# Patient Record
Sex: FEMALE | Race: WHITE | NOT HISPANIC OR LATINO | Employment: OTHER | URBAN - METROPOLITAN AREA
[De-identification: names, ages, dates, MRNs, and addresses within clinical notes are randomized per-mention and may not be internally consistent; named-entity substitution may affect disease eponyms.]

---

## 2017-01-02 ENCOUNTER — TELEPHONE (OUTPATIENT)
Dept: FAMILY MEDICINE | Facility: CLINIC | Age: 72
End: 2017-01-02

## 2017-01-02 NOTE — Clinical Note
January 26, 2017    Yamel Lindsay  [address] [address]      Dear Yamel Lindsay,     We have tried to contact you about your health, but have been unable to reach you.  Please call us as soon as possible so we can provide you with the best care possible.  We will continue to check in with you throughout the year to complete these items of care, if you are not able to complete these items at this time.  If you would like to complete the missing items for your care, please contact us at [phone].    We recommend the following:   -Complete and return the attached ASTHMA CONTROL TEST.  If your total score is 19 or less or you have been to the ER or urgent care for your asthma, then please schedule an asthma followup appointment.    Sincerely,     Your Care Team at [place]  EN

## 2017-01-02 NOTE — TELEPHONE ENCOUNTER
Please create a telephone encounter and send an ACT Questionnaire to this patient, last ACT score 0. Requested by Magdi.    Once the ACT Questionnaire has been sent out postpone the encounter for 2 weeks for the MA's to follow up with the patient.    Please place ACT Questionnaire order once the questionnaire has been completed and returned to clinic or if the MA's get a score over the phone, they will place the order then. Over the phone score will only be done if the patient has received the ACT Questionnaire in the mail already.    Libertad Stoner MA

## 2017-01-02 NOTE — Clinical Note
January 2, 2017    Yamel Lindsay  [address] [address]    Dear Yamel    We care about your health and have reviewed your health plan. We have reviewed your medical conditions, medication list, and lab results and are making recommendations based on this review, to better manage your health.    You are in particular need of attention regarding:  - Your Asthma, please complete the enclosed asthma questionnaire and mail it back to the clinic.      Here is a list of Health Maintenance topics that are due now or due soon:  Health Maintenance Due   Topic Date Due     HEPATITIS C SCREENING  06/15/1963     DEXA Q3 YR  07/05/2014     COLON CANCER SCREEN (SYSTEM ASSIGNED)  08/29/2015     MAMMO SCREEN Q2 YR (SYSTEM ASSIGNED)  02/18/2016     ADVANCE DIRECTIVE PLANNING Q5 YRS (NO INBASKET)  12/19/2016     We will be calling you in the next couple of weeks to help you schedule any appointments that are needed.  Please call us at [phone] (or use [employer]) to address the above recommendations.     Thank you for trusting [facility] and we appreciate the opportunity to serve you.  We look forward to supporting your healthcare needs in the future.      Healthy Regards,    Your Health Care Team  JPB/EN

## 2017-01-31 ENCOUNTER — TELEPHONE (OUTPATIENT)
Dept: FAMILY MEDICINE | Facility: CLINIC | Age: 72
End: 2017-01-31

## 2017-01-31 NOTE — TELEPHONE ENCOUNTER
Panel Management Review      Patient has the following on her problem list: None      Composite cancer screening  Chart review shows that this patient is due/due soon for the following Mammogram and Colonoscopy  Summary:    Patient is due/failing the following:   COLONOSCOPY and MAMMOGRAM    Action needed:   Patient needs office visit for colonoscopy and mammo.    Type of outreach:    Phone, spoke to patient.  Patient will do when she comes back from vacation     Questions for provider review:    None                                                                                                                                    Esme Strauss CMA       Chart routed to closed chart .

## 2017-02-02 ENCOUNTER — TELEPHONE (OUTPATIENT)
Dept: FAMILY MEDICINE | Facility: CLINIC | Age: 72
End: 2017-02-02

## 2017-02-14 NOTE — TELEPHONE ENCOUNTER
Received ACT back from patient. Score of 23, passing. No further action at this time.  Libertad Stoner MA

## 2017-02-15 ASSESSMENT — ASTHMA QUESTIONNAIRES: ACT_TOTALSCORE: 23

## 2017-02-23 ENCOUNTER — TELEPHONE (OUTPATIENT)
Dept: FAMILY MEDICINE | Facility: CLINIC | Age: 72
End: 2017-02-23

## 2017-02-27 NOTE — TELEPHONE ENCOUNTER
Your last mammogram was 2/18/14 at [facility]. You are due for a screening Mammogram.  Please contact the Diagnostics Registration Department at: [phone] to schedule this appointment.     Specialty Comments have been updated:    Panel Management: Telephone Encounter: Breast Cancer 2/20/17    Luis Root CMA

## 2017-02-28 NOTE — TELEPHONE ENCOUNTER
Talked with patient about mammogram and she will be gone 2 weeks and will call to schedule when comes back. dp    Panel Management Review      Patient has the following on her problem list: None      Composite cancer screening  Chart review shows that this patient is due/due soon for the following Mammogram  Summary:    Patient is due/failing the following:   PHYSICAL    Action needed:   Patient to call for mammogram when back in 2 weeks.    Type of outreach:    Phone, spoke to patient.  about mammogram appointment.     Questions for provider review:    None                                                                                                                                    Sneha Rogers     Chart routed to Care Team .

## 2017-03-02 ENCOUNTER — OFFICE VISIT (OUTPATIENT)
Dept: FAMILY MEDICINE | Facility: CLINIC | Age: 72
End: 2017-03-02
Payer: COMMERCIAL

## 2017-03-02 VITALS
BODY MASS INDEX: 28.21 KG/M2 | OXYGEN SATURATION: 98 % | DIASTOLIC BLOOD PRESSURE: 71 MMHG | SYSTOLIC BLOOD PRESSURE: 112 MMHG | WEIGHT: 158 LBS | TEMPERATURE: 97.2 F

## 2017-03-02 DIAGNOSIS — Z12.31 ENCOUNTER FOR SCREENING MAMMOGRAM FOR BREAST CANCER: ICD-10-CM

## 2017-03-02 DIAGNOSIS — F41.1 GENERALIZED ANXIETY DISORDER: Primary | ICD-10-CM

## 2017-03-02 DIAGNOSIS — F41.9 ANXIETY: ICD-10-CM

## 2017-03-02 DIAGNOSIS — F32.5 MAJOR DEPRESSION IN COMPLETE REMISSION (H): ICD-10-CM

## 2017-03-02 PROCEDURE — 99214 OFFICE O/P EST MOD 30 MIN: CPT | Performed by: FAMILY MEDICINE

## 2017-03-02 RX ORDER — CLONAZEPAM 0.5 MG/1
0.5 TABLET ORAL 3 TIMES DAILY PRN
Qty: 270 TABLET | Refills: 3 | Status: SHIPPED | OUTPATIENT
Start: 2017-03-02 | End: 2017-09-08

## 2017-03-02 ASSESSMENT — ANXIETY QUESTIONNAIRES
6. BECOMING EASILY ANNOYED OR IRRITABLE: NOT AT ALL
1. FEELING NERVOUS, ANXIOUS, OR ON EDGE: NEARLY EVERY DAY
2. NOT BEING ABLE TO STOP OR CONTROL WORRYING: SEVERAL DAYS
5. BEING SO RESTLESS THAT IT IS HARD TO SIT STILL: NOT AT ALL
3. WORRYING TOO MUCH ABOUT DIFFERENT THINGS: SEVERAL DAYS
GAD7 TOTAL SCORE: 7
7. FEELING AFRAID AS IF SOMETHING AWFUL MIGHT HAPPEN: SEVERAL DAYS

## 2017-03-02 ASSESSMENT — PATIENT HEALTH QUESTIONNAIRE - PHQ9: 5. POOR APPETITE OR OVEREATING: SEVERAL DAYS

## 2017-03-02 ASSESSMENT — PAIN SCALES - GENERAL: PAINLEVEL: NO PAIN (0)

## 2017-03-02 NOTE — MR AVS SNAPSHOT
"              After Visit Summary   3/2/2017    Yamel Lindsay    MRN: [id number]           Patient Information     Date Of Birth          1945        Visit Information        Provider Department      3/2/2017 10:40 AM Nimesh Nelson MD [facility]        Today's Diagnoses     Generalized anxiety disorder    -  1    Anxiety        Major depression in complete remission (H)        Encounter for screening mammogram for breast cancer           Follow-ups after your visit        Future tests that were ordered for you today     Open Future Orders        Priority Expected Expires Ordered    *MA Screening Digital Bilateral Routine  3/2/2018 3/2/2017            Who to contact     If you have questions or need follow up information about today's clinic visit or your schedule please contact [facility] directly at [phone].  Normal or non-critical lab and imaging results will be communicated to you by MyChart, letter or phone within 4 business days after the clinic has received the results. If you do not hear from us within 7 days, please contact the clinic through MyChart or phone. If you have a critical or abnormal lab result, we will notify you by phone as soon as possible.  Submit refill requests through [employer] or call your pharmacy and they will forward the refill request to us. Please allow 3 business days for your refill to be completed.          Additional Information About Your Visit        [employer] Information     [employer] lets you send messages to your doctor, view your test results, renew your prescriptions, schedule appointments and more. To sign up, go to [email or web address] . Click on \"Log in\" on the left side of the screen, which will take you to the Welcome page. Then click on \"Sign up Now\" on the right side of the page.     You will be asked to enter the access code listed below, as well as some personal information. Please follow the directions " to create your username and password.     Your access code is: [email or web address]  Expires: 2017 11:57 AM     Your access code will  in 90 days. If you need help or a new code, please call your [facility] or [phone].        Care EveryWhere ID     This is your Care EveryWhere ID. This could be used by other organizations to access your [place] medical records  [id number]        Your Vitals Were     Temperature Pulse Oximetry Breastfeeding? BMI (Body Mass Index)          97.2  F (36.2  C) (Oral) 98% No 28.21 kg/m2         Blood Pressure from Last 3 Encounters:   17 112/71   16 123/81   10/24/16 136/76    Weight from Last 3 Encounters:   17 158 lb (71.7 kg)   16 164 lb (74.4 kg)   10/24/16 164 lb (74.4 kg)                 Today's Medication Changes          These changes are accurate as of: 3/2/17 11:57 AM.  If you have any questions, ask your nurse or doctor.               These medicines have changed or have updated prescriptions.        Dose/Directions    clonazePAM 0.5 MG tablet   Commonly known as:  klonoPIN   This may have changed:    - when to take this  - reasons to take this   Used for:  Anxiety   Changed by:  Nimesh Nelson MD        Dose:  0.5 mg   Take 1 tablet (0.5 mg) by mouth 3 times daily as needed for anxiety   Quantity:  270 tablet   Refills:  3            Where to get your medicines      Some of these will need a paper prescription and others can be bought over the counter.  Ask your nurse if you have questions.     Bring a paper prescription for each of these medications     clonazePAM 0.5 MG tablet                Primary Care Provider Office Phone # Fax #    Catie Fraser [phone]       [facility] [address] [address]        Thank you!     Thank you for choosing [facility]  for your care. Our goal is always to provide you with excellent care. Hearing back from our  patients is one way we can continue to improve our services. Please take a few minutes to complete the written survey that you may receive in the mail after your visit with us. Thank you!             Your Updated Medication List - Protect others around you: Learn how to safely use, store and throw away your medicines at www.disposemymeds.org.          This list is accurate as of: 3/2/17 11:57 AM.  Always use your most recent med list.                   Brand Name Dispense Instructions for use    albuterol 108 (90 BASE) MCG/ACT Inhaler    PROAIR HFA/PROVENTIL HFA/VENTOLIN HFA    1 Inhaler    Inhale 2 puffs into the lungs every 6 hours as needed for shortness of breath / dyspnea or wheezing       CALCIUM 1000 + D PO      Take 1 tablet by mouth       clonazePAM 0.5 MG tablet    klonoPIN    270 tablet    Take 1 tablet (0.5 mg) by mouth 3 times daily as needed for anxiety       Fish Oil 1200 MG Caps      Take 1 capsule by mouth       FLUoxetine 40 MG capsule    PROzac    90 capsule    Take 1 capsule (40 mg) by mouth daily       hydrochlorothiazide 25 MG tablet    HYDRODIURIL    90 tablet    Take 1 tablet (25 mg) by mouth daily       ibuprofen 200 MG tablet    ADVIL/MOTRIN     Take 200-600 mg by mouth every 2 hours as needed for mild pain       omeprazole 10 MG CR capsule    priLOSEC    180 capsule    Take 2 capsules (20 mg) by mouth daily Take by mouth 30-60 minutes before a meal.       pravastatin 20 MG tablet    PRAVACHOL    90 tablet    Take 1 tablet (20 mg) by mouth At Bedtime 1 at bedtime.       PRENATAL VITAMINS PO      Take 1 tablet by mouth daily       traMADol 50 MG tablet    ULTRAM    120 tablet    Take 1 tablet (50 mg) by mouth every 6 hours as needed for pain       traZODone 50 MG tablet    DESYREL    180 tablet    Take 2 tablets (100 mg) by mouth At Bedtime       VITAMIN D-3 PO      Take 2,000 Units by mouth daily

## 2017-03-02 NOTE — LETTER
My Depression Action Plan  Name: Yamel Lindsay   Date of Birth 1945  Date: 3/2/2017    My doctor: Catie Fraser   My clinic: [address] [address] [phone]          GREEN    ZONE   Good Control    What it looks like:     Things are going generally well. You have normal up s and down s. You may even feel depressed from time to time, but bad moods usually last less than a day.   What you need to do:  1. Continue to care for yourself (see self care plan)  2. Check your depression survival kit and update it as needed  3. Follow your physician s recommendations including any medication.  4. Do not stop taking medication unless you consult with your physician first.           YELLOW         ZONE Getting Worse    What it looks like:     Depression is starting to interfere with your life.     It may be hard to get out of bed; you may be starting to isolate yourself from others.    Symptoms of depression are starting to last most all day and this has happened for several days.     You may have suicidal thoughts but they are not constant.   What you need to do:     1. Call your care team, your response to treatment will improve if you keep your care team informed of your progress. Yellow periods are signs an adjustment may need to be made.     2. Continue your self-care, even if you have to fake it!    3. Talk to someone in your support network    4. Open up your depression survival kit           RED    ZONE Medical Alert - Get Help    What it looks like:     Depression is seriously interfering with your life.     You may experience these or other symptoms: You can t get out of bed most days, can t work or engage in other necessary activities, you have trouble taking care of basic hygiene, or basic responsibilities, thoughts of suicide or death that will not go away, self-injurious behavior.     What you need to  do:  1. Call your care team and request a same-day appointment. If they are not available (weekends or after hours) call your local crisis line, emergency room or 911.      Electronically signed by: Nimesh Nelson, March 2, 2017    Depression Self Care Plan / Survival Kit    Self-Care for Depression  Here s the deal. Your body and mind are really not as separate as most people think.  What you do and think affects how you feel and how you feel influences what you do and think. This means if you do things that people who feel good do, it will help you feel better.  Sometimes this is all it takes.  There is also a place for medication and therapy depending on how severe your depression is, so be sure to consult with your medical provider and/ or Behavioral Health Consultant if your symptoms are worsening or not improving.     In order to better manage my stress, I will:    Exercise  Get some form of exercise, every day. This will help reduce pain and release endorphins, the  feel good  chemicals in your brain. This is almost as good as taking antidepressants!  This is not the same as joining a gym and then never going! (they count on that by the way ) It can be as simple as just going for a walk or doing some gardening, anything that will get you moving.      Hygiene   Maintain good hygiene (Get out of bed in the morning, Make your bed, Brush your teeth, Take a shower, and Get dressed like you were going to work, even if you are unemployed).  If your clothes don't fit try to get ones that do.    Diet  I will strive to eat foods that are good for me, drink plenty of water, and avoid excessive sugar, caffeine, alcohol, and other mood-altering substances.  Some foods that are helpful in depression are: complex carbohydrates, B vitamins, flaxseed, fish or fish oil, fresh fruits and vegetables.    Psychotherapy  I agree to participate in Individual Therapy (if recommended).    Medication  If prescribed medications, I  agree to take them.  Missing doses can result in serious side effects.  I understand that drinking alcohol, or other illicit drug use, may cause potential side effects.  I will not stop my medication abruptly without first discussing it with my provider.    Staying Connected With Others  I will stay in touch with my friends, family members, and my primary care provider/team.    Use your imagination  Be creative.  We all have a creative side; it doesn t matter if it s oil painting, sand castles, or mud pies! This will also kick up the endorphins.    Witness Beauty  (AKA stop and smell the roses) Take a look outside, even in mid-winter. Notice colors, textures. Watch the squirrels and birds.     Service to others  Be of service to others.  There is always someone else in need.  By helping others we can  get out of ourselves  and remember the really important things.  This also provides opportunities for practicing all the other parts of the program.    Humor  Laugh and be silly!  Adjust your TV habits for less news and crime-drama and more comedy.    Control your stress  Try breathing deep, massage therapy, biofeedback, and meditation. Find time to relax each day.     My support system    Clinic Contact:  Phone number:    Contact 1:  Phone number:    Contact 2:  Phone number:    Yazidism/:  Phone number:    Therapist:  Phone number:    Local crisis center:    Phone number:    Other community support:  Phone number:

## 2017-03-02 NOTE — NURSING NOTE
"Chief Complaint   Patient presents with     Recheck Medication     Refill Request     Klonopin       Initial /71  Temp 97.2  F (36.2  C) (Oral)  Wt 158 lb (71.7 kg)  SpO2 98%  Breastfeeding? No  BMI 28.21 kg/m2 Estimated body mass index is 28.21 kg/(m^2) as calculated from the following:    Height as of 12/20/16: 5' 2.76\" (1.594 m).    Weight as of this encounter: 158 lb (71.7 kg).  Medication Reconciliation: complete   Francisco Javier VARGAS    Prescription of Klonopin was given to patient at office visit  Date:  3/2/17  Signature:  Francisco Javier VARGAS        "

## 2017-03-03 ASSESSMENT — ANXIETY QUESTIONNAIRES: GAD7 TOTAL SCORE: 7

## 2017-05-01 ENCOUNTER — OFFICE VISIT (OUTPATIENT)
Dept: FAMILY MEDICINE | Facility: CLINIC | Age: 72
End: 2017-05-01
Payer: COMMERCIAL

## 2017-05-01 VITALS
WEIGHT: 160 LBS | HEART RATE: 79 BPM | TEMPERATURE: 98.3 F | SYSTOLIC BLOOD PRESSURE: 154 MMHG | HEIGHT: 63 IN | DIASTOLIC BLOOD PRESSURE: 82 MMHG | BODY MASS INDEX: 28.35 KG/M2

## 2017-05-01 DIAGNOSIS — K21.9 GASTROESOPHAGEAL REFLUX DISEASE WITHOUT ESOPHAGITIS: Primary | ICD-10-CM

## 2017-05-01 DIAGNOSIS — M17.0 OSTEOARTHRITIS OF BOTH KNEES, UNSPECIFIED OSTEOARTHRITIS TYPE: ICD-10-CM

## 2017-05-01 DIAGNOSIS — I10 BENIGN ESSENTIAL HYPERTENSION: ICD-10-CM

## 2017-05-01 PROCEDURE — 99214 OFFICE O/P EST MOD 30 MIN: CPT | Performed by: FAMILY MEDICINE

## 2017-05-01 RX ORDER — HYDROCHLOROTHIAZIDE 25 MG/1
25 TABLET ORAL DAILY
Qty: 90 TABLET | Refills: 3 | Status: SHIPPED | OUTPATIENT
Start: 2017-05-01 | End: 2018-04-16

## 2017-05-01 RX ORDER — OMEPRAZOLE 10 MG/1
20 CAPSULE, DELAYED RELEASE ORAL DAILY
Qty: 180 CAPSULE | Refills: 3 | Status: SHIPPED | OUTPATIENT
Start: 2017-05-01 | End: 2018-03-15

## 2017-05-01 RX ORDER — TRAMADOL HYDROCHLORIDE 50 MG/1
50 TABLET ORAL EVERY 6 HOURS PRN
Qty: 120 TABLET | Refills: 3 | Status: SHIPPED | OUTPATIENT
Start: 2017-05-01 | End: 2017-09-06

## 2017-05-01 NOTE — PATIENT INSTRUCTIONS
Thank you for choosing [facility].  You may be receiving a survey in the mail from [employer] Cisneros regarding your visit today.  Please take a few minutes to complete and return the survey to let us know how we are doing.      If you have questions or concerns, please contact us via [facility] or you can contact your care team at [phone].    Our Clinic hours are:  Monday 6:40 am  to 7:00 pm  Tuesday -Friday 6:40 am to 5:00 pm    The [place] outpatient lab hours are:  Monday - Friday 6:10 am to 4:45 pm  Saturdays 7:00 am to 11:00 am  Appointments are required, call [phone]    If you have clinical questions after hours or would like to schedule an appointment,  call the clinic at [phone].

## 2017-05-01 NOTE — MR AVS SNAPSHOT
After Visit Summary   5/1/2017    Yamel Lindsay    MRN: [id number]           Patient Information     Date Of Birth          1945        Visit Information        Provider Department      5/1/2017 12:40 PM Catie Fraser MD [facility]        Today's Diagnoses     Gastroesophageal reflux disease without esophagitis    -  1    Osteoarthritis of both knees, unspecified osteoarthritis type        Essential hypertension with goal blood pressure less than 130/80          Care Instructions          Thank you for choosing [facility]  You may be receiving a survey in the mail from [employer]is[employer] regarding your visit today.  Please take a few minutes to complete and return the survey to let us know how we are doing.      If you have questions or concerns, please contact us via Yun[employer] or you can contact your care team at [phone].    Our Clinic hours are:  Monday 6:40 am  to 7:00 pm  Tuesday -Friday 6:40 am to 5:00 pm    The [place] outpatient lab hours are:  Monday - Friday 6:10 am to 4:45 pm  Saturdays 7:00 am to 11:00 am  Appointments are required, call [phone]    If you have clinical questions after hours or would like to schedule an appointment,  call the clinic at [phone].        Follow-ups after your visit        Additional Services     ORTHO  REFERRAL       [facility] is referring you to the Orthopedic  Services at [facility].       The  Representative will assist you in the coordination of your Orthopedic and Musculoskeletal Care as prescribed by your physician.    The  Representative will call you within 1 business day to help schedule your appointment, or you may contact the  Representative at:    All areas ~ [phone]     Type of Referral : Surgical / Specialist       Timeframe requested: Routine    Coverage of these services is subject to the terms and limitations  of your health insurance plan.  Please call member services at your health plan with any benefit or coverage questions.      If X-rays, CT or MRI's have been performed, please contact the facility where they were done to arrange for , prior to your scheduled appointment.  Please bring this referral request to your appointment and present it to your specialist.                  Your next 10 appointments already scheduled     May 08, 2017  1:00 PM CDT   SHORT with FIOR ORTIZ/GURU RN   [facility]    [address] [address] [phone]            May 24, 2017  2:45 PM CDT   MA SCREENING DIGITAL BILATERAL with QUINCY   [facility] ([facility])    [address] [address] [phone]           Do not use any powder, lotion or deodorant under your arms or on your breast. If you do, we will ask you to remove it before your exam.  Wear comfortable, two-piece clothing.  If you have any allergies, tell your care team.  Bring any previous mammograms from other facilities or have them mailed to the breast center.              Who to contact     If you have questions or need follow up information about today's clinic visit or your schedule please contact [facility] directly at [phone].  Normal or non-critical lab and imaging results will be communicated to you by MyChart, letter or phone within 4 business days after the clinic has received the results. If you do not hear from us within 7 days, please contact the clinic through MyChart or phone. If you have a critical or abnormal lab result, we will notify you by phone as soon as possible.  Submit refill requests through [employer] or call your pharmacy and they will forward the refill request to us. Please allow 3 business days for your refill to be completed.          Additional Information About Your Visit        [employer] Information     [employer]  "lets you send messages to your doctor, view your test results, renew your prescriptions, schedule appointments and more. To sign up, go to [email or web address] . Click on \"Log in\" on the left side of the screen, which will take you to the Welcome page. Then click on \"Sign up Now\" on the right side of the page.     You will be asked to enter the access code listed below, as well as some personal information. Please follow the directions to create your username and password.     Your access code is: [email or web address]  Expires: 2017 12:57 PM     Your access code will  in 90 days. If you need help or a new code, please call your [place] [facility] clinic or [phone].        Care EveryWhere ID     This is your Care EveryWhere ID. This could be used by other organizations to access your [facility] medical records  [id number]        Your Vitals Were     Pulse Temperature Height BMI (Body Mass Index)          79 98.3  F (36.8  C) (Tympanic) 5' 2.75\" (1.594 m) 28.57 kg/m2         Blood Pressure from Last 3 Encounters:   17 154/82   17 112/71   16 123/81    Weight from Last 3 Encounters:   17 160 lb (72.6 kg)   17 158 lb (71.7 kg)   16 164 lb (74.4 kg)              We Performed the Following     ORTHO  REFERRAL          Where to get your medicines      These medications were sent to [facility] [place], [place] - [address]  [address], [place]. \[facility]"" [id number]     Phone:  [phone]     hydrochlorothiazide 25 MG tablet    omeprazole 10 MG CR capsule         Some of these will need a paper prescription and others can be bought over the counter.  Ask your nurse if you have questions.     Bring a paper prescription for each of these medications     traMADol 50 MG tablet          Primary Care Provider Office Phone # Fax #    Catie Fraser [phone]       [address]" [facility] [place] [address]        Thank you!     Thank you for choosing [facility]  for your care. Our goal is always to provide you with excellent care. Hearing back from our patients is one way we can continue to improve our services. Please take a few minutes to complete the written survey that you may receive in the mail after your visit with us. Thank you!             Your Updated Medication List - Protect others around you: Learn how to safely use, store and throw away your medicines at www.disposemymeds.org.          This list is accurate as of: 5/1/17  1:52 PM.  Always use your most recent med list.                   Brand Name Dispense Instructions for use    albuterol 108 (90 BASE) MCG/ACT Inhaler    PROAIR HFA/PROVENTIL HFA/VENTOLIN HFA    1 Inhaler    Inhale 2 puffs into the lungs every 6 hours as needed for shortness of breath / dyspnea or wheezing       CALCIUM 1000 + D PO      Take 1 tablet by mouth       clonazePAM 0.5 MG tablet    klonoPIN    270 tablet    Take 1 tablet (0.5 mg) by mouth 3 times daily as needed for anxiety       Fish Oil 1200 MG Caps      Take 1 capsule by mouth       FLUoxetine 40 MG capsule    PROzac    90 capsule    Take 1 capsule (40 mg) by mouth daily       hydrochlorothiazide 25 MG tablet    HYDRODIURIL    90 tablet    Take 1 tablet (25 mg) by mouth daily       ibuprofen 200 MG tablet    ADVIL/MOTRIN     Take 200-600 mg by mouth every 2 hours as needed for mild pain       omeprazole 10 MG CR capsule    priLOSEC    180 capsule    Take 2 capsules (20 mg) by mouth daily Take by mouth 30-60 minutes before a meal.       pravastatin 20 MG tablet    PRAVACHOL    90 tablet    Take 1 tablet (20 mg) by mouth At Bedtime 1 at bedtime.       PRENATAL VITAMINS PO      Take 1 tablet by mouth daily       traMADol 50 MG tablet    ULTRAM    120 tablet    Take 1 tablet (50 mg) by mouth every 6 hours as needed for pain       traZODone 50 MG tablet    DESYREL    180 tablet    Take 2  tablets (100 mg) by mouth At Bedtime       VITAMIN D-3 PO      Take 2,000 Units by mouth daily

## 2017-05-01 NOTE — PROGRESS NOTES
SUBJECTIVE:                                                    Yamel Lindsay is a 71 year old female who presents to clinic today for the following health issues:      Hypertension Follow-up  Patient left her medication at a hotel and needs a refill     Outpatient blood pressures are not being checked.    Low Salt Diet: low salt       Chronic Pain Follow-Up       Type / Location of Pain: arthritis in R knees   Analgesia/pain control:       Recent changes:  worse      Overall control: Tolerable with discomfort  Activity level/function:      Daily activities:  Able to do moderate activities    Work:  not applicable  Adverse effects:  No  Adherance    Taking medication as directed?  Yes    Participating in other treatments: yes  Risk Factors:    Sleep:  Good    Mood/anxiety:  controlled    Recent family or social stressors:  none noted    Other aggravating factors: none  PHQ-9 SCORE 2/22/2016 10/24/2016 12/20/2016   Total Score - - -   Total Score 2 5 12     PAOLA-7 SCORE 10/24/2016 12/20/2016 3/2/2017   Total Score - - -   Total Score 9 15 7     Encounter-Level CSA - 02/20/2015:                 Controlled Substance Agreement - Scan on 2/26/2015  9:06 AM : Controlled Medication Agreement-  2/20/15 (below)                 Amount of exercise or physical activity: 2-3 days/week for an average of 15-30 minutes    Problems taking medications regularly: No    Medication side effects: none    Diet: low salt    Medication Followup of HCTZ Prilosec and tramadol    Taking Medication as prescribed: yes    Side Effects:  None    Medication Helping Symptoms:  yes     Joint Pain     Onset: L knee pain     Description: Patient's l/knee will buckle and throw her off balance happens about 3-4 times daily   Location: L knee  Character: Sharp and haylie     Intensity: mild    Progression of Symptoms: same    Accompanying Signs & Symptoms:  Other symptoms: none   History:   Previous similar pain: no       Precipitating factors:   Trauma  or overuse: no     Alleviating factors:  Improved by: nothing       Therapies Tried and outcome: none     Patient is a 71 yr old female here for medication refills. She is on tramadol for chronic knee pain. She reports that she has not been seen by any orthopedist for this. I recommended that perhaps she should consider this .       Problem list and histories reviewed & adjusted, as indicated.  Additional history: as documented    Patient Active Problem List   Diagnosis     Hyperlipidemia LDL goal <130     Primary localized osteoarthrosis, lower leg     Generalized anxiety disorder     Rhinitis, allergic seasonal     Alcoholism (H)     Mild recurrent major depression (H)     Advanced directives, counseling/discussion     Seasonal allergic rhinitis     Hip pain     GERD (gastroesophageal reflux disease)     S/P laparoscopic hernia repair     OA (osteoarthritis) of knee     Mild intermittent asthma without complication     Major depression in complete remission (H)     Benign essential hypertension     Past Surgical History:   Procedure Laterality Date     ESOPHAGOSCOPY, GASTROSCOPY, DUODENOSCOPY (EGD), COMBINED N/A 1/5/2015    Procedure: COMBINED ESOPHAGOSCOPY, GASTROSCOPY, DUODENOSCOPY (EGD), BIOPSY SINGLE OR MULTIPLE;  Surgeon: Dylan Alejandra MD;  Location: [facility]     LAPAROSCOPIC HERNIORRHAPHY INGUINAL Right 1/26/2015    Procedure: LAPAROSCOPIC HERNIORRHAPHY INGUINAL;  Surgeon: Favio Olsen MD;  Location: [facility]     TUBAL LIGATION         Social History   Substance Use Topics     Smoking status: Former Smoker     Packs/day: 0.50     Types: Cigarettes     Quit date: 6/16/2011     Smokeless tobacco: Never Used     Alcohol use No     Family History   Problem Relation Age of Onset     CANCER Mother      C.A.D. Father      Lipids Father      Hypertension Father      Hypertension Brother      CANCER Brother      esophageal cancer     Breast Cancer Other      Prostate Cancer Brother      Psychotic Disorder Daughter       DIABETES No family hx of      CEREBROVASCULAR DISEASE No family hx of      Cancer - colorectal No family hx of          Current Outpatient Prescriptions   Medication Sig Dispense Refill     traMADol (ULTRAM) 50 MG tablet Take 1 tablet (50 mg) by mouth every 6 hours as needed for pain 120 tablet 3     omeprazole (PRILOSEC) 10 MG CR capsule Take 2 capsules (20 mg) by mouth daily Take by mouth 30-60 minutes before a meal. 180 capsule 3     hydrochlorothiazide (HYDRODIURIL) 25 MG tablet Take 1 tablet (25 mg) by mouth daily 90 tablet 3     clonazePAM (KLONOPIN) 0.5 MG tablet Take 1 tablet (0.5 mg) by mouth 3 times daily as needed for anxiety 270 tablet 3     FLUoxetine (PROZAC) 40 MG capsule Take 1 capsule (40 mg) by mouth daily 90 capsule 3     albuterol (PROAIR HFA/PROVENTIL HFA/VENTOLIN HFA) 108 (90 BASE) MCG/ACT Inhaler Inhale 2 puffs into the lungs every 6 hours as needed for shortness of breath / dyspnea or wheezing 1 Inhaler 3     pravastatin (PRAVACHOL) 20 MG tablet Take 1 tablet (20 mg) by mouth At Bedtime 1 at bedtime. 90 tablet 3     traZODone (DESYREL) 50 MG tablet Take 2 tablets (100 mg) by mouth At Bedtime 180 tablet 3     Calcium Carb-Cholecalciferol (CALCIUM 1000 + D PO) Take 1 tablet by mouth       Omega-3 Fatty Acids (FISH OIL) 1200 MG CAPS Take 1 capsule by mouth       PRENATAL VITAMINS PO Take 1 tablet by mouth daily        Cholecalciferol (VITAMIN D-3 PO) Take 2,000 Units by mouth daily        ibuprofen (ADVIL,MOTRIN) 200 MG tablet Take 200-600 mg by mouth every 2 hours as needed for mild pain       Allergies   Allergen Reactions     Nka [No Known Allergies]      Seasonal Allergies      BP Readings from Last 3 Encounters:   05/01/17 154/82   03/02/17 112/71   12/20/16 123/81    Wt Readings from Last 3 Encounters:   05/01/17 160 lb (72.6 kg)   03/02/17 158 lb (71.7 kg)   12/20/16 164 lb (74.4 kg)                  Labs reviewed in EPIC    Reviewed and updated as needed this visit by clinical  "staff  Allergies       Reviewed and updated as needed this visit by Provider         ROS:  Constitutional, HEENT, cardiovascular, pulmonary, gi and gu systems are negative, except as otherwise noted.    OBJECTIVE:                                                    /82  Pulse 79  Temp 98.3  F (36.8  C) (Tympanic)  Ht 5' 2.75\" (1.594 m)  Wt 160 lb (72.6 kg)  BMI 28.57 kg/m2  Body mass index is 28.57 kg/(m^2).  GENERAL: healthy, alert and no distress  NECK: no adenopathy, no asymmetry, masses, or scars and thyroid normal to palpation  RESP: lungs clear to auscultation - no rales, rhonchi or wheezes  CV: regular rate and rhythm, normal S1 S2, no S3 or S4, no murmur, click or rub, no peripheral edema and peripheral pulses strong  ABDOMEN: soft, nontender, no hepatosplenomegaly, no masses and bowel sounds normal  MS: no gross musculoskeletal defects noted, no edema    Diagnostic Test Results:  none      ASSESSMENT/PLAN:                                                    (K21.9) Gastroesophageal reflux disease without esophagitis  (primary encounter diagnosis)  Comment: medication refilled  Plan: omeprazole (PRILOSEC) 10 MG CR capsule            (M17.0) Osteoarthritis of both knees, unspecified osteoarthritis type  Comment: Medication refilled  Plan: traMADol (ULTRAM) 50 MG tablet, ORTHO         REFERRAL              (I10) Benign essential hypertension  Comment: High today. She will come back for a recheck   Plan: hydrochlorothiazide (HYDRODIURIL) 25 MG tablet                FUTURE APPOINTMENTS:       - Follow-up visit as needed.    Catie Fraser MD  [facility]  "

## 2017-05-04 PROBLEM — I10 BENIGN ESSENTIAL HYPERTENSION: Status: ACTIVE | Noted: 2017-05-04

## 2017-05-08 ENCOUNTER — TELEPHONE (OUTPATIENT)
Dept: FAMILY MEDICINE | Facility: CLINIC | Age: 72
End: 2017-05-08

## 2017-05-08 ENCOUNTER — OFFICE VISIT (OUTPATIENT)
Dept: FAMILY MEDICINE | Facility: CLINIC | Age: 72
End: 2017-05-08
Payer: COMMERCIAL

## 2017-05-08 ENCOUNTER — HOSPITAL ENCOUNTER (OUTPATIENT)
Dept: MAMMOGRAPHY | Facility: CLINIC | Age: 72
Discharge: HOME | End: 2017-05-08
Attending: FAMILY MEDICINE | Admitting: FAMILY MEDICINE
Payer: MEDICARE

## 2017-05-08 VITALS — SYSTOLIC BLOOD PRESSURE: 131 MMHG | HEART RATE: 68 BPM | DIASTOLIC BLOOD PRESSURE: 89 MMHG

## 2017-05-08 DIAGNOSIS — Z12.31 ENCOUNTER FOR SCREENING MAMMOGRAM FOR BREAST CANCER: ICD-10-CM

## 2017-05-08 DIAGNOSIS — I10 BENIGN ESSENTIAL HYPERTENSION: Primary | ICD-10-CM

## 2017-05-08 DIAGNOSIS — E78.5 HYPERLIPIDEMIA LDL GOAL <130: ICD-10-CM

## 2017-05-08 PROCEDURE — G0202 SCR MAMMO BI INCL CAD: HCPCS

## 2017-05-08 PROCEDURE — 99207 ZZC NO CHARGE NURSE ONLY: CPT

## 2017-05-08 PROCEDURE — 77063 BREAST TOMOSYNTHESIS BI: CPT

## 2017-05-08 NOTE — MR AVS SNAPSHOT
After Visit Summary   5/8/2017    Yamel Lindsay    MRN: [id number]           Patient Information     Date Of Birth          1945        Visit Information        Provider Department      5/8/2017 1:00 PM FIOR ORTIZ/GURU PETERSON [facility]        Today's Diagnoses     Benign essential hypertension    -  1    Hyperlipidemia LDL goal <130           Follow-ups after your visit        Your next 10 appointments already scheduled     May 24, 2017  2:45 PM CDT   MA SCREENING DIGITAL BILATERAL with [facility]   [facility] ([facility])    [address] [place] [address] [phone]           Do not use any powder, lotion or deodorant under your arms or on your breast. If you do, we will ask you to remove it before your exam.  Wear comfortable, two-piece clothing.  If you have any allergies, tell your care team.  Bring any previous mammograms from other facilities or have them mailed to the breast center.              Future tests that were ordered for you today     Open Future Orders        Priority Expected Expires Ordered    Basic metabolic panel Routine  8/31/2017 5/8/2017    Lipid Profile with reflex to direct LDL Routine  8/31/2017 5/8/2017            Who to contact     If you have questions or need follow up information about today's clinic visit or your schedule please contact [facility] directly at [phone].  Normal or non-critical lab and imaging results will be communicated to you by MyChart, letter or phone within 4 business days after the clinic has received the results. If you do not hear from us within 7 days, please contact the clinic through MyChart or phone. If you have a critical or abnormal lab result, we will notify you by phone as soon as possible.  Submit refill requests through [facility] or call your pharmacy and they will forward the refill request to us. Please allow 3 business days for your refill to be completed.           "Additional Information About Your Visit        MyChart Information     [employer] lets you send messages to your doctor, view your test results, renew your prescriptions, schedule appointments and more. To sign up, go to [email or web address] . Click on \"Log in\" on the left side of the screen, which will take you to the Welcome page. Then click on \"Sign up Now\" on the right side of the page.     You will be asked to enter the access code listed below, as well as some personal information. Please follow the directions to create your username and password.     Your access code is: [email or web address]  Expires: 2017 12:57 PM     Your access code will  in 90 days. If you need help or a new code, please call your [facility] clinic or [phone].        Care EveryWhere ID     This is your Care EveryWhere ID. This could be used by other organizations to access your [facility] medical records  [id number]        Your Vitals Were     Pulse                   68            Blood Pressure from Last 3 Encounters:   17 131/89   17 154/82   17 112/71    Weight from Last 3 Encounters:   17 160 lb (72.6 kg)   17 158 lb (71.7 kg)   16 164 lb (74.4 kg)               Primary Care Provider Office Phone # Fax #    Shanaana lilia Taj Fraser [phone]       [facility] [address] [address]        Thank you!     Thank you for choosing [facility]  for your care. Our goal is always to provide you with excellent care. Hearing back from our patients is one way we can continue to improve our services. Please take a few minutes to complete the written survey that you may receive in the mail after your visit with us. Thank you!             Your Updated Medication List - Protect others around you: Learn how to safely use, store and throw away your medicines at www.disposemymeds.org.          This list is accurate as of: 17  1:31 PM.  Always use " your most recent med list.                   Brand Name Dispense Instructions for use    albuterol 108 (90 BASE) MCG/ACT Inhaler    PROAIR HFA/PROVENTIL HFA/VENTOLIN HFA    1 Inhaler    Inhale 2 puffs into the lungs every 6 hours as needed for shortness of breath / dyspnea or wheezing       CALCIUM 1000 + D PO      Take 1 tablet by mouth       clonazePAM 0.5 MG tablet    klonoPIN    270 tablet    Take 1 tablet (0.5 mg) by mouth 3 times daily as needed for anxiety       Fish Oil 1200 MG Caps      Take 1 capsule by mouth       FLUoxetine 40 MG capsule    PROzac    90 capsule    Take 1 capsule (40 mg) by mouth daily       hydrochlorothiazide 25 MG tablet    HYDRODIURIL    90 tablet    Take 1 tablet (25 mg) by mouth daily       ibuprofen 200 MG tablet    ADVIL/MOTRIN     Take 200-600 mg by mouth every 2 hours as needed for mild pain       omeprazole 10 MG CR capsule    priLOSEC    180 capsule    Take 2 capsules (20 mg) by mouth daily Take by mouth 30-60 minutes before a meal.       pravastatin 20 MG tablet    PRAVACHOL    90 tablet    Take 1 tablet (20 mg) by mouth At Bedtime 1 at bedtime.       PRENATAL VITAMINS PO      Take 1 tablet by mouth daily       traMADol 50 MG tablet    ULTRAM    120 tablet    Take 1 tablet (50 mg) by mouth every 6 hours as needed for pain       traZODone 50 MG tablet    DESYREL    180 tablet    Take 2 tablets (100 mg) by mouth At Bedtime       VITAMIN D-3 PO      Take 2,000 Units by mouth daily

## 2017-05-08 NOTE — TELEPHONE ENCOUNTER
Pt returned for blood pressure recheck. Pt was last seen 5/1/17 and her blood pressure was noted to be elevated.  Pt explained that she had been out of town and accidentally left her medications at the hotel she was staying. Pt says she called the hotel but no lost meds were turned into hotel staff.   Pt updates that she has been on her medications for one week.     Pt is at goal now. 138/74.  Recheck a few minutes later is 131/89, pulse of 68.     EPIC was slow and not up while I was seeing pt.     After she left, I saw that she was due for lab work.  Lab orders placed.     Left message for patient to return a call to the clinic RN.      Gemma Mendieta RN              Lab Results   Component Value Date     CR 0.57 01/26/2015            Lab Results   Component Value Date     POTASSIUM 3.2 01/26/2015

## 2017-05-08 NOTE — PROGRESS NOTES
Pt returned for blood pressure recheck. Pt was last seen 5/1/17 and her blood pressure was noted to be elevated.  Pt explained that she had been out of town and accidentally left her medications at the hotel she was staying. Pt says she called the hotel but no lost meds were turned into hotel staff.   Pt updates that she has been on her medications for one week.     Pt is at goal now. 138/74.  Recheck a few minutes later is 131/89, pulse of 68.     EPIC was slow and not up while I was seeing pt.     After she left, I saw that she was due for lab work.     Left message for patient to return a call to the clinic RN.      Gemma Mendieta RN

## 2017-05-08 NOTE — NURSING NOTE
Pt returned for blood pressure recheck.  Pt was last seen 5/1/17 and her blood pressure was noted to be elevated.  Pt explained that she had been out of town and accidentally left her medications at the hotel she was staying.  Pt says she called the hotel but no lost meds were turned into hotel staff.   Pt updates that she has been on her medications for one week.    Pt is at goal now.  138/74.  Recheck a few minutes later is 131/89, pulse of 68.    EPIC was slow and not up while I was seeing pt.    After she left, I saw that she was due for lab work.    Left message for patient to return a call to the clinic RN.        Gemma Mendieta RN      Lab Results   Component Value Date    CR 0.57 01/26/2015     Lab Results   Component Value Date    POTASSIUM 3.2 01/26/2015

## 2017-05-09 ENCOUNTER — TRANSFERRED RECORDS (OUTPATIENT)
Dept: HEALTH INFORMATION MANAGEMENT | Facility: CLINIC | Age: 72
End: 2017-05-09

## 2017-05-09 DIAGNOSIS — I10 BENIGN ESSENTIAL HYPERTENSION: ICD-10-CM

## 2017-05-09 DIAGNOSIS — E78.5 HYPERLIPIDEMIA LDL GOAL <130: ICD-10-CM

## 2017-05-09 LAB
ANION GAP SERPL CALCULATED.3IONS-SCNC: 7 MMOL/L (ref 3–14)
BUN SERPL-MCNC: 10 MG/DL (ref 7–30)
CALCIUM SERPL-MCNC: 8.9 MG/DL (ref 8.5–10.1)
CHLORIDE SERPL-SCNC: 96 MMOL/L (ref 94–109)
CHOLEST SERPL-MCNC: 197 MG/DL
CO2 SERPL-SCNC: 34 MMOL/L (ref 20–32)
CREAT SERPL-MCNC: 0.66 MG/DL (ref 0.52–1.04)
GFR SERPL CREATININE-BSD FRML MDRD: 89 ML/MIN/1.7M2
GLUCOSE SERPL-MCNC: 81 MG/DL (ref 70–99)
HDLC SERPL-MCNC: 47 MG/DL
LDLC SERPL CALC-MCNC: 127 MG/DL
NONHDLC SERPL-MCNC: 150 MG/DL
POTASSIUM SERPL-SCNC: 4.4 MMOL/L (ref 3.4–5.3)
SODIUM SERPL-SCNC: 137 MMOL/L (ref 133–144)
TRIGL SERPL-MCNC: 113 MG/DL

## 2017-05-09 PROCEDURE — 80048 BASIC METABOLIC PNL TOTAL CA: CPT | Performed by: FAMILY MEDICINE

## 2017-05-09 PROCEDURE — 36415 COLL VENOUS BLD VENIPUNCTURE: CPT | Performed by: FAMILY MEDICINE

## 2017-05-09 PROCEDURE — 80061 LIPID PANEL: CPT | Performed by: FAMILY MEDICINE

## 2017-05-09 NOTE — LETTER
[facility]  [address] [address]  Phone: [phone]  Fax: [phone]    May 10, 2017    Yamel Lindsay  [address] [address]          Dear Ms. Lindsay,    The results of your recent lab tests were: Enclosed is a copy of these results.  If you have any further questions or problems, BMP ) was within normal parameters. Her good cholesterol was a bit low and her LDL which is the bad cholesterol went up a bit.She is advised to continue with healthy living . please contact our office.    Sincerely,      Ctaie Fraser MD / jelani

## 2017-05-10 NOTE — PROGRESS NOTES
Please inform patient that test result ( BMP ) was within normal parameters. Her good cholesterol was a bit low and her LDL which is the bad cholesterol went up a bit.She is advised to continue with healthy living .     Thank you.     Catie Fraser M.D.

## 2017-05-11 ENCOUNTER — THERAPY VISIT (OUTPATIENT)
Dept: PHYSICAL THERAPY | Facility: CLINIC | Age: 72
End: 2017-05-11
Payer: COMMERCIAL

## 2017-05-11 DIAGNOSIS — M25.562 ACUTE PAIN OF LEFT KNEE: ICD-10-CM

## 2017-05-11 DIAGNOSIS — M25.561 CHRONIC PAIN OF RIGHT KNEE: Primary | ICD-10-CM

## 2017-05-11 DIAGNOSIS — G89.29 CHRONIC PAIN OF RIGHT KNEE: Primary | ICD-10-CM

## 2017-05-11 PROCEDURE — 97110 THERAPEUTIC EXERCISES: CPT | Mod: GP | Performed by: PHYSICAL THERAPIST

## 2017-05-11 PROCEDURE — 97161 PT EVAL LOW COMPLEX 20 MIN: CPT | Mod: GP | Performed by: PHYSICAL THERAPIST

## 2017-05-11 ASSESSMENT — ACTIVITIES OF DAILY LIVING (ADL)
SIT WITH YOUR KNEE BENT: ACTIVITY IS VERY DIFFICULT
PAIN: THE SYMPTOM AFFECTS MY ACTIVITY MODERATELY
SQUAT: ACTIVITY IS FAIRLY DIFFICULT
STAND: ACTIVITY IS MINIMALLY DIFFICULT
KNEE_ACTIVITY_OF_DAILY_LIVING_SCORE: 57.14
RISE FROM A CHAIR: ACTIVITY IS SOMEWHAT DIFFICULT
WEAKNESS: I HAVE THE SYMPTOM BUT IT DOES NOT AFFECT MY ACTIVITY
RAW_SCORE: 40
KNEEL ON THE FRONT OF YOUR KNEE: ACTIVITY IS FAIRLY DIFFICULT
AS_A_RESULT_OF_YOUR_KNEE_INJURY,_HOW_WOULD_YOU_RATE_YOUR_CURRENT_LEVEL_OF_DAILY_ACTIVITY?: NEARLY NORMAL
GIVING WAY, BUCKLING OR SHIFTING OF KNEE: THE SYMPTOM AFFECTS MY ACTIVITY SEVERELY
GO UP STAIRS: ACTIVITY IS MINIMALLY DIFFICULT
SWELLING: I HAVE THE SYMPTOM BUT IT DOES NOT AFFECT MY ACTIVITY
HOW_WOULD_YOU_RATE_THE_OVERALL_FUNCTION_OF_YOUR_KNEE_DURING_YOUR_USUAL_DAILY_ACTIVITIES?: NEARLY NORMAL
STIFFNESS: I HAVE THE SYMPTOM BUT IT DOES NOT AFFECT MY ACTIVITY
WALK: ACTIVITY IS SOMEWHAT DIFFICULT
GO DOWN STAIRS: ACTIVITY IS MINIMALLY DIFFICULT
LIMPING: THE SYMPTOM AFFECTS MY ACTIVITY MODERATELY
KNEE_ACTIVITY_OF_DAILY_LIVING_SUM: 40

## 2017-05-11 NOTE — PROGRESS NOTES
[facility] Initial Evaluation -- Lower Extremity    Evaluation Date: May 11, 2017  Yamel Lindsay is a 71 year old female with a (R) knee condition.   Referral: Ortho  Work mechanical stresses: NA   Employment status: Retired  Leisure mechanical stresses: Walking 40 min-1hr daily  Functional disability score: See KOS  VAS score (0-10): 2-4/10 pain varying intensity  Patient goals/expectations:  Reduce pain with activities    HISTORY:    Present symptoms: (L) ant/lat knee, (R) ant knee.  R > L  Pain quality (sharp/shooting/stabbing/aching/burning/cramping):  achy    Present since (onset date): MD referral date 5/9/17.  (R) knee 15 yrs (L) knee 1 month ago  Symptoms (improving/unchaning/worsening):  Worsening (R) knee, unchanging (L).    Symptoms commenced as a result of: No apparent reason   Condition occurred in the following environment: Unknown     Symptoms at onset: (R) knee  Paresthesia (yes/no):  No  Spinal history: Yes, intermittent 2 x week depending on activity but lasts usually 1 day.  Ongoing like this for years  Cough/Sneeze (pos/neg):  Neg    Constant symptoms: None  Intermittent symptoms: As above.  (L) knee only 2-3 x week when she gets buckling of knee then pain and limping the remaining part of day but then subsides.    Symptoms are worse with the following: Sometimes Sitting, Sometimes Walking, Sometimes On the move and Sometimes Sleeping (prone/sup/side R/L) - Wakes regardless of position, Time of day - always am   Symptoms are better with the following: Sometimes When still    Continued use makes the pain (better/worse/no effect): No effect    Disturbed night (yes/no): Yes      Pain at rest (yes/no):  Yes  Site (back/hip/knee/ankle/foot):  (R) Knee    Other questions (swelling/clicking/locking/giving way/falling):  Buckling - Left knee     Previous episodes: Yes  Previous treatments: cortisone injections -10 years ago    Specific Questions:  General health  (excellent/good/fair/poor):  Excellent  Pertinent medical history includes: Osteoporosis and Depression  Medications (nil/NSAIDS/analg/steroids/anticoag/other):  Narcotics/Opiods and Other - Anti-depressants  Medical allergies:  None  Imaging (NA/Xray/MRI):  Xray  Recent or major surgery (yes/no):  No  Night pain (yes/no):  No  Accidents (yes/no):  No  Unexplained weight loss (yes/no):  No  Barriers at home: None  Other red flags: None    Sites for physical examination (back/hip/knee/ankle/foot/other): Knee's    EXAMINATION    Posture:  Sitting (good/fair/poor): NA    Correction of Posture (better/worse/no effect/NA): NA  Standing (good/fair/poor): NA  Other observations:  NA    Neurological: (NA/motor/sensory/reflexes/dural): NA    Baselines (pain or functional activity): Painful walking, squatting    Extremities (Hip / Knee / Ankle / Foot): Knee    Movement Loss Juan Jose Mod Min Nil Pain   Flexion   X  (R) 132/ERP; (L) 140   Extension   X  (R) 0/ERP; (L) -2     Passive Movement (+/-) over pressure:  (R) ext min loss, flex min loss/ERP; (L) WNL  Resisted Test Response (pain): Quad 5/5 (B); H-S 5/5 (B)  Other Tests: NA    Spine:  Movement loss: NA  Effect of repeated movements: NA  Effect of static positioning: NA  Spine testing (not relevant/relevant/secondary problem): Secondary problem?    Baseline Symptoms: 3/10 pain (R) knee w/squatting, ROM as above  Repeated Tests Symptom Response Mechanical Response   Active/Passive movement, resisted test, functional test During -  Produce, Abolish, Increase, Decrease, NE After -  Better, Worse, NB, NW, NE Effect -   ? or ? ROM, strength or key functional test No   Effect   Passive extension w/self OP (standing) Produce  (R) ant knee No Worse  X   Eccentric ext w/resistance Produce  (R) ant knee No Worse X  Ext ROM = contra X  Pain w/squat or Flex ROM   Passive flexion w/OP Produce  (R) ant knee No Worse X  Flex = contralateral  Abolishes pain with squat X  Ext ROM remains  =  contralateral   Effect of static positioning       NA         Provisional Classification (Extremity/Spine):  Extremity - Derangement      Princicple of Management:   Education:  Etiology, specificity of exercise in treatment of pain    Equipment provided:  None  Exercise and dosage:  Repeated flex w/OP x 10-15 reps, 3-4 x daily.  Perform this on (L) side when noticing symptoms and assess response.  If improves continue, if worsens, instructed to perform repeated extension w/self OP.    ASSESSMENT/PLAN:    Patient is a 71 year old female with right side knee complaints.    Patient has the following significant findings with corresponding treatment plan.                Diagnosis 1:  (B) Knee Pain    Pain -  self management, education, directional preference exercise and home program  Decreased ROM/flexibility - manual therapy, therapeutic exercise and home program  Decreased joint mobility - manual therapy, therapeutic exercise and home program  Decreased strength - therapeutic exercise, therapeutic activities and home program  Impaired muscle performance - neuro re-education and home program  Decreased function - therapeutic activities and home program    Therapy Evaluation Codes:   1) History comprised of:   Personal factors that impact the plan of care:      None.    Comorbidity factors that impact the plan of care are:      Depression.     Medications impacting care: Anti-depressant.  2) Examination of Body Systems comprised of:   Body structures and functions that impact the plan of care:      Knee.   Activity limitations that impact the plan of care are:      Sitting, Squatting/kneeling and Walking.  3) Clinical presentation characteristics are:   Stable/Uncomplicated.  4) Decision-Making    Moderate complexity using standardized patient assessment instrument and/or measureable assessment of functional outcome.  Cumulative Therapy Evaluation is: Low complexity.    Previous and current functional limitations:   (See Goal Flow Sheet for this information)    Short term and Long term goals: (See Goal Flow Sheet for this information)     Communication ability:  Patient appears to be able to clearly communicate and understand verbal and written communication and follow directions correctly.  Treatment Explanation - The following has been discussed with the patient:   RX ordered/plan of care  Anticipated outcomes  Possible risks and side effects  This patient would benefit from PT intervention to resume normal activities.   Rehab potential is good.    Frequency:  1 X week, once daily  Duration:  for 6 weeks  Discharge Plan:  Achieve all LTG.  Independent in home treatment program.  Reach maximal therapeutic benefit.    Please refer to the daily flowsheet for treatment today, total treatment time and time spent performing 1:1 timed codes.

## 2017-05-17 ENCOUNTER — OFFICE VISIT (OUTPATIENT)
Dept: FAMILY MEDICINE | Facility: CLINIC | Age: 72
End: 2017-05-17
Payer: COMMERCIAL

## 2017-05-17 VITALS
DIASTOLIC BLOOD PRESSURE: 68 MMHG | TEMPERATURE: 97 F | WEIGHT: 160 LBS | OXYGEN SATURATION: 94 % | BODY MASS INDEX: 28.57 KG/M2 | SYSTOLIC BLOOD PRESSURE: 126 MMHG | HEART RATE: 71 BPM

## 2017-05-17 DIAGNOSIS — R82.90 ABNORMAL URINE FINDING: ICD-10-CM

## 2017-05-17 DIAGNOSIS — R30.0 DYSURIA: Primary | ICD-10-CM

## 2017-05-17 LAB
ALBUMIN UR-MCNC: NEGATIVE MG/DL
APPEARANCE UR: CLEAR
BACTERIA #/AREA URNS HPF: ABNORMAL /HPF
BILIRUB UR QL STRIP: NEGATIVE
COLOR UR AUTO: YELLOW
GLUCOSE UR STRIP-MCNC: NEGATIVE MG/DL
HGB UR QL STRIP: ABNORMAL
KETONES UR STRIP-MCNC: NEGATIVE MG/DL
LEUKOCYTE ESTERASE UR QL STRIP: ABNORMAL
NITRATE UR QL: NEGATIVE
NON-SQ EPI CELLS #/AREA URNS LPF: ABNORMAL /LPF
PH UR STRIP: 6 PH (ref 5–7)
RBC #/AREA URNS AUTO: ABNORMAL /HPF (ref 0–2)
SP GR UR STRIP: <=1.005 (ref 1–1.03)
URN SPEC COLLECT METH UR: ABNORMAL
URNS CMNT MICRO: ABNORMAL
UROBILINOGEN UR STRIP-ACNC: 0.2 EU/DL (ref 0.2–1)
WBC #/AREA URNS AUTO: ABNORMAL /HPF (ref 0–2)

## 2017-05-17 PROCEDURE — 81001 URINALYSIS AUTO W/SCOPE: CPT | Performed by: NURSE PRACTITIONER

## 2017-05-17 PROCEDURE — 99213 OFFICE O/P EST LOW 20 MIN: CPT | Performed by: NURSE PRACTITIONER

## 2017-05-17 PROCEDURE — 87088 URINE BACTERIA CULTURE: CPT | Performed by: NURSE PRACTITIONER

## 2017-05-17 PROCEDURE — 87186 SC STD MICRODIL/AGAR DIL: CPT | Performed by: NURSE PRACTITIONER

## 2017-05-17 PROCEDURE — 87086 URINE CULTURE/COLONY COUNT: CPT | Performed by: NURSE PRACTITIONER

## 2017-05-17 RX ORDER — SULFAMETHOXAZOLE/TRIMETHOPRIM 800-160 MG
1 TABLET ORAL 2 TIMES DAILY
Qty: 6 TABLET | Refills: 0 | Status: SHIPPED | OUTPATIENT
Start: 2017-05-17 | End: 2017-05-20

## 2017-05-17 NOTE — NURSING NOTE
"Chief Complaint   Patient presents with     UTI       Initial /68 (BP Location: Left arm, Patient Position: Chair, Cuff Size: Adult Regular)  Pulse 71  Temp 97  F (36.1  C) (Oral)  Wt 160 lb (72.6 kg)  SpO2 94%  BMI 28.57 kg/m2 Estimated body mass index is 28.57 kg/(m^2) as calculated from the following:    Height as of 5/1/17: 5' 2.75\" (1.594 m).    Weight as of this encounter: 160 lb (72.6 kg).  Medication Reconciliation: complete   Marry GOLDBERG CMA (AAMA)      "

## 2017-05-17 NOTE — PROGRESS NOTES
SUBJECTIVE:                                                    Yamel Lindsay is a 71 year old female who presents to clinic today for the following health issues:        URINARY TRACT SYMPTOMS     Onset: x1 days    Description:   Painful urination (Dysuria): YES  Blood in urine (Hematuria): no   Delay in urine (Hesitency): no     Intensity: moderate    Progression of Symptoms:  same    Accompanying Signs & Symptoms:  Fever/chills: no   Flank pain no   Nausea and vomiting: no   Any vaginal symptoms: none  Abdominal/Pelvic Pain: no    History:   History of frequent UTI's: no   History of kidney stones: no   Sexually Active: no   Possibility of pregnancy: No    Precipitating factors:   none         Therapies Tried and outcome: Increase fluid intake      Problem list and histories reviewed & adjusted, as indicated.  Additional history: as documented    Patient Active Problem List   Diagnosis     Hyperlipidemia LDL goal <130     Primary localized osteoarthrosis, lower leg     Generalized anxiety disorder     Rhinitis, allergic seasonal     Alcoholism (H)     Mild recurrent major depression (H)     Advanced directives, counseling/discussion     Seasonal allergic rhinitis     Hip pain     GERD (gastroesophageal reflux disease)     S/P laparoscopic hernia repair     OA (osteoarthritis) of knee     Mild intermittent asthma without complication     Major depression in complete remission (H)     Benign essential hypertension     Chronic pain of right knee     Acute pain of left knee     Past Surgical History:   Procedure Laterality Date     ESOPHAGOSCOPY, GASTROSCOPY, DUODENOSCOPY (EGD), COMBINED N/A 1/5/2015    Procedure: COMBINED ESOPHAGOSCOPY, GASTROSCOPY, DUODENOSCOPY (EGD), BIOPSY SINGLE OR MULTIPLE;  Surgeon: Dylan Alejandra MD;  Location: [facility]     LAPAROSCOPIC HERNIORRHAPHY INGUINAL Right 1/26/2015    Procedure: LAPAROSCOPIC HERNIORRHAPHY INGUINAL;  Surgeon: Favio Olsen MD;  Location: [facility]     TUBAL LIGATION          Social History   Substance Use Topics     Smoking status: Former Smoker     Packs/day: 0.50     Types: Cigarettes     Quit date: 6/16/2011     Smokeless tobacco: Never Used     Alcohol use No     Family History   Problem Relation Age of Onset     CANCER Mother      C.A.D. Father      Lipids Father      Hypertension Father      Hypertension Brother      CANCER Brother      esophageal cancer     Breast Cancer Other      Prostate Cancer Brother      Psychotic Disorder Daughter      DIABETES No family hx of      CEREBROVASCULAR DISEASE No family hx of      Cancer - colorectal No family hx of          Current Outpatient Prescriptions   Medication Sig Dispense Refill     sulfamethoxazole-trimethoprim (BACTRIM DS/SEPTRA DS) 800-160 MG per tablet Take 1 tablet by mouth 2 times daily for 3 days 6 tablet 0     traMADol (ULTRAM) 50 MG tablet Take 1 tablet (50 mg) by mouth every 6 hours as needed for pain 120 tablet 3     omeprazole (PRILOSEC) 10 MG CR capsule Take 2 capsules (20 mg) by mouth daily Take by mouth 30-60 minutes before a meal. 180 capsule 3     hydrochlorothiazide (HYDRODIURIL) 25 MG tablet Take 1 tablet (25 mg) by mouth daily 90 tablet 3     clonazePAM (KLONOPIN) 0.5 MG tablet Take 1 tablet (0.5 mg) by mouth 3 times daily as needed for anxiety 270 tablet 3     FLUoxetine (PROZAC) 40 MG capsule Take 1 capsule (40 mg) by mouth daily 90 capsule 3     albuterol (PROAIR HFA/PROVENTIL HFA/VENTOLIN HFA) 108 (90 BASE) MCG/ACT Inhaler Inhale 2 puffs into the lungs every 6 hours as needed for shortness of breath / dyspnea or wheezing 1 Inhaler 3     ibuprofen (ADVIL,MOTRIN) 200 MG tablet Take 200-600 mg by mouth every 2 hours as needed for mild pain       pravastatin (PRAVACHOL) 20 MG tablet Take 1 tablet (20 mg) by mouth At Bedtime 1 at bedtime. 90 tablet 3     traZODone (DESYREL) 50 MG tablet Take 2 tablets (100 mg) by mouth At Bedtime 180 tablet 3     Calcium Carb-Cholecalciferol (CALCIUM 1000 + D PO) Take 1  tablet by mouth       Omega-3 Fatty Acids (FISH OIL) 1200 MG CAPS Take 1 capsule by mouth       PRENATAL VITAMINS PO Take 1 tablet by mouth daily        Cholecalciferol (VITAMIN D-3 PO) Take 2,000 Units by mouth daily        Allergies   Allergen Reactions     Nka [No Known Allergies]      Seasonal Allergies      BP Readings from Last 3 Encounters:   05/17/17 126/68   05/08/17 131/89   05/01/17 154/82    Wt Readings from Last 3 Encounters:   05/17/17 160 lb (72.6 kg)   05/01/17 160 lb (72.6 kg)   03/02/17 158 lb (71.7 kg)                  Labs reviewed in EPIC    Reviewed and updated as needed this visit by clinical staff  Tobacco  Allergies  Meds  Med Hx  Surg Hx  Fam Hx  Soc Hx      Reviewed and updated as needed this visit by Provider         ROS:  Constitutional, HEENT, cardiovascular, pulmonary, gi and gu systems are negative, except as otherwise noted.    OBJECTIVE:                                                    /68 (BP Location: Left arm, Patient Position: Chair, Cuff Size: Adult Regular)  Pulse 71  Temp 97  F (36.1  C) (Oral)  Wt 160 lb (72.6 kg)  SpO2 94%  BMI 28.57 kg/m2  Body mass index is 28.57 kg/(m^2).  GENERAL: healthy, alert and no distress  RESP: lungs clear to auscultation - no rales, rhonchi or wheezes  CV: regular rate and rhythm, normal S1 S2, no S3 or S4, no murmur, click or rub, no peripheral edema and peripheral pulses strong  ABDOMEN: soft, nontender, no hepatosplenomegaly, no masses and bowel sounds normal  MS: no gross musculoskeletal defects noted, no edema    Diagnostic Test Results:  Results for orders placed or performed in visit on 05/17/17 (from the past 24 hour(s))   *UA reflex to Microscopic and Culture ([place] and [facility]] (except [place] and [place])   Result Value Ref Range    Color Urine Yellow     Appearance Urine Clear     Glucose Urine Negative NEG mg/dL    Bilirubin Urine Negative NEG    Ketones Urine Negative NEG mg/dL    Specific Gravity  Urine <=1.005 1.003 - 1.035    Blood Urine Small (A) NEG    pH Urine 6.0 5.0 - 7.0 pH    Protein Albumin Urine Negative NEG mg/dL    Urobilinogen Urine 0.2 0.2 - 1.0 EU/dL    Nitrite Urine Negative NEG    Leukocyte Esterase Urine Large (A) NEG    Source Midstream Urine    Urine Microscopic   Result Value Ref Range    WBC Urine 25-50 (A) 0 - 2 /HPF    RBC Urine 5-10 (A) 0 - 2 /HPF    Squamous Epithelial /LPF Urine Few FEW /LPF    Bacteria Urine Few (A) NEG /HPF    Comment Urine WBC CLUMPS PRESENT         ASSESSMENT/PLAN:                                                      1. Dysuria    - *UA reflex to Microscopic and Culture ([place] and [facility] (except [place] and [place])  - sulfamethoxazole-trimethoprim (BACTRIM DS/SEPTRA DS) 800-160 MG per tablet; Take 1 tablet by mouth 2 times daily for 3 days  Dispense: 6 tablet; Refill: 0  - Urine Microscopic    2. Abnormal urine finding    - Urine Culture Aerobic Bacterial    FUTURE APPOINTMENTS:       - Follow-up for annual visit or as needed    NAYA Santana CNP  [facility] IR[facility]

## 2017-05-17 NOTE — PATIENT INSTRUCTIONS
[facility]    If you have any questions regarding to your visit please contact your care team:     Team Pink:   Clinic Hours Telephone Number   Internal Medicine:  Dr. Stella Riley NP       7am-7pm  Monday - Thursday   7am-5pm  Fridays  [phone]  (Appointment scheduling available 24/7)    Questions about your visit?  Team Line  [phone]   Urgent Care - [place] [facility] and [facility] - 11am-9pm Monday-Friday Saturday-Sunday- 9am-5pm   [place] - 5pm-9pm Monday-Friday Saturday-Sunday- 9am-5pm  [phone] - [place]   [phone] - [place]       What options do I have for visits at the clinic other than the traditional office visit?  To expand how we care for you, many of our providers are utilizing electronic visits (e-visits) and telephone visits, when medically appropriate, for interactions with their patients rather than a visit in the clinic.   We also offer nurse visits for many medical concerns. Just like any other service, we will bill your insurance company for this type of visit based on time spent on the phone with your provider. Not all insurance companies cover these visits. Please check with your medical insurance if this type of visit is covered. You will be responsible for any charges that are not paid by your insurance.      E-visits via [employer]:  generally incur a $35.00 fee.  Telephone visits:  Time spent on the phone: *charged based on time that is spent on the phone in increments of 10 minutes. Estimated cost:   5-10 mins $30.00   11-20 mins. $59.00   21-30 mins. $85.00   Use [employer] (secure email communication and access to your chart) to send your primary care provider a message or make an appointment. Ask someone on your Team how to sign up for [employer].    For a Price Quote for your services, please call our Consumer Price Line at [phone].    As always, Thank you for trusting us with your health care  needs!    Discharged by Marry GOLDBERG CMA ([facility])

## 2017-05-17 NOTE — MR AVS SNAPSHOT
After Visit Summary   5/17/2017    Yamel Lindsay    MRN: [id number]           Patient Information     Date Of Birth          1945        Visit Information        Provider Department      5/17/2017 3:20 PM Pamella Riley APRN [facility]        Today's Diagnoses     Dysuria    -  1      Care Instructions    [facility]    If you have any questions regarding to your visit please contact your care team:     Team Pink:   Clinic Hours Telephone Number   Internal Medicine:  Dr. Stella Riley, NP       7am-7pm  Monday - Thursday   7am-5pm  Fridays  [phone]  (Appointment scheduling available 24/7)    Questions about your visit?  Team Line  [phone]   Urgent Care - [place] and [facility] [place] [place] - 11am-9pm Monday-Friday Saturday-Sunday- 9am-5pm   [place] - 5pm-9pm Monday-Friday Saturday-Sunday- 9am-5pm  [phone] - [place]   [phone] - [place]       What options do I have for visits at the clinic other than the traditional office visit?  To expand how we care for you, many of our providers are utilizing electronic visits (e-visits) and telephone visits, when medically appropriate, for interactions with their patients rather than a visit in the clinic.   We also offer nurse visits for many medical concerns. Just like any other service, we will bill your insurance company for this type of visit based on time spent on the phone with your provider. Not all insurance companies cover these visits. Please check with your medical insurance if this type of visit is covered. You will be responsible for any charges that are not paid by your insurance.      E-visits via [employer]:  generally incur a $35.00 fee.  Telephone visits:  Time spent on the phone: *charged based on time that is spent on the phone in increments of 10 minutes. Estimated cost:   5-10 mins $30.00   11-20 mins. $59.00   21-30 mins. $85.00  "  Use Synetiqhart (secure email communication and access to your chart) to send your primary care provider a message or make an appointment. Ask someone on your Team how to sign up for Presence[employer].    For a Price Quote for your services, please call our Consumer Price Line at [phone].    As always, Thank you for trusting us with your health care needs!    Discharged by Marry GOLDBERG CMA ([facility])          Follow-ups after your visit        Your next 10 appointments already scheduled     May 18, 2017  1:30 PM CDT   DENEEN Extremity with Rhett Edwards PT   [facility]  [facility] ([facility] [place])    [address]   [place] [address] [phone]              Who to contact     If you have questions or need follow up information about today's clinic visit or your schedule please contact [facility] directly at [phone].  Normal or non-critical lab and imaging results will be communicated to you by Synetiqhart, letter or phone within 4 business days after the clinic has received the results. If you do not hear from us within 7 days, please contact the clinic through Synetiqhart or phone. If you have a critical or abnormal lab result, we will notify you by phone as soon as possible.  Submit refill requests through PowerPot or call your pharmacy and they will forward the refill request to us. Please allow 3 business days for your refill to be completed.          Additional Information About Your Visit        PowerPot Information     PowerPot lets you send messages to your doctor, view your test results, renew your prescriptions, schedule appointments and more. To sign up, go to [email or web address] . Click on \"Log in\" on the left side of the screen, which will take you to the Welcome page. Then click on \"Sign up Now\" on the right side of the page.     You will be asked to enter the access code listed below, as well as some personal information. Please follow the " directions to create your username and password.     Your access code is: [email or web address]  Expires: 2017 12:57 PM     Your access code will  in 90 days. If you need help or a new code, please call your [place] clinic or [phone].        Care EveryWhere ID     This is your Care EveryWhere ID. This could be used by other organizations to access your [place] medical records  [id number]        Your Vitals Were     Pulse Temperature Pulse Oximetry BMI (Body Mass Index)          71 97  F (36.1  C) (Oral) 94% 28.57 kg/m2         Blood Pressure from Last 3 Encounters:   17 126/68   17 131/89   17 154/82    Weight from Last 3 Encounters:   17 160 lb (72.6 kg)   17 160 lb (72.6 kg)   17 158 lb (71.7 kg)              We Performed the Following     *UA reflex to Microscopic and Culture ([place] [facility] (except [place] and [place])          Today's Medication Changes          These changes are accurate as of: 17  3:50 PM.  If you have any questions, ask your nurse or doctor.               Start taking these medicines.        Dose/Directions    sulfamethoxazole-trimethoprim 800-160 MG per tablet   Commonly known as:  BACTRIM DS/SEPTRA DS   Used for:  Dysuria   Started by:  Pamella Riley APRN CNP        Dose:  1 tablet   Take 1 tablet by mouth 2 times daily for 3 days   Quantity:  6 tablet   Refills:  0            Where to get your medicines      These medications were sent to [facility] [place] [facility] [place], [place] - [address]  [address], [place]. \[facility].\"" [address]     Phone:  [phone]     sulfamethoxazole-trimethoprim 800-160 MG per tablet                Primary Care Provider Office Phone # Fax #    Catie Fraser [phone]       [facility] [address] [address]        Thank you!     Thank you for choosing [facility]  for your care.  Our goal is always to provide you with excellent care. Hearing back from our patients is one way we can continue to improve our services. Please take a few minutes to complete the written survey that you may receive in the mail after your visit with us. Thank you!             Your Updated Medication List - Protect others around you: Learn how to safely use, store and throw away your medicines at www.disposemymeds.org.          This list is accurate as of: 5/17/17  3:50 PM.  Always use your most recent med list.                   Brand Name Dispense Instructions for use    albuterol 108 (90 BASE) MCG/ACT Inhaler    PROAIR HFA/PROVENTIL HFA/VENTOLIN HFA    1 Inhaler    Inhale 2 puffs into the lungs every 6 hours as needed for shortness of breath / dyspnea or wheezing       CALCIUM 1000 + D PO      Take 1 tablet by mouth       clonazePAM 0.5 MG tablet    klonoPIN    270 tablet    Take 1 tablet (0.5 mg) by mouth 3 times daily as needed for anxiety       Fish Oil 1200 MG Caps      Take 1 capsule by mouth       FLUoxetine 40 MG capsule    PROzac    90 capsule    Take 1 capsule (40 mg) by mouth daily       hydrochlorothiazide 25 MG tablet    HYDRODIURIL    90 tablet    Take 1 tablet (25 mg) by mouth daily       ibuprofen 200 MG tablet    ADVIL/MOTRIN     Take 200-600 mg by mouth every 2 hours as needed for mild pain       omeprazole 10 MG CR capsule    priLOSEC    180 capsule    Take 2 capsules (20 mg) by mouth daily Take by mouth 30-60 minutes before a meal.       pravastatin 20 MG tablet    PRAVACHOL    90 tablet    Take 1 tablet (20 mg) by mouth At Bedtime 1 at bedtime.       PRENATAL VITAMINS PO      Take 1 tablet by mouth daily       sulfamethoxazole-trimethoprim 800-160 MG per tablet    BACTRIM DS/SEPTRA DS    6 tablet    Take 1 tablet by mouth 2 times daily for 3 days       traMADol 50 MG tablet    ULTRAM    120 tablet    Take 1 tablet (50 mg) by mouth every 6 hours as needed for pain       traZODone 50 MG tablet     DESYREL    180 tablet    Take 2 tablets (100 mg) by mouth At Bedtime       VITAMIN D-3 PO      Take 2,000 Units by mouth daily

## 2017-05-18 LAB
BACTERIA SPEC CULT: ABNORMAL
MICRO REPORT STATUS: ABNORMAL
MICROORGANISM SPEC CULT: ABNORMAL
SPECIMEN SOURCE: ABNORMAL

## 2017-05-19 NOTE — PROGRESS NOTES
Patient prescribed Bactrim.  Bacteria is susceptible to antibiotic prescribed.    Pamella Riley, CNP

## 2017-05-22 ENCOUNTER — OFFICE VISIT (OUTPATIENT)
Dept: FAMILY MEDICINE | Facility: CLINIC | Age: 72
End: 2017-05-22
Payer: COMMERCIAL

## 2017-05-22 VITALS
BODY MASS INDEX: 28 KG/M2 | SYSTOLIC BLOOD PRESSURE: 123 MMHG | TEMPERATURE: 98.4 F | DIASTOLIC BLOOD PRESSURE: 74 MMHG | WEIGHT: 158 LBS | HEIGHT: 63 IN | HEART RATE: 75 BPM

## 2017-05-22 DIAGNOSIS — E78.5 HYPERLIPIDEMIA LDL GOAL <100: Primary | ICD-10-CM

## 2017-05-22 PROCEDURE — 99213 OFFICE O/P EST LOW 20 MIN: CPT | Performed by: FAMILY MEDICINE

## 2017-05-22 ASSESSMENT — PATIENT HEALTH QUESTIONNAIRE - PHQ9: 5. POOR APPETITE OR OVEREATING: SEVERAL DAYS

## 2017-05-22 ASSESSMENT — ANXIETY QUESTIONNAIRES
5. BEING SO RESTLESS THAT IT IS HARD TO SIT STILL: MORE THAN HALF THE DAYS
2. NOT BEING ABLE TO STOP OR CONTROL WORRYING: SEVERAL DAYS
3. WORRYING TOO MUCH ABOUT DIFFERENT THINGS: SEVERAL DAYS
7. FEELING AFRAID AS IF SOMETHING AWFUL MIGHT HAPPEN: MORE THAN HALF THE DAYS
GAD7 TOTAL SCORE: 10
6. BECOMING EASILY ANNOYED OR IRRITABLE: MORE THAN HALF THE DAYS
1. FEELING NERVOUS, ANXIOUS, OR ON EDGE: SEVERAL DAYS

## 2017-05-22 NOTE — PATIENT INSTRUCTIONS
Thank you for choosing [facility].  You may be receiving a survey in the mail from [employer] regarding your visit today.  Please take a few minutes to complete and return the survey to let us know how we are doing.      If you have questions or concerns, please contact us via [employer] or you can contact your care team at [phone].    Our Clinic hours are:  Monday 6:40 am  to 7:00 pm  Tuesday -Friday 6:40 am to 5:00 pm    The [place] outpatient lab hours are:  Monday - Friday 6:10 am to 4:45 pm  Saturdays 7:00 am to 11:00 am  Appointments are required, call [phone]    If you have clinical questions after hours or would like to schedule an appointment,  call the clinic at [phone].

## 2017-05-22 NOTE — MR AVS SNAPSHOT
After Visit Summary   5/22/2017    Yamel Lindsay    MRN: [id number]           Patient Information     Date Of Birth          1945        Visit Information        Provider Department      5/22/2017 2:00 PM Catie Fraser MD [facility]        Today's Diagnoses     Hyperlipidemia LDL goal <100    -  1      Care Instructions          Thank you for choosing [facility]  You may be receiving a survey in the mail from [facility] regarding your visit today.  Please take a few minutes to complete and return the survey to let us know how we are doing.      If you have questions or concerns, please contact us via Sense.ly or you can contact your care team at [phone].    Our Clinic hours are:  Monday 6:40 am  to 7:00 pm  Tuesday -Friday 6:40 am to 5:00 pm    The [place] outpatient lab hours are:  Monday - Friday 6:10 am to 4:45 pm  Saturdays 7:00 am to 11:00 am  Appointments are required, call [phone]    If you have clinical questions after hours or would like to schedule an appointment,  call the clinic at [phone].        Follow-ups after your visit        Who to contact     If you have questions or need follow up information about today's clinic visit or your schedule please contact [facility] directly at [phone].  Normal or non-critical lab and imaging results will be communicated to you by [employer]oking GmbHhart, letter or phone within 4 business days after the clinic has received the results. If you do not hear from us within 7 days, please contact the clinic through [employer] or phone. If you have a critical or abnormal lab result, we will notify you by phone as soon as possible.  Submit refill requests through Sense.ly or call your pharmacy and they will forward the refill request to us. Please allow 3 business days for your refill to be completed.          Additional Information About Your Visit        lingoking GmbHhart Information     Sense.ly lets you send  "messages to your doctor, view your test results, renew your prescriptions, schedule appointments and more. To sign up, go to [email or web address] . Click on \"Log in\" on the left side of the screen, which will take you to the Welcome page. Then click on \"Sign up Now\" on the right side of the page.     You will be asked to enter the access code listed below, as well as some personal information. Please follow the directions to create your username and password.     Your access code is: [email or web address]  Expires: 2017 12:57 PM     Your access code will  in 90 days. If you need help or a new code, please call your [place] clinic or [phone].        Care EveryWhere ID     This is your [facility] EveryWhere ID. This could be used by other organizations to access your [place] medical records  [id number]        Your Vitals Were     Pulse Temperature Height BMI (Body Mass Index)          75 98.4  F (36.9  C) (Tympanic) 5' 2.75\" (1.594 m) 28.21 kg/m2         Blood Pressure from Last 3 Encounters:   17 123/74   17 126/68   17 131/89    Weight from Last 3 Encounters:   17 158 lb (71.7 kg)   17 160 lb (72.6 kg)   17 160 lb (72.6 kg)              We Performed the Following     DEPRESSION ACTION PLAN (DAP)        Primary Care Provider Office Phone # Fax #    Catie Fraser [phone]       [facility] [address] [facility] [address]        Thank you!     Thank you for choosing [facility]  for your care. Our goal is always to provide you with excellent care. Hearing back from our patients is one way we can continue to improve our services. Please take a few minutes to complete the written survey that you may receive in the mail after your visit with us. Thank you!             Your Updated Medication List - Protect others around you: Learn how to safely use, store and throw away your medicines at www.disposemymeds.org.        "   This list is accurate as of: 5/22/17 11:59 PM.  Always use your most recent med list.                   Brand Name Dispense Instructions for use    albuterol 108 (90 BASE) MCG/ACT Inhaler    PROAIR HFA/PROVENTIL HFA/VENTOLIN HFA    1 Inhaler    Inhale 2 puffs into the lungs every 6 hours as needed for shortness of breath / dyspnea or wheezing       CALCIUM 1000 + D PO      Take 1 tablet by mouth       clonazePAM 0.5 MG tablet    klonoPIN    270 tablet    Take 1 tablet (0.5 mg) by mouth 3 times daily as needed for anxiety       Fish Oil 1200 MG Caps      Take 1 capsule by mouth       FLUoxetine 40 MG capsule    PROzac    90 capsule    Take 1 capsule (40 mg) by mouth daily       hydrochlorothiazide 25 MG tablet    HYDRODIURIL    90 tablet    Take 1 tablet (25 mg) by mouth daily       ibuprofen 200 MG tablet    ADVIL/MOTRIN     Take 200-600 mg by mouth every 2 hours as needed for mild pain       omeprazole 10 MG CR capsule    priLOSEC    180 capsule    Take 2 capsules (20 mg) by mouth daily Take by mouth 30-60 minutes before a meal.       pravastatin 20 MG tablet    PRAVACHOL    90 tablet    Take 1 tablet (20 mg) by mouth At Bedtime 1 at bedtime.       PRENATAL VITAMINS PO      Take 1 tablet by mouth daily       traMADol 50 MG tablet    ULTRAM    120 tablet    Take 1 tablet (50 mg) by mouth every 6 hours as needed for pain       traZODone 50 MG tablet    DESYREL    180 tablet    Take 2 tablets (100 mg) by mouth At Bedtime       VITAMIN D-3 PO      Take 2,000 Units by mouth daily

## 2017-05-22 NOTE — PROGRESS NOTES
SUBJECTIVE:                                                    Yamel Lindsay is a 71 year old female who presents to clinic today for the following health issues:      Patient is here to go over result of blood test     Component      Latest Ref Rng & Units 5/9/2017 5/17/2017   Sodium      133 - 144 mmol/L 137    Potassium      3.4 - 5.3 mmol/L 4.4    Chloride      94 - 109 mmol/L 96    Carbon Dioxide      20 - 32 mmol/L 34 (H)    Anion Gap      3 - 14 mmol/L 7    Glucose      70 - 99 mg/dL 81    Urea Nitrogen      7 - 30 mg/dL 10    Creatinine      0.52 - 1.04 mg/dL 0.66    GFR Estimate      >60 mL/min/1.7m2 89    GFR Estimate If Black      >60 mL/min/1.7m2 >90 . . .    Calcium      8.5 - 10.1 mg/dL 8.9    Cholesterol      <200 mg/dL 197    Triglycerides      <150 mg/dL 113    HDL Cholesterol      >49 mg/dL 47 (L)    LDL Cholesterol Calculated      <100 mg/dL 127 (H)    Non HDL Cholesterol      <130 mg/dL 150 (H)    WBC Urine      0 - 2 /HPF  25-50 (A)   RBC Urine      0 - 2 /HPF  5-10 (A)   Squamous Epithelial /LPF Urine      FEW /LPF  Few   Bacteria Urine      NEG /HPF  Few (A)   Comment Urine        WBC CLUMPS PRESENT     The 10-year ASCVD risk score (Helper JOHNY Jr, et al., 2013) is: 9%    Values used to calculate the score:      Age: 71 years      Sex: Female      Is Non- : No      Diabetic: No      Tobacco smoker: No      Systolic Blood Pressure: 123 mmHg      Is BP treated: No      HDL Cholesterol: 47 mg/dL      Total Cholesterol: 197 mg/dL      Problem list and histories reviewed & adjusted, as indicated.  Additional history: as documented    Patient Active Problem List   Diagnosis     Hyperlipidemia LDL goal <130     Primary localized osteoarthrosis, lower leg     Generalized anxiety disorder     Rhinitis, allergic seasonal     Alcoholism (H)     Mild recurrent major depression (H)     Advanced directives, counseling/discussion     Seasonal allergic rhinitis     Hip pain     GERD  (gastroesophageal reflux disease)     S/P laparoscopic hernia repair     OA (osteoarthritis) of knee     Mild intermittent asthma without complication     Major depression in complete remission (H)     Benign essential hypertension     Chronic pain of right knee     Acute pain of left knee     Past Surgical History:   Procedure Laterality Date     ESOPHAGOSCOPY, GASTROSCOPY, DUODENOSCOPY (EGD), COMBINED N/A 1/5/2015    Procedure: COMBINED ESOPHAGOSCOPY, GASTROSCOPY, DUODENOSCOPY (EGD), BIOPSY SINGLE OR MULTIPLE;  Surgeon: Dylan Alejandra MD;  Location: [facility]     LAPAROSCOPIC HERNIORRHAPHY INGUINAL Right 1/26/2015    Procedure: LAPAROSCOPIC HERNIORRHAPHY INGUINAL;  Surgeon: Favio Olsen MD;  Location: [facility]     TUBAL LIGATION         Social History   Substance Use Topics     Smoking status: Former Smoker     Packs/day: 0.50     Types: Cigarettes     Quit date: 6/16/2011     Smokeless tobacco: Never Used     Alcohol use No     Family History   Problem Relation Age of Onset     CANCER Mother      C.A.D. Father      Lipids Father      Hypertension Father      Hypertension Brother      CANCER Brother      esophageal cancer     Breast Cancer Other      Prostate Cancer Brother      Psychotic Disorder Daughter      DIABETES No family hx of      CEREBROVASCULAR DISEASE No family hx of      Cancer - colorectal No family hx of          Current Outpatient Prescriptions   Medication Sig Dispense Refill     traMADol (ULTRAM) 50 MG tablet Take 1 tablet (50 mg) by mouth every 6 hours as needed for pain 120 tablet 3     omeprazole (PRILOSEC) 10 MG CR capsule Take 2 capsules (20 mg) by mouth daily Take by mouth 30-60 minutes before a meal. 180 capsule 3     hydrochlorothiazide (HYDRODIURIL) 25 MG tablet Take 1 tablet (25 mg) by mouth daily 90 tablet 3     clonazePAM (KLONOPIN) 0.5 MG tablet Take 1 tablet (0.5 mg) by mouth 3 times daily as needed for anxiety 270 tablet 3     FLUoxetine (PROZAC) 40 MG capsule Take 1 capsule (40  "mg) by mouth daily 90 capsule 3     pravastatin (PRAVACHOL) 20 MG tablet Take 1 tablet (20 mg) by mouth At Bedtime 1 at bedtime. 90 tablet 3     traZODone (DESYREL) 50 MG tablet Take 2 tablets (100 mg) by mouth At Bedtime 180 tablet 3     Calcium Carb-Cholecalciferol (CALCIUM 1000 + D PO) Take 1 tablet by mouth       Omega-3 Fatty Acids (FISH OIL) 1200 MG CAPS Take 1 capsule by mouth       PRENATAL VITAMINS PO Take 1 tablet by mouth daily        Cholecalciferol (VITAMIN D-3 PO) Take 2,000 Units by mouth daily        albuterol (PROAIR HFA/PROVENTIL HFA/VENTOLIN HFA) 108 (90 BASE) MCG/ACT Inhaler Inhale 2 puffs into the lungs every 6 hours as needed for shortness of breath / dyspnea or wheezing 1 Inhaler 3     ibuprofen (ADVIL,MOTRIN) 200 MG tablet Take 200-600 mg by mouth every 2 hours as needed for mild pain       Allergies   Allergen Reactions     Nka [No Known Allergies]      Seasonal Allergies      BP Readings from Last 3 Encounters:   05/22/17 123/74   05/17/17 126/68   05/08/17 131/89    Wt Readings from Last 3 Encounters:   05/22/17 158 lb (71.7 kg)   05/17/17 160 lb (72.6 kg)   05/01/17 160 lb (72.6 kg)                  Labs reviewed in EPIC    Reviewed and updated as needed this visit by clinical staff  Allergies       Reviewed and updated as needed this visit by Provider         ROS:  Constitutional, HEENT, cardiovascular, pulmonary, gi and gu systems are negative, except as otherwise noted.    OBJECTIVE:                                                    /74 (BP Location: Left arm, Cuff Size: Adult Regular)  Pulse 75  Temp 98.4  F (36.9  C) (Tympanic)  Ht 5' 2.75\" (1.594 m)  Wt 158 lb (71.7 kg)  BMI 28.21 kg/m2  Body mass index is 28.21 kg/(m^2).  GENERAL: healthy, alert and no distress  EYES: Eyes grossly normal to inspection, PERRL and conjunctivae and sclerae normal  HENT: ear canals and TM's normal, nose and mouth without ulcers or lesions  NECK: no adenopathy, no asymmetry, masses, or " scars and thyroid normal to palpation  RESP: lungs clear to auscultation - no rales, rhonchi or wheezes  CV: regular rate and rhythm, normal S1 S2, no S3 or S4, no murmur, click or rub, no peripheral edema and peripheral pulses strong  ABDOMEN: soft, nontender, no hepatosplenomegaly, no masses and bowel sounds normal  MS: no gross musculoskeletal defects noted, no edema    Diagnostic Test Results:  none      ASSESSMENT/PLAN:                                                    Hyperlipidemia; controlled   Plan:  No changes in the patient's current treatment plan      (E78.5) Hyperlipidemia LDL goal <100  (primary encounter diagnosis)  Comment: Patient states she will like to try lifestyle modification   Plan: Lifestyle modification.     FUTURE APPOINTMENTS:       - Follow-up visit as needed.    Catie Fraser MD  [facility]

## 2017-05-22 NOTE — LETTER
My Depression Action Plan  Name: Yamel Lindsay   Date of Birth 1945  Date: 5/22/2017    My doctor: Catie Fraser   My clinic: [facility]  [address] [place] [address]  [phone]          GREEN    ZONE   Good Control    What it looks like:     Things are going generally well. You have normal up s and down s. You may even feel depressed from time to time, but bad moods usually last less than a day.   What you need to do:  1. Continue to care for yourself (see self care plan)  2. Check your depression survival kit and update it as needed  3. Follow your physician s recommendations including any medication.  4. Do not stop taking medication unless you consult with your physician first.           YELLOW         ZONE Getting Worse    What it looks like:     Depression is starting to interfere with your life.     It may be hard to get out of bed; you may be starting to isolate yourself from others.    Symptoms of depression are starting to last most all day and this has happened for several days.     You may have suicidal thoughts but they are not constant.   What you need to do:     1. Call your care team, your response to treatment will improve if you keep your care team informed of your progress. Yellow periods are signs an adjustment may need to be made.     2. Continue your self-care, even if you have to fake it!    3. Talk to someone in your support network    4. Open up your depression survival kit           RED    ZONE Medical Alert - Get Help    What it looks like:     Depression is seriously interfering with your life.     You may experience these or other symptoms: You can t get out of bed most days, can t work or engage in other necessary activities, you have trouble taking care of basic hygiene, or basic responsibilities, thoughts of suicide or death that will not go away, self-injurious behavior.     What you need to do:  1. Call your care  team and request a same-day appointment. If they are not available (weekends or after hours) call your local crisis line, emergency room or 911.      Electronically signed by: Carisa Strauss, May 22, 2017    Depression Self Care Plan / Survival Kit    Self-Care for Depression  Here s the deal. Your body and mind are really not as separate as most people think.  What you do and think affects how you feel and how you feel influences what you do and think. This means if you do things that people who feel good do, it will help you feel better.  Sometimes this is all it takes.  There is also a place for medication and therapy depending on how severe your depression is, so be sure to consult with your medical provider and/ or Behavioral Health Consultant if your symptoms are worsening or not improving.     In order to better manage my stress, I will:    Exercise  Get some form of exercise, every day. This will help reduce pain and release endorphins, the  feel good  chemicals in your brain. This is almost as good as taking antidepressants!  This is not the same as joining a gym and then never going! (they count on that by the way ) It can be as simple as just going for a walk or doing some gardening, anything that will get you moving.      Hygiene   Maintain good hygiene (Get out of bed in the morning, Make your bed, Brush your teeth, Take a shower, and Get dressed like you were going to work, even if you are unemployed).  If your clothes don't fit try to get ones that do.    Diet  I will strive to eat foods that are good for me, drink plenty of water, and avoid excessive sugar, caffeine, alcohol, and other mood-altering substances.  Some foods that are helpful in depression are: complex carbohydrates, B vitamins, flaxseed, fish or fish oil, fresh fruits and vegetables.    Psychotherapy  I agree to participate in Individual Therapy (if recommended).    Medication  If prescribed medications, I agree to take them.   Missing doses can result in serious side effects.  I understand that drinking alcohol, or other illicit drug use, may cause potential side effects.  I will not stop my medication abruptly without first discussing it with my provider.    Staying Connected With Others  I will stay in touch with my friends, family members, and my primary care provider/team.    Use your imagination  Be creative.  We all have a creative side; it doesn t matter if it s oil painting, sand castles, or mud pies! This will also kick up the endorphins.    Witness Beauty  (AKA stop and smell the roses) Take a look outside, even in mid-winter. Notice colors, textures. Watch the squirrels and birds.     Service to others  Be of service to others.  There is always someone else in need.  By helping others we can  get out of ourselves  and remember the really important things.  This also provides opportunities for practicing all the other parts of the program.    Humor  Laugh and be silly!  Adjust your TV habits for less news and crime-drama and more comedy.    Control your stress  Try breathing deep, massage therapy, biofeedback, and meditation. Find time to relax each day.     My support system    Clinic Contact:  Phone number:    Contact 1:  Phone number:    Contact 2:  Phone number:    Mandaeism/:  Phone number:    Therapist:  Phone number:    Local crisis center:    Phone number:    Other community support:  Phone number:

## 2017-05-23 ASSESSMENT — ANXIETY QUESTIONNAIRES: GAD7 TOTAL SCORE: 10

## 2017-05-23 ASSESSMENT — PATIENT HEALTH QUESTIONNAIRE - PHQ9: SUM OF ALL RESPONSES TO PHQ QUESTIONS 1-9: 5

## 2017-05-23 ASSESSMENT — ASTHMA QUESTIONNAIRES: ACT_TOTALSCORE: 21

## 2017-05-23 NOTE — LETTER
[facility]LETIC [facility] PHYSICAL THERAPY  [address]  [facility] [address]  [phone]    May 12, 2017    Re: Yamel Lindsay   :   1945  MRN:  [id number]   REFERRING PHYSICIAN:   Quintin Allen    [facility] PHYSICAL THERAPY    Date of Initial Evaluation:   2017  Visits:   1  Reason for Referral:     Chronic pain of right knee  Acute pain of left knee    [facility] Athletic [facility] Initial Evaluation -- Lower Extremity  Evaluation Date: May 11, 2017  Yamel Lindsay is a 71 year old female with a (R) knee condition.   Referral: Ortho  Work mechanical stresses: NA   Employment status: Retired  Leisure mechanical stresses: Walking 40 min-1hr daily  Functional disability score: See KOS  VAS score (0-10): 2-4/10 pain varying intensity  Patient goals/expectations:  Reduce pain with activities    HISTORY:  Present symptoms: (L) ant/lat knee, (R) ant knee.  R > L  Pain quality (sharp/shooting/stabbing/aching/burning/cramping):  achy  Present since (onset date): MD referral date 17.  (R) knee 15 yrs (L) knee 1 month ago  Symptoms (improving/unchaning/worsening):  Worsening (R) knee, unchanging (L).    Symptoms commenced as a result of: No apparent reason   Condition occurred in the following environment: Unknown   Symptoms at onset: (R) knee    Paresthesia (yes/no):  No  Spinal history: Yes, intermittent 2 x week depending on activity but lasts usually 1 day.  Ongoing like this for years  Cough/Sneeze (pos/neg):  Neg  Constant symptoms: None  Intermittent symptoms: As above.  (L) knee only 2-3 x week when she gets buckling of knee then pain and limping the remaining part of day but then subsides.  Symptoms are worse with the following: Sometimes Sitting, Sometimes Walking, Sometimes On the move and Sometimes Sleeping (prone/sup/side R/L) - Wakes regardless of position, Time of day - always am   Symptoms are better with the  following: Sometimes When still  Continued use makes the pain (better/worse/no effect): No effect  Re: Yamel Lindsay   :   1945    HISTORY (continued)  Disturbed night (yes/no): Yes    Pain at rest (yes/no):  Yes    Site (back/hip/knee/ankle/foot):  (R) Knee  Other questions (swelling/clicking/locking/giving way/falling):  Buckling - Left knee  Previous episodes: Yes  Previous treatments: cortisone injections -10 years ago    Specific Questions:  General health (excellent/good/fair/poor):  Excellent  Pertinent medical history includes: Osteoporosis and Depression  Medications (nil/NSAIDS/analg/steroids/anticoag/other):  Narcotics/Opiods and Other - Anti-depressants  Medical allergies:  None  Imaging (NA/Xray/MRI):  Xray  Recent or major surgery (yes/no):  No  Night pain (yes/no):  No  Accidents (yes/no):  No  Unexplained weight loss (yes/no):  No  Barriers at home: None  Other red flags: None    Sites for physical examination (back/hip/knee/ankle/foot/other): Knee's    EXAMINATION    Posture:  Sitting (good/fair/poor): NA    Correction of Posture (better/worse/no effect/NA): NA  Standing (good/fair/poor): NA  Other observations:  NA    Neurological: (NA/motor/sensory/reflexes/dural): NA    Baselines (pain or functional activity): Painful walking, squatting    Extremities (Hip / Knee / Ankle / Foot): Knee    Movement Loss Juan Jose Mod Min Nil Pain   Flexion   X  (R) 132/ERP; (L) 140   Extension   X  (R) 0/ERP; (L) -2     Passive Movement (+/-) over pressure:  (R) ext min loss, flex min loss/ERP; (L) WNL  Resisted Test Response (pain): Quad 5/5 (B); H-S 5/5 (B)  Other Tests: NA      Re: Yamel Lindsay   :   1945    Spine:  Movement loss: NA  Effect of repeated movements: NA  Effect of static positioning: NA  Spine testing (not relevant/relevant/secondary problem): Secondary problem?    Baseline Symptoms: 3/10 pain (R) knee w/squatting, ROM as above  Repeated Tests Symptom Response Mechanical Response    Active/Passive movement, resisted test, functional test During -  Produce, Abolish, Increase, Decrease, NE After -  Better, Worse, NB, NW, NE Effect -   ? or ? ROM, strength or key functional test No   Effect   Passive extension w/self OP (standing) Produce  (R) ant knee No Worse  X   Eccentric ext w/resistance Produce  (R) ant knee No Worse X  Ext ROM = contra X  Pain w/squat or Flex ROM   Passive flexion w/OP Produce  (R) ant knee No Worse X  Flex = contralateral  Abolishes pain with squat X  Ext ROM remains =  contralateral   Effect of static positioning       NA       Provisional Classification (Extremity/Spine):  Extremity - Derangement    Princicple of Management:   Education:  Etiology, specificity of exercise in treatment of pain    Equipment provided:  None  Exercise and dosage:  Repeated flex w/OP x 10-15 reps, 3-4 x daily.  Perform this on (L) side when noticing symptoms and assess response.  If improves continue, if worsens, instructed to perform repeated extension w/self OP.    ASSESSMENT/PLAN:  Patient is a 71 year old female with right side knee complaints.    Patient has the following significant findings with corresponding treatment plan.                Diagnosis 1:  (B) Knee Pain  Pain -  self management, education, directional preference exercise and home program  Decreased ROM/flexibility - manual therapy, therapeutic exercise and home program  Decreased joint mobility - manual therapy, therapeutic exercise and home program  Decreased strength - therapeutic exercise, therapeutic activities and home program  Impaired muscle performance - neuro re-education and home program  Decreased function - therapeutic activities and home program  Re: Yamel Lindsay   :   1945    Therapy Evaluation Codes:   1) History comprised of:   Personal factors that impact the plan of care:      None.    Comorbidity factors that impact the plan of care are:      Depression.     Medications impacting care:  Anti-depressant.  2) Examination of Body Systems comprised of:   Body structures and functions that impact the plan of care:      Knee.   Activity limitations that impact the plan of care are:      Sitting, Squatting/kneeling and Walking.  3) Clinical presentation characteristics are:   Stable/Uncomplicated.  4) Decision-Making    Moderate complexity using standardized patient assessment instrument and/or measureable assessment of functional outcome.  Cumulative Therapy Evaluation is: Low complexity.    Previous and current functional limitations:  (See Goal Flow Sheet for this information)    Short term and Long term goals: (See Goal Flow Sheet for this information)   Communication ability:  Patient appears to be able to clearly communicate and understand verbal and written communication and follow directions correctly.  Treatment Explanation - The following has been discussed with the patient:   RX ordered/plan of care, Anticipated outcomes, Possible risks and side effects    This patient would benefit from PT intervention to resume normal activities.   Rehab potential is good.  Frequency:  1 X week, once daily  Duration:  for 6 weeks  Discharge Plan:  Achieve all LTG.  Independent in home treatment program.  Reach maximal therapeutic benefit.    Thank you for your referral.    INQUIRIES        Therapist:   SKINNY HerediaT, cert MDT  [facility] PH[facility]ICAL THERAPY  [address] [facility] [address]  [facility] [address]  Phone: [phone]  Fax: [phone]      General

## 2017-08-10 ENCOUNTER — TELEPHONE (OUTPATIENT)
Dept: FAMILY MEDICINE | Facility: CLINIC | Age: 72
End: 2017-08-10

## 2017-08-10 DIAGNOSIS — J45.20 MILD INTERMITTENT ASTHMA WITHOUT COMPLICATION: ICD-10-CM

## 2017-08-10 NOTE — TELEPHONE ENCOUNTER
Routing refill request to provider for review/approval because:  Diagnosis is not on problem list.  Macy Laird RN CPC Triage.

## 2017-08-10 NOTE — TELEPHONE ENCOUNTER
albuterol        Last Written Prescription Date: 12/20/16  Last Fill Quantity: 1, # refills: 3    Last Office Visit with G, P or [facility] prescribing provider:  5/22/17   Future Office Visit:       Date of Last Asthma Action Plan Letter:   There are no preventive care reminders to display for this patient.   Asthma Control Test:   ACT Total Scores 5/22/2017   ACT TOTAL SCORE (Goal Greater than or Equal to 20) 21   In the past 12 months, how many times did you visit the emergency room for your asthma without being admitted to the hospital? 0   In the past 12 months, how many times were you hospitalized overnight because of your asthma? 0       Date of Last Spirometry Test:   No results found for this or any previous visit.

## 2017-08-11 NOTE — TELEPHONE ENCOUNTER
Pt is calling wanting this filled by the end of the day     Eliana Stokes  [facility] [place]

## 2017-08-14 RX ORDER — ALBUTEROL SULFATE 90 UG/1
AEROSOL, METERED RESPIRATORY (INHALATION)
Qty: 18 G | Refills: 3 | Status: SHIPPED | OUTPATIENT
Start: 2017-08-14 | End: 2018-09-18

## 2017-08-14 NOTE — TELEPHONE ENCOUNTER
Routing refill request to provider for review/approval because:  Dr. Fraser is not original prescriber of this medicaiton.      Kristin WICK RN

## 2017-09-06 ENCOUNTER — OFFICE VISIT (OUTPATIENT)
Dept: FAMILY MEDICINE | Facility: CLINIC | Age: 72
End: 2017-09-06
Payer: COMMERCIAL

## 2017-09-06 VITALS
SYSTOLIC BLOOD PRESSURE: 137 MMHG | DIASTOLIC BLOOD PRESSURE: 75 MMHG | WEIGHT: 155 LBS | HEIGHT: 63 IN | HEART RATE: 68 BPM | TEMPERATURE: 97.9 F | BODY MASS INDEX: 27.46 KG/M2

## 2017-09-06 DIAGNOSIS — E78.5 HYPERLIPIDEMIA LDL GOAL <130: ICD-10-CM

## 2017-09-06 DIAGNOSIS — M17.0 OSTEOARTHRITIS OF BOTH KNEES, UNSPECIFIED OSTEOARTHRITIS TYPE: ICD-10-CM

## 2017-09-06 DIAGNOSIS — G47.09 OTHER INSOMNIA: ICD-10-CM

## 2017-09-06 PROCEDURE — 99214 OFFICE O/P EST MOD 30 MIN: CPT | Performed by: FAMILY MEDICINE

## 2017-09-06 RX ORDER — TRAMADOL HYDROCHLORIDE 50 MG/1
50 TABLET ORAL EVERY 6 HOURS PRN
Qty: 120 TABLET | Refills: 3 | Status: SHIPPED | OUTPATIENT
Start: 2017-09-06 | End: 2017-09-06

## 2017-09-06 RX ORDER — TRAZODONE HYDROCHLORIDE 50 MG/1
100 TABLET, FILM COATED ORAL AT BEDTIME
Qty: 180 TABLET | Refills: 3 | Status: SHIPPED | OUTPATIENT
Start: 2017-09-06 | End: 2018-08-30

## 2017-09-06 RX ORDER — TRAMADOL HYDROCHLORIDE 50 MG/1
50 TABLET ORAL EVERY 6 HOURS PRN
Qty: 120 TABLET | Refills: 3 | Status: SHIPPED | OUTPATIENT
Start: 2017-09-06 | End: 2018-01-10

## 2017-09-06 NOTE — MR AVS SNAPSHOT
"              After Visit Summary   2017    Yamel Lindsay    MRN: [id number]           Patient Information     Date Of Birth          1945        Visit Information        Provider Department      2017 9:40 AM Catie Fraser MD [facility]        Today's Diagnoses     Osteoarthritis of both knees, unspecified osteoarthritis type        Hyperlipidemia LDL goal <130        Other insomnia           Follow-ups after your visit        Who to contact     If you have questions or need follow up information about today's clinic visit or your schedule please contact [facility] directly at [phone].  Normal or non-critical lab and imaging results will be communicated to you by MyChart, letter or phone within 4 business days after the clinic has received the results. If you do not hear from us within 7 days, please contact the clinic through [employer] or phone. If you have a critical or abnormal lab result, we will notify you by phone as soon as possible.  Submit refill requests through [employer] or call your pharmacy and they will forward the refill request to us. Please allow 3 business days for your refill to be completed.          Additional Information About Your Visit        MyChart Information     [employer] lets you send messages to your doctor, view your test results, renew your prescriptions, schedule appointments and more. To sign up, go to [email or web address] . Click on \"Log in\" on the left side of the screen, which will take you to the Welcome page. Then click on \"Sign up Now\" on the right side of the page.     You will be asked to enter the access code listed below, as well as some personal information. Please follow the directions to create your username and password.     Your access code is: [email or web address]  Expires: 2017 10:07 AM     Your access code will  in 90 days. If you need help or a new code, please call your [facility] or [phone].   " "     Care EveryWhere ID     This is your Care EveryWhere ID. This could be used by other organizations to access your [facility] medical records  [id number]        Your Vitals Were     Pulse Temperature Height BMI (Body Mass Index)          68 97.9  F (36.6  C) (Tympanic) 5' 2.75\" (1.594 m) 27.68 kg/m2         Blood Pressure from Last 3 Encounters:   09/06/17 137/75   05/22/17 123/74   05/17/17 126/68    Weight from Last 3 Encounters:   09/06/17 155 lb (70.3 kg)   05/22/17 158 lb (71.7 kg)   05/17/17 160 lb (72.6 kg)              Today, you had the following     No orders found for display         Today's Medication Changes          These changes are accurate as of: 9/6/17 10:11 AM.  If you have any questions, ask your nurse or doctor.               Start taking these medicines.        Dose/Directions    traMADol 50 MG tablet   Commonly known as:  ULTRAM   Used for:  Osteoarthritis of both knees, unspecified osteoarthritis type   Started by:  Catie Fraser MD        Dose:  50 mg   Take 1 tablet (50 mg) by mouth every 6 hours as needed for pain   Quantity:  120 tablet   Refills:  3            Where to get your medicines      These medications were sent to [facility] - [place], [place] - [address]  [address], [facility] [address]     Phone:  [phone]     traZODone 50 MG tablet         Some of these will need a paper prescription and others can be bought over the counter.  Ask your nurse if you have questions.     Bring a paper prescription for each of these medications     traMADol 50 MG tablet                Primary Care Provider Office Phone # Fax #    Catie Fraser [phone] [address] [facility] [address]        Equal Access to Services     BELÉN MOSLEY AH: Marito Boucher, waaxda luqadaha, qaybta kaalmada dinorah, deep garrett. So [facility] " [phone].    ATENCIÓN: Si [place], tiene a maurer disposición servicios gratuitos de asistencia lingüística. Kunal alarcon [phone].    We comply with applicable federal civil rights laws and [place] laws. We do not discriminate on the basis of race, color, national origin, age, disability sex, sexual orientation or gender identity.            Thank you!     Thank you for choosing [facility]  for your care. Our goal is always to provide you with excellent care. Hearing back from our patients is one way we can continue to improve our services. Please take a few minutes to complete the written survey that you may receive in the mail after your visit with us. Thank you!             Your Updated Medication List - Protect others around you: Learn how to safely use, store and throw away your medicines at www.disposemymeds.org.          This list is accurate as of: 9/6/17 10:11 AM.  Always use your most recent med list.                   Brand Name Dispense Instructions for use Diagnosis    CALCIUM 1000 + D PO      Take 1 tablet by mouth        clonazePAM 0.5 MG tablet    klonoPIN    270 tablet    Take 1 tablet (0.5 mg) by mouth 3 times daily as needed for anxiety    Anxiety       Fish Oil 1200 MG Caps      Take 1 capsule by mouth        FLUoxetine 40 MG capsule    PROzac    90 capsule    Take 1 capsule (40 mg) by mouth daily    Major depressive disorder, single episode, moderate (H)       hydrochlorothiazide 25 MG tablet    HYDRODIURIL    90 tablet    Take 1 tablet (25 mg) by mouth daily    Benign essential hypertension       ibuprofen 200 MG tablet    ADVIL/MOTRIN     Take 200-600 mg by mouth every 2 hours as needed for mild pain        omeprazole 10 MG CR capsule    priLOSEC    180 capsule    Take 2 capsules (20 mg) by mouth daily Take by mouth 30-60 minutes before a meal.    Gastroesophageal reflux disease without esophagitis       pravastatin 20 MG tablet    PRAVACHOL    90 tablet    Take 1 tablet  (20 mg) by mouth At Bedtime 1 at bedtime.    Hyperlipidemia LDL goal <130       PRENATAL VITAMINS PO      Take 1 tablet by mouth daily        traMADol 50 MG tablet    ULTRAM    120 tablet    Take 1 tablet (50 mg) by mouth every 6 hours as needed for pain    Osteoarthritis of both knees, unspecified osteoarthritis type       traZODone 50 MG tablet    DESYREL    180 tablet    Take 2 tablets (100 mg) by mouth At Bedtime    Other insomnia       VENTOLIN  (90 BASE) MCG/ACT Inhaler   Generic drug:  albuterol     18 g    INHALE 2 PUFFS INTO THE LUNGS EVERY 6 HOURS AS NEEDED FOR SHORTNESS OF BREATH, DIFFICULTY BREATHING OR WHEEZING.    Mild intermittent asthma without complication       VITAMIN D-3 PO      Take 2,000 Units by mouth daily

## 2017-09-06 NOTE — PROGRESS NOTES
SUBJECTIVE:   Yamel Lindsay is a 72 year old female who presents to clinic today for the following health issues:  Patient is a 72 yr old male here for refills of her pain medication. She has chronic knee pain. She has had no side effects to the medication. She denies any acute concerns today     Chronic Pain Follow-Up       Type / Location of Pain: arthritis   Analgesia/pain control:       Recent changes:  same      Overall control: Tolerable with discomfort  Activity level/function:      Daily activities:  Can do most things most days, with some rest    Work:  not applicable  Adverse effects:  No  Adherance    Taking medication as directed?  Yes    Participating in other treatments: yes  Risk Factors:    Sleep:  Good    Mood/anxiety:  controlled    Recent family or social stressors:  none noted and other    Other aggravating factors: none  PHQ-9 SCORE 10/24/2016 12/20/2016 5/22/2017   Total Score - - -   Total Score 5 12 5     PAOLA-7 SCORE 12/20/2016 3/2/2017 5/22/2017   Total Score - - -   Total Score 15 7 10     Encounter-Level CSA - 02/20/2015:          Controlled Substance Agreement - Scan on 2/26/2015  9:06 AM : Controlled Medication Agreement-  2/20/15 (below)                  Amount of exercise or physical activity: 4-5 days/week for an average of 30-45 minutes    Problems taking medications regularly: No    Medication side effects: none  Diet: regular (no restrictions)      Medication Followup of tramadol trazodone     Taking Medication as prescribed: yes    Side Effects:  None    Medication Helping Symptoms:  yes         Problem list and histories reviewed & adjusted, as indicated.  Additional history: as documented    Patient Active Problem List   Diagnosis     Hyperlipidemia LDL goal <130     Primary localized osteoarthrosis, lower leg     Generalized anxiety disorder     Rhinitis, allergic seasonal     Alcoholism (H)     Mild recurrent major depression (H)     Advanced directives,  counseling/discussion     Seasonal allergic rhinitis     Hip pain     GERD (gastroesophageal reflux disease)     S/P laparoscopic hernia repair     OA (osteoarthritis) of knee     Mild intermittent asthma without complication     Major depression in complete remission (H)     Benign essential hypertension     Chronic pain of right knee     Acute pain of left knee     Past Surgical History:   Procedure Laterality Date     ESOPHAGOSCOPY, GASTROSCOPY, DUODENOSCOPY (EGD), COMBINED N/A 1/5/2015    Procedure: COMBINED ESOPHAGOSCOPY, GASTROSCOPY, DUODENOSCOPY (EGD), BIOPSY SINGLE OR MULTIPLE;  Surgeon: Dylan Alejandra MD;  Location: [facility]     LAPAROSCOPIC HERNIORRHAPHY INGUINAL Right 1/26/2015    Procedure: LAPAROSCOPIC HERNIORRHAPHY INGUINAL;  Surgeon: Favio Olsen MD;  Location: [facility]     TUBAL LIGATION         Social History   Substance Use Topics     Smoking status: Former Smoker     Packs/day: 0.50     Types: Cigarettes     Quit date: 6/16/2011     Smokeless tobacco: Never Used     Alcohol use No     Family History   Problem Relation Age of Onset     CANCER Mother      C.A.D. Father      Lipids Father      Hypertension Father      Hypertension Brother      CANCER Brother      esophageal cancer     Breast Cancer Other      Prostate Cancer Brother      Psychotic Disorder Daughter      DIABETES No family hx of      CEREBROVASCULAR DISEASE No family hx of      Cancer - colorectal No family hx of          Current Outpatient Prescriptions   Medication Sig Dispense Refill     traZODone (DESYREL) 50 MG tablet Take 2 tablets (100 mg) by mouth At Bedtime 180 tablet 3     traMADol (ULTRAM) 50 MG tablet Take 1 tablet (50 mg) by mouth every 6 hours as needed for pain 120 tablet 3     VENTOLIN  (90 BASE) MCG/ACT Inhaler INHALE 2 PUFFS INTO THE LUNGS EVERY 6 HOURS AS NEEDED FOR SHORTNESS OF BREATH, DIFFICULTY BREATHING OR WHEEZING. 18 g 3     omeprazole (PRILOSEC) 10 MG CR capsule Take 2 capsules (20 mg) by mouth daily  "Take by mouth 30-60 minutes before a meal. 180 capsule 3     hydrochlorothiazide (HYDRODIURIL) 25 MG tablet Take 1 tablet (25 mg) by mouth daily 90 tablet 3     FLUoxetine (PROZAC) 40 MG capsule Take 1 capsule (40 mg) by mouth daily 90 capsule 3     ibuprofen (ADVIL,MOTRIN) 200 MG tablet Take 200-600 mg by mouth every 2 hours as needed for mild pain       pravastatin (PRAVACHOL) 20 MG tablet Take 1 tablet (20 mg) by mouth At Bedtime 1 at bedtime. 90 tablet 3     Calcium Carb-Cholecalciferol (CALCIUM 1000 + D PO) Take 1 tablet by mouth       Omega-3 Fatty Acids (FISH OIL) 1200 MG CAPS Take 1 capsule by mouth       PRENATAL VITAMINS PO Take 1 tablet by mouth daily        Cholecalciferol (VITAMIN D-3 PO) Take 2,000 Units by mouth daily        clonazePAM (KLONOPIN) 0.5 MG tablet Take 1 tablet (0.5 mg) by mouth 3 times daily as needed for anxiety 90 tablet 3     Allergies   Allergen Reactions     Nka [No Known Allergies]      Seasonal Allergies      BP Readings from Last 3 Encounters:   09/06/17 137/75   05/22/17 123/74   05/17/17 126/68    Wt Readings from Last 3 Encounters:   09/06/17 155 lb (70.3 kg)   05/22/17 158 lb (71.7 kg)   05/17/17 160 lb (72.6 kg)                  Labs reviewed in EPIC        Reviewed and updated as needed this visit by clinical staffTobacco  Allergies  Med Hx  Surg Hx  Fam Hx  Soc Hx      Reviewed and updated as needed this visit by Provider         ROS:  Constitutional, HEENT, cardiovascular, pulmonary, gi and gu systems are negative, except as otherwise noted.      OBJECTIVE:   /75 (BP Location: Left arm, Cuff Size: Adult Regular)  Pulse 68  Temp 97.9  F (36.6  C) (Tympanic)  Ht 5' 2.75\" (1.594 m)  Wt 155 lb (70.3 kg)  BMI 27.68 kg/m2  Body mass index is 27.68 kg/(m^2).  GENERAL: healthy, alert and no distress  EYES: Eyes grossly normal to inspection, PERRL and conjunctivae and sclerae normal  HENT: ear canals and TM's normal, nose and mouth without ulcers or lesions  NECK: " no adenopathy, no asymmetry, masses, or scars and thyroid normal to palpation  RESP: lungs clear to auscultation - no rales, rhonchi or wheezes  CV: regular rate and rhythm, normal S1 S2, no S3 or S4, no murmur, click or rub, no peripheral edema and peripheral pulses strong  ABDOMEN: soft, nontender, no hepatosplenomegaly, no masses and bowel sounds normal  MS: no gross musculoskeletal defects noted, no edema  SKIN: no suspicious lesions or rashes    Diagnostic Test Results:  none     ASSESSMENT/PLAN:   (M17.0) Osteoarthritis of both knees, unspecified osteoarthritis type  Comment: Medication refilled  Plan: traMADol (ULTRAM) 50 MG tablet, DISCONTINUED:         traMADol (ULTRAM) 50 MG tablet, DISCONTINUED:         traMADol (ULTRAM) 50 MG tablet      (E78.5) Hyperlipidemia LDL goal <130  Comment: Patient will be notified of results   Plan: needs to come back fasting     (G47.09) Other insomnia  Comment: Medication reilled  Plan: traZODone (DESYREL) 50 MG tablet        FUTURE APPOINTMENTS:       - Follow-up visit as needed    Catie Fraser MD  [facility]

## 2017-09-08 ENCOUNTER — TELEPHONE (OUTPATIENT)
Dept: FAMILY MEDICINE | Facility: CLINIC | Age: 72
End: 2017-09-08

## 2017-09-08 DIAGNOSIS — F41.9 ANXIETY: ICD-10-CM

## 2017-09-08 RX ORDER — CLONAZEPAM 0.5 MG/1
0.5 TABLET ORAL 3 TIMES DAILY PRN
Qty: 90 TABLET | Refills: 3 | Status: SHIPPED | OUTPATIENT
Start: 2017-09-08 | End: 2018-01-10

## 2017-09-08 NOTE — TELEPHONE ENCOUNTER
Reason for Call:  Medication or medication refill:    Do you use a [facility]?  Name of the pharmacy and phone number for the current request:  [facility]    Name of the medication requested: Clonazepam    Other request: She is asking for a refill for today.  She is going out of town and the pharmacy is closed tomorrow.  Please advise.    Can we leave a detailed message on this number? YES    Phone number patient can be reached at: Home number on file [phone] (home)    Best Time: any    Call taken on 9/8/2017 at 9:54 AM by Cortney Sapp

## 2017-09-08 NOTE — TELEPHONE ENCOUNTER
Patient contacted the [facility] regarding refill request. Patient informed that request has been sent to her primary physician to address. She would like script sent to the [facility]. Routing as high priority as patient needs this filled today.

## 2017-10-10 ENCOUNTER — OFFICE VISIT (OUTPATIENT)
Dept: FAMILY MEDICINE | Facility: CLINIC | Age: 72
End: 2017-10-10
Payer: COMMERCIAL

## 2017-10-10 VITALS
SYSTOLIC BLOOD PRESSURE: 120 MMHG | WEIGHT: 155.8 LBS | HEART RATE: 69 BPM | HEIGHT: 63 IN | BODY MASS INDEX: 27.61 KG/M2 | RESPIRATION RATE: 12 BRPM | OXYGEN SATURATION: 97 % | DIASTOLIC BLOOD PRESSURE: 71 MMHG | TEMPERATURE: 97.7 F

## 2017-10-10 DIAGNOSIS — J01.90 ACUTE SINUSITIS WITH SYMPTOMS > 10 DAYS: Primary | ICD-10-CM

## 2017-10-10 PROCEDURE — 99213 OFFICE O/P EST LOW 20 MIN: CPT | Performed by: NURSE PRACTITIONER

## 2017-10-10 NOTE — PATIENT INSTRUCTIONS
Sinusitis (Antibiotic Treatment)    The sinuses are air-filled spaces within the bones of the face. They connect to the inside of the nose. Sinusitis is an inflammation of the tissue lining the sinus cavity. Sinus inflammation can occur during a cold. It can also be due to allergies to pollens and other particles in the air. Sinusitis can cause symptoms of sinus congestion and fullness. A sinus infection causes fever, headache and facial pain. There is often green or yellow drainage from the nose or into the back of the throat (post-nasal drip). You have been given antibiotics to treat this condition.  Home care:    Take the full course of antibiotics as instructed. Do not stop taking them, even if you feel better.    Drink plenty of water, hot tea, and other liquids. This may help thin mucus. It also may promote sinus drainage.    Heat may help soothe painful areas of the face. Use a towel soaked in hot water. Or,  the shower and direct the hot spray onto your face. Using a vaporizer along with a menthol rub at night may also help.     An expectorant containing guaifenesin may help thin the mucus and promote drainage from the sinuses.    Over-the-counter decongestants may be used unless a similar medicine was prescribed. Nasal sprays work the fastest. Use one that contains phenylephrine or oxymetazoline. First blow the nose gently. Then use the spray. Do not use these medicines more often than directed on the label or symptoms may get worse. You may also use tablets containing pseudoephedrine. Avoid products that combine ingredients, because side effects may be increased. Read labels. You can also ask the pharmacist for help. (NOTE: Persons with high blood pressure should not use decongestants. They can raise blood pressure.)    Over-the-counter antihistamines may help if allergies contributed to your sinusitis.      Do not use nasal rinses or irrigation during an acute sinus infection, unless told to by  your health care provider. Rinsing may spread the infection to other sinuses.    Use acetaminophen or ibuprofen to control pain, unless another pain medicine was prescribed. (If you have chronic liver or kidney disease or ever had a stomach ulcer, talk with your doctor before using these medicines. Aspirin should never be used in anyone under 18 years of age who is ill with a fever. It may cause severe liver damage.)    Don't smoke. This can worsen symptoms.  Follow-up care  Follow up with your healthcare provider or our staff if you are not improving within the next week.  When to seek medical advice  Call your healthcare provider if any of these occur:    Facial pain or headache becoming more severe    Stiff neck    Unusual drowsiness or confusion    Swelling of the forehead or eyelids    Vision problems, including blurred or double vision    Fever of 100.4 F (38 C) or higher, or as directed by your healthcare provider    Seizure    Breathing problems    Symptoms not resolving within 10 days  Date Last Reviewed: 4/13/2015 2000-2017 [employer]. [address] [place] [address], [place] [id number]. All rights reserved. This information is not intended as a substitute for professional medical care. Always follow your healthcare professional's instructions.

## 2017-10-10 NOTE — PROGRESS NOTES
"  SUBJECTIVE:   Yamel Lindsay is a 72 year old female who presents to clinic today for the following health issues:      ENT Symptoms             Symptoms: cc Present Absent Comment   Fever/Chills  x  Chills and sweats, low grade fevers, break with 2 aspirin.   Fatigue  x     Muscle Aches   x    Eye Irritation  x  Feels like her eyes are \"pulling out of her head\"   Sneezing  x     Nasal Clark/Drg  x  Congestion, no drainage.   Sinus Pressure/Pain  x     Loss of smell   x    Dental pain   x    Sore Throat   x    Swollen Glands   x    Ear Pain/Fullness   x    Cough  x  First thing in the morning, improves throughout the day.   Wheeze   x    Chest Pain   x    Shortness of breath   x    Rash   x    Other         Symptom duration:  1 month, does have seasonal allergies   Symptom severity:  moderate   Treatments tried:  benadryl BID, ibuprofen   Contacts:  none       Problem list and histories reviewed & adjusted, as indicated.  Additional history: as documented    Patient Active Problem List   Diagnosis     Hyperlipidemia LDL goal <130     Primary localized osteoarthrosis, lower leg     Generalized anxiety disorder     Rhinitis, allergic seasonal     Alcoholism (H)     Mild recurrent major depression (H)     Advanced directives, counseling/discussion     Seasonal allergic rhinitis     Hip pain     GERD (gastroesophageal reflux disease)     S/P laparoscopic hernia repair     OA (osteoarthritis) of knee     Mild intermittent asthma without complication     Major depression in complete remission (H)     Benign essential hypertension     Chronic pain of right knee     Acute pain of left knee     Past Surgical History:   Procedure Laterality Date     ESOPHAGOSCOPY, GASTROSCOPY, DUODENOSCOPY (EGD), COMBINED N/A 1/5/2015    Procedure: COMBINED ESOPHAGOSCOPY, GASTROSCOPY, DUODENOSCOPY (EGD), BIOPSY SINGLE OR MULTIPLE;  Surgeon: Dylan Alejandra MD;  Location: [facility]     LAPAROSCOPIC HERNIORRHAPHY INGUINAL Right 1/26/2015    " "Procedure: LAPAROSCOPIC HERNIORRHAPHY INGUINAL;  Surgeon: Favio Olsen MD;  Location: [facility]     TUBAL LIGATION         Social History   Substance Use Topics     Smoking status: Former Smoker     Packs/day: 0.50     Types: Cigarettes     Quit date: 6/16/2011     Smokeless tobacco: Never Used     Alcohol use No     Family History   Problem Relation Age of Onset     CANCER Mother      C.A.D. Father      Lipids Father      Hypertension Father      Hypertension Brother      CANCER Brother      esophageal cancer     Breast Cancer Other      Prostate Cancer Brother      Psychotic Disorder Daughter      DIABETES No family hx of      CEREBROVASCULAR DISEASE No family hx of      Cancer - colorectal No family hx of              Reviewed and updated as needed this visit by clinical staff     Reviewed and updated as needed this visit by Provider         ROS:  Constitutional, HEENT, cardiovascular, pulmonary, gi and gu systems are negative, except as otherwise noted.      OBJECTIVE:   /71 (BP Location: Left arm, Patient Position: Sitting, Cuff Size: Adult Regular)  Pulse 69  Temp 97.7  F (36.5  C) (Tympanic)  Resp 12  Ht 5' 2.75\" (1.594 m)  Wt 155 lb 12.8 oz (70.7 kg)  SpO2 97%  BMI 27.82 kg/m2  Body mass index is 27.82 kg/(m^2).  GENERAL: healthy, alert and no distress  EYES: Eyes grossly normal to inspection, PERRL and conjunctivae and sclerae normal  HENT: normal cephalic/atraumatic, both ears: normal: no effusions, no erythema, normal landmarks and retracted TM's, nose and mouth without ulcers or lesions, oropharynx clear, oral mucous membranes moist, sinuses: maxillary tenderness on both sides and cobblestone appearance to posterior pharynx/tongue.  NECK: no adenopathy and no asymmetry, masses, or scars  RESP: lungs clear to auscultation - no rales, rhonchi or wheezes  CV: regular rates and rhythm, normal S1 S2, no S3 or S4 and no murmur, click or rub  SKIN: no suspicious lesions or rashes  NEURO: Normal " strength and tone, mentation intact and speech normal    Diagnostic Test Results:  none     ASSESSMENT/PLAN:       ICD-10-CM    1. Acute sinusitis with symptoms > 10 days J01.90 amoxicillin-clavulanate (AUGMENTIN) 875-125 MG per tablet       FUTURE APPOINTMENTS:       - Follow-up visit in 1 week for persistent symptoms, sooner PRN new or worsening symptoms.     Patient Instructions     Sinusitis (Antibiotic Treatment)    The sinuses are air-filled spaces within the bones of the face. They connect to the inside of the nose. Sinusitis is an inflammation of the tissue lining the sinus cavity. Sinus inflammation can occur during a cold. It can also be due to allergies to pollens and other particles in the air. Sinusitis can cause symptoms of sinus congestion and fullness. A sinus infection causes fever, headache and facial pain. There is often green or yellow drainage from the nose or into the back of the throat (post-nasal drip). You have been given antibiotics to treat this condition.  Home care:    Take the full course of antibiotics as instructed. Do not stop taking them, even if you feel better.    Drink plenty of water, hot tea, and other liquids. This may help thin mucus. It also may promote sinus drainage.    Heat may help soothe painful areas of the face. Use a towel soaked in hot water. Or,  the shower and direct the hot spray onto your face. Using a vaporizer along with a menthol rub at night may also help.     An expectorant containing guaifenesin may help thin the mucus and promote drainage from the sinuses.    Over-the-counter decongestants may be used unless a similar medicine was prescribed. Nasal sprays work the fastest. Use one that contains phenylephrine or oxymetazoline. First blow the nose gently. Then use the spray. Do not use these medicines more often than directed on the label or symptoms may get worse. You may also use tablets containing pseudoephedrine. Avoid products that combine  ingredients, because side effects may be increased. Read labels. You can also ask the pharmacist for help. (NOTE: Persons with high blood pressure should not use decongestants. They can raise blood pressure.)    Over-the-counter antihistamines may help if allergies contributed to your sinusitis.      Do not use nasal rinses or irrigation during an acute sinus infection, unless told to by your health care provider. Rinsing may spread the infection to other sinuses.    Use acetaminophen or ibuprofen to control pain, unless another pain medicine was prescribed. (If you have chronic liver or kidney disease or ever had a stomach ulcer, talk with your doctor before using these medicines. Aspirin should never be used in anyone under 18 years of age who is ill with a fever. It may cause severe liver damage.)    Don't smoke. This can worsen symptoms.  Follow-up care  Follow up with your healthcare provider or our staff if you are not improving within the next week.  When to seek medical advice  Call your healthcare provider if any of these occur:    Facial pain or headache becoming more severe    Stiff neck    Unusual drowsiness or confusion    Swelling of the forehead or eyelids    Vision problems, including blurred or double vision    Fever of 100.4 F (38 C) or higher, or as directed by your healthcare provider    Seizure    Breathing problems    Symptoms not resolving within 10 days  Date Last Reviewed: 4/13/2015 2000-2017 [employer]. [address] [place] [address], [place] [address]. All rights reserved. This information is not intended as a substitute for professional medical care. Always follow your healthcare professional's instructions.            NAYA Ruth [facility]

## 2017-10-10 NOTE — NURSING NOTE
"Chief Complaint   Patient presents with     Sinus Problem       Initial /71 (BP Location: Left arm, Patient Position: Sitting, Cuff Size: Adult Regular)  Pulse 69  Temp 97.7  F (36.5  C) (Tympanic)  Resp 12  Ht 5' 2.75\" (1.594 m)  Wt 155 lb 12.8 oz (70.7 kg)  SpO2 97%  BMI 27.82 kg/m2 Estimated body mass index is 27.82 kg/(m^2) as calculated from the following:    Height as of this encounter: 5' 2.75\" (1.594 m).    Weight as of this encounter: 155 lb 12.8 oz (70.7 kg).  Medication Reconciliation: complete  "

## 2017-10-10 NOTE — MR AVS SNAPSHOT
After Visit Summary   10/10/2017    Yamel Lindsay    MRN: [id number]           Patient Information     Date Of Birth          1945        Visit Information        Provider Department      10/10/2017 1:40 PM Pamella Munoz APRN [facility]        Today's Diagnoses     Acute sinusitis with symptoms > 10 days    -  1      Care Instructions      Sinusitis (Antibiotic Treatment)    The sinuses are air-filled spaces within the bones of the face. They connect to the inside of the nose. Sinusitis is an inflammation of the tissue lining the sinus cavity. Sinus inflammation can occur during a cold. It can also be due to allergies to pollens and other particles in the air. Sinusitis can cause symptoms of sinus congestion and fullness. A sinus infection causes fever, headache and facial pain. There is often green or yellow drainage from the nose or into the back of the throat (post-nasal drip). You have been given antibiotics to treat this condition.  Home care:    Take the full course of antibiotics as instructed. Do not stop taking them, even if you feel better.    Drink plenty of water, hot tea, and other liquids. This may help thin mucus. It also may promote sinus drainage.    Heat may help soothe painful areas of the face. Use a towel soaked in hot water. Or,  the shower and direct the hot spray onto your face. Using a vaporizer along with a menthol rub at night may also help.     An expectorant containing guaifenesin may help thin the mucus and promote drainage from the sinuses.    Over-the-counter decongestants may be used unless a similar medicine was prescribed. Nasal sprays work the fastest. Use one that contains phenylephrine or oxymetazoline. First blow the nose gently. Then use the spray. Do not use these medicines more often than directed on the label or symptoms may get worse. You may also use tablets containing pseudoephedrine. Avoid products that combine  ingredients, because side effects may be increased. Read labels. You can also ask the pharmacist for help. (NOTE: Persons with high blood pressure should not use decongestants. They can raise blood pressure.)    Over-the-counter antihistamines may help if allergies contributed to your sinusitis.      Do not use nasal rinses or irrigation during an acute sinus infection, unless told to by your health care provider. Rinsing may spread the infection to other sinuses.    Use acetaminophen or ibuprofen to control pain, unless another pain medicine was prescribed. (If you have chronic liver or kidney disease or ever had a stomach ulcer, talk with your doctor before using these medicines. Aspirin should never be used in anyone under 18 years of age who is ill with a fever. It may cause severe liver damage.)    Don't smoke. This can worsen symptoms.  Follow-up care  Follow up with your healthcare provider or our staff if you are not improving within the next week.  When to seek medical advice  Call your healthcare provider if any of these occur:    Facial pain or headache becoming more severe    Stiff neck    Unusual drowsiness or confusion    Swelling of the forehead or eyelids    Vision problems, including blurred or double vision    Fever of 100.4 F (38 C) or higher, or as directed by your healthcare provider    Seizure    Breathing problems    Symptoms not resolving within 10 days  Date Last Reviewed: 4/13/2015 2000-2017 [employer]. [address] [place] [address], [place] [address]. All rights reserved. This information is not intended as a substitute for professional medical care. Always follow your healthcare professional's instructions.                Follow-ups after your visit        Who to contact     If you have questions or need follow up information about today's clinic visit or your schedule please contact [facility] directly at [phone].  Normal or non-critical lab and imaging  "results will be communicated to you by MyChart, letter or phone within 4 business days after the clinic has received the results. If you do not hear from us within 7 days, please contact the clinic through [email or web address] or phone. If you have a critical or abnormal lab result, we will notify you by phone as soon as possible.  Submit refill requests through [email or web address] or call your pharmacy and they will forward the refill request to us. Please allow 3 business days for your refill to be completed.          Additional Information About Your Visit        [email or web address] Information     [email or web address] lets you send messages to your doctor, view your test results, renew your prescriptions, schedule appointments and more. To sign up, go to [email or web address] . Click on \"Log in\" on the left side of the screen, which will take you to the Welcome page. Then click on \"Sign up Now\" on the right side of the page.     You will be asked to enter the access code listed below, as well as some personal information. Please follow the directions to create your username and password.     Your access code is: [email or web address]  Expires: 2017 10:07 AM     Your access code will  in 90 days. If you need help or a new code, please call your [place] clinic or [phone].        Care EveryWhere ID     This is your Care EveryWhere ID. This could be used by other organizations to access your [place] medical records  [id number]        Your Vitals Were     Pulse Temperature Respirations Height Pulse Oximetry BMI (Body Mass Index)    69 97.7  F (36.5  C) (Tympanic) 12 5' 2.75\" (1.594 m) 97% 27.82 kg/m2       Blood Pressure from Last 3 Encounters:   10/10/17 120/71   17 137/75   17 123/74    Weight from Last 3 Encounters:   10/10/17 155 lb 12.8 oz (70.7 kg)   17 155 lb (70.3 kg)   17 158 lb (71.7 kg)              Today, you had the following     No orders found for display         Today's Medication Changes          These changes " are accurate as of: 10/10/17  1:59 PM.  If you have any questions, ask your nurse or doctor.               Start taking these medicines.        Dose/Directions    amoxicillin-clavulanate 875-125 MG per tablet   Commonly known as:  AUGMENTIN   Used for:  Acute sinusitis with symptoms > 10 days   Started by:  Pamella Munoz APRN CNP        Dose:  1 tablet   Take 1 tablet by mouth 2 times daily   Quantity:  20 tablet   Refills:  0            Where to get your medicines      These medications were sent to [facility] - [place], [place] - [address]  [address], [facility] [address]     Phone:  [phone]     amoxicillin-clavulanate 875-125 MG per tablet                Primary Care Provider Office Phone # Fax #    Catie Fraser [phone] [address] [facility] [address]        Equal Access to Services     BELÉN MOSLEY : Hadii bobby lopez hadasho Soomaali, waaxda luqadaha, qaybta kaalmada adeegyada, waxay vivin hayfelicityn stephane garrett. So [facility] [phone].    ATENCIÓN: Si habla español, tiene a maurer disposición servicios gratuitos de asistencia lingüística. Kunal al [phone].    We comply with applicable federal civil rights laws and [place] laws. We do not discriminate on the basis of race, color, national origin, age, disability, sex, sexual orientation, or gender identity.            Thank you!     Thank you for choosing [facility]  for your care. Our goal is always to provide you with excellent care. Hearing back from our patients is one way we can continue to improve our services. Please take a few minutes to complete the written survey that you may receive in the mail after your visit with us. Thank you!             Your Updated Medication List - Protect others around you: Learn how to safely use, store and throw away your medicines at www.disposemymeds.org.          This list is accurate as of:  10/10/17  1:59 PM.  Always use your most recent med list.                   Brand Name Dispense Instructions for use Diagnosis    amoxicillin-clavulanate 875-125 MG per tablet    AUGMENTIN    20 tablet    Take 1 tablet by mouth 2 times daily    Acute sinusitis with symptoms > 10 days       CALCIUM 1000 + D PO      Take 1 tablet by mouth        clonazePAM 0.5 MG tablet    klonoPIN    90 tablet    Take 1 tablet (0.5 mg) by mouth 3 times daily as needed for anxiety    Anxiety       fish Oil 1200 MG capsule      Take 1 capsule by mouth        FLUoxetine 40 MG capsule    PROzac    90 capsule    Take 1 capsule (40 mg) by mouth daily    Major depressive disorder, single episode, moderate (H)       hydrochlorothiazide 25 MG tablet    HYDRODIURIL    90 tablet    Take 1 tablet (25 mg) by mouth daily    Benign essential hypertension       ibuprofen 200 MG tablet    ADVIL/MOTRIN     Take 200-600 mg by mouth every 2 hours as needed for mild pain        omeprazole 10 MG CR capsule    priLOSEC    180 capsule    Take 2 capsules (20 mg) by mouth daily Take by mouth 30-60 minutes before a meal.    Gastroesophageal reflux disease without esophagitis       pravastatin 20 MG tablet    PRAVACHOL    90 tablet    Take 1 tablet (20 mg) by mouth At Bedtime 1 at bedtime.    Hyperlipidemia LDL goal <130       PRENATAL VITAMINS PO      Take 1 tablet by mouth daily        traMADol 50 MG tablet    ULTRAM    120 tablet    Take 1 tablet (50 mg) by mouth every 6 hours as needed for pain    Osteoarthritis of both knees, unspecified osteoarthritis type       traZODone 50 MG tablet    DESYREL    180 tablet    Take 2 tablets (100 mg) by mouth At Bedtime    Other insomnia       VENTOLIN  (90 BASE) MCG/ACT Inhaler   Generic drug:  albuterol     18 g    INHALE 2 PUFFS INTO THE LUNGS EVERY 6 HOURS AS NEEDED FOR SHORTNESS OF BREATH, DIFFICULTY BREATHING OR WHEEZING.    Mild intermittent asthma without complication       VITAMIN D-3 PO      Take 2,000  Units by mouth daily

## 2018-01-02 DIAGNOSIS — F32.1 MAJOR DEPRESSIVE DISORDER, SINGLE EPISODE, MODERATE (H): ICD-10-CM

## 2018-01-03 NOTE — TELEPHONE ENCOUNTER
Requested Prescriptions   Pending Prescriptions Disp Refills     FLUoxetine (PROZAC) 40 MG capsule [Pharmacy Med Name: FLUOXETINE HCL 40MG CAPS] 90 capsule 3    Last Written Prescription Date:  12-20-16  Last Fill Quantity: 90,  # refills: 3   Last Office Visit with [facility], P or [facility] prescribing provider:  10-10-17   Future Office Visit:      Sig: TAKE ONE CAPSULE BY MOUTH EVERY DAY    SSRIs Protocol Failed    1/2/2018  3:24 PM       Failed - PHQ-9 score less than 5 in past 6 months    The PHQ-9 criteria is meant to fail. It requires a PHQ-9 score review         Failed - Recent (6 mo) or future visit with authorizing provider's specialty    Patient had office visit in the last 6 months or has a visit in the next 30 days with authorizing provider.  See chart review.            Passed - Patient is age 18 or older       Passed - No active pregnancy on record       Passed - No positive pregnancy test in last 12 months

## 2018-01-07 RX ORDER — FLUOXETINE 40 MG/1
CAPSULE ORAL
Qty: 90 CAPSULE | Refills: 3 | Status: SHIPPED | OUTPATIENT
Start: 2018-01-07 | End: 2018-01-09

## 2018-01-09 ENCOUNTER — OFFICE VISIT (OUTPATIENT)
Dept: FAMILY MEDICINE | Facility: CLINIC | Age: 73
End: 2018-01-09
Payer: COMMERCIAL

## 2018-01-09 ENCOUNTER — TELEPHONE (OUTPATIENT)
Dept: FAMILY MEDICINE | Facility: CLINIC | Age: 73
End: 2018-01-09

## 2018-01-09 VITALS
BODY MASS INDEX: 26.93 KG/M2 | DIASTOLIC BLOOD PRESSURE: 82 MMHG | SYSTOLIC BLOOD PRESSURE: 128 MMHG | TEMPERATURE: 98 F | HEIGHT: 63 IN | HEART RATE: 73 BPM | WEIGHT: 152 LBS

## 2018-01-09 DIAGNOSIS — F32.1 MAJOR DEPRESSIVE DISORDER, SINGLE EPISODE, MODERATE (H): ICD-10-CM

## 2018-01-09 DIAGNOSIS — M85.869 OSTEOPENIA OF LOWER LEG, UNSPECIFIED LATERALITY: Primary | ICD-10-CM

## 2018-01-09 PROCEDURE — 99214 OFFICE O/P EST MOD 30 MIN: CPT | Performed by: FAMILY MEDICINE

## 2018-01-09 RX ORDER — FLUOXETINE 40 MG/1
40 CAPSULE ORAL DAILY
Qty: 90 CAPSULE | Refills: 3 | Status: SHIPPED | OUTPATIENT
Start: 2018-01-09 | End: 2018-09-18

## 2018-01-09 ASSESSMENT — PATIENT HEALTH QUESTIONNAIRE - PHQ9
SUM OF ALL RESPONSES TO PHQ QUESTIONS 1-9: 2
5. POOR APPETITE OR OVEREATING: SEVERAL DAYS

## 2018-01-09 ASSESSMENT — ANXIETY QUESTIONNAIRES
3. WORRYING TOO MUCH ABOUT DIFFERENT THINGS: SEVERAL DAYS
7. FEELING AFRAID AS IF SOMETHING AWFUL MIGHT HAPPEN: NOT AT ALL
5. BEING SO RESTLESS THAT IT IS HARD TO SIT STILL: NOT AT ALL
2. NOT BEING ABLE TO STOP OR CONTROL WORRYING: SEVERAL DAYS
6. BECOMING EASILY ANNOYED OR IRRITABLE: NOT AT ALL
1. FEELING NERVOUS, ANXIOUS, OR ON EDGE: SEVERAL DAYS
GAD7 TOTAL SCORE: 4

## 2018-01-09 NOTE — MR AVS SNAPSHOT
After Visit Summary   1/9/2018    Yamel Lindsay    MRN: [id number]           Patient Information     Date Of Birth          1945        Visit Information        Provider Department      1/9/2018 1:20 PM Catie Fraser MD [facility]        Today's Diagnoses     Major depressive disorder, single episode, moderate (H)          Care Instructions          Thank you for choosing [facility].  You may be receiving a survey in the mail from [facility] regarding your visit today.  Please take a few minutes to complete and return the survey to let us know how we are doing.      If you have questions or concerns, please contact us via [employer] or you can contact your care team at [phone].    Our Clinic hours are:  Monday 6:40 am  to 7:00 pm  Tuesday -Friday 6:40 am to 5:00 pm    The [place] outpatient lab hours are:  Monday - Friday 6:10 am to 4:45 pm  Saturdays 7:00 am to 11:00 am  Appointments are required, call [phone]    If you have clinical questions after hours or would like to schedule an appointment,  call the clinic at [phone].          Follow-ups after your visit        Who to contact     If you have questions or need follow up information about today's clinic visit or your schedule please contact [facility] directly at [phone].  Normal or non-critical lab and imaging results will be communicated to you by MyChart, letter or phone within 4 business days after the clinic has received the results. If you do not hear from us within 7 days, please contact the clinic through [employer] or phone. If you have a critical or abnormal lab result, we will notify you by phone as soon as possible.  Submit refill requests through [employer] or call your pharmacy and they will forward the refill request to us. Please allow 3 business days for your refill to be completed.          Additional Information About Your Visit        MyChart Information      "[employer] lets you send messages to your doctor, view your test results, renew your prescriptions, schedule appointments and more. To sign up, go to [email or web address] . Click on \"Log in\" on the left side of the screen, which will take you to the Welcome page. Then click on \"Sign up Now\" on the right side of the page.     You will be asked to enter the access code listed below, as well as some personal information. Please follow the directions to create your username and password.     Your access code is: [email or web address]  Expires: 2018  1:47 PM     Your access code will  in 90 days. If you need help or a new code, please call your [place] clinic or [phone].        Care EveryWhere ID     This is your Care EveryWhere ID. This could be used by other organizations to access your [place] medical records  [id number]        Your Vitals Were     Pulse Temperature Height BMI (Body Mass Index)          73 98  F (36.7  C) (Tympanic) 5' 2.75\" (1.594 m) 27.14 kg/m2         Blood Pressure from Last 3 Encounters:   18 128/82   10/10/17 120/71   17 137/75    Weight from Last 3 Encounters:   18 152 lb (68.9 kg)   10/10/17 155 lb 12.8 oz (70.7 kg)   17 155 lb (70.3 kg)              Today, you had the following     No orders found for display         Today's Medication Changes          These changes are accurate as of: 18  1:47 PM.  If you have any questions, ask your nurse or doctor.               These medicines have changed or have updated prescriptions.        Dose/Directions    FLUoxetine 40 MG capsule   Commonly known as:  PROzac   This may have changed:  See the new instructions.   Used for:  Major depressive disorder, single episode, moderate (H)   Changed by:  Catie Fraser MD        Dose:  40 mg   Take 1 capsule (40 mg) by mouth daily   Quantity:  90 capsule   Refills:  3            Where to get your medicines      These medications were sent to [facility] " [place] - [place], [place] - [address]  [address], [facility] [address]     Phone:  [phone]     FLUoxetine 40 MG capsule                Primary Care Provider Office Phone # Fax #    Catie Fraser [phone] [address] [facility] [address]        Equal Access to Services     BELÉN MOSLEY : Hadii aad ku hadasho Soomaali, waaxda luqadaha, qaybta kaalmada adeegyada, waxay idiin hayaan adeeg kharash la'aan . So [facility] [phone].    ATENCIÓN: Si [place], tiene a maurer disposición servicios gratuitos de asistencia lingüística. Noemyaylin al [phone].    We comply with applicable federal civil rights laws and [place] laws. We do not discriminate on the basis of race, color, national origin, age, disability, sex, sexual orientation, or gender identity.            Thank you!     Thank you for choosing [facility]  for your care. Our goal is always to provide you with excellent care. Hearing back from our patients is one way we can continue to improve our services. Please take a few minutes to complete the written survey that you may receive in the mail after your visit with us. Thank you!             Your Updated Medication List - Protect others around you: Learn how to safely use, store and throw away your medicines at www.disposemymeds.org.          This list is accurate as of: 1/9/18  1:47 PM.  Always use your most recent med list.                   Brand Name Dispense Instructions for use Diagnosis    amoxicillin-clavulanate 875-125 MG per tablet    AUGMENTIN    20 tablet    Take 1 tablet by mouth 2 times daily    Acute sinusitis with symptoms > 10 days       CALCIUM 1000 + D PO      Take 1 tablet by mouth        clonazePAM 0.5 MG tablet    klonoPIN    90 tablet    Take 1 tablet (0.5 mg) by mouth 3 times daily as needed for anxiety    Anxiety       fish Oil 1200 MG capsule      Take 1 capsule by mouth        FLUoxetine  40 MG capsule    PROzac    90 capsule    Take 1 capsule (40 mg) by mouth daily    Major depressive disorder, single episode, moderate (H)       hydrochlorothiazide 25 MG tablet    HYDRODIURIL    90 tablet    Take 1 tablet (25 mg) by mouth daily    Benign essential hypertension       ibuprofen 200 MG tablet    ADVIL/MOTRIN     Take 200-600 mg by mouth every 2 hours as needed for mild pain        omeprazole 10 MG CR capsule    priLOSEC    180 capsule    Take 2 capsules (20 mg) by mouth daily Take by mouth 30-60 minutes before a meal.    Gastroesophageal reflux disease without esophagitis       pravastatin 20 MG tablet    PRAVACHOL    90 tablet    Take 1 tablet (20 mg) by mouth At Bedtime 1 at bedtime.    Hyperlipidemia LDL goal <130       PRENATAL VITAMINS PO      Take 1 tablet by mouth daily        traMADol 50 MG tablet    ULTRAM    120 tablet    Take 1 tablet (50 mg) by mouth every 6 hours as needed for pain    Osteoarthritis of both knees, unspecified osteoarthritis type       traZODone 50 MG tablet    DESYREL    180 tablet    Take 2 tablets (100 mg) by mouth At Bedtime    Other insomnia       VENTOLIN  (90 BASE) MCG/ACT Inhaler   Generic drug:  albuterol     18 g    INHALE 2 PUFFS INTO THE LUNGS EVERY 6 HOURS AS NEEDED FOR SHORTNESS OF BREATH, DIFFICULTY BREATHING OR WHEEZING.    Mild intermittent asthma without complication       VITAMIN D-3 PO      Take 2,000 Units by mouth daily

## 2018-01-09 NOTE — PATIENT INSTRUCTIONS
Thank you for choosing [facility].  You may be receiving a survey in the mail from [employer] regarding your visit today.  Please take a few minutes to complete and return the survey to let us know how we are doing.      If you have questions or concerns, please contact us via [employer] or you can contact your care team at [phone].    Our Clinic hours are:  Monday 6:40 am  to 7:00 pm  Tuesday -Friday 6:40 am to 5:00 pm    The [place] outpatient lab hours are:  Monday - Friday 6:10 am to 4:45 pm  Saturdays 7:00 am to 11:00 am  Appointments are required, call [phone]    If you have clinical questions after hours or would like to schedule an appointment,  call the clinic at [phone].

## 2018-01-09 NOTE — PROGRESS NOTES
SUBJECTIVE:   Yamel Lindsay is a 72 year old female who presents to clinic today for the following health issues:  Chief Complaint   Patient presents with     Depression     Refill Request     Health Maintenance     Patient is due for a dexa scan and is willing to do          Depression and Anxiety Follow-Up    Status since last visit: No change    Other associated symptoms:None    Complicating factors:     Significant life event: No     Current substance abuse: None    PHQ-9 Score and MyChart F/U Questions 10/24/2016 12/20/2016 5/22/2017   Total Score 5 12 5   Q9: Suicide Ideation Not at all Not at all Not at all     PAOLA-7 SCORE 12/20/2016 3/2/2017 5/22/2017   Total Score - - -   Total Score 15 7 10     In the past two weeks have you had thoughts of suicide or self-harm?  No.    Do you have concerns about your personal safety or the safety of others?   No    PHQ-9  English  PHQ-9   Any Language  GAD7  Suicide Assessment Five-step Evaluation and Treatment (SAFE-T)      Amount of exercise or physical activity: 2-3 days/week for an average of 30-45 minutes    Problems taking medications regularly: No    Medication side effects: none    Diet: low fat/cholesterol and small amount of  meat         Medication Followup of Prozac     Taking Medication as prescribed: yes    Side Effects:  None    Medication Helping Symptoms:  yes    Patient here for medication refill. Wants refills on antidepressants . Depression has been stable. No new symptoms.       Problem list and histories reviewed & adjusted, as indicated.  Additional history: as documented    Patient Active Problem List   Diagnosis     Hyperlipidemia LDL goal <130     Primary localized osteoarthrosis, lower leg     Generalized anxiety disorder     Rhinitis, allergic seasonal     Alcoholism (H)     Mild recurrent major depression (H)     Advanced directives, counseling/discussion     Seasonal allergic rhinitis     Hip pain     GERD (gastroesophageal reflux disease)      S/P laparoscopic hernia repair     OA (osteoarthritis) of knee     Mild intermittent asthma without complication     Major depression in complete remission (H)     Benign essential hypertension     Chronic pain of right knee     Acute pain of left knee     Past Surgical History:   Procedure Laterality Date     ESOPHAGOSCOPY, GASTROSCOPY, DUODENOSCOPY (EGD), COMBINED N/A 1/5/2015    Procedure: COMBINED ESOPHAGOSCOPY, GASTROSCOPY, DUODENOSCOPY (EGD), BIOPSY SINGLE OR MULTIPLE;  Surgeon: Dylan Alejandra MD;  Location: [facility]     LAPAROSCOPIC HERNIORRHAPHY INGUINAL Right 1/26/2015    Procedure: LAPAROSCOPIC HERNIORRHAPHY INGUINAL;  Surgeon: Favio Olsen MD;  Location: [facility]     TUBAL LIGATION         Social History   Substance Use Topics     Smoking status: Former Smoker     Packs/day: 0.50     Types: Cigarettes     Quit date: 6/16/2011     Smokeless tobacco: Never Used     Alcohol use No     Family History   Problem Relation Age of Onset     CANCER Mother      C.A.D. Father      Lipids Father      Hypertension Father      Hypertension Brother      CANCER Brother      esophageal cancer     Breast Cancer Other      Prostate Cancer Brother      Psychotic Disorder Daughter      DIABETES No family hx of      CEREBROVASCULAR DISEASE No family hx of      Cancer - colorectal No family hx of          Current Outpatient Prescriptions   Medication Sig Dispense Refill     FLUoxetine (PROZAC) 40 MG capsule Take 1 capsule (40 mg) by mouth daily 90 capsule 3     clonazePAM (KLONOPIN) 0.5 MG tablet Take 1 tablet (0.5 mg) by mouth 3 times daily as needed for anxiety 90 tablet 3     traZODone (DESYREL) 50 MG tablet Take 2 tablets (100 mg) by mouth At Bedtime 180 tablet 3     traMADol (ULTRAM) 50 MG tablet Take 1 tablet (50 mg) by mouth every 6 hours as needed for pain 120 tablet 3     VENTOLIN  (90 BASE) MCG/ACT Inhaler INHALE 2 PUFFS INTO THE LUNGS EVERY 6 HOURS AS NEEDED FOR SHORTNESS OF BREATH, DIFFICULTY BREATHING OR  "WHEEZING. 18 g 3     omeprazole (PRILOSEC) 10 MG CR capsule Take 2 capsules (20 mg) by mouth daily Take by mouth 30-60 minutes before a meal. 180 capsule 3     hydrochlorothiazide (HYDRODIURIL) 25 MG tablet Take 1 tablet (25 mg) by mouth daily 90 tablet 3     pravastatin (PRAVACHOL) 20 MG tablet Take 1 tablet (20 mg) by mouth At Bedtime 1 at bedtime. 90 tablet 3     Calcium Carb-Cholecalciferol (CALCIUM 1000 + D PO) Take 1 tablet by mouth       Omega-3 Fatty Acids (FISH OIL) 1200 MG CAPS Take 1 capsule by mouth       PRENATAL VITAMINS PO Take 1 tablet by mouth daily        Cholecalciferol (VITAMIN D-3 PO) Take 2,000 Units by mouth daily        amoxicillin-clavulanate (AUGMENTIN) 875-125 MG per tablet Take 1 tablet by mouth 2 times daily 20 tablet 0     ibuprofen (ADVIL,MOTRIN) 200 MG tablet Take 200-600 mg by mouth every 2 hours as needed for mild pain       Allergies   Allergen Reactions     Nka [No Known Allergies]      Seasonal Allergies      BP Readings from Last 3 Encounters:   01/09/18 128/82   10/10/17 120/71   09/06/17 137/75    Wt Readings from Last 3 Encounters:   01/09/18 152 lb (68.9 kg)   10/10/17 155 lb 12.8 oz (70.7 kg)   09/06/17 155 lb (70.3 kg)                  Labs reviewed in EPIC        Reviewed and updated as needed this visit by clinical staffTobacco  Allergies  Med Hx  Surg Hx  Fam Hx  Soc Hx      Reviewed and updated as needed this visit by Provider         ROS:  Constitutional, HEENT, cardiovascular, pulmonary, gi and gu systems are negative, except as otherwise noted.      OBJECTIVE:   /82 (BP Location: Left arm, Cuff Size: Adult Regular)  Pulse 73  Temp 98  F (36.7  C) (Tympanic)  Ht 5' 2.75\" (1.594 m)  Wt 152 lb (68.9 kg)  BMI 27.14 kg/m2  Body mass index is 27.14 kg/(m^2).  GENERAL: healthy, alert and no distress  EYES: Eyes grossly normal to inspection, PERRL and conjunctivae and sclerae normal  HENT: ear canals and TM's normal, nose and mouth without ulcers or " lesions  NECK: no adenopathy, no asymmetry, masses, or scars and thyroid normal to palpation  RESP: lungs clear to auscultation - no rales, rhonchi or wheezes  CV: regular rate and rhythm, normal S1 S2, no S3 or S4, no murmur, click or rub, no peripheral edema and peripheral pulses strong  ABDOMEN: soft, nontender, no hepatosplenomegaly, no masses and bowel sounds normal  MS: no gross musculoskeletal defects noted, no edema    Diagnostic Test Results:  none     ASSESSMENT/PLAN:   1. Major depressive disorder, single episode, moderate (H)  Medication refilled  - FLUoxetine (PROZAC) 40 MG capsule; Take 1 capsule (40 mg) by mouth daily  Dispense: 90 capsule; Refill: 3    2. Osteopenia of lower leg, unspecified laterality  Dexa scan ordered  - DX Hip/Pelvis/Spine; Future    FUTURE APPOINTMENTS:       - Follow-up visit as needed.    Catie Fraser MD  [facility]

## 2018-01-10 DIAGNOSIS — M17.0 OSTEOARTHRITIS OF BOTH KNEES, UNSPECIFIED OSTEOARTHRITIS TYPE: ICD-10-CM

## 2018-01-10 DIAGNOSIS — F41.9 ANXIETY: ICD-10-CM

## 2018-01-10 ASSESSMENT — ASTHMA QUESTIONNAIRES: ACT_TOTALSCORE: 22

## 2018-01-10 ASSESSMENT — ANXIETY QUESTIONNAIRES: GAD7 TOTAL SCORE: 4

## 2018-01-10 NOTE — TELEPHONE ENCOUNTER
clonazePAM (KLONOPIN) 0.5 MG tablet      Last Written Prescription Date:  09/08/2017  Last Fill Quantity: 90,   # refills: 3  Last Office Visit: 01/09/2018  Future Office visit:       Routing refill request to provider for review/approval because:  Drug not on the [facility], [facility] or [facility] refill protocol or controlled substance      Huang MILLER (R)

## 2018-01-10 NOTE — TELEPHONE ENCOUNTER
traMADol (ULTRAM) 50 MG tablet      Last Written Prescription Date:  09/06/2017  Last Fill Quantity: 120,   # refills: 3  Last Office Visit: 01/09/2017  Future Office visit:       Routing refill request to provider for review/approval because:  Drug not on the [facility], [facility] or [facility] refill protocol or controlled substance      Huang MILLER (R)

## 2018-01-12 ENCOUNTER — TELEPHONE (OUTPATIENT)
Dept: FAMILY MEDICINE | Facility: CLINIC | Age: 73
End: 2018-01-12

## 2018-01-12 DIAGNOSIS — Z78.0 MENOPAUSE: Primary | ICD-10-CM

## 2018-01-12 RX ORDER — CLONAZEPAM 0.5 MG/1
0.5 TABLET ORAL 3 TIMES DAILY PRN
Qty: 90 TABLET | Refills: 3 | Status: SHIPPED | OUTPATIENT
Start: 2018-01-12 | End: 2018-05-16

## 2018-01-12 RX ORDER — TRAMADOL HYDROCHLORIDE 50 MG/1
50 TABLET ORAL EVERY 6 HOURS PRN
Qty: 120 TABLET | Refills: 3 | Status: SHIPPED | OUTPATIENT
Start: 2018-01-12 | End: 2018-05-16

## 2018-01-12 NOTE — TELEPHONE ENCOUNTER
Reason for Call:  Other     Detailed comments: Skyla from Imaging calling about the bone scan ordered 1-9 by Dr. Fraser. She is getting an AVN warning which means something is wrong with the order. In this case the diagnosis is too specific. If this is not fixed the patient will get billed for the entire procedure.    Skyla said a commonly used diagnosis is pre-menopausal symptoms - or something closely related.    Phone Number Patient can be reached at: Other phone number:  Skyla - imaging - [phone]    Best Time: any    Can we leave a detailed message on this number? YES    Call taken on 1/12/2018 at 3:01 PM by Abby Calvo

## 2018-02-08 ENCOUNTER — OFFICE VISIT (OUTPATIENT)
Dept: FAMILY MEDICINE | Facility: CLINIC | Age: 73
End: 2018-02-08
Payer: COMMERCIAL

## 2018-02-08 VITALS
TEMPERATURE: 98.6 F | HEART RATE: 68 BPM | OXYGEN SATURATION: 95 % | SYSTOLIC BLOOD PRESSURE: 120 MMHG | DIASTOLIC BLOOD PRESSURE: 71 MMHG | WEIGHT: 150 LBS | BODY MASS INDEX: 26.58 KG/M2 | HEIGHT: 63 IN

## 2018-02-08 DIAGNOSIS — F41.9 ANXIETY: ICD-10-CM

## 2018-02-08 DIAGNOSIS — M17.0 OSTEOARTHRITIS OF BOTH KNEES, UNSPECIFIED OSTEOARTHRITIS TYPE: ICD-10-CM

## 2018-02-08 PROCEDURE — 99213 OFFICE O/P EST LOW 20 MIN: CPT | Performed by: FAMILY MEDICINE

## 2018-02-08 RX ORDER — CLONAZEPAM 0.5 MG/1
0.5 TABLET ORAL 3 TIMES DAILY PRN
Qty: 90 TABLET | Refills: 3 | Status: CANCELLED | OUTPATIENT
Start: 2018-02-08

## 2018-02-08 RX ORDER — TRAMADOL HYDROCHLORIDE 50 MG/1
50 TABLET ORAL EVERY 6 HOURS PRN
Qty: 120 TABLET | Refills: 3 | Status: CANCELLED | OUTPATIENT
Start: 2018-02-08

## 2018-02-08 NOTE — MR AVS SNAPSHOT
"              After Visit Summary   2018    Yamel Lindsay    MRN: [id number]           Patient Information     Date Of Birth          1945        Visit Information        Provider Department      2018 9:00 AM Catie Fraser MD [facility]        Today's Diagnoses     Anxiety        Osteoarthritis of both knees, unspecified osteoarthritis type           Follow-ups after your visit        Who to contact     If you have questions or need follow up information about today's clinic visit or your schedule please contact [facility] directly at [phone].  Normal or non-critical lab and imaging results will be communicated to you by RadMithart, letter or phone within 4 business days after the clinic has received the results. If you do not hear from us within 7 days, please contact the clinic through RadMithart or phone. If you have a critical or abnormal lab result, we will notify you by phone as soon as possible.  Submit refill requests through [employer] or call your pharmacy and they will forward the refill request to us. Please allow 3 business days for your refill to be completed.          Additional Information About Your Visit        MyChart Information     [employer] lets you send messages to your doctor, view your test results, renew your prescriptions, schedule appointments and more. To sign up, go to [email or web address] . Click on \"Log in\" on the left side of the screen, which will take you to the Welcome page. Then click on \"Sign up Now\" on the right side of the page.     You will be asked to enter the access code listed below, as well as some personal information. Please follow the directions to create your username and password.     Your access code is: [email or web address]  Expires: 2018  1:47 PM     Your access code will  in 90 days. If you need help or a new code, please call your [facility] or [phone].        Care EveryWhere ID     This is your " "Care EveryWhere ID. This could be used by other organizations to access your [place] medical records  [id number]        Your Vitals Were     Pulse Temperature Height Pulse Oximetry BMI (Body Mass Index)       68 98.6  F (37  C) (Tympanic) 5' 2.75\" (1.594 m) 95% 26.78 kg/m2        Blood Pressure from Last 3 Encounters:   02/08/18 120/71   01/09/18 128/82   10/10/17 120/71    Weight from Last 3 Encounters:   02/08/18 150 lb (68 kg)   01/09/18 152 lb (68.9 kg)   10/10/17 155 lb 12.8 oz (70.7 kg)              Today, you had the following     No orders found for display       Primary Care Provider Office Phone # Fax #    Catie Fraser [phone] [address] [facility] [address]        Equal Access to Services     BELÉN MOSLEY : Hadii bobby carrascoo Sokyara, waaxda luqadaha, qaybta kaalmada adetanjayada, deep arce . So [facility] [phone].    ATENCIÓN: Si south josé migueljose, tiene a maurer disposición servicios gratuitos de asistencia lingüística. Llame al [phone].    We comply with applicable federal civil rights laws and [place] laws. We do not discriminate on the basis of race, color, national origin, age, disability, sex, sexual orientation, or gender identity.            Thank you!     Thank you for choosing [facility]  for your care. Our goal is always to provide you with excellent care. Hearing back from our patients is one way we can continue to improve our services. Please take a few minutes to complete the written survey that you may receive in the mail after your visit with us. Thank you!             Your Updated Medication List - Protect others around you: Learn how to safely use, store and throw away your medicines at www.disposemymeds.org.          This list is accurate as of 2/8/18 12:12 PM.  Always use your most recent med list.                   Brand Name Dispense Instructions for use Diagnosis    amoxicillin-clavulanate " 875-125 MG per tablet    AUGMENTIN    20 tablet    Take 1 tablet by mouth 2 times daily    Acute sinusitis with symptoms > 10 days       CALCIUM 1000 + D PO      Take 1 tablet by mouth        clonazePAM 0.5 MG tablet    klonoPIN    90 tablet    Take 1 tablet (0.5 mg) by mouth 3 times daily as needed for anxiety    Anxiety       fish Oil 1200 MG capsule      Take 1 capsule by mouth        FLUoxetine 40 MG capsule    PROzac    90 capsule    Take 1 capsule (40 mg) by mouth daily    Major depressive disorder, single episode, moderate (H)       hydrochlorothiazide 25 MG tablet    HYDRODIURIL    90 tablet    Take 1 tablet (25 mg) by mouth daily    Benign essential hypertension       ibuprofen 200 MG tablet    ADVIL/MOTRIN     Take 200-600 mg by mouth every 2 hours as needed for mild pain        omeprazole 10 MG CR capsule    priLOSEC    180 capsule    Take 2 capsules (20 mg) by mouth daily Take by mouth 30-60 minutes before a meal.    Gastroesophageal reflux disease without esophagitis       pravastatin 20 MG tablet    PRAVACHOL    90 tablet    Take 1 tablet (20 mg) by mouth At Bedtime 1 at bedtime.    Hyperlipidemia LDL goal <130       PRENATAL VITAMINS PO      Take 1 tablet by mouth daily        traMADol 50 MG tablet    ULTRAM    120 tablet    Take 1 tablet (50 mg) by mouth every 6 hours as needed for pain    Osteoarthritis of both knees, unspecified osteoarthritis type       traZODone 50 MG tablet    DESYREL    180 tablet    Take 2 tablets (100 mg) by mouth At Bedtime    Other insomnia       VENTOLIN  (90 BASE) MCG/ACT Inhaler   Generic drug:  albuterol     18 g    INHALE 2 PUFFS INTO THE LUNGS EVERY 6 HOURS AS NEEDED FOR SHORTNESS OF BREATH, DIFFICULTY BREATHING OR WHEEZING.    Mild intermittent asthma without complication       VITAMIN D-3 PO      Take 2,000 Units by mouth daily

## 2018-02-08 NOTE — NURSING NOTE
"Chief Complaint   Patient presents with     Pain Management     Anxiety     Refill Request       Initial /71  Pulse 68  Temp 98.6  F (37  C) (Tympanic)  Ht 5' 2.75\" (1.594 m)  Wt 150 lb (68 kg)  SpO2 95%  BMI 26.78 kg/m2 Estimated body mass index is 26.78 kg/(m^2) as calculated from the following:    Height as of this encounter: 5' 2.75\" (1.594 m).    Weight as of this encounter: 150 lb (68 kg).  Medication Reconciliation: complete  "

## 2018-02-08 NOTE — PROGRESS NOTES
SUBJECTIVE:   Yamel Lindsay is a 72 year old female who presents to clinic today for the following health issues:  Patient is coning on a trip leaving 2-14-18 and will run out of her medication     Depression and Anxiety Follow-Up    Status since last visit: No change    Other associated symptoms:None    Complicating factors:     Significant life event: No     Current substance abuse: None    PHQ-9 12/20/2016 5/22/2017 1/9/2018   Total Score 12 5 2   Q9: Suicide Ideation Not at all Not at all Not at all     PAOLA-7 SCORE 3/2/2017 5/22/2017 1/9/2018   Total Score - - -   Total Score 7 10 4       PHQ-9  English  PHQ-9   Any Language  PAOLA-7  Suicide Assessment Five-step Evaluation and Treatment (SAFE-T)  Chronic Pain Follow-Up       Type / Location of Pain: arthritis in both knees   Analgesia/pain control:       Recent changes:  same      Overall control: Tolerable with discomfort  Activity level/function:      Daily activities:  Can do most things most days, with some rest and Able to do all daily activities    Work:  not applicable  Adverse effects:  No  Adherance    Taking medication as directed?  Yes    Participating in other treatments: no -   Risk Factors:    Sleep:  Good    Mood/anxiety:  controlled    Recent family or social stressors:  none noted    Other aggravating factors: prolonged standing  PHQ-9 SCORE 12/20/2016 5/22/2017 1/9/2018   Total Score - - -   Total Score 12 5 2     PAOLA-7 SCORE 3/2/2017 5/22/2017 1/9/2018   Total Score - - -   Total Score 7 10 4     Encounter-Level CSA - 02/20/2015:          Controlled Substance Agreement - Scan on 2/26/2015  9:06 AM : Controlled Medication Agreement-  2/20/15 (below)                Amount of exercise or physical activity: 4-5 days/week for an average of 30-45 minutes    Problems taking medications regularly: No    Medication side effects: none    Diet: regular (no restrictions)      Medication Followup of medication listed in      Taking Medication  as prescribed: yes    Side Effects:  None    Medication Helping Symptoms:  yes       Here for medication refills on her pain medication and anxiety medication. Has refills. Talked to pharmacy and they say she cam pick it up on Monday. Patient told this     Problem list and histories reviewed & adjusted, as indicated.  Additional history: as documented    Patient Active Problem List   Diagnosis     Hyperlipidemia LDL goal <130     Primary localized osteoarthrosis, lower leg     Generalized anxiety disorder     Rhinitis, allergic seasonal     Alcoholism (H)     Mild recurrent major depression (H)     Advanced directives, counseling/discussion     Seasonal allergic rhinitis     Hip pain     GERD (gastroesophageal reflux disease)     S/P laparoscopic hernia repair     OA (osteoarthritis) of knee     Mild intermittent asthma without complication     Major depression in complete remission (H)     Benign essential hypertension     Chronic pain of right knee     Acute pain of left knee     Past Surgical History:   Procedure Laterality Date     ESOPHAGOSCOPY, GASTROSCOPY, DUODENOSCOPY (EGD), COMBINED N/A 1/5/2015    Procedure: COMBINED ESOPHAGOSCOPY, GASTROSCOPY, DUODENOSCOPY (EGD), BIOPSY SINGLE OR MULTIPLE;  Surgeon: Dylan Alejandra MD;  Location: [facility]     LAPAROSCOPIC HERNIORRHAPHY INGUINAL Right 1/26/2015    Procedure: LAPAROSCOPIC HERNIORRHAPHY INGUINAL;  Surgeon: Favio Olsen MD;  Location: [facility]     TUBAL LIGATION         Social History   Substance Use Topics     Smoking status: Former Smoker     Packs/day: 0.50     Types: Cigarettes     Quit date: 6/16/2011     Smokeless tobacco: Never Used     Alcohol use No     Family History   Problem Relation Age of Onset     CANCER Mother      C.A.D. Father      Lipids Father      Hypertension Father      Hypertension Brother      CANCER Brother      esophageal cancer     Breast Cancer Other      Prostate Cancer Brother      Psychotic Disorder Daughter      DIABETES No  family hx of      CEREBROVASCULAR DISEASE No family hx of      Cancer - colorectal No family hx of          Current Outpatient Prescriptions   Medication Sig Dispense Refill     traMADol (ULTRAM) 50 MG tablet Take 1 tablet (50 mg) by mouth every 6 hours as needed for pain 120 tablet 3     clonazePAM (KLONOPIN) 0.5 MG tablet Take 1 tablet (0.5 mg) by mouth 3 times daily as needed for anxiety 90 tablet 3     FLUoxetine (PROZAC) 40 MG capsule Take 1 capsule (40 mg) by mouth daily 90 capsule 3     traZODone (DESYREL) 50 MG tablet Take 2 tablets (100 mg) by mouth At Bedtime 180 tablet 3     VENTOLIN  (90 BASE) MCG/ACT Inhaler INHALE 2 PUFFS INTO THE LUNGS EVERY 6 HOURS AS NEEDED FOR SHORTNESS OF BREATH, DIFFICULTY BREATHING OR WHEEZING. 18 g 3     omeprazole (PRILOSEC) 10 MG CR capsule Take 2 capsules (20 mg) by mouth daily Take by mouth 30-60 minutes before a meal. 180 capsule 3     hydrochlorothiazide (HYDRODIURIL) 25 MG tablet Take 1 tablet (25 mg) by mouth daily 90 tablet 3     pravastatin (PRAVACHOL) 20 MG tablet Take 1 tablet (20 mg) by mouth At Bedtime 1 at bedtime. 90 tablet 3     Calcium Carb-Cholecalciferol (CALCIUM 1000 + D PO) Take 1 tablet by mouth       Omega-3 Fatty Acids (FISH OIL) 1200 MG CAPS Take 1 capsule by mouth       PRENATAL VITAMINS PO Take 1 tablet by mouth daily        Cholecalciferol (VITAMIN D-3 PO) Take 2,000 Units by mouth daily        amoxicillin-clavulanate (AUGMENTIN) 875-125 MG per tablet Take 1 tablet by mouth 2 times daily 20 tablet 0     ibuprofen (ADVIL,MOTRIN) 200 MG tablet Take 200-600 mg by mouth every 2 hours as needed for mild pain       Allergies   Allergen Reactions     Nka [No Known Allergies]      Seasonal Allergies      BP Readings from Last 3 Encounters:   02/08/18 120/71   01/09/18 128/82   10/10/17 120/71    Wt Readings from Last 3 Encounters:   02/08/18 150 lb (68 kg)   01/09/18 152 lb (68.9 kg)   10/10/17 155 lb 12.8 oz (70.7 kg)                  Labs reviewed  "in EPIC    Reviewed and updated as needed this visit by clinical staff  Tobacco  Allergies  Med Hx  Surg Hx  Fam Hx  Soc Hx      Reviewed and updated as needed this visit by Provider         ROS:  Constitutional, HEENT, cardiovascular, pulmonary, gi and gu systems are negative, except as otherwise noted.    OBJECTIVE:     /71  Pulse 68  Temp 98.6  F (37  C) (Tympanic)  Ht 5' 2.75\" (1.594 m)  Wt 150 lb (68 kg)  SpO2 95%  BMI 26.78 kg/m2  Body mass index is 26.78 kg/(m^2).  GENERAL: healthy, alert and no distress    Diagnostic Test Results:  none     ASSESSMENT/PLAN:   1. Anxiety  Medication already faxed     2. Osteoarthritis of both knees, unspecified osteoarthritis type  Has refills in pharmacy and can fill this on Monday      FUTURE APPOINTMENTS:       - Follow-up visit as needed.    Catie Fraser MD  [facility]  "

## 2018-02-13 PROBLEM — M25.561 CHRONIC PAIN OF RIGHT KNEE: Status: RESOLVED | Noted: 2017-05-11 | Resolved: 2018-02-13

## 2018-02-13 PROBLEM — G89.29 CHRONIC PAIN OF RIGHT KNEE: Status: RESOLVED | Noted: 2017-05-11 | Resolved: 2018-02-13

## 2018-02-13 PROBLEM — M25.562 ACUTE PAIN OF LEFT KNEE: Status: RESOLVED | Noted: 2017-05-11 | Resolved: 2018-02-13

## 2018-03-15 DIAGNOSIS — K21.9 GASTROESOPHAGEAL REFLUX DISEASE WITHOUT ESOPHAGITIS: ICD-10-CM

## 2018-03-15 NOTE — TELEPHONE ENCOUNTER
"Requested Prescriptions   Pending Prescriptions Disp Refills     omeprazole (PRILOSEC) 20 MG CR capsule [Pharmacy Med Name: OMEPRAZOLE 20MG CPDR]  Last Written Prescription Date:  05/01/2017  Last Fill Quantity: 180,  # refills: 3   Last office visit: 2/8/2018 with prescribing provider:  Evelio   Future Office Visit:     90 capsule 3     Sig: TAKE 1 CAPSULE BY MOUTH DAILY, 30 TO 60 MINUTES BEFORE A MEAL.    PPI Protocol Passed    3/15/2018 11:22 AM       Passed - Not on Clopidogrel (unless Pantoprazole ordered)       Passed - No diagnosis of osteoporosis on record       Passed - Recent (12 mo) or future (30 days) visit within the authorizing provider's specialty    Patient had office visit in the last 12 months or has a visit in the next 30 days with authorizing provider or within the authorizing provider's specialty.  See \"Patient Info\" tab in inbasket, or \"Choose Columns\" in Meds & Orders section of the refill encounter.           Passed - Patient is age 18 or older       Passed - No active pregnacy on record       Passed - No positive pregnancy test in past 12 months        Huang Nielson RT (R)    "

## 2018-04-16 DIAGNOSIS — E78.5 HYPERLIPIDEMIA LDL GOAL <130: Primary | ICD-10-CM

## 2018-04-16 DIAGNOSIS — I10 BENIGN ESSENTIAL HYPERTENSION: ICD-10-CM

## 2018-04-16 RX ORDER — HYDROCHLOROTHIAZIDE 25 MG/1
25 TABLET ORAL DAILY
Qty: 30 TABLET | Refills: 0 | Status: SHIPPED | OUTPATIENT
Start: 2018-04-16 | End: 2018-05-03

## 2018-04-16 NOTE — TELEPHONE ENCOUNTER
Prescription for HCTZ approved per [facility] [place].  Due for labs May 2018 (Future orders placed for BMP, along with lipids as on statin also)  Last OV with PCP 2/8/2018.    BP Readings from Last 3 Encounters:   02/08/18 120/71   01/09/18 128/82   10/10/17 120/71       Yamini TEJADA RN

## 2018-05-03 ENCOUNTER — OFFICE VISIT (OUTPATIENT)
Dept: FAMILY MEDICINE | Facility: CLINIC | Age: 73
End: 2018-05-03
Payer: COMMERCIAL

## 2018-05-03 VITALS
HEIGHT: 63 IN | DIASTOLIC BLOOD PRESSURE: 69 MMHG | WEIGHT: 150 LBS | BODY MASS INDEX: 26.58 KG/M2 | SYSTOLIC BLOOD PRESSURE: 116 MMHG | TEMPERATURE: 98.9 F | HEART RATE: 68 BPM

## 2018-05-03 DIAGNOSIS — Z83.79 FAMILY HISTORY OF BARRETT'S ESOPHAGUS: Primary | ICD-10-CM

## 2018-05-03 DIAGNOSIS — I10 BENIGN ESSENTIAL HYPERTENSION: ICD-10-CM

## 2018-05-03 PROCEDURE — 99214 OFFICE O/P EST MOD 30 MIN: CPT | Performed by: FAMILY MEDICINE

## 2018-05-03 RX ORDER — HYDROCHLOROTHIAZIDE 25 MG/1
25 TABLET ORAL DAILY
Qty: 90 TABLET | Refills: 3 | Status: SHIPPED | OUTPATIENT
Start: 2018-05-03 | End: 2019-01-23

## 2018-05-03 NOTE — MR AVS SNAPSHOT
After Visit Summary   5/3/2018    Yamel Lindsay    MRN: [id number]           Patient Information     Date Of Birth          1945        Visit Information        Provider Department      5/3/2018 1:00 PM Catie Fraser MD [facility]        Today's Diagnoses     Family history of Oliva's esophagus    -  1    Benign essential hypertension          Care Instructions          Thank you for choosing [facility].  You may be receiving a survey in the mail from [facility] regarding your visit today.  Please take a few minutes to complete and return the survey to let us know how we are doing.      If you have questions or concerns, please contact us via [employer] or you can contact your care team at [phone].    Our Clinic hours are:  Monday 6:40 am  to 7:00 pm  Tuesday -Friday 6:40 am to 5:00 pm    The [place] outpatient lab hours are:  Monday - Friday 6:10 am to 4:45 pm  Saturdays 7:00 am to 11:00 am  Appointments are required, call [phone]    If you have clinical questions after hours or would like to schedule an appointment,  call the clinic at [phone].          Follow-ups after your visit        Future tests that were ordered for you today     Open Future Orders        Priority Expected Expires Ordered    UPPER GI ENDOSCOPY Routine  6/17/2018 5/3/2018            Who to contact     If you have questions or need follow up information about today's clinic visit or your schedule please contact [facility] directly at [phone].  Normal or non-critical lab and imaging results will be communicated to you by [employer], letter or phone within 4 business days after the clinic has received the results. If you do not hear from us within 7 days, please contact the clinic through [employer] or phone. If you have a critical or abnormal lab result, we will notify you by phone as soon as possible.  Submit refill requests through [employer] or call your pharmacy  "and they will forward the refill request to us. Please allow 3 business days for your refill to be completed.          Additional Information About Your Visit        R[employer] Information     [employer] lets you send messages to your doctor, view your test results, renew your prescriptions, schedule appointments and more. To sign up, go to [email or web address] . Click on \"Log in\" on the left side of the screen, which will take you to the Welcome page. Then click on \"Sign up Now\" on the right side of the page.     You will be asked to enter the access code listed below, as well as some personal information. Please follow the directions to create your username and password.     Your access code is: [email or web address]  Expires: 2018  1:28 PM     Your access code will  in 90 days. If you need help or a new code, please call your [place] clinic or [phone].        Care EveryWhere ID     This is your [facility] EveryWhere ID. This could be used by other organizations to access your [place] medical records  [id number]        Your Vitals Were     Pulse Temperature Height BMI (Body Mass Index)          68 98.9  F (37.2  C) (Tympanic) 5' 2.75\" (1.594 m) 26.78 kg/m2         Blood Pressure from Last 3 Encounters:   18 116/69   18 120/71   18 128/82    Weight from Last 3 Encounters:   18 150 lb (68 kg)   18 150 lb (68 kg)   18 152 lb (68.9 kg)              We Performed the Following     DEPRESSION ACTION PLAN (DAP)          Today's Medication Changes          These changes are accurate as of 5/3/18  1:28 PM.  If you have any questions, ask your nurse or doctor.               These medicines have changed or have updated prescriptions.        Dose/Directions    hydrochlorothiazide 25 MG tablet   Commonly known as:  HYDRODIURIL   This may have changed:  additional instructions   Used for:  Benign essential hypertension   Changed by:  Catie Fraser MD        Dose:  25 mg   Take 1 " tablet (25 mg) by mouth daily   Quantity:  90 tablet   Refills:  3            Where to get your medicines      These medications were sent to [facility] - [place], [place] - [address]  [address], [facility] [address]     Phone:  [phone]     hydrochlorothiazide 25 MG tablet                Primary Care Provider Office Phone # Fax #    Zaknaldo Taj Fraser [phone] [address] [facility] [address]        Equal Access to Services     BELÉN MOSLEY : Hadii aad ku hadasho Soomaali, waaxda luqadaha, qaybta kaalmada adeegyada, waxay idiin hayaan stephane arce . So [facility] [phone].    ATENCIÓN: Si habla español, tiene a maurer disposición servicios gratuitos de asistencia lingüística. Kunal al [phone].    We comply with applicable federal civil rights laws and [place] laws. We do not discriminate on the basis of race, color, national origin, age, disability, sex, sexual orientation, or gender identity.            Thank you!     Thank you for choosing [facility]  for your care. Our goal is always to provide you with excellent care. Hearing back from our patients is one way we can continue to improve our services. Please take a few minutes to complete the written survey that you may receive in the mail after your visit with us. Thank you!             Your Updated Medication List - Protect others around you: Learn how to safely use, store and throw away your medicines at www.disposemymeds.org.          This list is accurate as of 5/3/18  1:28 PM.  Always use your most recent med list.                   Brand Name Dispense Instructions for use Diagnosis    CALCIUM 1000 + D PO      Take 1 tablet by mouth        clonazePAM 0.5 MG tablet    klonoPIN    90 tablet    Take 1 tablet (0.5 mg) by mouth 3 times daily as needed for anxiety    Anxiety       fish Oil 1200 MG capsule      Take 1 capsule by mouth         FLUoxetine 40 MG capsule    PROzac    90 capsule    Take 1 capsule (40 mg) by mouth daily    Major depressive disorder, single episode, moderate (H)       hydrochlorothiazide 25 MG tablet    HYDRODIURIL    90 tablet    Take 1 tablet (25 mg) by mouth daily    Benign essential hypertension       ibuprofen 200 MG tablet    ADVIL/MOTRIN     Take 200-600 mg by mouth every 2 hours as needed for mild pain        omeprazole 20 MG CR capsule    priLOSEC    90 capsule    TAKE 1 CAPSULE BY MOUTH DAILY, 30 TO 60 MINUTES BEFORE A MEAL.    Gastroesophageal reflux disease without esophagitis       pravastatin 20 MG tablet    PRAVACHOL    90 tablet    Take 1 tablet (20 mg) by mouth At Bedtime 1 at bedtime.    Hyperlipidemia LDL goal <130       PRENATAL VITAMINS PO      Take 1 tablet by mouth daily        traMADol 50 MG tablet    ULTRAM    120 tablet    Take 1 tablet (50 mg) by mouth every 6 hours as needed for pain    Osteoarthritis of both knees, unspecified osteoarthritis type       traZODone 50 MG tablet    DESYREL    180 tablet    Take 2 tablets (100 mg) by mouth At Bedtime    Other insomnia       VENTOLIN  (90 Base) MCG/ACT Inhaler   Generic drug:  albuterol     18 g    INHALE 2 PUFFS INTO THE LUNGS EVERY 6 HOURS AS NEEDED FOR SHORTNESS OF BREATH, DIFFICULTY BREATHING OR WHEEZING.    Mild intermittent asthma without complication       VITAMIN D-3 PO      Take 2,000 Units by mouth daily

## 2018-05-03 NOTE — PATIENT INSTRUCTIONS
Thank you for choosing [facility].  You may be receiving a survey in the mail from [facility] regarding your visit today.  Please take a few minutes to complete and return the survey to let us know how we are doing.      If you have questions or concerns, please contact us via [employer] or you can contact your care team at [phone].    Our Clinic hours are:  Monday 6:40 am  to 7:00 pm  Tuesday -Friday 6:40 am to 5:00 pm    The [place] outpatient lab hours are:  Monday - Friday 6:10 am to 4:45 pm  Saturdays 7:00 am to 11:00 am  Appointments are required, call [phone]    If you have clinical questions after hours or would like to schedule an appointment,  call the clinic at [phone].

## 2018-05-03 NOTE — PROGRESS NOTES
SUBJECTIVE:   Yamel Lindsay is a 72 year old female who presents to clinic today for the following health issues:      Hypertension Follow-up      Outpatient blood pressures are not being checked.    Low Salt Diet: low salt      Amount of exercise or physical activity: 2-3 days/week for an average of 15-30 minutes    Problems taking medications regularly: No    Medication side effects: none    Diet: low salt        Medication Followup of HCTZ     Taking Medication as prescribed: yes    Side Effects:  None    Medication Helping Symptoms:  yes   Patient here for medication refills for hypertension. She is also due for some labs work . Will come back when fasting  Patient also has a strong family history of Oliva's and will like an Upper endoscopy     Problem list and histories reviewed & adjusted, as indicated.  Additional history: as documented    Patient Active Problem List   Diagnosis     Hyperlipidemia LDL goal <130     Primary localized osteoarthrosis, lower leg     Generalized anxiety disorder     Rhinitis, allergic seasonal     Alcoholism (H)     Mild recurrent major depression (H)     Advanced directives, counseling/discussion     Seasonal allergic rhinitis     Hip pain     GERD (gastroesophageal reflux disease)     S/P laparoscopic hernia repair     OA (osteoarthritis) of knee     Mild intermittent asthma without complication     Major depression in complete remission (H)     Benign essential hypertension     Past Surgical History:   Procedure Laterality Date     ESOPHAGOSCOPY, GASTROSCOPY, DUODENOSCOPY (EGD), COMBINED N/A 1/5/2015    Procedure: COMBINED ESOPHAGOSCOPY, GASTROSCOPY, DUODENOSCOPY (EGD), BIOPSY SINGLE OR MULTIPLE;  Surgeon: Dylan Alejandra MD;  Location: [facility]     LAPAROSCOPIC HERNIORRHAPHY INGUINAL Right 1/26/2015    Procedure: LAPAROSCOPIC HERNIORRHAPHY INGUINAL;  Surgeon: Favio Olsen MD;  Location: [facility]     TUBAL LIGATION         Social History   Substance Use Topics     Smoking  status: Former Smoker     Packs/day: 0.50     Types: Cigarettes     Quit date: 6/16/2011     Smokeless tobacco: Never Used     Alcohol use No     Family History   Problem Relation Age of Onset     CANCER Mother      C.A.D. Father      Lipids Father      Hypertension Father      Hypertension Brother      CANCER Brother      esophageal cancer     Breast Cancer Other      Prostate Cancer Brother      Psychotic Disorder Daughter      DIABETES No family hx of      CEREBROVASCULAR DISEASE No family hx of      Cancer - colorectal No family hx of          Current Outpatient Prescriptions   Medication Sig Dispense Refill     Calcium Carb-Cholecalciferol (CALCIUM 1000 + D PO) Take 1 tablet by mouth       Cholecalciferol (VITAMIN D-3 PO) Take 2,000 Units by mouth daily        clonazePAM (KLONOPIN) 0.5 MG tablet Take 1 tablet (0.5 mg) by mouth 3 times daily as needed for anxiety 90 tablet 3     FLUoxetine (PROZAC) 40 MG capsule Take 1 capsule (40 mg) by mouth daily 90 capsule 3     hydrochlorothiazide (HYDRODIURIL) 25 MG tablet Take 1 tablet (25 mg) by mouth daily 90 tablet 3     ibuprofen (ADVIL,MOTRIN) 200 MG tablet Take 200-600 mg by mouth every 2 hours as needed for mild pain       Omega-3 Fatty Acids (FISH OIL) 1200 MG CAPS Take 1 capsule by mouth       omeprazole (PRILOSEC) 20 MG CR capsule TAKE 1 CAPSULE BY MOUTH DAILY, 30 TO 60 MINUTES BEFORE A MEAL. 90 capsule 1     pravastatin (PRAVACHOL) 20 MG tablet Take 1 tablet (20 mg) by mouth At Bedtime 1 at bedtime. 90 tablet 3     PRENATAL VITAMINS PO Take 1 tablet by mouth daily        traMADol (ULTRAM) 50 MG tablet Take 1 tablet (50 mg) by mouth every 6 hours as needed for pain 120 tablet 3     traZODone (DESYREL) 50 MG tablet Take 2 tablets (100 mg) by mouth At Bedtime 180 tablet 3     VENTOLIN  (90 BASE) MCG/ACT Inhaler INHALE 2 PUFFS INTO THE LUNGS EVERY 6 HOURS AS NEEDED FOR SHORTNESS OF BREATH, DIFFICULTY BREATHING OR WHEEZING. 18 g 3     Allergies   Allergen  "Reactions     Nka [No Known Allergies]      Seasonal Allergies      BP Readings from Last 3 Encounters:   05/03/18 116/69   02/08/18 120/71   01/09/18 128/82    Wt Readings from Last 3 Encounters:   05/03/18 150 lb (68 kg)   02/08/18 150 lb (68 kg)   01/09/18 152 lb (68.9 kg)                  Labs reviewed in EPIC    Reviewed and updated as needed this visit by clinical staff  Tobacco  Med Hx  Surg Hx  Fam Hx  Soc Hx      Reviewed and updated as needed this visit by Provider         ROS:  Constitutional, HEENT, cardiovascular, pulmonary, gi and gu systems are negative, except as otherwise noted.    OBJECTIVE:     /69 (BP Location: Right arm, Cuff Size: Adult Regular)  Pulse 68  Temp 98.9  F (37.2  C) (Tympanic)  Ht 5' 2.75\" (1.594 m)  Wt 150 lb (68 kg)  BMI 26.78 kg/m2  Body mass index is 26.78 kg/(m^2).  GENERAL: healthy, alert and no distress  EYES: Eyes grossly normal to inspection, PERRL and conjunctivae and sclerae normal  HENT: ear canals and TM's normal, nose and mouth without ulcers or lesions  NECK: no adenopathy, no asymmetry, masses, or scars and thyroid normal to palpation  RESP: lungs clear to auscultation - no rales, rhonchi or wheezes  CV: regular rate and rhythm, normal S1 S2, no S3 or S4, no murmur, click or rub, no peripheral edema and peripheral pulses strong  ABDOMEN: soft, nontender, no hepatosplenomegaly, no masses and bowel sounds normal  MS: no gross musculoskeletal defects noted, no edema    Diagnostic Test Results:  none     ASSESSMENT/PLAN:         1. Benign essential hypertension  Patient's medication refilled. She will come back when fasting for labs   - hydrochlorothiazide (HYDRODIURIL) 25 MG tablet; Take 1 tablet (25 mg) by mouth daily  Dispense: 90 tablet; Refill: 3    2. Family history of Oliva's esophagus  Wants an upper GI as she has a strong family history of Barretts  - UPPER GI ENDOSCOPY; Future    FUTURE APPOINTMENTS:       - Follow-up visit as " needed    Catie Fraser MD  [facility]

## 2018-05-04 ASSESSMENT — ASTHMA QUESTIONNAIRES: ACT_TOTALSCORE: 21

## 2018-05-16 DIAGNOSIS — F41.9 ANXIETY: ICD-10-CM

## 2018-05-16 DIAGNOSIS — M17.0 OSTEOARTHRITIS OF BOTH KNEES, UNSPECIFIED OSTEOARTHRITIS TYPE: ICD-10-CM

## 2018-05-16 NOTE — TELEPHONE ENCOUNTER
Patient called requesting refill on tramadol and clonazepam.    Patient last seen 05/3/2018    Tramadol 1/12/2018 qty 120 with 3 refills  Clonazepam 1/12/2018 qty 90 with 3 refills.    Cristel GAMBINO  Station

## 2018-05-16 NOTE — TELEPHONE ENCOUNTER
Requested Prescriptions   Pending Prescriptions Disp Refills     clonazePAM (KLONOPIN) 0.5 MG tablet  Last Written Prescription Date:  01/12/2018  Last Fill Quantity: 90,  # refills: 3   Last office visit: 5/3/2018 with prescribing provider:  zara Ballesteros Office Visit:     90 tablet 3     Sig: Take 1 tablet (0.5 mg) by mouth 3 times daily as needed for anxiety    There is no refill protocol information for this order        traMADol (ULTRAM) 50 MG tablet  Last Written Prescription Date:  01/12/2018  Last Fill Quantity: 120,  # refills: 3   Last office visit: 5/3/2018 with prescribing provider:  zara Ballesteros Office Visit:     120 tablet 3     Sig: Take 1 tablet (50 mg) by mouth every 6 hours as needed for pain    There is no refill protocol information for this order        Huang ArevaloR)

## 2018-05-16 NOTE — TELEPHONE ENCOUNTER
Routing refill request to provider for review/approval because:  Drug not on the [facility] refill protocol      Michelle MONTE RN

## 2018-05-17 RX ORDER — CLONAZEPAM 0.5 MG/1
0.5 TABLET ORAL 3 TIMES DAILY PRN
Qty: 90 TABLET | Refills: 3 | Status: SHIPPED | OUTPATIENT
Start: 2018-05-17 | End: 2018-09-20

## 2018-05-17 RX ORDER — TRAMADOL HYDROCHLORIDE 50 MG/1
50 TABLET ORAL EVERY 6 HOURS PRN
Qty: 120 TABLET | Refills: 3 | Status: SHIPPED | OUTPATIENT
Start: 2018-05-17 | End: 2018-09-18

## 2018-05-17 NOTE — TELEPHONE ENCOUNTER
Rx 's for Klonopin and Tramadol faxed to [facility].  Message left for patient.

## 2018-05-17 NOTE — TELEPHONE ENCOUNTER
Routing refill requests to provider for review/approval because:  Drugs not on the [facility] refill protocol   Yamini TEJADA RN

## 2018-05-24 ENCOUNTER — ANESTHESIA EVENT (OUTPATIENT)
Dept: GASTROENTEROLOGY | Facility: CLINIC | Age: 73
End: 2018-05-24
Payer: MEDICARE

## 2018-05-24 ASSESSMENT — COPD QUESTIONNAIRES: COPD: 0

## 2018-05-24 ASSESSMENT — LIFESTYLE VARIABLES: TOBACCO_USE: 1

## 2018-05-24 NOTE — ANESTHESIA PREPROCEDURE EVALUATION
Anesthesia Evaluation     . Pt has had prior anesthetic. Type: MAC and General    No history of anesthetic complications          ROS/MED HX    ENT/Pulmonary:  - neg pulmonary ROS   (+)tobacco use, Past use 2 packs/day  Intermittent asthma Treatment: Inhaler prn,  , . .   (-) COPD   Neurologic:  - neg neurologic ROS     Cardiovascular:     (+) Dyslipidemia, hypertension----. : . . . :. . Previous cardiac testing date:results:date: results:ECG reviewed date:1-2015 results:Sinus Rhythm   WITHIN NORMAL LIMITS   date: results:          METS/Exercise Tolerance:  >4 METS   Hematologic:  - neg hematologic  ROS       Musculoskeletal:   (+) arthritis, , , -       GI/Hepatic:     (+) GERD Symptomatic, Other GI/Hepatic h/o hawkins's, ETOH      Renal/Genitourinary:  - ROS Renal section negative       Endo:  - neg endo ROS       Psychiatric:  - neg psychiatric ROS   (+) psychiatric history depression and anxiety      Infectious Disease:  - neg infectious disease ROS       Malignancy:      - no malignancy   Other:    (+) No chance of pregnancy H/O Chronic Pain,H/O chronic opiod use , no other significant disability                    Physical Exam  Normal systems: cardiovascular, pulmonary and dental    Airway   Mallampati: I  TM distance: >3 FB  Neck ROM: full    Dental     Cardiovascular       Pulmonary                     Anesthesia Plan      History & Physical Review  History and physical reviewed and following examination; no interval change.    ASA Status:  3 .    NPO Status:  > 4 hours    Plan for MAC Reason for MAC:  Difficulty with conscious sedation (QS)         Postoperative Care      Consents  Anesthetic plan, risks, benefits and alternatives discussed with:  Patient..                          .

## 2018-05-25 ENCOUNTER — HOSPITAL ENCOUNTER (OUTPATIENT)
Facility: CLINIC | Age: 73
Discharge: HOME | End: 2018-05-25
Attending: SURGERY | Admitting: SURGERY
Payer: MEDICARE

## 2018-05-25 ENCOUNTER — ANESTHESIA (OUTPATIENT)
Dept: GASTROENTEROLOGY | Facility: CLINIC | Age: 73
End: 2018-05-25
Payer: MEDICARE

## 2018-05-25 ENCOUNTER — SURGERY (OUTPATIENT)
Age: 73
End: 2018-05-25

## 2018-05-25 VITALS
DIASTOLIC BLOOD PRESSURE: 64 MMHG | HEIGHT: 63 IN | WEIGHT: 150 LBS | OXYGEN SATURATION: 96 % | RESPIRATION RATE: 16 BRPM | TEMPERATURE: 96.8 F | BODY MASS INDEX: 26.58 KG/M2 | SYSTOLIC BLOOD PRESSURE: 116 MMHG

## 2018-05-25 LAB — UPPER GI ENDOSCOPY: NORMAL

## 2018-05-25 PROCEDURE — 43239 EGD BIOPSY SINGLE/MULTIPLE: CPT | Performed by: SURGERY

## 2018-05-25 PROCEDURE — 88305 TISSUE EXAM BY PATHOLOGIST: CPT | Performed by: SURGERY

## 2018-05-25 PROCEDURE — 37000008 ZZH ANESTHESIA TECHNICAL FEE, 1ST 30 MIN: Performed by: SURGERY

## 2018-05-25 PROCEDURE — 88305 TISSUE EXAM BY PATHOLOGIST: CPT | Mod: 26 | Performed by: SURGERY

## 2018-05-25 PROCEDURE — 25000128 H RX IP 250 OP 636: Performed by: SURGERY

## 2018-05-25 PROCEDURE — 25000125 ZZHC RX 250: Performed by: SURGERY

## 2018-05-25 PROCEDURE — 25000125 ZZHC RX 250: Performed by: NURSE ANESTHETIST, CERTIFIED REGISTERED

## 2018-05-25 PROCEDURE — 25000128 H RX IP 250 OP 636: Performed by: NURSE ANESTHETIST, CERTIFIED REGISTERED

## 2018-05-25 RX ORDER — SODIUM CHLORIDE, SODIUM LACTATE, POTASSIUM CHLORIDE, CALCIUM CHLORIDE 600; 310; 30; 20 MG/100ML; MG/100ML; MG/100ML; MG/100ML
INJECTION, SOLUTION INTRAVENOUS CONTINUOUS
Status: DISCONTINUED | OUTPATIENT
Start: 2018-05-25 | End: 2018-05-25 | Stop reason: HOSPADM

## 2018-05-25 RX ORDER — GLYCOPYRROLATE 0.2 MG/ML
INJECTION, SOLUTION INTRAMUSCULAR; INTRAVENOUS PRN
Status: DISCONTINUED | OUTPATIENT
Start: 2018-05-25 | End: 2018-05-25

## 2018-05-25 RX ORDER — PROPOFOL 10 MG/ML
INJECTION, EMULSION INTRAVENOUS CONTINUOUS PRN
Status: DISCONTINUED | OUTPATIENT
Start: 2018-05-25 | End: 2018-05-25

## 2018-05-25 RX ORDER — LIDOCAINE HYDROCHLORIDE 10 MG/ML
INJECTION, SOLUTION INFILTRATION; PERINEURAL PRN
Status: DISCONTINUED | OUTPATIENT
Start: 2018-05-25 | End: 2018-05-25

## 2018-05-25 RX ORDER — LIDOCAINE 40 MG/G
CREAM TOPICAL
Status: DISCONTINUED | OUTPATIENT
Start: 2018-05-25 | End: 2018-05-25 | Stop reason: HOSPADM

## 2018-05-25 RX ORDER — ONDANSETRON 2 MG/ML
4 INJECTION INTRAMUSCULAR; INTRAVENOUS
Status: DISCONTINUED | OUTPATIENT
Start: 2018-05-25 | End: 2018-05-25 | Stop reason: HOSPADM

## 2018-05-25 RX ORDER — PROPOFOL 10 MG/ML
INJECTION, EMULSION INTRAVENOUS PRN
Status: DISCONTINUED | OUTPATIENT
Start: 2018-05-25 | End: 2018-05-25

## 2018-05-25 RX ADMIN — PROPOFOL 40 MG: 10 INJECTION, EMULSION INTRAVENOUS at 09:47

## 2018-05-25 RX ADMIN — GLYCOPYRROLATE 0.1 MG: 0.2 INJECTION, SOLUTION INTRAMUSCULAR; INTRAVENOUS at 09:46

## 2018-05-25 RX ADMIN — PROPOFOL 20 MG: 10 INJECTION, EMULSION INTRAVENOUS at 09:49

## 2018-05-25 RX ADMIN — PROPOFOL 200 MCG/KG/MIN: 10 INJECTION, EMULSION INTRAVENOUS at 09:45

## 2018-05-25 RX ADMIN — TOPICAL ANESTHETIC 1 SPRAY: 200 SPRAY DENTAL; PERIODONTAL at 09:45

## 2018-05-25 RX ADMIN — LIDOCAINE HYDROCHLORIDE 100 MG: 10 INJECTION, SOLUTION INFILTRATION; PERINEURAL at 09:46

## 2018-05-25 RX ADMIN — SODIUM CHLORIDE, POTASSIUM CHLORIDE, SODIUM LACTATE AND CALCIUM CHLORIDE: 600; 310; 30; 20 INJECTION, SOLUTION INTRAVENOUS at 08:57

## 2018-05-25 RX ADMIN — GLYCOPYRROLATE 0.1 MG: 0.2 INJECTION, SOLUTION INTRAMUSCULAR; INTRAVENOUS at 09:44

## 2018-05-25 RX ADMIN — LIDOCAINE HYDROCHLORIDE 1 ML: 10 INJECTION, SOLUTION EPIDURAL; INFILTRATION; INTRACAUDAL; PERINEURAL at 08:57

## 2018-05-25 NOTE — ANESTHESIA POSTPROCEDURE EVALUATION
Patient: Yamel Lindsay    Procedure(s):  gastroscopy - Wound Class: II-Clean Contaminated    Diagnosis:family history of Oliva's esophagus    diagnostic  Diagnosis Additional Information: No value filed.    Anesthesia Type:  No value filed.    Note:  Anesthesia Post Evaluation    Patient location during evaluation: Phase 2  Patient participation: Able to fully participate in evaluation  Level of consciousness: awake  Pain management: adequate  Airway patency: patent  Cardiovascular status: acceptable and hemodynamically stable  Respiratory status: acceptable, room air and spontaneous ventilation  Hydration status: acceptable  PONV: none     Anesthetic complications: None          Last vitals:  Vitals:    05/25/18 0830   BP: 121/77   Resp: 16   Temp: 36.8  C (98.2  F)   SpO2: 94%         Electronically Signed By: NAYA Chatterjee CRNA  May 25, 2018  9:58 AM

## 2018-05-25 NOTE — ANESTHESIA CARE TRANSFER NOTE
Patient: Yamel Lindsay    Procedure(s):  gastroscopy - Wound Class: II-Clean Contaminated    Diagnosis: family history of Oliva's esophagus    diagnostic  Diagnosis Additional Information: No value filed.    Anesthesia Type:   No value filed.     Note:  Airway :Room Air  Patient transferred to:Phase II  Handoff Report: Identifed the Patient, Identified the Reponsible Provider, Reviewed the pertinent medical history, Discussed the surgical course, Reviewed Intra-OP anesthesia mangement and issues during anesthesia, Set expectations for post-procedure period and Allowed opportunity for questions and acknowledgement of understanding      Vitals: (Last set prior to Anesthesia Care Transfer)    CRNA VITALS  5/25/2018 0925 - 5/25/2018 0955      5/25/2018             Pulse: 72    Ht Rate: 72    SpO2: 99 %                Electronically Signed By: NAYA Chatterjee CRNA  May 25, 2018  9:55 AM

## 2018-05-30 LAB — COPATH REPORT: NORMAL

## 2018-06-18 ENCOUNTER — OFFICE VISIT (OUTPATIENT)
Dept: FAMILY MEDICINE | Facility: CLINIC | Age: 73
End: 2018-06-18
Payer: COMMERCIAL

## 2018-06-18 VITALS
HEART RATE: 67 BPM | HEIGHT: 63 IN | SYSTOLIC BLOOD PRESSURE: 133 MMHG | DIASTOLIC BLOOD PRESSURE: 80 MMHG | BODY MASS INDEX: 27.11 KG/M2 | TEMPERATURE: 98.4 F | WEIGHT: 153 LBS

## 2018-06-18 DIAGNOSIS — M54.50 ACUTE BILATERAL LOW BACK PAIN WITHOUT SCIATICA: Primary | ICD-10-CM

## 2018-06-18 PROCEDURE — 99213 OFFICE O/P EST LOW 20 MIN: CPT | Performed by: FAMILY MEDICINE

## 2018-06-18 RX ORDER — CYCLOBENZAPRINE HCL 5 MG
5 TABLET ORAL 3 TIMES DAILY PRN
Qty: 42 TABLET | Refills: 0 | Status: SHIPPED | OUTPATIENT
Start: 2018-06-18 | End: 2018-09-18

## 2018-06-18 RX ORDER — METHYLPREDNISOLONE 4 MG
4 TABLET, DOSE PACK ORAL SEE ADMIN INSTRUCTIONS
Qty: 21 TABLET | Refills: 0 | Status: SHIPPED | OUTPATIENT
Start: 2018-06-18 | End: 2018-11-14

## 2018-06-18 ASSESSMENT — ANXIETY QUESTIONNAIRES
7. FEELING AFRAID AS IF SOMETHING AWFUL MIGHT HAPPEN: NOT AT ALL
1. FEELING NERVOUS, ANXIOUS, OR ON EDGE: SEVERAL DAYS
GAD7 TOTAL SCORE: 5
2. NOT BEING ABLE TO STOP OR CONTROL WORRYING: SEVERAL DAYS
3. WORRYING TOO MUCH ABOUT DIFFERENT THINGS: SEVERAL DAYS
5. BEING SO RESTLESS THAT IT IS HARD TO SIT STILL: NOT AT ALL
6. BECOMING EASILY ANNOYED OR IRRITABLE: SEVERAL DAYS

## 2018-06-18 ASSESSMENT — PATIENT HEALTH QUESTIONNAIRE - PHQ9: 5. POOR APPETITE OR OVEREATING: SEVERAL DAYS

## 2018-06-18 NOTE — PROGRESS NOTES
SUBJECTIVE:   Yamel Lindsay is a 73 year old female who presents to clinic today for the following health issues:      Back Pain       Duration: 1.5weeks ago         Specific cause: lifting, turning/bending    Description:   Location of pain: low back left and middle   Character of pain: stabbing  Pain radiation:radiates into the left leg  New numbness or weakness in legs, not attributed to pain:  no     Intensity: Currently 4-5/10, At its worst 7/10    History:   Pain interferes with job: Not applicable  History of back problems: no prior back problems  Any previous MRI or X-rays: None  Sees a specialist for back pain:  No  Therapies tried without relief:     Alleviating factors:   Improved by: cold, heat and NSAIDs      Precipitating factors:  Worsened by: Lifting, Bending, Standing and Lying Flat    Functional and Psychosocial Screen (Cordlel STarT Back):      Most recent score:    CORDELL START BACK TOTAL SCORE 6/18/2018   Total Score (all 9) 3             Accompanying Signs & Symptoms:  Risk of Fracture:  None  Risk of Cauda Equina:  None  Risk of Infection:  None  Risk of Cancer:  None  Risk of Ankylosing Spondylitis:  Onset at age <35, male, AND morning back stiffness. no          Patient is a 73 yr old female here for back pain. Says she has been moving furniture for the last couple of weeks. Patient reports a dull throbbing pain across her back. Patient states that it does not typically radiate into her legs. She has tried using over the counter medication with no relief. She is also on tramadol chronically.    Problem list and histories reviewed & adjusted, as indicated.  Additional history: as documented    Patient Active Problem List   Diagnosis     Hyperlipidemia LDL goal <130     Primary localized osteoarthrosis, lower leg     Generalized anxiety disorder     Rhinitis, allergic seasonal     Alcoholism (H)     Mild recurrent major depression (H)     Advanced directives, counseling/discussion     Seasonal  allergic rhinitis     Hip pain     GERD (gastroesophageal reflux disease)     S/P laparoscopic hernia repair     OA (osteoarthritis) of knee     Mild intermittent asthma without complication     Major depression in complete remission (H)     Benign essential hypertension     Past Surgical History:   Procedure Laterality Date     ESOPHAGOSCOPY, GASTROSCOPY, DUODENOSCOPY (EGD), COMBINED N/A 1/5/2015    Procedure: COMBINED ESOPHAGOSCOPY, GASTROSCOPY, DUODENOSCOPY (EGD), BIOPSY SINGLE OR MULTIPLE;  Surgeon: Dylan Alejandra MD;  Location: [facility]     ESOPHAGOSCOPY, GASTROSCOPY, DUODENOSCOPY (EGD), COMBINED N/A 5/25/2018    Procedure: COMBINED ESOPHAGOSCOPY, GASTROSCOPY, DUODENOSCOPY (EGD), BIOPSY SINGLE OR MULTIPLE;  gastroscopy;  Surgeon: Alexander Bautista MD;  Location: [facility]     LAPAROSCOPIC HERNIORRHAPHY INGUINAL Right 1/26/2015    Procedure: LAPAROSCOPIC HERNIORRHAPHY INGUINAL;  Surgeon: Favio Olsen MD;  Location: [facility]     TUBAL LIGATION         Social History   Substance Use Topics     Smoking status: Former Smoker     Packs/day: 0.50     Types: Cigarettes     Quit date: 6/16/2011     Smokeless tobacco: Never Used     Alcohol use No     Family History   Problem Relation Age of Onset     CANCER Mother      C.A.D. Father      Lipids Father      Hypertension Father      Hypertension Brother      CANCER Brother      esophageal cancer     Breast Cancer Other      Prostate Cancer Brother      Psychotic Disorder Daughter      DIABETES No family hx of      CEREBROVASCULAR DISEASE No family hx of      Cancer - colorectal No family hx of          Current Outpatient Prescriptions   Medication Sig Dispense Refill     Calcium Carb-Cholecalciferol (CALCIUM 1000 + D PO) Take 1 tablet by mouth       Cholecalciferol (VITAMIN D-3 PO) Take 2,000 Units by mouth daily        clonazePAM (KLONOPIN) 0.5 MG tablet Take 1 tablet (0.5 mg) by mouth 3 times daily as needed for anxiety 90 tablet 3     cyclobenzaprine (FLEXERIL) 5 MG  "tablet Take 1 tablet (5 mg) by mouth 3 times daily as needed 42 tablet 0     FLUoxetine (PROZAC) 40 MG capsule Take 1 capsule (40 mg) by mouth daily 90 capsule 3     hydrochlorothiazide (HYDRODIURIL) 25 MG tablet Take 1 tablet (25 mg) by mouth daily 90 tablet 3     ibuprofen (ADVIL,MOTRIN) 200 MG tablet Take 200-600 mg by mouth every 2 hours as needed for mild pain       methylPREDNISolone (MEDROL DOSEPAK) 4 MG tablet Take 1 tablet (4 mg) by mouth See Admin Instructions 21 tablet 0     Omega-3 Fatty Acids (FISH OIL) 1200 MG CAPS Take 1 capsule by mouth       omeprazole (PRILOSEC) 20 MG CR capsule TAKE 1 CAPSULE BY MOUTH DAILY, 30 TO 60 MINUTES BEFORE A MEAL. 90 capsule 1     pravastatin (PRAVACHOL) 20 MG tablet Take 1 tablet (20 mg) by mouth At Bedtime 1 at bedtime. 90 tablet 3     PRENATAL VITAMINS PO Take 1 tablet by mouth daily        traMADol (ULTRAM) 50 MG tablet Take 1 tablet (50 mg) by mouth every 6 hours as needed for pain 120 tablet 3     traZODone (DESYREL) 50 MG tablet Take 2 tablets (100 mg) by mouth At Bedtime 180 tablet 3     VENTOLIN  (90 BASE) MCG/ACT Inhaler INHALE 2 PUFFS INTO THE LUNGS EVERY 6 HOURS AS NEEDED FOR SHORTNESS OF BREATH, DIFFICULTY BREATHING OR WHEEZING. 18 g 3     Allergies   Allergen Reactions     Nka [No Known Allergies]      Seasonal Allergies      BP Readings from Last 3 Encounters:   06/18/18 133/80   05/25/18 116/64   05/03/18 116/69    Wt Readings from Last 3 Encounters:   06/18/18 153 lb (69.4 kg)   05/25/18 150 lb (68 kg)   05/03/18 150 lb (68 kg)                  Labs reviewed in EPIC    Reviewed and updated as needed this visit by clinical staff  Allergies  Meds       Reviewed and updated as needed this visit by Provider         ROS:  Constitutional, HEENT, cardiovascular, pulmonary, gi and gu systems are negative, except as otherwise noted.    OBJECTIVE:     /80  Pulse 67  Temp 98.4  F (36.9  C) (Tympanic)  Ht 5' 2.75\" (1.594 m)  Wt 153 lb (69.4 kg)  " BMI 27.32 kg/m2  Body mass index is 27.32 kg/(m^2).  GENERAL: healthy, alert and no distress  EYES: Eyes grossly normal to inspection, PERRL and conjunctivae and sclerae normal  HENT: ear canals and TM's normal, nose and mouth without ulcers or lesions  NECK: no adenopathy, no asymmetry, masses, or scars and thyroid normal to palpation  RESP: lungs clear to auscultation - no rales, rhonchi or wheezes  CV: regular rate and rhythm, normal S1 S2, no S3 or S4, no murmur, click or rub, no peripheral edema and peripheral pulses strong  MS: no gross musculoskeletal defects noted, no edema  SKIN: no suspicious lesions or rashes  PSYCH: mentation appears normal, affect normal/bright    Diagnostic Test Results:  none     ASSESSMENT/PLAN:     1. Acute bilateral low back pain without sciatica  Trial of methyl prednisone. With muscle relaxants. May also need Physical therapy .   - methylPREDNISolone (MEDROL DOSEPAK) 4 MG tablet; Take 1 tablet (4 mg) by mouth See Admin Instructions  Dispense: 21 tablet; Refill: 0  - cyclobenzaprine (FLEXERIL) 5 MG tablet; Take 1 tablet (5 mg) by mouth 3 times daily as needed  Dispense: 42 tablet; Refill: 0    FUTURE APPOINTMENTS:       - Follow-up visit as needed    Catie Fraser MD  [facility]

## 2018-06-18 NOTE — MR AVS SNAPSHOT
After Visit Summary   6/18/2018    Yamel Lindsay    MRN: [id number]           Patient Information     Date Of Birth          1945        Visit Information        Provider Department      6/18/2018 1:00 PM Catie Fraser MD [facility]        Today's Diagnoses     Acute bilateral low back pain without sciatica    -  1      Care Instructions          Thank you for choosing [facility] (formerly [facility]).  You may be receiving a survey in the mail from [facility] regarding your visit today.  Please take a few minutes to complete and return the survey to let us know how we are doing.      If you have questions or concerns, please contact us via [email or web address] or you can contact your care team at [phone].    Our Clinic hours are:  Monday 6:40 am  to 7:00 pm  Tuesday -Friday 6:40 am to 5:00 pm    The [place] outpatient lab hours are:  Monday - Friday 6:10 am to 4:45 pm  Saturdays 7:00 am to 11:00 am  Appointments are required, call [phone]    If you have clinical questions after hours or would like to schedule an appointment,  call the clinic at [phone].  When You Have Low Back Pain    Caring for Your Back  You are not alone.    Low back pain is very common. Nearly half of all adults have low back pain in any given year. The good news is that back pain is rarely a danger to your health. Most people can manage their back pain on their own and about half of them start feeling better within 2 weeks. In 9 out of 10 cases, low back pain goes away or no longer limits daily activity within 6 weeks.     Your outlook is good!    Your symptoms tell us that your low back pain is most likely not a danger to you. Most of the time we do not know the exact cause of low back pain, even if you see a doctor or have an MRI. However, treatment can still work without knowing the cause of the pain. Less than 1 in 100 people need surgery for their back pain.     What can I do about my low back pain?      There are three things you can do to ease low back pain and help it go away.    Use heat or cold packs.    Take medicine as directed.    Use positions, movements and exercises.     Using heat or cold packs    Try cold packs or gentle heat to ease your pain. Use whichever gives you the most relief. Apply the cold pack or heat for 15 minutes at a time, as often as needed.    Taking medicine      If your doctor has prescribed medicine, be sure to follow the directions.    If you take over-the-counter medicine, read and follow the directions.    Talk to your doctor if you have any questions.     Using positions, movements and exercises    Research tells us that moving your joints and muscles can help you recover from back pain. Such activity should be simple and gentle. Use the positions in the photos as well as walking to help relieve your pain. Try taking a short walk every 3 to 4 hours during the day. Walk for a few minutes inside your home or take longer walks outside, on a treadmill or at a mall. Slowly increase the amount of time you walk. Expect discomfort when you begin, but it should lessen as your back starts to heal. When your back feels better, walk daily to keep your back and body healthy.    Finding a comfortable position    When your back pain is new, certain positions will ease your pain. Gently try each of the positions below until you find one that is helpful. Once you find a position of comfort, use it as often as you like when you are resting. You will recover faster if you combine rest with activity.         Lie on your back with your legs bent. You can do this by placing a pillow under your knees. Or you may lie on the floor and rest your lower legs on the seat of a chair.       Lie on your side with your knees bent, and place a pillow between your knees.       Lie on your stomach over pillows.      When should I call my doctor?    Your back pain should improve over the first couple of weeks.  "As it improves, you should be able to return to your normal activities. But call your doctor if:    You have a sudden change in your ability to control your bladder or bowels.    You feel tingling in your groin or legs.    The pain spreads down your leg and into your foot.    Your toes, feet or leg muscles feel weak.    You feel generally unwell or sick.    Your pain does not get better or gets worse.      If you are deaf or hard of hearing, please let us know. We provide many free services including sign language interpreters,oral interpreters, TTYs, telephone amplifiers, note takers and written materials.    For informational purposes only. Not to replace the advice of your health care provider. Copyright   2013 [facility]. All rights reserved. [employer] [id number] - Rev 06/14.            Follow-ups after your visit        Who to contact     If you have questions or need follow up information about today's clinic visit or your schedule please contact [facility] directly at [phone].  Normal or non-critical lab and imaging results will be communicated to you by MyChart, letter or phone within 4 business days after the clinic has received the results. If you do not hear from us within 7 days, please contact the clinic through [email or web address] or phone. If you have a critical or abnormal lab result, we will notify you by phone as soon as possible.  Submit refill requests through [employer] or call your pharmacy and they will forward the refill request to us. Please allow 3 business days for your refill to be completed.          Additional Information About Your Visit        Care EveryWhere ID     This is your Care EveryWhere ID. This could be used by other organizations to access your [facility] medical records  [id number]        Your Vitals Were     Pulse Temperature Height BMI (Body Mass Index)          67 98.4  F (36.9  C) (Tympanic) 5' 2.75\" (1.594 m) 27.32 kg/m2         Blood Pressure from Last " 3 Encounters:   06/18/18 133/80   05/25/18 116/64   05/03/18 116/69    Weight from Last 3 Encounters:   06/18/18 153 lb (69.4 kg)   05/25/18 150 lb (68 kg)   05/03/18 150 lb (68 kg)              Today, you had the following     No orders found for display         Today's Medication Changes          These changes are accurate as of 6/18/18  1:29 PM.  If you have any questions, ask your nurse or doctor.               Start taking these medicines.        Dose/Directions    cyclobenzaprine 5 MG tablet   Commonly known as:  FLEXERIL   Used for:  Acute bilateral low back pain without sciatica   Started by:  Catie Fraser MD        Dose:  5 mg   Take 1 tablet (5 mg) by mouth 3 times daily as needed   Quantity:  42 tablet   Refills:  0       methylPREDNISolone 4 MG tablet   Commonly known as:  MEDROL DOSEPAK   Used for:  Acute bilateral low back pain without sciatica   Started by:  Catie Fraser MD        Dose:  4 mg   Take 1 tablet (4 mg) by mouth See Admin Instructions   Quantity:  21 tablet   Refills:  0            Where to get your medicines      These medications were sent to [facility] [address]  [address], [place] [address]     Phone:  [phone]     cyclobenzaprine 5 MG tablet    methylPREDNISolone 4 MG tablet                Primary Care Provider Office Phone # Fax #    Catie Fraser [phone] [address] [facility] [address]        Equal Access to Services     CASIMIRO MOSLEY AH: Hadii aad ku hadasho Soomaali, waaxda luqadaha, qaybta kaalmada adeegyada, waxay joanna garrett. So [facility] [phone].    ATENCIÓN: Si habla español, tiene a maurer disposición servicios gratuitos de asistencia lingüística. Llame al [phone].    We comply with applicable federal civil rights laws and [place] laws. We do not discriminate on the basis of race, color, national  origin, age, disability, sex, sexual orientation, or gender identity.            Thank you!     Thank you for choosing [facility]  for your care. Our goal is always to provide you with excellent care. Hearing back from our patients is one way we can continue to improve our services. Please take a few minutes to complete the written survey that you may receive in the mail after your visit with us. Thank you!             Your Updated Medication List - Protect others around you: Learn how to safely use, store and throw away your medicines at www.disposemymeds.org.          This list is accurate as of 6/18/18  1:29 PM.  Always use your most recent med list.                   Brand Name Dispense Instructions for use Diagnosis    CALCIUM 1000 + D PO      Take 1 tablet by mouth        clonazePAM 0.5 MG tablet    klonoPIN    90 tablet    Take 1 tablet (0.5 mg) by mouth 3 times daily as needed for anxiety    Anxiety       cyclobenzaprine 5 MG tablet    FLEXERIL    42 tablet    Take 1 tablet (5 mg) by mouth 3 times daily as needed    Acute bilateral low back pain without sciatica       fish Oil 1200 MG capsule      Take 1 capsule by mouth        FLUoxetine 40 MG capsule    PROzac    90 capsule    Take 1 capsule (40 mg) by mouth daily    Major depressive disorder, single episode, moderate (H)       hydrochlorothiazide 25 MG tablet    HYDRODIURIL    90 tablet    Take 1 tablet (25 mg) by mouth daily    Benign essential hypertension       ibuprofen 200 MG tablet    ADVIL/MOTRIN     Take 200-600 mg by mouth every 2 hours as needed for mild pain        methylPREDNISolone 4 MG tablet    MEDROL DOSEPAK    21 tablet    Take 1 tablet (4 mg) by mouth See Admin Instructions    Acute bilateral low back pain without sciatica       omeprazole 20 MG CR capsule    priLOSEC    90 capsule    TAKE 1 CAPSULE BY MOUTH DAILY, 30 TO 60 MINUTES BEFORE A MEAL.    Gastroesophageal reflux disease without esophagitis       pravastatin 20 MG  tablet    PRAVACHOL    90 tablet    Take 1 tablet (20 mg) by mouth At Bedtime 1 at bedtime.    Hyperlipidemia LDL goal <130       PRENATAL VITAMINS PO      Take 1 tablet by mouth daily        traMADol 50 MG tablet    ULTRAM    120 tablet    Take 1 tablet (50 mg) by mouth every 6 hours as needed for pain    Osteoarthritis of both knees, unspecified osteoarthritis type       traZODone 50 MG tablet    DESYREL    180 tablet    Take 2 tablets (100 mg) by mouth At Bedtime    Other insomnia       VENTOLIN  (90 Base) MCG/ACT Inhaler   Generic drug:  albuterol     18 g    INHALE 2 PUFFS INTO THE LUNGS EVERY 6 HOURS AS NEEDED FOR SHORTNESS OF BREATH, DIFFICULTY BREATHING OR WHEEZING.    Mild intermittent asthma without complication       VITAMIN D-3 PO      Take 2,000 Units by mouth daily

## 2018-06-18 NOTE — PATIENT INSTRUCTIONS
Thank you for choosing [facility].  You may be receiving a survey in the mail from [employer] [facility] regarding your visit today.  Please take a few minutes to complete and return the survey to let us know how we are doing.      If you have questions or concerns, please contact us via [employer] or you can contact your care team at [phone].    Our Clinic hours are:  Monday 6:40 am  to 7:00 pm  Tuesday -Friday 6:40 am to 5:00 pm    The [place] outpatient lab hours are:  Monday - Friday 6:10 am to 4:45 pm  Saturdays 7:00 am to 11:00 am  Appointments are required, call [phone]    If you have clinical questions after hours or would like to schedule an appointment,  call the clinic at [phone].  When You Have Low Back Pain    Caring for Your Back  You are not alone.    Low back pain is very common. Nearly half of all adults have low back pain in any given year. The good news is that back pain is rarely a danger to your health. Most people can manage their back pain on their own and about half of them start feeling better within 2 weeks. In 9 out of 10 cases, low back pain goes away or no longer limits daily activity within 6 weeks.     Your outlook is good!    Your symptoms tell us that your low back pain is most likely not a danger to you. Most of the time we do not know the exact cause of low back pain, even if you see a doctor or have an MRI. However, treatment can still work without knowing the cause of the pain. Less than 1 in 100 people need surgery for their back pain.     What can I do about my low back pain?     There are three things you can do to ease low back pain and help it go away.    Use heat or cold packs.    Take medicine as directed.    Use positions, movements and exercises.     Using heat or cold packs    Try cold packs or gentle heat to ease your pain. Use whichever gives you the most relief. Apply the cold pack or heat for 15 minutes at a time, as often as needed.    Taking  medicine      If your doctor has prescribed medicine, be sure to follow the directions.    If you take over-the-counter medicine, read and follow the directions.    Talk to your doctor if you have any questions.     Using positions, movements and exercises    Research tells us that moving your joints and muscles can help you recover from back pain. Such activity should be simple and gentle. Use the positions in the photos as well as walking to help relieve your pain. Try taking a short walk every 3 to 4 hours during the day. Walk for a few minutes inside your home or take longer walks outside, on a treadmill or at a mall. Slowly increase the amount of time you walk. Expect discomfort when you begin, but it should lessen as your back starts to heal. When your back feels better, walk daily to keep your back and body healthy.    Finding a comfortable position    When your back pain is new, certain positions will ease your pain. Gently try each of the positions below until you find one that is helpful. Once you find a position of comfort, use it as often as you like when you are resting. You will recover faster if you combine rest with activity.         Lie on your back with your legs bent. You can do this by placing a pillow under your knees. Or you may lie on the floor and rest your lower legs on the seat of a chair.       Lie on your side with your knees bent, and place a pillow between your knees.       Lie on your stomach over pillows.      When should I call my doctor?    Your back pain should improve over the first couple of weeks. As it improves, you should be able to return to your normal activities. But call your doctor if:    You have a sudden change in your ability to control your bladder or bowels.    You feel tingling in your groin or legs.    The pain spreads down your leg and into your foot.    Your toes, feet or leg muscles feel weak.    You feel generally unwell or sick.    Your pain does not get  better or gets worse.      If you are deaf or hard of hearing, please let us know. We provide many free services including sign language interpreters,oral interpreters, TTYs, telephone amplifiers, note takers and written materials.    For informational purposes only. Not to replace the advice of your health care provider. Copyright   2013 [employer]. All rights reserved. [employer] 314316   Rev 06/14.

## 2018-06-19 ASSESSMENT — ANXIETY QUESTIONNAIRES: GAD7 TOTAL SCORE: 5

## 2018-06-19 ASSESSMENT — PATIENT HEALTH QUESTIONNAIRE - PHQ9: SUM OF ALL RESPONSES TO PHQ QUESTIONS 1-9: 4

## 2018-06-19 ASSESSMENT — ASTHMA QUESTIONNAIRES: ACT_TOTALSCORE: 20

## 2018-08-30 DIAGNOSIS — G47.09 OTHER INSOMNIA: ICD-10-CM

## 2018-08-30 NOTE — TELEPHONE ENCOUNTER
"Requested Prescriptions   Pending Prescriptions Disp Refills     traZODone (DESYREL) 50 MG tablet [Pharmacy Med Name: TRAZODONE HCL 50MG TABS]  Last Written Prescription Date:  09/06/17  Last Fill Quantity: 180,  # refills: 3   Last office visit: 6/18/2018 with prescribing provider:  06/18/18   Future Office Visit:     180 tablet 3     Sig: TAKE TWO TABLETS BY MOUTH EVERY NIGHT AT BEDTIME    Serotonin Modulators Passed    8/30/2018  1:13 PM       Passed - Recent (12 mo) or future (30 days) visit within the authorizing provider's specialty    Patient had office visit in the last 12 months or has a visit in the next 30 days with authorizing provider or within the authorizing provider's specialty.  See \"Patient Info\" tab in inbasket, or \"Choose Columns\" in Meds & Orders section of the refill encounter.           Passed - Patient is age 18 or older       Passed - No active pregnancy on record       Passed - No positive pregnancy test in past 12 months          "

## 2018-08-31 RX ORDER — TRAZODONE HYDROCHLORIDE 50 MG/1
TABLET, FILM COATED ORAL
Qty: 60 TABLET | Refills: 0 | Status: SHIPPED | OUTPATIENT
Start: 2018-08-31 | End: 2018-09-18

## 2018-08-31 NOTE — TELEPHONE ENCOUNTER
Medication is being filled for 1 time refill only due to:  Patient needs to be seen because has not discussed insomnia with PCP since 9/2017..   She will schedule an appt in next 2 weeks.  Kristin WICK RN

## 2018-09-18 ENCOUNTER — TELEPHONE (OUTPATIENT)
Dept: FAMILY MEDICINE | Facility: CLINIC | Age: 73
End: 2018-09-18

## 2018-09-18 ENCOUNTER — OFFICE VISIT (OUTPATIENT)
Dept: FAMILY MEDICINE | Facility: CLINIC | Age: 73
End: 2018-09-18
Payer: COMMERCIAL

## 2018-09-18 VITALS
TEMPERATURE: 97.4 F | WEIGHT: 161 LBS | BODY MASS INDEX: 28.53 KG/M2 | HEIGHT: 63 IN | DIASTOLIC BLOOD PRESSURE: 66 MMHG | SYSTOLIC BLOOD PRESSURE: 118 MMHG | OXYGEN SATURATION: 93 % | HEART RATE: 82 BPM

## 2018-09-18 DIAGNOSIS — L57.0 ACTINIC KERATOSIS: ICD-10-CM

## 2018-09-18 DIAGNOSIS — I10 BENIGN ESSENTIAL HYPERTENSION: Primary | ICD-10-CM

## 2018-09-18 DIAGNOSIS — M54.50 ACUTE BILATERAL LOW BACK PAIN WITHOUT SCIATICA: ICD-10-CM

## 2018-09-18 DIAGNOSIS — M17.0 OSTEOARTHRITIS OF BOTH KNEES, UNSPECIFIED OSTEOARTHRITIS TYPE: ICD-10-CM

## 2018-09-18 DIAGNOSIS — G47.09 OTHER INSOMNIA: ICD-10-CM

## 2018-09-18 DIAGNOSIS — F32.1 MAJOR DEPRESSIVE DISORDER, SINGLE EPISODE, MODERATE (H): ICD-10-CM

## 2018-09-18 DIAGNOSIS — J45.20 MILD INTERMITTENT ASTHMA WITHOUT COMPLICATION: ICD-10-CM

## 2018-09-18 DIAGNOSIS — M79.7 FIBROMYALGIA: Primary | ICD-10-CM

## 2018-09-18 DIAGNOSIS — K21.9 GASTROESOPHAGEAL REFLUX DISEASE WITHOUT ESOPHAGITIS: ICD-10-CM

## 2018-09-18 PROCEDURE — 99214 OFFICE O/P EST MOD 30 MIN: CPT | Performed by: FAMILY MEDICINE

## 2018-09-18 RX ORDER — CYCLOBENZAPRINE HCL 5 MG
5 TABLET ORAL 3 TIMES DAILY PRN
Qty: 42 TABLET | Refills: 0 | Status: SHIPPED | OUTPATIENT
Start: 2018-09-18 | End: 2018-09-18

## 2018-09-18 RX ORDER — TRAZODONE HYDROCHLORIDE 50 MG/1
TABLET, FILM COATED ORAL
Qty: 180 TABLET | Refills: 3 | Status: SHIPPED | OUTPATIENT
Start: 2018-09-18 | End: 2019-08-30

## 2018-09-18 RX ORDER — FLUOXETINE 40 MG/1
40 CAPSULE ORAL DAILY
Qty: 90 CAPSULE | Refills: 3 | Status: SHIPPED | OUTPATIENT
Start: 2018-09-18 | End: 2018-12-05

## 2018-09-18 RX ORDER — ALBUTEROL SULFATE 90 UG/1
AEROSOL, METERED RESPIRATORY (INHALATION)
Qty: 18 G | Refills: 3 | Status: SHIPPED | OUTPATIENT
Start: 2018-09-18 | End: 2019-03-14

## 2018-09-18 RX ORDER — TIZANIDINE 2 MG/1
2 TABLET ORAL 3 TIMES DAILY
Qty: 90 TABLET | Refills: 0 | Status: SHIPPED | OUTPATIENT
Start: 2018-09-18 | End: 2019-07-01

## 2018-09-18 RX ORDER — TRAMADOL HYDROCHLORIDE 50 MG/1
50 TABLET ORAL EVERY 6 HOURS PRN
Qty: 120 TABLET | Refills: 3 | Status: SHIPPED | OUTPATIENT
Start: 2018-09-18 | End: 2019-01-23

## 2018-09-18 ASSESSMENT — ANXIETY QUESTIONNAIRES
5. BEING SO RESTLESS THAT IT IS HARD TO SIT STILL: SEVERAL DAYS
2. NOT BEING ABLE TO STOP OR CONTROL WORRYING: SEVERAL DAYS
1. FEELING NERVOUS, ANXIOUS, OR ON EDGE: SEVERAL DAYS
7. FEELING AFRAID AS IF SOMETHING AWFUL MIGHT HAPPEN: NOT AT ALL
3. WORRYING TOO MUCH ABOUT DIFFERENT THINGS: SEVERAL DAYS
6. BECOMING EASILY ANNOYED OR IRRITABLE: SEVERAL DAYS
GAD7 TOTAL SCORE: 6

## 2018-09-18 ASSESSMENT — PATIENT HEALTH QUESTIONNAIRE - PHQ9: 5. POOR APPETITE OR OVEREATING: SEVERAL DAYS

## 2018-09-18 NOTE — PATIENT INSTRUCTIONS
Thank you for choosing [facility].  You may be receiving a survey in the mail from [employer] regarding your visit today.  Please take a few minutes to complete and return the survey to let us know how we are doing.      If you have questions or concerns, please contact us via Phantom or you can contact your care team at [phone].    Our Clinic hours are:  Monday 6:40 am  to 7:00 pm  Tuesday -Friday 6:40 am to 5:00 pm    The [place] outpatient lab hours are:  Monday - Friday 6:10 am to 4:45 pm  Saturdays 7:00 am to 11:00 am  Appointments are required, call [phone]    If you have clinical questions after hours or would like to schedule an appointment,  call the clinic at [phone].

## 2018-09-18 NOTE — TELEPHONE ENCOUNTER
Cyclobenzaprine is not covered by Medicare Part D with out Prior Authorization.  They do cover Tizanidine without a PA.    Tulio Elizabeth  [facility]   Ext #[phone], [phone]  #[phone]

## 2018-09-18 NOTE — TELEPHONE ENCOUNTER
Patient notified by voicemail that there is a prescription waiting for them at their pharmacy of choice.      Hui LIU RN

## 2018-09-18 NOTE — MR AVS SNAPSHOT
After Visit Summary   9/18/2018    Yamel Lindsay    MRN: [id number]           Patient Information     Date Of Birth          1945        Visit Information        Provider Department      9/18/2018 10:00 AM Catie Fraser MD [facility]        Today's Diagnoses     Benign essential hypertension    -  1    Other insomnia        Mild intermittent asthma without complication        Osteoarthritis of both knees, unspecified osteoarthritis type        Gastroesophageal reflux disease without esophagitis        Major depressive disorder, single episode, moderate (H)        Acute bilateral low back pain without sciatica          Care Instructions          Thank you for choosing [facility].  You may be receiving a survey in the mail from [employer] regarding your visit today.  Please take a few minutes to complete and return the survey to let us know how we are doing.      If you have questions or concerns, please contact us via Sociall or you can contact your care team at [phone].    Our Clinic hours are:  Monday 6:40 am  to 7:00 pm  Tuesday -Friday 6:40 am to 5:00 pm    The [place] outpatient lab hours are:  Monday - Friday 6:10 am to 4:45 pm  Saturdays 7:00 am to 11:00 am  Appointments are required, call [phone]    If you have clinical questions after hours or would like to schedule an appointment,  call the clinic at [phone].          Follow-ups after your visit        Future tests that were ordered for you today     Open Future Orders        Priority Expected Expires Ordered    **Basic metabolic panel FUTURE anytime Routine 9/18/2018 9/18/2019 9/18/2018    Lipid panel reflex to direct LDL Fasting Routine 9/18/2018 9/18/2019 9/18/2018            Who to contact     If you have questions or need follow up information about today's clinic visit or your schedule please contact [facility] directly at [phone].  Normal or non-critical lab and  "imaging results will be communicated to you by MyChart, letter or phone within 4 business days after the clinic has received the results. If you do not hear from us within 7 days, please contact the clinic through Restalohart or phone. If you have a critical or abnormal lab result, we will notify you by phone as soon as possible.  Submit refill requests through [email or web address] or call your pharmacy and they will forward the refill request to us. Please allow 3 business days for your refill to be completed.          Additional Information About Your Visit        RestaloharOnevest Information     [email or web address] lets you send messages to your doctor, view your test results, renew your prescriptions, schedule appointments and more. To sign up, go to [email or web address] . Click on \"Log in\" on the left side of the screen, which will take you to the Welcome page. Then click on \"Sign up Now\" on the right side of the page.     You will be asked to enter the access code listed below, as well as some personal information. Please follow the directions to create your username and password.     Your access code is: [email or web address]  Expires: 2018  9:45 AM     Your access code will  in 90 days. If you need help or a new code, please call your [facility] clinic or [phone].        Care EveryWhere ID     This is your Care EveryWhere ID. This could be used by other organizations to access your [facility] medical records  [id number]        Your Vitals Were     Pulse Temperature Height Pulse Oximetry BMI (Body Mass Index)       82 97.4  F (36.3  C) (Tympanic) 5' 2.75\" (1.594 m) 93% 28.75 kg/m2        Blood Pressure from Last 3 Encounters:   18 118/66   18 133/80   18 116/64    Weight from Last 3 Encounters:   18 161 lb (73 kg)   18 153 lb (69.4 kg)   18 150 lb (68 kg)                 Where to get your medicines      These medications were sent to [facility] [place], [place] - [address]  [address], [facility] [address]     Phone:  [phone]     albuterol 108 (90 Base) MCG/ACT inhaler    cyclobenzaprine 5 MG tablet    FLUoxetine 40 MG capsule    omeprazole 20 MG CR capsule    traZODone 50 MG tablet         Some of these will need a paper prescription and others can be bought over the counter.  Ask your nurse if you have questions.     Bring a paper prescription for each of these medications     traMADol 50 MG tablet         Information about OPIOIDS     PRESCRIPTION OPIOIDS: WHAT YOU NEED TO KNOW   We gave you an opioid (narcotic) pain medicine. It is important to manage your pain, but opioids are not always the best choice. You should first try all the other options your care team gave you. Take this medicine for as short a time (and as few doses) as possible.    Some activities can increase your pain, such as bandage changes or therapy sessions. It may help to take your pain medicine 30 to 60 minutes before these activities. Reduce your stress by getting enough sleep, working on hobbies you enjoy and practicing relaxation or meditation. Talk to your care team about ways to manage your pain beyond prescription opioids.    These medicines have risks:    DO NOT drive when on new or higher doses of pain medicine. These medicines can affect your alertness and reaction times, and you could be arrested for driving under the influence (DUI). If you need to use opioids long-term, talk to your care team about driving.    DO NOT operate heavy machinery    DO NOT do any other dangerous activities while taking these medicines.    DO NOT drink any alcohol while taking these medicines.     If the opioid prescribed includes acetaminophen, DO NOT take with any other medicines that contain acetaminophen. Read all labels carefully. Look for the word  acetaminophen  or  Tylenol.  Ask your pharmacist if you have questions or are unsure.    You can get addicted to pain medicines,  especially if you have a history of addiction (chemical, alcohol or substance dependence). Talk to your care team about ways to reduce this risk.    All opioids tend to cause constipation. Drink plenty of water and eat foods that have a lot of fiber, such as fruits, vegetables, prune juice, apple juice and high-fiber cereal. Take a laxative (Miralax, milk of magnesia, Colace, Senna) if you don t move your bowels at least every other day. Other side effects include upset stomach, sleepiness, dizziness, throwing up, tolerance (needing more of the medicine to have the same effect), physical dependence and slowed breathing.    Store your pills in a secure place, locked if possible. We will not replace any lost or stolen medicine. If you don t finish your medicine, please throw away (dispose) as directed by your pharmacist. The [place] Pollution [employer] has more information about safe disposal: [email or web address]         Primary Care Provider Office Phone # Fax #    Catie Fraser [phone] [address] [facility] [address]        Equal Access to Services     BELÉN MOSLEY AH: Hadii bobby carrascoo Sokyara, waaxda luqadaha, qaybta kaalmada dinorah, deep garrett. So [facility] [phone].    ATENCIÓN: Si habla español, tiene a maurer disposición servicios gratuitos de asistencia lingüística. Kunal al [phone].    We comply with applicable federal civil rights laws and [place] laws. We do not discriminate on the basis of race, color, national origin, age, disability, sex, sexual orientation, or gender identity.            Thank you!     Thank you for choosing [facility]  for your care. Our goal is always to provide you with excellent care. Hearing back from our patients is one way we can continue to improve our services. Please take a few minutes to complete the written survey that you  may receive in the mail after your visit with us. Thank you!             Your Updated Medication List - Protect others around you: Learn how to safely use, store and throw away your medicines at www.disposemymeds.org.          This list is accurate as of 9/18/18 10:25 AM.  Always use your most recent med list.                   Brand Name Dispense Instructions for use Diagnosis    albuterol 108 (90 Base) MCG/ACT inhaler    VENTOLIN HFA    18 g    INHALE 2 PUFFS INTO THE LUNGS EVERY 6 HOURS AS NEEDED FOR SHORTNESS OF BREATH, DIFFICULTY BREATHING OR WHEEZING.    Mild intermittent asthma without complication       CALCIUM 1000 + D PO      Take 1 tablet by mouth        clonazePAM 0.5 MG tablet    klonoPIN    90 tablet    Take 1 tablet (0.5 mg) by mouth 3 times daily as needed for anxiety    Anxiety       cyclobenzaprine 5 MG tablet    FLEXERIL    42 tablet    Take 1 tablet (5 mg) by mouth 3 times daily as needed    Acute bilateral low back pain without sciatica       fish Oil 1200 MG capsule      Take 1 capsule by mouth        FLUoxetine 40 MG capsule    PROzac    90 capsule    Take 1 capsule (40 mg) by mouth daily    Major depressive disorder, single episode, moderate (H)       hydrochlorothiazide 25 MG tablet    HYDRODIURIL    90 tablet    Take 1 tablet (25 mg) by mouth daily    Benign essential hypertension       ibuprofen 200 MG tablet    ADVIL/MOTRIN     Take 200-600 mg by mouth every 2 hours as needed for mild pain        methylPREDNISolone 4 MG tablet    MEDROL DOSEPAK    21 tablet    Take 1 tablet (4 mg) by mouth See Admin Instructions    Acute bilateral low back pain without sciatica       omeprazole 20 MG CR capsule    priLOSEC    90 capsule    TAKE 1 CAPSULE BY MOUTH DAILY, 30 TO 60 MINUTES BEFORE A MEAL.    Gastroesophageal reflux disease without esophagitis       pravastatin 20 MG tablet    PRAVACHOL    90 tablet    Take 1 tablet (20 mg) by mouth At Bedtime 1 at bedtime.    Hyperlipidemia LDL goal <130        PRENATAL VITAMINS PO      Take 1 tablet by mouth daily        traMADol 50 MG tablet    ULTRAM    120 tablet    Take 1 tablet (50 mg) by mouth every 6 hours as needed for pain    Osteoarthritis of both knees, unspecified osteoarthritis type       traZODone 50 MG tablet    DESYREL    180 tablet    TAKE TWO TABLETS BY MOUTH EVERY NIGHT AT BEDTIME    Other insomnia       VITAMIN D-3 PO      Take 2,000 Units by mouth daily

## 2018-09-18 NOTE — PROGRESS NOTES
SUBJECTIVE:   Yamel Lindsay is a 73 year old female who presents to clinic today for the following health issues:    Chief Complaint   Patient presents with     Asthma     Depression     anxiety      Refill Request     Blood Draw     Patient is not fasting today is due for bmp and lipid        Hypertension Follow-up      Outpatient blood pressures are not being checked.    Low Salt Diet: low salt    Depression and Anxiety Follow-Up    Status since last visit: No change    Other associated symptoms:None    Complicating factors:     Significant life event: No     Current substance abuse: None    PHQ-9 5/22/2017 1/9/2018 6/18/2018   Total Score 5 2 4   Q9: Suicide Ideation Not at all Not at all Not at all     PAOLA-7 SCORE 5/22/2017 1/9/2018 6/18/2018   Total Score - - -   Total Score 10 4 5     In the past two weeks have you had thoughts of suicide or self-harm?  No.    Do you have concerns about your personal safety or the safety of others?   No  PHQ-9  English  PHQ-9   Any Language  PAOLA-7  Suicide Assessment Five-step Evaluation and Treatment (SAFE-T)  Asthma Follow-Up    Was ACT completed today?    Yes    ACT Total Scores 9/18/2018   ACT TOTAL SCORE (Goal Greater than or Equal to 20) 20   In the past 12 months, how many times did you visit the emergency room for your asthma without being admitted to the hospital? 0   In the past 12 months, how many times were you hospitalized overnight because of your asthma? 0       Recent asthma triggers that patient is dealing with: upper respiratory infections and exercise or sports        Amount of exercise or physical activity: 2-3 days/week for an average of 15-30 minutes    Problems taking medications regularly: No    Medication side effects: none    Diet: low salt and low fat/cholesterol      Medication Followup of all medications listed in      Taking Medication as prescribed: yes    Side Effects:  None    Medication Helping Symptoms:  yes       Patient is a 73  yr old female here for medication refills. She is on chronic pain medication and also on muscle relaxants. Patient denies any side effects to her medication and they have worked well for her.     She also has two moles that she will like seen . She has on on her right shoulder and it catches with her clothes. She also has one on her left temple. No other symptoms.    Problem list and histories reviewed & adjusted, as indicated.  Additional history: as documented    Patient Active Problem List   Diagnosis     Hyperlipidemia LDL goal <130     Primary localized osteoarthrosis, lower leg     Generalized anxiety disorder     Rhinitis, allergic seasonal     Alcoholism (H)     Mild recurrent major depression (H)     Advanced directives, counseling/discussion     Seasonal allergic rhinitis     Hip pain     GERD (gastroesophageal reflux disease)     S/P laparoscopic hernia repair     OA (osteoarthritis) of knee     Mild intermittent asthma without complication     Major depression in complete remission (H)     Benign essential hypertension     Past Surgical History:   Procedure Laterality Date     ESOPHAGOSCOPY, GASTROSCOPY, DUODENOSCOPY (EGD), COMBINED N/A 1/5/2015    Procedure: COMBINED ESOPHAGOSCOPY, GASTROSCOPY, DUODENOSCOPY (EGD), BIOPSY SINGLE OR MULTIPLE;  Surgeon: Dylan Alejandra MD;  Location: [facility]     ESOPHAGOSCOPY, GASTROSCOPY, DUODENOSCOPY (EGD), COMBINED N/A 5/25/2018    Procedure: COMBINED ESOPHAGOSCOPY, GASTROSCOPY, DUODENOSCOPY (EGD), BIOPSY SINGLE OR MULTIPLE;  gastroscopy;  Surgeon: Alexander Bautista MD;  Location: [facility]     LAPAROSCOPIC HERNIORRHAPHY INGUINAL Right 1/26/2015    Procedure: LAPAROSCOPIC HERNIORRHAPHY INGUINAL;  Surgeon: Favio Olsen MD;  Location: [facility]     TUBAL LIGATION         Social History   Substance Use Topics     Smoking status: Former Smoker     Packs/day: 0.50     Types: Cigarettes     Quit date: 6/16/2011     Smokeless tobacco: Never Used     Alcohol use No     Family  History   Problem Relation Age of Onset     Cancer Mother      C.A.D. Father      Lipids Father      Hypertension Father      Hypertension Brother      Cancer Brother      esophageal cancer     Breast Cancer Other      Prostate Cancer Brother      Psychotic Disorder Daughter      Diabetes No family hx of      Cerebrovascular Disease No family hx of      Cancer - colorectal No family hx of          Current Outpatient Prescriptions   Medication Sig Dispense Refill     albuterol (VENTOLIN HFA) 108 (90 Base) MCG/ACT inhaler INHALE 2 PUFFS INTO THE LUNGS EVERY 6 HOURS AS NEEDED FOR SHORTNESS OF BREATH, DIFFICULTY BREATHING OR WHEEZING. 18 g 3     Calcium Carb-Cholecalciferol (CALCIUM 1000 + D PO) Take 1 tablet by mouth       Cholecalciferol (VITAMIN D-3 PO) Take 2,000 Units by mouth daily        clonazePAM (KLONOPIN) 0.5 MG tablet Take 1 tablet (0.5 mg) by mouth 3 times daily as needed for anxiety 90 tablet 3     cyclobenzaprine (FLEXERIL) 5 MG tablet Take 1 tablet (5 mg) by mouth 3 times daily as needed 42 tablet 0     FLUoxetine (PROZAC) 40 MG capsule Take 1 capsule (40 mg) by mouth daily 90 capsule 3     hydrochlorothiazide (HYDRODIURIL) 25 MG tablet Take 1 tablet (25 mg) by mouth daily 90 tablet 3     Omega-3 Fatty Acids (FISH OIL) 1200 MG CAPS Take 1 capsule by mouth       omeprazole (PRILOSEC) 20 MG CR capsule TAKE 1 CAPSULE BY MOUTH DAILY, 30 TO 60 MINUTES BEFORE A MEAL. 90 capsule 3     traMADol (ULTRAM) 50 MG tablet Take 1 tablet (50 mg) by mouth every 6 hours as needed for pain 120 tablet 3     traZODone (DESYREL) 50 MG tablet TAKE TWO TABLETS BY MOUTH EVERY NIGHT AT BEDTIME 180 tablet 3     ibuprofen (ADVIL,MOTRIN) 200 MG tablet Take 200-600 mg by mouth every 2 hours as needed for mild pain       methylPREDNISolone (MEDROL DOSEPAK) 4 MG tablet Take 1 tablet (4 mg) by mouth See Admin Instructions 21 tablet 0     pravastatin (PRAVACHOL) 20 MG tablet Take 1 tablet (20 mg) by mouth At Bedtime 1 at bedtime. 90  "tablet 3     PRENATAL VITAMINS PO Take 1 tablet by mouth daily        tiZANidine (ZANAFLEX) 2 MG tablet Take 1 tablet (2 mg) by mouth 3 times daily 90 tablet 0     [DISCONTINUED] FLUoxetine (PROZAC) 40 MG capsule Take 1 capsule (40 mg) by mouth daily 90 capsule 3     [DISCONTINUED] traZODone (DESYREL) 50 MG tablet TAKE TWO TABLETS BY MOUTH EVERY NIGHT AT BEDTIME 60 tablet 0     [DISCONTINUED] VENTOLIN  (90 BASE) MCG/ACT Inhaler INHALE 2 PUFFS INTO THE LUNGS EVERY 6 HOURS AS NEEDED FOR SHORTNESS OF BREATH, DIFFICULTY BREATHING OR WHEEZING. 18 g 3     Allergies   Allergen Reactions     Nka [No Known Allergies]      Seasonal Allergies        Reviewed and updated as needed this visit by clinical staff  Tobacco  Meds  Med Hx  Surg Hx  Fam Hx  Soc Hx      Reviewed and updated as needed this visit by Provider         ROS:  Constitutional, HEENT, cardiovascular, pulmonary, gi and gu systems are negative, except as otherwise noted.    OBJECTIVE:     /66  Pulse 82  Temp 97.4  F (36.3  C) (Tympanic)  Ht 5' 2.75\" (1.594 m)  Wt 161 lb (73 kg)  SpO2 93%  BMI 28.75 kg/m2  Body mass index is 28.75 kg/(m^2).  GENERAL: healthy, alert and no distress  EYES: Eyes grossly normal to inspection, PERRL and conjunctivae and sclerae normal  HENT: ear canals and TM's normal, nose and mouth without ulcers or lesions  NECK: no adenopathy, no asymmetry, masses, or scars and thyroid normal to palpation  RESP: lungs clear to auscultation - no rales, rhonchi or wheezes  CV: regular rate and rhythm, normal S1 S2, no S3 or S4, no murmur, click or rub, no peripheral edema and peripheral pulses strong  ABDOMEN: soft, nontender, no hepatosplenomegaly, no masses and bowel sounds normal  MS: no gross musculoskeletal defects noted, no edema  SKIN: no suspicious lesions or rashes and keratoses - actinic # 2 - frozen    Diagnostic Test Results:  Pending     ASSESSMENT/PLAN:   (I10) Benign essential hypertension  (primary encounter " diagnosis)  Comment: BP is within normal range  Plan: Basic metabolic panel FUTURE anytime, Lipid         panel reflex to direct LDL Fasting           (G47.09) Other insomnia  Comment: Medication refilled  Plan: traZODone (DESYREL) 50 MG tablet      (J45.20) Mild intermittent asthma without complication  Comment: medication refilled   Plan: albuterol (VENTOLIN HFA) 108 (90 Base) MCG/ACT         inhaler            (M17.0) Osteoarthritis of both knees, unspecified osteoarthritis type  Comment: Medication refilled  Plan: traMADol (ULTRAM) 50 MG tablet      (K21.9) Gastroesophageal reflux disease without esophagitis  Comment: Medication refilled  Plan: omeprazole (PRILOSEC) 20 MG CR capsule      (F32.1) Major depressive disorder, single episode, moderate (H)  Comment: medication refilled   Plan: FLUoxetine (PROZAC) 40 MG capsule            (M54.5) Acute bilateral low back pain without sciatica  Comment: Medication refilled  Plan: cyclobenzaprine (FLEXERIL) 5 MG tablet      (L57.0) Actinic keratosis  Comment: 2 actinic keratosis frozen with liquid nitrogen  Plan: Patient tolerated this well        FUTURE APPOINTMENTS:       - Follow-up visit as needed.    Catie Fraser MD  [facility]

## 2018-09-20 DIAGNOSIS — F41.9 ANXIETY: ICD-10-CM

## 2018-09-20 ASSESSMENT — PATIENT HEALTH QUESTIONNAIRE - PHQ9: SUM OF ALL RESPONSES TO PHQ QUESTIONS 1-9: 6

## 2018-09-20 ASSESSMENT — ANXIETY QUESTIONNAIRES: GAD7 TOTAL SCORE: 6

## 2018-09-20 ASSESSMENT — ASTHMA QUESTIONNAIRES: ACT_TOTALSCORE: 20

## 2018-09-20 NOTE — TELEPHONE ENCOUNTER
Reason for Call:  Other prescription    Detailed comments: pt calling for a refill of clonazePAM (KLONOPIN) 0.5 MG tablet.    Phone Number Patient can be reached at: Home number on file [phone] (home)    Best Time: any    Can we leave a detailed message on this number? YES    Call taken on 9/20/2018 at 9:11 AM by Hali Ledezma

## 2018-09-26 RX ORDER — CLONAZEPAM 0.5 MG/1
0.5 TABLET ORAL 3 TIMES DAILY PRN
Qty: 90 TABLET | Refills: 3 | Status: SHIPPED | OUTPATIENT
Start: 2018-09-26 | End: 2019-01-23

## 2018-10-25 ENCOUNTER — TELEPHONE (OUTPATIENT)
Dept: FAMILY MEDICINE | Facility: CLINIC | Age: 73
End: 2018-10-25

## 2018-10-25 NOTE — TELEPHONE ENCOUNTER
Panel Management Review      Patient has the following on her problem list:       Composite cancer screening  Chart review shows that this patient is due/due soon for the following Colonoscopy  Summary:    Patient is due/failing the following:   COLONOSCOPY and FIT    Action needed:   Colonoscopy     Type of outreach:    Phone, spoke to patient.  she feels like she had it done about 2-3 yrs ago but does not know where she got it at she was going to call her daughter to see if she remembers     Questions for provider review:    None                                                                                                                                    Esme Strauss CMA     Chart routed to  .

## 2018-11-14 ENCOUNTER — OFFICE VISIT (OUTPATIENT)
Dept: FAMILY MEDICINE | Facility: CLINIC | Age: 73
End: 2018-11-14
Payer: COMMERCIAL

## 2018-11-14 VITALS
OXYGEN SATURATION: 95 % | SYSTOLIC BLOOD PRESSURE: 132 MMHG | WEIGHT: 158 LBS | DIASTOLIC BLOOD PRESSURE: 70 MMHG | HEART RATE: 73 BPM | RESPIRATION RATE: 14 BRPM | TEMPERATURE: 98.1 F | HEIGHT: 63 IN | BODY MASS INDEX: 28 KG/M2

## 2018-11-14 DIAGNOSIS — F32.1 MODERATE MAJOR DEPRESSION (H): Primary | ICD-10-CM

## 2018-11-14 PROCEDURE — 99213 OFFICE O/P EST LOW 20 MIN: CPT | Performed by: FAMILY MEDICINE

## 2018-11-14 RX ORDER — CITALOPRAM HYDROBROMIDE 10 MG/1
10 TABLET ORAL DAILY
Qty: 30 TABLET | Refills: 3 | Status: SHIPPED | OUTPATIENT
Start: 2018-11-14 | End: 2019-03-14

## 2018-11-14 ASSESSMENT — PATIENT HEALTH QUESTIONNAIRE - PHQ9
SUM OF ALL RESPONSES TO PHQ QUESTIONS 1-9: 19
5. POOR APPETITE OR OVEREATING: NEARLY EVERY DAY

## 2018-11-14 ASSESSMENT — ANXIETY QUESTIONNAIRES
7. FEELING AFRAID AS IF SOMETHING AWFUL MIGHT HAPPEN: MORE THAN HALF THE DAYS
1. FEELING NERVOUS, ANXIOUS, OR ON EDGE: NEARLY EVERY DAY
6. BECOMING EASILY ANNOYED OR IRRITABLE: NEARLY EVERY DAY
5. BEING SO RESTLESS THAT IT IS HARD TO SIT STILL: MORE THAN HALF THE DAYS
GAD7 TOTAL SCORE: 19
3. WORRYING TOO MUCH ABOUT DIFFERENT THINGS: NEARLY EVERY DAY
2. NOT BEING ABLE TO STOP OR CONTROL WORRYING: NEARLY EVERY DAY
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
## 2018-11-14 NOTE — MR AVS SNAPSHOT
After Visit Summary   11/14/2018    Yamel Lindsay    MRN: [id number]           Patient Information     Date Of Birth          1945        Visit Information        Provider Department      11/14/2018 1:00 PM Catie Fraser MD [facility]        Today's Diagnoses     Moderate major depression (H)    -  1      Care Instructions          Thank you for choosing [facility].  You may be receiving a survey in the mail from [facility] [employer] regarding your visit today.  Please take a few minutes to complete and return the survey to let us know how we are doing.      If you have questions or concerns, please contact us via [employer] or you can contact your care team at [phone].    Our Clinic hours are:  Monday 6:40 am  to 7:00 pm  Tuesday -Friday 6:40 am to 5:00 pm    The [place] outpatient lab hours are:  Monday - Friday 6:10 am to 4:45 pm  Saturdays 7:00 am to 11:00 am  Appointments are required, call [phone]    If you have clinical questions after hours or would like to schedule an appointment,  call the clinic at [phone].  Depression: Tips to Help Yourself    As your healthcare providers help treat your depression, you can also help yourself. Keep in mind that your illness affects you emotionally, physically, mentally, and socially. So full recovery will take time. Take care of your body and your soul, and be patient with yourself as you get better.  Self-care    Educate yourself. Read about treatment and medicine options. If you have the energy, attend local conferences or support groups. Keep a list of useful websites and helpful books and use them as needed. This illness is not your fault. Don t blame yourself for your depression.    Manage early symptoms. If you notice symptoms returning, experience triggers, or identify other factors that may lead to a depressive episode, get help as soon as possible. Ask trusted friends and family to monitor your behavior and  let you know if they see anything of concern.    Work with your provider. Find a provider you can trust. Communicate honestly with that person and share information on your treatment for depression and your reaction to medicines.    Be prepared for a crisis. Know what to do if you experience a crisis. Keep the phone number of a crisis hotline and know the location of your community's urgent care centers and the closest emergency department.    Hold off on big decisions. Depression can cloud your judgment. So wait until you feel better before making major life decisions, such as changing jobs, moving, or getting  or .    Be patient. Recovering from depression is a process. Don t be discouraged if it takes some time to feel better.    Keep it simple. Depression saps your energy and concentration. So you won t be able to do all the things you used to do. Set small goals and do what you can.    Be with others. Don t isolate yourself--you ll only feel worse. Try to be with other people. And take part in fun activities when you can. Go to a movie, ballgame, [employer] service, or social event. Talk openly with people you can trust. And accept help when it s offered.  Take care of your body  People with depression often lose the desire to take care of themselves. That only makes their problems worse. During treatment and afterward, make a point to:    Exercise. It s a great way to take care of your body. And studies have shown that exercise helps fight depression.    Avoid drugs and alcohol. These may ease the pain in the short term. But they ll only make your problems worse in the long run.    Get relief from stress. Ask your healthcare provider for relaxation exercises and techniques to help relieve stress.    Eat right. A balanced and healthy diet helps keep your body healthy.  Date Last Reviewed: 1/1/2017 2000-2018 [employer]. [address] [place] [address], [place] [address]. All rights  "reserved. This information is not intended as a substitute for professional medical care. Always follow your healthcare professional's instructions.                Follow-ups after your visit        Follow-up notes from your care team     Return in about 3 months (around 2/14/2019).      Who to contact     If you have questions or need follow up information about today's clinic visit or your schedule please contact [facility] directly at [phone].  Normal or non-critical lab and imaging results will be communicated to you by [employer], letter or phone within 4 business days after the clinic has received the results. If you do not hear from us within 7 days, please contact the clinic through [employer] or phone. If you have a critical or abnormal lab result, we will notify you by phone as soon as possible.  Submit refill requests through [employer] or call your pharmacy and they will forward the refill request to us. Please allow 3 business days for your refill to be completed.          Additional Information About Your Visit        [employer] Information     [employer] gives you secure access to your electronic health record. If you see a primary care provider, you can also send messages to your care team and make appointments. If you have questions, please call your primary care clinic.  If you do not have a primary care provider, please call [phone] and they will assist you.        Care EveryWhere ID     This is your Care EveryWhere ID. This could be used by other organizations to access your [facility] medical records  [id number]        Your Vitals Were     Pulse Temperature Respirations Height Pulse Oximetry BMI (Body Mass Index)    73 98.1  F (36.7  C) (Tympanic) 14 5' 2.75\" (1.594 m) 95% 28.21 kg/m2       Blood Pressure from Last 3 Encounters:   11/14/18 132/70   09/18/18 118/66   06/18/18 133/80    Weight from Last 3 Encounters:   11/14/18 158 lb (71.7 kg)   09/18/18 161 lb (73 kg)   06/18/18 153 lb " (69.4 kg)              Today, you had the following     No orders found for display         Today's Medication Changes          These changes are accurate as of 11/14/18  1:25 PM.  If you have any questions, ask your nurse or doctor.               Start taking these medicines.        Dose/Directions    citalopram 10 MG tablet   Commonly known as:  celeXA   Used for:  Moderate major depression (H)   Started by:  Catie Fraser MD        Dose:  10 mg   Take 1 tablet (10 mg) by mouth daily   Quantity:  30 tablet   Refills:  3         Stop taking these medicines if you haven't already. Please contact your care team if you have questions.     methylPREDNISolone 4 MG tablet   Commonly known as:  MEDROL DOSEPAK   Stopped by:  Catie Fraser MD                Where to get your medicines      These medications were sent to [facility] - [place], [place] - [address]  [address], [facility] [address]     Phone:  [phone]     citalopram 10 MG tablet                Primary Care Provider Office Phone # Fax #    Catie Fraser [phone] [address] [facility] [address]        Equal Access to Services     BELÉN MOSLEY AH: Hadii bobby lopez hadasho Sokyara, waaxda luqadaha, qaybta kaalmada adeegyada, deep garrett. So [facility] [phone].    ATENCIÓN: Si habla español, tiene a maurer disposición servicios gratuitos de asistencia lingüística. Kunal al [phone].    We comply with applicable federal civil rights laws and [place] laws. We do not discriminate on the basis of race, color, national origin, age, disability, sex, sexual orientation, or gender identity.            Thank you!     Thank you for choosing [facility]  for your care. Our goal is always to provide you with excellent care. Hearing back from our patients is one way we can continue to improve our services. Please  take a few minutes to complete the written survey that you may receive in the mail after your visit with us. Thank you!             Your Updated Medication List - Protect others around you: Learn how to safely use, store and throw away your medicines at www.disposemymeds.org.          This list is accurate as of 11/14/18  1:25 PM.  Always use your most recent med list.                   Brand Name Dispense Instructions for use Diagnosis    albuterol 108 (90 Base) MCG/ACT inhaler    VENTOLIN HFA    18 g    INHALE 2 PUFFS INTO THE LUNGS EVERY 6 HOURS AS NEEDED FOR SHORTNESS OF BREATH, DIFFICULTY BREATHING OR WHEEZING.    Mild intermittent asthma without complication       CALCIUM 1000 + D PO      Take 1 tablet by mouth        citalopram 10 MG tablet    celeXA    30 tablet    Take 1 tablet (10 mg) by mouth daily    Moderate major depression (H)       clonazePAM 0.5 MG tablet    klonoPIN    90 tablet    Take 1 tablet (0.5 mg) by mouth 3 times daily as needed for anxiety    Anxiety       fish Oil 1200 MG capsule      Take 1 capsule by mouth        FLUoxetine 40 MG capsule    PROzac    90 capsule    Take 1 capsule (40 mg) by mouth daily    Major depressive disorder, single episode, moderate (H)       hydrochlorothiazide 25 MG tablet    HYDRODIURIL    90 tablet    Take 1 tablet (25 mg) by mouth daily    Benign essential hypertension       ibuprofen 200 MG tablet    ADVIL/MOTRIN     Take 200-600 mg by mouth every 2 hours as needed for mild pain        omeprazole 20 MG CR capsule    priLOSEC    90 capsule    TAKE 1 CAPSULE BY MOUTH DAILY, 30 TO 60 MINUTES BEFORE A MEAL.    Gastroesophageal reflux disease without esophagitis       pravastatin 20 MG tablet    PRAVACHOL    90 tablet    Take 1 tablet (20 mg) by mouth At Bedtime 1 at bedtime.    Hyperlipidemia LDL goal <130       PRENATAL VITAMINS PO      Take 1 tablet by mouth daily        tiZANidine 2 MG tablet    ZANAFLEX    90 tablet    Take 1 tablet (2 mg) by mouth 3 times  daily    Fibromyalgia       traMADol 50 MG tablet    ULTRAM    120 tablet    Take 1 tablet (50 mg) by mouth every 6 hours as needed for pain    Osteoarthritis of both knees, unspecified osteoarthritis type       traZODone 50 MG tablet    DESYREL    180 tablet    TAKE TWO TABLETS BY MOUTH EVERY NIGHT AT BEDTIME    Other insomnia       VITAMIN D-3 PO      Take 2,000 Units by mouth daily

## 2018-11-14 NOTE — PATIENT INSTRUCTIONS
Thank you for choosing [facility].  You may be receiving a survey in the mail from [employer] regarding your visit today.  Please take a few minutes to complete and return the survey to let us know how we are doing.      If you have questions or concerns, please contact us via [employer] or you can contact your care team at [phone].    Our Clinic hours are:  Monday 6:40 am  to 7:00 pm  Tuesday -Friday 6:40 am to 5:00 pm    The [place] outpatient lab hours are:  Monday - Friday 6:10 am to 4:45 pm  Saturdays 7:00 am to 11:00 am  Appointments are required, call [phone]    If you have clinical questions after hours or would like to schedule an appointment,  call the clinic at [phone].  Depression: Tips to Help Yourself    As your healthcare providers help treat your depression, you can also help yourself. Keep in mind that your illness affects you emotionally, physically, mentally, and socially. So full recovery will take time. Take care of your body and your soul, and be patient with yourself as you get better.  Self-care    Educate yourself. Read about treatment and medicine options. If you have the energy, attend local conferences or support groups. Keep a list of useful websites and helpful books and use them as needed. This illness is not your fault. Don t blame yourself for your depression.    Manage early symptoms. If you notice symptoms returning, experience triggers, or identify other factors that may lead to a depressive episode, get help as soon as possible. Ask trusted friends and family to monitor your behavior and let you know if they see anything of concern.    Work with your provider. Find a provider you can trust. Communicate honestly with that person and share information on your treatment for depression and your reaction to medicines.    Be prepared for a crisis. Know what to do if you experience a crisis. Keep the phone number of a crisis hotline and know the location of your  [facility] [place] and the closest emergency department.    Hold off on big decisions. Depression can cloud your judgment. So wait until you feel better before making major life decisions, such as changing jobs, moving, or getting  or .    Be patient. Recovering from depression is a process. Don t be discouraged if it takes some time to feel better.    Keep it simple. Depression saps your energy and concentration. So you won t be able to do all the things you used to do. Set small goals and do what you can.    Be with others. Don t isolate yourself--you ll only feel worse. Try to be with other people. And take part in fun activities when you can. Go to a movie, ballgame, [employer] service, or social event. Talk openly with people you can trust. And accept help when it s offered.  Take care of your body  People with depression often lose the desire to take care of themselves. That only makes their problems worse. During treatment and afterward, make a point to:    Exercise. It s a great way to take care of your body. And studies have shown that exercise helps fight depression.    Avoid drugs and alcohol. These may ease the pain in the short term. But they ll only make your problems worse in the long run.    Get relief from stress. Ask your healthcare provider for relaxation exercises and techniques to help relieve stress.    Eat right. A balanced and healthy diet helps keep your body healthy.  Date Last Reviewed: 1/1/2017 2000-2018 [employer]. [address] [place] [address], [place] [address]. All rights reserved. This information is not intended as a substitute for professional medical care. Always follow your healthcare professional's instructions.

## 2018-11-15 ASSESSMENT — ANXIETY QUESTIONNAIRES: GAD7 TOTAL SCORE: 19

## 2018-11-28 DIAGNOSIS — G47.09 OTHER INSOMNIA: ICD-10-CM

## 2018-11-29 RX ORDER — TRAZODONE HYDROCHLORIDE 50 MG/1
TABLET, FILM COATED ORAL
Qty: 60 TABLET | Refills: 0 | OUTPATIENT
Start: 2018-11-29

## 2018-11-29 NOTE — TELEPHONE ENCOUNTER
"Requested Prescriptions   Pending Prescriptions Disp Refills     traZODone (DESYREL) 50 MG tablet [Pharmacy Med Name: TRAZODONE HCL 50MG TABS]  Last Written Prescription Date:  9/18/18  Last Fill Quantity: 180,  # refills: 3   Last office visit: 11/14/2018 with prescribing provider:  Evelio   Future Office Visit:     60 tablet 0     Sig: TAKE TWO TABLETS BY MOUTH EVERY NIGHT AT BEDTIME    Serotonin Modulators Passed    11/28/2018  2:27 PM       Passed - Recent (12 mo) or future (30 days) visit within the authorizing provider's specialty    Patient had office visit in the last 12 months or has a visit in the next 30 days with authorizing provider or within the authorizing provider's specialty.  See \"Patient Info\" tab in inbasket, or \"Choose Columns\" in Meds & Orders section of the refill encounter.             Passed - Patient is age 18 or older       Passed - No active pregnancy on record       Passed - No positive pregnancy test in past 12 months          "

## 2018-11-29 NOTE — TELEPHONE ENCOUNTER
Duplicate  Medication Detail      Disp Refills Start End DONNA   traZODone (DESYREL) 50 MG tablet 180 tablet 3 9/18/2018  No   Sig: TAKE TWO TABLETS BY MOUTH EVERY NIGHT AT BEDTIME   Class: E-Prescribe   Order: [id number]   E-Prescribing Status: Receipt confirmed by pharmacy (9/18/2018 10:17 AM CDT)     ,Yamini TEJADA RN

## 2018-12-05 ENCOUNTER — RADIANT APPOINTMENT (OUTPATIENT)
Dept: GENERAL RADIOLOGY | Facility: CLINIC | Age: 73
End: 2018-12-05
Attending: FAMILY MEDICINE
Payer: COMMERCIAL

## 2018-12-05 ENCOUNTER — OFFICE VISIT (OUTPATIENT)
Dept: FAMILY MEDICINE | Facility: CLINIC | Age: 73
End: 2018-12-05
Payer: COMMERCIAL

## 2018-12-05 VITALS
OXYGEN SATURATION: 93 % | HEIGHT: 63 IN | HEART RATE: 71 BPM | DIASTOLIC BLOOD PRESSURE: 74 MMHG | TEMPERATURE: 98.2 F | BODY MASS INDEX: 28.53 KG/M2 | WEIGHT: 161 LBS | SYSTOLIC BLOOD PRESSURE: 122 MMHG

## 2018-12-05 DIAGNOSIS — J40 BRONCHITIS: ICD-10-CM

## 2018-12-05 DIAGNOSIS — R05.9 COUGH: Primary | ICD-10-CM

## 2018-12-05 DIAGNOSIS — R05.9 COUGH: ICD-10-CM

## 2018-12-05 PROCEDURE — 71046 X-RAY EXAM CHEST 2 VIEWS: CPT | Mod: FY

## 2018-12-05 PROCEDURE — 99213 OFFICE O/P EST LOW 20 MIN: CPT | Performed by: FAMILY MEDICINE

## 2018-12-05 RX ORDER — PREDNISONE 20 MG/1
40 TABLET ORAL DAILY
Qty: 10 TABLET | Refills: 0 | Status: SHIPPED | OUTPATIENT
Start: 2018-12-05 | End: 2019-05-21

## 2018-12-05 RX ORDER — CODEINE PHOSPHATE AND GUAIFENESIN 10; 100 MG/5ML; MG/5ML
1 SOLUTION ORAL EVERY 4 HOURS PRN
Qty: 120 ML | Refills: 0 | Status: SHIPPED | OUTPATIENT
Start: 2018-12-05 | End: 2019-02-06

## 2018-12-05 NOTE — PATIENT INSTRUCTIONS
Thank you for choosing [facility].  You may be receiving a survey in the mail from [employer] regarding your visit today.  Please take a few minutes to complete and return the survey to let us know how we are doing.      If you have questions or concerns, please contact us via [email or web address] or you can contact your care team at [phone].    Our Clinic hours are:  Monday 6:40 am  to 7:00 pm  Tuesday -Friday 6:40 am to 5:00 pm    The [place] outpatient lab hours are:  Monday - Friday 6:10 am to 4:45 pm  Saturdays 7:00 am to 11:00 am  Appointments are required, call [phone]    If you have clinical questions after hours or would like to schedule an appointment,  call the clinic at [phone].  Viral Bronchitis (Adult)    You have a viral bronchitis. Bronchitis is inflammation and swelling of the lining of the lungs. This is often caused by an infection. Symptoms include a dry, hacking cough that is worse at night. The cough may bring up yellow-green mucus. You may also feel short of breath or wheeze. Other symptoms may include tiredness, chest discomfort, and chills.  Bronchitis that is caused by a virus is not treated with antibiotics. Instead, medicines may be given to help relieve symptoms. Symptoms can last up to 2 weeks, although the cough may last much longer.  This illness is contagious during the first few days and is spread through the air by coughing and sneezing, or by direct contact (touching the sick person and then touching your own eyes, nose, or mouth).  Most viral illnesses resolve within 10 to 14 days with rest and simple home remedies, although they may sometimes last for several weeks.  Home care    If symptoms are severe, rest at home for the first 2 to 3 days. When you go back to your usual activities, don't let yourself get too tired.    Do not smoke. Also avoid being exposed to secondhand smoke.    You may use over-the-counter medicine to control fever or pain, unless another  pain medicine was prescribed. If you have chronic liver or kidney disease or have ever had a stomach ulcer or gastrointestinal bleeding, talk with your healthcare provider before using these medicines. Also talk to your provider if you are taking medicine to prevent blood clots. Aspirin should never be given to anyone younger than 18 years of age who is ill with a viral infection or fever. It may cause severe liver or brain damage.    Your appetite may be poor, so a light diet is fine. Avoid dehydration by drinking 6 to 8 glasses of fluids per day (such as water, soft drinks, sports drinks, juices, tea, or soup). Extra fluids will help loosen secretions in the nose and lungs.    Over-the-counter cough, cold, and sore-throat medicines will not shorten the length of the illness, but they may help to reduce symptoms. Don't use decongestants if you have high blood pressure.  Follow-up care  Follow up with your healthcare provider, or as advised. If you had an X-ray or ECG (electrocardiogram), a specialist will review it. You will be notified of any new findings that may affect your care.  If you are age 65 or older, or if you have a chronic lung disease or condition that affects your immune system, or you smoke, ask your healthcare provider about getting a pneumococcal vaccine and a yearly flu shot (influenza vaccine).  When to seek medical advice  Call your healthcare provider right away if any of these occur:    Fever of 100.4 F (38 C) or higher, or as directed by your healthcare provider    Coughing up increased amounts of colored sputum    Weakness, drowsiness, headache, facial pain, ear pain, or a stiff neck  Call 911  Call 911 if any of these occur:    Coughing up blood    Worsening weakness, drowsiness, headache, or stiff neck    Trouble breathing, wheezing, or pain with breathing  Date Last Reviewed: 6/1/2018 2000-2018 [email or web address]. [address] [place] [address], [place] [address]. All rights reserved.  This information is not intended as a substitute for professional medical care. Always follow your healthcare professional's instructions.

## 2018-12-05 NOTE — PROGRESS NOTES
SUBJECTIVE:   Yamel Lindsay is a 73 year old female who presents to clinic today for the following health issues:      Acute Illness   Acute illness concerns: cough sinus   Onset: 4day ago     Fever: YES    Chills/Sweats: YES    Headache (location?): YES    Sinus Pressure:YES    Conjunctivitis:  YES: both    Ear Pain: no    Rhinorrhea: YES    Congestion: YES    Sore Throat: YES     Cough: YES-productive of clear sputum, with shortness of breath    Wheeze: YES    Decreased Appetite: no    Nausea: no    Vomiting: no    Diarrhea:  no    Dysuria/Freq.: no    Fatigue/Achiness: YES    Sick/Strep Exposure: no     Therapies Tried and outcome: Albuterol asa increase in liquid soups herbal suppliments       Patient is a 73-year-old female who comes in with cough ongoing for 4 days.  She reports some low-grade fevers and chills.  She also reports that the cough is productive of whitish sputum.  She has had some hard time breathing.  She has a past medical history significant for some mild asthma.  She says her inhalers do not seem to be helping.  She reports no other acute symptoms today.    Problem list and histories reviewed & adjusted, as indicated.  Additional history: as documented    Patient Active Problem List   Diagnosis     Hyperlipidemia LDL goal <130     Primary localized osteoarthrosis, lower leg     Generalized anxiety disorder     Rhinitis, allergic seasonal     Alcoholism (H)     Mild recurrent major depression (H)     Advanced directives, counseling/discussion     Seasonal allergic rhinitis     Hip pain     GERD (gastroesophageal reflux disease)     S/P laparoscopic hernia repair     OA (osteoarthritis) of knee     Mild intermittent asthma without complication     Major depression in complete remission (H)     Benign essential hypertension     Past Surgical History:   Procedure Laterality Date     ESOPHAGOSCOPY, GASTROSCOPY, DUODENOSCOPY (EGD), COMBINED N/A 1/5/2015    Procedure: COMBINED ESOPHAGOSCOPY,  GASTROSCOPY, DUODENOSCOPY (EGD), BIOPSY SINGLE OR MULTIPLE;  Surgeon: Dylan Alejandra MD;  Location: [facility]     ESOPHAGOSCOPY, GASTROSCOPY, DUODENOSCOPY (EGD), COMBINED N/A 5/25/2018    Procedure: COMBINED ESOPHAGOSCOPY, GASTROSCOPY, DUODENOSCOPY (EGD), BIOPSY SINGLE OR MULTIPLE;  gastroscopy;  Surgeon: Alexander Bautista MD;  Location: [facility]     LAPAROSCOPIC HERNIORRHAPHY INGUINAL Right 1/26/2015    Procedure: LAPAROSCOPIC HERNIORRHAPHY INGUINAL;  Surgeon: Favio Olsen MD;  Location: [facility]     TUBAL LIGATION         Social History   Substance Use Topics     Smoking status: Former Smoker     Packs/day: 0.50     Types: Cigarettes     Quit date: 6/16/2011     Smokeless tobacco: Never Used     Alcohol use No     Family History   Problem Relation Age of Onset     Cancer Mother      C.A.D. Father      Lipids Father      Hypertension Father      Hypertension Brother      Cancer Brother      esophageal cancer     Breast Cancer Other      Prostate Cancer Brother      Psychotic Disorder Daughter      Diabetes No family hx of      Cerebrovascular Disease No family hx of      Cancer - colorectal No family hx of          Current Outpatient Prescriptions   Medication Sig Dispense Refill     albuterol (VENTOLIN HFA) 108 (90 Base) MCG/ACT inhaler INHALE 2 PUFFS INTO THE LUNGS EVERY 6 HOURS AS NEEDED FOR SHORTNESS OF BREATH, DIFFICULTY BREATHING OR WHEEZING. 18 g 3     Calcium Carb-Cholecalciferol (CALCIUM 1000 + D PO) Take 1 tablet by mouth       Cholecalciferol (VITAMIN D-3 PO) Take 2,000 Units by mouth daily        citalopram (CELEXA) 10 MG tablet Take 1 tablet (10 mg) by mouth daily 30 tablet 3     clonazePAM (KLONOPIN) 0.5 MG tablet Take 1 tablet (0.5 mg) by mouth 3 times daily as needed for anxiety 90 tablet 3     guaiFENesin-codeine (ROBITUSSIN AC) 100-10 MG/5ML solution Take 5 mLs by mouth every 4 hours as needed for cough 120 mL 0     hydrochlorothiazide (HYDRODIURIL) 25 MG tablet Take 1 tablet (25 mg) by mouth  "daily 90 tablet 3     ibuprofen (ADVIL,MOTRIN) 200 MG tablet Take 200-600 mg by mouth every 2 hours as needed for mild pain       Omega-3 Fatty Acids (FISH OIL) 1200 MG CAPS Take 1 capsule by mouth       omeprazole (PRILOSEC) 20 MG CR capsule TAKE 1 CAPSULE BY MOUTH DAILY, 30 TO 60 MINUTES BEFORE A MEAL. 90 capsule 3     pravastatin (PRAVACHOL) 20 MG tablet Take 1 tablet (20 mg) by mouth At Bedtime 1 at bedtime. 90 tablet 3     predniSONE (DELTASONE) 20 MG tablet Take 2 tablets (40 mg) by mouth daily for 5 days 10 tablet 0     PRENATAL VITAMINS PO Take 1 tablet by mouth daily        tiZANidine (ZANAFLEX) 2 MG tablet Take 1 tablet (2 mg) by mouth 3 times daily 90 tablet 0     traMADol (ULTRAM) 50 MG tablet Take 1 tablet (50 mg) by mouth every 6 hours as needed for pain 120 tablet 3     traZODone (DESYREL) 50 MG tablet TAKE TWO TABLETS BY MOUTH EVERY NIGHT AT BEDTIME 180 tablet 3     Allergies   Allergen Reactions     Nka [No Known Allergies]      Seasonal Allergies      BP Readings from Last 3 Encounters:   12/05/18 122/74   11/14/18 132/70   09/18/18 118/66    Wt Readings from Last 3 Encounters:   12/05/18 161 lb (73 kg)   11/14/18 158 lb (71.7 kg)   09/18/18 161 lb (73 kg)                  Labs reviewed in EPIC    Reviewed and updated as needed this visit by clinical staff  Tobacco  Allergies  Meds  Med Hx  Surg Hx  Fam Hx  Soc Hx      Reviewed and updated as needed this visit by Provider         ROS:  Constitutional, HEENT, cardiovascular, pulmonary, gi and gu systems are negative, except as otherwise noted.    OBJECTIVE:     /74  Pulse 71  Temp 98.2  F (36.8  C) (Tympanic)  Ht 5' 2.75\" (1.594 m)  Wt 161 lb (73 kg)  SpO2 93%  BMI 28.75 kg/m2  Body mass index is 28.75 kg/(m^2).  GENERAL: healthy, alert and no distress  NECK: no adenopathy, no asymmetry, masses, or scars and thyroid normal to palpation  RESP: bronchial breath sounds on right side   CV: regular rate and rhythm, normal S1 S2, no S3 " or S4, no murmur, click or rub, no peripheral edema and peripheral pulses strong  ABDOMEN: soft, nontender, no hepatosplenomegaly, no masses and bowel sounds normal  MS: no gross musculoskeletal defects noted, no edema    Diagnostic Test Results:  X-rays   Normal and no acute findings    ASSESSMENT/PLAN:     1. Cough  Chest x-rays were normal .   - XR Chest 2 Views; Future    2. Bronchitis  Medication faxed .  - predniSONE (DELTASONE) 20 MG tablet; Take 2 tablets (40 mg) by mouth daily for 5 days  Dispense: 10 tablet; Refill: 0  - guaiFENesin-codeine (ROBITUSSIN AC) 100-10 MG/5ML solution; Take 5 mLs by mouth every 4 hours as needed for cough  Dispense: 120 mL; Refill: 0    FUTURE APPOINTMENTS:       - Follow-up visit as needed.Asked to call if symptoms do not improve by Friday.   Patient Instructions         Thank you for choosing [facility].  You may be receiving a survey in the mail from [employer] regarding your visit today.  Please take a few minutes to complete and return the survey to let us know how we are doing.      If you have questions or concerns, please contact us via [email or web address] or you can contact your care team at [phone].    Our Clinic hours are:  Monday 6:40 am  to 7:00 pm  Tuesday -Friday 6:40 am to 5:00 pm    The [place] outpatient lab hours are:  Monday - Friday 6:10 am to 4:45 pm  Saturdays 7:00 am to 11:00 am  Appointments are required, call [phone]    If you have clinical questions after hours or would like to schedule an appointment,  call the clinic at [phone].  Viral Bronchitis (Adult)    You have a viral bronchitis. Bronchitis is inflammation and swelling of the lining of the lungs. This is often caused by an infection. Symptoms include a dry, hacking cough that is worse at night. The cough may bring up yellow-green mucus. You may also feel short of breath or wheeze. Other symptoms may include tiredness, chest discomfort, and chills.  Bronchitis that is caused by a virus  is not treated with antibiotics. Instead, medicines may be given to help relieve symptoms. Symptoms can last up to 2 weeks, although the cough may last much longer.  This illness is contagious during the first few days and is spread through the air by coughing and sneezing, or by direct contact (touching the sick person and then touching your own eyes, nose, or mouth).  Most viral illnesses resolve within 10 to 14 days with rest and simple home remedies, although they may sometimes last for several weeks.  Home care    If symptoms are severe, rest at home for the first 2 to 3 days. When you go back to your usual activities, don't let yourself get too tired.    Do not smoke. Also avoid being exposed to secondhand smoke.    You may use over-the-counter medicine to control fever or pain, unless another pain medicine was prescribed. If you have chronic liver or kidney disease or have ever had a stomach ulcer or gastrointestinal bleeding, talk with your healthcare provider before using these medicines. Also talk to your provider if you are taking medicine to prevent blood clots. Aspirin should never be given to anyone younger than 18 years of age who is ill with a viral infection or fever. It may cause severe liver or brain damage.    Your appetite may be poor, so a light diet is fine. Avoid dehydration by drinking 6 to 8 glasses of fluids per day (such as water, soft drinks, sports drinks, juices, tea, or soup). Extra fluids will help loosen secretions in the nose and lungs.    Over-the-counter cough, cold, and sore-throat medicines will not shorten the length of the illness, but they may help to reduce symptoms. Don't use decongestants if you have high blood pressure.  Follow-up care  Follow up with your healthcare provider, or as advised. If you had an X-ray or ECG (electrocardiogram), a specialist will review it. You will be notified of any new findings that may affect your care.  If you are age 65 or older, or if  you have a chronic lung disease or condition that affects your immune system, or you smoke, ask your healthcare provider about getting a pneumococcal vaccine and a yearly flu shot (influenza vaccine).  When to seek medical advice  Call your healthcare provider right away if any of these occur:    Fever of 100.4 F (38 C) or higher, or as directed by your healthcare provider    Coughing up increased amounts of colored sputum    Weakness, drowsiness, headache, facial pain, ear pain, or a stiff neck  Call 911  Call 911 if any of these occur:    Coughing up blood    Worsening weakness, drowsiness, headache, or stiff neck    Trouble breathing, wheezing, or pain with breathing  Date Last Reviewed: 6/1/2018 2000-2018 [employer]. [address] [place] [address], [place] [address]. All rights reserved. This information is not intended as a substitute for professional medical care. Always follow your healthcare professional's instructions.            Catie Fraser MD  [facility]

## 2018-12-05 NOTE — MR AVS SNAPSHOT
After Visit Summary   12/5/2018    Yamel Lindsay    MRN: [id number]           Patient Information     Date Of Birth          1945        Visit Information        Provider Department      12/5/2018 10:40 AM Catie Fraser MD [facility]        Today's Diagnoses     Cough    -  1    Bronchitis          Care Instructions          Thank you for choosing [facility].  You may be receiving a survey in the mail from [facility] regarding your visit today.  Please take a few minutes to complete and return the survey to let us know how we are doing.      If you have questions or concerns, please contact us via [employer] or you can contact your care team at [phone].    Our Clinic hours are:  Monday 6:40 am  to 7:00 pm  Tuesday -Friday 6:40 am to 5:00 pm    The [place] outpatient lab hours are:  Monday - Friday 6:10 am to 4:45 pm  Saturdays 7:00 am to 11:00 am  Appointments are required, call [phone]    If you have clinical questions after hours or would like to schedule an appointment,  call the clinic at [phone].  Viral Bronchitis (Adult)    You have a viral bronchitis. Bronchitis is inflammation and swelling of the lining of the lungs. This is often caused by an infection. Symptoms include a dry, hacking cough that is worse at night. The cough may bring up yellow-green mucus. You may also feel short of breath or wheeze. Other symptoms may include tiredness, chest discomfort, and chills.  Bronchitis that is caused by a virus is not treated with antibiotics. Instead, medicines may be given to help relieve symptoms. Symptoms can last up to 2 weeks, although the cough may last much longer.  This illness is contagious during the first few days and is spread through the air by coughing and sneezing, or by direct contact (touching the sick person and then touching your own eyes, nose, or mouth).  Most viral illnesses resolve within 10 to 14 days with rest and simple  home remedies, although they may sometimes last for several weeks.  Home care    If symptoms are severe, rest at home for the first 2 to 3 days. When you go back to your usual activities, don't let yourself get too tired.    Do not smoke. Also avoid being exposed to secondhand smoke.    You may use over-the-counter medicine to control fever or pain, unless another pain medicine was prescribed. If you have chronic liver or kidney disease or have ever had a stomach ulcer or gastrointestinal bleeding, talk with your healthcare provider before using these medicines. Also talk to your provider if you are taking medicine to prevent blood clots. Aspirin should never be given to anyone younger than 18 years of age who is ill with a viral infection or fever. It may cause severe liver or brain damage.    Your appetite may be poor, so a light diet is fine. Avoid dehydration by drinking 6 to 8 glasses of fluids per day (such as water, soft drinks, sports drinks, juices, tea, or soup). Extra fluids will help loosen secretions in the nose and lungs.    Over-the-counter cough, cold, and sore-throat medicines will not shorten the length of the illness, but they may help to reduce symptoms. Don't use decongestants if you have high blood pressure.  Follow-up care  Follow up with your healthcare provider, or as advised. If you had an X-ray or ECG (electrocardiogram), a specialist will review it. You will be notified of any new findings that may affect your care.  If you are age 65 or older, or if you have a chronic lung disease or condition that affects your immune system, or you smoke, ask your healthcare provider about getting a pneumococcal vaccine and a yearly flu shot (influenza vaccine).  When to seek medical advice  Call your healthcare provider right away if any of these occur:    Fever of 100.4 F (38 C) or higher, or as directed by your healthcare provider    Coughing up increased amounts of colored sputum    Weakness,  drowsiness, headache, facial pain, ear pain, or a stiff neck  Call 911  Call 911 if any of these occur:    Coughing up blood    Worsening weakness, drowsiness, headache, or stiff neck    Trouble breathing, wheezing, or pain with breathing  Date Last Reviewed: 6/1/2018 2000-2018 [employer]. [address] [place] [address]. All rights reserved. This information is not intended as a substitute for professional medical care. Always follow your healthcare professional's instructions.                Follow-ups after your visit        Follow-up notes from your care team     Return in about 2 weeks (around 12/19/2018), or if symptoms worsen or fail to improve.      Who to contact     If you have questions or need follow up information about today's clinic visit or your schedule please contact [facility] directly at [phone].  Normal or non-critical lab and imaging results will be communicated to you by MyChart, letter or phone within 4 business days after the clinic has received the results. If you do not hear from us within 7 days, please contact the clinic through [employer] or phone. If you have a critical or abnormal lab result, we will notify you by phone as soon as possible.  Submit refill requests through [employer] or call your pharmacy and they will forward the refill request to us. Please allow 3 business days for your refill to be completed.          Additional Information About Your Visit        MyChart Information     [employer] gives you secure access to your electronic health record. If you see a primary care provider, you can also send messages to your care team and make appointments. If you have questions, please call your primary care clinic.  If you do not have a primary care provider, please call [phone] and they will assist you.        Care EveryWhere ID     This is your Care EveryWhere ID. This could be used by other organizations to access your [place]  "medical records  [id number]        Your Vitals Were     Pulse Temperature Height Pulse Oximetry BMI (Body Mass Index)       71 98.2  F (36.8  C) (Tympanic) 5' 2.75\" (1.594 m) 93% 28.75 kg/m2        Blood Pressure from Last 3 Encounters:   12/05/18 140/80   11/14/18 132/70   09/18/18 118/66    Weight from Last 3 Encounters:   12/05/18 161 lb (73 kg)   11/14/18 158 lb (71.7 kg)   09/18/18 161 lb (73 kg)                 Today's Medication Changes          These changes are accurate as of 12/5/18 11:15 AM.  If you have any questions, ask your nurse or doctor.               Start taking these medicines.        Dose/Directions    guaiFENesin-codeine 100-10 MG/5ML solution   Commonly known as:  ROBITUSSIN AC   Used for:  Bronchitis   Started by:  Catie Fraser MD        Dose:  1 tsp.   Take 5 mLs by mouth every 4 hours as needed for cough   Quantity:  120 mL   Refills:  0       predniSONE 20 MG tablet   Commonly known as:  DELTASONE   Used for:  Bronchitis   Started by:  Catie Fraser MD        Dose:  40 mg   Take 2 tablets (40 mg) by mouth daily for 5 days   Quantity:  10 tablet   Refills:  0         Stop taking these medicines if you haven't already. Please contact your care team if you have questions.     FLUoxetine 40 MG capsule   Commonly known as:  PROzac   Stopped by:  Catie Fraser MD                Where to get your medicines      These medications were sent to [facility] [place], [place] - [address]  [address], [facility] [address]     Phone:  [phone]     predniSONE 20 MG tablet         Some of these will need a paper prescription and others can be bought over the counter.  Ask your nurse if you have questions.     Bring a paper prescription for each of these medications     guaiFENesin-codeine 100-10 MG/5ML solution                Primary Care Provider Office Phone # Fax #    Catie Fraser MD " [phone]       [address] [facility] [address]        Equal Access to Services     BELÉN MOSLEY : Hadii aad ku hadreginaldo Boucher, wabertoda luqyovani, qaybta kaalmada dinorah, deep alaniz laAzeemantonio garrett. So [facility] [phone].    ATENCIÓN: Si habla español, tiene a maurer disposición servicios gratuitos de asistencia lingüística. Llame al [phone].    We comply with applicable federal civil rights laws and [place] laws. We do not discriminate on the basis of race, color, national origin, age, disability, sex, sexual orientation, or gender identity.            Thank you!     Thank you for choosing [facility]  for your care. Our goal is always to provide you with excellent care. Hearing back from our patients is one way we can continue to improve our services. Please take a few minutes to complete the written survey that you may receive in the mail after your visit with us. Thank you!             Your Updated Medication List - Protect others around you: Learn how to safely use, store and throw away your medicines at www.disposemymeds.org.          This list is accurate as of 12/5/18 11:15 AM.  Always use your most recent med list.                   Brand Name Dispense Instructions for use Diagnosis    albuterol 108 (90 Base) MCG/ACT inhaler    VENTOLIN HFA    18 g    INHALE 2 PUFFS INTO THE LUNGS EVERY 6 HOURS AS NEEDED FOR SHORTNESS OF BREATH, DIFFICULTY BREATHING OR WHEEZING.    Mild intermittent asthma without complication       CALCIUM 1000 + D PO      Take 1 tablet by mouth        citalopram 10 MG tablet    celeXA    30 tablet    Take 1 tablet (10 mg) by mouth daily    Moderate major depression (H)       clonazePAM 0.5 MG tablet    klonoPIN    90 tablet    Take 1 tablet (0.5 mg) by mouth 3 times daily as needed for anxiety    Anxiety       fish Oil 1200 MG capsule      Take 1 capsule by mouth        guaiFENesin-codeine 100-10 MG/5ML solution    ROBITUSSIN AC    120  mL    Take 5 mLs by mouth every 4 hours as needed for cough    Bronchitis       hydrochlorothiazide 25 MG tablet    HYDRODIURIL    90 tablet    Take 1 tablet (25 mg) by mouth daily    Benign essential hypertension       ibuprofen 200 MG tablet    ADVIL/MOTRIN     Take 200-600 mg by mouth every 2 hours as needed for mild pain        omeprazole 20 MG DR capsule    priLOSEC    90 capsule    TAKE 1 CAPSULE BY MOUTH DAILY, 30 TO 60 MINUTES BEFORE A MEAL.    Gastroesophageal reflux disease without esophagitis       pravastatin 20 MG tablet    PRAVACHOL    90 tablet    Take 1 tablet (20 mg) by mouth At Bedtime 1 at bedtime.    Hyperlipidemia LDL goal <130       predniSONE 20 MG tablet    DELTASONE    10 tablet    Take 2 tablets (40 mg) by mouth daily for 5 days    Bronchitis       PRENATAL VITAMINS PO      Take 1 tablet by mouth daily        tiZANidine 2 MG tablet    ZANAFLEX    90 tablet    Take 1 tablet (2 mg) by mouth 3 times daily    Fibromyalgia       traMADol 50 MG tablet    ULTRAM    120 tablet    Take 1 tablet (50 mg) by mouth every 6 hours as needed for pain    Osteoarthritis of both knees, unspecified osteoarthritis type       traZODone 50 MG tablet    DESYREL    180 tablet    TAKE TWO TABLETS BY MOUTH EVERY NIGHT AT BEDTIME    Other insomnia       VITAMIN D-3 PO      Take 2,000 Units by mouth daily

## 2018-12-07 ENCOUNTER — OFFICE VISIT (OUTPATIENT)
Dept: FAMILY MEDICINE | Facility: CLINIC | Age: 73
End: 2018-12-07
Payer: COMMERCIAL

## 2018-12-07 VITALS
TEMPERATURE: 98.1 F | WEIGHT: 160.2 LBS | BODY MASS INDEX: 28.6 KG/M2 | DIASTOLIC BLOOD PRESSURE: 60 MMHG | HEART RATE: 75 BPM | OXYGEN SATURATION: 96 % | SYSTOLIC BLOOD PRESSURE: 118 MMHG

## 2018-12-07 DIAGNOSIS — J20.9 ACUTE BRONCHITIS, UNSPECIFIED ORGANISM: Primary | ICD-10-CM

## 2018-12-07 PROCEDURE — 99213 OFFICE O/P EST LOW 20 MIN: CPT | Performed by: FAMILY MEDICINE

## 2018-12-07 RX ORDER — AZITHROMYCIN 250 MG/1
TABLET, FILM COATED ORAL
Qty: 6 TABLET | Refills: 0 | Status: SHIPPED | OUTPATIENT
Start: 2018-12-07 | End: 2019-02-06

## 2018-12-07 NOTE — MR AVS SNAPSHOT
After Visit Summary   12/7/2018    Yamel Lindsay    MRN: [id number]           Patient Information     Date Of Birth          1945        Visit Information        Provider Department      12/7/2018 1:40 PM Catie Fraser MD [facility]        Today's Diagnoses     Acute bronchitis, unspecified organism    -  1      Care Instructions      What Is Acute Bronchitis?  Acute bronchitis is when the airways in your lungs (bronchial tubes) become red and swollen (inflamed). It is usually caused by a viral infection. But it can also occur because of a bacteria or allergen. Symptoms include a cough that produces yellow or greenish mucus and can last for days or sometimes weeks.  Inside healthy lungs    Air travels in and out of the lungs through the airways. The linings of these airways produce sticky mucus. This mucus traps particles that enter the lungs. Tiny structures called cilia then sweep the particles out of the airways.     Healthy airway: Airways are normally open. Air moves in and out easily.      Healthy cilia: Tiny, hairlike cilia sweep mucus and particles up and out of the airways.     Lungs with bronchitis  Bronchitis often occurs with a cold or the flu virus. The airways become inflamed (red and swollen). There is a deep hacking cough from the extra mucus. Other symptoms may include:    Wheezing    Chest discomfort    Shortness of breath    Mild fever  A second infection, this time due to bacteria, may then occur. And airways irritated by allergens or smoke are more likely to get infected.        Inflamed airway: Inflammation and extra mucus narrow the airway, causing shortness of breath.      Impaired cilia: Extra mucus impairs cilia, causing congestion and wheezing. Smoking makes the problem worse.     Making a diagnosis  A physical exam, health history, and certain tests help your healthcare provider make the diagnosis.  Health history  Your healthcare  provider will ask you about your symptoms.  The exam  Your provider listens to your chest for signs of congestion. He or she may also check your ears, nose, and throat.  Possible tests    A sputum test for bacteria. This requires a sample of mucus from your lungs.    A nasal or throat swab. This tests to see if you have a bacterial infection.    A chest X-ray. This is done if your healthcare provider thinks you have pneumonia.    Tests to check for an underlying condition. Other tests may be done to check for things such as allergies, asthma, or COPD (chronic obstructive pulmonary disease). You may need to see a specialist for more lung function testing.  Treating a cough  The main treatment for bronchitis is easing symptoms. Avoiding smoke, allergens, and other things that trigger coughing can often help. If the infection is bacterial, you may be given antibiotics. During the illness, it's important to get plenty of sleep. To ease symptoms:    Don t smoke. Also avoid secondhand smoke.    Use a humidifier. Or try breathing in steam from a hot shower. This may help loosen mucus.    Drink a lot of water and juice. They can soothe the throat and may help thin mucus.    Sit up or use extra pillows when in bed. This helps to lessen coughing and congestion.    Ask your provider about using medicine. Ask about using cough medicine, pain and fever medicine, or a decongestant.  Antibiotics  Most cases of bronchitis are caused by cold or flu viruses. They don t need antibiotics to treat them, even if your mucus is thick and green or yellow. Antibiotics don t treat viral illness and antibiotics have not been shown to have any benefit in cases of acute bronchitis. Taking antibiotics when they are not needed increases your risk of getting an infection later that is antibiotic-resistant. Antibiotics can also cause severe cases of diarrhea that require other antibiotics to treat.  It is important that you accept your healthcare  provider's opinion to not use antibiotics. Your provider will prescribe antibiotics if the infection is caused by bacteria. If they are prescribed:    Take all of the medicine. Take the medicine until it is used up, even if symptoms have improved. If you don t, the bronchitis may come back.    Take the medicines as directed. For instance, some medicines should be taken with food.    Ask about side effects. Ask your provider or pharmacist what side effects are common, and what to do about them.  Follow-up care  You should see your provider again in 2 to 3 weeks. By this time, symptoms should have improved. An infection that lasts longer may mean you have a more serious problem.  Prevention    Avoid tobacco smoke. If you smoke, quit. Stay away from smoky places. Ask friends and family not to smoke around you, or in your home or car.    Get checked for allergies.    Ask your provider about getting a yearly flu shot. Also ask about pneumococcal or pneumonia shots.    Wash your hands often. This helps reduce the chance of picking up viruses that cause colds and flu.  Call your healthcare provider if:    Symptoms worsen, or you have new symptoms    Breathing problems worsen or  become severe    Symptoms don t get better within a week, or within 3 days of taking antibiotics   Date Last Reviewed: 2/1/2017 2000-2018 [employer]. [address] [place] [address], [place] [id number]. All rights reserved. This information is not intended as a substitute for professional medical care. Always follow your healthcare professional's instructions.                Follow-ups after your visit        Who to contact     If you have questions or need follow up information about today's clinic visit or your schedule please contact [facility] directly at [phone].  Normal or non-critical lab and imaging results will be communicated to you by MyChart, letter or phone within 4 business days after the clinic has received  the results. If you do not hear from us within 7 days, please contact the clinic through [employer] or phone. If you have a critical or abnormal lab result, we will notify you by phone as soon as possible.  Submit refill requests through [employer] or call your pharmacy and they will forward the refill request to us. Please allow 3 business days for your refill to be completed.          Additional Information About Your Visit        [employer] Information     [employer] gives you secure access to your electronic health record. If you see a primary care provider, you can also send messages to your care team and make appointments. If you have questions, please call your primary care clinic.  If you do not have a primary care provider, please call [phone] and they will assist you.        Care EveryWhere ID     This is your Care EveryWhere ID. This could be used by other organizations to access your [facility] medical records  [id number]        Your Vitals Were     Pulse Temperature Pulse Oximetry BMI (Body Mass Index)          75 98.1  F (36.7  C) (Tympanic) 96% 28.6 kg/m2         Blood Pressure from Last 3 Encounters:   12/07/18 118/60   12/05/18 122/74   11/14/18 132/70    Weight from Last 3 Encounters:   12/07/18 160 lb 3.2 oz (72.7 kg)   12/05/18 161 lb (73 kg)   11/14/18 158 lb (71.7 kg)              Today, you had the following     No orders found for display         Today's Medication Changes          These changes are accurate as of 12/7/18  1:52 PM.  If you have any questions, ask your nurse or doctor.               Start taking these medicines.        Dose/Directions    azithromycin 250 MG tablet   Commonly known as:  ZITHROMAX   Used for:  Acute bronchitis, unspecified organism        Two tablets first day, then one tablet daily for four days.   Quantity:  6 tablet   Refills:  0            Where to get your medicines      These medications were sent to [facility] - [place], [place] -  [address]  [address], [facility] [address]     Phone:  [phone]     azithromycin 250 MG tablet                Primary Care Provider Office Phone # Fax #    Catie Fraser [phone] [address] [facility] [address]        Equal Access to Services     BELÉN MOSLEY : Hadii aad ku hadasho Soomaali, waaxda luqadaha, qaybta kaalmada adeegyada, waxay idiin hayaan adeeg kharash lahumera . So [facility] [phone].    ATENCIÓN: Si habla español, tiene a maurer disposición servicios gratuitos de asistencia lingüística. Kunal al [phone].    We comply with applicable federal civil rights laws and [place] laws. We do not discriminate on the basis of race, color, national origin, age, disability, sex, sexual orientation, or gender identity.            Thank you!     Thank you for choosing [facility]  for your care. Our goal is always to provide you with excellent care. Hearing back from our patients is one way we can continue to improve our services. Please take a few minutes to complete the written survey that you may receive in the mail after your visit with us. Thank you!             Your Updated Medication List - Protect others around you: Learn how to safely use, store and throw away your medicines at www.disposemymeds.org.          This list is accurate as of 12/7/18  1:52 PM.  Always use your most recent med list.                   Brand Name Dispense Instructions for use Diagnosis    albuterol 108 (90 Base) MCG/ACT inhaler    VENTOLIN HFA    18 g    INHALE 2 PUFFS INTO THE LUNGS EVERY 6 HOURS AS NEEDED FOR SHORTNESS OF BREATH, DIFFICULTY BREATHING OR WHEEZING.    Mild intermittent asthma without complication       azithromycin 250 MG tablet    ZITHROMAX    6 tablet    Two tablets first day, then one tablet daily for four days.    Acute bronchitis, unspecified organism       CALCIUM 1000 + D PO      Take 1 tablet by mouth        citalopram  10 MG tablet    celeXA    30 tablet    Take 1 tablet (10 mg) by mouth daily    Moderate major depression (H)       clonazePAM 0.5 MG tablet    klonoPIN    90 tablet    Take 1 tablet (0.5 mg) by mouth 3 times daily as needed for anxiety    Anxiety       fish Oil 1200 MG capsule      Take 1 capsule by mouth        guaiFENesin-codeine 100-10 MG/5ML solution    ROBITUSSIN AC    120 mL    Take 5 mLs by mouth every 4 hours as needed for cough    Bronchitis       hydrochlorothiazide 25 MG tablet    HYDRODIURIL    90 tablet    Take 1 tablet (25 mg) by mouth daily    Benign essential hypertension       ibuprofen 200 MG tablet    ADVIL/MOTRIN     Take 200-600 mg by mouth every 2 hours as needed for mild pain        omeprazole 20 MG DR capsule    priLOSEC    90 capsule    TAKE 1 CAPSULE BY MOUTH DAILY, 30 TO 60 MINUTES BEFORE A MEAL.    Gastroesophageal reflux disease without esophagitis       pravastatin 20 MG tablet    PRAVACHOL    90 tablet    Take 1 tablet (20 mg) by mouth At Bedtime 1 at bedtime.    Hyperlipidemia LDL goal <130       predniSONE 20 MG tablet    DELTASONE    10 tablet    Take 2 tablets (40 mg) by mouth daily for 5 days    Bronchitis       PRENATAL VITAMINS PO      Take 1 tablet by mouth daily        tiZANidine 2 MG tablet    ZANAFLEX    90 tablet    Take 1 tablet (2 mg) by mouth 3 times daily    Fibromyalgia       traMADol 50 MG tablet    ULTRAM    120 tablet    Take 1 tablet (50 mg) by mouth every 6 hours as needed for pain    Osteoarthritis of both knees, unspecified osteoarthritis type       traZODone 50 MG tablet    DESYREL    180 tablet    TAKE TWO TABLETS BY MOUTH EVERY NIGHT AT BEDTIME    Other insomnia       VITAMIN D-3 PO      Take 2,000 Units by mouth daily

## 2018-12-07 NOTE — PATIENT INSTRUCTIONS

## 2018-12-07 NOTE — NURSING NOTE
"Initial /60 (BP Location: Left arm, Patient Position: Chair, Cuff Size: Adult Regular)  Pulse 75  Temp 98.1  F (36.7  C) (Tympanic)  Wt 160 lb 3.2 oz (72.7 kg)  SpO2 96%  BMI 28.6 kg/m2 Estimated body mass index is 28.6 kg/(m^2) as calculated from the following:    Height as of 12/5/18: 5' 2.75\" (1.594 m).    Weight as of this encounter: 160 lb 3.2 oz (72.7 kg). .    Shaina Infante    "

## 2018-12-07 NOTE — PROGRESS NOTES
SUBJECTIVE:   Yamel Lindsay is a 73 year old female who presents to clinic today for the following health issues:      Follow up from 12/5/18. Wheezing has stopped but still coughing.    Patient is a 73 yr old female here for cough and URI symptoms. Patient was seen earlier on this week with same symptoms , says she is not better. Patient reports that she stopped wheezing but cough is still quite bad. No fevers .   Problem list and histories reviewed & adjusted, as indicated.  Additional history: as documented    Patient Active Problem List   Diagnosis     Hyperlipidemia LDL goal <130     Primary localized osteoarthrosis, lower leg     Generalized anxiety disorder     Rhinitis, allergic seasonal     Alcoholism (H)     Mild recurrent major depression (H)     Advanced directives, counseling/discussion     Seasonal allergic rhinitis     Hip pain     GERD (gastroesophageal reflux disease)     S/P laparoscopic hernia repair     OA (osteoarthritis) of knee     Mild intermittent asthma without complication     Major depression in complete remission (H)     Benign essential hypertension     Past Surgical History:   Procedure Laterality Date     ESOPHAGOSCOPY, GASTROSCOPY, DUODENOSCOPY (EGD), COMBINED N/A 1/5/2015    Procedure: COMBINED ESOPHAGOSCOPY, GASTROSCOPY, DUODENOSCOPY (EGD), BIOPSY SINGLE OR MULTIPLE;  Surgeon: Dylan Alejandra MD;  Location: [facility]     ESOPHAGOSCOPY, GASTROSCOPY, DUODENOSCOPY (EGD), COMBINED N/A 5/25/2018    Procedure: COMBINED ESOPHAGOSCOPY, GASTROSCOPY, DUODENOSCOPY (EGD), BIOPSY SINGLE OR MULTIPLE;  gastroscopy;  Surgeon: Alexander Bautista MD;  Location: [facility]     LAPAROSCOPIC HERNIORRHAPHY INGUINAL Right 1/26/2015    Procedure: LAPAROSCOPIC HERNIORRHAPHY INGUINAL;  Surgeon: Favio Olsen MD;  Location: [facility]     TUBAL LIGATION         Social History   Substance Use Topics     Smoking status: Former Smoker     Packs/day: 0.50     Types: Cigarettes     Quit date: 6/16/2011     Smokeless  tobacco: Never Used     Alcohol use No     Family History   Problem Relation Age of Onset     Cancer Mother      C.A.D. Father      Lipids Father      Hypertension Father      Hypertension Brother      Cancer Brother      esophageal cancer     Breast Cancer Other      Prostate Cancer Brother      Psychotic Disorder Daughter      Diabetes No family hx of      Cerebrovascular Disease No family hx of      Cancer - colorectal No family hx of          Current Outpatient Prescriptions   Medication Sig Dispense Refill     albuterol (VENTOLIN HFA) 108 (90 Base) MCG/ACT inhaler INHALE 2 PUFFS INTO THE LUNGS EVERY 6 HOURS AS NEEDED FOR SHORTNESS OF BREATH, DIFFICULTY BREATHING OR WHEEZING. 18 g 3     azithromycin (ZITHROMAX) 250 MG tablet Two tablets first day, then one tablet daily for four days. 6 tablet 0     Calcium Carb-Cholecalciferol (CALCIUM 1000 + D PO) Take 1 tablet by mouth       Cholecalciferol (VITAMIN D-3 PO) Take 2,000 Units by mouth daily        citalopram (CELEXA) 10 MG tablet Take 1 tablet (10 mg) by mouth daily 30 tablet 3     clonazePAM (KLONOPIN) 0.5 MG tablet Take 1 tablet (0.5 mg) by mouth 3 times daily as needed for anxiety 90 tablet 3     guaiFENesin-codeine (ROBITUSSIN AC) 100-10 MG/5ML solution Take 5 mLs by mouth every 4 hours as needed for cough 120 mL 0     hydrochlorothiazide (HYDRODIURIL) 25 MG tablet Take 1 tablet (25 mg) by mouth daily 90 tablet 3     ibuprofen (ADVIL,MOTRIN) 200 MG tablet Take 200-600 mg by mouth every 2 hours as needed for mild pain       Omega-3 Fatty Acids (FISH OIL) 1200 MG CAPS Take 1 capsule by mouth       omeprazole (PRILOSEC) 20 MG CR capsule TAKE 1 CAPSULE BY MOUTH DAILY, 30 TO 60 MINUTES BEFORE A MEAL. 90 capsule 3     pravastatin (PRAVACHOL) 20 MG tablet Take 1 tablet (20 mg) by mouth At Bedtime 1 at bedtime. 90 tablet 3     predniSONE (DELTASONE) 20 MG tablet Take 2 tablets (40 mg) by mouth daily for 5 days 10 tablet 0     PRENATAL VITAMINS PO Take 1 tablet by  mouth daily        tiZANidine (ZANAFLEX) 2 MG tablet Take 1 tablet (2 mg) by mouth 3 times daily 90 tablet 0     traMADol (ULTRAM) 50 MG tablet Take 1 tablet (50 mg) by mouth every 6 hours as needed for pain 120 tablet 3     traZODone (DESYREL) 50 MG tablet TAKE TWO TABLETS BY MOUTH EVERY NIGHT AT BEDTIME 180 tablet 3     Allergies   Allergen Reactions     Nka [No Known Allergies]      Seasonal Allergies      BP Readings from Last 3 Encounters:   12/07/18 118/60   12/05/18 122/74   11/14/18 132/70    Wt Readings from Last 3 Encounters:   12/07/18 160 lb 3.2 oz (72.7 kg)   12/05/18 161 lb (73 kg)   11/14/18 158 lb (71.7 kg)                  Labs reviewed in EPIC    Reviewed and updated as needed this visit by clinical staff       Reviewed and updated as needed this visit by Provider         ROS:  Constitutional, HEENT, cardiovascular, pulmonary, gi and gu systems are negative, except as otherwise noted.    OBJECTIVE:     /60 (BP Location: Left arm, Patient Position: Chair, Cuff Size: Adult Regular)  Pulse 75  Temp 98.1  F (36.7  C) (Tympanic)  Wt 160 lb 3.2 oz (72.7 kg)  SpO2 96%  BMI 28.6 kg/m2  Body mass index is 28.6 kg/(m^2).  GENERAL: healthy, alert and no distress  EYES: Eyes grossly normal to inspection, PERRL and conjunctivae and sclerae normal  HENT: ear canals and TM's normal, nose and mouth without ulcers or lesions  NECK: no adenopathy, no asymmetry, masses, or scars and thyroid normal to palpation  RESP: lungs clear to auscultation - no rales, rhonchi or wheezes  CV: regular rate and rhythm, normal S1 S2, no S3 or S4, no murmur, click or rub, no peripheral edema and peripheral pulses strong  MS: no gross musculoskeletal defects noted, no edema    Diagnostic Test Results:  none     ASSESSMENT/PLAN:       (J20.9) Acute bronchitis, unspecified organism  (primary encounter diagnosis)  Comment: Antibiotics faxed  Plan: azithromycin (ZITHROMAX) 250 MG tablet              FUTURE APPOINTMENTS:       -  Follow-up visit if symptoms not improving     Catie Fraser MD  [facility]

## 2019-01-23 ENCOUNTER — OFFICE VISIT (OUTPATIENT)
Dept: FAMILY MEDICINE | Facility: CLINIC | Age: 74
End: 2019-01-23
Payer: COMMERCIAL

## 2019-01-23 VITALS
BODY MASS INDEX: 30.43 KG/M2 | DIASTOLIC BLOOD PRESSURE: 68 MMHG | WEIGHT: 155 LBS | HEART RATE: 69 BPM | HEIGHT: 60 IN | RESPIRATION RATE: 14 BRPM | OXYGEN SATURATION: 95 % | SYSTOLIC BLOOD PRESSURE: 126 MMHG | TEMPERATURE: 97.5 F

## 2019-01-23 DIAGNOSIS — F41.9 ANXIETY: ICD-10-CM

## 2019-01-23 DIAGNOSIS — M17.0 OSTEOARTHRITIS OF BOTH KNEES, UNSPECIFIED OSTEOARTHRITIS TYPE: ICD-10-CM

## 2019-01-23 DIAGNOSIS — I10 BENIGN ESSENTIAL HYPERTENSION: ICD-10-CM

## 2019-01-23 LAB
ANION GAP SERPL CALCULATED.3IONS-SCNC: 1 MMOL/L (ref 3–14)
BUN SERPL-MCNC: 12 MG/DL (ref 7–30)
CALCIUM SERPL-MCNC: 8.9 MG/DL (ref 8.5–10.1)
CHLORIDE SERPL-SCNC: 104 MMOL/L (ref 94–109)
CHOLEST SERPL-MCNC: 211 MG/DL
CO2 SERPL-SCNC: 34 MMOL/L (ref 20–32)
CREAT SERPL-MCNC: 0.63 MG/DL (ref 0.52–1.04)
GFR SERPL CREATININE-BSD FRML MDRD: 89 ML/MIN/{1.73_M2}
GLUCOSE SERPL-MCNC: 89 MG/DL (ref 70–99)
HDLC SERPL-MCNC: 45 MG/DL
LDLC SERPL CALC-MCNC: 134 MG/DL
NONHDLC SERPL-MCNC: 166 MG/DL
POTASSIUM SERPL-SCNC: 4.4 MMOL/L (ref 3.4–5.3)
SODIUM SERPL-SCNC: 139 MMOL/L (ref 133–144)
TRIGL SERPL-MCNC: 159 MG/DL

## 2019-01-23 PROCEDURE — 99214 OFFICE O/P EST MOD 30 MIN: CPT | Performed by: FAMILY MEDICINE

## 2019-01-23 PROCEDURE — 80048 BASIC METABOLIC PNL TOTAL CA: CPT | Performed by: FAMILY MEDICINE

## 2019-01-23 PROCEDURE — 36415 COLL VENOUS BLD VENIPUNCTURE: CPT | Performed by: FAMILY MEDICINE

## 2019-01-23 PROCEDURE — 80061 LIPID PANEL: CPT | Performed by: FAMILY MEDICINE

## 2019-01-23 RX ORDER — CLONAZEPAM 0.5 MG/1
0.5 TABLET ORAL 3 TIMES DAILY PRN
Qty: 90 TABLET | Refills: 3 | Status: SHIPPED | OUTPATIENT
Start: 2019-01-23 | End: 2019-05-21

## 2019-01-23 RX ORDER — TRAMADOL HYDROCHLORIDE 50 MG/1
50 TABLET ORAL EVERY 6 HOURS PRN
Qty: 120 TABLET | Refills: 3 | Status: SHIPPED | OUTPATIENT
Start: 2019-01-23 | End: 2019-05-21

## 2019-01-23 RX ORDER — HYDROCHLOROTHIAZIDE 25 MG/1
25 TABLET ORAL DAILY
Qty: 90 TABLET | Refills: 3 | Status: SHIPPED | OUTPATIENT
Start: 2019-01-23 | End: 2020-01-07

## 2019-01-23 ASSESSMENT — MIFFLIN-ST. JEOR: SCORE: 1134.58

## 2019-01-23 NOTE — RESULT ENCOUNTER NOTE
Please inform patient that test result showed high cholesterol.This puts her at slight risk for cardiovascular events . I would recommend a low dose of a cholesterol medication .   Thank you.     Catie Fraser M.D.

## 2019-01-23 NOTE — PATIENT INSTRUCTIONS
Thank you for choosing [facility].  You may be receiving a survey in the mail from [employer] regarding your visit today.  Please take a few minutes to complete and return the survey to let us know how we are doing.      If you have questions or concerns, please contact us via [employer] or you can contact your care team at [phone].    Our Clinic hours are:  Monday 6:40 am  to 7:00 pm  Tuesday -Friday 6:40 am to 5:00 pm    The [place] outpatient lab hours are:  Monday - Friday 6:10 am to 4:45 pm  Saturdays 7:00 am to 11:00 am  Appointments are required, call [phone]    If you have clinical questions after hours or would like to schedule an appointment,  call the clinic at [phone].

## 2019-01-23 NOTE — PROGRESS NOTES
SUBJECTIVE:   Yamel Linsday is a 73 year old female who presents to clinic today for the following health issues:    Yamel is a 73 yr old female here for medication refills for her hypertension, anxiety and chronic pain. She reports that she started weaning off her antidepressants and since then she has been feeling much better. She denies any withdrawal symptoms.   BP seems controlled . Denies any side effects to her medication       Hypertension Follow-up      Outpatient blood pressures are not being checked.    Low Salt Diet: low salt    Chronic Pain Follow-Up       Type / Location of Pain: knee and neck   Analgesia/pain control:       Recent changes:  same      Overall control: Tolerable with discomfort  Activity level/function:      Daily activities:  Can do most things most days, with some rest    Work:  not applicable  Adverse effects:  No  Adherance    Taking medication as directed?  Yes    Participating in other treatments: no -   Risk Factors:    Sleep:  Good    Mood/anxiety:  controlled    Recent family or social stressors:  none noted    Other aggravating factors: none  PHQ-9 SCORE 6/18/2018 9/18/2018 11/14/2018   PHQ-9 Total Score - - -   PHQ-9 Total Score 4 6 19     PAOLA-7 SCORE 6/18/2018 9/18/2018 11/14/2018   Total Score - - -   Total Score 5 6 19     Encounter-Level CSA - 02/20/2015:    Controlled Substance Agreement - Scan on 2/26/2015  9:06 AM: Controlled Medication Agreement-  2/20/15 (below)       Patient-Level CSA:    There are no patient-level csa.         Amount of exercise or physical activity: 2-3 days/week for an average of 30-45 minutes    Problems taking medications regularly: No    Medication side effects: none    Diet: regular (no restrictions) and low salt      Medication Followup of all medications listed in      Taking Medication as prescribed: yes    Side Effects:  None    Medication Helping Symptoms:  yes           Problem list and histories reviewed & adjusted, as  indicated.  Additional history: as documented    Patient Active Problem List   Diagnosis     Hyperlipidemia LDL goal <130     Primary localized osteoarthrosis, lower leg     Generalized anxiety disorder     Rhinitis, allergic seasonal     Alcoholism (H)     Mild recurrent major depression (H)     Advanced directives, counseling/discussion     Seasonal allergic rhinitis     Hip pain     GERD (gastroesophageal reflux disease)     S/P laparoscopic hernia repair     OA (osteoarthritis) of knee     Mild intermittent asthma without complication     Major depression in complete remission (H)     Benign essential hypertension     Past Surgical History:   Procedure Laterality Date     ESOPHAGOSCOPY, GASTROSCOPY, DUODENOSCOPY (EGD), COMBINED N/A 2015    Procedure: COMBINED ESOPHAGOSCOPY, GASTROSCOPY, DUODENOSCOPY (EGD), BIOPSY SINGLE OR MULTIPLE;  Surgeon: Dylan Alejandra MD;  Location: [facility]     ESOPHAGOSCOPY, GASTROSCOPY, DUODENOSCOPY (EGD), COMBINED N/A 2018    Procedure: COMBINED ESOPHAGOSCOPY, GASTROSCOPY, DUODENOSCOPY (EGD), BIOPSY SINGLE OR MULTIPLE;  gastroscopy;  Surgeon: Alexander Bautista MD;  Location: [facility]     LAPAROSCOPIC HERNIORRHAPHY INGUINAL Right 2015    Procedure: LAPAROSCOPIC HERNIORRHAPHY INGUINAL;  Surgeon: Favio Olsen MD;  Location: [facility]     TUBAL LIGATION         Social History     Tobacco Use     Smoking status: Former Smoker     Packs/day: 0.50     Types: Cigarettes     Last attempt to quit: 2011     Years since quittin.6     Smokeless tobacco: Never Used   Substance Use Topics     Alcohol use: No     Family History   Problem Relation Age of Onset     Cancer Mother      C.A.D. Father      Lipids Father      Hypertension Father      Hypertension Brother      Cancer Brother         esophageal cancer     Breast Cancer Other      Prostate Cancer Brother      Psychotic Disorder Daughter      Diabetes No family hx of      Cerebrovascular Disease No family hx of       Cancer - colorectal No family hx of          Current Outpatient Medications   Medication Sig Dispense Refill     albuterol (VENTOLIN HFA) 108 (90 Base) MCG/ACT inhaler INHALE 2 PUFFS INTO THE LUNGS EVERY 6 HOURS AS NEEDED FOR SHORTNESS OF BREATH, DIFFICULTY BREATHING OR WHEEZING. 18 g 3     Calcium Carb-Cholecalciferol (CALCIUM 1000 + D PO) Take 1 tablet by mouth       Cholecalciferol (VITAMIN D-3 PO) Take 2,000 Units by mouth daily        citalopram (CELEXA) 10 MG tablet Take 1 tablet (10 mg) by mouth daily 30 tablet 3     clonazePAM (KLONOPIN) 0.5 MG tablet Take 1 tablet (0.5 mg) by mouth 3 times daily as needed for anxiety 90 tablet 3     hydrochlorothiazide (HYDRODIURIL) 25 MG tablet Take 1 tablet (25 mg) by mouth daily 90 tablet 3     ibuprofen (ADVIL,MOTRIN) 200 MG tablet Take 200-600 mg by mouth every 2 hours as needed for mild pain       Omega-3 Fatty Acids (FISH OIL) 1200 MG CAPS Take 1 capsule by mouth       omeprazole (PRILOSEC) 20 MG CR capsule TAKE 1 CAPSULE BY MOUTH DAILY, 30 TO 60 MINUTES BEFORE A MEAL. 90 capsule 3     PRENATAL VITAMINS PO Take 1 tablet by mouth daily        tiZANidine (ZANAFLEX) 2 MG tablet Take 1 tablet (2 mg) by mouth 3 times daily 90 tablet 0     traMADol (ULTRAM) 50 MG tablet Take 1 tablet (50 mg) by mouth every 6 hours as needed for pain 120 tablet 3     traZODone (DESYREL) 50 MG tablet TAKE TWO TABLETS BY MOUTH EVERY NIGHT AT BEDTIME 180 tablet 3     azithromycin (ZITHROMAX) 250 MG tablet Two tablets first day, then one tablet daily for four days. 6 tablet 0     guaiFENesin-codeine (ROBITUSSIN AC) 100-10 MG/5ML solution Take 5 mLs by mouth every 4 hours as needed for cough (Patient not taking: Reported on 1/23/2019) 120 mL 0     Allergies   Allergen Reactions     Nka [No Known Allergies]      Seasonal Allergies      BP Readings from Last 3 Encounters:   01/23/19 126/68   12/07/18 118/60   12/05/18 122/74    Wt Readings from Last 3 Encounters:   01/23/19 70.3 kg (155 lb)    12/07/18 72.7 kg (160 lb 3.2 oz)   12/05/18 73 kg (161 lb)                  Labs reviewed in EPIC    Reviewed and updated as needed this visit by clinical staff  Tobacco  Allergies  Meds  Med Hx  Surg Hx  Fam Hx  Soc Hx      Reviewed and updated as needed this visit by Provider         ROS:  Constitutional, HEENT, cardiovascular, pulmonary, gi and gu systems are negative, except as otherwise noted.    OBJECTIVE:     /68   Pulse 69   Temp 97.5  F (36.4  C) (Tympanic)   Resp 14   Ht 1.524 m (5')   Wt 70.3 kg (155 lb)   SpO2 95%   BMI 30.27 kg/m    Body mass index is 30.27 kg/m .  GENERAL: healthy, alert and no distress  EYES: Eyes grossly normal to inspection, PERRL and conjunctivae and sclerae normal  HENT: ear canals and TM's normal, nose and mouth without ulcers or lesions  NECK: no adenopathy, no asymmetry, masses, or scars and thyroid normal to palpation  RESP: lungs clear to auscultation - no rales, rhonchi or wheezes  CV: regular rate and rhythm, normal S1 S2, no S3 or S4, no murmur, click or rub, no peripheral edema and peripheral pulses strong  ABDOMEN: soft, nontender, no hepatosplenomegaly, no masses and bowel sounds normal  MS: no gross musculoskeletal defects noted, no edema  SKIN: no suspicious lesions or rashes    Diagnostic Test Results:  Pending     ASSESSMENT/PLAN:         (I10) Benign essential hypertension  Comment: BP is controlled. Medication faxed   Plan: hydrochlorothiazide (HYDRODIURIL) 25 MG tablet,        Lipid panel reflex to direct LDL Fasting, Basic        metabolic panel            (M17.0) Osteoarthritis of both knees, unspecified osteoarthritis type  Comment: Medication faxed .   Plan: traMADol (ULTRAM) 50 MG tablet            (F41.9) Anxiety  Comment: Medication faxed   Plan: clonazePAM (KLONOPIN) 0.5 MG tablet        FUTURE APPOINTMENTS:       - Follow-up visit in 3 months or as needed    Catie Fraser MD  [facility]

## 2019-01-28 DIAGNOSIS — E78.5 HYPERLIPIDEMIA LDL GOAL <100: Primary | ICD-10-CM

## 2019-01-28 RX ORDER — ATORVASTATIN CALCIUM 20 MG/1
20 TABLET, FILM COATED ORAL DAILY
Qty: 90 TABLET | Refills: 3 | Status: SHIPPED | OUTPATIENT
Start: 2019-01-28 | End: 2020-03-05

## 2019-02-06 ENCOUNTER — OFFICE VISIT (OUTPATIENT)
Dept: FAMILY MEDICINE | Facility: CLINIC | Age: 74
End: 2019-02-06
Payer: COMMERCIAL

## 2019-02-06 VITALS
TEMPERATURE: 98.2 F | DIASTOLIC BLOOD PRESSURE: 86 MMHG | OXYGEN SATURATION: 96 % | WEIGHT: 160 LBS | SYSTOLIC BLOOD PRESSURE: 128 MMHG | HEIGHT: 60 IN | BODY MASS INDEX: 31.41 KG/M2 | HEART RATE: 78 BPM

## 2019-02-06 DIAGNOSIS — F10.20 ALCOHOLISM (H): ICD-10-CM

## 2019-02-06 DIAGNOSIS — F32.1 MODERATE MAJOR DEPRESSION (H): ICD-10-CM

## 2019-02-06 DIAGNOSIS — R06.2 WHEEZING: Primary | ICD-10-CM

## 2019-02-06 PROCEDURE — 99214 OFFICE O/P EST MOD 30 MIN: CPT | Mod: 25 | Performed by: FAMILY MEDICINE

## 2019-02-06 PROCEDURE — 94640 AIRWAY INHALATION TREATMENT: CPT | Performed by: FAMILY MEDICINE

## 2019-02-06 RX ORDER — ALBUTEROL SULFATE 0.83 MG/ML
2.5 SOLUTION RESPIRATORY (INHALATION) EVERY 6 HOURS PRN
Qty: 1 BOX | Refills: 0 | Status: SHIPPED | OUTPATIENT
Start: 2019-02-06 | End: 2019-03-14

## 2019-02-06 RX ORDER — PREDNISONE 20 MG/1
40 TABLET ORAL DAILY
Qty: 10 TABLET | Refills: 0 | Status: SHIPPED | OUTPATIENT
Start: 2019-02-06 | End: 2019-05-21

## 2019-02-06 RX ORDER — ALBUTEROL SULFATE 0.83 MG/ML
2.5 SOLUTION RESPIRATORY (INHALATION) ONCE
Status: DISCONTINUED | OUTPATIENT
Start: 2019-02-06 | End: 2020-04-27

## 2019-02-06 ASSESSMENT — MIFFLIN-ST. JEOR: SCORE: 1157.26

## 2019-02-06 NOTE — PATIENT INSTRUCTIONS
Thank you for choosing [facility].  You may be receiving a survey in the mail from [employer] regarding your visit today.  Please take a few minutes to complete and return the survey to let us know how we are doing.      If you have questions or concerns, please contact us via [email or web address] or you can contact your care team at [phone].    Our Clinic hours are:  Monday 6:40 am  to 7:00 pm  Tuesday -Friday 6:40 am to 5:00 pm    The [place] outpatient lab hours are:  Monday - Friday 6:10 am to 4:45 pm  Saturdays 7:00 am to 11:00 am  Appointments are required, call [phone]    If you have clinical questions after hours or would like to schedule an appointment,  call the clinic at [phone].  Patient Education     Controlling Your Asthma  You can do a lot to manage your asthma and improve your quality of life. You will need to work with your healthcare provider to develop a plan. But it s up to you to put this plan into action.  Why you need to take control  You need to control the inflammation in your lungs. Take all medicine as directed, especially controller medicines, even if you feel that your asthma is under good control. You also need to relieve symptoms when you have them. These are long-term tasks. But the more you stay in control, the better you ll feel. If you don t stay in control:    Asthma symptoms may cause you to miss school, work, or activities that you enjoy.    Asthma flare-ups can be dangerous, even deadly.    Uncontrolled asthma makes it more likely that you will need emergency department and in-hospital care.    Uncontrolled asthma may cause permanent damage to your lungs.    Peak flow monitoring helps measure how open your airways are.   Taking medicine helps you control your asthma and relieve symptoms when they occur.     Using an Asthma Action Plan will help you keep track of and respond to asthma symptoms.   Avoiding triggers--the things that inflame your airways--will help  prevent symptoms and flare-ups.   Your action plan  Your healthcare provider will help you prepare, and when needed, update your personal Asthma Action Plan. Your plan tells you what to do based on your current symptoms. If you don't have an Asthma Action Plan, or if yours isn't up-to-date, make sure you talk with your healthcare provider.  Date Last Reviewed: 1/1/2017 2000-2018 [employer]. [address] [place] [address], [place] [address]. All rights reserved. This information is not intended as a substitute for professional medical care. Always follow your healthcare professional's instructions.

## 2019-02-06 NOTE — PROGRESS NOTES
SUBJECTIVE:   Yamel Lindsay is a 73 year old female who presents to clinic today for the following health issues:      Acute Illness   Acute illness concerns: cough and wheezing   Onset: 1-29-19    Fever: YES    Chills/Sweats: YES    Headache (location?): YES    Sinus Pressure:YES    Conjunctivitis:  no    Ear Pain: no    Rhinorrhea: YES    Congestion: YES- chest and chest is painful    Sore Throat: no     Cough: YES-with shortness of breath, barking    Wheeze: YES    Decreased Appetite: YES    Nausea: YES    Vomiting: no    Diarrhea:  no    Dysuria/Freq.: no    Fatigue/Achiness: YES    Sick/Strep Exposure: no     Therapies Tried and outcome: inhaler       73 yr old female here for wheezing, started about five days ago. She has a dry cough. Has a history of asthma, says the wheezing was induced with exercise. Denies any fevers. Had some shortness of breath.    Problem list and histories reviewed & adjusted, as indicated.  Additional history: as documented    Patient Active Problem List   Diagnosis     Hyperlipidemia LDL goal <130     Primary localized osteoarthrosis, lower leg     Generalized anxiety disorder     Rhinitis, allergic seasonal     Alcoholism (H)     Mild recurrent major depression (H)     Advanced directives, counseling/discussion     Seasonal allergic rhinitis     Hip pain     GERD (gastroesophageal reflux disease)     S/P laparoscopic hernia repair     OA (osteoarthritis) of knee     Mild intermittent asthma without complication     Major depression in complete remission (H)     Benign essential hypertension     Past Surgical History:   Procedure Laterality Date     ESOPHAGOSCOPY, GASTROSCOPY, DUODENOSCOPY (EGD), COMBINED N/A 1/5/2015    Procedure: COMBINED ESOPHAGOSCOPY, GASTROSCOPY, DUODENOSCOPY (EGD), BIOPSY SINGLE OR MULTIPLE;  Surgeon: Dylan Alejandra MD;  Location: [facility]     ESOPHAGOSCOPY, GASTROSCOPY, DUODENOSCOPY (EGD), COMBINED N/A 5/25/2018    Procedure: COMBINED ESOPHAGOSCOPY,  GASTROSCOPY, DUODENOSCOPY (EGD), BIOPSY SINGLE OR MULTIPLE;  gastroscopy;  Surgeon: Alexander Bautista MD;  Location: [facility]     LAPAROSCOPIC HERNIORRHAPHY INGUINAL Right 2015    Procedure: LAPAROSCOPIC HERNIORRHAPHY INGUINAL;  Surgeon: Favio Olsen MD;  Location: [facility]     TUBAL LIGATION         Social History     Tobacco Use     Smoking status: Former Smoker     Packs/day: 0.50     Types: Cigarettes     Last attempt to quit: 2011     Years since quittin.6     Smokeless tobacco: Never Used   Substance Use Topics     Alcohol use: No     Family History   Problem Relation Age of Onset     Cancer Mother      C.A.D. Father      Lipids Father      Hypertension Father      Hypertension Brother      Cancer Brother         esophageal cancer     Breast Cancer Other      Prostate Cancer Brother      Psychotic Disorder Daughter      Diabetes No family hx of      Cerebrovascular Disease No family hx of      Cancer - colorectal No family hx of          Current Outpatient Medications   Medication Sig Dispense Refill     albuterol (PROVENTIL) (2.5 MG/3ML) 0.083% neb solution Take 1 vial (2.5 mg) by nebulization every 6 hours as needed for shortness of breath / dyspnea or wheezing 1 Box 0     albuterol (VENTOLIN HFA) 108 (90 Base) MCG/ACT inhaler INHALE 2 PUFFS INTO THE LUNGS EVERY 6 HOURS AS NEEDED FOR SHORTNESS OF BREATH, DIFFICULTY BREATHING OR WHEEZING. 18 g 3     atorvastatin (LIPITOR) 20 MG tablet Take 1 tablet (20 mg) by mouth daily 90 tablet 3     Calcium Carb-Cholecalciferol (CALCIUM 1000 + D PO) Take 1 tablet by mouth       Cholecalciferol (VITAMIN D-3 PO) Take 2,000 Units by mouth daily        clonazePAM (KLONOPIN) 0.5 MG tablet Take 1 tablet (0.5 mg) by mouth 3 times daily as needed for anxiety 90 tablet 3     hydrochlorothiazide (HYDRODIURIL) 25 MG tablet Take 1 tablet (25 mg) by mouth daily 90 tablet 3     ibuprofen (ADVIL,MOTRIN) 200 MG tablet Take 200-600 mg by mouth every 2 hours as needed for  mild pain       Omega-3 Fatty Acids (FISH OIL) 1200 MG CAPS Take 1 capsule by mouth       omeprazole (PRILOSEC) 20 MG CR capsule TAKE 1 CAPSULE BY MOUTH DAILY, 30 TO 60 MINUTES BEFORE A MEAL. 90 capsule 3     order for DME Equipment being ordered: Nebulizer 1 Units 0     predniSONE (DELTASONE) 20 MG tablet Take 40 mg by mouth daily for 5 days. 10 tablet 0     PRENATAL VITAMINS PO Take 1 tablet by mouth daily        traMADol (ULTRAM) 50 MG tablet Take 1 tablet (50 mg) by mouth every 6 hours as needed for pain 120 tablet 3     traZODone (DESYREL) 50 MG tablet TAKE TWO TABLETS BY MOUTH EVERY NIGHT AT BEDTIME 180 tablet 3     citalopram (CELEXA) 10 MG tablet Take 1 tablet (10 mg) by mouth daily (Patient not taking: Reported on 2/6/2019) 30 tablet 3     tiZANidine (ZANAFLEX) 2 MG tablet Take 1 tablet (2 mg) by mouth 3 times daily (Patient not taking: Reported on 2/6/2019) 90 tablet 0     Allergies   Allergen Reactions     Nka [No Known Allergies]      Seasonal Allergies      BP Readings from Last 3 Encounters:   02/06/19 128/86   01/23/19 126/68   12/07/18 118/60    Wt Readings from Last 3 Encounters:   02/06/19 72.6 kg (160 lb)   01/23/19 70.3 kg (155 lb)   12/07/18 72.7 kg (160 lb 3.2 oz)                  Labs reviewed in EPIC    Reviewed and updated as needed this visit by clinical staff       Reviewed and updated as needed this visit by Provider         ROS:  Constitutional, HEENT, cardiovascular, pulmonary, gi and gu systems are negative, except as otherwise noted.    OBJECTIVE:     /86   Pulse 78   Temp 98.2  F (36.8  C) (Tympanic)   Ht 1.524 m (5')   Wt 72.6 kg (160 lb)   SpO2 96%   BMI 31.25 kg/m    Body mass index is 31.25 kg/m .  GENERAL: healthy, alert and no distress  EYES: Eyes grossly normal to inspection, PERRL and conjunctivae and sclerae normal  HENT: ear canals and TM's normal, nose and mouth without ulcers or lesions  NECK: no adenopathy, no asymmetry, masses, or scars and thyroid normal to  palpation  RESP: lungs clear to auscultation - no rales, rhonchi or wheezes  CV: regular rate and rhythm, normal S1 S2, no S3 or S4, no murmur, click or rub, no peripheral edema and peripheral pulses strong  MS: no gross musculoskeletal defects noted, no edema    Diagnostic Test Results:  none     ASSESSMENT/PLAN:       1. Wheezing  Medication is faxed, appears like an asthma exacerbation , albuterol neb given in clinic   - order for DME; Equipment being ordered: Nebulizer  Dispense: 1 Units; Refill: 0  - albuterol (PROVENTIL) neb solution 2.5 mg; Take 3 mLs (2.5 mg) by nebulization once  - albuterol (PROVENTIL) (2.5 MG/3ML) 0.083% neb solution; Take 1 vial (2.5 mg) by nebulization every 6 hours as needed for shortness of breath / dyspnea or wheezing  Dispense: 1 Box; Refill: 0  - predniSONE (DELTASONE) 20 MG tablet; Take 40 mg by mouth daily for 5 days.  Dispense: 10 tablet; Refill: 0  - ALBUTEROL UNIT DOSE, 1 MG  - INHALATION/NEBULIZER TREATMENT, INITIAL    2. Alcoholism (H)  resolved     3. Moderate major depression (H)  Stable       FUTURE APPOINTMENTS:       - Follow-up visit in one month or as needed.    Catie Fraser MD  [facility]

## 2019-02-11 ENCOUNTER — TELEPHONE (OUTPATIENT)
Dept: FAMILY MEDICINE | Facility: CLINIC | Age: 74
End: 2019-02-11

## 2019-02-11 NOTE — TELEPHONE ENCOUNTER
Patient reports she has had no change, was feeling better with Prednisone, using Albuterol nebs every 6 hours with improvement.   Advised an Office visit or Urgent Care for evaluation of lungs and breathing.   Patient does not know her schedule and will call back to schedule.

## 2019-02-11 NOTE — TELEPHONE ENCOUNTER
Reason for Call:  Other med request    Detailed comments: patient is asking for prednisone as her Asthma is worse.    Phone Number Patient can be reached at: Home number on file [phone] (home)    Best Time: any    Can we leave a detailed message on this number? YES    Call taken on 2/11/2019 at 2:08 PM by Raeann Gomez

## 2019-03-07 ENCOUNTER — TELEPHONE (OUTPATIENT)
Dept: FAMILY MEDICINE | Facility: CLINIC | Age: 74
End: 2019-03-07

## 2019-03-07 NOTE — TELEPHONE ENCOUNTER
Panel Management Review      Patient has the following on her problem list:     Depression / Dysthymia review    Measure:  Needs PHQ-9 score of 4 or less during index window.  Administer PHQ-9 and if score is 5 or more, send encounter to provider for next steps.    4-8 month window range: 3/14/19-7/14/19    PHQ-9 SCORE 6/18/2018 9/18/2018 11/14/2018   PHQ-9 Total Score - - -   PHQ-9 Total Score 4 6 19       If PHQ-9 recheck is 5 or more, route to provider for next steps.    Patient is due for:  PHQ9    Asthma review     ACT Total Scores 9/18/2018   ACT TOTAL SCORE (Goal Greater than or Equal to 20) 20   In the past 12 months, how many times did you visit the emergency room for your asthma without being admitted to the hospital? 0   In the past 12 months, how many times were you hospitalized overnight because of your asthma? 0      1. Is Asthma diagnosis on the Problem List? Yes    2. Is Asthma listed on Health Maintenance? Yes    3. Patient is due for:  ACT      Composite cancer screening  Chart review shows that this patient is due/due soon for the following None  Summary:    Patient is due/failing the following:   ACT and PHQ9    Action needed:   Patient needs to do ACT. and Patient needs to do PHQ9.    Type of outreach:    Will postpone until 3/14/19.  Due to update the phq/bhakti and ACT.  Can review phq over the phone, advise patient we will mail an ACT for her to complete and return.    Questions for provider review:    None                                                                                                                                    MAKI Richards MA       Chart routed to Care Team .

## 2019-03-14 ENCOUNTER — OFFICE VISIT (OUTPATIENT)
Dept: FAMILY MEDICINE | Facility: CLINIC | Age: 74
End: 2019-03-14
Payer: COMMERCIAL

## 2019-03-14 VITALS
TEMPERATURE: 98 F | BODY MASS INDEX: 32.51 KG/M2 | WEIGHT: 165.6 LBS | SYSTOLIC BLOOD PRESSURE: 130 MMHG | OXYGEN SATURATION: 93 % | HEART RATE: 78 BPM | RESPIRATION RATE: 18 BRPM | HEIGHT: 60 IN | DIASTOLIC BLOOD PRESSURE: 70 MMHG

## 2019-03-14 DIAGNOSIS — J45.20 MILD INTERMITTENT ASTHMA WITHOUT COMPLICATION: ICD-10-CM

## 2019-03-14 PROCEDURE — 99213 OFFICE O/P EST LOW 20 MIN: CPT | Performed by: NURSE PRACTITIONER

## 2019-03-14 RX ORDER — FLUTICASONE PROPIONATE AND SALMETEROL 113; 14 UG/1; UG/1
1 POWDER, METERED RESPIRATORY (INHALATION) 2 TIMES DAILY
Qty: 1 INHALER | Refills: 3 | Status: SHIPPED | OUTPATIENT
Start: 2019-03-14 | End: 2020-05-07

## 2019-03-14 RX ORDER — PREDNISONE 20 MG/1
40 TABLET ORAL DAILY
Qty: 10 TABLET | Refills: 0 | Status: SHIPPED | OUTPATIENT
Start: 2019-03-14 | End: 2019-05-21

## 2019-03-14 RX ORDER — ALBUTEROL SULFATE 90 UG/1
AEROSOL, METERED RESPIRATORY (INHALATION)
Qty: 18 G | Refills: 3 | Status: SHIPPED | OUTPATIENT
Start: 2019-03-14 | End: 2019-07-01

## 2019-03-14 RX ORDER — ALBUTEROL SULFATE 0.83 MG/ML
2.5 SOLUTION RESPIRATORY (INHALATION) EVERY 6 HOURS PRN
Qty: 75 VIAL | Refills: 2 | Status: SHIPPED | OUTPATIENT
Start: 2019-03-14 | End: 2020-05-07

## 2019-03-14 ASSESSMENT — ANXIETY QUESTIONNAIRES
GAD7 TOTAL SCORE: 5
7. FEELING AFRAID AS IF SOMETHING AWFUL MIGHT HAPPEN: NOT AT ALL
2. NOT BEING ABLE TO STOP OR CONTROL WORRYING: SEVERAL DAYS
3. WORRYING TOO MUCH ABOUT DIFFERENT THINGS: NOT AT ALL
6. BECOMING EASILY ANNOYED OR IRRITABLE: SEVERAL DAYS
5. BEING SO RESTLESS THAT IT IS HARD TO SIT STILL: SEVERAL DAYS
4. TROUBLE RELAXING: SEVERAL DAYS
1. FEELING NERVOUS, ANXIOUS, OR ON EDGE: SEVERAL DAYS

## 2019-03-14 ASSESSMENT — MIFFLIN-ST. JEOR: SCORE: 1182.66

## 2019-03-14 ASSESSMENT — PATIENT HEALTH QUESTIONNAIRE - PHQ9: SUM OF ALL RESPONSES TO PHQ QUESTIONS 1-9: 1

## 2019-03-14 NOTE — TELEPHONE ENCOUNTER
Patient called back and said she has an appointment today she will do it in clinic.  Lily Gaffney on 3/14/2019 at 1:00 PM

## 2019-03-14 NOTE — PATIENT INSTRUCTIONS
Refilled Albuterol neb and inhaler    Recommend to try Airduo inhaler, this is not rescue inhaler, this is more for treatment, it will help to prevent frequent episodes of shortness of breath and wheezing     Prednisone 40 mg daily morning for 5 days

## 2019-03-14 NOTE — PROGRESS NOTES
SUBJECTIVE:   Yamel Lindsay is a 73 year old female who presents to clinic today for the following health issues: asthma follow up. Complains of wheezing and shortness of breath, using Albuterol twice daily morning and evening almost daily.     Asthma Follow-Up    Was ACT completed today?    Yes    ACT Total Scores 3/14/2019   ACT TOTAL SCORE (Goal Greater than or Equal to 20) 14   In the past 12 months, how many times did you visit the emergency room for your asthma without being admitted to the hospital? 0   In the past 12 months, how many times were you hospitalized overnight because of your asthma? 0       Recent asthma triggers that patient is dealing with: cold air    Amount of exercise or physical activity: 2-3 days/week for an average of 30-45 minutes    Problems taking medications regularly: No    Medication side effects: none    Diet: low fat/cholesterol    Problem list and histories reviewed & adjusted, as indicated.  Additional history: as documented    Labs reviewed in EPIC    Reviewed and updated as needed this visit by clinical staff  Allergies       Reviewed and updated as needed this visit by Provider         ROS:  Constitutional, HEENT, cardiovascular, pulmonary, gi and gu systems are negative, except as otherwise noted.    OBJECTIVE:     /70 (BP Location: Left arm, Patient Position: Sitting, Cuff Size: Adult Regular)   Pulse 78   Temp 98  F (36.7  C) (Tympanic)   Resp 18   Ht 1.524 m (5')   Wt 75.1 kg (165 lb 9.6 oz)   SpO2 93%   BMI 32.34 kg/m    Body mass index is 32.34 kg/m .  GENERAL: healthy, alert and no distress  RESP: lungs clear to auscultation - no rales, rhonchi or wheezes  CV: regular rate and rhythm, normal S1 S2, no S3 or S4, no murmur, click or rub, no peripheral edema and peripheral pulses strong  PSYCH: mentation appears normal, affect normal/bright    Diagnostic Test Results:  none     ASSESSMENT/PLAN:     1. Mild intermittent asthma without  complication  -uncontrolled  -recommended to start Airduo, refilled Albuterol and also recommended short course of Prednisone   - albuterol (PROVENTIL) (2.5 MG/3ML) 0.083% neb solution; Take 1 vial (2.5 mg) by nebulization every 6 hours as needed for shortness of breath / dyspnea or wheezing  Dispense: 75 vial; Refill: 2  - fluticasone-salmeterol (AIRDUO RESPICLICK) 113-14 MCG/ACT inhaler; Inhale 1 puff into the lungs 2 times daily  Dispense: 1 Inhaler; Refill: 3  - predniSONE (DELTASONE) 20 MG tablet; Take 40 mg by mouth daily for 5 days.  Dispense: 10 tablet; Refill: 0  - albuterol (VENTOLIN HFA) 108 (90 Base) MCG/ACT inhaler; INHALE 2 PUFFS INTO THE LUNGS EVERY 6 HOURS AS NEEDED FOR SHORTNESS OF BREATH, DIFFICULTY BREATHING OR WHEEZING.  Dispense: 18 g; Refill: 3    See Patient Instructions    NAYA [facility]

## 2019-03-14 NOTE — TELEPHONE ENCOUNTER
Left message for patient to call back. Due to update the phq/bhakti and ACT.  Can review phq over the phone, advise patient we will mail an ACT for her to complete and return.-Darian AMARAL

## 2019-03-15 ASSESSMENT — ANXIETY QUESTIONNAIRES: GAD7 TOTAL SCORE: 5

## 2019-03-15 ASSESSMENT — ASTHMA QUESTIONNAIRES: ACT_TOTALSCORE: 14

## 2019-03-27 ENCOUNTER — TELEPHONE (OUTPATIENT)
Dept: FAMILY MEDICINE | Facility: CLINIC | Age: 74
End: 2019-03-27

## 2019-03-28 NOTE — TELEPHONE ENCOUNTER
Prior Authorization Retail Medication Request    Medication/Dose: fluticasone inh salmeter  ICD code (if different than what is on RX):    Previously Tried and Failed:  Proair; proventil; ventolin; albuterol neb  Rationale:  Temporary fill letter received    Insurance Name:  [employer]  Insurance ID:  [id number]       Pharmacy Information (if different than what is on RX)  Name:    Phone:

## 2019-04-01 NOTE — TELEPHONE ENCOUNTER
This PA request was submitted to the insurance by the Central Prior Authorization Team.  If you have any questions in regards to this request, we can be reached at [phone].     Thanks    PA Initiation    Medication: fluticasone-salmeterol (AIRDUO RESPICLICK) 113-14 MCG/ACT inhaler  Insurance Company: [employer] - Phone [phone] Fax [phone]  Pharmacy Filling the Rx: [facility] [place], [place] - [address] [place], [place] [address]ELEAZAR UMANA  Filling Pharmacy Phone: [phone]  Filling Pharmacy Fax:    Start Date: 4/1/2019

## 2019-04-02 NOTE — TELEPHONE ENCOUNTER
Prior Authorization Approval    Authorization Effective Date: 1/1/2019  Authorization Expiration Date: 3/31/2020  Medication: fluticasone-salmeterol (AIRDUO RESPICLICK) 113-14 MCG/ACT inhaler - APPROVED   Approved Dose/Quantity:   Reference #:     Insurance Company: [employer] - Phone [phone] Fax [phone]  Expected CoPay:       CoPay Card Available:      Foundation Assistance Needed:    Which Pharmacy is filling the prescription (Not needed for infusion/clinic administered): [facility] - [place], [place] - [address]  Pharmacy Notified: Yes  Patient Notified: Yes

## 2019-05-21 ENCOUNTER — OFFICE VISIT (OUTPATIENT)
Dept: FAMILY MEDICINE | Facility: CLINIC | Age: 74
End: 2019-05-21
Payer: COMMERCIAL

## 2019-05-21 ENCOUNTER — TELEPHONE (OUTPATIENT)
Dept: FAMILY MEDICINE | Facility: CLINIC | Age: 74
End: 2019-05-21

## 2019-05-21 VITALS
HEART RATE: 76 BPM | SYSTOLIC BLOOD PRESSURE: 124 MMHG | OXYGEN SATURATION: 95 % | WEIGHT: 158.7 LBS | HEIGHT: 63 IN | BODY MASS INDEX: 28.12 KG/M2 | DIASTOLIC BLOOD PRESSURE: 76 MMHG | RESPIRATION RATE: 14 BRPM | TEMPERATURE: 97.3 F

## 2019-05-21 DIAGNOSIS — F41.9 ANXIETY: ICD-10-CM

## 2019-05-21 DIAGNOSIS — M17.0 OSTEOARTHRITIS OF BOTH KNEES, UNSPECIFIED OSTEOARTHRITIS TYPE: ICD-10-CM

## 2019-05-21 DIAGNOSIS — Z12.11 SCREEN FOR COLON CANCER: Primary | ICD-10-CM

## 2019-05-21 PROCEDURE — 99214 OFFICE O/P EST MOD 30 MIN: CPT | Performed by: FAMILY MEDICINE

## 2019-05-21 RX ORDER — TRAMADOL HYDROCHLORIDE 50 MG/1
50 TABLET ORAL EVERY 6 HOURS PRN
Qty: 120 TABLET | Refills: 3 | Status: SHIPPED | OUTPATIENT
Start: 2019-05-21 | End: 2019-08-30

## 2019-05-21 RX ORDER — CLONAZEPAM 0.5 MG/1
0.5 TABLET ORAL 3 TIMES DAILY PRN
Qty: 90 TABLET | Refills: 3 | Status: SHIPPED | OUTPATIENT
Start: 2019-05-21 | End: 2019-08-30

## 2019-05-21 ASSESSMENT — MIFFLIN-ST. JEOR: SCORE: 1195.02

## 2019-05-21 NOTE — PATIENT INSTRUCTIONS
Thank you for choosing [facility].  You may be receiving an email and/or telephone survey request from [facility] [employer] regarding your visit today.  Please take a few minutes to respond to the survey to let us know how we are doing.      If you have questions or concerns, please contact us via [employer] or you can contact your care team at [phone].    Our Clinic hours are:  Monday 6:40 am  to 7:00 pm  Tuesday -Friday 6:40 am to 5:00 pm    The [place] outpatient lab hours are:  Monday - Friday 6:10 am to 4:45 pm  Saturdays 7:00 am to 11:00 am  Appointments are required, call [phone]    If you have clinical questions after hours or would like to schedule an appointment,  call the clinic at [phone].

## 2019-05-21 NOTE — TELEPHONE ENCOUNTER
Panel Management Review      Patient has the following on her problem list:     Asthma review     ACT Total Scores 5/21/2019   ACT TOTAL SCORE (Goal Greater than or Equal to 20) 13   In the past 12 months, how many times did you visit the emergency room for your asthma without being admitted to the hospital? 0   In the past 12 months, how many times were you hospitalized overnight because of your asthma? 0      1. Is Asthma diagnosis on the Problem List? Yes    2. Is Asthma listed on Health Maintenance? Yes    3. Patient is due for:  ACT      Composite cancer screening  Chart review shows that this patient is due/due soon for the following Mammogram and Fecal Colorectal (FIT)  Summary:    Patient is due/failing the following:   ACT, FIT and MAMMOGRAM    Action needed:   Patient needs to do ACT. In 4 weeks    Type of outreach:    Patient failed ACT at her appt today and was given act paperwork to bring home will call in 4 weeks     Questions for provider review:    None                                                                                                                                    [facility]     Chart routed to Care Team .

## 2019-05-21 NOTE — PROGRESS NOTES
Subjective     Yamel Lindsay is a 73 year old female who presents to clinic today for the following health issues:    Here for medication refills on Klonopin and Tramadol. Has been on it for years . Denies any side effects. Works well for her.     HPI   Chronic Pain Follow-Up       Type / Location of Pain: all over body   Analgesia/pain control:       Recent changes:  same      Overall control: Tolerable with discomfort  Activity level/function:      Daily activities:  Can do most things most days, with some rest    Work:  not applicable  Adverse effects:  No  Adherance    Taking medication as directed?  Yes    Participating in other treatments: no -   Risk Factors:    Sleep:  Good    Mood/anxiety:  controlled    Recent family or social stressors:  none noted    Other aggravating factors: none  PHQ-9 SCORE 9/18/2018 11/14/2018 3/14/2019   PHQ-9 Total Score - - -   PHQ-9 Total Score 6 19 1     PAOLA-7 SCORE 9/18/2018 11/14/2018 3/14/2019   Total Score - - -   Total Score 6 19 5     Encounter-Level CSA - 02/20/2015:    Controlled Substance Agreement - Scan on 2/26/2015  9:06 AM: Controlled Medication Agreement-  2/20/15 (below)       Patient-Level CSA:    There are no patient-level csa.         Amount of exercise or physical activity: 4-5 days/week for an average of 15-30 minutes    Problems taking medications regularly: No    Medication side effects: none    Diet: regular (no restrictions)      Medication Followup of medication listed in      Taking Medication as prescribed: yes    Side Effects:  None    Medication Helping Symptoms:  yes     {  Patient Active Problem List   Diagnosis     Hyperlipidemia LDL goal <130     Primary localized osteoarthrosis, lower leg     Generalized anxiety disorder     Rhinitis, allergic seasonal     Alcoholism (H)     Mild recurrent major depression (H)     Advanced directives, counseling/discussion     Seasonal allergic rhinitis     Hip pain     GERD (gastroesophageal reflux  disease)     S/P laparoscopic hernia repair     OA (osteoarthritis) of knee     Mild intermittent asthma without complication     Major depression in complete remission (H)     Benign essential hypertension     Past Surgical History:   Procedure Laterality Date     ESOPHAGOSCOPY, GASTROSCOPY, DUODENOSCOPY (EGD), COMBINED N/A 2015    Procedure: COMBINED ESOPHAGOSCOPY, GASTROSCOPY, DUODENOSCOPY (EGD), BIOPSY SINGLE OR MULTIPLE;  Surgeon: Dylan Alejandra MD;  Location: [facility]     ESOPHAGOSCOPY, GASTROSCOPY, DUODENOSCOPY (EGD), COMBINED N/A 2018    Procedure: COMBINED ESOPHAGOSCOPY, GASTROSCOPY, DUODENOSCOPY (EGD), BIOPSY SINGLE OR MULTIPLE;  gastroscopy;  Surgeon: Alexander Bautista MD;  Location: [facility]     LAPAROSCOPIC HERNIORRHAPHY INGUINAL Right 2015    Procedure: LAPAROSCOPIC HERNIORRHAPHY INGUINAL;  Surgeon: Favio Olsen MD;  Location: [facility]     TUBAL LIGATION         Social History     Tobacco Use     Smoking status: Former Smoker     Packs/day: 0.50     Types: Cigarettes     Last attempt to quit: 2011     Years since quittin.9     Smokeless tobacco: Never Used   Substance Use Topics     Alcohol use: No     Family History   Problem Relation Age of Onset     Cancer Mother      C.A.D. Father      Lipids Father      Hypertension Father      Hypertension Brother      Cancer Brother         esophageal cancer     Breast Cancer Other      Prostate Cancer Brother      Psychotic Disorder Daughter      Diabetes No family hx of      Cerebrovascular Disease No family hx of      Cancer - colorectal No family hx of          Current Outpatient Medications   Medication Sig Dispense Refill     albuterol (PROVENTIL) (2.5 MG/3ML) 0.083% neb solution Take 1 vial (2.5 mg) by nebulization every 6 hours as needed for shortness of breath / dyspnea or wheezing 75 vial 2     albuterol (VENTOLIN HFA) 108 (90 Base) MCG/ACT inhaler INHALE 2 PUFFS INTO THE LUNGS EVERY 6 HOURS AS NEEDED FOR SHORTNESS OF BREATH,  DIFFICULTY BREATHING OR WHEEZING. 18 g 3     atorvastatin (LIPITOR) 20 MG tablet Take 1 tablet (20 mg) by mouth daily 90 tablet 3     Calcium Carb-Cholecalciferol (CALCIUM 1000 + D PO) Take 1 tablet by mouth       Cholecalciferol (VITAMIN D-3 PO) Take 2,000 Units by mouth daily        clonazePAM (KLONOPIN) 0.5 MG tablet Take 1 tablet (0.5 mg) by mouth 3 times daily as needed for anxiety 90 tablet 3     fluticasone-salmeterol (AIRDUO RESPICLICK) 113-14 MCG/ACT inhaler Inhale 1 puff into the lungs 2 times daily 1 Inhaler 3     hydrochlorothiazide (HYDRODIURIL) 25 MG tablet Take 1 tablet (25 mg) by mouth daily 90 tablet 3     ibuprofen (ADVIL,MOTRIN) 200 MG tablet Take 200-600 mg by mouth every 2 hours as needed for mild pain       Omega-3 Fatty Acids (FISH OIL) 1200 MG CAPS Take 1 capsule by mouth       omeprazole (PRILOSEC) 20 MG CR capsule TAKE 1 CAPSULE BY MOUTH DAILY, 30 TO 60 MINUTES BEFORE A MEAL. 90 capsule 3     PRENATAL VITAMINS PO Take 1 tablet by mouth daily        traMADol (ULTRAM) 50 MG tablet Take 1 tablet (50 mg) by mouth every 6 hours as needed for pain 120 tablet 3     traZODone (DESYREL) 50 MG tablet TAKE TWO TABLETS BY MOUTH EVERY NIGHT AT BEDTIME 180 tablet 3     order for DME Equipment being ordered: Nebulizer 1 Units 0     tiZANidine (ZANAFLEX) 2 MG tablet Take 1 tablet (2 mg) by mouth 3 times daily (Patient not taking: Reported on 2/6/2019) 90 tablet 0     Allergies   Allergen Reactions     Nka [No Known Allergies]      Seasonal Allergies      BP Readings from Last 3 Encounters:   05/21/19 124/76   03/14/19 130/70   02/06/19 128/86    Wt Readings from Last 3 Encounters:   05/21/19 72 kg (158 lb 11.2 oz)   03/14/19 75.1 kg (165 lb 9.6 oz)   02/06/19 72.6 kg (160 lb)                    Reviewed and updated as needed this visit by Provider         Review of Systems   ROS COMP: Constitutional, HEENT, cardiovascular, pulmonary, gi and gu systems are negative, except as otherwise noted.      Objective  "   /76   Pulse 76   Temp 97.3  F (36.3  C) (Tympanic)   Resp 14   Ht 1.594 m (5' 2.75\")   Wt 72 kg (158 lb 11.2 oz)   SpO2 95%   BMI 28.34 kg/m    Body mass index is 28.34 kg/m .  Physical Exam   GENERAL: healthy, alert and no distress  EYES: Eyes grossly normal to inspection, PERRL and conjunctivae and sclerae normal  HENT: ear canals and TM's normal, nose and mouth without ulcers or lesions  NECK: no adenopathy, no asymmetry, masses, or scars and thyroid normal to palpation  RESP: lungs clear to auscultation - no rales, rhonchi or wheezes  CV: regular rate and rhythm, normal S1 S2, no S3 or S4, no murmur, click or rub, no peripheral edema and peripheral pulses strong  ABDOMEN: soft, nontender, no hepatosplenomegaly, no masses and bowel sounds normal  MS: no gross musculoskeletal defects noted, no edema    Diagnostic Test Results:  none         Assessment & Plan     1. Osteoarthritis of both knees, unspecified osteoarthritis type  Medication refilled   - traMADol (ULTRAM) 50 MG tablet; Take 1 tablet (50 mg) by mouth every 6 hours as needed for pain  Dispense: 120 tablet; Refill: 3    2. Anxiety  Medication refilled  - clonazePAM (KLONOPIN) 0.5 MG tablet; Take 1 tablet (0.5 mg) by mouth 3 times daily as needed for anxiety  Dispense: 90 tablet; Refill: 3    3. Screen for colon cancer  .Patient will be notified of results  - Fecal colorectal cancer screen (FIT); Future          FUTURE APPOINTMENTS:       - Follow-up visit in one month or sooner as needed.    No follow-ups on file.    Catie Fraser MD  [facility] [place]    "

## 2019-05-22 ASSESSMENT — ASTHMA QUESTIONNAIRES: ACT_TOTALSCORE: 13

## 2019-06-03 PROCEDURE — 82274 ASSAY TEST FOR BLOOD FECAL: CPT | Performed by: FAMILY MEDICINE

## 2019-06-09 LAB — HEMOCCULT STL QL IA: NEGATIVE

## 2019-06-10 DIAGNOSIS — Z12.11 SCREEN FOR COLON CANCER: ICD-10-CM

## 2019-06-10 NOTE — LETTER
June 11, 2019      Yamel Lindsay  [address] [address] [facility] [id number]        Dear ,    We are writing to inform you of your test results.    Test result was within normal parameters.     Resulted Orders   Fecal colorectal cancer screen (FIT)   Result Value Ref Range    Occult Blood Scn FIT Negative NEG^Negative       If you have any questions or concerns, please call the clinic at the number listed above.       Sincerely,      Catie Fraser MD

## 2019-06-11 NOTE — RESULT ENCOUNTER NOTE
Please inform patient that test result was within normal parameters.   Thank you.     Catie Fraser M.D.

## 2019-06-24 ENCOUNTER — NURSE TRIAGE (OUTPATIENT)
Dept: NURSING | Facility: CLINIC | Age: 74
End: 2019-06-24

## 2019-06-24 DIAGNOSIS — J45.20 MILD INTERMITTENT ASTHMA WITHOUT COMPLICATION: ICD-10-CM

## 2019-06-24 NOTE — TELEPHONE ENCOUNTER
Clinic Action Needed: yes, call back    FNA Triage Call  Presenting Problem:  Patient says she is returning call to clinic.  FNA advised it may be related to refill of her albuterol inhaler.   Patient says she is currently out of town and needs a refill.  Patient asks that clinic be notified again she needs it immediately.  FNA advised will send message back to clinic.     FNA did not see this note before hanging up with patient. Please call patient back.          Shamika Llanos RN/[facility]

## 2019-06-24 NOTE — TELEPHONE ENCOUNTER
Patient says she is returning call to clinic.  FNA advised it may be related to refill of her albuterol inhaler.  Patient says she is currently out of town and needs a refill.  Patient asks that clinic be notified again she needs it immediately.  FNA advised will send message back to clinic. FNA saw note patient is due for mammogram and ACT.  Note added to telephone encounter for clinic to call patient back.    Reason for Disposition    [1] Caller requesting NON-URGENT health information AND [2] PCP's office is the best resource    Additional Information    Negative: RN needs further essential information from caller in order to complete triage    Negative: [1] Caller is not with the adult (patient) AND [2] reporting urgent symptoms    Negative: Lab result questions    Negative: Medication questions    Negative: Caller can't be reached by phone    Negative: Caller has already spoken to PCP or another triager    Negative: Requesting regular office appointment    Protocols used: INFORMATION ONLY CALL-A-

## 2019-06-24 NOTE — TELEPHONE ENCOUNTER
"Requested Prescriptions   Pending Prescriptions Disp Refills     albuterol (VENTOLIN HFA) 108 (90 Base) MCG/ACT inhaler [Pharmacy Med Name: VENTOLIN HFA 108MCG/ACT AERS] 18 g 3     Sig: INHALE 2 PUFFS INTO THE LUNGS EVERY 6 HOURS AS NEEDED FOR SHORTNESS OF BREATH, DIFFICULTY BREATHING OR WHEEZING.  Last Written Prescription Date:  3/14/2019  Last Fill Quantity: 18g,  # refills: 3  Last office visit: 5/21/2019 with prescribing provider:  Evelio   Future Office Visit:           Asthma Maintenance Inhalers - Anticholinergics Failed - 6/24/2019  4:57 PM        Failed - Asthma control assessment score within normal limits in last 6 months     Please review ACT score.   ACT Total Scores 9/18/2018 3/14/2019 5/21/2019   ACT TOTAL SCORE (Goal Greater than or Equal to 20) 20 14 13   In the past 12 months, how many times did you visit the emergency room for your asthma without being admitted to the hospital? 0 0 0   In the past 12 months, how many times were you hospitalized overnight because of your asthma? 0 0 0             Passed - Patient is age 12 years or older        Passed - Medication is active on med list        Passed - Recent (6 mo) or future (30 days) visit within the authorizing provider's specialty     Patient had office visit in the last 6 months or has a visit in the next 30 days with authorizing provider or within the authorizing provider's specialty.  See \"Patient Info\" tab in inbasket, or \"Choose Columns\" in Meds & Orders section of the refill encounter.              "

## 2019-06-24 NOTE — TELEPHONE ENCOUNTER
Panel Management Review      Patient has the following on her problem list:     Asthma review     ACT Total Scores 5/21/2019   ACT TOTAL SCORE (Goal Greater than or Equal to 20) 13   In the past 12 months, how many times did you visit the emergency room for your asthma without being admitted to the hospital? 0   In the past 12 months, how many times were you hospitalized overnight because of your asthma? 0      Composite cancer screening  Chart review shows that this patient is due/due soon for the following Mammogram  Summary:    Patient is due/failing the following:   ACT and MAMMOGRAM    Type of outreach:    Phone, left message for patient to call back.     Darian WICK CMA

## 2019-07-01 ENCOUNTER — TELEPHONE (OUTPATIENT)
Dept: FAMILY MEDICINE | Facility: CLINIC | Age: 74
End: 2019-07-01

## 2019-07-01 ENCOUNTER — OFFICE VISIT (OUTPATIENT)
Dept: FAMILY MEDICINE | Facility: CLINIC | Age: 74
End: 2019-07-01
Payer: COMMERCIAL

## 2019-07-01 VITALS
RESPIRATION RATE: 14 BRPM | WEIGHT: 154 LBS | SYSTOLIC BLOOD PRESSURE: 120 MMHG | OXYGEN SATURATION: 94 % | HEIGHT: 63 IN | BODY MASS INDEX: 27.29 KG/M2 | TEMPERATURE: 98.4 F | DIASTOLIC BLOOD PRESSURE: 66 MMHG | HEART RATE: 74 BPM

## 2019-07-01 DIAGNOSIS — M62.838 MUSCLE SPASM: Primary | ICD-10-CM

## 2019-07-01 DIAGNOSIS — J45.20 MILD INTERMITTENT ASTHMA WITHOUT COMPLICATION: ICD-10-CM

## 2019-07-01 DIAGNOSIS — M79.7 FIBROMYALGIA: ICD-10-CM

## 2019-07-01 PROCEDURE — 99214 OFFICE O/P EST MOD 30 MIN: CPT | Performed by: FAMILY MEDICINE

## 2019-07-01 RX ORDER — ALBUTEROL SULFATE 90 UG/1
AEROSOL, METERED RESPIRATORY (INHALATION)
Qty: 1 INHALER | Refills: 3 | Status: SHIPPED | OUTPATIENT
Start: 2019-07-01 | End: 2019-11-18

## 2019-07-01 RX ORDER — ALBUTEROL SULFATE 90 UG/1
AEROSOL, METERED RESPIRATORY (INHALATION)
Qty: 18 G | Refills: 3 | OUTPATIENT
Start: 2019-07-01

## 2019-07-01 RX ORDER — TIZANIDINE 2 MG/1
2 TABLET ORAL 3 TIMES DAILY
Qty: 90 TABLET | Refills: 0 | Status: SHIPPED | OUTPATIENT
Start: 2019-07-01 | End: 2019-11-12

## 2019-07-01 ASSESSMENT — MIFFLIN-ST. JEOR: SCORE: 1168.7

## 2019-07-01 NOTE — LETTER
August 13, 2019      Yamel Lindsay  [address] [address]        Dear Yamel,      Your [facility] Team works hard to make sure that you  receive exceptional care. Enclosed you will find a copy of the Asthma Control Test (ACT) that our clinic uses to monitor and manage your asthma. This test is an assessment tool that we can use to determine how well your asthma is controlled. Please fill out and mail back the enclosed ACT form. Enclosed is a stamped addressed envelope for you to mail the ACT back to the clinic in.  Thank You .          Sincerely,        Catie Fraser MD

## 2019-07-01 NOTE — TELEPHONE ENCOUNTER
Panel Management Review      Patient has the following on her problem list:     Asthma review     ACT Total Scores 7/1/2019   ACT TOTAL SCORE (Goal Greater than or Equal to 20) 7   In the past 12 months, how many times did you visit the emergency room for your asthma without being admitted to the hospital? 0   In the past 12 months, how many times were you hospitalized overnight because of your asthma? 0      1. Is Asthma diagnosis on the Problem List? Yes    2. Is Asthma listed on Health Maintenance? Yes    3. Patient is due for:  ACT      Composite cancer screening  Chart review shows that this patient is due/due soon for the following Mammogram  Summary:    Patient is due/failing the following:   ACT and MAMMOGRAM    Action needed:   Patient needs to do ACT.    Type of outreach:    Patient was seen in clinic on 7/1 and failed her ACT was given ACT to bring home will call in 4 weeks to see how she is doing     Questions for provider review:    None                                                                                                                                    Esme Strauss CMA     Chart routed to Care Team .

## 2019-07-01 NOTE — PROGRESS NOTES
Subjective     Yamel Lindsay is a 74 year old female who presents to clinic today for the following health issues:    Patient is a 74 yr old female here for asthma recheck . She appears to be using her rescue inhaler more than she should.  In the last couple of weeks she says she is using her inhalers +  nebs about 3-4 times a day.  She is supposed to be on a maintenance inhaler : Airduo Respiclick.      She is not using it as directed.  She is supposed to be on a twice a day but uses it only once a day .  Denies any side effects to her medications .  Her asthma is not controlled her score today 16 .  She is traveling  to [place] and she needs  a refill of the albuterol inhaler.  I went over some asthma education with her emphasizing that she needs to use the maintenance inhaler twice a day instead of once and that once she is using the rescue inhaler more than 4 times a day this is some indication that asthma is not controlled.  Today she is not having any asthma symptoms she is no wheezing she is not short of breath her oxygen saturations are normal.    HPI   Asthma Follow-Up    Was ACT completed today?    Yes    ACT Total Scores 7/1/2019   ACT TOTAL SCORE (Goal Greater than or Equal to 20) 7   In the past 12 months, how many times did you visit the emergency room for your asthma without being admitted to the hospital? 0   In the past 12 months, how many times were you hospitalized overnight because of your asthma? 0       How many days per week do you miss taking your asthma controller medication?  I do not have an asthma controller medication    Please describe any recent triggers for your asthma: upper respiratory infections and animal dander dog     Have you had any Emergency Room Visits, Urgent Care Visits, or Hospital Admissions since your last office visit?  No        Amount of exercise or physical activity: very little     Problems taking medications regularly: No    Medication side effects:  none    Diet: regular (no restrictions)      Medication Followup of albuterol     Taking Medication as prescribed: patient is out of the inhaler but is using the nebs     Side Effects:  None    Medication Helping Symptoms:  yes         Patient Active Problem List   Diagnosis     Hyperlipidemia LDL goal <130     Primary localized osteoarthrosis, lower leg     Generalized anxiety disorder     Rhinitis, allergic seasonal     Alcoholism (H)     Mild recurrent major depression (H)     Advanced directives, counseling/discussion     Seasonal allergic rhinitis     Hip pain     GERD (gastroesophageal reflux disease)     S/P laparoscopic hernia repair     OA (osteoarthritis) of knee     Mild intermittent asthma without complication     Major depression in complete remission (H)     Benign essential hypertension     Past Surgical History:   Procedure Laterality Date     ESOPHAGOSCOPY, GASTROSCOPY, DUODENOSCOPY (EGD), COMBINED N/A 2015    Procedure: COMBINED ESOPHAGOSCOPY, GASTROSCOPY, DUODENOSCOPY (EGD), BIOPSY SINGLE OR MULTIPLE;  Surgeon: Dylan Alejandra MD;  Location: [facility]     ESOPHAGOSCOPY, GASTROSCOPY, DUODENOSCOPY (EGD), COMBINED N/A 2018    Procedure: COMBINED ESOPHAGOSCOPY, GASTROSCOPY, DUODENOSCOPY (EGD), BIOPSY SINGLE OR MULTIPLE;  gastroscopy;  Surgeon: Alexander Bautista MD;  Location: [facility]     LAPAROSCOPIC HERNIORRHAPHY INGUINAL Right 2015    Procedure: LAPAROSCOPIC HERNIORRHAPHY INGUINAL;  Surgeon: Favio Olsen MD;  Location: [facility]     TUBAL LIGATION         Social History     Tobacco Use     Smoking status: Former Smoker     Packs/day: 0.50     Types: Cigarettes     Last attempt to quit: 2011     Years since quittin.0     Smokeless tobacco: Never Used   Substance Use Topics     Alcohol use: No     Family History   Problem Relation Age of Onset     Cancer Mother      C.A.D. Father      Lipids Father      Hypertension Father      Hypertension Brother      Cancer Brother          esophageal cancer     Breast Cancer Other      Prostate Cancer Brother      Psychotic Disorder Daughter      Diabetes No family hx of      Cerebrovascular Disease No family hx of      Cancer - colorectal No family hx of          Current Outpatient Medications   Medication Sig Dispense Refill     albuterol (PROVENTIL) (2.5 MG/3ML) 0.083% neb solution Take 1 vial (2.5 mg) by nebulization every 6 hours as needed for shortness of breath / dyspnea or wheezing 75 vial 2     albuterol (VENTOLIN HFA) 108 (90 Base) MCG/ACT inhaler INHALE 2 PUFFS INTO THE LUNGS EVERY 6 HOURS AS NEEDED FOR SHORTNESS OF BREATH, DIFFICULTY BREATHING OR WHEEZING. 1 Inhaler 3     atorvastatin (LIPITOR) 20 MG tablet Take 1 tablet (20 mg) by mouth daily 90 tablet 3     Calcium Carb-Cholecalciferol (CALCIUM 1000 + D PO) Take 1 tablet by mouth       Cholecalciferol (VITAMIN D-3 PO) Take 2,000 Units by mouth daily        clonazePAM (KLONOPIN) 0.5 MG tablet Take 1 tablet (0.5 mg) by mouth 3 times daily as needed for anxiety 90 tablet 3     fluticasone-salmeterol (AIRDUO RESPICLICK) 113-14 MCG/ACT inhaler Inhale 1 puff into the lungs 2 times daily 1 Inhaler 3     hydrochlorothiazide (HYDRODIURIL) 25 MG tablet Take 1 tablet (25 mg) by mouth daily 90 tablet 3     ibuprofen (ADVIL,MOTRIN) 200 MG tablet Take 200-600 mg by mouth every 2 hours as needed for mild pain       Omega-3 Fatty Acids (FISH OIL) 1200 MG CAPS Take 1 capsule by mouth       omeprazole (PRILOSEC) 20 MG CR capsule TAKE 1 CAPSULE BY MOUTH DAILY, 30 TO 60 MINUTES BEFORE A MEAL. 90 capsule 3     order for DME Equipment being ordered: Nebulizer 1 Units 0     PRENATAL VITAMINS PO Take 1 tablet by mouth daily        tiZANidine (ZANAFLEX) 2 MG tablet Take 1 tablet (2 mg) by mouth 3 times daily 90 tablet 0     traMADol (ULTRAM) 50 MG tablet Take 1 tablet (50 mg) by mouth every 6 hours as needed for pain 120 tablet 3     traZODone (DESYREL) 50 MG tablet TAKE TWO TABLETS BY MOUTH EVERY NIGHT AT  "BEDTIME 180 tablet 3     Allergies   Allergen Reactions     Nka [No Known Allergies]      Seasonal Allergies      BP Readings from Last 3 Encounters:   07/01/19 120/66   05/21/19 124/76   03/14/19 130/70    Wt Readings from Last 3 Encounters:   07/01/19 69.9 kg (154 lb)   05/21/19 72 kg (158 lb 11.2 oz)   03/14/19 75.1 kg (165 lb 9.6 oz)                      Reviewed and updated as needed this visit by Provider         Review of Systems   ROS COMP: Constitutional, HEENT, cardiovascular, pulmonary, gi and gu systems are negative, except as otherwise noted.      Objective    /66   Pulse 74   Temp 98.4  F (36.9  C) (Tympanic)   Resp 14   Ht 1.594 m (5' 2.75\")   Wt 69.9 kg (154 lb)   SpO2 94%   BMI 27.50 kg/m    Body mass index is 27.5 kg/m .  Physical Exam   GENERAL: healthy, alert and no distress  EYES: Eyes grossly normal to inspection, PERRL and conjunctivae and sclerae normal  HENT: ear canals and TM's normal, nose and mouth without ulcers or lesions  NECK: no adenopathy, no asymmetry, masses, or scars and thyroid normal to palpation  RESP: lungs clear to auscultation - no rales, rhonchi or wheezes  CV: regular rate and rhythm, normal S1 S2, no S3 or S4, no murmur, click or rub, no peripheral edema and peripheral pulses strong  ABDOMEN: soft, nontender, no hepatosplenomegaly, no masses and bowel sounds normal  MS: no gross musculoskeletal defects noted, no edema    Diagnostic Test Results:  none         Assessment & Plan       ICD-10-CM    1. Muscle spasm M62.838 tiZANidine (ZANAFLEX) 2 MG tablet   2. Mild intermittent asthma without complication J45.20 albuterol (VENTOLIN HFA) 108 (90 Base) MCG/ACT inhaler   3. Fibromyalgia M79.7    MEDICATION REFILLED.   Patient wanted refills on muscle relaxants.             FUTURE APPOINTMENTS:       - Follow-up visit in one month .    No follow-ups on file.    Catie Fraser MD  [facility]      "

## 2019-07-01 NOTE — PATIENT INSTRUCTIONS
Thank you for choosing [facility].  You may be receiving an email and/or telephone survey request from [facility] Customer Experience regarding your visit today.  Please take a few minutes to respond to the survey to let us know how we are doing.      If you have questions or concerns, please contact us via [facility] or you can contact your care team at [phone].    Our Clinic hours are:  Monday 6:40 am  to 7:00 pm  Tuesday -Friday 6:40 am to 5:00 pm    The [facility] outpatient lab hours are:  Monday - Friday 6:10 am to 4:45 pm  Saturdays 7:00 am to 11:00 am  Appointments are required, call [phone]    If you have clinical questions after hours or would like to schedule an appointment,  call the clinic at [phone].

## 2019-07-01 NOTE — TELEPHONE ENCOUNTER
She has not viewed the Mychart note with ACT.   I can see she was in to the clinic today and this was refilled.   Vashti Desir RNC

## 2019-07-02 ASSESSMENT — ASTHMA QUESTIONNAIRES: ACT_TOTALSCORE: 7

## 2019-07-08 ENCOUNTER — TELEPHONE (OUTPATIENT)
Dept: FAMILY MEDICINE | Facility: CLINIC | Age: 74
End: 2019-07-08

## 2019-07-08 DIAGNOSIS — F33.0 MILD RECURRENT MAJOR DEPRESSION (H): ICD-10-CM

## 2019-07-08 DIAGNOSIS — F32.5 MAJOR DEPRESSION IN COMPLETE REMISSION (H): ICD-10-CM

## 2019-07-08 DIAGNOSIS — F41.1 GENERALIZED ANXIETY DISORDER: Primary | ICD-10-CM

## 2019-07-08 NOTE — TELEPHONE ENCOUNTER
Panel Management Review      Patient has the following on her problem list:     Depression / Dysthymia review    Measure:  Needs PHQ-9 score of 4 or less during index window.  Administer PHQ-9 and if score is 5 or more, send encounter to provider for next steps.    5 - 7 month window range: 3/15/19-7/13/19    PHQ-9 SCORE 9/18/2018 11/14/2018 3/14/2019   PHQ-9 Total Score - - -   PHQ-9 Total Score 6 19 1       If PHQ-9 recheck is 5 or more, route to provider for next steps.    Patient is due for:  PHQ9      Composite cancer screening  Chart review shows that this patient is due/due soon for the following Mammogram  Summary:    Patient is due/failing the following:   MAMMOGRAM and PHQ9    Action needed:   Patient needs to do PHQ9 and mammogram.    Type of outreach:    Phone, left message for patient to call back.     Questions for provider review:    None                                                                                                                                    MAKI Richards MA       Chart routed to Care Team .

## 2019-07-17 NOTE — TELEPHONE ENCOUNTER
Panel Management Review      Patient has the following on her problem list:     Depression / Dysthymia review    Measure:  Needs PHQ-9 score of 4 or less during index window.  Administer PHQ-9 and if score is 5 or more, send encounter to provider for next steps.    5 - 7 month window range: 3/15-7/13    PHQ-9 SCORE 9/18/2018 11/14/2018 3/14/2019   PHQ-9 Total Score - - -   PHQ-9 Total Score 6 19 1       If PHQ-9 recheck is 5 or more, route to provider for next steps.    Patient is due for:  PHQ9      Composite cancer screening  Chart review shows that this patient is due/due soon for the following Mammogram  Summary:    Patient is due/failing the following:   MAMMOGRAM and PHQ9      Left message for patient to call back. Darian AMARAL

## 2019-07-25 ASSESSMENT — PATIENT HEALTH QUESTIONNAIRE - PHQ9
SUM OF ALL RESPONSES TO PHQ QUESTIONS 1-9: 3
5. POOR APPETITE OR OVEREATING: NOT AT ALL

## 2019-07-25 ASSESSMENT — ANXIETY QUESTIONNAIRES
5. BEING SO RESTLESS THAT IT IS HARD TO SIT STILL: NOT AT ALL
6. BECOMING EASILY ANNOYED OR IRRITABLE: SEVERAL DAYS
GAD7 TOTAL SCORE: 4
1. FEELING NERVOUS, ANXIOUS, OR ON EDGE: SEVERAL DAYS
7. FEELING AFRAID AS IF SOMETHING AWFUL MIGHT HAPPEN: NOT AT ALL
2. NOT BEING ABLE TO STOP OR CONTROL WORRYING: SEVERAL DAYS
3. WORRYING TOO MUCH ABOUT DIFFERENT THINGS: SEVERAL DAYS

## 2019-07-25 NOTE — TELEPHONE ENCOUNTER
Patient contacted and questionnaires completed.  She is interested in seeing behavioral health.  Would like referral, she will also check with her insurance company.  Continues to take her medications with no concerns.    Dr. Fraser, please review, we will call the patient back with recommendations.    PHQ-9 ([employer]) 7/25/2019   No Interest In Doing Things    Feeling Depressed    Trouble Sleeping    Tired / No Energy    No appetite or Over-Eating    Feeling Bad about Self    Trouble Concentrating    Moving Slow or Restless    Suicidal Thoughts    Total Score    1.  Little interest or pleasure in doing things 0   2.  Feeling down, depressed, or hopeless 1   3.  Trouble falling or staying asleep, or sleeping too much 0   4.  Feeling tired or having little energy 0   5.  Poor appetite or overeating 0   6.  Feeling bad about yourself 1   7.  Trouble concentrating 1   8.  Moving slowly or restless 0   9.  Suicidal or self-harm thoughts 0   PHQ-9 Total Score 3   Difficulty at work, home, or with people    PAOLA-7   [employer], 2002; Used with Permission) 7/25/2019   Over the last 2 weeks, how often have you been bothered by feeling nervous, anxious or on edge?    Over the last 2 weeks, how often have you been bothered by not being able to stop or control worrying?    Over the last 2 weeks, how often have you been bothered by worrying too much about different things?    Over the last 2 weeks, how often have you been bothered by trouble relaxing?    Over the last 2 weeks, how often have you been bothered by being so restless that it is hard to sit still?    Over the last 2 weeks, how often have you been bothered by becoming easily annoyed or irritable?    Over the last 2 weeks, how often have you been bothered by feeling afraid as if something awful might happen?    PAOLA-7 Total Score =     1. Feeling nervous, anxious, or on edge 1   2. Not being able to stop or control worrying 1   3. Worrying too much about different  things 1   4. Trouble relaxing 0   5. Being so restless that it is hard to sit still 0   6. Becoming easily annoyed or irritable 1   7. Feeling afraid, as if something awful might happen 0   PAOLA-7 Total Score 4   If you checked any problems, how difficult have they made it for you to do your work, take care of things at home, or get along with other people?

## 2019-07-26 ASSESSMENT — ANXIETY QUESTIONNAIRES: GAD7 TOTAL SCORE: 4

## 2019-07-29 NOTE — TELEPHONE ENCOUNTER
Panel Management Review      Patient has the following on her problem list:     Asthma review     ACT Total Scores 7/1/2019   ACT TOTAL SCORE (Goal Greater than or Equal to 20) 7   In the past 12 months, how many times did you visit the emergency room for your asthma without being admitted to the hospital? 0   In the past 12 months, how many times were you hospitalized overnight because of your asthma? 0      1. Is Asthma diagnosis on the Problem List? Yes    2. Is Asthma listed on Health Maintenance? Yes    3. Patient is due for:  ACT      Composite cancer screening  Chart review shows that this patient is due/due soon for the following Mammogram ( has scheduled)   Summary:    Patient is due/failing the following:   ACT ( given one 7/1/19 to bring home and told we will call her to update)    Type of outreach:  7/29/19  Phone, left message for patient to call back.   8/6/19 - Left message for patient to call back. Darian WICK CMA

## 2019-08-13 NOTE — TELEPHONE ENCOUNTER
Panel Management Review      Patient has the following on her problem list:     Asthma review     ACT Total Scores 7/1/2019   ACT TOTAL SCORE (Goal Greater than or Equal to 20) 7   In the past 12 months, how many times did you visit the emergency room for your asthma without being admitted to the hospital? 0   In the past 12 months, how many times were you hospitalized overnight because of your asthma? 0      1. Is Asthma diagnosis on the Problem List? Yes    2. Is Asthma listed on Health Maintenance? Yes    3. Patient is due for:  ACT      Composite cancer screening  Chart review shows that this patient is due/due soon for the following None  Summary:    Patient is due/failing the following:   ACT    Action needed:   Patient needs to do ACT.    Type of outreach:    Sent letter. with ACT to fill out and mail back     -Darian WICK CMA

## 2019-08-15 ENCOUNTER — HOSPITAL ENCOUNTER (OUTPATIENT)
Dept: MAMMOGRAPHY | Facility: CLINIC | Age: 74
Discharge: HOME | End: 2019-08-15
Attending: FAMILY MEDICINE | Admitting: FAMILY MEDICINE
Payer: COMMERCIAL

## 2019-08-15 ENCOUNTER — OFFICE VISIT (OUTPATIENT)
Dept: PSYCHOLOGY | Facility: CLINIC | Age: 74
End: 2019-08-15
Payer: COMMERCIAL

## 2019-08-15 DIAGNOSIS — F41.1 GENERALIZED ANXIETY DISORDER: Primary | ICD-10-CM

## 2019-08-15 DIAGNOSIS — Z12.31 VISIT FOR SCREENING MAMMOGRAM: ICD-10-CM

## 2019-08-15 PROCEDURE — 90834 PSYTX W PT 45 MINUTES: CPT | Performed by: PSYCHOLOGIST

## 2019-08-15 PROCEDURE — 77063 BREAST TOMOSYNTHESIS BI: CPT

## 2019-08-16 NOTE — PROGRESS NOTES
Progress Note - Initial Session  Disclaimer: This note consists of symbols derived from keyboarding, dictation and/or voice recognition software. As a result, there may be errors in the script that have gone undetected. Please consider this when interpreting information found in this chart.    Client Name:  Yamel Lindsay Date: 8/15/2019         Service Type: Individual  Video Visit: No     Session Start Time: 12:00 PM  session End Time: 12:45 PM     Session Length: 45    Session #: 1    Attendees: Client attended alone     DATA:  Diagnostic Assessment in progress.  Unable to complete documentation at the conclusion of the first session due to needing more information.  Client presents without paperwork.  She will bring it to our next session.  She has had psychiatry previously but no counseling or therapy.  Presenting complaint is stress and anxiety physically manifesting in her shoulders and neck.  He describes himself as having always been anxious.  Lives alone,  and 20 for his.  Sleep okay on trazodone.  Comparatively active social life, goes to the theater with her friend regularly, tries to see her entire family at least once per month.  Occasional uncued panic attacks consisting of tachycardia, diaphoresis, weak knees, dizziness, shortness of breath, peripheral numbing, tunnel vision, feeling as if she is having a heart attack.  Interactive Complexity: No  Crisis: No    Intervention:  CBT: Assessment for anxiety, work done breathing control and panic attack reduction.    ASSESSMENT:  Mental Status Assessment:  Appearance:   Appropriate   Eye Contact:   Good   Psychomotor Behavior: Normal   Attitude:   Cooperative   Orientation:   All  Speech   Rate / Production: Normal    Volume:  Normal   Mood:    Normal  Affect:    Appropriate   Thought Content:  Clear   Thought Form:  Coherent  Logical   Insight:    Good       Safety Issues and Plan for Safety and Risk Management:  Client denies  current fears or concerns for personal safety.  Client denies current or recent suicidal ideation or behaviors.  Client denies current or recent homicidal ideation or behaviors.  Client denies current or recent self injurious behavior or ideation.  Client denies other safety concerns.  Recommended that patient call 911 or go to the local ED should there be a change in any of these risk factors.  Client reports there are no firearms in the house.      Diagnostic Criteria:  A. Excessive anxiety and worry about a number of events or activities (such as work or school performance).   B. The person finds it difficult to control the worry.  C. Select 3 or more symptoms (required for diagnosis). Only one item is required in children.   - Restlessness or feeling keyed up or on edge.    - Being easily fatigued.    - Difficulty concentrating or mind going blank.    - Muscle tension.    - Sleep disturbance (difficulty falling or staying asleep, or restless unsatisfying sleep).   D. The focus of the anxiety and worry is not confined to features of an Axis I disorder.  E. The anxiety, worry, or physical symptoms cause clinically significant distress or impairment in social, occupational, or other important areas of functioning.   F. The disturbance is not due to the direct physiological effects of a substance (e.g., a drug of abuse, a medication) or a general medical condition (e.g., hyperthyroidism) and does not occur exclusively during a Mood Disorder, a Psychotic Disorder, or a Pervasive Developmental Disorder.    - The aformentioned symptoms began 65 year(s) ago and occurs 7 days per week and is experienced as moderate.      DSM5 Diagnoses: (Sustained by DSM5 Criteria Listed Above)  Diagnoses: 300.02 (F41.1) Generalized Anxiety Disorder  Psychosocial & Contextual Factors: Sleep difficulties        Collateral Reports Completed:  Not Applicable      PLAN: (Homework, other):  Client stated that she may follow up for ongoing  services with [facility].  Client will complete intake paperwork for our next session.      Simone Singleton

## 2019-08-27 ENCOUNTER — TELEPHONE (OUTPATIENT)
Dept: FAMILY MEDICINE | Facility: CLINIC | Age: 74
End: 2019-08-27

## 2019-08-27 NOTE — TELEPHONE ENCOUNTER
Panel Management Review      Patient has the following on her problem list:     Asthma review     ACT Total Scores 8/27/2019   ACT TOTAL SCORE (Goal Greater than or Equal to 20) 10   In the past 12 months, how many times did you visit the emergency room for your asthma without being admitted to the hospital? -   In the past 12 months, how many times were you hospitalized overnight because of your asthma? -      1. Is Asthma diagnosis on the Problem List? Yes    2. Is Asthma listed on Health Maintenance? Yes    3. Patient is due for:  ACT      Composite cancer screening  Chart review shows that this patient is due/due soon for the following   Summary:    Patient is due/failing the following:   ACT    Action needed:   Patient needs to do ACT.    Type of outreach:    ACT completed and returned in the mail, routed to provider for review.                                                                                                                                      MAKI Richards MA       Chart routed to Provider .

## 2019-08-30 ENCOUNTER — OFFICE VISIT (OUTPATIENT)
Dept: FAMILY MEDICINE | Facility: CLINIC | Age: 74
End: 2019-08-30
Payer: COMMERCIAL

## 2019-08-30 VITALS
OXYGEN SATURATION: 96 % | HEIGHT: 63 IN | SYSTOLIC BLOOD PRESSURE: 126 MMHG | BODY MASS INDEX: 27.82 KG/M2 | TEMPERATURE: 97.5 F | RESPIRATION RATE: 16 BRPM | HEART RATE: 72 BPM | WEIGHT: 157 LBS | DIASTOLIC BLOOD PRESSURE: 80 MMHG

## 2019-08-30 DIAGNOSIS — F41.9 ANXIETY: ICD-10-CM

## 2019-08-30 DIAGNOSIS — G47.09 OTHER INSOMNIA: ICD-10-CM

## 2019-08-30 DIAGNOSIS — K21.9 GASTROESOPHAGEAL REFLUX DISEASE WITHOUT ESOPHAGITIS: ICD-10-CM

## 2019-08-30 DIAGNOSIS — M17.0 OSTEOARTHRITIS OF BOTH KNEES, UNSPECIFIED OSTEOARTHRITIS TYPE: ICD-10-CM

## 2019-08-30 DIAGNOSIS — G89.4 CHRONIC PAIN SYNDROME: ICD-10-CM

## 2019-08-30 DIAGNOSIS — F32.5 MAJOR DEPRESSION IN COMPLETE REMISSION (H): Primary | ICD-10-CM

## 2019-08-30 PROCEDURE — 99214 OFFICE O/P EST MOD 30 MIN: CPT | Performed by: FAMILY MEDICINE

## 2019-08-30 PROCEDURE — 80307 DRUG TEST PRSMV CHEM ANLYZR: CPT | Mod: 90 | Performed by: FAMILY MEDICINE

## 2019-08-30 PROCEDURE — 99000 SPECIMEN HANDLING OFFICE-LAB: CPT | Performed by: FAMILY MEDICINE

## 2019-08-30 RX ORDER — TRAMADOL HYDROCHLORIDE 50 MG/1
50 TABLET ORAL EVERY 6 HOURS PRN
Qty: 120 TABLET | Refills: 3 | Status: SHIPPED | OUTPATIENT
Start: 2019-08-30 | End: 2019-08-30

## 2019-08-30 RX ORDER — CLONAZEPAM 0.5 MG/1
0.5 TABLET ORAL 3 TIMES DAILY PRN
Qty: 90 TABLET | Refills: 3 | Status: SHIPPED | OUTPATIENT
Start: 2019-08-30 | End: 2019-08-30

## 2019-08-30 RX ORDER — TRAZODONE HYDROCHLORIDE 50 MG/1
TABLET, FILM COATED ORAL
Qty: 180 TABLET | Refills: 3 | Status: SHIPPED | OUTPATIENT
Start: 2019-08-30 | End: 2020-07-31

## 2019-08-30 RX ORDER — BUPROPION HYDROCHLORIDE 150 MG/1
150 TABLET ORAL EVERY MORNING
Qty: 90 TABLET | Refills: 3 | Status: SHIPPED | OUTPATIENT
Start: 2019-08-30 | End: 2021-03-03

## 2019-08-30 RX ORDER — CLONAZEPAM 0.5 MG/1
0.5 TABLET ORAL 3 TIMES DAILY PRN
Qty: 90 TABLET | Refills: 3 | Status: SHIPPED | OUTPATIENT
Start: 2019-09-13 | End: 2020-01-07

## 2019-08-30 RX ORDER — TRAMADOL HYDROCHLORIDE 50 MG/1
50 TABLET ORAL EVERY 6 HOURS PRN
Qty: 120 TABLET | Refills: 0 | Status: SHIPPED | OUTPATIENT
Start: 2019-09-13 | End: 2019-08-30

## 2019-08-30 RX ORDER — TRAMADOL HYDROCHLORIDE 50 MG/1
50 TABLET ORAL EVERY 6 HOURS PRN
Qty: 120 TABLET | Refills: 3 | Status: SHIPPED | OUTPATIENT
Start: 2019-09-13 | End: 2019-08-30

## 2019-08-30 RX ORDER — TRAMADOL HYDROCHLORIDE 50 MG/1
50 TABLET ORAL EVERY 6 HOURS PRN
Qty: 120 TABLET | Refills: 0 | Status: SHIPPED | OUTPATIENT
Start: 2019-10-11 | End: 2020-04-27

## 2019-08-30 RX ORDER — TRAMADOL HYDROCHLORIDE 50 MG/1
50 TABLET ORAL EVERY 6 HOURS PRN
Qty: 112 TABLET | Refills: 0 | Status: SHIPPED | OUTPATIENT
Start: 2019-09-13 | End: 2019-10-11

## 2019-08-30 ASSESSMENT — ANXIETY QUESTIONNAIRES
7. FEELING AFRAID AS IF SOMETHING AWFUL MIGHT HAPPEN: SEVERAL DAYS
1. FEELING NERVOUS, ANXIOUS, OR ON EDGE: NEARLY EVERY DAY
3. WORRYING TOO MUCH ABOUT DIFFERENT THINGS: NEARLY EVERY DAY
2. NOT BEING ABLE TO STOP OR CONTROL WORRYING: NEARLY EVERY DAY
GAD7 TOTAL SCORE: 18
6. BECOMING EASILY ANNOYED OR IRRITABLE: MORE THAN HALF THE DAYS
5. BEING SO RESTLESS THAT IT IS HARD TO SIT STILL: NEARLY EVERY DAY

## 2019-08-30 ASSESSMENT — PATIENT HEALTH QUESTIONNAIRE - PHQ9
5. POOR APPETITE OR OVEREATING: NEARLY EVERY DAY
SUM OF ALL RESPONSES TO PHQ QUESTIONS 1-9: 9

## 2019-08-30 ASSESSMENT — MIFFLIN-ST. JEOR: SCORE: 1182.31

## 2019-08-30 NOTE — PROGRESS NOTES
Subjective     Yamel Lindsay is a 74 year old female who presents to clinic today for the following health issues:    74 yr old female here for chronic pain medication follow up. She has been on tramadol for years for multiple joint pain. She denies an side effects. She is also on Klonopin for anxiety and she also struggles with depression. She is seeing a therapist and he recommended that she try taking Wellbutrin. She denies any suicidal ideation at this time.     HPI   Chronic Pain Follow-Up       Type / Location of Pain: Arthritis, knees and neck  Analgesia/pain control:       Recent changes:  same      Overall control: Tolerable with discomfort  Activity level/function:      Daily activities:  Able to do all daily activities    Work:  not applicable  Adverse effects:  No  Adherance    Taking medication as directed?  Yes    Participating in other treatments: not applicable  Risk Factors:    Sleep:  Good    Mood/anxiety:  controlled    Recent family or social stressors:  none noted    Other aggravating factors: repetition and weather changes  PHQ-9 SCORE 3/14/2019 7/25/2019 8/30/2019   PHQ-9 Total Score - - -   PHQ-9 Total Score 1 3 9     PAOLA-7 SCORE 3/14/2019 7/25/2019 8/30/2019   Total Score - - -   Total Score 5 4 18     Encounter-Level CSA - 02/20/2015:    Controlled Substance Agreement - Scan on 2/26/2015  9:06 AM: Controlled Medication Agreement-  2/20/15 (below)       Patient-Level CSA:    There are no patient-level csa.           How many servings of fruits and vegetables do you eat daily?  2-3    On average, how many sweetened beverages do you drink each day (soda, juice, sweet tea, etc)?   0    How many days per week do you miss taking your medication? 0        Medication Followup of ultram, trazodone, Clonazepam, Omeprazole    Taking Medication as prescribed: yes    Side Effects:  None    Medication Helping Symptoms:  yes     Anxiety Follow-Up    How are you doing with your anxiety since your last  visit? No change    Are you having other symptoms that might be associated with anxiety? No    Have you had a significant life event? No     Are you feeling depressed? No    Do you have any concerns with your use of alcohol or other drugs? No    Social History     Tobacco Use     Smoking status: Former Smoker     Packs/day: 0.50     Types: Cigarettes     Last attempt to quit: 2011     Years since quittin.2     Smokeless tobacco: Never Used   Substance Use Topics     Alcohol use: No     Drug use: No     PAOLA-7 SCORE 3/14/2019 2019 2019   Total Score - - -   Total Score 5 4 18     PHQ 3/14/2019 2019 2019   PHQ-9 Total Score 1 3 9   Q9: Thoughts of better off dead/self-harm past 2 weeks Not at all Not at all Not at all     No flowsheet data found.  No flowsheet data found.           Patient Active Problem List   Diagnosis     Hyperlipidemia LDL goal <130     Primary localized osteoarthrosis, lower leg     Generalized anxiety disorder     Rhinitis, allergic seasonal     Alcoholism (H)     Mild recurrent major depression (H)     Advanced directives, counseling/discussion     Seasonal allergic rhinitis     Hip pain     GERD (gastroesophageal reflux disease)     S/P laparoscopic hernia repair     OA (osteoarthritis) of knee     Mild intermittent asthma without complication     Major depression in complete remission (H)     Benign essential hypertension     Past Surgical History:   Procedure Laterality Date     ESOPHAGOSCOPY, GASTROSCOPY, DUODENOSCOPY (EGD), COMBINED N/A 2015    Procedure: COMBINED ESOPHAGOSCOPY, GASTROSCOPY, DUODENOSCOPY (EGD), BIOPSY SINGLE OR MULTIPLE;  Surgeon: Dylan Alejandra MD;  Location: [facility]     ESOPHAGOSCOPY, GASTROSCOPY, DUODENOSCOPY (EGD), COMBINED N/A 2018    Procedure: COMBINED ESOPHAGOSCOPY, GASTROSCOPY, DUODENOSCOPY (EGD), BIOPSY SINGLE OR MULTIPLE;  gastroscopy;  Surgeon: Alexander Bautista MD;  Location: [facility]     LAPAROSCOPIC HERNIORRHAPHY  INGUINAL Right 2015    Procedure: LAPAROSCOPIC HERNIORRHAPHY INGUINAL;  Surgeon: Favio Olsen MD;  Location: [facility]     TUBAL LIGATION         Social History     Tobacco Use     Smoking status: Former Smoker     Packs/day: 0.50     Types: Cigarettes     Last attempt to quit: 2011     Years since quittin.2     Smokeless tobacco: Never Used   Substance Use Topics     Alcohol use: No     Family History   Problem Relation Age of Onset     Cancer Mother      C.A.D. Father      Lipids Father      Hypertension Father      Hypertension Brother      Cancer Brother         esophageal cancer     Breast Cancer Other      Prostate Cancer Brother      Psychotic Disorder Daughter      Diabetes No family hx of      Cerebrovascular Disease No family hx of      Cancer - colorectal No family hx of          Current Outpatient Medications   Medication Sig Dispense Refill     albuterol (PROVENTIL) (2.5 MG/3ML) 0.083% neb solution Take 1 vial (2.5 mg) by nebulization every 6 hours as needed for shortness of breath / dyspnea or wheezing 75 vial 2     albuterol (VENTOLIN HFA) 108 (90 Base) MCG/ACT inhaler INHALE 2 PUFFS INTO THE LUNGS EVERY 6 HOURS AS NEEDED FOR SHORTNESS OF BREATH, DIFFICULTY BREATHING OR WHEEZING. 1 Inhaler 3     atorvastatin (LIPITOR) 20 MG tablet Take 1 tablet (20 mg) by mouth daily 90 tablet 3     buPROPion (WELLBUTRIN XL) 150 MG 24 hr tablet Take 1 tablet (150 mg) by mouth every morning 90 tablet 3     Calcium Carb-Cholecalciferol (CALCIUM 1000 + D PO) Take 1 tablet by mouth       Cholecalciferol (VITAMIN D-3 PO) Take 2,000 Units by mouth daily        [START ON 2019] clonazePAM (KLONOPIN) 0.5 MG tablet Take 1 tablet (0.5 mg) by mouth 3 times daily as needed for anxiety Patient going out of town on Sept 11 may fill  on this day 90 tablet 3     fluticasone-salmeterol (AIRDUO RESPICLICK) 113-14 MCG/ACT inhaler Inhale 1 puff into the lungs 2 times daily 1 Inhaler 3     hydrochlorothiazide (HYDRODIURIL)  "25 MG tablet Take 1 tablet (25 mg) by mouth daily 90 tablet 3     ibuprofen (ADVIL,MOTRIN) 200 MG tablet Take 200-600 mg by mouth every 2 hours as needed for mild pain       Omega-3 Fatty Acids (FISH OIL) 1200 MG CAPS Take 1 capsule by mouth       omeprazole (PRILOSEC) 20 MG DR capsule TAKE 1 CAPSULE BY MOUTH DAILY, 30 TO 60 MINUTES BEFORE A MEAL. 90 capsule 3     PRENATAL VITAMINS PO Take 1 tablet by mouth daily        [START ON 9/13/2019] traMADol (ULTRAM) 50 MG tablet Take 1 tablet (50 mg) by mouth every 6 hours as needed for pain Patient going out of town on Sept 11 th may fill on this day 112 tablet 0     [START ON 10/11/2019] traMADol (ULTRAM) 50 MG tablet Take 1 tablet (50 mg) by mouth every 6 hours as needed for severe pain 120 tablet 0     traZODone (DESYREL) 50 MG tablet TAKE TWO TABLETS BY MOUTH EVERY NIGHT AT BEDTIME 180 tablet 3     order for DME Equipment being ordered: Nebulizer 1 Units 0     tiZANidine (ZANAFLEX) 2 MG tablet Take 1 tablet (2 mg) by mouth 3 times daily 90 tablet 0     Allergies   Allergen Reactions     Nka [No Known Allergies]      Seasonal Allergies      BP Readings from Last 3 Encounters:   08/30/19 126/80   07/01/19 120/66   05/21/19 124/76    Wt Readings from Last 3 Encounters:   08/30/19 71.2 kg (157 lb)   07/01/19 69.9 kg (154 lb)   05/21/19 72 kg (158 lb 11.2 oz)                      Reviewed and updated as needed this visit by Provider  Tobacco  Allergies  Meds  Problems  Med Hx  Surg Hx  Fam Hx         Review of Systems   ROS COMP: Constitutional, HEENT, cardiovascular, pulmonary, gi and gu systems are negative, except as otherwise noted.      Objective    /80   Pulse 72   Temp 97.5  F (36.4  C) (Tympanic)   Resp 16   Ht 1.594 m (5' 2.75\")   Wt 71.2 kg (157 lb)   SpO2 96%   BMI 28.03 kg/m    Body mass index is 28.03 kg/m .  Physical Exam   GENERAL: healthy, alert and no distress  EYES: Eyes grossly normal to inspection, PERRL and conjunctivae and sclerae " normal  HENT: ear canals and TM's normal, nose and mouth without ulcers or lesions  NECK: no adenopathy, no asymmetry, masses, or scars and thyroid normal to palpation  RESP: lungs clear to auscultation - no rales, rhonchi or wheezes  CV: regular rate and rhythm, normal S1 S2, no S3 or S4, no murmur, click or rub, no peripheral edema and peripheral pulses strong  ABDOMEN: soft, nontender, no hepatosplenomegaly, no masses and bowel sounds normal  MS: no gross musculoskeletal defects noted, no edema    Diagnostic Test Results:  Pending         Assessment & Plan     1. Anxiety  Medication refilled. Faxed to pharmacy   - clonazePAM (KLONOPIN) 0.5 MG tablet; Take 1 tablet (0.5 mg) by mouth 3 times daily as needed for anxiety Patient going out of town on Sept 11 may fill  on this day  Dispense: 90 tablet; Refill: 3    2. Gastroesophageal reflux disease without esophagitis  Symptoms stable , medication faxed   - omeprazole (PRILOSEC) 20 MG DR capsule; TAKE 1 CAPSULE BY MOUTH DAILY, 30 TO 60 MINUTES BEFORE A MEAL.  Dispense: 90 capsule; Refill: 3    3. Osteoarthritis of both knees, unspecified osteoarthritis type  [place] the [place]  and it is noted   - Drug  Screen Comprehensive, Urine w/o Reported Meds (Pain Care Package)  - traMADol (ULTRAM) 50 MG tablet; Take 1 tablet (50 mg) by mouth every 6 hours as needed for pain Patient going out of town on Sept 11 th may fill on this day  Dispense: 112 tablet; Refill: 0  - traMADol (ULTRAM) 50 MG tablet; Take 1 tablet (50 mg) by mouth every 6 hours as needed for severe pain  Dispense: 120 tablet; Refill: 0    4. Other insomnia  - traZODone (DESYREL) 50 MG tablet; TAKE TWO TABLETS BY MOUTH EVERY NIGHT AT BEDTIME  Dispense: 180 tablet; Refill: 3    5. Chronic pain syndrome   [place]ed the [place] prescription monitoring and no red flags . Also ordered a UDS   - Drug  Screen Comprehensive, Urine w/o Reported Meds (Pain Care Package)  - traMADol (ULTRAM) 50 MG tablet; Take 1 tablet (50  mg) by mouth every 6 hours as needed for pain Patient going out of town on Sept 11 th may fill on this day  Dispense: 112 tablet; Refill: 0  - traMADol (ULTRAM) 50 MG tablet; Take 1 tablet (50 mg) by mouth every 6 hours as needed for severe pain  Dispense: 120 tablet; Refill: 0    6. Major depression in complete remission (H)  Patient started on Wellbutrin   - buPROPion (WELLBUTRIN XL) 150 MG 24 hr tablet; Take 1 tablet (150 mg) by mouth every morning  Dispense: 90 tablet; Refill: 3             FUTURE APPOINTMENTS:       - Follow-up visit in 2 month or sooner as needed.    Return in about 2 months (around 10/30/2019) for Routine Visit.    Catie Fraser MD  [facility]

## 2019-08-30 NOTE — NURSING NOTE
Rx for Klonopin, and 2 Rx's for Tramadol (start date 9/1319 and 10/11/19) faxed to [facility] at [phone].

## 2019-08-31 ASSESSMENT — ANXIETY QUESTIONNAIRES: GAD7 TOTAL SCORE: 18

## 2019-08-31 ASSESSMENT — ASTHMA QUESTIONNAIRES: ACT_TOTALSCORE: 12

## 2019-09-04 LAB — COMPREHEN DRUG ANALYSIS UR: NORMAL

## 2019-09-05 ENCOUNTER — OFFICE VISIT (OUTPATIENT)
Dept: FAMILY MEDICINE | Facility: CLINIC | Age: 74
End: 2019-09-05
Payer: COMMERCIAL

## 2019-09-05 VITALS
TEMPERATURE: 97 F | SYSTOLIC BLOOD PRESSURE: 106 MMHG | HEART RATE: 72 BPM | DIASTOLIC BLOOD PRESSURE: 72 MMHG | OXYGEN SATURATION: 97 % | HEIGHT: 63 IN | RESPIRATION RATE: 14 BRPM | BODY MASS INDEX: 27.11 KG/M2 | WEIGHT: 153 LBS

## 2019-09-05 DIAGNOSIS — G89.29 OTHER CHRONIC PAIN: Primary | ICD-10-CM

## 2019-09-05 PROCEDURE — 99212 OFFICE O/P EST SF 10 MIN: CPT | Performed by: FAMILY MEDICINE

## 2019-09-05 ASSESSMENT — MIFFLIN-ST. JEOR: SCORE: 1164.16

## 2019-09-05 NOTE — PATIENT INSTRUCTIONS
Thank you for choosing [facility].  You may be receiving an email and/or telephone survey request from [facility] Customer Experience regarding your visit today.  Please take a few minutes to respond to the survey to let us know how we are doing.      If you have questions or concerns, please contact us via [employer] or you can contact your care team at [phone].    Our Clinic hours are:  Monday 6:40 am  to 7:00 pm  Tuesday -Friday 6:40 am to 5:00 pm    The [place] outpatient lab hours are:  Monday - Friday 6:10 am to 4:45 pm  Saturdays 7:00 am to 11:00 am  Appointments are required, call [phone]    If you have clinical questions after hours or would like to schedule an appointment,  call the clinic at [phone].

## 2019-09-05 NOTE — PROGRESS NOTES
Subjective     Yamel Lindsay is a 74 year old female who presents to clinic today for the following health issues:74 yr old female here for medication refill. Was here for same last week and returned with some questions. She is leaving town for a class reunion on the 11 th and wanted a refill sooner than the date. I had done the same exact thing she asked of today about a week ago. Patient states that she was confused about this. I reassured her and my assistant called the pharmacy confirming that the prescription was received.    HPI   Chronic Pain Follow-Up       Type / Location of Pain: all over body   Analgesia/pain control:       Recent changes:  same      Overall control: Tolerable with discomfort  Activity level/function:      Daily activities:  Able to do moderate activities    Work:  not applicable  Adverse effects:  No  Adherance    Taking medication as directed?  Yes    Participating in other treatments: yes patient had seen a therapist   Risk Factors:    Sleep:  Fair    Mood/anxiety:  Same     Recent family or social stressors:  none noted    Other aggravating factors: walking   PHQ-9 SCORE 3/14/2019 7/25/2019 8/30/2019   PHQ-9 Total Score - - -   PHQ-9 Total Score 1 3 9     PAOLA-7 SCORE 3/14/2019 7/25/2019 8/30/2019   Total Score - - -   Total Score 5 4 18     Encounter-Level CSA - 02/20/2015:    Controlled Substance Agreement - Scan on 2/26/2015  9:06 AM: Controlled Medication Agreement-  2/20/15 (below)       Patient-Level CSA:    There are no patient-level csa.           How many servings of fruits and vegetables do you eat daily?  2-3    On average, how many sweetened beverages do you drink each day (soda, juice, sweet tea, etc)?   0    How many days per week do you miss taking your medication? 0        Medication Followup of tramadol     Taking Medication as prescribed: yes    Side Effects:  None    Medication Helping Symptoms:  yes         Patient Active Problem List   Diagnosis     Hyperlipidemia  LDL goal <130     Primary localized osteoarthrosis, lower leg     Generalized anxiety disorder     Rhinitis, allergic seasonal     Alcoholism (H)     Mild recurrent major depression (H)     Advanced directives, counseling/discussion     Seasonal allergic rhinitis     Hip pain     GERD (gastroesophageal reflux disease)     S/P laparoscopic hernia repair     OA (osteoarthritis) of knee     Mild intermittent asthma without complication     Major depression in complete remission (H)     Benign essential hypertension     Past Surgical History:   Procedure Laterality Date     ESOPHAGOSCOPY, GASTROSCOPY, DUODENOSCOPY (EGD), COMBINED N/A 2015    Procedure: COMBINED ESOPHAGOSCOPY, GASTROSCOPY, DUODENOSCOPY (EGD), BIOPSY SINGLE OR MULTIPLE;  Surgeon: Dylan Alejandra MD;  Location: [facility]     ESOPHAGOSCOPY, GASTROSCOPY, DUODENOSCOPY (EGD), COMBINED N/A 2018    Procedure: COMBINED ESOPHAGOSCOPY, GASTROSCOPY, DUODENOSCOPY (EGD), BIOPSY SINGLE OR MULTIPLE;  gastroscopy;  Surgeon: Alexander Bautista MD;  Location: [facility]     LAPAROSCOPIC HERNIORRHAPHY INGUINAL Right 2015    Procedure: LAPAROSCOPIC HERNIORRHAPHY INGUINAL;  Surgeon: Favio Olsen MD;  Location: [facility]     TUBAL LIGATION         Social History     Tobacco Use     Smoking status: Former Smoker     Packs/day: 0.50     Types: Cigarettes     Last attempt to quit: 2011     Years since quittin.2     Smokeless tobacco: Never Used   Substance Use Topics     Alcohol use: No     Family History   Problem Relation Age of Onset     Cancer Mother      C.A.D. Father      Lipids Father      Hypertension Father      Hypertension Brother      Cancer Brother         esophageal cancer     Breast Cancer Other      Prostate Cancer Brother      Psychotic Disorder Daughter      Diabetes No family hx of      Cerebrovascular Disease No family hx of      Cancer - colorectal No family hx of          Current Outpatient Medications   Medication Sig Dispense Refill      albuterol (PROVENTIL) (2.5 MG/3ML) 0.083% neb solution Take 1 vial (2.5 mg) by nebulization every 6 hours as needed for shortness of breath / dyspnea or wheezing 75 vial 2     albuterol (VENTOLIN HFA) 108 (90 Base) MCG/ACT inhaler INHALE 2 PUFFS INTO THE LUNGS EVERY 6 HOURS AS NEEDED FOR SHORTNESS OF BREATH, DIFFICULTY BREATHING OR WHEEZING. 1 Inhaler 3     atorvastatin (LIPITOR) 20 MG tablet Take 1 tablet (20 mg) by mouth daily 90 tablet 3     buPROPion (WELLBUTRIN XL) 150 MG 24 hr tablet Take 1 tablet (150 mg) by mouth every morning 90 tablet 3     Calcium Carb-Cholecalciferol (CALCIUM 1000 + D PO) Take 1 tablet by mouth       Cholecalciferol (VITAMIN D-3 PO) Take 2,000 Units by mouth daily        [START ON 9/13/2019] clonazePAM (KLONOPIN) 0.5 MG tablet Take 1 tablet (0.5 mg) by mouth 3 times daily as needed for anxiety Patient going out of town on Sept 11 may fill  on this day 90 tablet 3     fluticasone-salmeterol (AIRDUO RESPICLICK) 113-14 MCG/ACT inhaler Inhale 1 puff into the lungs 2 times daily 1 Inhaler 3     hydrochlorothiazide (HYDRODIURIL) 25 MG tablet Take 1 tablet (25 mg) by mouth daily 90 tablet 3     ibuprofen (ADVIL,MOTRIN) 200 MG tablet Take 200-600 mg by mouth every 2 hours as needed for mild pain       Omega-3 Fatty Acids (FISH OIL) 1200 MG CAPS Take 1 capsule by mouth       omeprazole (PRILOSEC) 20 MG DR capsule TAKE 1 CAPSULE BY MOUTH DAILY, 30 TO 60 MINUTES BEFORE A MEAL. 90 capsule 3     PRENATAL VITAMINS PO Take 1 tablet by mouth daily        tiZANidine (ZANAFLEX) 2 MG tablet Take 1 tablet (2 mg) by mouth 3 times daily 90 tablet 0     [START ON 9/13/2019] traMADol (ULTRAM) 50 MG tablet Take 1 tablet (50 mg) by mouth every 6 hours as needed for pain Patient going out of town on Sept 11 th may fill on this day 112 tablet 0     traZODone (DESYREL) 50 MG tablet TAKE TWO TABLETS BY MOUTH EVERY NIGHT AT BEDTIME 180 tablet 3     order for DME Equipment being ordered: Nebulizer 1 Units 0     [START  ON 10/11/2019] traMADol (ULTRAM) 50 MG tablet Take 1 tablet (50 mg) by mouth every 6 hours as needed for severe pain 120 tablet 0     Allergies   Allergen Reactions     Nka [No Known Allergies]      Seasonal Allergies      BP Readings from Last 3 Encounters:   09/05/19 106/72   08/30/19 126/80   07/01/19 120/66    Wt Readings from Last 3 Encounters:   09/05/19 69.4 kg (153 lb)   08/30/19 71.2 kg (157 lb)   07/01/19 69.9 kg (154 lb)                      Reviewed and updated as needed this visit by Provider         Review of Systems   ROS COMP: Constitutional, HEENT, cardiovascular, pulmonary, gi and gu systems are negative, except as otherwise noted.      Objective    There were no vitals taken for this visit.  There is no height or weight on file to calculate BMI.  Physical Exam   GENERAL: healthy, alert and no distress  SKIN: no suspicious lesions or rashes  PSYCH: mentation appears normal, affect normal/bright    Diagnostic Test Results:  none         Assessment & Plan     1. Other chronic pain  Patient reassured, medication already faxed                FUTURE APPOINTMENTS:       - Follow-up visit in 2 months or sooner as needed.  Patient Instructions         Thank you for choosing [facility].  You may be receiving an email and/or telephone survey request from [facility] [employer] regarding your visit today.  Please take a few minutes to respond to the survey to let us know how we are doing.      If you have questions or concerns, please contact us via NICE or you can contact your care team at [phone].    Our Clinic hours are:  Monday 6:40 am  to 7:00 pm  Tuesday -Friday 6:40 am to 5:00 pm    The [place] outpatient lab hours are:  Monday - Friday 6:10 am to 4:45 pm  Saturdays 7:00 am to 11:00 am  Appointments are required, call [phone]    If you have clinical questions after hours or would like to schedule an appointment,  call the clinic at [phone].      No follow-ups on  file.    Catie Fraser MD  [facility]

## 2019-09-19 ENCOUNTER — OFFICE VISIT (OUTPATIENT)
Dept: PSYCHOLOGY | Facility: CLINIC | Age: 74
End: 2019-09-19
Payer: COMMERCIAL

## 2019-09-19 DIAGNOSIS — F41.1 GENERALIZED ANXIETY DISORDER: Primary | ICD-10-CM

## 2019-09-19 PROCEDURE — 90834 PSYTX W PT 45 MINUTES: CPT | Performed by: PSYCHOLOGIST

## 2019-09-23 NOTE — PROGRESS NOTES
Progress Note  Disclaimer: This note consists of symbols derived from keyboarding, dictation and/or voice recognition software. As a result, there may be errors in the script that have gone undetected. Please consider this when interpreting information found in this chart.    Client Name:  Yamel Lindsay Date: 9/19/2019         Service Type: Individual  Video Visit: No     Session Start Time: 10:30 AM  session End Time: 11:15 AM     Session Length: 45    Session #: 2    Attendees: Client attended alone     DATA:  Diagnostic Assessment in progress.  Unable to complete documentation at the conclusion of the first session due to needing more information.  Client presents without paperwork.  This was supposed to be completed for this session    Client has been traveling a great deal this summer.  Lives alone, .  Sleep okay on trazodone.  Comparatively active social life, goes to the theater with her friend regularly, tries to see her entire family at least once per month.  Occasional uncued panic attacks consisting of tachycardia, diaphoresis, weak knees, dizziness, shortness of breath, peripheral numbing, tunnel vision, feeling as if she is having a heart attack.  Interactive Complexity: No  Crisis: No    Intervention:  CBT: Assessment for anxiety, work done breathing control and panic attack reduction.    ASSESSMENT:  Mental Status Assessment:  Appearance:   Appropriate   Eye Contact:   Good   Psychomotor Behavior: Normal   Attitude:   Cooperative   Orientation:   All  Speech   Rate / Production: Normal    Volume:  Normal   Mood:    Normal  Affect:    Appropriate   Thought Content:  Clear   Thought Form:  Coherent  Logical   Insight:    Good       Safety Issues and Plan for Safety and Risk Management:  Client denies current fears or concerns for personal safety.  Client denies current or recent suicidal ideation or behaviors.  Client denies current or recent homicidal ideation or  behaviors.  Client denies current or recent self injurious behavior or ideation.  Client denies other safety concerns.  Recommended that patient call 911 or go to the local ED should there be a change in any of these risk factors.  Client reports there are no firearms in the house.      Diagnostic Criteria:  A. Excessive anxiety and worry about a number of events or activities (such as work or school performance).   B. The person finds it difficult to control the worry.  C. Select 3 or more symptoms (required for diagnosis). Only one item is required in children.   - Restlessness or feeling keyed up or on edge.    - Being easily fatigued.    - Difficulty concentrating or mind going blank.    - Muscle tension.    - Sleep disturbance (difficulty falling or staying asleep, or restless unsatisfying sleep).   D. The focus of the anxiety and worry is not confined to features of an Axis I disorder.  E. The anxiety, worry, or physical symptoms cause clinically significant distress or impairment in social, occupational, or other important areas of functioning.   F. The disturbance is not due to the direct physiological effects of a substance (e.g., a drug of abuse, a medication) or a general medical condition (e.g., hyperthyroidism) and does not occur exclusively during a Mood Disorder, a Psychotic Disorder, or a Pervasive Developmental Disorder.    - The aformentioned symptoms began 65 year(s) ago and occurs 7 days per week and is experienced as moderate.      DSM5 Diagnoses: (Sustained by DSM5 Criteria Listed Above)  Diagnoses: 300.02 (F41.1) Generalized Anxiety Disorder  Psychosocial & Contextual Factors: Sleep difficulties        Collateral Reports Completed:  Not Applicable      PLAN: (Homework, other):  Client stated that she may follow up for ongoing services with [facility].  Client will complete intake paperwork for our next session.      Simone Singleton

## 2019-10-02 ENCOUNTER — ALLIED HEALTH/NURSE VISIT (OUTPATIENT)
Dept: FAMILY MEDICINE | Facility: CLINIC | Age: 74
End: 2019-10-02
Payer: COMMERCIAL

## 2019-10-02 ENCOUNTER — OFFICE VISIT (OUTPATIENT)
Dept: PSYCHOLOGY | Facility: CLINIC | Age: 74
End: 2019-10-02
Payer: COMMERCIAL

## 2019-10-02 DIAGNOSIS — F41.1 GENERALIZED ANXIETY DISORDER: Primary | ICD-10-CM

## 2019-10-02 DIAGNOSIS — Z23 NEED FOR PROPHYLACTIC VACCINATION AND INOCULATION AGAINST INFLUENZA: Primary | ICD-10-CM

## 2019-10-02 PROCEDURE — 90834 PSYTX W PT 45 MINUTES: CPT | Performed by: PSYCHOLOGIST

## 2019-10-02 PROCEDURE — 99207 ZZC NO CHARGE LOS: CPT

## 2019-10-02 PROCEDURE — 90662 IIV NO PRSV INCREASED AG IM: CPT

## 2019-10-02 PROCEDURE — 90471 IMMUNIZATION ADMIN: CPT

## 2019-10-03 NOTE — PROGRESS NOTES
Progress Note  Disclaimer: This note consists of symbols derived from keyboarding, dictation and/or voice recognition software. As a result, there may be errors in the script that have gone undetected. Please consider this when interpreting information found in this chart.    Client Name:  Yamel Lindsay Date: 10/2/2019         Service Type: Individual  Video Visit: No     Session Start Time: 9:30 AM  session End Time: 10:15 AM     Session Length: 45    Session #: 3    Attendees: Client attended alone     DATA:  Client reports she still experiences a lot of daily anxiety and stress.  It is better if she is out of the house.  She continues to engage with friends, go out to the theater.  She has a trip coming up to [facility] for a week and is looking forward to it greatly.  Stated that she carries her anxiety and stress both in her physical and emotional being.  She does not have much in the way of cognitive thoughts of anxiety.      Interactive Complexity: No  Crisis: No    Intervention:  CBT: Assessment for anxiety, work done breathing control and panic attack reduction.    ASSESSMENT:  Mental Status Assessment:  Appearance:   Appropriate   Eye Contact:   Good   Psychomotor Behavior: Normal   Attitude:   Cooperative   Orientation:   All  Speech   Rate / Production: Normal    Volume:  Normal   Mood:    Normal  Affect:    Appropriate   Thought Content:  Clear   Thought Form:  Coherent  Logical   Insight:    Good       Safety Issues and Plan for Safety and Risk Management:  Client denies current fears or concerns for personal safety.  Client denies current or recent suicidal ideation or behaviors.  Client denies current or recent homicidal ideation or behaviors.  Client denies current or recent self injurious behavior or ideation.  Client denies other safety concerns.  Recommended that patient call 911 or go to the local ED should there be a change in any of these risk  factors.  Client reports there are no firearms in the house.      Diagnostic Criteria:  A. Excessive anxiety and worry about a number of events or activities (such as work or school performance).   B. The person finds it difficult to control the worry.  C. Select 3 or more symptoms (required for diagnosis). Only one item is required in children.   - Restlessness or feeling keyed up or on edge.    - Being easily fatigued.    - Difficulty concentrating or mind going blank.    - Muscle tension.    - Sleep disturbance (difficulty falling or staying asleep, or restless unsatisfying sleep).   D. The focus of the anxiety and worry is not confined to features of an Axis I disorder.  E. The anxiety, worry, or physical symptoms cause clinically significant distress or impairment in social, occupational, or other important areas of functioning.   F. The disturbance is not due to the direct physiological effects of a substance (e.g., a drug of abuse, a medication) or a general medical condition (e.g., hyperthyroidism) and does not occur exclusively during a Mood Disorder, a Psychotic Disorder, or a Pervasive Developmental Disorder.    - The aformentioned symptoms began 65 year(s) ago and occurs 7 days per week and is experienced as moderate.      DSM5 Diagnoses: (Sustained by DSM5 Criteria Listed Above)  Diagnoses: 300.02 (F41.1) Generalized Anxiety Disorder  Psychosocial & Contextual Factors: Sleep difficulties        Collateral Reports Completed:  Not Applicable      PLAN: (Homework, other):  Client stated that she may follow up for ongoing services with [facility].  Client will complete intake paperwork for our next session.      Simone Singleton  ______________________________________________________________________    Treatment Plan    Patient's Name: Yamel Lindsay  YOB: 1945    Date: 10/3/2019    DSM5 Diagnoses: (Sustained by DSM5 Criteria Listed Above)  Diagnoses: 300.02 (F41.1) Generalized  Anxiety Disorder  Psychosocial & Contextual Factors: Sleep difficulties    Referral / Collaboration:  Referral to another professional/service is not indicated at this time..    Anticipated number of session or this episode of care: 20    Anxiety Treatment plan:  1. Education- the Biopsychosocial model of anxiety  a. Client will be able to describe how anxiety is effecting them physically, emotionally and socially  2. Distraction  a. Client will learn 2 techniques to distract themselves when becoming anxious  3. Diaphragmatic breathing  a. Client will learn to breath using their diaphragm  b. Client will learn 2 breathing patterns to use to reduce anxiety  4. Visualization  a. Client will learn to establish a safe, calm place in their mind utilizing all their senses  b. Client will practice using visualization at times when they are not anxious so they will be able to use it when anxiety occurs  5. Progressive muscle relaxation  a. Client will learn progressive muscle relaxation techniques and practice them 4-5 times per week.  b. Client will learn to use mental images of relaxation to relax muscle groups and do this on a daily basis  6. Self-care  a. Client will identify 3 things they can do just for themselves  b. Client will take time for quiet, reflection, meditation 3 times per week  c. Client will Learn to set boundaries when appropriate  d. Client will Identify 3 individuals they can call on for support, distraction  7. Assessment of progress  a. Client will engage in assessment of their progress on a regular basis      Patient has reviewed and agreed to the above plan.      Simone Singleton  October 3, 2019

## 2019-10-10 ENCOUNTER — TELEPHONE (OUTPATIENT)
Dept: FAMILY MEDICINE | Facility: CLINIC | Age: 74
End: 2019-10-10

## 2019-10-10 DIAGNOSIS — G89.4 CHRONIC PAIN SYNDROME: ICD-10-CM

## 2019-10-10 DIAGNOSIS — M17.0 OSTEOARTHRITIS OF BOTH KNEES, UNSPECIFIED OSTEOARTHRITIS TYPE: ICD-10-CM

## 2019-10-10 NOTE — TELEPHONE ENCOUNTER
Tonny from pharmacy reports that the RX written from 10/11/19 is  due to the new laws.  Can provider prescribe new Rx by escribe?  Pharm cued    Routing to provider.    Gabriella WICK Rn

## 2019-10-10 NOTE — TELEPHONE ENCOUNTER
Please see note from pharmacy  Ok to fill Tramadol early?    Routing to provider.    Gabriella WICK Rn

## 2019-10-10 NOTE — TELEPHONE ENCOUNTER
Reason for Call:  Other prescription    Detailed comments: Tonny from the pharmacy is calling asking for a verbal to fill the Tramadol today because patient is going out of town.    Phone Number Patient can be reached at: Home number on file [phone] extension 2 ask for Tonny    Best Time: any    Can we leave a detailed message on this number? YES    Call taken on 10/10/2019 at 8:57 AM by Lily Gaffney

## 2019-10-11 DIAGNOSIS — G89.4 CHRONIC PAIN SYNDROME: ICD-10-CM

## 2019-10-11 DIAGNOSIS — M17.0 OSTEOARTHRITIS OF BOTH KNEES, UNSPECIFIED OSTEOARTHRITIS TYPE: ICD-10-CM

## 2019-10-11 RX ORDER — TRAMADOL HYDROCHLORIDE 50 MG/1
50 TABLET ORAL 4 TIMES DAILY
Qty: 120 TABLET | Refills: 0 | Status: SHIPPED | OUTPATIENT
Start: 2019-10-11 | End: 2019-11-12

## 2019-10-11 RX ORDER — TRAMADOL HYDROCHLORIDE 50 MG/1
50 TABLET ORAL EVERY 6 HOURS PRN
Qty: 112 TABLET | Refills: 0 | Status: CANCELLED | OUTPATIENT
Start: 2019-10-11

## 2019-10-11 NOTE — TELEPHONE ENCOUNTER
Patient notified, she needs refill faxed to pharmacy. CSS, please fax this asap.        KRISTEN Martini

## 2019-10-11 NOTE — TELEPHONE ENCOUNTER
This was filled already, spoke to the patient, she needs this faxed to pharmacy. CSS, please locate scripts and fax, thank you.    KRISTEN Martini

## 2019-10-11 NOTE — TELEPHONE ENCOUNTER
Only one Rx faxed the 120 1 tablet by mouth every 4 hours.   The PRN one was not received.  Lily Gaffney on 10/11/2019 at 3:03 PM

## 2019-10-11 NOTE — TELEPHONE ENCOUNTER
Due to new law effective July 1--- Opiods have to be filled within 30 days of written date so the rx from Aug 30 to fill today is VOID. Please send new rx. Patient is leaving country tomorrow-- so please send as soon as possible as we close at 5.  Thanks!  Helen Madrid CPhT  [facility]  [phone]

## 2019-10-11 NOTE — TELEPHONE ENCOUNTER
Faxed one Rx to the pharmacy the Q[facility]. 120 with 0 refills.    Lily Gaffney on 10/11/2019 at 3:02 PM

## 2019-10-11 NOTE — TELEPHONE ENCOUNTER
Dr. Fraser,  There are 2 scripts for the Tramadol, patient is notified of the 50 mg to take 4 times daily being faxed, there is also a PRN script to take every 6 hours as needed, please clarify, do you want patient to be on both? Patient says she takes every 4 hours, not on as needed basis, please advise.    KRISTEN Martini

## 2019-10-28 ENCOUNTER — OFFICE VISIT (OUTPATIENT)
Dept: PSYCHOLOGY | Facility: CLINIC | Age: 74
End: 2019-10-28
Payer: COMMERCIAL

## 2019-10-28 DIAGNOSIS — F41.1 GENERALIZED ANXIETY DISORDER: Primary | ICD-10-CM

## 2019-10-28 PROCEDURE — 90834 PSYTX W PT 45 MINUTES: CPT | Performed by: PSYCHOLOGIST

## 2019-10-31 ASSESSMENT — ANXIETY QUESTIONNAIRES
1. FEELING NERVOUS, ANXIOUS, OR ON EDGE: MORE THAN HALF THE DAYS
GAD7 TOTAL SCORE: 10
6. BECOMING EASILY ANNOYED OR IRRITABLE: SEVERAL DAYS
5. BEING SO RESTLESS THAT IT IS HARD TO SIT STILL: MORE THAN HALF THE DAYS
3. WORRYING TOO MUCH ABOUT DIFFERENT THINGS: MORE THAN HALF THE DAYS
7. FEELING AFRAID AS IF SOMETHING AWFUL MIGHT HAPPEN: NOT AT ALL
2. NOT BEING ABLE TO STOP OR CONTROL WORRYING: SEVERAL DAYS
4. TROUBLE RELAXING: MORE THAN HALF THE DAYS

## 2019-10-31 ASSESSMENT — PATIENT HEALTH QUESTIONNAIRE - PHQ9: SUM OF ALL RESPONSES TO PHQ QUESTIONS 1-9: 4

## 2019-10-31 NOTE — PROGRESS NOTES
Progress Note  Disclaimer: This note consists of symbols derived from keyboarding, dictation and/or voice recognition software. As a result, there may be errors in the script that have gone undetected. Please consider this when interpreting information found in this chart.    Client Name:  Yamel Lindsay Date: 10/28/2019         Service Type: Individual  Video Visit: No     Session Start Time: 10:30 AM  session End Time: 11:15 AM     Session Length: 45    Session #: 4    Attendees: Client attended alone     DATA:  Client reports she had a great trip to [place] with family.  She described witnessing a large protest outside of her hotel which she had to work around to get inside as well as to go to the airport the next morning.  She reported this all fairly calm way and did not report and anxiety during or after the experience.  This may have contributed to a general decline in her anxiety since our last meeting.  No panic attacks reported.  Her self-care she continues to read.  She sleeps more than she should possibly, she is developing a distributed sleep pattern.      Interactive Complexity: No  Crisis: No    Intervention:  CBT: for anxiety,  breathing control and panic attack reduction.    ASSESSMENT:  Mental Status Assessment:  Appearance:   Appropriate   Eye Contact:   Good   Psychomotor Behavior: Normal   Attitude:   Cooperative   Orientation:   All  Speech   Rate / Production: Normal    Volume:  Normal   Mood:    Normal  Affect:    Appropriate   Thought Content:  Clear   Thought Form:  Coherent  Logical   Insight:    Good       Safety Issues and Plan for Safety and Risk Management:  Client denies current fears or concerns for personal safety.  Client denies current or recent suicidal ideation or behaviors.  Client denies current or recent homicidal ideation or behaviors.  Client denies current or recent self injurious behavior or ideation.  Client denies other  safety concerns.  Recommended that patient call 911 or go to the local ED should there be a change in any of these risk factors.  Client reports there are no firearms in the house.      Diagnostic Criteria:  A. Excessive anxiety and worry about a number of events or activities (such as work or school performance).   B. The person finds it difficult to control the worry.  C. Select 3 or more symptoms (required for diagnosis). Only one item is required in children.   - Restlessness or feeling keyed up or on edge.    - Being easily fatigued.    - Difficulty concentrating or mind going blank.    - Muscle tension.    - Sleep disturbance (difficulty falling or staying asleep, or restless unsatisfying sleep).   D. The focus of the anxiety and worry is not confined to features of an Axis I disorder.  E. The anxiety, worry, or physical symptoms cause clinically significant distress or impairment in social, occupational, or other important areas of functioning.   F. The disturbance is not due to the direct physiological effects of a substance (e.g., a drug of abuse, a medication) or a general medical condition (e.g., hyperthyroidism) and does not occur exclusively during a Mood Disorder, a Psychotic Disorder, or a Pervasive Developmental Disorder.    - The aformentioned symptoms began 65 year(s) ago and occurs 7 days per week and is experienced as moderate.      DSM5 Diagnoses: (Sustained by DSM5 Criteria Listed Above)  Diagnoses: 300.02 (F41.1) Generalized Anxiety Disorder  Psychosocial & Contextual Factors: Sleep difficulties        Collateral Reports Completed:  Not Applicable      PLAN: (Homework, other):  Client to continue 3 breath technique as many times a day as she can remember.      Simone Singleton  ______________________________________________________________________    Treatment Plan    Patient's Name: Yamel Lnidsay  YOB: 1945    Date: 10/3/2019    DSM5 Diagnoses: (Sustained by DSM5 Criteria Listed  Above)  Diagnoses: 300.02 (F41.1) Generalized Anxiety Disorder  Psychosocial & Contextual Factors: Sleep difficulties    Referral / Collaboration:  Referral to another professional/service is not indicated at this time..    Anticipated number of session or this episode of care: 20    Anxiety Treatment plan:  1. Education- the Biopsychosocial model of anxiety  a. Client will be able to describe how anxiety is effecting them physically, emotionally and socially  2. Distraction  a. Client will learn 2 techniques to distract themselves when becoming anxious  3. Diaphragmatic breathing  a. Client will learn to breath using their diaphragm  b. Client will learn 2 breathing patterns to use to reduce anxiety  4. Visualization  a. Client will learn to establish a safe, calm place in their mind utilizing all their senses  b. Client will practice using visualization at times when they are not anxious so they will be able to use it when anxiety occurs  5. Progressive muscle relaxation  a. Client will learn progressive muscle relaxation techniques and practice them 4-5 times per week.  b. Client will learn to use mental images of relaxation to relax muscle groups and do this on a daily basis  6. Self-care  a. Client will identify 3 things they can do just for themselves  b. Client will take time for quiet, reflection, meditation 3 times per week  c. Client will Learn to set boundaries when appropriate  d. Client will Identify 3 individuals they can call on for support, distraction  7. Assessment of progress  a. Client will engage in assessment of their progress on a regular basis      Patient has reviewed and agreed to the above plan.      Simone Singleton  October 3, 2019

## 2019-11-02 ASSESSMENT — ANXIETY QUESTIONNAIRES: GAD7 TOTAL SCORE: 10

## 2019-11-04 ENCOUNTER — HEALTH MAINTENANCE LETTER (OUTPATIENT)
Age: 74
End: 2019-11-04

## 2019-11-12 ENCOUNTER — OFFICE VISIT (OUTPATIENT)
Dept: PSYCHOLOGY | Facility: CLINIC | Age: 74
End: 2019-11-12
Payer: COMMERCIAL

## 2019-11-12 ENCOUNTER — OFFICE VISIT (OUTPATIENT)
Dept: FAMILY MEDICINE | Facility: CLINIC | Age: 74
End: 2019-11-12
Payer: COMMERCIAL

## 2019-11-12 VITALS
SYSTOLIC BLOOD PRESSURE: 124 MMHG | TEMPERATURE: 98.7 F | BODY MASS INDEX: 27.46 KG/M2 | DIASTOLIC BLOOD PRESSURE: 68 MMHG | HEART RATE: 79 BPM | WEIGHT: 155 LBS | OXYGEN SATURATION: 93 % | RESPIRATION RATE: 12 BRPM | HEIGHT: 63 IN

## 2019-11-12 DIAGNOSIS — M62.838 MUSCLE SPASM: ICD-10-CM

## 2019-11-12 DIAGNOSIS — Z11.59 ENCOUNTER FOR HEPATITIS C SCREENING TEST FOR LOW RISK PATIENT: ICD-10-CM

## 2019-11-12 DIAGNOSIS — M17.0 OSTEOARTHRITIS OF BOTH KNEES, UNSPECIFIED OSTEOARTHRITIS TYPE: ICD-10-CM

## 2019-11-12 DIAGNOSIS — Z00.00 ENCOUNTER FOR MEDICARE ANNUAL WELLNESS EXAM: Primary | ICD-10-CM

## 2019-11-12 DIAGNOSIS — G89.4 CHRONIC PAIN SYNDROME: ICD-10-CM

## 2019-11-12 DIAGNOSIS — F41.1 GENERALIZED ANXIETY DISORDER: Primary | ICD-10-CM

## 2019-11-12 PROCEDURE — 90834 PSYTX W PT 45 MINUTES: CPT | Performed by: PSYCHOLOGIST

## 2019-11-12 PROCEDURE — 99213 OFFICE O/P EST LOW 20 MIN: CPT | Mod: 25 | Performed by: FAMILY MEDICINE

## 2019-11-12 PROCEDURE — 86803 HEPATITIS C AB TEST: CPT | Performed by: FAMILY MEDICINE

## 2019-11-12 PROCEDURE — 36415 COLL VENOUS BLD VENIPUNCTURE: CPT | Performed by: FAMILY MEDICINE

## 2019-11-12 PROCEDURE — 99397 PER PM REEVAL EST PAT 65+ YR: CPT | Performed by: FAMILY MEDICINE

## 2019-11-12 RX ORDER — TRAMADOL HYDROCHLORIDE 50 MG/1
50 TABLET ORAL 4 TIMES DAILY
Qty: 120 TABLET | Refills: 0 | Status: SHIPPED | OUTPATIENT
Start: 2019-11-12 | End: 2020-04-27

## 2019-11-12 RX ORDER — TRAMADOL HYDROCHLORIDE 50 MG/1
50 TABLET ORAL 4 TIMES DAILY
Qty: 120 TABLET | Refills: 0 | Status: SHIPPED | OUTPATIENT
Start: 2019-12-13 | End: 2019-11-15

## 2019-11-12 RX ORDER — TIZANIDINE 2 MG/1
2 TABLET ORAL 3 TIMES DAILY
Qty: 270 TABLET | Refills: 1 | Status: SHIPPED | OUTPATIENT
Start: 2019-11-12 | End: 2020-01-07

## 2019-11-12 ASSESSMENT — MIFFLIN-ST. JEOR: SCORE: 1173.24

## 2019-11-12 NOTE — NURSING NOTE
"Initial /68   Pulse 79   Temp 98.7  F (37.1  C) (Tympanic)   Resp 12   Ht 1.594 m (5' 2.75\")   Wt 70.3 kg (155 lb)   SpO2 93%   BMI 27.68 kg/m   Estimated body mass index is 27.68 kg/m  as calculated from the following:    Height as of this encounter: 1.594 m (5' 2.75\").    Weight as of this encounter: 70.3 kg (155 lb). .      "

## 2019-11-12 NOTE — PROGRESS NOTES
"  SUBJECTIVE:   Yamel Lindsay is a 74 year old female who presents for Preventive Visit.  Patient is a 74 yr old female here for her annual physical . She also needs refills on her pain medication. Denies any acute symptoms. Just got back from a trip from [place] .      Are you in the first 12 months of your Medicare Part B coverage?  No    Physical Health:    In general, how would you rate your overall physical health? good    Outside of work, how many days during the week do you exercise? 2-3 days/week    Outside of work, approximately how many minutes a day do you exercise?15-30 minutes    If you drink alcohol do you typically have >3 drinks per day or >7 drinks per week? Not Applicable    Do you usually eat at least 4 servings of fruit and vegetables a day, include whole grains & fiber and avoid regularly eating high fat or \"junk\" foods? NO 2-3    Do you have any problems taking medications regularly?  No    Do you have any side effects from medications? not applicable    Needs assistance for the following daily activities: no assistance needed    Which of the following safety concerns are present in your home?  none identified     Hearing impairment: No    In the past 6 months, have you been bothered by leaking of urine? no    Mental Health:    In general, how would you rate your overall mental or emotional health? fair  PHQ-2 Score:    PHQ-2 Score:     PHQ-2 ( 1999 [employer]) 11/12/2019 3/14/2019   Q1: Little interest or pleasure in doing things 0 0   Q2: Feeling down, depressed or hopeless 2 0   PHQ-2 Score 2 0       Do you feel safe in your environment? Yes    Have you ever done Advance Care Planning? (For example, a Health Directive, POLST, or a discussion with a medical provider or your loved ones about your wishes): No, advance care planning information given to patient to review.  Advanced care planning was discussed at today's visit.    Additional concerns to address?  No    Fall risk:  Fall Risk Assessment " not completed.    Cognitive Screenin) Repeat 3 items (Leader, Season, Table)    2) Clock draw: ABNORMAL   3) 3 item recall: Recalls 1 object   Results: ABNORMAL clock, 1-2 items recalled: PROBABLE COGNITIVE IMPAIRMENT, **INFORM PROVIDER**    Mini-CogTM Copyright [employer]. Licensed by the author for use in [facility]; reprinted with permission (terrance@[facility]). All rights reserved.      Do you have sleep apnea, excessive snoring or daytime drowsiness?: no        Medication Followup of Tizanidine and Tramadol    Taking Medication as prescribed: yes    Side Effects:  None    Medication Helping Symptoms:  yes       Reviewed and updated as needed this visit by clinical staff  Tobacco  Allergies  Med Hx  Surg Hx  Fam Hx  Soc Hx        Reviewed and updated as needed this visit by Provider        Social History     Tobacco Use     Smoking status: Former Smoker     Packs/day: 0.50     Types: Cigarettes     Last attempt to quit: 2011     Years since quittin.4     Smokeless tobacco: Never Used   Substance Use Topics     Alcohol use: No                           Current providers sharing in care for this patient include:   Patient Care Team:  Catie Fraser MD as PCP - General (Family Practice)  Catie Fraser MD as Assigned PCP    The following health maintenance items are reviewed in Epic and correct as of today:  Health Maintenance   Topic Date Due     HEPATITIS C SCREENING  1945     ASTHMA ACTION PLAN  1945     ZOSTER IMMUNIZATION (2 of 3) 2013     DEXA  2014     MEDICARE ANNUAL WELLNESS VISIT  2015     ADVANCE CARE PLANNING  2016     ASTHMA CONTROL TEST  2020     PHQ-9  2020     FALL RISK ASSESSMENT  2020     URINE DRUG SCREEN  2020     PAOLA ASSESSMENT  10/28/2020     MAMMO SCREENING  08/15/2021     LIPID  2024     DTAP/TDAP/TD IMMUNIZATION (3 - Td) 2024     COLONOSCOPY  10/01/2026     DEPRESSION  ACTION PLAN  Completed     INFLUENZA VACCINE  Completed     PNEUMOCOCCAL IMMUNIZATION 65+ LOW/MEDIUM RISK  Completed     IPV IMMUNIZATION  Aged Out     MENINGITIS IMMUNIZATION  Aged Out     BP Readings from Last 3 Encounters:   19 124/68   10/02/19 (!) 142/70   19 106/72    Wt Readings from Last 3 Encounters:   19 70.3 kg (155 lb)   10/02/19 70.3 kg (155 lb)   19 69.4 kg (153 lb)                  Patient Active Problem List   Diagnosis     Hyperlipidemia LDL goal <130     Primary localized osteoarthrosis, lower leg     Generalized anxiety disorder     Rhinitis, allergic seasonal     Alcoholism (H)     Mild recurrent major depression (H)     Advanced directives, counseling/discussion     Seasonal allergic rhinitis     Hip pain     GERD (gastroesophageal reflux disease)     S/P laparoscopic hernia repair     OA (osteoarthritis) of knee     Mild intermittent asthma without complication     Major depression in complete remission (H)     Benign essential hypertension     Past Surgical History:   Procedure Laterality Date     ESOPHAGOSCOPY, GASTROSCOPY, DUODENOSCOPY (EGD), COMBINED N/A 2015    Procedure: COMBINED ESOPHAGOSCOPY, GASTROSCOPY, DUODENOSCOPY (EGD), BIOPSY SINGLE OR MULTIPLE;  Surgeon: Dylan Alejandra MD;  Location: [facility]     ESOPHAGOSCOPY, GASTROSCOPY, DUODENOSCOPY (EGD), COMBINED N/A 2018    Procedure: COMBINED ESOPHAGOSCOPY, GASTROSCOPY, DUODENOSCOPY (EGD), BIOPSY SINGLE OR MULTIPLE;  gastroscopy;  Surgeon: Alexander Bautista MD;  Location: [facility]     LAPAROSCOPIC HERNIORRHAPHY INGUINAL Right 2015    Procedure: LAPAROSCOPIC HERNIORRHAPHY INGUINAL;  Surgeon: Favio Olsen MD;  Location: [facility]     TUBAL LIGATION         Social History     Tobacco Use     Smoking status: Former Smoker     Packs/day: 0.50     Types: Cigarettes     Last attempt to quit: 2011     Years since quittin.4     Smokeless tobacco: Never Used   Substance Use Topics     Alcohol use: No      Family History   Problem Relation Age of Onset     Cancer Mother      C.A.D. Father      Lipids Father      Hypertension Father      Hypertension Brother      Cancer Brother         esophageal cancer     Breast Cancer Other      Prostate Cancer Brother      Psychotic Disorder Daughter      Diabetes No family hx of      Cerebrovascular Disease No family hx of      Cancer - colorectal No family hx of          Current Outpatient Medications   Medication Sig Dispense Refill     albuterol (PROVENTIL) (2.5 MG/3ML) 0.083% neb solution Take 1 vial (2.5 mg) by nebulization every 6 hours as needed for shortness of breath / dyspnea or wheezing 75 vial 2     albuterol (VENTOLIN HFA) 108 (90 Base) MCG/ACT inhaler INHALE 2 PUFFS INTO THE LUNGS EVERY 6 HOURS AS NEEDED FOR SHORTNESS OF BREATH, DIFFICULTY BREATHING OR WHEEZING. 1 Inhaler 3     atorvastatin (LIPITOR) 20 MG tablet Take 1 tablet (20 mg) by mouth daily 90 tablet 3     buPROPion (WELLBUTRIN XL) 150 MG 24 hr tablet Take 1 tablet (150 mg) by mouth every morning 90 tablet 3     Calcium Carb-Cholecalciferol (CALCIUM 1000 + D PO) Take 1 tablet by mouth       Cholecalciferol (VITAMIN D-3 PO) Take 2,000 Units by mouth daily        fluticasone-salmeterol (AIRDUO RESPICLICK) 113-14 MCG/ACT inhaler Inhale 1 puff into the lungs 2 times daily 1 Inhaler 3     hydrochlorothiazide (HYDRODIURIL) 25 MG tablet Take 1 tablet (25 mg) by mouth daily 90 tablet 3     ibuprofen (ADVIL,MOTRIN) 200 MG tablet Take 200-600 mg by mouth every 2 hours as needed for mild pain       Omega-3 Fatty Acids (FISH OIL) 1200 MG CAPS Take 1 capsule by mouth       omeprazole (PRILOSEC) 20 MG DR capsule TAKE 1 CAPSULE BY MOUTH DAILY, 30 TO 60 MINUTES BEFORE A MEAL. 90 capsule 3     PRENATAL VITAMINS PO Take 1 tablet by mouth daily        tiZANidine (ZANAFLEX) 2 MG tablet Take 1 tablet (2 mg) by mouth 3 times daily 270 tablet 1     traMADol (ULTRAM) 50 MG tablet Take 1 tablet (50 mg) by mouth 4 times daily 120  "tablet 0     [START ON 12/13/2019] traMADol (ULTRAM) 50 MG tablet Take 1 tablet (50 mg) by mouth 4 times daily 120 tablet 0     traZODone (DESYREL) 50 MG tablet TAKE TWO TABLETS BY MOUTH EVERY NIGHT AT BEDTIME 180 tablet 3     clonazePAM (KLONOPIN) 0.5 MG tablet Take 1 tablet (0.5 mg) by mouth 3 times daily as needed for anxiety Patient going out of town on Sept 11 may fill  on this day 90 tablet 3     order for DME Equipment being ordered: Nebulizer 1 Units 0     Allergies   Allergen Reactions     Nka [No Known Allergies]      Seasonal Allergies      Pneumonia Vaccine:Adults age 65+ who received Pneumovax (PPSV23) at 65 years or older: Should be given PCV13 > 1 year after their most recent PPSV23  Mammogram Screening: Mammogram Screening: Patient over age 50, mutual decision to screen reflected in health maintenance.had one recently in August , it was normal .     ROS:  Constitutional, HEENT, cardiovascular, pulmonary, gi and gu systems are negative, except as otherwise noted.    OBJECTIVE:   /68   Pulse 79   Temp 98.7  F (37.1  C) (Tympanic)   Resp 12   Ht 1.594 m (5' 2.75\")   Wt 70.3 kg (155 lb)   SpO2 93%   BMI 27.68 kg/m   Estimated body mass index is 27.68 kg/m  as calculated from the following:    Height as of this encounter: 1.594 m (5' 2.75\").    Weight as of this encounter: 70.3 kg (155 lb).  EXAM:   GENERAL: healthy, alert and no distress  EYES: Eyes grossly normal to inspection, PERRL and conjunctivae and sclerae normal  HENT: ear canals and TM's normal, nose and mouth without ulcers or lesions  NECK: no adenopathy, no asymmetry, masses, or scars and thyroid normal to palpation  RESP: lungs clear to auscultation - no rales, rhonchi or wheezes  CV: regular rate and rhythm, normal S1 S2, no S3 or S4, no murmur, click or rub, no peripheral edema and peripheral pulses strong  MS: no gross musculoskeletal defects noted, no edema  SKIN: no suspicious lesions or rashes  PSYCH: mentation appears " "normal, affect normal/bright    Diagnostic Test Results:  Pending     ASSESSMENT / PLAN:   1. Encounter for Medicare annual wellness exam  Patient is a 74 yr old female here for her annual physical. She has a history of hypertension, asthma, chronic pain,she is doing well overall. Medication is faxed for her.    2. Muscle spasm  Medication is faxed   - tiZANidine (ZANAFLEX) 2 MG tablet; Take 1 tablet (2 mg) by mouth 3 times daily  Dispense: 270 tablet; Refill: 1    3. Osteoarthritis of both knees, unspecified osteoarthritis type  - traMADol (ULTRAM) 50 MG tablet; Take 1 tablet (50 mg) by mouth 4 times daily  Dispense: 120 tablet; Refill: 0  - traMADol (ULTRAM) 50 MG tablet; Take 1 tablet (50 mg) by mouth 4 times daily  Dispense: 120 tablet; Refill: 0    4. Chronic pain syndrome   - traMADol (ULTRAM) 50 MG tablet; Take 1 tablet (50 mg) by mouth 4 times daily  Dispense: 120 tablet; Refill: 0  - traMADol (ULTRAM) 50 MG tablet; Take 1 tablet (50 mg) by mouth 4 times daily  Dispense: 120 tablet; Refill: 0    5. Encounter for hepatitis C screening test for low risk patient  Patient will be notified of results  - **Hepatitis C Screen Reflex to RNA FUTURE anytime; Future  - **Hepatitis C Screen Reflex to RNA FUTURE anytime    COUNSELING:  Reviewed preventive health counseling, as reflected in patient instructions       Regular exercise       Healthy diet/nutrition       Vision screening    Estimated body mass index is 27.68 kg/m  as calculated from the following:    Height as of this encounter: 1.594 m (5' 2.75\").    Weight as of this encounter: 70.3 kg (155 lb).         reports that she quit smoking about 8 years ago. Her smoking use included cigarettes. She smoked 0.50 packs per day. She has never used smokeless tobacco.      Appropriate preventive services were discussed with this patient, including applicable screening as appropriate for cardiovascular disease, diabetes, osteopenia/osteoporosis, and glaucoma.  As " appropriate for age/gender, discussed screening for colorectal cancer, prostate cancer, breast cancer, and cervical cancer. Checklist reviewing preventive services available has been given to the patient.    Reviewed patients plan of care and provided an AVS. The Basic Care Plan (routine screening as documented in Health Maintenance) for Yamel meets the Care Plan requirement. This Care Plan has been established and reviewed with the Patient.    Counseling Resources:  ATP IV Guidelines  Pooled Cohorts Equation Calculator  Breast Cancer Risk Calculator  FRAX Risk Assessment  ICSI Preventive Guidelines  Dietary Guidelines for Americans, 2010  USDA's MyPlate  ASA Prophylaxis  Lung CA Screening    Catie Fraser MD  [facility]

## 2019-11-12 NOTE — PATIENT INSTRUCTIONS
Patient Education   Personalized Prevention Plan  You are due for the preventive services outlined below.  Your care team is available to assist you in scheduling these services.  If you have already completed any of these items, please share that information with your care team to update in your medical record.  Health Maintenance Due   Topic Date Due     Hepatitis C Screening  1945     Asthma Action Plan - yearly  1945     Zoster (Shingles) Vaccine (2 of 3) 03/14/2013     Osteoporosis Screening  07/05/2014     Annual Wellness Visit  02/18/2015     Discuss Advance Care Planning  12/19/2016

## 2019-11-13 LAB — HCV AB SERPL QL IA: NONREACTIVE

## 2019-11-15 DIAGNOSIS — G89.4 CHRONIC PAIN SYNDROME: ICD-10-CM

## 2019-11-15 DIAGNOSIS — M17.0 OSTEOARTHRITIS OF BOTH KNEES, UNSPECIFIED OSTEOARTHRITIS TYPE: ICD-10-CM

## 2019-11-15 RX ORDER — TRAMADOL HYDROCHLORIDE 50 MG/1
50 TABLET ORAL 4 TIMES DAILY
Qty: 120 TABLET | Refills: 0 | Status: SHIPPED | OUTPATIENT
Start: 2019-12-11 | End: 2020-01-07

## 2019-11-15 ASSESSMENT — ANXIETY QUESTIONNAIRES
7. FEELING AFRAID AS IF SOMETHING AWFUL MIGHT HAPPEN: NOT AT ALL
1. FEELING NERVOUS, ANXIOUS, OR ON EDGE: MORE THAN HALF THE DAYS
4. TROUBLE RELAXING: NEARLY EVERY DAY
5. BEING SO RESTLESS THAT IT IS HARD TO SIT STILL: MORE THAN HALF THE DAYS
3. WORRYING TOO MUCH ABOUT DIFFERENT THINGS: MORE THAN HALF THE DAYS
2. NOT BEING ABLE TO STOP OR CONTROL WORRYING: SEVERAL DAYS
GAD7 TOTAL SCORE: 12
6. BECOMING EASILY ANNOYED OR IRRITABLE: MORE THAN HALF THE DAYS

## 2019-11-15 ASSESSMENT — PATIENT HEALTH QUESTIONNAIRE - PHQ9: SUM OF ALL RESPONSES TO PHQ QUESTIONS 1-9: 5

## 2019-11-15 NOTE — PROGRESS NOTES
Progress Note  Disclaimer: This note consists of symbols derived from keyboarding, dictation and/or voice recognition software. As a result, there may be errors in the script that have gone undetected. Please consider this when interpreting information found in this chart.    Client Name:  Yamel Lindsay Date: 11/12/2019         Service Type: Individual  Video Visit: No     Session Start Time: 3:00 PM  session End Time: 3:45 PM     Session Length: 45    Session #: 5    Attendees: Client attended alone     DATA:  Client reports she still notes some anxiety and depression.  Things have been better this week due to spending the weekend at a [employer] for [place] hunting season.    Interactive Complexity: No  Crisis: No    Intervention:  CBT: for anxiety,  breathing control and panic attack reduction.    ASSESSMENT:  Mental Status Assessment:  Appearance:   Appropriate   Eye Contact:   Good   Psychomotor Behavior: Normal   Attitude:   Cooperative   Orientation:   All  Speech   Rate / Production: Normal    Volume:  Normal   Mood:    Normal  Affect:    Appropriate   Thought Content:  Clear   Thought Form:  Coherent  Logical   Insight:    Good       Safety Issues and Plan for Safety and Risk Management:  Client denies current fears or concerns for personal safety.  Client denies current or recent suicidal ideation or behaviors.  Client denies current or recent homicidal ideation or behaviors.  Client denies current or recent self injurious behavior or ideation.  Client denies other safety concerns.  Recommended that patient call 911 or go to the local ED should there be a change in any of these risk factors.  Client reports there are no firearms in the house.      Diagnostic Criteria:  A. Excessive anxiety and worry about a number of events or activities (such as work or school performance).   B. The person finds it difficult to control the worry.  C. Select 3 or more  symptoms (required for diagnosis). Only one item is required in children.   - Restlessness or feeling keyed up or on edge.    - Being easily fatigued.    - Difficulty concentrating or mind going blank.    - Muscle tension.    - Sleep disturbance (difficulty falling or staying asleep, or restless unsatisfying sleep).   D. The focus of the anxiety and worry is not confined to features of an Axis I disorder.  E. The anxiety, worry, or physical symptoms cause clinically significant distress or impairment in social, occupational, or other important areas of functioning.   F. The disturbance is not due to the direct physiological effects of a substance (e.g., a drug of abuse, a medication) or a general medical condition (e.g., hyperthyroidism) and does not occur exclusively during a Mood Disorder, a Psychotic Disorder, or a Pervasive Developmental Disorder.    - The aformentioned symptoms began 65 year(s) ago and occurs 7 days per week and is experienced as moderate.      DSM5 Diagnoses: (Sustained by DSM5 Criteria Listed Above)  Diagnoses: 300.02 (F41.1) Generalized Anxiety Disorder  Psychosocial & Contextual Factors: Sleep difficulties        Collateral Reports Completed:  Not Applicable      PLAN: (Homework, other):  Client to continue 3 breath technique as many times a day as she can remember.      Simone Singleton  ______________________________________________________________________    Treatment Plan    Patient's Name: Yamel Lindsay  YOB: 1945    Date: 10/3/2019    DSM5 Diagnoses: (Sustained by DSM5 Criteria Listed Above)  Diagnoses: 300.02 (F41.1) Generalized Anxiety Disorder  Psychosocial & Contextual Factors: Sleep difficulties    Referral / Collaboration:  Referral to another professional/service is not indicated at this time..    Anticipated number of session or this episode of care: 20    Anxiety Treatment plan:  1. Education- the Biopsychosocial model of anxiety  a. Client will be able to  describe how anxiety is effecting them physically, emotionally and socially  2. Distraction  a. Client will learn 2 techniques to distract themselves when becoming anxious  3. Diaphragmatic breathing  a. Client will learn to breath using their diaphragm  b. Client will learn 2 breathing patterns to use to reduce anxiety  4. Visualization  a. Client will learn to establish a safe, calm place in their mind utilizing all their senses  b. Client will practice using visualization at times when they are not anxious so they will be able to use it when anxiety occurs  5. Progressive muscle relaxation  a. Client will learn progressive muscle relaxation techniques and practice them 4-5 times per week.  b. Client will learn to use mental images of relaxation to relax muscle groups and do this on a daily basis  6. Self-care  a. Client will identify 3 things they can do just for themselves  b. Client will take time for quiet, reflection, meditation 3 times per week  c. Client will Learn to set boundaries when appropriate  d. Client will Identify 3 individuals they can call on for support, distraction  7. Assessment of progress  a. Client will engage in assessment of their progress on a regular basis      Patient has reviewed and agreed to the above plan.      Simone Singleton  October 3, 2019

## 2019-11-16 ASSESSMENT — ANXIETY QUESTIONNAIRES: GAD7 TOTAL SCORE: 12

## 2019-11-18 DIAGNOSIS — J45.20 MILD INTERMITTENT ASTHMA WITHOUT COMPLICATION: ICD-10-CM

## 2019-11-18 NOTE — TELEPHONE ENCOUNTER
"Requested Prescriptions   Pending Prescriptions Disp Refills     albuterol (VENTOLIN HFA) 108 (90 Base) MCG/ACT inhaler [Pharmacy Med Name: VENTOLIN HFA 108MCG/ACT AERS] 18 g 3     Sig: INHALE 2 PUFFS INTO THE LUNGS EVERY 6 HOURS AS NEEDED FOR SHORTNESS OF BREATH, DIFFICULTY BREATHING OR WHEEZING.       Asthma Maintenance Inhalers - Anticholinergics Passed - 11/18/2019 12:44 PM        Passed - Patient is age 12 years or older        Passed - Asthma control assessment score within normal limits in last 6 months     Please review ACT score.           Passed - Medication is active on med list        Passed - Recent (6 mo) or future (30 days) visit within the authorizing provider's specialty     Patient had office visit in the last 6 months or has a visit in the next 30 days with authorizing provider or within the authorizing provider's specialty.  See \"Patient Info\" tab in inbasket, or \"Choose Columns\" in Meds & Orders section of the refill encounter.            Last Written Prescription Date:  7/1/2019  Last Fill Quantity: 1,  # refills: 3   Last office visit: 11/12/2019 with prescribing provider:  Evelio   Future Office Visit:   Next 5 appointments (look out 90 days)    Nov 25, 2019  3:00 PM CST  Return Visit with [facility] [address] [address]  [phone]   Dec 09, 2019  4:00 PM CST  Return Visit with [facility] ([facility]) [address] [address]  [phone]           "

## 2019-11-21 RX ORDER — ALBUTEROL SULFATE 90 UG/1
AEROSOL, METERED RESPIRATORY (INHALATION)
Qty: 18 G | Refills: 3 | Status: SHIPPED | OUTPATIENT
Start: 2019-11-21 | End: 2020-05-07

## 2019-11-21 NOTE — TELEPHONE ENCOUNTER
ACT Total Scores 8/27/2019 8/30/2019 10/2/2019   ACT TOTAL SCORE (Goal Greater than or Equal to 20) 10 12 20   In the past 12 months, how many times did you visit the emergency room for your asthma without being admitted to the hospital? - 0 0   In the past 12 months, how many times were you hospitalized overnight because of your asthma? - 0 0     Prescription approved per [facility] Refill Protocol.

## 2020-01-07 ENCOUNTER — TELEPHONE (OUTPATIENT)
Dept: FAMILY MEDICINE | Facility: CLINIC | Age: 75
End: 2020-01-07

## 2020-01-07 ENCOUNTER — OFFICE VISIT (OUTPATIENT)
Dept: FAMILY MEDICINE | Facility: CLINIC | Age: 75
End: 2020-01-07
Payer: COMMERCIAL

## 2020-01-07 VITALS
SYSTOLIC BLOOD PRESSURE: 132 MMHG | TEMPERATURE: 98.9 F | OXYGEN SATURATION: 95 % | HEIGHT: 63 IN | WEIGHT: 153 LBS | HEART RATE: 80 BPM | BODY MASS INDEX: 27.11 KG/M2 | RESPIRATION RATE: 16 BRPM | DIASTOLIC BLOOD PRESSURE: 76 MMHG

## 2020-01-07 DIAGNOSIS — R82.90 NONSPECIFIC FINDING ON EXAMINATION OF URINE: ICD-10-CM

## 2020-01-07 DIAGNOSIS — M62.838 MUSCLE SPASM: ICD-10-CM

## 2020-01-07 DIAGNOSIS — G89.4 CHRONIC PAIN SYNDROME: ICD-10-CM

## 2020-01-07 DIAGNOSIS — R30.9 PAIN WITH URINATION: Primary | ICD-10-CM

## 2020-01-07 DIAGNOSIS — N39.0 URINARY TRACT INFECTION WITHOUT HEMATURIA, SITE UNSPECIFIED: ICD-10-CM

## 2020-01-07 DIAGNOSIS — F41.9 ANXIETY: ICD-10-CM

## 2020-01-07 DIAGNOSIS — I10 BENIGN ESSENTIAL HYPERTENSION: ICD-10-CM

## 2020-01-07 DIAGNOSIS — M17.0 OSTEOARTHRITIS OF BOTH KNEES, UNSPECIFIED OSTEOARTHRITIS TYPE: ICD-10-CM

## 2020-01-07 LAB
ALBUMIN UR-MCNC: 30 MG/DL
APPEARANCE UR: ABNORMAL
BACTERIA #/AREA URNS HPF: ABNORMAL /HPF
BILIRUB UR QL STRIP: NEGATIVE
COLOR UR AUTO: YELLOW
GLUCOSE UR STRIP-MCNC: NEGATIVE MG/DL
HGB UR QL STRIP: ABNORMAL
KETONES UR STRIP-MCNC: NEGATIVE MG/DL
LEUKOCYTE ESTERASE UR QL STRIP: ABNORMAL
NITRATE UR QL: POSITIVE
NON-SQ EPI CELLS #/AREA URNS LPF: ABNORMAL /LPF
PH UR STRIP: 7 PH (ref 5–7)
RBC #/AREA URNS AUTO: ABNORMAL /HPF
SOURCE: ABNORMAL
SP GR UR STRIP: 1.01 (ref 1–1.03)
UROBILINOGEN UR STRIP-ACNC: 0.2 EU/DL (ref 0.2–1)
WBC #/AREA URNS AUTO: >100 /HPF

## 2020-01-07 PROCEDURE — 99214 OFFICE O/P EST MOD 30 MIN: CPT | Performed by: FAMILY MEDICINE

## 2020-01-07 PROCEDURE — 81001 URINALYSIS AUTO W/SCOPE: CPT | Performed by: FAMILY MEDICINE

## 2020-01-07 PROCEDURE — 87086 URINE CULTURE/COLONY COUNT: CPT | Performed by: FAMILY MEDICINE

## 2020-01-07 PROCEDURE — 87186 SC STD MICRODIL/AGAR DIL: CPT | Performed by: FAMILY MEDICINE

## 2020-01-07 PROCEDURE — 87088 URINE BACTERIA CULTURE: CPT | Performed by: FAMILY MEDICINE

## 2020-01-07 RX ORDER — TRAMADOL HYDROCHLORIDE 50 MG/1
50 TABLET ORAL EVERY 6 HOURS PRN
Qty: 120 TABLET | Refills: 0 | Status: SHIPPED | OUTPATIENT
Start: 2020-02-11 | End: 2020-02-13

## 2020-01-07 RX ORDER — TIZANIDINE 2 MG/1
2 TABLET ORAL 3 TIMES DAILY
Qty: 270 TABLET | Refills: 1 | Status: SHIPPED | OUTPATIENT
Start: 2020-01-07 | End: 2020-08-27

## 2020-01-07 RX ORDER — CIPROFLOXACIN 250 MG/1
250 TABLET, FILM COATED ORAL 2 TIMES DAILY
Qty: 14 TABLET | Refills: 0 | Status: SHIPPED | OUTPATIENT
Start: 2020-01-07 | End: 2020-03-05

## 2020-01-07 RX ORDER — HYDROCHLOROTHIAZIDE 25 MG/1
25 TABLET ORAL DAILY
Qty: 90 TABLET | Refills: 3 | Status: SHIPPED | OUTPATIENT
Start: 2020-01-07 | End: 2020-11-19

## 2020-01-07 RX ORDER — TRAMADOL HYDROCHLORIDE 50 MG/1
50 TABLET ORAL 4 TIMES DAILY
Qty: 120 TABLET | Refills: 0 | Status: SHIPPED | OUTPATIENT
Start: 2020-01-12 | End: 2020-02-13

## 2020-01-07 RX ORDER — CLONAZEPAM 0.5 MG/1
0.5 TABLET ORAL 3 TIMES DAILY PRN
Qty: 90 TABLET | Refills: 3 | Status: SHIPPED | OUTPATIENT
Start: 2020-01-07 | End: 2020-04-27

## 2020-01-07 ASSESSMENT — MIFFLIN-ST. JEOR: SCORE: 1164.16

## 2020-01-07 NOTE — TELEPHONE ENCOUNTER
Faxed prescription to patient's pharmacy.  [facility].  Patient notified.

## 2020-01-07 NOTE — PATIENT INSTRUCTIONS
"      Thank you for choosing [facility].  You may be receiving an email and/or telephone survey request from [facility] Customer Experience regarding your visit today.  Please take a few minutes to respond to the survey to let us know how we are doing.      If you have questions or concerns, please contact us via [employer] or you can contact your care team at [phone].    Our Clinic hours are:  Monday 6:40 am  to 7:00 pm  Tuesday -Friday 6:40 am to 5:00 pm    The [place] outpatient lab hours are:  Monday - Friday 6:10 am to 4:45 pm  Saturdays 7:00 am to 11:00 am  Appointments are required, call [phone]    If you have clinical questions after hours or would like to schedule an appointment,  call the clinic at [phone].  Patient Education     Bladder Infection, Female (Adult)    Urine is normally doesn't have any bacteria in it. But bacteria can get into the urinary tract from the skin around the rectum. Or they can travel in the blood from elsewhere in the body. Once they are in your urinary tract, they can cause infection in the urethra (urethritis), the bladder (cystitis), or the kidneys (pyelonephritis).  The most common place for an infection is in the bladder. This is called a bladder infection. This is one of the most common infections in women. Most bladder infections are easily treated. They are not serious unless the infection spreads to the kidney.  The phrases \"bladder infection,\" \"UTI,\" and \"cystitis\" are often used to describe the same thing. But they are not always the same. Cystitis is an inflammation of the bladder. The most common cause of cystitis is an infection.  Symptoms  The infection causes inflammation in the urethra and bladder. This causes many of the symptoms. The most common symptoms of a bladder infection are:    Pain or burning when urinating    Having to urinate more often than usual    Urgent need to urinate    Only a small amount of urine comes out    Blood in " urine    Abdominal discomfort. This is usually in the lower abdomen above the pubic bone.    Cloudy urine    Strong- or bad-smelling urine    Unable to urinate (urinary retention)    Unable to hold urine in (urinary incontinence)    Fever    Loss of appetite    Confusion (in older adults)  Causes  Bladder infections are not contagious. You can't get one from someone else, from a toilet seat, or from sharing a bath.  The most common cause of bladder infections is bacteria from the bowels. The bacteria get onto the skin around the opening of the urethra. From there, they can get into the urine and travel up to the bladder, causing inflammation and infection. This usually happens because of:    Wiping improperly after urinating. Always wipe from front to back.    Bowel incontinence    Pregnancy    Procedures such as having a catheter inserted    Older age    Not emptying your bladder. This can allow bacteria a chance to grow in your urine.    Dehydration    Constipation    Sex    Use of a diaphragm for birth control   Treatment  Bladder infections are diagnosed by a urine test. They are treated with antibiotics and usually clear up quickly without complications. Treatment helps prevent a more serious kidney infection.  Medicines  Medicines can help in the treatment of a bladder infection:    Take antibiotics until they are used up, even if you feel better. It is important to finish them to make sure the infection has cleared.    You can use acetaminophen or ibuprofen for pain, fever, or discomfort, unless another medicine was prescribed. If you have chronic liver or kidney disease, talk with your healthcare provider before using these medicines. Also talk with your provider if you've ever had a stomach ulcer or gastrointestinal bleeding, or are taking blood-thinner medicines.    If you are given phenazopydridine to reduce burning with urination, it will cause your urine to become a bright orange color. This can stain  clothing.  Care and prevention  These self-care steps can help prevent future infections:    Drink plenty of fluids to prevent dehydration and flush out your bladder. Do this unless you must restrict fluids for other health reasons, or your doctor told you not to.    Proper cleaning after going to the bathroom is important. Wipe from front to back after using the toilet to prevent the spread of bacteria.    Urinate more often. Don't try to hold urine in for a long time.    Wear loose-fitting clothes and cotton underwear. Avoid tight-fitting pants.    Improve your diet and prevent constipation. Eat more fresh fruit and vegetables, and fiber, and less junk and fatty foods.    Avoid sex until your symptoms are gone.    Avoid caffeine, alcohol, and spicy foods. These can irritate your bladder.    Urinate right after intercourse to flush out your bladder.    If you use birth control pills and have frequent bladder infections, discuss it with your doctor.  Follow-up care  Call your healthcare provider if all symptoms are not gone after 3 days of treatment. This is especially important if you have repeat infections.  If a culture was done, you will be told if your treatment needs to be changed. If directed, you can call to find out the results.  If X-rays were done, you will be told if the results will affect your treatment.  Call 911  Call 911 if any of the following occur:    Trouble breathing    Hard to wake up or confusion    Fainting or loss of consciousness    Rapid heart rate  When to seek medical advice  Call your healthcare provider right away if any of these occur:    Fever of 100.4 F (38.0 C) or higher, or as directed by your healthcare provider    Symptoms are not better by the third day of treatment    Back or belly (abdominal) pain that gets worse    Repeated vomiting, or unable to keep medicine down    Weakness or dizziness    Vaginal discharge    Pain, redness, or swelling in the outer vaginal area  (labia)  Date Last Reviewed: 10/1/2016    [id number] [employer]. [address] [place] [address], [place] [address]. All rights reserved. This information is not intended as a substitute for professional medical care. Always follow your healthcare professional's instructions.

## 2020-01-07 NOTE — PROGRESS NOTES
Subjective     Yamel Lindsay is a 74 year old female who presents to clinic today for the following health issues:    Yamel is a 74 yr old female here for medication refills.   She takes Tramadol for chronic pain.   She also takes clonazepam for anxiety.   Given her age I talked to Yamel about possibly weaning her off or reducing these medication . This is a proposal I placed before her today. She did not oppose this.   She also wanted refills on her other medication.    Other c/o she has had urinary symptoms for a couple of days. She reports burning, frequency. Denies any fevers or chills.     HPI   Hypertension Follow-up      Do you check your blood pressure regularly outside of the clinic? No     Are you following a low salt diet? Yes    Are your blood pressures ever more than 140 on the top number (systolic) OR more   than 90 on the bottom number (diastolic), for example 140/90? No    Chronic Pain Follow-Up       Type / Location of Pain: knee neck and hands   Analgesia/pain control:       Recent changes:  same      Overall control: Tolerable with discomfort  Activity level/function:      Daily activities:  Able to do moderate activities    Work:  not applicable  Adverse effects:  No  Adherance    Taking medication as directed?  Yes    Participating in other treatments: no -   Risk Factors:    Sleep:  Good    Mood/anxiety:  controlled    Recent family or social stressors:  none noted    Other aggravating factors: none  PHQ-9 SCORE 8/30/2019 10/31/2019 11/15/2019   PHQ-9 Total Score - - -   PHQ-9 Total Score 9 4 5     PAOLA-7 SCORE 8/30/2019 10/31/2019 11/15/2019   Total Score - - -   Total Score 18 10 12     Encounter-Level CSA - 02/20/2015:    Controlled Substance Agreement - Scan on 2/26/2015  9:06 AM: Controlled Medication Agreement-  2/20/15     Patient-Level CSA:    There are no patient-level csa.         How many servings of fruits and vegetables do you eat daily?  2-3    On average, how many sweetened  beverages do you drink each day (Examples: soda, juice, sweet tea, etc.  Do NOT count diet or artificially sweetened beverages)?   0    How many days per week do you miss taking your medication? 0    URINARY TRACT SYMPTOMS  Onset: 2 days ago     Description:   Painful urination (Dysuria): YES           Frequency: YES  Blood in urine (Hematuria): no   Delay in urine (Hesitency): no     Intensity: moderate    Progression of Symptoms:  worsening    Accompanying Signs & Symptoms:  Fever/chills: no   Flank pain no   Nausea and vomiting: no   Any vaginal symptoms: none  Abdominal/Pelvic Pain: no     History:   History of frequent UTI's: no   History of kidney stones: no   Sexually Active: no   Possibility of pregnancy: No    Precipitating factors:   None     Therapies Tried and outcome: Increase fluid intake      Patient Active Problem List   Diagnosis     Hyperlipidemia LDL goal <130     Primary localized osteoarthrosis, lower leg     Generalized anxiety disorder     Rhinitis, allergic seasonal     Alcoholism (H)     Mild recurrent major depression (H)     Advanced directives, counseling/discussion     Seasonal allergic rhinitis     Hip pain     GERD (gastroesophageal reflux disease)     S/P laparoscopic hernia repair     OA (osteoarthritis) of knee     Mild intermittent asthma without complication     Major depression in complete remission (H)     Benign essential hypertension     Past Surgical History:   Procedure Laterality Date     ESOPHAGOSCOPY, GASTROSCOPY, DUODENOSCOPY (EGD), COMBINED N/A 1/5/2015    Procedure: COMBINED ESOPHAGOSCOPY, GASTROSCOPY, DUODENOSCOPY (EGD), BIOPSY SINGLE OR MULTIPLE;  Surgeon: Dylan Alejandra MD;  Location: [facility]     ESOPHAGOSCOPY, GASTROSCOPY, DUODENOSCOPY (EGD), COMBINED N/A 5/25/2018    Procedure: COMBINED ESOPHAGOSCOPY, GASTROSCOPY, DUODENOSCOPY (EGD), BIOPSY SINGLE OR MULTIPLE;  gastroscopy;  Surgeon: Alexander Bautista MD;  Location: [facility]     LAPAROSCOPIC HERNIORRHAPHY  INGUINAL Right 2015    Procedure: LAPAROSCOPIC HERNIORRHAPHY INGUINAL;  Surgeon: Favio Olsen MD;  Location: [facility]     TUBAL LIGATION         Social History     Tobacco Use     Smoking status: Former Smoker     Packs/day: 0.50     Types: Cigarettes     Last attempt to quit: 2011     Years since quittin.5     Smokeless tobacco: Never Used   Substance Use Topics     Alcohol use: No     Family History   Problem Relation Age of Onset     Cancer Mother      C.A.D. Father      Lipids Father      Hypertension Father      Hypertension Brother      Cancer Brother         esophageal cancer     Breast Cancer Other      Prostate Cancer Brother      Psychotic Disorder Daughter      Diabetes No family hx of      Cerebrovascular Disease No family hx of      Cancer - colorectal No family hx of          Current Outpatient Medications   Medication Sig Dispense Refill     albuterol (VENTOLIN HFA) 108 (90 Base) MCG/ACT inhaler INHALE 2 PUFFS INTO THE LUNGS EVERY 6 HOURS AS NEEDED FOR SHORTNESS OF BREATH, DIFFICULTY BREATHING OR WHEEZING. 18 g 3     atorvastatin (LIPITOR) 20 MG tablet Take 1 tablet (20 mg) by mouth daily 90 tablet 3     buPROPion (WELLBUTRIN XL) 150 MG 24 hr tablet Take 1 tablet (150 mg) by mouth every morning 90 tablet 3     Calcium Carb-Cholecalciferol (CALCIUM 1000 + D PO) Take 1 tablet by mouth       Cholecalciferol (VITAMIN D-3 PO) Take 2,000 Units by mouth daily        ciprofloxacin (CIPRO) 250 MG tablet Take 1 tablet (250 mg) by mouth 2 times daily for 7 days 14 tablet 0     clonazePAM (KLONOPIN) 0.5 MG tablet Take 1 tablet (0.5 mg) by mouth 3 times daily as needed for anxiety Patient going out of town on Sept 11 may fill  on this day 90 tablet 3     fluticasone-salmeterol (AIRDUO RESPICLICK) 113-14 MCG/ACT inhaler Inhale 1 puff into the lungs 2 times daily 1 Inhaler 3     hydrochlorothiazide (HYDRODIURIL) 25 MG tablet Take 1 tablet (25 mg) by mouth daily 90 tablet 3     ibuprofen (ADVIL,MOTRIN)  "200 MG tablet Take 200-600 mg by mouth every 2 hours as needed for mild pain       Omega-3 Fatty Acids (FISH OIL) 1200 MG CAPS Take 1 capsule by mouth       omeprazole (PRILOSEC) 20 MG DR capsule TAKE 1 CAPSULE BY MOUTH DAILY, 30 TO 60 MINUTES BEFORE A MEAL. 90 capsule 3     tiZANidine (ZANAFLEX) 2 MG tablet Take 1 tablet (2 mg) by mouth 3 times daily 270 tablet 1     [START ON 1/12/2020] traMADol (ULTRAM) 50 MG tablet Take 1 tablet (50 mg) by mouth 4 times daily 120 tablet 0     [START ON 2/11/2020] traMADol (ULTRAM) 50 MG tablet Take 1 tablet (50 mg) by mouth every 6 hours as needed for severe pain 120 tablet 0     traZODone (DESYREL) 50 MG tablet TAKE TWO TABLETS BY MOUTH EVERY NIGHT AT BEDTIME 180 tablet 3     albuterol (PROVENTIL) (2.5 MG/3ML) 0.083% neb solution Take 1 vial (2.5 mg) by nebulization every 6 hours as needed for shortness of breath / dyspnea or wheezing (Patient not taking: Reported on 1/7/2020) 75 vial 2     order for DME Equipment being ordered: Nebulizer 1 Units 0     PRENATAL VITAMINS PO Take 1 tablet by mouth daily        Allergies   Allergen Reactions     Nka [No Known Allergies]      Seasonal Allergies      BP Readings from Last 3 Encounters:   01/07/20 132/76   11/12/19 124/68   10/02/19 (!) 142/70    Wt Readings from Last 3 Encounters:   01/07/20 69.4 kg (153 lb)   11/12/19 70.3 kg (155 lb)   10/02/19 70.3 kg (155 lb)                      Reviewed and updated as needed this visit by Provider  Tobacco  Allergies  Meds  Problems  Med Hx  Surg Hx  Fam Hx         Review of Systems   ROS COMP: Constitutional, HEENT, cardiovascular, pulmonary, gi and gu systems are negative, except as otherwise noted.      Objective    /76   Pulse 80   Temp 98.9  F (37.2  C) (Tympanic)   Resp 16   Ht 1.594 m (5' 2.75\")   Wt 69.4 kg (153 lb)   SpO2 95%   BMI 27.32 kg/m    Body mass index is 27.32 kg/m .  Physical Exam   GENERAL: healthy, alert and no distress  EYES: Eyes grossly normal to " inspection, PERRL and conjunctivae and sclerae normal  HENT: ear canals and TM's normal, nose and mouth without ulcers or lesions  NECK: no adenopathy, no asymmetry, masses, or scars and thyroid normal to palpation  RESP: lungs clear to auscultation - no rales, rhonchi or wheezes  CV: regular rate and rhythm, normal S1 S2, no S3 or S4, no murmur, click or rub, no peripheral edema and peripheral pulses strong  ABDOMEN: soft, nontender, no hepatosplenomegaly, no masses and bowel sounds normal  MS: no gross musculoskeletal defects noted, no edema    Diagnostic Test Results:  Results for orders placed or performed in visit on 01/07/20 (from the past 24 hour(s))   UA reflex to Microscopic and Culture   Result Value Ref Range    Color Urine Yellow     Appearance Urine Slightly Cloudy     Glucose Urine Negative NEG^Negative mg/dL    Bilirubin Urine Negative NEG^Negative    Ketones Urine Negative NEG^Negative mg/dL    Specific Gravity Urine 1.010 1.003 - 1.035    Blood Urine Trace (A) NEG^Negative    pH Urine 7.0 5.0 - 7.0 pH    Protein Albumin Urine 30 (A) NEG^Negative mg/dL    Urobilinogen Urine 0.2 0.2 - 1.0 EU/dL    Nitrite Urine Positive (A) NEG^Negative    Leukocyte Esterase Urine Large (A) NEG^Negative    Source Midstream Urine    Urine Microscopic   Result Value Ref Range    WBC Urine >100 (A) OTO5^0 - 5 /HPF    RBC Urine O - 2 OTO2^O - 2 /HPF    Squamous Epithelial /LPF Urine Few FEW^Few /LPF    Bacteria Urine Moderate (A) NEG^Negative /HPF           Assessment & Plan     1. Benign essential hypertension  Blood pressure is within normal limits. Medication refilled.   - hydrochlorothiazide (HYDRODIURIL) 25 MG tablet; Take 1 tablet (25 mg) by mouth daily  Dispense: 90 tablet; Refill: 3    2. Anxiety  Medication refilled.   - clonazePAM (KLONOPIN) 0.5 MG tablet; Take 1 tablet (0.5 mg) by mouth 3 times daily as needed for anxiety Patient going out of town on Sept 11 may fill  on this day  Dispense: 90 tablet; Refill:  3    3. Muscle spasm  - tiZANidine (ZANAFLEX) 2 MG tablet; Take 1 tablet (2 mg) by mouth 3 times daily  Dispense: 270 tablet; Refill: 1    4. Osteoarthritis of both knees, unspecified osteoarthritis type  - traMADol (ULTRAM) 50 MG tablet; Take 1 tablet (50 mg) by mouth 4 times daily  Dispense: 120 tablet; Refill: 0    5. Chronic pain syndrome   - traMADol (ULTRAM) 50 MG tablet; Take 1 tablet (50 mg) by mouth 4 times daily  Dispense: 120 tablet; Refill: 0  - traMADol (ULTRAM) 50 MG tablet; Take 1 tablet (50 mg) by mouth every 6 hours as needed for severe pain  Dispense: 120 tablet; Refill: 0    6. Pain with urination  UA is indicative of infection.   - UA reflex to Microscopic and Culture  - Urine Microscopic    7. Nonspecific finding on examination of urine  - Urine Culture Aerobic Bacterial    8. Urinary tract infection without hematuria, site unspecified  Antibiotics faxed. Recommend increasing fluids.   - ciprofloxacin (CIPRO) 250 MG tablet; Take 1 tablet (250 mg) by mouth 2 times daily for 7 days  Dispense: 14 tablet; Refill: 0       FUTURE APPOINTMENTS:       - Follow-up visit in one month or sooner as needed.  Patient Instructions         Thank you for choosing [facility].  You may be receiving an email and/or telephone survey request from [facility] [employer] regarding your visit today.  Please take a few minutes to respond to the survey to let us know how we are doing.      If you have questions or concerns, please contact us via [employer] or you can contact your care team at [phone].    Our Clinic hours are:  Monday 6:40 am  to 7:00 pm  Tuesday -Friday 6:40 am to 5:00 pm    The [place] outpatient lab hours are:  Monday - Friday 6:10 am to 4:45 pm  Saturdays 7:00 am to 11:00 am  Appointments are required, call [phone]    If you have clinical questions after hours or would like to schedule an appointment,  call the clinic at [phone].  Patient Education     Bladder  "Infection, Female (Adult)    Urine is normally doesn't have any bacteria in it. But bacteria can get into the urinary tract from the skin around the rectum. Or they can travel in the blood from elsewhere in the body. Once they are in your urinary tract, they can cause infection in the urethra (urethritis), the bladder (cystitis), or the kidneys (pyelonephritis).  The most common place for an infection is in the bladder. This is called a bladder infection. This is one of the most common infections in women. Most bladder infections are easily treated. They are not serious unless the infection spreads to the kidney.  The phrases \"bladder infection,\" \"UTI,\" and \"cystitis\" are often used to describe the same thing. But they are not always the same. Cystitis is an inflammation of the bladder. The most common cause of cystitis is an infection.  Symptoms  The infection causes inflammation in the urethra and bladder. This causes many of the symptoms. The most common symptoms of a bladder infection are:    Pain or burning when urinating    Having to urinate more often than usual    Urgent need to urinate    Only a small amount of urine comes out    Blood in urine    Abdominal discomfort. This is usually in the lower abdomen above the pubic bone.    Cloudy urine    Strong- or bad-smelling urine    Unable to urinate (urinary retention)    Unable to hold urine in (urinary incontinence)    Fever    Loss of appetite    Confusion (in older adults)  Causes  Bladder infections are not contagious. You can't get one from someone else, from a toilet seat, or from sharing a bath.  The most common cause of bladder infections is bacteria from the bowels. The bacteria get onto the skin around the opening of the urethra. From there, they can get into the urine and travel up to the bladder, causing inflammation and infection. This usually happens because of:    Wiping improperly after urinating. Always wipe from front to back.    Bowel " incontinence    Pregnancy    Procedures such as having a catheter inserted    Older age    Not emptying your bladder. This can allow bacteria a chance to grow in your urine.    Dehydration    Constipation    Sex    Use of a diaphragm for birth control   Treatment  Bladder infections are diagnosed by a urine test. They are treated with antibiotics and usually clear up quickly without complications. Treatment helps prevent a more serious kidney infection.  Medicines  Medicines can help in the treatment of a bladder infection:    Take antibiotics until they are used up, even if you feel better. It is important to finish them to make sure the infection has cleared.    You can use acetaminophen or ibuprofen for pain, fever, or discomfort, unless another medicine was prescribed. If you have chronic liver or kidney disease, talk with your healthcare provider before using these medicines. Also talk with your provider if you've ever had a stomach ulcer or gastrointestinal bleeding, or are taking blood-thinner medicines.    If you are given phenazopydridine to reduce burning with urination, it will cause your urine to become a bright orange color. This can stain clothing.  Care and prevention  These self-care steps can help prevent future infections:    Drink plenty of fluids to prevent dehydration and flush out your bladder. Do this unless you must restrict fluids for other health reasons, or your doctor told you not to.    Proper cleaning after going to the bathroom is important. Wipe from front to back after using the toilet to prevent the spread of bacteria.    Urinate more often. Don't try to hold urine in for a long time.    Wear loose-fitting clothes and cotton underwear. Avoid tight-fitting pants.    Improve your diet and prevent constipation. Eat more fresh fruit and vegetables, and fiber, and less junk and fatty foods.    Avoid sex until your symptoms are gone.    Avoid caffeine, alcohol, and spicy foods. These can  irritate your bladder.    Urinate right after intercourse to flush out your bladder.    If you use birth control pills and have frequent bladder infections, discuss it with your doctor.  Follow-up care  Call your healthcare provider if all symptoms are not gone after 3 days of treatment. This is especially important if you have repeat infections.  If a culture was done, you will be told if your treatment needs to be changed. If directed, you can call to find out the results.  If X-rays were done, you will be told if the results will affect your treatment.  Call 911  Call 911 if any of the following occur:    Trouble breathing    Hard to wake up or confusion    Fainting or loss of consciousness    Rapid heart rate  When to seek medical advice  Call your healthcare provider right away if any of these occur:    Fever of 100.4 F (38.0 C) or higher, or as directed by your healthcare provider    Symptoms are not better by the third day of treatment    Back or belly (abdominal) pain that gets worse    Repeated vomiting, or unable to keep medicine down    Weakness or dizziness    Vaginal discharge    Pain, redness, or swelling in the outer vaginal area (labia)  Date Last Reviewed: 10/1/2016    5693-5910 [employer]. [address] [place] [address], [place] [address]. All rights reserved. This information is not intended as a substitute for professional medical care. Always follow your healthcare professional's instructions.               Return in about 4 weeks (around 2/4/2020) for Routine Visit.    Catie Fraser MD  [facility]

## 2020-01-09 LAB
BACTERIA SPEC CULT: ABNORMAL
Lab: ABNORMAL
SPECIMEN SOURCE: ABNORMAL

## 2020-02-13 ENCOUNTER — TELEPHONE (OUTPATIENT)
Dept: FAMILY MEDICINE | Facility: CLINIC | Age: 75
End: 2020-02-13

## 2020-02-13 DIAGNOSIS — G89.4 CHRONIC PAIN SYNDROME: ICD-10-CM

## 2020-02-13 RX ORDER — TRAMADOL HYDROCHLORIDE 50 MG/1
50 TABLET ORAL EVERY 6 HOURS PRN
Qty: 120 TABLET | Refills: 0 | Status: SHIPPED | OUTPATIENT
Start: 2020-02-13 | End: 2020-03-05

## 2020-02-13 NOTE — TELEPHONE ENCOUNTER
Dr. Fraser,    The original telephone note asking for the refill is still open and the medication is still pended.  We are unable to locate the Rx that appears you did on 2/11/2020.  Please advise. Josi DELGADILLO RN

## 2020-02-13 NOTE — TELEPHONE ENCOUNTER
I am surprised that the prescription is missing as I printed both out for her at her last visit. New prescription placed in out basket.ready for faxing.

## 2020-02-13 NOTE — TELEPHONE ENCOUNTER
Reason for call:  Patient reporting a symptom    Symptom or request: Pharmacist from [facility] calling.  Pt is at pharmacy - She was told that her Tramadol Rx was faxed to the pharmacy yesterday.  Roxanna MORRISON notified and was unable to find hard copy to refax.      Duration (how long have symptoms been present): today    Have you been treated for this before? Yes    Additional comments:     Phone Number pharmacist can be reached at:  Other phone number:  [phone]    Best Time:  any    Can we leave a detailed message on this number:  YES    Call taken on 2/13/2020 at 10:20 AM by Libertad Casillas

## 2020-03-04 DIAGNOSIS — E78.5 HYPERLIPIDEMIA LDL GOAL <100: ICD-10-CM

## 2020-03-04 NOTE — TELEPHONE ENCOUNTER
"Requested Prescriptions   Pending Prescriptions Disp Refills     atorvastatin (LIPITOR) 20 MG tablet [Pharmacy Med Name: ATORVASTATIN CALCIUM 20MG TABS]  Last Written Prescription Date:  1/28/19  Last Fill Quantity: 90,  # refills: 3   Last Office Visit with [facility], P or [facility] prescribing provider:  1/7/20   Future Office Visit:    Next 5 appointments (look out 90 days)    Mar 05, 2020 11:00 AM CST  SHORT with Catie Fraser MD  [facility]  Arrive at: Clinic [facility] [address] [id number]  [phone]          90 tablet 3     Sig: TAKE ONE TABLET BY MOUTH ONCE DAILY       Statins Protocol Failed - 3/4/2020  2:41 PM        Failed - LDL on file in past 12 months     Recent Labs   Lab Test 01/23/19  0758   *             Passed - No abnormal creatine kinase in past 12 months     No lab results found.             Passed - Recent (12 mo) or future (30 days) visit within the authorizing provider's specialty     Patient has had an office visit with the authorizing provider or a provider within the authorizing providers department within the previous 12 mos or has a future within next 30 days. See \"Patient Info\" tab in inbasket, or \"Choose Columns\" in Meds & Orders section of the refill encounter.              Passed - Medication is active on med list        Passed - Patient is age 18 or older        Passed - No active pregnancy on record        Passed - No positive pregnancy test in past 12 months          "

## 2020-03-05 ENCOUNTER — OFFICE VISIT (OUTPATIENT)
Dept: FAMILY MEDICINE | Facility: CLINIC | Age: 75
End: 2020-03-05
Payer: COMMERCIAL

## 2020-03-05 VITALS
OXYGEN SATURATION: 94 % | BODY MASS INDEX: 27.82 KG/M2 | WEIGHT: 157 LBS | HEART RATE: 76 BPM | HEIGHT: 63 IN | SYSTOLIC BLOOD PRESSURE: 124 MMHG | TEMPERATURE: 97.7 F | RESPIRATION RATE: 18 BRPM | DIASTOLIC BLOOD PRESSURE: 66 MMHG

## 2020-03-05 DIAGNOSIS — G89.4 CHRONIC PAIN SYNDROME: ICD-10-CM

## 2020-03-05 DIAGNOSIS — E78.5 HYPERLIPIDEMIA LDL GOAL <100: ICD-10-CM

## 2020-03-05 DIAGNOSIS — F10.20 ALCOHOLISM (H): ICD-10-CM

## 2020-03-05 DIAGNOSIS — F32.5 MAJOR DEPRESSION IN COMPLETE REMISSION (H): ICD-10-CM

## 2020-03-05 PROCEDURE — 99214 OFFICE O/P EST MOD 30 MIN: CPT | Performed by: FAMILY MEDICINE

## 2020-03-05 RX ORDER — ATORVASTATIN CALCIUM 20 MG/1
20 TABLET, FILM COATED ORAL DAILY
Qty: 90 TABLET | Refills: 3 | Status: SHIPPED | OUTPATIENT
Start: 2020-03-05 | End: 2022-02-02

## 2020-03-05 RX ORDER — TRAMADOL HYDROCHLORIDE 50 MG/1
50 TABLET ORAL EVERY 6 HOURS PRN
Qty: 120 TABLET | Refills: 0 | Status: SHIPPED | OUTPATIENT
Start: 2020-03-14 | End: 2020-04-10

## 2020-03-05 RX ORDER — ATORVASTATIN CALCIUM 20 MG/1
TABLET, FILM COATED ORAL
Qty: 30 TABLET | Refills: 0 | Status: SHIPPED | OUTPATIENT
Start: 2020-03-05 | End: 2020-03-05

## 2020-03-05 ASSESSMENT — MIFFLIN-ST. JEOR: SCORE: 1182.31

## 2020-03-05 NOTE — PROGRESS NOTES
Subjective     Yamel Lindsay is a 74 year old female who presents to clinic today for the following health issues:Patient is here for medication refills. She is on tramadol for chronic pain. She is also in need of Lipitor refills. Denies any side effect to her medication.     HPI   Hyperlipidemia Follow-Up      Are you regularly taking any medication or supplement to lower your cholesterol?   Yes- lipitor     Are you having muscle aches or other side effects that you think could be caused by your cholesterol lowering medication?  No    Chronic Pain Follow-Up    Where in your body do you have pain? Back knees and neck   How has your pain affected your ability to work? Not applicable  Which of these pain treatments have you tried since your last clinic visit? Activity or exercise and Relaxation techniques / Biofeedback  How well are you sleeping? Good  How has your mood been since your last visit? About the same  Have you had a significant life event? No  Other aggravating factors: prolonged standing  Taking medication as directed? Yes    PHQ-9 SCORE 8/30/2019 10/31/2019 11/15/2019   PHQ-9 Total Score - - -   PHQ-9 Total Score 9 4 5     PAOLA-7 SCORE 8/30/2019 10/31/2019 11/15/2019   Total Score - - -   Total Score 18 10 12     No flowsheet data found.  Encounter-Level CSA - 02/20/2015:    Controlled Substance Agreement - Scan on 2/26/2015  9:06 AM: Controlled Medication Agreement-  2/20/15     Patient-Level CSA:    There are no patient-level csa.         How many servings of fruits and vegetables do you eat daily?  2-3    On average, how many sweetened beverages do you drink each day (Examples: soda, juice, sweet tea, etc.  Do NOT count diet or artificially sweetened beverages)?   0    How many days per week do you exercise enough to make your heart beat faster? 5    How many minutes a day do you exercise enough to make your heart beat faster? 30 - 60    How many days per week do you miss taking your medication?  0    Medication Followup of all medication listed in      Taking Medication as prescribed: yes    Side Effects:  None    Medication Helping Symptoms:  yes       Patient Active Problem List   Diagnosis     Hyperlipidemia LDL goal <130     Primary localized osteoarthrosis, lower leg     Generalized anxiety disorder     Rhinitis, allergic seasonal     Alcoholism (H)     Mild recurrent major depression (H)     Advanced directives, counseling/discussion     Seasonal allergic rhinitis     Hip pain     GERD (gastroesophageal reflux disease)     S/P laparoscopic hernia repair     OA (osteoarthritis) of knee     Mild intermittent asthma without complication     Major depression in complete remission (H)     Benign essential hypertension     Past Surgical History:   Procedure Laterality Date     ESOPHAGOSCOPY, GASTROSCOPY, DUODENOSCOPY (EGD), COMBINED N/A 2015    Procedure: COMBINED ESOPHAGOSCOPY, GASTROSCOPY, DUODENOSCOPY (EGD), BIOPSY SINGLE OR MULTIPLE;  Surgeon: Dylan Alejandra MD;  Location: [facility]     ESOPHAGOSCOPY, GASTROSCOPY, DUODENOSCOPY (EGD), COMBINED N/A 2018    Procedure: COMBINED ESOPHAGOSCOPY, GASTROSCOPY, DUODENOSCOPY (EGD), BIOPSY SINGLE OR MULTIPLE;  gastroscopy;  Surgeon: Alexander Bautista MD;  Location: [facility]     LAPAROSCOPIC HERNIORRHAPHY INGUINAL Right 2015    Procedure: LAPAROSCOPIC HERNIORRHAPHY INGUINAL;  Surgeon: Favio Olsen MD;  Location: [facility]     TUBAL LIGATION         Social History     Tobacco Use     Smoking status: Former Smoker     Packs/day: 0.50     Types: Cigarettes     Last attempt to quit: 2011     Years since quittin.7     Smokeless tobacco: Never Used   Substance Use Topics     Alcohol use: No     Family History   Problem Relation Age of Onset     Cancer Mother      C.A.D. Father      Lipids Father      Hypertension Father      Hypertension Brother      Cancer Brother         esophageal cancer     Breast Cancer Other      Prostate Cancer  Brother      Psychotic Disorder Daughter      Diabetes No family hx of      Cerebrovascular Disease No family hx of      Cancer - colorectal No family hx of          Current Outpatient Medications   Medication Sig Dispense Refill     albuterol (VENTOLIN HFA) 108 (90 Base) MCG/ACT inhaler INHALE 2 PUFFS INTO THE LUNGS EVERY 6 HOURS AS NEEDED FOR SHORTNESS OF BREATH, DIFFICULTY BREATHING OR WHEEZING. 18 g 3     atorvastatin (LIPITOR) 20 MG tablet Take 1 tablet (20 mg) by mouth daily 90 tablet 3     buPROPion (WELLBUTRIN XL) 150 MG 24 hr tablet Take 1 tablet (150 mg) by mouth every morning 90 tablet 3     Calcium Carb-Cholecalciferol (CALCIUM 1000 + D PO) Take 1 tablet by mouth       Cholecalciferol (VITAMIN D-3 PO) Take 2,000 Units by mouth daily        clonazePAM (KLONOPIN) 0.5 MG tablet Take 1 tablet (0.5 mg) by mouth 3 times daily as needed for anxiety Patient going out of town on Sept 11 may fill  on this day 90 tablet 3     fluticasone-salmeterol (AIRDUO RESPICLICK) 113-14 MCG/ACT inhaler Inhale 1 puff into the lungs 2 times daily 1 Inhaler 3     hydrochlorothiazide (HYDRODIURIL) 25 MG tablet Take 1 tablet (25 mg) by mouth daily 90 tablet 3     Omega-3 Fatty Acids (FISH OIL) 1200 MG CAPS Take 1 capsule by mouth       omeprazole (PRILOSEC) 20 MG DR capsule TAKE 1 CAPSULE BY MOUTH DAILY, 30 TO 60 MINUTES BEFORE A MEAL. 90 capsule 3     tiZANidine (ZANAFLEX) 2 MG tablet Take 1 tablet (2 mg) by mouth 3 times daily 270 tablet 1     [START ON 3/14/2020] traMADol (ULTRAM) 50 MG tablet Take 1 tablet (50 mg) by mouth every 6 hours as needed for severe pain May fill three days before if prescription falls on a weekend 120 tablet 0     [START ON 3/14/2020] traMADol (ULTRAM) 50 MG tablet Take 1 tablet (50 mg) by mouth every 6 hours as needed for severe pain 120 tablet 0     traZODone (DESYREL) 50 MG tablet TAKE TWO TABLETS BY MOUTH EVERY NIGHT AT BEDTIME 180 tablet 3     albuterol (PROVENTIL) (2.5 MG/3ML) 0.083% neb solution  "Take 1 vial (2.5 mg) by nebulization every 6 hours as needed for shortness of breath / dyspnea or wheezing (Patient not taking: Reported on 3/5/2020) 75 vial 2     ibuprofen (ADVIL,MOTRIN) 200 MG tablet Take 200-600 mg by mouth every 2 hours as needed for mild pain       PRENATAL VITAMINS PO Take 1 tablet by mouth daily        traMADol (ULTRAM) 50 MG tablet Take 1 tablet (50 mg) by mouth 4 times daily 120 tablet 0     Allergies   Allergen Reactions     Nka [No Known Allergies]      Seasonal Allergies      BP Readings from Last 3 Encounters:   03/05/20 124/66   01/07/20 132/76   11/12/19 124/68    Wt Readings from Last 3 Encounters:   03/05/20 71.2 kg (157 lb)   01/07/20 69.4 kg (153 lb)   11/12/19 70.3 kg (155 lb)                      Reviewed and updated as needed this visit by Provider  Tobacco  Allergies  Meds  Problems  Med Hx  Surg Hx  Fam Hx         Review of Systems   ROS COMP: Constitutional, HEENT, cardiovascular, pulmonary, gi and gu systems are negative, except as otherwise noted.      Objective    /66   Pulse 76   Temp 97.7  F (36.5  C) (Tympanic)   Resp 18   Ht 1.594 m (5' 2.75\")   Wt 71.2 kg (157 lb)   SpO2 94%   BMI 28.03 kg/m    Body mass index is 28.03 kg/m .  Physical Exam   GENERAL: healthy, alert and no distress  EYES: Eyes grossly normal to inspection, PERRL and conjunctivae and sclerae normal  HENT: ear canals and TM's normal, nose and mouth without ulcers or lesions  NECK: no adenopathy, no asymmetry, masses, or scars and thyroid normal to palpation  RESP: lungs clear to auscultation - no rales, rhonchi or wheezes  CV: regular rate and rhythm, normal S1 S2, no S3 or S4, no murmur, click or rub, no peripheral edema and peripheral pulses strong  MS: no gross musculoskeletal defects noted, no edema    Diagnostic Test Results:  none         Assessment & Plan     1. Hyperlipidemia LDL goal <100  Medication refilled  - atorvastatin (LIPITOR) 20 MG tablet; Take 1 tablet (20 mg) by " mouth daily  Dispense: 90 tablet; Refill: 3  - Lipid panel reflex to direct LDL Fasting; Future    2. Chronic pain syndrome   Medication refilled  - traMADol (ULTRAM) 50 MG tablet; Take 1 tablet (50 mg) by mouth every 6 hours as needed for severe pain May fill three days before if prescription falls on a weekend  Dispense: 120 tablet; Refill: 0  - traMADol (ULTRAM) 50 MG tablet; Take 1 tablet (50 mg) by mouth every 6 hours as needed for severe pain  Dispense: 120 tablet; Refill: 0    3. Alcoholism (H)  In remission     4. Major depression in complete remission (H)  stable         FUTURE APPOINTMENTS:       - Follow-up visit in 2 months or sooner as needed.  Patient Instructions         Thank you for choosing [facility].  You may be receiving an email and/or telephone survey request from [facility] Customer Experience regarding your visit today.  Please take a few minutes to respond to the survey to let us know how we are doing.      If you have questions or concerns, please contact us via "[employer] or you can contact your care team at [phone].    Our Clinic hours are:  Monday 6:40 am  to 7:00 pm  Tuesday -Friday 6:40 am to 5:00 pm    The [place] outpatient lab hours are:  Monday - Friday 6:10 am to 4:45 pm  Saturdays 7:00 am to 11:00 am  Appointments are required, call [phone]    If you have clinical questions after hours or would like to schedule an appointment,  call the clinic at [phone].      Return in about 4 weeks (around 4/2/2020) for Routine Visit.    Catie Fraser MD  [facility]

## 2020-03-05 NOTE — PATIENT INSTRUCTIONS
Thank you for choosing [facility].  You may be receiving an email and/or telephone survey request from [facility] Customer Experience regarding your visit today.  Please take a few minutes to respond to the survey to let us know how we are doing.      If you have questions or concerns, please contact us via [employer] or you can contact your care team at [phone].    Our Clinic hours are:  Monday 6:40 am  to 7:00 pm  Tuesday -Friday 6:40 am to 5:00 pm    The [place] outpatient lab hours are:  Monday - Friday 6:10 am to 4:45 pm  Saturdays 7:00 am to 11:00 am  Appointments are required, call [phone]    If you have clinical questions after hours or would like to schedule an appointment,  call the clinic at [phone].

## 2020-03-05 NOTE — TELEPHONE ENCOUNTER
Medication is being filled for 1 time refill only due to:  Patient needs labs Lipids.     Sent message with script to pharmacy and mailed letter to home.

## 2020-03-06 ASSESSMENT — ASTHMA QUESTIONNAIRES: ACT_TOTALSCORE: 22

## 2020-04-10 ENCOUNTER — VIRTUAL VISIT (OUTPATIENT)
Dept: FAMILY MEDICINE | Facility: CLINIC | Age: 75
End: 2020-04-10
Payer: COMMERCIAL

## 2020-04-10 DIAGNOSIS — G89.4 CHRONIC PAIN SYNDROME: ICD-10-CM

## 2020-04-10 DIAGNOSIS — F41.9 ANXIETY: ICD-10-CM

## 2020-04-10 PROCEDURE — 96127 BRIEF EMOTIONAL/BEHAV ASSMT: CPT | Performed by: FAMILY MEDICINE

## 2020-04-10 PROCEDURE — 99213 OFFICE O/P EST LOW 20 MIN: CPT | Mod: 95 | Performed by: FAMILY MEDICINE

## 2020-04-10 RX ORDER — TRAMADOL HYDROCHLORIDE 50 MG/1
50 TABLET ORAL EVERY 6 HOURS PRN
Qty: 120 TABLET | Refills: 0 | Status: CANCELLED | OUTPATIENT
Start: 2020-04-10

## 2020-04-10 RX ORDER — TRAMADOL HYDROCHLORIDE 50 MG/1
50 TABLET ORAL EVERY 6 HOURS PRN
Qty: 32 TABLET | Refills: 0 | Status: SHIPPED | OUTPATIENT
Start: 2020-04-10 | End: 2020-08-27

## 2020-04-10 RX ORDER — CLONAZEPAM 0.5 MG/1
0.5 TABLET ORAL 3 TIMES DAILY PRN
Qty: 90 TABLET | Refills: 3 | Status: CANCELLED | OUTPATIENT
Start: 2020-04-10

## 2020-04-10 ASSESSMENT — ANXIETY QUESTIONNAIRES
5. BEING SO RESTLESS THAT IT IS HARD TO SIT STILL: NOT AT ALL
2. NOT BEING ABLE TO STOP OR CONTROL WORRYING: SEVERAL DAYS
1. FEELING NERVOUS, ANXIOUS, OR ON EDGE: SEVERAL DAYS
3. WORRYING TOO MUCH ABOUT DIFFERENT THINGS: SEVERAL DAYS
IF YOU CHECKED OFF ANY PROBLEMS ON THIS QUESTIONNAIRE, HOW DIFFICULT HAVE THESE PROBLEMS MADE IT FOR YOU TO DO YOUR WORK, TAKE CARE OF THINGS AT HOME, OR GET ALONG WITH OTHER PEOPLE: SOMEWHAT DIFFICULT
6. BECOMING EASILY ANNOYED OR IRRITABLE: SEVERAL DAYS
GAD7 TOTAL SCORE: 6
7. FEELING AFRAID AS IF SOMETHING AWFUL MIGHT HAPPEN: SEVERAL DAYS

## 2020-04-10 ASSESSMENT — PATIENT HEALTH QUESTIONNAIRE - PHQ9
5. POOR APPETITE OR OVEREATING: SEVERAL DAYS
SUM OF ALL RESPONSES TO PHQ QUESTIONS 1-9: 3

## 2020-04-10 NOTE — PROGRESS NOTES
"Subjective     Yamel Lindsay is a 74 year old female who is being evaluated via a billable telephone visit.      The patient has been notified of following:     \"This telephone visit will be conducted via a call between you and your physician/provider. We have found that certain health care needs can be provided without the need for a physical exam.  This service lets us provide the care you need with a short phone conversation.  If a prescription is necessary we can send it directly to your pharmacy.  If lab work is needed we can place an order for that and you can then stop by our lab to have the test done at a later time.    Telephone visits are billed at different rates depending on your insurance coverage. During this emergency period, for some insurers they may be billed the same as an in-person visit.  Please reach out to your insurance provider with any questions.    If during the course of the call the physician/provider feels a telephone visit is not appropriate, you will not be charged for this service.\"    Patient has given verbal consent for Telephone visit?  Yes    How would you like to obtain your AVS? Andreshart    Yamel Lindsay complains of   Chief Complaint   Patient presents with     Recheck Medication     Trazodone       ALLERGIES  Nka [no known allergies] and Seasonal allergies    Anxiety Follow-Up    How are you doing with your anxiety since your last visit? Stable/ improved.    Are you having other symptoms that might be associated with anxiety? No    Have you had a significant life event? No     Are you feeling depressed? Yes:  little bit but she is still doing things.    Do you have any concerns with your use of alcohol or other drugs? No    Social History     Tobacco Use     Smoking status: Former Smoker     Packs/day: 0.50     Types: Cigarettes     Last attempt to quit: 2011     Years since quittin.8     Smokeless tobacco: Never Used   Substance Use Topics     Alcohol use: No     " Drug use: No     PAOLA-7 SCORE 10/31/2019 11/15/2019 4/10/2020   Total Score - - -   Total Score 10 12 6     PHQ 10/31/2019 11/15/2019 4/10/2020   PHQ-9 Total Score 4 5 3   Q9: Thoughts of better off dead/self-harm past 2 weeks Not at all Not at all Not at all     Last PHQ-9 4/10/2020   1.  Little interest or pleasure in doing things 1   2.  Feeling down, depressed, or hopeless 1   3.  Trouble falling or staying asleep, or sleeping too much 0   4.  Feeling tired or having little energy 1   5.  Poor appetite or overeating 0   6.  Feeling bad about yourself 0   7.  Trouble concentrating 0   8.  Moving slowly or restless 0   Q9: Thoughts of better off dead/self-harm past 2 weeks 0   PHQ-9 Total Score 3   Difficulty at work, home, or with people Somewhat difficult     PAOLA-7  4/10/2020   1. Feeling nervous, anxious, or on edge 1   2. Not being able to stop or control worrying 1   3. Worrying too much about different things 1   4. Trouble relaxing 1   5. Being so restless that it is hard to sit still 0   6. Becoming easily annoyed or irritable 1   7. Feeling afraid, as if something awful might happen 1   PAOLA-7 Total Score 6   If you checked any problems, how difficult have they made it for you to do your work, take care of things at home, or get along with other people? Somewhat difficult       Chronic Pain Follow-Up    Where in your body do you have pain? Thoracic and lumbar area, knees, and neck.  How has your pain affected your ability to work? Not applicable  Which of these pain treatments have you tried since your last clinic visit? Activity or exercise and Relaxation techniques / Biofeedback  How well are you sleeping? Good  How has your mood been since your last visit? Slightly worse  Have you had a significant life event? No  Other aggravating factors: prolonged standing  Taking medication as directed? Yes    PHQ-9 SCORE 10/31/2019 11/15/2019 4/10/2020   PHQ-9 Total Score - - -   PHQ-9 Total Score 4 5 3     PAOLA-7  SCORE 10/31/2019 11/15/2019 4/10/2020   Total Score - - -   Total Score 10 12 6     No flowsheet data found.  Encounter-Level CSA - 02/20/2015:    Controlled Substance Agreement - Scan on 2/26/2015  9:06 AM: Controlled Medication Agreement-  2/20/15     Patient-Level CSA:    There are no patient-level csa.         How many servings of fruits and vegetables do you eat daily?  2-3    On average, how many sweetened beverages do you drink each day (Examples: soda, juice, sweet tea, etc.  Do NOT count diet or artificially sweetened beverages)?   0    How many days per week do you exercise enough to make your heart beat faster? 4    How many minutes a day do you exercise enough to make your heart beat faster? 30 - 60    How many days per week do you miss taking your medication? 0    Reviewed the [place] prescription monitoring site and noted she consistently gets clomazepam 0.5 mg tablet 90 a month and tramadol 50 mg 120 tablets a month for the past year from same provider and pharmacy    Patient Active Problem List   Diagnosis     Hyperlipidemia LDL goal <130     Primary localized osteoarthrosis, lower leg     Generalized anxiety disorder     Rhinitis, allergic seasonal     Alcoholism (H)     Mild recurrent major depression (H)     Advanced directives, counseling/discussion     Seasonal allergic rhinitis     Hip pain     GERD (gastroesophageal reflux disease)     S/P laparoscopic hernia repair     OA (osteoarthritis) of knee     Mild intermittent asthma without complication     Major depression in complete remission (H)     Benign essential hypertension     Past Surgical History:   Procedure Laterality Date     ESOPHAGOSCOPY, GASTROSCOPY, DUODENOSCOPY (EGD), COMBINED N/A 1/5/2015    Procedure: COMBINED ESOPHAGOSCOPY, GASTROSCOPY, DUODENOSCOPY (EGD), BIOPSY SINGLE OR MULTIPLE;  Surgeon: Dylan Alejandra MD;  Location: [facility]     ESOPHAGOSCOPY, GASTROSCOPY, DUODENOSCOPY (EGD), COMBINED N/A 5/25/2018    Procedure: COMBINED  ESOPHAGOSCOPY, GASTROSCOPY, DUODENOSCOPY (EGD), BIOPSY SINGLE OR MULTIPLE;  gastroscopy;  Surgeon: Alexander Bautista MD;  Location: [facility]     LAPAROSCOPIC HERNIORRHAPHY INGUINAL Right 2015    Procedure: LAPAROSCOPIC HERNIORRHAPHY INGUINAL;  Surgeon: Favio Olsen MD;  Location: [facility]     TUBAL LIGATION         Social History     Tobacco Use     Smoking status: Former Smoker     Packs/day: 0.50     Types: Cigarettes     Last attempt to quit: 2011     Years since quittin.8     Smokeless tobacco: Never Used   Substance Use Topics     Alcohol use: No     Family History   Problem Relation Age of Onset     Cancer Mother      C.A.D. Father      Lipids Father      Hypertension Father      Hypertension Brother      Cancer Brother         esophageal cancer     Breast Cancer Other      Prostate Cancer Brother      Psychotic Disorder Daughter      Diabetes No family hx of      Cerebrovascular Disease No family hx of      Cancer - colorectal No family hx of          Current Outpatient Medications   Medication Sig Dispense Refill     albuterol (PROVENTIL) (2.5 MG/3ML) 0.083% neb solution Take 1 vial (2.5 mg) by nebulization every 6 hours as needed for shortness of breath / dyspnea or wheezing (Patient not taking: Reported on 3/5/2020) 75 vial 2     albuterol (VENTOLIN HFA) 108 (90 Base) MCG/ACT inhaler INHALE 2 PUFFS INTO THE LUNGS EVERY 6 HOURS AS NEEDED FOR SHORTNESS OF BREATH, DIFFICULTY BREATHING OR WHEEZING. 18 g 3     atorvastatin (LIPITOR) 20 MG tablet Take 1 tablet (20 mg) by mouth daily 90 tablet 3     buPROPion (WELLBUTRIN XL) 150 MG 24 hr tablet Take 1 tablet (150 mg) by mouth every morning 90 tablet 3     Calcium Carb-Cholecalciferol (CALCIUM 1000 + D PO) Take 1 tablet by mouth       Cholecalciferol (VITAMIN D-3 PO) Take 2,000 Units by mouth daily        clonazePAM (KLONOPIN) 0.5 MG tablet Take 1 tablet (0.5 mg) by mouth 3 times daily as needed for anxiety Patient going out of town on Sept 11 may  fill  on this day 90 tablet 3     fluticasone-salmeterol (AIRDUO RESPICLICK) 113-14 MCG/ACT inhaler Inhale 1 puff into the lungs 2 times daily 1 Inhaler 3     hydrochlorothiazide (HYDRODIURIL) 25 MG tablet Take 1 tablet (25 mg) by mouth daily 90 tablet 3     ibuprofen (ADVIL,MOTRIN) 200 MG tablet Take 200-600 mg by mouth every 2 hours as needed for mild pain       Omega-3 Fatty Acids (FISH OIL) 1200 MG CAPS Take 1 capsule by mouth       omeprazole (PRILOSEC) 20 MG DR capsule TAKE 1 CAPSULE BY MOUTH DAILY, 30 TO 60 MINUTES BEFORE A MEAL. 90 capsule 3     PRENATAL VITAMINS PO Take 1 tablet by mouth daily        tiZANidine (ZANAFLEX) 2 MG tablet Take 1 tablet (2 mg) by mouth 3 times daily 270 tablet 1     traMADol (ULTRAM) 50 MG tablet Take 1 tablet (50 mg) by mouth every 6 hours as needed for severe pain May fill three days before if prescription falls on a weekend 120 tablet 0     traMADol (ULTRAM) 50 MG tablet Take 1 tablet (50 mg) by mouth every 6 hours as needed for severe pain 120 tablet 0     traMADol (ULTRAM) 50 MG tablet Take 1 tablet (50 mg) by mouth 4 times daily 120 tablet 0     traZODone (DESYREL) 50 MG tablet TAKE TWO TABLETS BY MOUTH EVERY NIGHT AT BEDTIME 180 tablet 3     Allergies   Allergen Reactions     Nka [No Known Allergies]      Seasonal Allergies        Reviewed and updated as needed this visit by Provider         Review of Systems   ROS COMP: Constitutional, HEENT, cardiovascular, pulmonary, gi and gu systems are negative, except as otherwise noted.       Objective   Reported vitals:  There were no vitals taken for this visit.   healthy, alert and no distress  Psych: Alert and oriented times 3; coherent speech, normal   rate and volume, able to articulate logical thoughts, able   to abstract reason, no tangential thoughts, no hallucinations   or delusions  Her affect is bright and happy, laughs, talkative             Assessment/Plan:  1. Anxiety  Had clonazepam filled April 3 and gets 90  tablets a month, she will set up phone visit with Dr. Fraser for refills per narcotic refill contract    2. Chronic pain syndrome   Will cover for 8 days until Dr. Fraser off [facility] for COVID19 and then will set up appointment with Dr. NINA to get further refills, they did talk about weaning off in January but does not look lke that is set up yet.  Because she had been on these two drugs for so long would do a slow wear like one less pill a week.  Currently she is using 28 a week so would go to 27 a week, then 26 a week then 25 a week then 24 a week so next prescription would be for 102 tablets instead of 120. And continue slow weak reducing by just one pill a week.  Find alternate treatment for pain and anxiety.  Zoloft or another SSRI would be helpful for anxiety and pain.  - traMADol (ULTRAM) 50 MG tablet; Take 1 tablet (50 mg) by mouth every 6 hours as needed for severe pain May fill three days before if prescription falls on a weekend  Dispense: 32 tablet; Refill: 0    No follow-ups on file.      Phone call duration:  11 minutes    Bonnie Bonilla MD

## 2020-04-11 ASSESSMENT — ANXIETY QUESTIONNAIRES: GAD7 TOTAL SCORE: 6

## 2020-04-23 DIAGNOSIS — F41.9 ANXIETY: ICD-10-CM

## 2020-04-23 DIAGNOSIS — G89.4 CHRONIC PAIN SYNDROME: ICD-10-CM

## 2020-04-23 DIAGNOSIS — M62.838 MUSCLE SPASM: ICD-10-CM

## 2020-04-23 RX ORDER — TRAMADOL HYDROCHLORIDE 50 MG/1
50 TABLET ORAL EVERY 6 HOURS PRN
Qty: 32 TABLET | Refills: 0 | Status: CANCELLED | OUTPATIENT
Start: 2020-04-23

## 2020-04-23 RX ORDER — CLONAZEPAM 0.5 MG/1
0.5 TABLET ORAL 3 TIMES DAILY PRN
Qty: 90 TABLET | Refills: 3 | Status: CANCELLED | OUTPATIENT
Start: 2020-04-23

## 2020-04-23 NOTE — TELEPHONE ENCOUNTER
Reason for Call:  Medication or medication refill:    Do you use a [facility]?  Name of the pharmacy and phone number for the current request:  [facility] [place], [place] - [address] [place] [address]  [phone]    Name of the medication requested:   Clonazepam  Tramadol  Can we leave a detailed message on this number? YES    Phone number patient can be reached at: Home number on file [phone] (home)    Best Time: any    Call taken on 4/23/2020 at 11:18 AM by Lily Gaffney

## 2020-04-23 NOTE — TELEPHONE ENCOUNTER
Routing refill request to provider for review/approval because:  Drug not on the [facility] refill protocol   LOV for chronic pain 4-10-20.  Clonazepam last written for #90 + 3.  #90 is a 30 day supply.    Requested Prescriptions   Pending Prescriptions Disp Refills     traMADol (ULTRAM) 50 MG tablet 32 tablet 0     Sig: Take 1 tablet (50 mg) by mouth every 6 hours as needed for severe pain May fill three days before if prescription falls on a weekend       There is no refill protocol information for this order        clonazePAM (KLONOPIN) 0.5 MG tablet 90 tablet 3     Sig: Take 1 tablet (0.5 mg) by mouth 3 times daily as needed for anxiety Patient going out of town on Sept 11 may fill  on this day       There is no refill protocol information for this order

## 2020-04-27 ENCOUNTER — VIRTUAL VISIT (OUTPATIENT)
Dept: FAMILY MEDICINE | Facility: CLINIC | Age: 75
End: 2020-04-27
Payer: COMMERCIAL

## 2020-04-27 DIAGNOSIS — F41.9 ANXIETY: ICD-10-CM

## 2020-04-27 DIAGNOSIS — G89.4 CHRONIC PAIN SYNDROME: ICD-10-CM

## 2020-04-27 DIAGNOSIS — M17.0 OSTEOARTHRITIS OF BOTH KNEES, UNSPECIFIED OSTEOARTHRITIS TYPE: ICD-10-CM

## 2020-04-27 PROCEDURE — 99214 OFFICE O/P EST MOD 30 MIN: CPT | Mod: 95 | Performed by: FAMILY MEDICINE

## 2020-04-27 RX ORDER — TRAMADOL HYDROCHLORIDE 50 MG/1
50 TABLET ORAL 4 TIMES DAILY
Qty: 120 TABLET | Refills: 0 | Status: SHIPPED | OUTPATIENT
Start: 2020-05-27 | End: 2020-07-31

## 2020-04-27 RX ORDER — TRAMADOL HYDROCHLORIDE 50 MG/1
50 TABLET ORAL EVERY 6 HOURS PRN
Qty: 120 TABLET | Refills: 0 | Status: SHIPPED | OUTPATIENT
Start: 2020-04-27 | End: 2020-05-27

## 2020-04-27 RX ORDER — TRAMADOL HYDROCHLORIDE 50 MG/1
50 TABLET ORAL EVERY 6 HOURS PRN
Qty: 120 TABLET | Refills: 0 | Status: SHIPPED | OUTPATIENT
Start: 2020-06-26 | End: 2020-07-26

## 2020-04-27 RX ORDER — CLONAZEPAM 0.5 MG/1
0.5 TABLET ORAL 3 TIMES DAILY PRN
Qty: 90 TABLET | Refills: 3 | Status: SHIPPED | OUTPATIENT
Start: 2020-04-27 | End: 2020-08-28

## 2020-04-27 ASSESSMENT — ANXIETY QUESTIONNAIRES
6. BECOMING EASILY ANNOYED OR IRRITABLE: NEARLY EVERY DAY
2. NOT BEING ABLE TO STOP OR CONTROL WORRYING: NEARLY EVERY DAY
5. BEING SO RESTLESS THAT IT IS HARD TO SIT STILL: NEARLY EVERY DAY
GAD7 TOTAL SCORE: 19
3. WORRYING TOO MUCH ABOUT DIFFERENT THINGS: NEARLY EVERY DAY
7. FEELING AFRAID AS IF SOMETHING AWFUL MIGHT HAPPEN: SEVERAL DAYS
1. FEELING NERVOUS, ANXIOUS, OR ON EDGE: NEARLY EVERY DAY
IF YOU CHECKED OFF ANY PROBLEMS ON THIS QUESTIONNAIRE, HOW DIFFICULT HAVE THESE PROBLEMS MADE IT FOR YOU TO DO YOUR WORK, TAKE CARE OF THINGS AT HOME, OR GET ALONG WITH OTHER PEOPLE: SOMEWHAT DIFFICULT

## 2020-04-27 ASSESSMENT — PATIENT HEALTH QUESTIONNAIRE - PHQ9: 5. POOR APPETITE OR OVEREATING: NEARLY EVERY DAY

## 2020-04-27 NOTE — PROGRESS NOTES
"Yamel Lindsay is a 74 year old female who is being evaluated via a billable telephone visit.     74 yr old female here for medication refills . She is on tramadol and clonazepam. She reports that her depression is slightly worse and she was wondering about switching to Fluoxetine. She says she was on it a long time ago and it was quite effective.     The patient has been notified of following:     \"This telephone visit will be conducted via a call between you and your physician/provider. We have found that certain health care needs can be provided without the need for a physical exam.  This service lets us provide the care you need with a short phone conversation.  If a prescription is necessary we can send it directly to your pharmacy.  If lab work is needed we can place an order for that and you can then stop by our lab to have the test done at a later time.    Telephone visits are billed at different rates depending on your insurance coverage. During this emergency period, for some insurers they may be billed the same as an in-person visit.  Please reach out to your insurance provider with any questions.    If during the course of the call the physician/provider feels a telephone visit is not appropriate, you will not be charged for this service.\"    Patient has given verbal consent for Telephone visit?  Yes    How would you like to obtain your AVS? MyChart    Subjective     Yamel Lindsay is a 74 year old female who presents to clinic today for the following health issues:    HPI  Anxiety Follow-Up    How are you doing with your anxiety since your last visit? Worsened since being at home    Are you having other symptoms that might be associated with anxiety? No    Have you had a significant life event? No     Are you feeling depressed? Yes:  currently taking wellbutrin would like to talk about prozac    Do you have any concerns with your use of alcohol or other drugs? No    Social History     Tobacco Use     " Smoking status: Former Smoker     Packs/day: 0.50     Types: Cigarettes     Last attempt to quit: 2011     Years since quittin.8     Smokeless tobacco: Never Used   Substance Use Topics     Alcohol use: No     Drug use: No     PAOLA-7 SCORE 11/15/2019 4/10/2020 2020   Total Score - - -   Total Score 12 6 19     PHQ 10/31/2019 11/15/2019 4/10/2020   PHQ-9 Total Score 4 5 3   Q9: Thoughts of better off dead/self-harm past 2 weeks Not at all Not at all Not at all     Last PHQ-9 4/10/2020   1.  Little interest or pleasure in doing things 1   2.  Feeling down, depressed, or hopeless 1   3.  Trouble falling or staying asleep, or sleeping too much 0   4.  Feeling tired or having little energy 1   5.  Poor appetite or overeating 0   6.  Feeling bad about yourself 0   7.  Trouble concentrating 0   8.  Moving slowly or restless 0   Q9: Thoughts of better off dead/self-harm past 2 weeks 0   PHQ-9 Total Score 3   Difficulty at work, home, or with people Somewhat difficult       Chronic Pain Follow-Up    Where in your body do you have pain? Mostly in knees, shoulders and neck, back, now is starting in right hand  How has your pain affected your ability to work? Not applicable  Which of these pain treatments have you tried since your last clinic visit? Massage  How well are you sleeping? Good  How has your mood been since your last visit? Slightly worse  Have you had a significant life event? No  Other aggravating factors: none  Taking medication as directed? Yes    PHQ-9 SCORE 10/31/2019 11/15/2019 4/10/2020   PHQ-9 Total Score - - -   PHQ-9 Total Score 4 5 3     PAOLA-7 SCORE 11/15/2019 4/10/2020 2020   Total Score - - -   Total Score 12 6 19     No flowsheet data found.  Encounter-Level CSA - 2015:    Controlled Substance Agreement - Scan on 2015  9:06 AM: Controlled Medication Agreement-  2/20/15     Patient-Level CSA:    There are no patient-level csa.         How many servings of fruits and  vegetables do you eat daily?  4 or more    On average, how many sweetened beverages do you drink each day (Examples: soda, juice, sweet tea, etc.  Do NOT count diet or artificially sweetened beverages)?   0    How many days per week do you exercise enough to make your heart beat faster? 4    How many minutes a day do you exercise enough to make your heart beat faster? 30 - 60    How many days per week do you miss taking your medication? 0            Patient Active Problem List   Diagnosis     Hyperlipidemia LDL goal <130     Primary localized osteoarthrosis, lower leg     Generalized anxiety disorder     Rhinitis, allergic seasonal     Alcoholism (H)     Mild recurrent major depression (H)     Advanced directives, counseling/discussion     Seasonal allergic rhinitis     Hip pain     GERD (gastroesophageal reflux disease)     S/P laparoscopic hernia repair     OA (osteoarthritis) of knee     Mild intermittent asthma without complication     Major depression in complete remission (H)     Benign essential hypertension     Past Surgical History:   Procedure Laterality Date     ESOPHAGOSCOPY, GASTROSCOPY, DUODENOSCOPY (EGD), COMBINED N/A 2015    Procedure: COMBINED ESOPHAGOSCOPY, GASTROSCOPY, DUODENOSCOPY (EGD), BIOPSY SINGLE OR MULTIPLE;  Surgeon: Dylan Alejandra MD;  Location: [facility]     ESOPHAGOSCOPY, GASTROSCOPY, DUODENOSCOPY (EGD), COMBINED N/A 2018    Procedure: COMBINED ESOPHAGOSCOPY, GASTROSCOPY, DUODENOSCOPY (EGD), BIOPSY SINGLE OR MULTIPLE;  gastroscopy;  Surgeon: Alexander Bautista MD;  Location: [facility]     LAPAROSCOPIC HERNIORRHAPHY INGUINAL Right 2015    Procedure: LAPAROSCOPIC HERNIORRHAPHY INGUINAL;  Surgeon: Favio Olsen MD;  Location: [facility]     TUBAL LIGATION         Social History     Tobacco Use     Smoking status: Former Smoker     Packs/day: 0.50     Types: Cigarettes     Last attempt to quit: 2011     Years since quittin.8     Smokeless tobacco: Never Used   Substance  Use Topics     Alcohol use: No     Family History   Problem Relation Age of Onset     Cancer Mother      C.A.D. Father      Lipids Father      Hypertension Father      Hypertension Brother      Cancer Brother         esophageal cancer     Breast Cancer Other      Prostate Cancer Brother      Psychotic Disorder Daughter      Diabetes No family hx of      Cerebrovascular Disease No family hx of      Cancer - colorectal No family hx of          Current Outpatient Medications   Medication Sig Dispense Refill     albuterol (PROVENTIL) (2.5 MG/3ML) 0.083% neb solution Take 1 vial (2.5 mg) by nebulization every 6 hours as needed for shortness of breath / dyspnea or wheezing 75 vial 2     albuterol (VENTOLIN HFA) 108 (90 Base) MCG/ACT inhaler INHALE 2 PUFFS INTO THE LUNGS EVERY 6 HOURS AS NEEDED FOR SHORTNESS OF BREATH, DIFFICULTY BREATHING OR WHEEZING. 18 g 3     atorvastatin (LIPITOR) 20 MG tablet Take 1 tablet (20 mg) by mouth daily 90 tablet 3     buPROPion (WELLBUTRIN XL) 150 MG 24 hr tablet Take 1 tablet (150 mg) by mouth every morning 90 tablet 3     Calcium Carb-Cholecalciferol (CALCIUM 1000 + D PO) Take 1 tablet by mouth       Cholecalciferol (VITAMIN D-3 PO) Take 2,000 Units by mouth daily        clonazePAM (KLONOPIN) 0.5 MG tablet Take 1 tablet (0.5 mg) by mouth 3 times daily as needed for anxiety 90 tablet 3     FLUoxetine (PROZAC) 20 MG capsule Take 1 capsule (20 mg) by mouth daily 30 capsule 11     fluticasone-salmeterol (AIRDUO RESPICLICK) 113-14 MCG/ACT inhaler Inhale 1 puff into the lungs 2 times daily 1 Inhaler 3     hydrochlorothiazide (HYDRODIURIL) 25 MG tablet Take 1 tablet (25 mg) by mouth daily 90 tablet 3     ibuprofen (ADVIL,MOTRIN) 200 MG tablet Take 200-600 mg by mouth every 2 hours as needed for mild pain       Omega-3 Fatty Acids (FISH OIL) 1200 MG CAPS Take 1 capsule by mouth       omeprazole (PRILOSEC) 20 MG DR capsule TAKE 1 CAPSULE BY MOUTH DAILY, 30 TO 60 MINUTES BEFORE A MEAL. 90 capsule 3      PRENATAL VITAMINS PO Take 1 tablet by mouth daily        tiZANidine (ZANAFLEX) 2 MG tablet Take 1 tablet (2 mg) by mouth 3 times daily 270 tablet 1     [START ON 5/27/2020] traMADol (ULTRAM) 50 MG tablet Take 1 tablet (50 mg) by mouth 4 times daily 120 tablet 0     traMADol (ULTRAM) 50 MG tablet Take 1 tablet (50 mg) by mouth every 6 hours as needed for severe pain 120 tablet 0     [START ON 6/26/2020] traMADol (ULTRAM) 50 MG tablet Take 1 tablet (50 mg) by mouth every 6 hours as needed for severe pain 120 tablet 0     traMADol (ULTRAM) 50 MG tablet Take 1 tablet (50 mg) by mouth every 6 hours as needed for severe pain May fill three days before if prescription falls on a weekend 32 tablet 0     traZODone (DESYREL) 50 MG tablet TAKE TWO TABLETS BY MOUTH EVERY NIGHT AT BEDTIME 180 tablet 3     Allergies   Allergen Reactions     Nka [No Known Allergies]      Seasonal Allergies      BP Readings from Last 3 Encounters:   03/05/20 124/66   01/07/20 132/76   11/12/19 124/68    Wt Readings from Last 3 Encounters:   03/05/20 71.2 kg (157 lb)   01/07/20 69.4 kg (153 lb)   11/12/19 70.3 kg (155 lb)                    Reviewed and updated as needed this visit by Provider  Tobacco  Allergies  Meds  Problems  Med Hx  Surg Hx  Fam Hx         Review of Systems   ROS COMP: Constitutional, HEENT, cardiovascular, pulmonary, gi and gu systems are negative, except as otherwise noted.       Objective   Reported vitals:  There were no vitals taken for this visit.   healthy, alert and no distress  PSYCH: Alert and oriented times 3; coherent speech, normal   rate and volume, able to articulate logical thoughts, able   to abstract reason, no tangential thoughts, no hallucinations   or delusions  Her affect is normal  RESP: No cough, no audible wheezing, able to talk in full sentences  Remainder of exam unable to be completed due to telephone visits    Diagnostic Test Results:  none         Assessment/Plan:  1. Anxiety  Medication  refilled.recommend tapering off the Wellbutrin before starting the Fluoxetine.   - clonazePAM (KLONOPIN) 0.5 MG tablet; Take 1 tablet (0.5 mg) by mouth 3 times daily as needed for anxiety  Dispense: 90 tablet; Refill: 3  - FLUoxetine (PROZAC) 20 MG capsule; Take 1 capsule (20 mg) by mouth daily  Dispense: 30 capsule; Refill: 11    2. Osteoarthritis of both knees, unspecified osteoarthritis type  Medication refilled  - traMADol (ULTRAM) 50 MG tablet; Take 1 tablet (50 mg) by mouth 4 times daily  Dispense: 120 tablet; Refill: 0  - traMADol (ULTRAM) 50 MG tablet; Take 1 tablet (50 mg) by mouth every 6 hours as needed for severe pain  Dispense: 120 tablet; Refill: 0    3. Chronic pain syndrome   - traMADol (ULTRAM) 50 MG tablet; Take 1 tablet (50 mg) by mouth 4 times daily  Dispense: 120 tablet; Refill: 0  - traMADol (ULTRAM) 50 MG tablet; Take 1 tablet (50 mg) by mouth every 6 hours as needed for severe pain  Dispense: 120 tablet; Refill: 0  - traMADol (ULTRAM) 50 MG tablet; Take 1 tablet (50 mg) by mouth every 6 hours as needed for severe pain  Dispense: 120 tablet; Refill: 0    Return in about 3 months (around 7/27/2020) for Routine Visit.      Phone call duration:  5-10 minutes    Catie Fraser MD

## 2020-04-28 ASSESSMENT — ANXIETY QUESTIONNAIRES: GAD7 TOTAL SCORE: 19

## 2020-05-07 ENCOUNTER — TELEPHONE (OUTPATIENT)
Dept: FAMILY MEDICINE | Facility: CLINIC | Age: 75
End: 2020-05-07

## 2020-05-07 ENCOUNTER — VIRTUAL VISIT (OUTPATIENT)
Dept: FAMILY MEDICINE | Facility: CLINIC | Age: 75
End: 2020-05-07
Payer: COMMERCIAL

## 2020-05-07 DIAGNOSIS — J45.20 MILD INTERMITTENT ASTHMA WITHOUT COMPLICATION: Primary | ICD-10-CM

## 2020-05-07 DIAGNOSIS — J45.20 MILD INTERMITTENT ASTHMA WITHOUT COMPLICATION: ICD-10-CM

## 2020-05-07 PROCEDURE — 99213 OFFICE O/P EST LOW 20 MIN: CPT | Mod: TEL | Performed by: FAMILY MEDICINE

## 2020-05-07 RX ORDER — ALBUTEROL SULFATE 90 UG/1
AEROSOL, METERED RESPIRATORY (INHALATION)
Qty: 18 G | Refills: 3 | Status: SHIPPED | OUTPATIENT
Start: 2020-05-07 | End: 2020-06-30

## 2020-05-07 RX ORDER — FLUTICASONE PROPIONATE AND SALMETEROL 113; 14 UG/1; UG/1
1 POWDER, METERED RESPIRATORY (INHALATION) 2 TIMES DAILY
Qty: 1 INHALER | Refills: 3 | Status: SHIPPED | OUTPATIENT
Start: 2020-05-07 | End: 2020-05-07 | Stop reason: ALTCHOICE

## 2020-05-07 RX ORDER — FLUTICASONE PROPIONATE AND SALMETEROL XINAFOATE 115; 21 UG/1; UG/1
2 AEROSOL, METERED RESPIRATORY (INHALATION) 2 TIMES DAILY
Qty: 1 INHALER | Refills: 3 | Status: SHIPPED | OUTPATIENT
Start: 2020-05-07 | End: 2020-09-01

## 2020-05-07 RX ORDER — ALBUTEROL SULFATE 0.83 MG/ML
2.5 SOLUTION RESPIRATORY (INHALATION) EVERY 6 HOURS PRN
Qty: 75 VIAL | Refills: 2 | Status: SHIPPED | OUTPATIENT
Start: 2020-05-07 | End: 2020-09-01

## 2020-05-07 NOTE — PROGRESS NOTES
"    Yamel Lindsay is a 74 year old female who is being evaluated via a billable telephone visit.      74 yr old female with history of mild asthma. She reports that in the last few weeks her asthma has been acting up and she is requesting refills on her asthma medication. She denies any side effects and her medication have been effective.    The patient has been notified of following:     \"This telephone visit will be conducted via a call between you and your physician/provider. We have found that certain health care needs can be provided without the need for a physical exam.  This service lets us provide the care you need with a short phone conversation.  If a prescription is necessary we can send it directly to your pharmacy.  If lab work is needed we can place an order for that and you can then stop by our lab to have the test done at a later time.    Telephone visits are billed at different rates depending on your insurance coverage. During this emergency period, for some insurers they may be billed the same as an in-person visit.  Please reach out to your insurance provider with any questions.    If during the course of the call the physician/provider feels a telephone visit is not appropriate, you will not be charged for this service.\"    Patient has given verbal consent for Telephone visit?  Yes    What phone number would you like to be contacted at?     How would you like to obtain your AVS? Andreshart    Subjective     Yamel Lindsay is a 74 year old female who presents to clinic today for the following health issues:    HPI  Asthma Follow-Up    Was ACT completed today?    Yes    ACT Total Scores 5/7/2020   ACT TOTAL SCORE (Goal Greater than or Equal to 20) 20   In the past 12 months, how many times did you visit the emergency room for your asthma without being admitted to the hospital? 0   In the past 12 months, how many times were you hospitalized overnight because of your asthma? 0       How many days per " week do you miss taking your asthma controller medication?  I do not have an asthma controller medication    Please describe any recent triggers for your asthma: upper respiratory infections and pollens    Have you had any Emergency Room Visits, Urgent Care Visits, or Hospital Admissions since your last office visit?  No      How many servings of fruits and vegetables do you eat daily?  2-3    On average, how many sweetened beverages do you drink each day (Examples: soda, juice, sweet tea, etc.  Do NOT count diet or artificially sweetened beverages)?   0    How many days per week do you exercise enough to make your heart beat faster? 4    How many minutes a day do you exercise enough to make your heart beat faster? 30 - 60    How many days per week do you miss taking your medication? 0    Medication Followup of albuterol, airduo    Taking Medication as prescribed: yes    Side Effects:  None    Medication Helping Symptoms:  yes              Patient Active Problem List   Diagnosis     Hyperlipidemia LDL goal <130     Primary localized osteoarthrosis, lower leg     Generalized anxiety disorder     Rhinitis, allergic seasonal     Alcoholism (H)     Mild recurrent major depression (H)     Advanced directives, counseling/discussion     Seasonal allergic rhinitis     Hip pain     GERD (gastroesophageal reflux disease)     S/P laparoscopic hernia repair     OA (osteoarthritis) of knee     Mild intermittent asthma without complication     Major depression in complete remission (H)     Benign essential hypertension     Past Surgical History:   Procedure Laterality Date     ESOPHAGOSCOPY, GASTROSCOPY, DUODENOSCOPY (EGD), COMBINED N/A 1/5/2015    Procedure: COMBINED ESOPHAGOSCOPY, GASTROSCOPY, DUODENOSCOPY (EGD), BIOPSY SINGLE OR MULTIPLE;  Surgeon: Dylan Alejandra MD;  Location: [facility]     ESOPHAGOSCOPY, GASTROSCOPY, DUODENOSCOPY (EGD), COMBINED N/A 5/25/2018    Procedure: COMBINED ESOPHAGOSCOPY, GASTROSCOPY, DUODENOSCOPY  (EGD), BIOPSY SINGLE OR MULTIPLE;  gastroscopy;  Surgeon: Alexander Bautista MD;  Location: [facility]     LAPAROSCOPIC HERNIORRHAPHY INGUINAL Right 2015    Procedure: LAPAROSCOPIC HERNIORRHAPHY INGUINAL;  Surgeon: Favio Olsen MD;  Location: [facility]     TUBAL LIGATION         Social History     Tobacco Use     Smoking status: Former Smoker     Packs/day: 0.50     Types: Cigarettes     Last attempt to quit: 2011     Years since quittin.8     Smokeless tobacco: Never Used   Substance Use Topics     Alcohol use: No     Family History   Problem Relation Age of Onset     Cancer Mother      C.A.D. Father      Lipids Father      Hypertension Father      Hypertension Brother      Cancer Brother         esophageal cancer     Breast Cancer Other      Prostate Cancer Brother      Psychotic Disorder Daughter      Diabetes No family hx of      Cerebrovascular Disease No family hx of      Cancer - colorectal No family hx of          Current Outpatient Medications   Medication Sig Dispense Refill     albuterol (PROVENTIL) (2.5 MG/3ML) 0.083% neb solution Take 1 vial (2.5 mg) by nebulization every 6 hours as needed for shortness of breath / dyspnea or wheezing 75 vial 2     albuterol (VENTOLIN HFA) 108 (90 Base) MCG/ACT inhaler INHALE 2 PUFFS INTO THE LUNGS EVERY 6 HOURS AS NEEDED FOR SHORTNESS OF BREATH, DIFFICULTY BREATHING OR WHEEZING. 18 g 3     atorvastatin (LIPITOR) 20 MG tablet Take 1 tablet (20 mg) by mouth daily 90 tablet 3     buPROPion (WELLBUTRIN XL) 150 MG 24 hr tablet Take 1 tablet (150 mg) by mouth every morning 90 tablet 3     Calcium Carb-Cholecalciferol (CALCIUM 1000 + D PO) Take 1 tablet by mouth       Cholecalciferol (VITAMIN D-3 PO) Take 2,000 Units by mouth daily        clonazePAM (KLONOPIN) 0.5 MG tablet Take 1 tablet (0.5 mg) by mouth 3 times daily as needed for anxiety 90 tablet 3     FLUoxetine (PROZAC) 20 MG capsule Take 1 capsule (20 mg) by mouth daily 30 capsule 11      fluticasone-salmeterol (AIRDUO RESPICLICK) 113-14 MCG/ACT inhaler Inhale 1 puff into the lungs 2 times daily 1 Inhaler 3     hydrochlorothiazide (HYDRODIURIL) 25 MG tablet Take 1 tablet (25 mg) by mouth daily 90 tablet 3     ibuprofen (ADVIL,MOTRIN) 200 MG tablet Take 200-600 mg by mouth every 2 hours as needed for mild pain       Omega-3 Fatty Acids (FISH OIL) 1200 MG CAPS Take 1 capsule by mouth       omeprazole (PRILOSEC) 20 MG DR capsule TAKE 1 CAPSULE BY MOUTH DAILY, 30 TO 60 MINUTES BEFORE A MEAL. 90 capsule 3     tiZANidine (ZANAFLEX) 2 MG tablet Take 1 tablet (2 mg) by mouth 3 times daily 270 tablet 1     traZODone (DESYREL) 50 MG tablet TAKE TWO TABLETS BY MOUTH EVERY NIGHT AT BEDTIME 180 tablet 3     PRENATAL VITAMINS PO Take 1 tablet by mouth daily        [START ON 5/27/2020] traMADol (ULTRAM) 50 MG tablet Take 1 tablet (50 mg) by mouth 4 times daily 120 tablet 0     traMADol (ULTRAM) 50 MG tablet Take 1 tablet (50 mg) by mouth every 6 hours as needed for severe pain 120 tablet 0     [START ON 6/26/2020] traMADol (ULTRAM) 50 MG tablet Take 1 tablet (50 mg) by mouth every 6 hours as needed for severe pain 120 tablet 0     traMADol (ULTRAM) 50 MG tablet Take 1 tablet (50 mg) by mouth every 6 hours as needed for severe pain May fill three days before if prescription falls on a weekend 32 tablet 0     Allergies   Allergen Reactions     Nka [No Known Allergies]      Seasonal Allergies      BP Readings from Last 3 Encounters:   03/05/20 124/66   01/07/20 132/76   11/12/19 124/68    Wt Readings from Last 3 Encounters:   03/05/20 71.2 kg (157 lb)   01/07/20 69.4 kg (153 lb)   11/12/19 70.3 kg (155 lb)                    Reviewed and updated as needed this visit by Provider  Tobacco  Allergies  Meds  Problems  Med Hx  Surg Hx  Fam Hx         Review of Systems   ROS COMP: Constitutional, HEENT, cardiovascular, pulmonary, gi and gu systems are negative, except as otherwise noted.       Objective   Reported  vitals:  There were no vitals taken for this visit.   healthy, alert and no distress  PSYCH: Alert and oriented times 3; coherent speech, normal   rate and volume, able to articulate logical thoughts, able   to abstract reason, no tangential thoughts, no hallucinations   or delusions  Her affect is normal  RESP: No cough, no audible wheezing, able to talk in full sentences  Remainder of exam unable to be completed due to telephone visits    Diagnostic Test Results:  none         Assessment/Plan:  1. Mild intermittent asthma without complication  Medication faxed  - albuterol (VENTOLIN HFA) 108 (90 Base) MCG/ACT inhaler; INHALE 2 PUFFS INTO THE LUNGS EVERY 6 HOURS AS NEEDED FOR SHORTNESS OF BREATH, DIFFICULTY BREATHING OR WHEEZING.  Dispense: 18 g; Refill: 3  - fluticasone-salmeterol (AIRDUO RESPICLICK) 113-14 MCG/ACT inhaler; Inhale 1 puff into the lungs 2 times daily  Dispense: 1 Inhaler; Refill: 3  - albuterol (PROVENTIL) (2.5 MG/3ML) 0.083% neb solution; Take 1 vial (2.5 mg) by nebulization every 6 hours as needed for shortness of breath / dyspnea or wheezing  Dispense: 75 vial; Refill: 2    Return in about 4 weeks (around 6/4/2020) for Phone Visit.      Phone call duration:  5-10 minutes    Catie Fraser MD

## 2020-05-07 NOTE — TELEPHONE ENCOUNTER
Per therapeutic substitution protocol:     Medication: AirDuo is not covered by insurance.  . Reviewed pharmacy records, and dicussed therapeutic option with patient.     Sent new prescription for Advair /21 ( 2 puffs twice daily) .      Routing to the prescriber as an  FYI to inform of change.      Updated medication list in EPIC.    Tulio Elizabeth  [facility]   Ext #[phone], [phone]  #[phone]

## 2020-05-08 ASSESSMENT — ASTHMA QUESTIONNAIRES: ACT_TOTALSCORE: 20

## 2020-06-26 DIAGNOSIS — M17.0 OSTEOARTHRITIS OF BOTH KNEES, UNSPECIFIED OSTEOARTHRITIS TYPE: ICD-10-CM

## 2020-06-26 DIAGNOSIS — G89.4 CHRONIC PAIN SYNDROME: ICD-10-CM

## 2020-06-26 RX ORDER — TRAMADOL HYDROCHLORIDE 50 MG/1
TABLET ORAL
Qty: 120 TABLET | Refills: 0 | OUTPATIENT
Start: 2020-06-26

## 2020-06-29 DIAGNOSIS — J45.20 MILD INTERMITTENT ASTHMA WITHOUT COMPLICATION: ICD-10-CM

## 2020-06-30 RX ORDER — ALBUTEROL SULFATE 90 UG/1
AEROSOL, METERED RESPIRATORY (INHALATION)
Qty: 8.5 G | Refills: 0 | Status: SHIPPED | OUTPATIENT
Start: 2020-06-30 | End: 2020-07-20

## 2020-07-27 DIAGNOSIS — M17.0 OSTEOARTHRITIS OF BOTH KNEES, UNSPECIFIED OSTEOARTHRITIS TYPE: ICD-10-CM

## 2020-07-27 DIAGNOSIS — G89.4 CHRONIC PAIN SYNDROME: ICD-10-CM

## 2020-07-30 DIAGNOSIS — G47.09 OTHER INSOMNIA: ICD-10-CM

## 2020-07-30 NOTE — TELEPHONE ENCOUNTER
"Requested Prescriptions   Pending Prescriptions Disp Refills     traZODone (DESYREL) 50 MG tablet [Pharmacy Med Name: TRAZODONE HCL 50MG TABS] 180 tablet 3     Sig: TAKE TWO TABLETS BY MOUTH EVERY NIGHT AT BEDTIME       Serotonin Modulators Passed - 7/30/2020 11:07 AM        Passed - Recent (12 mo) or future (30 days) visit within the authorizing provider's specialty     Patient has had an office visit with the authorizing provider or a provider within the authorizing providers department within the previous 12 mos or has a future within next 30 days. See \"Patient Info\" tab in inbasket, or \"Choose Columns\" in Meds & Orders section of the refill encounter.              Passed - Medication is active on med list        Passed - Patient is age 18 or older        Passed - No active pregnancy on record        Passed - No positive pregnancy test in past 12 months             "

## 2020-07-30 NOTE — TELEPHONE ENCOUNTER
Routing refill request to provider for review/approval because:  [place] [facility] [place]         Requested Prescriptions   Pending Prescriptions Disp Refills     traMADol (ULTRAM) 50 MG tablet [Pharmacy Med Name: TRAMADOL HCL 50MG TABS] 120 tablet 0     Sig: TAKE ONE TABLET BY MOUTH EVERY 6 HOURS AS NEEDED FOR SEVERE PAIN       There is no refill protocol information for this order        Kristin WICK RN BSN

## 2020-07-31 RX ORDER — TRAZODONE HYDROCHLORIDE 50 MG/1
TABLET, FILM COATED ORAL
Qty: 180 TABLET | Refills: 1 | Status: SHIPPED | OUTPATIENT
Start: 2020-07-31 | End: 2020-11-19

## 2020-07-31 RX ORDER — TRAMADOL HYDROCHLORIDE 50 MG/1
TABLET ORAL
Qty: 120 TABLET | Refills: 0 | Status: SHIPPED | OUTPATIENT
Start: 2020-07-31 | End: 2020-08-24

## 2020-07-31 NOTE — TELEPHONE ENCOUNTER
Prescription approved per [facility] [place] Protocol.  Kristin WICK RN BSN

## 2020-08-17 DIAGNOSIS — M17.0 OSTEOARTHRITIS OF BOTH KNEES, UNSPECIFIED OSTEOARTHRITIS TYPE: ICD-10-CM

## 2020-08-17 DIAGNOSIS — G89.4 CHRONIC PAIN SYNDROME: ICD-10-CM

## 2020-08-18 NOTE — TELEPHONE ENCOUNTER
Requested Prescriptions   Pending Prescriptions Disp Refills     traMADol (ULTRAM) 50 MG tablet [Pharmacy Med Name: TRAMADOL HCL 50MG TABS] 120 tablet 3     Sig: TAKE ONE TABLET BY MOUTH EVERY 6 HOURS AS NEEDED FOR PAIN       There is no refill protocol information for this order

## 2020-08-19 NOTE — TELEPHONE ENCOUNTER
Dr. Fraser,      Routing refill request to provider for review/approval because:  Drug not on the [facility] refill protocol Josi DELGADILLO RN

## 2020-08-20 DIAGNOSIS — J45.20 MILD INTERMITTENT ASTHMA WITHOUT COMPLICATION: ICD-10-CM

## 2020-08-20 NOTE — TELEPHONE ENCOUNTER
"Requested Prescriptions   Pending Prescriptions Disp Refills     fluticasone-salmeterol (AIRDUO RESPICLICK) 113-14 MCG/ACT inhaler [Pharmacy Med Name: FLUTICASONE-SALMETEROL 113-14 AEPB]  3     Sig: INHALE ONE PUFF BY MOUTH TWICE A DAY       Long-Acting Beta Agonist Inhalers Protocol  Failed - 8/20/2020 12:19 PM        Failed - Order for Serevent, Striverdi, or Foradil and pt has steroid inhaler        Passed - Patient is age 12 or older        Passed - Asthma control assessment score within normal limits in last 6 months     Please review ACT score.           Passed - Medication is active on med list        Passed - Recent (6 mo) or future (30 days) visit within the authorizing provider's specialty     Patient had office visit in the last 6 months or has a visit in the next 30 days with authorizing provider or within the authorizing provider's specialty.  See \"Patient Info\" tab in inbasket, or \"Choose Columns\" in Meds & Orders section of the refill encounter.           Inhaled Steroids Protocol Passed - 8/20/2020 12:19 PM        Passed - Patient is age 12 or older        Passed - Asthma control assessment score within normal limits in last 6 months     Please review ACT score.           Passed - Medication is active on med list        Passed - Recent (6 mo) or future (30 days) visit within the authorizing provider's specialty     Patient had office visit in the last 6 months or has a visit in the next 30 days with authorizing provider or within the authorizing provider's specialty.  See \"Patient Info\" tab in inbasket, or \"Choose Columns\" in Meds & Orders section of the refill encounter.               ACT Total Scores 10/2/2019 3/5/2020 5/7/2020   ACT TOTAL SCORE (Goal Greater than or Equal to 20) 20 22 20   In the past 12 months, how many times did you visit the emergency room for your asthma without being admitted to the hospital? 0 0 0   In the past 12 months, how many times were you hospitalized overnight " because of your asthma? 0 0 0

## 2020-08-21 DIAGNOSIS — J45.20 MILD INTERMITTENT ASTHMA WITHOUT COMPLICATION: ICD-10-CM

## 2020-08-21 RX ORDER — ALBUTEROL SULFATE 90 UG/1
AEROSOL, METERED RESPIRATORY (INHALATION)
Qty: 8.5 G | Refills: 1 | Status: SHIPPED | OUTPATIENT
Start: 2020-08-21 | End: 2020-09-01

## 2020-08-21 NOTE — TELEPHONE ENCOUNTER
Pending Prescriptions:                       Disp   Refills    albuterol (PROAIR HFA/PROVENTIL HFA/MARGIE*8.5 g  0          [facility] - [place], [place] - [address]  [address]  [place], [place]. \[facility].\"" [id number]  Phone: [phone] Fax: [phone]    Lily Gaffney on 8/21/2020 at 10:04 AM

## 2020-08-21 NOTE — TELEPHONE ENCOUNTER
"Requested Prescriptions   Pending Prescriptions Disp Refills     albuterol (PROAIR HFA/PROVENTIL HFA/VENTOLIN HFA) 108 (90 Base) MCG/ACT inhaler 8.5 g 0       Asthma Maintenance Inhalers - Anticholinergics Passed - 8/21/2020 10:05 AM        Passed - Patient is age 12 years or older        Passed - Asthma control assessment score within normal limits in last 6 months     Please review ACT score.           Passed - Medication is active on med list        Passed - Recent (6 mo) or future (30 days) visit within the authorizing provider's specialty     Patient had office visit in the last 6 months or has a visit in the next 30 days with authorizing provider or within the authorizing provider's specialty.  See \"Patient Info\" tab in inbasket, or \"Choose Columns\" in Meds & Orders section of the refill encounter.           Short-Acting Beta Agonist Inhalers Protocol  Passed - 8/21/2020 10:05 AM        Passed - Patient is age 12 or older        Passed - Asthma control assessment score within normal limits in last 6 months     Please review ACT score.           Passed - Medication is active on med list        Passed - Recent (6 mo) or future (30 days) visit within the authorizing provider's specialty     Patient had office visit in the last 6 months or has a visit in the next 30 days with authorizing provider or within the authorizing provider's specialty.  See \"Patient Info\" tab in inbasket, or \"Choose Columns\" in Meds & Orders section of the refill encounter.               Prescription approved per [facility] Ref[facility] Protocol.    "

## 2020-08-24 RX ORDER — TRAMADOL HYDROCHLORIDE 50 MG/1
TABLET ORAL
Qty: 120 TABLET | Refills: 3 | Status: SHIPPED | OUTPATIENT
Start: 2020-08-24 | End: 2020-12-04

## 2020-08-26 DIAGNOSIS — M62.838 MUSCLE SPASM: ICD-10-CM

## 2020-08-26 DIAGNOSIS — F41.9 ANXIETY: ICD-10-CM

## 2020-08-26 RX ORDER — FLUTICASONE PROPIONATE AND SALMETEROL 113; 14 UG/1; UG/1
POWDER, METERED RESPIRATORY (INHALATION)
Qty: 1 INHALER | Refills: 3 | Status: SHIPPED | OUTPATIENT
Start: 2020-08-26 | End: 2020-11-16

## 2020-08-27 ENCOUNTER — OFFICE VISIT (OUTPATIENT)
Dept: FAMILY MEDICINE | Facility: CLINIC | Age: 75
End: 2020-08-27
Payer: COMMERCIAL

## 2020-08-27 VITALS
TEMPERATURE: 98.3 F | RESPIRATION RATE: 16 BRPM | WEIGHT: 159 LBS | DIASTOLIC BLOOD PRESSURE: 64 MMHG | OXYGEN SATURATION: 95 % | BODY MASS INDEX: 28.39 KG/M2 | SYSTOLIC BLOOD PRESSURE: 126 MMHG | HEART RATE: 81 BPM

## 2020-08-27 DIAGNOSIS — M62.838 MUSCLE SPASM: Primary | ICD-10-CM

## 2020-08-27 PROCEDURE — 99213 OFFICE O/P EST LOW 20 MIN: CPT | Performed by: INTERNAL MEDICINE

## 2020-08-27 RX ORDER — TIZANIDINE 2 MG/1
2 TABLET ORAL 3 TIMES DAILY
Qty: 270 TABLET | Refills: 0 | Status: SHIPPED | OUTPATIENT
Start: 2020-08-27 | End: 2021-06-25

## 2020-08-27 NOTE — PROGRESS NOTES
Subjective     Yamel Lindsay is a 75 year old female who presents to clinic today for the following health issues:    HPI       Neck Pain  Onset/Duration: Today this morning  Description:   Location: Neck (left)  Radiation: into the left neck, nto the left shoulder and left upper back  Intensity: moderate  Progression of Symptoms:  same and constant  Accompanying Signs & Symptoms:  Burning, tingling, prickly sensation in arm(s): YES- left arm into hand.  Mild numbness.    Numbness in arm(s): YES- left hand  Weakness in arm(s):  YES  Fever: no  Headache: no  Nausea and/or vomiting: YES- nausea  History:   Trauma: no  Previous neck pain: no  Previous surgery or injections: no  Previous Imaging (MRI,X ray): no  Precipitating or alleviating factors: None  Does movement impact the pain:  no  Therapies tried and outcome: tramadol 50mg QID, clonazepam, tizanidine 2 mg TID PRN.  Ibuprofen 400 mg every 4 hours.  --She is on tramadol for knee, shoulder, neck, back pain  --no neck imaging on file  --neck is feeling better as the day has gone on, almost cancelled appointment  --no trauma, injury, heavy lifting.  --she thinks the tramadol/ibuprofen has helped  --has also used heat/ice -somewhat helpful.  -fell asleep on the couch last night.      Asthma Follow-Up    Was ACT completed today?    Yes    ACT Total Scores 8/27/2020   ACT TOTAL SCORE (Goal Greater than or Equal to 20) 11   In the past 12 months, how many times did you visit the emergency room for your asthma without being admitted to the hospital? 0   In the past 12 months, how many times were you hospitalized overnight because of your asthma? 0         How many days per week do you miss taking your asthma controller medication?  0    Please describe any recent triggers for your asthma: smoke, pollens, mold, humidity, aspirin, exercise or sports, emotions and Gastric Reflux    Have you had any Emergency Room Visits, Urgent Care Visits, or Hospital Admissions since  "your last office visit?  No       Review of Systems   Constitutional, HEENT, cardiovascular, pulmonary, gi and gu systems are negative, except as otherwise noted.      Objective    /64   Pulse 81   Temp 98.3  F (36.8  C) (Tympanic)   Resp 16   Wt 72.1 kg (159 lb)   SpO2 95%   Breastfeeding No   BMI 28.39 kg/m    Body mass index is 28.39 kg/m .  Physical Exam   GENERAL APPEARANCE: healthy, alert and no distress  ORTHO: Cervical Spine Exam: Inspection: normal cervical lordosis  Tender:  left paracervical muscles, left trapezius muscles  Non-tender:  spinous processes, scapula, medial border of scapula, superior angle of scapula, right paracervical muscles, right trapezius muscles  Range of Motion:  flexion:  decreased, painful, extension: full, pain-free, left lateral bending: decreased, painful, right lateral bending: full, pain-free, left lateral rotation:  decreased, painful, right lateral rotation:  full, pain-free  Strength: Full strength of all neck muscles  Special tests:  Spurling's - negative - left, Spurling's - negative - right                Assessment & Plan     Muscle spasm - refilled tizanidine.  Heat, tramadol, ibuprofen, time.  If not impriving w/in 1 week, may consider medrol and imaging  - tiZANidine (ZANAFLEX) 2 MG tablet; Take 1 tablet (2 mg) by mouth 3 times daily     BMI:   Estimated body mass index is 28.39 kg/m  as calculated from the following:    Height as of 3/5/20: 1.594 m (5' 2.75\").    Weight as of this encounter: 72.1 kg (159 lb).   Weight management plan: Patient was referred to their PCP to discuss a diet and exercise plan.        Patient Instructions   1. This is muscle tension  2. Tizandine was refilled, Dr. Fraser should fill the Clonazepam since this is a controlled substance  3. Continue with heat.    Make your own rice sock - long clean sock, put uncooked rice in it, tie off end, microwave for 1-2 minutes.     Patient Education     Neck Problems: Relieving Your " Symptoms    The first goal of treatment is to relieve your symptoms. Your healthcare provider may recommend self-care treatments. These include resting, applying ice and heat, taking medicine, and doing exercises. Your healthcare provider may also recommend that you see a physical therapist who can teach you ways to care for and strengthen your neck.  Self-care treatments  Pain can end quickly or last a while. Either way, you ll want relief as soon as possible. Your healthcare provider can tell you which treatments to do at home to help relieve your pain:    Lying down for a short time takes pressure from the head off the neck.    Ice and heat can help reduce pain. To bring down swelling, rest an ice pack wrapped in a thin towel on your neck for 10 to 15 minutes. To relax sore muscles, apply a warm, wet towel to the area. Or you can take a warm bath or shower.    Over-the-counter medicines, such as ibuprofen, naproxen, and aspirin, can help reduce pain and swelling. Acetaminophen can help relieve pain. Use these only as directed.    Exercises can relax muscles and ease stiffness. To prepare, drape a warm, wet towel around your neck and shoulders for 5 minutes. Remove the towel. Then do any exercises recommended to you by your healthcare provider.  Physical therapy  If self-care treatments aren t helping relieve neck pain, your healthcare provider may suggest physical therapy. Physical therapy is done by a specialist trained to treat injuries and musculoskeletal disorders. Your physical therapist (PT) will teach you how to strengthen muscles, improve the spine s alignment, and help you move properly. Treatment methods used in physical therapy may include:    Heat. A special heating pad called a neck pack may be applied to your neck.    Exercises. Your PT will teach you exercises to help strengthen your neck and improve its range of motion.    Joint mobilization. The PT gently moves your vertebrae to help restore  motion in your neck joints and reduce neck pain.    Soft tissue mobilization. The PT massages and stretches the muscles in your neck and shoulders.    Electrical stimulation. Electrical impulses are sent into your neck. This helps reduce soreness and inflammation.    Education in body mechanics. The PT shows you ways to position and move your body that protect the neck.  Other treatments  If physical therapy doesn t relieve your neck pain, your healthcare provider may suggest other treatments. For example, medicines or injections can help relieve pain and swelling. In some cases, surgery may be needed to treat neck problems.  Date Last Reviewed: 10/1/2017    7614-9054 [employer]. [address] [place] [address]. All rights reserved. This information is not intended as a substitute for professional medical care. Always follow your healthcare professional's instructions.               No follow-ups on file.    Magda Preciado,   [facility]

## 2020-08-27 NOTE — PATIENT INSTRUCTIONS
1. This is muscle tension  2. Tizandine was refilled, Dr. Fraser should fill the Clonazepam since this is a controlled substance  3. Continue with heat.    Make your own rice sock - long clean sock, put uncooked rice in it, tie off end, microwave for 1-2 minutes.     Patient Education     Neck Problems: Relieving Your Symptoms    The first goal of treatment is to relieve your symptoms. Your healthcare provider may recommend self-care treatments. These include resting, applying ice and heat, taking medicine, and doing exercises. Your healthcare provider may also recommend that you see a physical therapist who can teach you ways to care for and strengthen your neck.  Self-care treatments  Pain can end quickly or last a while. Either way, you ll want relief as soon as possible. Your healthcare provider can tell you which treatments to do at home to help relieve your pain:    Lying down for a short time takes pressure from the head off the neck.    Ice and heat can help reduce pain. To bring down swelling, rest an ice pack wrapped in a thin towel on your neck for 10 to 15 minutes. To relax sore muscles, apply a warm, wet towel to the area. Or you can take a warm bath or shower.    Over-the-counter medicines, such as ibuprofen, naproxen, and aspirin, can help reduce pain and swelling. Acetaminophen can help relieve pain. Use these only as directed.    Exercises can relax muscles and ease stiffness. To prepare, drape a warm, wet towel around your neck and shoulders for 5 minutes. Remove the towel. Then do any exercises recommended to you by your healthcare provider.  Physical therapy  If self-care treatments aren t helping relieve neck pain, your healthcare provider may suggest physical therapy. Physical therapy is done by a specialist trained to treat injuries and musculoskeletal disorders. Your physical therapist (PT) will teach you how to strengthen muscles, improve the spine s alignment, and help you move properly.  Treatment methods used in physical therapy may include:    Heat. A special heating pad called a neck pack may be applied to your neck.    Exercises. Your PT will teach you exercises to help strengthen your neck and improve its range of motion.    Joint mobilization. The PT gently moves your vertebrae to help restore motion in your neck joints and reduce neck pain.    Soft tissue mobilization. The PT massages and stretches the muscles in your neck and shoulders.    Electrical stimulation. Electrical impulses are sent into your neck. This helps reduce soreness and inflammation.    Education in body mechanics. The PT shows you ways to position and move your body that protect the neck.  Other treatments  If physical therapy doesn t relieve your neck pain, your healthcare provider may suggest other treatments. For example, medicines or injections can help relieve pain and swelling. In some cases, surgery may be needed to treat neck problems.  Date Last Reviewed: 10/1/2017    9925-9887 [employer]. [address] [place] [address], [place] [address]. All rights reserved. This information is not intended as a substitute for professional medical care. Always follow your healthcare professional's instructions.

## 2020-08-27 NOTE — TELEPHONE ENCOUNTER
Requested Prescriptions   Pending Prescriptions Disp Refills     clonazePAM (KLONOPIN) 0.5 MG tablet [Pharmacy Med Name: CLONAZEPAM 0.5MG TABS] 90 tablet 3     Sig: TAKE ONE TABLET BY MOUTH THREE TIMES A DAY AS NEEDED FOR ANXIETY       There is no refill protocol information for this order        tiZANidine (ZANAFLEX) 2 MG tablet [Pharmacy Med Name: TIZANIDINE HCL 2MG TABS] 270 tablet 1     Sig: TAKE ONE TABLET BY MOUTH THREE TIMES A DAY       There is no refill protocol information for this order

## 2020-08-28 DIAGNOSIS — F41.9 ANXIETY: ICD-10-CM

## 2020-08-28 RX ORDER — CLONAZEPAM 0.5 MG/1
TABLET ORAL
Qty: 90 TABLET | Refills: 3 | Status: SHIPPED | OUTPATIENT
Start: 2020-08-28 | End: 2020-12-23

## 2020-08-28 RX ORDER — CLONAZEPAM 0.5 MG/1
TABLET ORAL
Qty: 90 TABLET | Refills: 3 | Status: CANCELLED | OUTPATIENT
Start: 2020-08-28

## 2020-08-28 RX ORDER — TIZANIDINE 2 MG/1
TABLET ORAL
Qty: 270 TABLET | Refills: 1 | Status: SHIPPED | OUTPATIENT
Start: 2020-08-28 | End: 2021-03-18

## 2020-08-28 ASSESSMENT — ASTHMA QUESTIONNAIRES: ACT_TOTALSCORE: 11

## 2020-08-28 NOTE — TELEPHONE ENCOUNTER
Patient is calling and stating that she is out of this and her Pharmacy will not be open this weekend. Please expedite today.  [facility]

## 2020-08-28 NOTE — TELEPHONE ENCOUNTER
Requested Prescriptions   Pending Prescriptions Disp Refills     clonazePAM (KLONOPIN) 0.5 MG tablet 90 tablet 3       There is no refill protocol information for this order

## 2020-08-31 DIAGNOSIS — J45.20 MILD INTERMITTENT ASTHMA WITHOUT COMPLICATION: ICD-10-CM

## 2020-08-31 DIAGNOSIS — M17.0 OSTEOARTHRITIS OF BOTH KNEES, UNSPECIFIED OSTEOARTHRITIS TYPE: ICD-10-CM

## 2020-08-31 DIAGNOSIS — G89.4 CHRONIC PAIN SYNDROME: ICD-10-CM

## 2020-08-31 NOTE — TELEPHONE ENCOUNTER
"Requested Prescriptions   Pending Prescriptions Disp Refills     albuterol (PROVENTIL) (2.5 MG/3ML) 0.083% neb solution [Pharmacy Med Name: ALBUTEROL SULFATE 2.5 MG/3ML NEBU] 225 mL 2     Sig: NEBULIZE CONTENTS OF ONE VIAL EVERY 6 HOURS AS NEEDED FOR FOR SHORTNESS OF BREATH / DYSPNEA OR WHEEZING       Asthma Maintenance Inhalers - Anticholinergics Failed - 8/31/2020 10:29 AM        Failed - Asthma control assessment score within normal limits in last 6 months     Please review ACT score.   ACT Total Scores 3/5/2020 5/7/2020 8/27/2020   ACT TOTAL SCORE (Goal Greater than or Equal to 20) 22 20 11   In the past 12 months, how many times did you visit the emergency room for your asthma without being admitted to the hospital? 0 0 0   In the past 12 months, how many times were you hospitalized overnight because of your asthma? 0 0 0             Passed - Patient is age 12 years or older        Passed - Medication is active on med list        Passed - Recent (6 mo) or future (30 days) visit within the authorizing provider's specialty     Patient had office visit in the last 6 months or has a visit in the next 30 days with authorizing provider or within the authorizing provider's specialty.  See \"Patient Info\" tab in inbasket, or \"Choose Columns\" in Meds & Orders section of the refill encounter.           Short-Acting Beta Agonist Inhalers Protocol  Failed - 8/31/2020 10:29 AM        Failed - Asthma control assessment score within normal limits in last 6 months     Please review ACT score.           Passed - Patient is age 12 or older        Passed - Medication is active on med list        Passed - Recent (6 mo) or future (30 days) visit within the authorizing provider's specialty     Patient had office visit in the last 6 months or has a visit in the next 30 days with authorizing provider or within the authorizing provider's specialty.  See \"Patient Info\" tab in inbasket, or \"Choose Columns\" in Meds & Orders section of the " refill encounter.               traMADol (ULTRAM) 50 MG tablet [Pharmacy Med Name: TRAMADOL HCL 50MG TABS] 120 tablet 0     Sig: TAKE ONE TABLET BY MOUTH EVERY 6 HOURS AS NEEDED FOR SEVERE PAIN       There is no refill protocol information for this order

## 2020-08-31 NOTE — PROGRESS NOTES
"Yamel Lindsay is a 75 year old female who is being evaluated via a billable telephone visit.      75 yr old female with history of asthma, she needs refills on her medication. Lately her asthma has been acting up. She reports cough that wakes her up at night. Mostly dry - patient states that she has shortness of breath and wheezing. Denies fevers or chills. Using more of her neb treatments.     The patient has been notified of following:     \"This telephone visit will be conducted via a call between you and your physician/provider. We have found that certain health care needs can be provided without the need for a physical exam.  This service lets us provide the care you need with a short phone conversation.  If a prescription is necessary we can send it directly to your pharmacy.  If lab work is needed we can place an order for that and you can then stop by our lab to have the test done at a later time.    Telephone visits are billed at different rates depending on your insurance coverage. During this emergency period, for some insurers they may be billed the same as an in-person visit.  Please reach out to your insurance provider with any questions.    If during the course of the call the physician/provider feels a telephone visit is not appropriate, you will not be charged for this service.\"    Patient has given verbal consent for Telephone visit?  Yes    What phone number would you like to be contacted at? [phone]    How would you like to obtain your AVS? Mail a copy    Subjective     Yamel Lindsay is a 75 year old female who presents via phone visit today for the following health issues:    HPI    Asthma Follow-Up- has not been taking a daily controller because she felt that she didn't need it.    Was ACT completed today?  No      Do you have a cough?  YES    Are you experiencing any wheezing in your chest?  YES    Do you have any shortness of breath?  YES     How often are you using a short-acting " (rescue) inhaler or nebulizer, such as Albuterol?  5-6 times a day and in the middle of the night    How many days per week do you miss taking your asthma controller medication?  I do not have an asthma controller medication    Please describe any recent triggers for your asthma: patient does not know    Have you had any Emergency Room Visits, Urgent Care Visits, or Hospital Admissions since your last office visit?  No      How many servings of fruits and vegetables do you eat daily?  2-3    On average, how many sweetened beverages do you drink each day (Examples: soda, juice, sweet tea, etc.  Do NOT count diet or artificially sweetened beverages)?   0    How many days per week do you exercise enough to make your heart beat faster? 5    How many minutes a day do you exercise enough to make your heart beat faster? 30 - 60    How many days per week do you miss taking your medication? 0           Review of Systems   CONSTITUTIONAL: NEGATIVE for fever, chills, change in weight  ENT/MOUTH: NEGATIVE for ear, mouth and throat problems  RESP:POSITIVE for cough-non productive, Hx asthma and SOB/dyspnea  CV: NEGATIVE for chest pain, palpitations or peripheral edema  GI: NEGATIVE for nausea, abdominal pain, heartburn, or change in bowel habits  : negative for and dysuria  ENDOCRINE: NEGATIVE for temperature intolerance, skin/hair changes  HEME/ALLERGY/IMMUNE: NEGATIVE for bleeding problems  PSYCHIATRIC: NEGATIVE for changes in mood or affect       Objective          Vitals:  No vitals were obtained today due to virtual visit.    healthy, alert and no distress  PSYCH: Alert and oriented times 3; coherent speech, normal   rate and volume, able to articulate logical thoughts, able   to abstract reason, no tangential thoughts, no hallucinations   or delusions  Her affect is normal  RESP: No cough, no audible wheezing, able to talk in full sentences  Remainder of exam unable to be completed due to telephone visits    No results  found for this or any previous visit (from the past 24 hour(s)).        Assessment/Plan:    Assessment & Plan     Mild intermittent asthma without complication  Medication refilled  - albuterol (PROAIR HFA/PROVENTIL HFA/VENTOLIN HFA) 108 (90 Base) MCG/ACT inhaler; INHALE 2 PUFFS INTO THE LUNGS EVERY 6 HOURS AS NEEDED FOR SHORTNESS OF BREATH, DIFFICULTY BREATHING OR WHEEZING  - albuterol (PROVENTIL) (2.5 MG/3ML) 0.083% neb solution; Take 1 vial (2.5 mg) by nebulization every 6 hours as needed for shortness of breath / dyspnea or wheezing  - fluticasone-salmeterol (ADVAIR-HFA) 115-21 MCG/ACT inhaler; Inhale 2 puffs into the lungs 2 times daily    Mild intermittent asthma with exacerbation  Patient appears to be having an exacerbation- prednisone faxed.  - predniSONE (DELTASONE) 20 MG tablet; Take 3 tabs by mouth daily x 3 days, then 2 tabs daily x 3 days, then 1 tab daily x 3 days, then 1/2 tab daily x 3 days.       FUTURE APPOINTMENTS:       - Follow-up visit in one month or sooner as needed.    Return in about 4 weeks (around 9/29/2020) for In-Clinic Visit.    Catie Fraser MD  [facility]    Phone call duration:  5  minutes

## 2020-09-01 ENCOUNTER — VIRTUAL VISIT (OUTPATIENT)
Dept: FAMILY MEDICINE | Facility: CLINIC | Age: 75
End: 2020-09-01
Payer: COMMERCIAL

## 2020-09-01 DIAGNOSIS — J45.21 MILD INTERMITTENT ASTHMA WITH EXACERBATION: Primary | ICD-10-CM

## 2020-09-01 DIAGNOSIS — J45.20 MILD INTERMITTENT ASTHMA WITHOUT COMPLICATION: ICD-10-CM

## 2020-09-01 PROCEDURE — 99214 OFFICE O/P EST MOD 30 MIN: CPT | Mod: TEL | Performed by: FAMILY MEDICINE

## 2020-09-01 RX ORDER — ALBUTEROL SULFATE 90 UG/1
AEROSOL, METERED RESPIRATORY (INHALATION)
Qty: 8.5 G | Refills: 11 | Status: SHIPPED | OUTPATIENT
Start: 2020-09-01 | End: 2021-09-02

## 2020-09-01 RX ORDER — ALBUTEROL SULFATE 0.83 MG/ML
2.5 SOLUTION RESPIRATORY (INHALATION) EVERY 6 HOURS PRN
Qty: 75 VIAL | Refills: 3 | Status: SHIPPED | OUTPATIENT
Start: 2020-09-01 | End: 2020-11-19

## 2020-09-01 RX ORDER — FLUTICASONE PROPIONATE AND SALMETEROL XINAFOATE 115; 21 UG/1; UG/1
2 AEROSOL, METERED RESPIRATORY (INHALATION) 2 TIMES DAILY
Qty: 1 INHALER | Refills: 11 | Status: SHIPPED | OUTPATIENT
Start: 2020-09-01 | End: 2021-09-20

## 2020-09-01 RX ORDER — PREDNISONE 20 MG/1
TABLET ORAL
Qty: 20 TABLET | Refills: 0 | Status: SHIPPED | OUTPATIENT
Start: 2020-09-01 | End: 2020-11-16

## 2020-09-03 RX ORDER — ALBUTEROL SULFATE 0.83 MG/ML
SOLUTION RESPIRATORY (INHALATION)
Qty: 225 ML | Refills: 2 | OUTPATIENT
Start: 2020-09-03

## 2020-09-03 RX ORDER — TRAMADOL HYDROCHLORIDE 50 MG/1
TABLET ORAL
Qty: 120 TABLET | Refills: 0 | OUTPATIENT
Start: 2020-09-03

## 2020-09-17 ENCOUNTER — TELEPHONE (OUTPATIENT)
Dept: FAMILY MEDICINE | Facility: CLINIC | Age: 75
End: 2020-09-17

## 2020-09-17 NOTE — TELEPHONE ENCOUNTER
Panel Management Review      Patient has the following on her problem list:       Composite cancer screening  Chart review shows that this patient is due/due soon for the following   Summary:    Patient is due/failing the following:   COLONOSCOPY and FIT    Action needed:   Patient needs referral/order:     Type of outreach:    Sent letter.    Questions for provider review:    None                                                                                                                                    Ely SORIA CMA       Chart routed to  .

## 2020-09-17 NOTE — LETTER
[facility]  [address] [address]  Phone: [phone]    09/17/20    Yamel Lindsay  [address] [address]      Yamel,        At [facility] we care about your health and are committed to providing quality patient care, which includes staying current on preventative cancer screenings and other health maintenance testing.  You can increase your chances of finding and treating cancers and other health issues through regular screenings.      Our records show that you are due for the following screening(s):      Colonoscopy for Colon Cancer - Call [phone] to schedule   Recommended every ten years for everyone age 50 and older  We strongly urge our patients to consider having a colonoscopy done, which is the best screening test available and only needs to be done every 10 years if normal.     or  Fit Test for Colon Cancer -  in clinic   Recommended every year for everyone age 50 and older if you have not colon/rectal issues in the past  Please take a moment to stop by the clinic to  a take home colon/rectal cancer test kit.       If you have already had one or all of the above screening tests at another facility, please call us so that we may update your chart.            Your partners in health,          Quality Committee   [facility]

## 2020-10-21 DIAGNOSIS — K21.9 GASTROESOPHAGEAL REFLUX DISEASE WITHOUT ESOPHAGITIS: ICD-10-CM

## 2020-11-16 ENCOUNTER — VIRTUAL VISIT (OUTPATIENT)
Dept: FAMILY MEDICINE | Facility: CLINIC | Age: 75
End: 2020-11-16
Payer: COMMERCIAL

## 2020-11-16 DIAGNOSIS — N39.0 URINARY TRACT INFECTION WITHOUT HEMATURIA, SITE UNSPECIFIED: Primary | ICD-10-CM

## 2020-11-16 PROCEDURE — 99213 OFFICE O/P EST LOW 20 MIN: CPT | Mod: 95 | Performed by: NURSE PRACTITIONER

## 2020-11-16 RX ORDER — CIPROFLOXACIN 500 MG/1
500 TABLET, FILM COATED ORAL 2 TIMES DAILY
Qty: 6 TABLET | Refills: 0 | Status: SHIPPED | OUTPATIENT
Start: 2020-11-16 | End: 2020-11-19

## 2020-11-16 NOTE — PATIENT INSTRUCTIONS
Push fluids and take antibiotic as prescribed.  Follow up if symptoms persist or worsen.      Our Clinic hours are:  Mondays    7:20 am - 7 pm  Tues -  Fri  7:20 am - 5 pm    Clinic Phone: [phone]    The clinic lab opens at 7:30 am Mon - Fri and appointments are required.    [facility] [place]  Ph. [phone]  Monday  8 am - 7pm  Tues - Fri 8 am - 5:30 pm          Subjective: Ashley MCGREGOR presents with a bite to the L 2nd finger. This occurred 1 hour.  This Has not occurred before. A tiny kitten bit her  No nausea, vomiting, loss of bowel or bladder function. No numbness or tingling in the extremities. No difficulty swallowing or breathing.  Review of systems [-] otherwise  Review of Systems    OTC meds tried: [-]  No past medical history on file.   No past surgical history on file.   Social History     Tobacco Use   • Smoking status: Never Smoker   • Smokeless tobacco: Never Used   Substance Use Topics   • Alcohol use: Not on file   • Drug use: Not on file      Current Outpatient Medications   Medication Sig Dispense Refill   • albuterol (VENTOLIN HFA) 108 (90 Base) MCG/ACT inhaler Inhale 1 puff into the lungs.     • levothyroxine (SYNTHROID) 50 MCG tablet Take 50 mcg by mouth.     • liothyronine (CYTOMEL) 5 MCG tablet Take 5 mcg by mouth.     • sertraline (ZOLOFT) 100 MG tablet TAKE 1 TABLET BY MOUTH IN THE EVENING     • omeprazole (PRILOSEC) 40 MG capsule Take 40 mg by mouth.     • albuterol 108 (90 Base) MCG/ACT inhaler Inhale 2 puffs into the lungs.       No current facility-administered medications for this visit.         OBJECTIVE: NAD, Blood pressure 110/70, pulse 86, temperature 98.4 °F (36.9 °C), temperature source Tympanic, resp. rate 16, last menstrual period 07/14/2019, SpO2 98 %.       Eyes: no injection or jaundice, no subconjunctival hemorrhage      Oral: no erythema or swelling      Neck: no adenopathy or swelling      HRRR, LCTA      Abdomen soft, BS [+], no masses or tenderness to palpation      Peripheral pulse was intact, capillary refill was normal, sensory function was intact and motion of extremities NL      Skin: erythematous , puncture wounds distal left 2nd finger    ASSESSMENT:. Bite from cat                            Cellulitis    Plan:underwent discussion with the patient regarding this problem and symptomatic measures discussed  Meds in Urgent  care:  no  Home meds:  yes  Ashley will follow up with her primary care physician as needed or as directed but may return to the [facility] if needed. If symptoms become severe, pt instructed to go to the ER                 [id number]

## 2020-11-16 NOTE — PROGRESS NOTES
"Yamel Lindsay is a 75 year old female who is being evaluated via a billable telephone visit.      The patient has been notified of following:     \"This telephone visit will be conducted via a call between you and your physician/provider. We have found that certain health care needs can be provided without the need for a physical exam.  This service lets us provide the care you need with a short phone conversation.  If a prescription is necessary we can send it directly to your pharmacy.  If lab work is needed we can place an order for that and you can then stop by our lab to have the test done at a later time.    Telephone visits are billed at different rates depending on your insurance coverage. During this emergency period, for some insurers they may be billed the same as an in-person visit.  Please reach out to your insurance provider with any questions.    If during the course of the call the physician/provider feels a telephone visit is not appropriate, you will not be charged for this service.\"    Patient has given verbal consent for Telephone visit?  Yes    What phone number would you like to be contacted at? [phone]      How would you like to obtain your AVS? Bari    Subjective     Yamel Lindsay is a 75 year old female who presents via phone visit today for the following health issues:    HPI     Genitourinary - Female  Onset/Duration: started Friday middle of the night, Saturday drank a bottle of cranberry juice and then Saturday night symptoms came back again.  Description:   Painful urination (Dysuria): YES           Frequency: YES  Blood in urine (Hematuria): no  Delay in urine (Hesitency): no  Intensity: moderate  Progression of Symptoms:  worsening  Accompanying Signs & Symptoms:  Fever/chills: YES  Flank pain: no  Nausea and vomiting: no  Vaginal symptoms: none  Abdominal/Pelvic Pain: no  History:   History of frequent UTI s: no, it has been about 3 years  History of kidney stones: " no  Sexually Active: no  Possibility of pregnancy: No  Precipitating or alleviating factors: None  Therapies tried and outcome: Cranberry juice prn (contraindicated in Coumadin patients) and  Aspirin             Review of Systems   See above       Objective          Vitals:  No vitals were obtained today due to virtual visit.    healthy, alert and no distress  PSYCH: Alert and oriented times 3; coherent speech, normal   rate and volume, able to articulate logical thoughts, able   to abstract reason, no tangential thoughts, no hallucinations   or delusions  Her affect is normal  RESP: No cough, no audible wheezing, able to talk in full sentences  Remainder of exam unable to be completed due to telephone visits            Assessment/Plan:    Assessment & Plan     Urinary tract infection without hematuria, site unspecified    - ciprofloxacin (CIPRO) 500 MG tablet; Take 1 tablet (500 mg) by mouth 2 times daily for 3 days     Discussed how to take the medication(s), expected outcomes, potential side effects.      See Patient Instructions  Patient Instructions   Push fluids and take antibiotic as prescribed.  Follow up if symptoms persist or worsen.      Our Clinic hours are:  Mondays    7:20 am - 7 pm  Tues -  Fri  7:20 am - 5 pm    Clinic Phone: [phone]    The clinic lab opens at 7:30 am Mon - Fri and appointments are required.    [facility] [place]  Ph. [phone]  Monday  8 am - 7pm  Tues - Fri 8 am - 5:30 pm             Return in about 2 days (around 11/18/2020) for or sooner if symptoms persist or worsen.    [facility]    Phone call duration:  13 minutes

## 2020-11-19 DIAGNOSIS — I10 BENIGN ESSENTIAL HYPERTENSION: ICD-10-CM

## 2020-11-19 DIAGNOSIS — J45.20 MILD INTERMITTENT ASTHMA WITHOUT COMPLICATION: ICD-10-CM

## 2020-11-19 DIAGNOSIS — G47.09 OTHER INSOMNIA: ICD-10-CM

## 2020-11-19 RX ORDER — HYDROCHLOROTHIAZIDE 25 MG/1
25 TABLET ORAL DAILY
Qty: 90 TABLET | Refills: 3 | Status: SHIPPED | OUTPATIENT
Start: 2020-11-19 | End: 2021-09-20

## 2020-11-19 RX ORDER — ALBUTEROL SULFATE 0.83 MG/ML
2.5 SOLUTION RESPIRATORY (INHALATION) EVERY 6 HOURS PRN
Qty: 75 VIAL | Refills: 3 | Status: SHIPPED | OUTPATIENT
Start: 2020-11-19 | End: 2021-09-20

## 2020-11-19 RX ORDER — TRAZODONE HYDROCHLORIDE 50 MG/1
100 TABLET, FILM COATED ORAL AT BEDTIME
Qty: 180 TABLET | Refills: 3 | Status: SHIPPED | OUTPATIENT
Start: 2020-11-19 | End: 2021-10-26

## 2020-11-19 NOTE — TELEPHONE ENCOUNTER
"Requested Prescriptions   Pending Prescriptions Disp Refills     albuterol (PROVENTIL) (2.5 MG/3ML) 0.083% neb solution 75 vial 3     Sig: Take 1 vial (2.5 mg) by nebulization every 6 hours as needed for shortness of breath / dyspnea or wheezing       Asthma Maintenance Inhalers - Anticholinergics Failed - 11/19/2020  8:48 AM        Failed - Asthma control assessment score within normal limits in last 6 months     Please review ACT score.           Passed - Patient is age 12 years or older        Passed - Medication is active on med list        Passed - Recent (6 mo) or future (30 days) visit within the authorizing provider's specialty     Patient had office visit in the last 6 months or has a visit in the next 30 days with authorizing provider or within the authorizing provider's specialty.  See \"Patient Info\" tab in inbasket, or \"Choose Columns\" in Meds & Orders section of the refill encounter.           Short-Acting Beta Agonist Inhalers Protocol  Failed - 11/19/2020  8:48 AM        Failed - Asthma control assessment score within normal limits in last 6 months     Please review ACT score.           Passed - Patient is age 12 or older        Passed - Medication is active on med list        Passed - Recent (6 mo) or future (30 days) visit within the authorizing provider's specialty     Patient had office visit in the last 6 months or has a visit in the next 30 days with authorizing provider or within the authorizing provider's specialty.  See \"Patient Info\" tab in inbasket, or \"Choose Columns\" in Meds & Orders section of the refill encounter.               hydrochlorothiazide (HYDRODIURIL) 25 MG tablet 90 tablet 3     Sig: Take 1 tablet (25 mg) by mouth daily       Diuretics (Including Combos) Protocol Failed - 11/19/2020  8:48 AM        Failed - Normal serum creatinine on file in past 12 months     Recent Labs   Lab Test 01/23/19  0758   CR 0.63              Failed - Normal serum potassium on file in past 12 " "months     Recent Labs   Lab Test 01/23/19  0758   POTASSIUM 4.4                    Failed - Normal serum sodium on file in past 12 months     Recent Labs   Lab Test 01/23/19  0758                 Passed - Blood pressure under 140/90 in past 12 months     BP Readings from Last 3 Encounters:   08/27/20 126/64   03/05/20 124/66   01/07/20 132/76                 Passed - Recent (12 mo) or future (30 days) visit within the authorizing provider's specialty     Patient has had an office visit with the authorizing provider or a provider within the authorizing providers department within the previous 12 mos or has a future within next 30 days. See \"Patient Info\" tab in inbasket, or \"Choose Columns\" in Meds & Orders section of the refill encounter.              Passed - Medication is active on med list        Passed - Patient is age 18 or older        Passed - No active pregancy on record        Passed - No positive pregnancy test in past 12 months           traZODone (DESYREL) 50 MG tablet 180 tablet 1       Serotonin Modulators Passed - 11/19/2020  8:48 AM        Passed - Recent (12 mo) or future (30 days) visit within the authorizing provider's specialty     Patient has had an office visit with the authorizing provider or a provider within the authorizing providers department within the previous 12 mos or has a future within next 30 days. See \"Patient Info\" tab in inbasket, or \"Choose Columns\" in Meds & Orders section of the refill encounter.              Passed - Medication is active on med list        Passed - Patient is age 18 or older        Passed - No active pregnancy on record        Passed - No positive pregnancy test in past 12 months             "

## 2020-12-04 ENCOUNTER — VIRTUAL VISIT (OUTPATIENT)
Dept: FAMILY MEDICINE | Facility: CLINIC | Age: 75
End: 2020-12-04
Payer: COMMERCIAL

## 2020-12-04 DIAGNOSIS — M17.0 OSTEOARTHRITIS OF BOTH KNEES, UNSPECIFIED OSTEOARTHRITIS TYPE: ICD-10-CM

## 2020-12-04 DIAGNOSIS — G89.4 CHRONIC PAIN SYNDROME: ICD-10-CM

## 2020-12-04 PROCEDURE — 99213 OFFICE O/P EST LOW 20 MIN: CPT | Mod: 95 | Performed by: INTERNAL MEDICINE

## 2020-12-04 RX ORDER — TRAMADOL HYDROCHLORIDE 50 MG/1
50 TABLET ORAL EVERY 6 HOURS PRN
Qty: 120 TABLET | Refills: 1 | Status: SHIPPED | OUTPATIENT
Start: 2020-12-04 | End: 2021-05-17

## 2020-12-04 ASSESSMENT — ANXIETY QUESTIONNAIRES
7. FEELING AFRAID AS IF SOMETHING AWFUL MIGHT HAPPEN: NOT AT ALL
6. BECOMING EASILY ANNOYED OR IRRITABLE: NOT AT ALL
GAD7 TOTAL SCORE: 2
IF YOU CHECKED OFF ANY PROBLEMS ON THIS QUESTIONNAIRE, HOW DIFFICULT HAVE THESE PROBLEMS MADE IT FOR YOU TO DO YOUR WORK, TAKE CARE OF THINGS AT HOME, OR GET ALONG WITH OTHER PEOPLE: SOMEWHAT DIFFICULT
2. NOT BEING ABLE TO STOP OR CONTROL WORRYING: NOT AT ALL
5. BEING SO RESTLESS THAT IT IS HARD TO SIT STILL: NOT AT ALL
1. FEELING NERVOUS, ANXIOUS, OR ON EDGE: SEVERAL DAYS
3. WORRYING TOO MUCH ABOUT DIFFERENT THINGS: SEVERAL DAYS

## 2020-12-04 ASSESSMENT — PATIENT HEALTH QUESTIONNAIRE - PHQ9
5. POOR APPETITE OR OVEREATING: NOT AT ALL
SUM OF ALL RESPONSES TO PHQ QUESTIONS 1-9: 4

## 2020-12-04 NOTE — PROGRESS NOTES
"Yamel Lindsay is a 75 year old female who is being evaluated via a billable telephone visit.      The patient has been notified of following:     \"This telephone visit will be conducted via a call between you and your physician/provider. We have found that certain health care needs can be provided without the need for a physical exam.  This service lets us provide the care you need with a short phone conversation.  If a prescription is necessary we can send it directly to your pharmacy.  If lab work is needed we can place an order for that and you can then stop by our lab to have the test done at a later time.    Telephone visits are billed at different rates depending on your insurance coverage. During this emergency period, for some insurers they may be billed the same as an in-person visit.  Please reach out to your insurance provider with any questions.    If during the course of the call the physician/provider feels a telephone visit is not appropriate, you will not be charged for this service.\"    Patient has given verbal consent for Telephone visit?  Yes    What phone number would you like to be contacted at? [phone]    How would you like to obtain your AVS? MyChart    Subjective     Yamel Lindsay is a 75 year old female who presents via phone visit today for the following health issues:    HPI     PCP is Dr. Fraser     Chronic Pain Follow-Up    Where in your body do you have pain? Knees, back and neck  How has your pain affected your ability to work? Not applicable. Is retired. Pain does affect some of her activities.   Which of these pain treatments have you tried since your last clinic visit? Activity or exercise, Cold, Relaxation techniques / Biofeedback, Rest and Stretching  How well are you sleeping? Good  How has your mood been since your last visit? About the same  Have you had a significant life event? No  Other aggravating factors: not sure what makes pain worse  Taking medication as " directed? Yes, takes four tablets a day as needed  --she reports she needs refill of tramadol; her PCP did not have appointments today and she is nearly out of med  --denies any side effects such as constipation or drowsiness    PHQ-9 SCORE 10/31/2019 11/15/2019 4/10/2020   PHQ-9 Total Score - - -   PHQ-9 Total Score 4 5 3     PAOLA-7 SCORE 11/15/2019 4/10/2020 4/27/2020   Total Score - - -   Total Score 12 6 19     No flowsheet data found.  Encounter-Level CSA - 02/20/2015:    Controlled Substance Agreement - Scan on 2/26/2015  9:06 AM: Controlled Medication Agreement-  2/20/15     Patient-Level CSA:    There are no patient-level csa.         How many servings of fruits and vegetables do you eat daily?  2-3    On average, how many sweetened beverages do you drink each day (Examples: soda, juice, sweet tea, etc.  Do NOT count diet or artificially sweetened beverages)?   0    How many days per week do you exercise enough to make your heart beat faster? 7    How many minutes a day do you exercise enough to make your heart beat faster? 60 or more    How many days per week do you miss taking your medication? 0      Review of Systems   Constitutional, HEENT, cardiovascular, pulmonary, gi and gu systems are negative, except as otherwise noted.       Objective          Vitals:  No vitals were obtained today due to virtual visit.    healthy, alert and no distress  PSYCH: Alert and oriented times 3; coherent speech, normal   rate and volume, able to articulate logical thoughts, able   to abstract reason, no tangential thoughts, no hallucinations   or delusions  Her affect is normal  RESP: No cough, no audible wheezing, able to talk in full sentences  Remainder of exam unable to be completed due to telephone visits          Assessment/Plan:    Assessment & Plan     Diagnoses and all orders for this visit:    Osteoarthritis of both knees, unspecified osteoarthritis type  -     traMADol (ULTRAM) 50 MG tablet; Take 1 tablet (50  mg) by mouth every 6 hours as needed for severe pain    Chronic pain syndrome   -     traMADol (ULTRAM) 50 MG tablet; Take 1 tablet (50 mg) by mouth every 6 hours as needed for severe pain            Patient Instructions   1. Refill of the tramadol sent to pharmacy  2. Follow-up with DR. Fraser when due for next refill      No follow-ups on file.    Magda Preciado [facility]    Phone call duration:  8 minutes

## 2020-12-05 ASSESSMENT — ANXIETY QUESTIONNAIRES: GAD7 TOTAL SCORE: 2

## 2020-12-17 DIAGNOSIS — K21.9 GASTROESOPHAGEAL REFLUX DISEASE WITHOUT ESOPHAGITIS: ICD-10-CM

## 2020-12-22 NOTE — TELEPHONE ENCOUNTER
Prescription approved per [facility] Refill Protocol.  Elenita Jones RN

## 2020-12-23 DIAGNOSIS — F41.9 ANXIETY: ICD-10-CM

## 2020-12-23 RX ORDER — CLONAZEPAM 0.5 MG/1
TABLET ORAL
Qty: 90 TABLET | Refills: 0 | Status: SHIPPED | OUTPATIENT
Start: 2020-12-23 | End: 2021-01-27

## 2020-12-23 NOTE — TELEPHONE ENCOUNTER
Reason for Call:  Medication refill:    Do you use a [facility]?  Name of the pharmacy and phone number for the current request:  [facility] [address]    Name of the medication requested: Clonozepam    Can we leave a detailed message on this number? YES    Phone number patient can be reached at: Home number on file [phone] (home)    Best Time: Any    Call taken on 12/23/2020 at 1:50 PM by Helen Patricia

## 2020-12-23 NOTE — TELEPHONE ENCOUNTER
Routing refill request to provider for review/approval because:  Drug not on the [facility] refill protocol   RX 8-28-20 , controlled substance.    Kristin WICK, MSN, RN

## 2021-01-15 ENCOUNTER — HEALTH MAINTENANCE LETTER (OUTPATIENT)
Age: 76
End: 2021-01-15

## 2021-01-21 DIAGNOSIS — F41.9 ANXIETY: ICD-10-CM

## 2021-01-21 NOTE — TELEPHONE ENCOUNTER
Pending Prescriptions:                       Disp   Refills    clonazePAM (KLONOPIN) 0.5 MG tablet       90 tab*0            Sig: TAKE ONE TABLET BY MOUTH THREE TIMES A DAY AS           NEEDED FOR ANXIETY    [id number] - [place], [place] - [address] AT [facility] [address]  [address] [address]  Phone: [phone] Fax: [phone]      Lily Gaffney on 1/21/2021 at 2:32 PM

## 2021-01-22 NOTE — TELEPHONE ENCOUNTER
Routing refill request to provider for review/approval because:  Drug not on the [facility] refill protocol     Elenita Jones RN

## 2021-01-26 NOTE — TELEPHONE ENCOUNTER
Patient is waiting for medication to be filled, patient is needing medication, has been out since Thursday. Helen Patricia on 1/26/2021 at 9:23 AM

## 2021-01-27 RX ORDER — CLONAZEPAM 0.5 MG/1
TABLET ORAL
Qty: 60 TABLET | Refills: 0 | Status: SHIPPED | OUTPATIENT
Start: 2021-01-27 | End: 2021-02-05

## 2021-01-31 ENCOUNTER — TELEPHONE (OUTPATIENT)
Dept: FAMILY MEDICINE | Facility: CLINIC | Age: 76
End: 2021-01-31

## 2021-01-31 NOTE — TELEPHONE ENCOUNTER
Prior Authorization Retail Medication Request    Medication/Dose: omeprazole  ICD code (if different than what is on RX):    Previously Tried and Failed:  Protonix; prilosec 10 mg; robinul inj;   Rationale:  Temporary fill letter received; patient has used since 2014 with success    Insurance Name:  [employer]  Insurance ID:  [id number]      Pharmacy Information (if different than what is on RX)  Name:    Phone:

## 2021-02-01 NOTE — TELEPHONE ENCOUNTER
Central Prior Authorization Team   Phone: [phone]      PA Initiation    Medication: omeprazole  Insurance Company: [employer] - Phone [phone] Fax [phone]  Pharmacy Filling the Rx: [place] - [address]  Filling Pharmacy Phone: [phone]  Filling Pharmacy Fax:    Start Date: 2/1/2021

## 2021-02-02 NOTE — TELEPHONE ENCOUNTER
Prior Authorization Not Needed per Insurance    Medication: omeprazole  Insurance Company: [employer] - Phone [phone] Fax [phone]  Expected CoPay:      Pharmacy Filling the Rx: [id number] [address] AT [address]  Pharmacy Notified: Yes  Patient Notified: Yes    Pharmacy received a paid claim on 1/22.

## 2021-02-05 ENCOUNTER — OFFICE VISIT (OUTPATIENT)
Dept: FAMILY MEDICINE | Facility: CLINIC | Age: 76
End: 2021-02-05
Payer: COMMERCIAL

## 2021-02-05 VITALS
OXYGEN SATURATION: 95 % | DIASTOLIC BLOOD PRESSURE: 72 MMHG | SYSTOLIC BLOOD PRESSURE: 127 MMHG | TEMPERATURE: 98.2 F | WEIGHT: 163 LBS | HEART RATE: 71 BPM | BODY MASS INDEX: 29.1 KG/M2

## 2021-02-05 DIAGNOSIS — M25.511 ACUTE PAIN OF RIGHT SHOULDER: ICD-10-CM

## 2021-02-05 DIAGNOSIS — F10.20 ALCOHOLISM (H): ICD-10-CM

## 2021-02-05 DIAGNOSIS — F41.9 ANXIETY: ICD-10-CM

## 2021-02-05 DIAGNOSIS — M19.90 GENERALIZED ARTHRITIS: Primary | ICD-10-CM

## 2021-02-05 DIAGNOSIS — F32.5 MAJOR DEPRESSION IN COMPLETE REMISSION (H): ICD-10-CM

## 2021-02-05 PROCEDURE — 99214 OFFICE O/P EST MOD 30 MIN: CPT | Performed by: FAMILY MEDICINE

## 2021-02-05 RX ORDER — TRAMADOL HYDROCHLORIDE 50 MG/1
50 TABLET ORAL EVERY 6 HOURS PRN
Qty: 120 TABLET | Refills: 2 | Status: SHIPPED | OUTPATIENT
Start: 2021-02-05 | End: 2021-02-08

## 2021-02-05 RX ORDER — CLONAZEPAM 0.5 MG/1
0.5 TABLET ORAL 2 TIMES DAILY PRN
Qty: 60 TABLET | Refills: 1 | Status: SHIPPED | OUTPATIENT
Start: 2021-02-05 | End: 2021-04-22

## 2021-02-05 ASSESSMENT — ANXIETY QUESTIONNAIRES
1. FEELING NERVOUS, ANXIOUS, OR ON EDGE: SEVERAL DAYS
6. BECOMING EASILY ANNOYED OR IRRITABLE: NOT AT ALL
GAD7 TOTAL SCORE: 3
2. NOT BEING ABLE TO STOP OR CONTROL WORRYING: SEVERAL DAYS
5. BEING SO RESTLESS THAT IT IS HARD TO SIT STILL: NOT AT ALL
7. FEELING AFRAID AS IF SOMETHING AWFUL MIGHT HAPPEN: NOT AT ALL
3. WORRYING TOO MUCH ABOUT DIFFERENT THINGS: SEVERAL DAYS

## 2021-02-05 ASSESSMENT — PATIENT HEALTH QUESTIONNAIRE - PHQ9
5. POOR APPETITE OR OVEREATING: NOT AT ALL
SUM OF ALL RESPONSES TO PHQ QUESTIONS 1-9: 4

## 2021-02-05 ASSESSMENT — ENCOUNTER SYMPTOMS
BACK PAIN: 1
GASTROINTESTINAL NEGATIVE: 1
CONSTITUTIONAL NEGATIVE: 1
RESPIRATORY NEGATIVE: 1
CARDIOVASCULAR NEGATIVE: 1
EYES NEGATIVE: 1
NERVOUS/ANXIOUS: 1
HEMATOLOGIC/LYMPHATIC NEGATIVE: 1
ARTHRALGIAS: 1
ENDOCRINE NEGATIVE: 1

## 2021-02-05 NOTE — PROGRESS NOTES
Assessment & Plan     Generalized arthritis  Medication faxed.   - traMADol (ULTRAM) 50 MG tablet; Take 1 tablet (50 mg) by mouth every 6 hours as needed for severe pain    Anxiety  Reduced klonopin to two tabs daily.   - clonazePAM (KLONOPIN) 0.5 MG tablet; Take 1 tablet (0.5 mg) by mouth 2 times daily as needed for anxiety TAKE ONE TABLET BY MOUTH THREE TIMES A DAY AS NEEDED FOR ANXIETY    Alcoholism (H)  In remission     Major depression in complete remission (H)  Stable.    Acute pain of right shoulder  Patient reassured, recommend topicals like Voltaren gel . If symptoms persist may need a cortisone shot or imaging.       15    FUTURE APPOINTMENTS:       - Follow-up visit in one month or sooner as needed.    Return in about 4 weeks (around 3/5/2021) for Follow up.    Catie Fraser MD  [facility] 75 year [facility] who presents to clinic today for the following health issues     HPI   75-year-old female who presents today for recheck of her medication and also recheck of  Anxiety,depression and chronic pain.  She is on klonopin 0.5 mg 3 times daily and I explained to the patient that given her age would like us to start tapering of the Klonopin. Klonopin is reduced to  0.5 mg twice a day.      She is on also on tramadol 50 mg 4 times daily and that is another medication she will likely need to taper off as we go along.  She denies any acute symptoms.  She did have some confusion about her omeprazole she thought she was supposed to take 20 mg 3 times a day and I corrected her today.she is supposed to take one tablet daily.  No other symptoms reported today.    Other complaint today is right shoulder pain. She reports that she lifted a leather couch a few weeks ago and then reaggravated the shoulder pain again this last weekend. She has good range of motion. Denies any numbness or tingling down her hands.     Anxiety Follow-Up    How are you doing with your  anxiety since your last visit? No change    Are you having other symptoms that might be associated with anxiety? No    Have you had a significant life event? No     Are you feeling depressed? No    Do you have any concerns with your use of alcohol or other drugs? No    Social History     Tobacco Use     Smoking status: Former Smoker     Packs/day: 0.50     Types: Cigarettes     Quit date: 2011     Years since quittin.6     Smokeless tobacco: Never Used   Substance Use Topics     Alcohol use: No     Drug use: No     PAOLA-7 SCORE 2020   Total Score - - -   Total Score 19 2 3     PHQ 4/10/2020 2020 2021   PHQ-9 Total Score 3 4 4   Q9: Thoughts of better off dead/self-harm past 2 weeks Not at all Not at all Not at all     Last PHQ-9 2021   1.  Little interest or pleasure in doing things 1   2.  Feeling down, depressed, or hopeless 1   3.  Trouble falling or staying asleep, or sleeping too much 0   4.  Feeling tired or having little energy 1   5.  Poor appetite or overeating 1   6.  Feeling bad about yourself 0   7.  Trouble concentrating 0   8.  Moving slowly or restless 0   Q9: Thoughts of better off dead/self-harm past 2 weeks 0   PHQ-9 Total Score 4   Difficulty at work, home, or with people Somewhat difficult     PAOLA-7  2021   1. Feeling nervous, anxious, or on edge 1   2. Not being able to stop or control worrying 1   3. Worrying too much about different things 1   4. Trouble relaxing 0   5. Being so restless that it is hard to sit still 0   6. Becoming easily annoyed or irritable 0   7. Feeling afraid, as if something awful might happen 0   PAOLA-7 Total Score 3   If you checked any problems, how difficult have they made it for you to do your work, take care of things at home, or get along with other people? -       Chronic Pain Follow-Up- patient is having right shoulder pain for one month since lifting something heavy    Where in your body do you have pain?  Arthritis  How has your pain affected your ability to work? Not applicable  Which of these pain treatments have you tried since your last clinic visit? Other: walking  How well are you sleeping? Good  How has your mood been since your last visit? About the same  Have you had a significant life event? No  Other aggravating factors: weather  Taking medication as directed? Yes    PHQ-9 SCORE 4/10/2020 12/4/2020 2/5/2021   PHQ-9 Total Score - - -   PHQ-9 Total Score 3 4 4     PAOLA-7 SCORE 4/27/2020 12/4/2020 2/5/2021   Total Score - - -   Total Score 19 2 3     No flowsheet data found.  Encounter-Level CSA - 02/20/2015:    Controlled Substance Agreement - Scan on 2/26/2015  9:06 AM: Controlled Medication Agreement-  2/20/15     Patient-Level CSA:    There are no patient-level csa.         How many servings of fruits and vegetables do you eat daily?  2-3    On average, how many sweetened beverages do you drink each day (Examples: soda, juice, sweet tea, etc.  Do NOT count diet or artificially sweetened beverages)?   0    How many days per week do you exercise enough to make your heart beat faster? 6    How many minutes a day do you exercise enough to make your heart beat faster? 30 - 60    How many days per week do you miss taking your medication? 0    Medication Followup of Omeprazole, patient states she had 3 tabs in the past. Patient thinks the dose was changed to one daily. Also requesting Tramadol and Clonazepam    Taking Medication as prescribed: yes    Side Effects:  None    Medication Helping Symptoms:  yes       Review of Systems   Constitutional: Negative.    HENT: Negative.    Eyes: Negative.    Respiratory: Negative.    Cardiovascular: Negative.    Gastrointestinal: Negative.    Endocrine: Negative.    Breasts:  negative.    Genitourinary: Negative.    Musculoskeletal: Positive for arthralgias and back pain.   Hematological: Negative.    Psychiatric/Behavioral: Positive for mood changes. The patient is  nervous/anxious.             Objective    /72   Pulse 71   Temp 98.2  F (36.8  C) (Tympanic)   Wt 73.9 kg (163 lb)   SpO2 95%   BMI 29.10 kg/m    Body mass index is 29.1 kg/m .  Physical Exam  Constitutional:       Appearance: Normal appearance.   HENT:      Head: Normocephalic and atraumatic.   Eyes:      Pupils: Pupils are equal, round, and reactive to light.   Neck:      Musculoskeletal: Normal range of motion.   Cardiovascular:      Rate and Rhythm: Normal rate and regular rhythm.   Pulmonary:      Effort: Pulmonary effort is normal.      Breath sounds: Normal breath sounds.   Abdominal:      General: Abdomen is flat.      Palpations: Abdomen is soft.   Musculoskeletal:         General: Tenderness and signs of injury present. No swelling.      Right shoulder: She exhibits tenderness, swelling, pain and spasm. She exhibits normal range of motion and no laceration.   Skin:     General: Skin is warm and dry.   Neurological:      Mental Status: She is alert and oriented to person, place, and time.   Psychiatric:         Mood and Affect: Mood normal.         Behavior: Behavior normal.

## 2021-02-05 NOTE — PATIENT INSTRUCTIONS
Patient Education     Shoulder Pain with Uncertain Cause   Shoulder pain can have many causes. Pain often comes from the structures that surround the shoulder joint. These are the joint capsule, ligaments, tendons, muscles, and bursa. Pain can also come from cartilage in the joint. Cartilage can become worn out or injured. It s important to know what s causing your pain so the healthcare provider can use the correct treatment. But sometimes it s difficult to find the exact cause of shoulder pain. You may need to see a specialist (orthopedist). You may also need special tests such as a CT scan or MRI. The provider may need to use special tools to look inside the joint (arthroscopy).  Shoulder pain can be treated with a sling or a device that keeps your shoulder from moving. You can take an anti-inflammatory medicine such as ibuprofen to ease pain. You may need to do special shoulder exercises. Follow up with a specialist if the pain is severe or doesn t go away after a few weeks.  Home care  Follow these tips when caring for yourself at home:    If a sling was given to you, leave it in place for the time advised by your healthcare provider. If you aren t sure how long to wear it, ask for advice. If the sling becomes loose, adjust it so that your forearm is level with the ground. Your shoulder should feel well supported.    Put an ice pack on the injured area for 20 minutes every 1 to 2 hours the first day. You can make your own ice pack by putting ice cubes in a plastic bag. Wrap the bag in a thin towel. Continue with ice packs 3 to 4 times a day for the next 2 days. Then use the pack as needed to ease pain and swelling.    You may use acetaminophen or ibuprofen to control pain, unless another pain medicine was prescribed. If you have chronic liver or kidney disease, talk with your healthcare provider before using these medicines. Also talk with your provider if you ve ever had a stomach ulcer or digestive  bleeding.    Shoulder pain may seem worse at night, when there is less to distract you from the pain. If you sleep on your side, try to keep weight off your painful shoulder. Propping pillows behind you may stop you from rolling over onto that shoulder during sleep.     Shoulder and elbow joints can become stiff if left in a sling for too long. You should start range of motion exercises about 7 to 10 days after the injury. Talk with your provider to find out what type of exercises to do and how soon to start.    You can take the sling off to shower or bathe.  Follow-up care  Follow up with your healthcare provider if you don t start to get better in the next 5 days.  When to seek medical advice  Call your healthcare provider right away if any of these occur:    Pain or swelling gets worse or continues for more than a few days    Your hand or fingers become cold, blue, numb, or tingly    Large amount of bruising on your shoulder or upper arm    Trouble moving your hand or fingers    Weakness in your hand or fingers    Your shoulder becomes stiff    It feels like your shoulder is popping out    You are less able to do your daily activities  Sherie last reviewed this educational content on 1/1/2020 2000-2020 [employer]. [address] [place] [address], [place] [address]. All rights reserved. This information is not intended as a substitute for professional medical care. Always follow your healthcare professional's instructions.

## 2021-02-06 ASSESSMENT — ANXIETY QUESTIONNAIRES: GAD7 TOTAL SCORE: 3

## 2021-02-08 NOTE — TELEPHONE ENCOUNTER
Faxed to [facility].     [facility] [place]      Received notice of Approval from Horace Radford at [facility] for left L4 and left L5 TFESIs effective 2/4/2021-5/5/2021 to be done at [address] [facility] to clinical staff to schedule

## 2021-02-15 DIAGNOSIS — F41.9 ANXIETY: ICD-10-CM

## 2021-02-15 NOTE — TELEPHONE ENCOUNTER
clonazePAM (KLONOPIN) 0.5 MG tablet 60 tablet 1 2/5/2021  No   Sig - Route: Take 1 tablet (0.5 mg) by mouth 2 times daily as needed for anxiety TAKE ONE TABLET BY MOUTH THREE TIMES A DAY AS NEEDED FOR ANXIETY - Oral     Please clarify directions, pharmacy unable to dispense due to two sets of directions.     Cristel GAMBINO  Station

## 2021-02-18 ENCOUNTER — RECORDS - HEALTHEAST (OUTPATIENT)
Dept: SCHEDULING | Facility: CLINIC | Age: 76
End: 2021-02-18

## 2021-02-18 ENCOUNTER — TELEPHONE (OUTPATIENT)
Dept: FAMILY MEDICINE | Facility: CLINIC | Age: 76
End: 2021-02-18

## 2021-02-18 ENCOUNTER — NURSE TRIAGE (OUTPATIENT)
Dept: NURSING | Facility: CLINIC | Age: 76
End: 2021-02-18

## 2021-02-18 NOTE — TELEPHONE ENCOUNTER
Patient calling regarding her clonazepam prescription.    Rx sent to pharmacy on 2/5/21.    Patient is currently out of the clonazepam and is requesting to have clarification sent to pharmacy so she can pick the prescription.   Please contact patient with update as she would like to have this filled before the weekend if provider or covering provider could address.      Vale Echavarria RN 02/18/21 2:28 PM  [facility] Nurse Advisor

## 2021-02-18 NOTE — TELEPHONE ENCOUNTER
[facility] pharmacy calling for clarification on how to take Klonopin.    Sig:Take 1 tablet (0.5 mg) by mouth 2 times daily as needed for anxiety TAKE ONE TABLET BY MOUTH THREE TIMES A DAY AS NEEDED FOR ANXIETY     Thank you    Lori BRYAN RN

## 2021-02-18 NOTE — TELEPHONE ENCOUNTER
See telephone encounter with same date for documentation. RN will close this encounter to avoid duplication.   Vale Echavarria RN 02/18/21 2:30 PM  [facility] Nurse Advisor

## 2021-02-25 RX ORDER — CLONAZEPAM 0.5 MG/1
TABLET ORAL
Qty: 60 TABLET | OUTPATIENT
Start: 2021-02-25

## 2021-03-03 ENCOUNTER — OFFICE VISIT (OUTPATIENT)
Dept: FAMILY MEDICINE | Facility: CLINIC | Age: 76
End: 2021-03-03
Payer: COMMERCIAL

## 2021-03-03 VITALS
OXYGEN SATURATION: 98 % | HEART RATE: 71 BPM | SYSTOLIC BLOOD PRESSURE: 122 MMHG | TEMPERATURE: 98.1 F | DIASTOLIC BLOOD PRESSURE: 74 MMHG | BODY MASS INDEX: 28.56 KG/M2 | HEIGHT: 63 IN | WEIGHT: 161.2 LBS

## 2021-03-03 DIAGNOSIS — F32.5 MAJOR DEPRESSION IN COMPLETE REMISSION (H): ICD-10-CM

## 2021-03-03 DIAGNOSIS — F41.9 ANXIETY: ICD-10-CM

## 2021-03-03 DIAGNOSIS — Z12.11 COLON CANCER SCREENING: ICD-10-CM

## 2021-03-03 DIAGNOSIS — E78.5 HYPERLIPIDEMIA LDL GOAL <100: ICD-10-CM

## 2021-03-03 DIAGNOSIS — Z00.00 ROUTINE GENERAL MEDICAL EXAMINATION AT A HEALTH CARE FACILITY: Primary | ICD-10-CM

## 2021-03-03 LAB
ALBUMIN SERPL-MCNC: 3.3 G/DL (ref 3.4–5)
ALP SERPL-CCNC: 179 U/L (ref 40–150)
ALT SERPL W P-5'-P-CCNC: 18 U/L (ref 0–50)
ANION GAP SERPL CALCULATED.3IONS-SCNC: 6 MMOL/L (ref 3–14)
AST SERPL W P-5'-P-CCNC: 13 U/L (ref 0–45)
BILIRUB SERPL-MCNC: 0.2 MG/DL (ref 0.2–1.3)
BUN SERPL-MCNC: 10 MG/DL (ref 7–30)
CALCIUM SERPL-MCNC: 9.6 MG/DL (ref 8.5–10.1)
CHLORIDE SERPL-SCNC: 103 MMOL/L (ref 94–109)
CHOLEST SERPL-MCNC: 219 MG/DL
CO2 SERPL-SCNC: 31 MMOL/L (ref 20–32)
CREAT SERPL-MCNC: 0.6 MG/DL (ref 0.52–1.04)
GFR SERPL CREATININE-BSD FRML MDRD: 89 ML/MIN/{1.73_M2}
GLUCOSE SERPL-MCNC: 85 MG/DL (ref 70–99)
HDLC SERPL-MCNC: 57 MG/DL
LDLC SERPL CALC-MCNC: 134 MG/DL
NONHDLC SERPL-MCNC: 162 MG/DL
POTASSIUM SERPL-SCNC: 3.9 MMOL/L (ref 3.4–5.3)
PROT SERPL-MCNC: 7.1 G/DL (ref 6.8–8.8)
SODIUM SERPL-SCNC: 140 MMOL/L (ref 133–144)
TRIGL SERPL-MCNC: 140 MG/DL

## 2021-03-03 PROCEDURE — 80061 LIPID PANEL: CPT | Performed by: FAMILY MEDICINE

## 2021-03-03 PROCEDURE — 92551 PURE TONE HEARING TEST AIR: CPT | Performed by: FAMILY MEDICINE

## 2021-03-03 PROCEDURE — 99214 OFFICE O/P EST MOD 30 MIN: CPT | Mod: 25 | Performed by: FAMILY MEDICINE

## 2021-03-03 PROCEDURE — G0438 PPPS, INITIAL VISIT: HCPCS | Performed by: FAMILY MEDICINE

## 2021-03-03 PROCEDURE — 36415 COLL VENOUS BLD VENIPUNCTURE: CPT | Performed by: FAMILY MEDICINE

## 2021-03-03 PROCEDURE — 80053 COMPREHEN METABOLIC PANEL: CPT | Performed by: FAMILY MEDICINE

## 2021-03-03 RX ORDER — BUPROPION HYDROCHLORIDE 150 MG/1
150 TABLET ORAL EVERY MORNING
Qty: 90 TABLET | Refills: 3 | Status: SHIPPED | OUTPATIENT
Start: 2021-03-03 | End: 2021-09-20

## 2021-03-03 ASSESSMENT — MIFFLIN-ST. JEOR: SCORE: 1196.36

## 2021-03-03 ASSESSMENT — ACTIVITIES OF DAILY LIVING (ADL): CURRENT_FUNCTION: NO ASSISTANCE NEEDED

## 2021-03-03 NOTE — NURSING NOTE
"Chief Complaint   Patient presents with     Wellness Visit       Initial /74   Pulse 71   Temp 98.1  F (36.7  C) (Tympanic)   Ht 1.594 m (5' 2.75\")   Wt 73.1 kg (161 lb 3.2 oz)   SpO2 98%   BMI 28.78 kg/m   Estimated body mass index is 28.78 kg/m  as calculated from the following:    Height as of this encounter: 1.594 m (5' 2.75\").    Weight as of this encounter: 73.1 kg (161 lb 3.2 oz).    Patient presents to the clinic using No DME    Health Maintenance that is potentially due pending provider review:  ACT and Dexa     Possibly completing today per provider review.    Is there anyone who you would like to be able to receive your results? No  If yes have patient fill out MANDA  Ramesh Lechuga M.A.        "

## 2021-03-03 NOTE — PATIENT INSTRUCTIONS
Preventive Health Recommendations    See your health care provider every year to    Review health changes.     Discuss preventive care.      Review your medicines if your doctor has prescribed any.      You no longer need a yearly Pap test unless you've had an abnormal Pap test in the past 10 years. If you have vaginal symptoms, such as bleeding or discharge, be sure to talk with your provider about a Pap test.      Every 1 to 2 years, have a mammogram.  If you are over 69, talk with your health care provider about whether or not you want to continue having screening mammograms.      Every 10 years, have a colonoscopy. Or, have a yearly FIT test (stool test). These exams will check for colon cancer.       Have a cholesterol test every 5 years, or more often if your doctor advises it.       Have a diabetes test (fasting glucose) every three years. If you are at risk for diabetes, you should have this test more often.       At age 65, have a bone density scan (DEXA) to check for osteoporosis (brittle bone disease).    Shots:    Get a flu shot each year.    Get a tetanus shot every 10 years.    Talk to your doctor about your pneumonia vaccines. There are now two you should receive - Pneumovax (PPSV 23) and Prevnar (PCV 13).    Talk to your pharmacist about the shingles vaccine.    Talk to your doctor about the hepatitis B vaccine.    Nutrition:     Eat at least 5 servings of fruits and vegetables each day.      Eat whole-grain bread, whole-wheat pasta and brown rice instead of white grains and rice.      Get adequate about Calcium and Vitamin D.     Lifestyle    Exercise at least 150 minutes a week (30 minutes a day, 5 days a week). This will help you control your weight and prevent disease.      Limit alcohol to one drink per day.      No smoking.       Wear sunscreen to prevent skin cancer.       See your dentist twice a year for an exam and cleaning.      See your eye doctor every 1 to 2 years to screen for  conditions such as glaucoma, macular degeneration, cataracts, etc.    Personalized Prevention Plan  You are due for the preventive services outlined below.  Your care team is available to assist you in scheduling these services.  If you have already completed any of these items, please share that information with your care team to update in your medical record.    Health Maintenance Due   Topic Date Due     COVID-19 Vaccine (1 of 2) 06/15/1961     Zoster (Shingles) Vaccine (2 of 3) 03/14/2013     Osteoporosis Screening  07/05/2014     URINE DRUG SCREEN  08/30/2020     Asthma Action Plan - yearly  11/12/2020     Asthma Control Test  02/27/2021

## 2021-03-03 NOTE — PROGRESS NOTES
"SUBJECTIVE:   Yamel Lindsay is a 75 year old female who presents for Preventive Visit.    75 yr old female here for her annual physical. Patient reports no acute symptoms and she has been doing well overall. Denies any acute symptoms. She also wants a refill on some of her chronic medications. We talked about preventive measures and she still will like to get a mammogram.    {Split Bill scripting  The purpose of this visit is to discuss your medical history and prevent health problems before you are sick. You may be responsible for a co-pay, coinsurance, or deductible if your visit today includes services such as checking on a sore throat, having an x-ray or lab test, or treating and evaluating a new or existing condition :  Patient has been advised of split billing requirements and indicates understanding: Yes   Are you in the first 12 months of your Medicare coverage?  No    Healthy Habits:    In general, how would you rate your overall health?  Very good    Frequency of exercise:  6-7 days/week    Duration of exercise:  Greater than 60 minutes    Do you usually eat at least 4 servings of fruit and vegetables a day, include whole grains    & fiber and avoid regularly eating high fat or \"junk\" foods?  Yes    Taking medications regularly:  Yes    Barriers to taking medications:  None    Medication side effects:  None    Ability to successfully perform activities of daily living:  No assistance needed    Home Safety:  No safety concerns identified    Hearing Impairment:  No hearing concerns    In the past 6 months, have you been bothered by leaking of urine?  No    In general, how would you rate your overall mental or emotional health?  Good      PHQ-2 Total Score:    Additional concerns today:  Yes (Has been having some achy, throbbing legs at night.  Tried compression socks. )    Do you feel safe in your environment? Yes    Have you ever done Advance Care Planning? (For example, a Health Directive, POLST, or a " discussion with a medical provider or your loved ones about your wishes): No, advance care planning information given to patient to review.  Patient declined advance care planning discussion at this time.  Plans to get one made with .       Right Ear:      1000 Hz RESPONSE- on Level: 40 db (Conditioning sound)   1000 Hz: RESPONSE- on Level:   20 db    2000 Hz: RESPONSE- on Level:   20 db    4000 Hz: RESPONSE- on Level:   20 db     Left Ear:      4000 Hz: RESPONSE- on Level:   20 db    2000 Hz: RESPONSE- on Level:   20 db    1000 Hz: RESPONSE- on Level:   20 db     500 Hz: RESPONSE- on Level: 25 db    Right Ear:    500 Hz: RESPONSE- on Level: 25 db    Hearing Acuity: Pass    Hearing Assessment: normal   Fall risk  Fallen 2 or more times in the past year?: No  Any fall with injury in the past year?: No    Cognitive Screening   1) Repeat 3 items (Leader, Season, Table)    2) Clock draw: NORMAL  3) 3 item recall: Recalls 3 objects  Results: 3 items recalled: COGNITIVE IMPAIRMENT LESS LIKELY    Mini-CogTM Copyright S Brandon. Licensed by the author for use in [facility] [employer]; reprinted with permission (sovilma@[facility]). All rights reserved.      Do you have sleep apnea, excessive snoring or daytime drowsiness?: no    Reviewed and updated as needed this visit by clinical staff  Tobacco  Allergies  Meds   Med Hx  Surg Hx  Fam Hx  Soc Hx        Reviewed and updated as needed this visit by Provider                Social History     Tobacco Use     Smoking status: Former Smoker     Packs/day: 0.50     Types: Cigarettes     Quit date: 2011     Years since quittin.7     Smokeless tobacco: Never Used   Substance Use Topics     Alcohol use: No     If you drink alcohol do you typically have >3 drinks per day or >7 drinks per week? Yes      Alcohol Use 2014   Prescreen: >3 drinks/day or >7 drinks/week? The patient does not drink >3 drinks per day nor >7 drinks per week.               Current  providers sharing in care for this patient include:   Patient Care Team:  Catie Fraser MD as PCP - General (Family Practice)  Catie Fraser MD as Assigned PCP    The following health maintenance items are reviewed in Epic and correct as of today:  Health Maintenance   Topic Date Due     COVID-19 Vaccine (1 of 2) 06/15/1961     ZOSTER IMMUNIZATION (2 of 3) 03/14/2013     DEXA  07/05/2014     URINE DRUG SCREEN  08/30/2020     ASTHMA ACTION PLAN  11/12/2020     ASTHMA CONTROL TEST  02/27/2021     PHQ-9  08/05/2021     MAMMO SCREENING  08/15/2021     FALL RISK ASSESSMENT  12/04/2021     MEDICARE ANNUAL WELLNESS VISIT  03/03/2022     DTAP/TDAP/TD IMMUNIZATION (3 - Td) 02/18/2024     LIPID  03/03/2026     ADVANCE CARE PLANNING  03/03/2026     COLORECTAL CANCER SCREENING  10/01/2026     HEPATITIS C SCREENING  Completed     DEPRESSION ACTION PLAN  Completed     INFLUENZA VACCINE  Completed     Pneumococcal Vaccine: Pediatrics (0 to 5 Years) and At-Risk Patients (6 to 64 Years)  Completed     Pneumococcal Vaccine: 65+ Years  Completed     IPV IMMUNIZATION  Aged Out     MENINGITIS IMMUNIZATION  Aged Out     HEPATITIS B IMMUNIZATION  Aged Out     Lab work is in process  BP Readings from Last 3 Encounters:   03/03/21 122/74   02/05/21 127/72   08/27/20 126/64    Wt Readings from Last 3 Encounters:   03/03/21 73.1 kg (161 lb 3.2 oz)   02/05/21 73.9 kg (163 lb)   08/27/20 72.1 kg (159 lb)                  Patient Active Problem List   Diagnosis     Hyperlipidemia LDL goal <130     Primary localized osteoarthrosis, lower leg     Generalized anxiety disorder     Rhinitis, allergic seasonal     Alcoholism (H)     Mild recurrent major depression (H)     Advanced directives, counseling/discussion     Seasonal allergic rhinitis     Hip pain     GERD (gastroesophageal reflux disease)     S/P laparoscopic hernia repair     OA (osteoarthritis) of knee     Mild intermittent asthma without complication     Major  depression in complete remission (H)     Benign essential hypertension     Past Surgical History:   Procedure Laterality Date     ESOPHAGOSCOPY, GASTROSCOPY, DUODENOSCOPY (EGD), COMBINED N/A 2015    Procedure: COMBINED ESOPHAGOSCOPY, GASTROSCOPY, DUODENOSCOPY (EGD), BIOPSY SINGLE OR MULTIPLE;  Surgeon: Dylan Alejandra MD;  Location: [facility]     ESOPHAGOSCOPY, GASTROSCOPY, DUODENOSCOPY (EGD), COMBINED N/A 2018    Procedure: COMBINED ESOPHAGOSCOPY, GASTROSCOPY, DUODENOSCOPY (EGD), BIOPSY SINGLE OR MULTIPLE;  gastroscopy;  Surgeon: Alexander Bautista MD;  Location: [facility]     LAPAROSCOPIC HERNIORRHAPHY INGUINAL Right 2015    Procedure: LAPAROSCOPIC HERNIORRHAPHY INGUINAL;  Surgeon: Favio Olsen MD;  Location: [facility]     TUBAL LIGATION         Social History     Tobacco Use     Smoking status: Former Smoker     Packs/day: 0.50     Types: Cigarettes     Quit date: 2011     Years since quittin.7     Smokeless tobacco: Never Used   Substance Use Topics     Alcohol use: No     Family History   Problem Relation Age of Onset     Cancer Mother      C.A.D. Father      Lipids Father      Hypertension Father      Hypertension Brother      Cancer Brother         esophageal cancer     Breast Cancer Other      Prostate Cancer Brother      Psychotic Disorder Daughter      Diabetes No family hx of      Cerebrovascular Disease No family hx of      Cancer - colorectal No family hx of          Current Outpatient Medications   Medication Sig Dispense Refill     albuterol (PROAIR HFA/PROVENTIL HFA/VENTOLIN HFA) 108 (90 Base) MCG/ACT inhaler INHALE 2 PUFFS INTO THE LUNGS EVERY 6 HOURS AS NEEDED FOR SHORTNESS OF BREATH, DIFFICULTY BREATHING OR WHEEZING 8.5 g 11     albuterol (PROVENTIL) (2.5 MG/3ML) 0.083% neb solution Take 1 vial (2.5 mg) by nebulization every 6 hours as needed for shortness of breath / dyspnea or wheezing 75 vial 3     buPROPion (WELLBUTRIN XL) 150 MG 24 hr tablet Take 1 tablet (150 mg) by  "mouth every morning 90 tablet 3     Calcium Carb-Cholecalciferol (CALCIUM 1000 + D PO) Take 1 tablet by mouth       Cholecalciferol (VITAMIN D-3 PO) Take 2,000 Units by mouth daily        clonazePAM (KLONOPIN) 0.5 MG tablet Take 1 tablet (0.5 mg) by mouth 2 times daily as needed for anxiety TAKE ONE TABLET BY MOUTH THREE TIMES A DAY AS NEEDED FOR ANXIETY 60 tablet 1     FLUoxetine (PROZAC) 20 MG capsule Take 1 capsule (20 mg) by mouth daily 30 capsule 11     fluticasone-salmeterol (ADVAIR-HFA) 115-21 MCG/ACT inhaler Inhale 2 puffs into the lungs 2 times daily 1 Inhaler 11     hydrochlorothiazide (HYDRODIURIL) 25 MG tablet Take 1 tablet (25 mg) by mouth daily 90 tablet 3     ibuprofen (ADVIL,MOTRIN) 200 MG tablet Take 200-600 mg by mouth every 2 hours as needed for mild pain       Omega-3 Fatty Acids (FISH OIL) 1200 MG CAPS Take 1 capsule by mouth       omeprazole (PRILOSEC) 20 MG DR capsule TAKE 1 CAPSULE BY MOUTH DAILY, 30 TO 60 MINUTES BEFORE A MEAL. 90 capsule 2     tiZANidine (ZANAFLEX) 2 MG tablet TAKE ONE TABLET BY MOUTH THREE TIMES A  tablet 1     tiZANidine (ZANAFLEX) 2 MG tablet Take 1 tablet (2 mg) by mouth 3 times daily 270 tablet 0     traMADol (ULTRAM) 50 MG tablet Take 1 tablet (50 mg) by mouth every 6 hours as needed for severe pain 120 tablet 1     traZODone (DESYREL) 50 MG tablet Take 2 tablets (100 mg) by mouth At Bedtime 180 tablet 3     VITAMIN K PO        atorvastatin (LIPITOR) 20 MG tablet Take 1 tablet (20 mg) by mouth daily (Patient not taking: Reported on 12/4/2020) 90 tablet 3     Allergies   Allergen Reactions     Nka [No Known Allergies]      Seasonal Allergies      Mammogram Screening - Patient over age 75, has elected to continue with screening.    Review of Systems  Constitutional, HEENT, cardiovascular, pulmonary, gi and gu systems are negative, except as otherwise noted.    OBJECTIVE:   /74   Pulse 71   Temp 98.1  F (36.7  C) (Tympanic)   Ht 1.594 m (5' 2.75\")   Wt " "73.1 kg (161 lb 3.2 oz)   SpO2 98%   BMI 28.78 kg/m   Estimated body mass index is 28.78 kg/m  as calculated from the following:    Height as of this encounter: 1.594 m (5' 2.75\").    Weight as of this encounter: 73.1 kg (161 lb 3.2 oz).  Physical Exam  GENERAL: healthy, alert and no distress  EYES: Eyes grossly normal to inspection, PERRL and conjunctivae and sclerae normal  HENT: ear canals and TM's normal, nose and mouth without ulcers or lesions  NECK: no adenopathy, no asymmetry, masses, or scars and thyroid normal to palpation  RESP: lungs clear to auscultation - no rales, rhonchi or wheezes  CV: regular rate and rhythm, normal S1 S2, no S3 or S4, no murmur, click or rub, no peripheral edema and peripheral pulses strong  ABDOMEN: soft, nontender, no hepatosplenomegaly, no masses and bowel sounds normal  MS: no gross musculoskeletal defects noted, no edema  SKIN: no suspicious lesions or rashes  NEURO: Normal strength and tone, mentation intact and speech normal  PSYCH: mentation appears normal, affect normal/bright    Diagnostic Test Results:  Pending    ASSESSMENT / PLAN:   1. Routine general medical examination at a health care facility  Patient will be notified of results.Patient encouraged to remain active.  - Comprehensive metabolic panel (BMP + Alb, Alk Phos, ALT, AST, Total. Bili, TP)    2. Anxiety  Medication faxed.  - FLUoxetine (PROZAC) 20 MG capsule; Take 1 capsule (20 mg) by mouth daily  Dispense: 30 capsule; Refill: 11  - OFFICE/OUTPT VISIT,EST,LEVL III    3. Major depression in complete remission (H)  Medication faxed.  - buPROPion (WELLBUTRIN XL) 150 MG 24 hr tablet; Take 1 tablet (150 mg) by mouth every morning  Dispense: 90 tablet; Refill: 3  - OFFICE/OUTPT VISIT,EST,LEVL III    4. Colon cancer screening  - Fecal colorectal cancer screen (FIT); Future    5. Hyperlipidemia LDL goal <100  - Lipid panel reflex to direct LDL Fasting  - OFFICE/OUTPT VISIT,EST,LEVL III    Patient has been advised " "of split billing requirements and indicates understanding: Yes  COUNSELING:  Reviewed preventive health counseling, as reflected in patient instructions       Regular exercise       Healthy diet/nutrition       Vision screening    Estimated body mass index is 28.78 kg/m  as calculated from the following:    Height as of this encounter: 1.594 m (5' 2.75\").    Weight as of this encounter: 73.1 kg (161 lb 3.2 oz).    Weight management plan: Discussed healthy diet and exercise guidelines    She reports that she quit smoking about 9 years ago. Her smoking use included cigarettes. She smoked 0.50 packs per day. She has never used smokeless tobacco.      Appropriate preventive services were discussed with this patient, including applicable screening as appropriate for cardiovascular disease, diabetes, osteopenia/osteoporosis, and glaucoma.  As appropriate for age/gender, discussed screening for colorectal cancer, prostate cancer, breast cancer, and cervical cancer. Checklist reviewing preventive services available has been given to the patient.    Reviewed patients plan of care and provided an AVS. The Basic Care Plan (routine screening as documented in Health Maintenance) for Yamel meets the Care Plan requirement. This Care Plan has been established and reviewed with the Patient.    Counseling Resources:  ATP IV Guidelines  Pooled Cohorts Equation Calculator  Breast Cancer Risk Calculator  Breast Cancer: Medication to Reduce Risk  FRAX Risk Assessment  ICSI Preventive Guidelines  Dietary Guidelines for Americans, 2010  USDA's MyPlate  ASA Prophylaxis  Lung CA Screening    Catie Fraser MD  [facility]    Identified Health Risks:  "

## 2021-03-04 ASSESSMENT — ASTHMA QUESTIONNAIRES: ACT_TOTALSCORE: 12

## 2021-03-04 NOTE — RESULT ENCOUNTER NOTE
Please inform patient that test result showed elevated cholesterol about the same as last check. Other labs were within normal limits.    Thank you.     Catie Fraser M.D.

## 2021-03-13 ENCOUNTER — TELEPHONE (OUTPATIENT)
Dept: FAMILY MEDICINE | Facility: CLINIC | Age: 76
End: 2021-03-13

## 2021-03-13 DIAGNOSIS — M62.838 MUSCLE SPASM: ICD-10-CM

## 2021-03-15 NOTE — TELEPHONE ENCOUNTER
Requested Prescriptions   Pending Prescriptions Disp Refills     tiZANidine (ZANAFLEX) 2 MG tablet [Pharmacy Med Name: TIZANIDINE 2MG TABLETS] 270 tablet 1     Sig: TAKE 1 TABLET BY MOUTH THREE TIMES DAILY       There is no refill protocol information for this order

## 2021-03-16 RX ORDER — TIZANIDINE 2 MG/1
TABLET ORAL
Qty: 270 TABLET | Refills: 1 | OUTPATIENT
Start: 2021-03-16

## 2021-03-17 NOTE — TELEPHONE ENCOUNTER
Patient called and is asking for refill.  She is out of medication. Helen Patricia on 3/17/2021 at 1:16 PM

## 2021-03-18 ENCOUNTER — VIRTUAL VISIT (OUTPATIENT)
Dept: FAMILY MEDICINE | Facility: CLINIC | Age: 76
End: 2021-03-18
Payer: COMMERCIAL

## 2021-03-18 DIAGNOSIS — M62.838 MUSCLE SPASM: ICD-10-CM

## 2021-03-18 PROCEDURE — 99213 OFFICE O/P EST LOW 20 MIN: CPT | Mod: 95 | Performed by: FAMILY MEDICINE

## 2021-03-18 RX ORDER — TIZANIDINE 2 MG/1
2 TABLET ORAL 3 TIMES DAILY PRN
Qty: 90 TABLET | Refills: 1 | Status: SHIPPED | OUTPATIENT
Start: 2021-03-18 | End: 2022-02-02

## 2021-04-22 ENCOUNTER — VIRTUAL VISIT (OUTPATIENT)
Dept: FAMILY MEDICINE | Facility: CLINIC | Age: 76
End: 2021-04-22
Payer: COMMERCIAL

## 2021-04-22 DIAGNOSIS — F41.9 ANXIETY: ICD-10-CM

## 2021-04-22 PROCEDURE — 99213 OFFICE O/P EST LOW 20 MIN: CPT | Mod: 95 | Performed by: FAMILY MEDICINE

## 2021-04-22 RX ORDER — CLONAZEPAM 0.5 MG/1
0.5 TABLET ORAL 2 TIMES DAILY PRN
Qty: 60 TABLET | Refills: 1 | Status: SHIPPED | OUTPATIENT
Start: 2021-04-22 | End: 2021-06-25

## 2021-04-22 NOTE — PROGRESS NOTES
Yamel is a 75 year old who is being evaluated via a billable telephone visit.      What phone number would you like to be contacted at? [phone]  How would you like to obtain your AVS? Mail a copy    Assessment & Plan     Anxiety  Medication faxed.   - clonazePAM (KLONOPIN) 0.5 MG tablet; Take 1 tablet (0.5 mg) by mouth 2 times daily as needed for anxiety TAKE ONE TABLET BY MOUTH THREE TIMES A DAY AS NEEDED FOR ANXIETY           FUTURE APPOINTMENTS:       - Follow-up visit in 2 months or sooner as needed.    Return in about 2 months (around 2021) for Follow up.    Catie Fraser MD  [facility]    Subjective   Yamel is a 75 year old who presents for the following health issues  accompanied by her :    HPI   75 yr old female here for medication refills. She is needing refills on her Klonopin. We have been gradually weaning her off the medication. She is presently on two tabs a day.She reports no side effects.    Anxiety Follow-Up    How are you doing with your anxiety since your last visit? Worsened since decreasing Clonazepam    Are you having other symptoms that might be associated with anxiety? No    Have you had a significant life event? OTHER: Pandemic     Are you feeling depressed? Yes:  sometimes    Do you have any concerns with your use of alcohol or other drugs? No    Social History     Tobacco Use     Smoking status: Former Smoker     Packs/day: 0.50     Types: Cigarettes     Quit date: 2011     Years since quittin.8     Smokeless tobacco: Never Used   Substance Use Topics     Alcohol use: No     Drug use: No     PAOLA-7 SCORE 2020   Total Score - - -   Total Score 19 2 3     PHQ 4/10/2020 2020 2021   PHQ-9 Total Score 3 4 4   Q9: Thoughts of better off dead/self-harm past 2 weeks Not at all Not at all Not at all     Last PHQ-9 2021   1.  Little interest or pleasure in doing things 1   2.  Feeling down, depressed, or  hopeless 1   3.  Trouble falling or staying asleep, or sleeping too much 0   4.  Feeling tired or having little energy 1   5.  Poor appetite or overeating 1   6.  Feeling bad about yourself 0   7.  Trouble concentrating 0   8.  Moving slowly or restless 0   Q9: Thoughts of better off dead/self-harm past 2 weeks 0   PHQ-9 Total Score 4   Difficulty at work, home, or with people Somewhat difficult     PAOLA-7  2/5/2021   1. Feeling nervous, anxious, or on edge 1   2. Not being able to stop or control worrying 1   3. Worrying too much about different things 1   4. Trouble relaxing 0   5. Being so restless that it is hard to sit still 0   6. Becoming easily annoyed or irritable 0   7. Feeling afraid, as if something awful might happen 0   PAOLA-7 Total Score 3   If you checked any problems, how difficult have they made it for you to do your work, take care of things at home, or get along with other people? -         How many servings of fruits and vegetables do you eat daily?  2-3    On average, how many sweetened beverages do you drink each day (Examples: soda, juice, sweet tea, etc.  Do NOT count diet or artificially sweetened beverages)?   0    How many days per week do you exercise enough to make your heart beat faster? 2-4 miles day    How many minutes a day do you exercise enough to make your heart beat faster? 9 or less    How many days per week do you miss taking your medication? 0    Medication Followup of Clonazepam    Taking Medication as prescribed: yes    Side Effects:  None    Medication Helping Symptoms:  Not as much on the decreased dose       Review of Systems   Constitutional, HEENT, cardiovascular, pulmonary, gi and gu systems are negative, except as otherwise noted.      Objective           Vitals:  No vitals were obtained today due to virtual visit.    Physical Exam   healthy, alert and no distress  PSYCH: Alert and oriented times 3; coherent speech, normal   rate and volume, able to articulate logical  thoughts, able   to abstract reason, no tangential thoughts, no hallucinations   or delusions  Her affect is normal  RESP: No cough, no audible wheezing, able to talk in full sentences  Remainder of exam unable to be completed due to telephone visits                Phone call duration: 3 minutes

## 2021-05-06 DIAGNOSIS — G89.4 CHRONIC PAIN SYNDROME: ICD-10-CM

## 2021-05-06 DIAGNOSIS — M17.0 OSTEOARTHRITIS OF BOTH KNEES, UNSPECIFIED OSTEOARTHRITIS TYPE: ICD-10-CM

## 2021-05-06 NOTE — TELEPHONE ENCOUNTER
Routing refill request to provider for review/approval because:  Drug not on the [facility] refill protocol     Elenita Jones RN    Requested Prescriptions   Pending Prescriptions Disp Refills     traMADol (ULTRAM) 50 MG tablet [Pharmacy Med Name: TRAMADOL 50MG TABLETS] 120 tablet      Sig: TAKE 1 TABLET(50 MG) BY MOUTH EVERY 6 HOURS AS NEEDED FOR SEVERE PAIN       There is no refill protocol information for this order

## 2021-05-06 NOTE — LETTER
May 17, 2021      Yamel Lindsay  [address] [id number]        Dear Yamel,   We received a refill request for your Tramadol medication.  This medication has been refilled and you are due for an office visit for further refills.  Please call [phone] to schedule an appointment.            Sincerely,        Catie Fraser MD

## 2021-05-17 RX ORDER — TRAMADOL HYDROCHLORIDE 50 MG/1
TABLET ORAL
Qty: 120 TABLET | Refills: 0 | Status: SHIPPED | OUTPATIENT
Start: 2021-05-17 | End: 2021-09-20

## 2021-06-25 ENCOUNTER — OFFICE VISIT (OUTPATIENT)
Dept: FAMILY MEDICINE | Facility: CLINIC | Age: 76
End: 2021-06-25
Payer: COMMERCIAL

## 2021-06-25 VITALS
WEIGHT: 147.4 LBS | OXYGEN SATURATION: 95 % | BODY MASS INDEX: 26.12 KG/M2 | SYSTOLIC BLOOD PRESSURE: 136 MMHG | TEMPERATURE: 97.7 F | DIASTOLIC BLOOD PRESSURE: 62 MMHG | HEIGHT: 63 IN | HEART RATE: 74 BPM | RESPIRATION RATE: 14 BRPM

## 2021-06-25 DIAGNOSIS — K21.9 GASTROESOPHAGEAL REFLUX DISEASE WITHOUT ESOPHAGITIS: ICD-10-CM

## 2021-06-25 DIAGNOSIS — F41.9 ANXIETY: ICD-10-CM

## 2021-06-25 DIAGNOSIS — G89.4 CHRONIC PAIN SYNDROME: ICD-10-CM

## 2021-06-25 DIAGNOSIS — M17.0 OSTEOARTHRITIS OF BOTH KNEES, UNSPECIFIED OSTEOARTHRITIS TYPE: ICD-10-CM

## 2021-06-25 PROCEDURE — 80307 DRUG TEST PRSMV CHEM ANLYZR: CPT | Performed by: FAMILY MEDICINE

## 2021-06-25 PROCEDURE — 99214 OFFICE O/P EST MOD 30 MIN: CPT | Performed by: FAMILY MEDICINE

## 2021-06-25 RX ORDER — CLONAZEPAM 0.5 MG/1
0.5 TABLET ORAL 2 TIMES DAILY PRN
Qty: 60 TABLET | Refills: 2 | Status: SHIPPED | OUTPATIENT
Start: 2021-06-25 | End: 2021-09-20

## 2021-06-25 RX ORDER — TRAMADOL HYDROCHLORIDE 50 MG/1
TABLET ORAL
Qty: 120 TABLET | Refills: 0 | Status: CANCELLED | OUTPATIENT
Start: 2021-06-25

## 2021-06-25 RX ORDER — TRAMADOL HYDROCHLORIDE 50 MG/1
50 TABLET ORAL 3 TIMES DAILY
Qty: 90 TABLET | Refills: 3 | Status: SHIPPED | OUTPATIENT
Start: 2021-06-25 | End: 2021-07-25

## 2021-06-25 ASSESSMENT — ANXIETY QUESTIONNAIRES
1. FEELING NERVOUS, ANXIOUS, OR ON EDGE: SEVERAL DAYS
3. WORRYING TOO MUCH ABOUT DIFFERENT THINGS: SEVERAL DAYS
IF YOU CHECKED OFF ANY PROBLEMS ON THIS QUESTIONNAIRE, HOW DIFFICULT HAVE THESE PROBLEMS MADE IT FOR YOU TO DO YOUR WORK, TAKE CARE OF THINGS AT HOME, OR GET ALONG WITH OTHER PEOPLE: SOMEWHAT DIFFICULT
GAD7 TOTAL SCORE: 7
5. BEING SO RESTLESS THAT IT IS HARD TO SIT STILL: SEVERAL DAYS
7. FEELING AFRAID AS IF SOMETHING AWFUL MIGHT HAPPEN: SEVERAL DAYS
6. BECOMING EASILY ANNOYED OR IRRITABLE: SEVERAL DAYS
2. NOT BEING ABLE TO STOP OR CONTROL WORRYING: SEVERAL DAYS

## 2021-06-25 ASSESSMENT — MIFFLIN-ST. JEOR: SCORE: 1133.85

## 2021-06-25 ASSESSMENT — PATIENT HEALTH QUESTIONNAIRE - PHQ9
SUM OF ALL RESPONSES TO PHQ QUESTIONS 1-9: 3
5. POOR APPETITE OR OVEREATING: SEVERAL DAYS

## 2021-06-25 ASSESSMENT — PAIN SCALES - GENERAL: PAINLEVEL: SEVERE PAIN (6)

## 2021-06-25 NOTE — PROGRESS NOTES
Assessment & Plan     Gastroesophageal reflux disease without esophagitis  Medication faxed.   - omeprazole (PRILOSEC) 20 MG DR capsule; Take 1 capsule (20 mg) by mouth daily 30-60 minutes before a meal.    Anxiety  - clonazePAM (KLONOPIN) 0.5 MG tablet; Take 1 tablet (0.5 mg) by mouth 2 times daily as needed for anxiety    Osteoarthritis of both knees, unspecified osteoarthritis type  Pain is controlled on medication    Chronic pain syndrome   - [id number] - Urine Drug Confirmation Panel (Comprehensive)  - traMADol (ULTRAM) 50 MG tablet; Take 1 tablet (50 mg) by mouth 3 times daily    [id number]}     FUTURE APPOINTMENTS:       - Follow-up visit in 3 months .    Return in about 3 months (around 9/25/2021).    Catie Fraser MD  [facility] is a 76 year [facility] who presents for the following health issues  accompanied by her :    HIMA Montesinos is a 76 yr old female who comes in for medication refills. She is on tramadol for chronic pain and has been on it for many years. She takes Tramadol. We have been discussing weaning her off. We talked about this again and she is open to cutting down to three a day.    Chronic Pain Follow-Up    Where in your body do you have pain? Knees, neck, back (lower)  How has your pain affected your ability to work? 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 Activity or exercise, Cold, Relaxation techniques / Biofeedback, Rest and Stretching  How well are you sleeping? Good  How has your mood been since your last visit? About the same  Have you had a significant life event? No  Other aggravating factors: prolonged sitting and walking too far  Taking medication as directed? Yes    PHQ-9 SCORE 12/4/2020 2/5/2021 6/25/2021   PHQ-9 Total Score - - -   PHQ-9 Total Score 4 4 3     PAOLA-7 SCORE 12/4/2020  "2/5/2021 6/25/2021   Total Score - - -   Total Score 2 3 7     No flowsheet data found.  Encounter-Level CSA - 02/20/2015:    Controlled Substance Agreement - Scan on 2/26/2015  9:06 AM: Controlled Medication Agreement-  2/20/15     Patient-Level CSA:    There are no patient-level csa.         How many servings of fruits and vegetables do you eat daily?  2-3    On average, how many sweetened beverages do you drink each day (Examples: soda, juice, sweet tea, etc.  Do NOT count diet or artificially sweetened beverages)?   0    How many days per week do you exercise enough to make your heart beat faster? 6    How many minutes a day do you exercise enough to make your heart beat faster? 30 - 60    How many days per week do you miss taking your medication? 0    Medication Followup of traMADol (ULTRAM) 50 MG tablet    Taking Medication as prescribed: yes    Side Effects:  None    Medication Helping Symptoms:  yes         Review of Systems   Constitutional, HEENT, cardiovascular, pulmonary, gi and gu systems are negative, except as otherwise noted.      Objective    /62 (BP Location: Right arm, Patient Position: Sitting, Cuff Size: Adult Regular)   Pulse 74   Temp 97.7  F (36.5  C) (Tympanic)   Resp 14   Ht 1.602 m (5' 3.07\")   Wt 66.9 kg (147 lb 6.4 oz)   SpO2 95%   BMI 26.05 kg/m    Body mass index is 26.05 kg/m .  Physical Exam   GENERAL: healthy, alert and no distress  EYES: Eyes grossly normal to inspection, PERRL and conjunctivae and sclerae normal  HENT: ear canals and TM's normal, nose and mouth without ulcers or lesions  NECK: no adenopathy, no asymmetry, masses, or scars and thyroid normal to palpation  RESP: lungs clear to auscultation - no rales, rhonchi or wheezes  CV: regular rate and rhythm, normal S1 S2, no S3 or S4, no murmur, click or rub, no peripheral edema and peripheral pulses strong  ABDOMEN: soft, nontender, no hepatosplenomegaly, no masses and bowel sounds normal  MS: no gross " musculoskeletal defects noted, no edema        Labs pending.

## 2021-06-25 NOTE — LETTER
Opioid / Opioid Plus Controlled Substance Agreement    This is an agreement between you and your provider about the safe and appropriate use of controlled substance/opioids prescribed by your care team. Controlled substances are medicines that can cause physical and mental dependence (abuse).    There are strict laws about having and using these medicines. We here at [facility] are committing to working with you in your efforts to get better. To support you in this work, we ll help you schedule regular office appointments for medicine refills. If we must cancel or change your appointment for any reason, we ll make sure you have enough medicine to last until your next appointment.     As a Provider, I will:    Listen carefully to your concerns and treat you with respect.     Recommend a treatment plan that I believe is in your best interest. This plan may involve therapies other than opioid pain medication.     Talk with you often about the possible benefits, and the risk of harm of any medicine that we prescribe for you.     Provide a plan on how to taper (discontinue or go off) using this medicine if the decision is made to stop its use.    As a Patient, I understand that opioid(s):     Are a controlled substance prescribed by my care team to help me function or work and manage my condition(s).     Are strong medicines and can cause serious side effects such as:    Drowsiness, which can seriously affect my driving ability    A lower breathing rate, enough to cause death    Harm to my thinking ability     Depression     Abuse of and addiction to this medicine    Need to be taken exactly as prescribed. Combining opioids with certain medicines or chemicals (such as illegal drugs, sedatives, sleeping pills, and benzodiazepines) can be dangerous or even fatal. If I stop opioids suddenly, I may have severe withdrawal symptoms.    Do not work for all types of pain nor for all patients. If they re not helpful, I may  be asked to stop them.        The risks, benefits and side effects of these medicine(s) were explained to me. I agree that:  1. I will take part in other treatments as advised by my care team. This may be psychiatry or counseling, physical therapy, behavioral therapy, group treatment or a referral to a specialist.     2. I will keep all my appointments. I understand that this is part of the monitoring of opioids. My care team may require an office visit for EVERY opioid/controlled substance refill. If I miss appointments or don t follow instructions, my care team may stop my medicine.    3. I will take my medicines as prescribed. I will not change the dose or schedule unless my care team tells me to. There will be no refills if I run out early.     4. I may be asked to come to the clinic and complete a urine drug test or complete a pill count at any time. If I don t give a urine sample or participate in a pill count, the care team may stop my medicine.    5. I will only receive prescriptions from this clinic for chronic pain. If I am treated by another provider for acute pain issues, I will tell them that I am taking opioid pain medication for chronic pain and that I have a treatment agreement with this provider. I will inform my [facility] care team within one business day if I am given a prescription for any pain medication by another healthcare provider. My [facility] care team can contact other providers and pharmacists about my use of any medicines.    6. It is up to me to make sure that I don t run out of my medicines on weekends or holidays. If my care team is willing to refill my opioid prescription without a visit, I must request refills only during office hours. Refills may take up to 3 business days to process. I will use one pharmacy to fill all my opioid and other controlled substance prescriptions. I will notify the clinic about any changes to my insurance or medication  availability.    7. I am responsible for my prescriptions. If the medicine/prescription is lost, stolen or destroyed, it will not be replaced. I also agree not to share controlled substance medicines with anyone.    8. I am aware I should not use any illegal or recreational drugs. I agree not to drink alcohol unless my care team says I can.       9. If I enroll in the [place] Medical Cannabis program, I will tell my care team prior to my next refill.     10. I will tell my care team right away if I become pregnant, have a new medical problem treated outside of my regular clinic, or have a change in my medications.    11. I understand that this medicine can affect my thinking, judgment and reaction time. Alcohol and drugs affect the brain and body, which can affect the safety of my driving. Being under the influence of alcohol or drugs can affect my decision-making, behaviors, personal safety, and the safety of others. Driving while impaired (DWI) can occur if a person is driving, operating, or in physical control of a car, motorcycle, boat, snowmobile, ATV, motorbike, off-road vehicle, or any other motor vehicle ([place] Statute 169A.20). I understand the risk if I choose to drive or operate any vehicle or machinery.    I understand that if I do not follow any of the conditions above, my prescriptions or treatment may be stopped or changed.          Opioids  What You Need to Know    What are opioids?   Opioids are pain medicines that must be prescribed by a doctor. They are also known as narcotics.     Examples are:   1. morphine (MS Contin, Alexandrea)  2. oxycodone (Oxycontin)  3. oxycodone and acetaminophen (Percocet)  4. hydrocodone and acetaminophen (Vicodin, Norco)   5. fentanyl patch (Duragesic)   6. hydromorphone (Dilaudid)   7. methadone  8. codeine (Tylenol #3)     What do opioids do well?   Opioids are best for severe short-term pain such as after a surgery or injury. They may work well for cancer pain. They may  help some people with long-lasting (chronic) pain.     What do opioids NOT do well?   Opioids never get rid of pain entirely, and they don t work well for most patients with chronic pain. Opioids don t reduce swelling, one of the causes of pain.                                    Other ways to manage chronic pain and improve function include:       Treat the health problem that may be causing pain    Anti-inflammation medicines, which reduce swelling and tenderness, such as ibuprofen (Advil, Motrin) or naproxen (Aleve)    Acetaminophen (Tylenol)    Antidepressants and anti-seizure medicines, especially for nerve pain    Topical treatments such as patches or creams    Injections or nerve blocks    Chiropractic or osteopathic treatment    Acupuncture, massage, deep breathing, meditation, visual imagery, aromatherapy    Use heat or ice at the pain site    Physical therapy     Exercise    Stop smoking    Take part in therapy       Risks and side effects     Talk to your doctor before you start or decide to keep taking opioids. Possible side effects include:      Lowering your breathing rate enough to cause death    Overdose, including death, especially if taking higher than prescribed doses    Worse depression symptoms; less pleasure in things you usually enjoy    Feeling tired or sluggish    Slower thoughts or cloudy thinking    Being more sensitive to pain over time; pain is harder to control    Trouble sleeping or restless sleep    Changes in hormone levels (for example, less testosterone)    Changes in sex drive or ability to have sex    Constipation    Unsafe driving    Itching and sweating    Dizziness    Nausea, throwing up and dry mouth    What else should I know about opioids?    Opioids may lead to dependence, tolerance, or addiction.      Dependence means that if you stop or reduce the medicine too quickly, you will have withdrawal symptoms. These include loose poop (diarrhea), jitters, flu-like symptoms,  nervousness and tremors. Dependence is not the same as addiction.                       Tolerance means needing higher doses over time to get the same effect. This may increase the chance of serious side effects.      Addiction is when people improperly use a substance that harms their body, their mind or their relations with others. Use of opiates can cause a relapse of addiction if you have a history of drug or alcohol abuse.      People who have used opioids for a long time may have a lower quality of life, worse depression, higher levels of pain and more visits to doctors.    You can overdose on opioids. Take these steps to lower your risk of overdose:    1. Recognize the signs:  Signs of overdose include decrease or loss of consciousness (blackout), slowed breathing, trouble waking up and blue lips. If someone is worried about overdose, they should call 911.    2. Talk to your doctor about Narcan (naloxone).   If you are at risk for overdose, you may be given a prescription for Narcan. This medicine very quickly reverses the effects of opioids.   If you overdose, a friend or family member can give you Narcan while waiting for the ambulance. They need to know the signs of overdose and how to give Narcan.     3. Don't use alcohol or street drugs.   Taking them with opioids can cause death.    4. Do not take any of these medicines unless your doctor says it s OK. Taking these with opioids can cause death:    Benzodiazepines, such as lorazepam (Ativan), alprazolam (Xanax) or diazepam (Valium)    Muscle relaxers, such as cyclobenzaprine (Flexeril)    Sleeping pills like zolpidem (Ambien)     Other opioids      How to keep you and other people safe while taking opioids:    1. Never share your opioids with others.  Opioid medicines are regulated by the Drug Enforcement Agency ([employer]). Selling or sharing medications is a criminal act.    2. Be sure to store opioids in a secure place, locked up if possible. Young children  can easily swallow them and overdose.    3. When you are traveling with your medicines, keep them in the original bottles. If you use a pill box, be sure you also carry a copy of your medicine list from your clinic or pharmacy.    4. Safe disposal of opioids    Most pharmacies have places to get rid of medicine, called disposal kiosks. Medicine disposal options are also available in every [facility]. Search your county and  medication disposal  to find more options. You can find more details at:  [email or web address]     I agree that my provider, clinic care team, and pharmacy may work with any city, state or federal law enforcement agency that investigates the misuse, sale, or other diversion of my controlled medicine. I will allow my provider to discuss my care with, or share a copy of, this agreement with any other treating provider, pharmacy or emergency room where I receive care.    I have read this agreement and have asked questions about anything I did not understand.    _______________________________________________________  Patient Signature - Yamel Lindsay _____________________                   Date     _______________________________________________________  Provider Signature - Catie Fraser MD   _____________________                   Date     _______________________________________________________  Witness Signature (required if provider not present while patient signing)   _____________________                   Date

## 2021-06-26 ASSESSMENT — ANXIETY QUESTIONNAIRES: GAD7 TOTAL SCORE: 7

## 2021-06-29 LAB
7AMINOCLONAZEPAM UR QL CFM: PRESENT
CREAT UR-MCNC: 140 MG/DL
N-NORTRAMADOL/CREAT UR CFM: ABNORMAL NG/MG{CREAT}
O-NORTRAMADOL UR CFM-MCNC: ABNORMAL NG/ML
RPT COMMENT: ABNORMAL
TRAMADOL CTO UR CFM-MCNC: 7480 NG/ML
TRAMADOL/CREAT UR: 5343 NG/MG{CREAT}

## 2021-08-05 DIAGNOSIS — K21.9 GASTROESOPHAGEAL REFLUX DISEASE WITHOUT ESOPHAGITIS: ICD-10-CM

## 2021-09-02 DIAGNOSIS — J45.20 MILD INTERMITTENT ASTHMA WITHOUT COMPLICATION: ICD-10-CM

## 2021-09-02 RX ORDER — ALBUTEROL SULFATE 90 UG/1
AEROSOL, METERED RESPIRATORY (INHALATION)
Qty: 8.5 G | Refills: 11 | Status: SHIPPED | OUTPATIENT
Start: 2021-09-02 | End: 2022-06-12

## 2021-09-02 NOTE — TELEPHONE ENCOUNTER
Patient need refill by Tuesday, leaving out of town.     Patient was scheduled for OV but MD called in.    Last refill 09/01/2020--1 with 11 refills        Cristel GAMBINO  Station

## 2021-09-02 NOTE — TELEPHONE ENCOUNTER
LM to call clinic/RN to clarify medication need and do the  ACT.  This refill request was also routed to the provider for review.    KPavelRN

## 2021-09-02 NOTE — TELEPHONE ENCOUNTER
Pending Prescriptions:                       Disp   Refills    albuterol (PROAIR HFA/PROVENTIL HFA/VENTOL*8.5 g  11       Sig: INHALE 2 PUFFS INTO THE LUNGS EVERY 6 HOURS AS NEEDED           FOR SHORTNESS OF BREATH, DIFFICULTY BREATHING OR           WHEEZING        Routing refill request to provider for review/approval because:  Failed protocol-last ACT 3/3/21 with score of 12.  Please see other notes below.       Last Written Prescription Date:  9/1/20  Last Fill Quantity: 8.5 g,  # refills: 11   Last office visit: 6/25/2021 with prescribing provider.  Future Office Visit:   Next 5 appointments (look out 90 days)      Sep 14, 2021 11:20 AM  SHORT with Catie Fraser MD  [facility] ([facility] )  Arrive at: Clinic [address] [facility] [id number]  [phone]               Dafne Finch RN

## 2021-09-03 ENCOUNTER — TELEPHONE (OUTPATIENT)
Dept: FAMILY MEDICINE | Facility: CLINIC | Age: 76
End: 2021-09-03

## 2021-09-03 NOTE — TELEPHONE ENCOUNTER
Pt called back.   Needs ACT update.  Gemma Mendieta RN    ACT Total Scores 5/7/2020 8/27/2020 3/3/2021   ACT TOTAL SCORE (Goal Greater than or Equal to 20) 20 11 12   In the past 12 months, how many times did you visit the emergency room for your asthma without being admitted to the hospital? 0 0 0   In the past 12 months, how many times were you hospitalized overnight because of your asthma? 0 0 0

## 2021-09-03 NOTE — TELEPHONE ENCOUNTER
Pt called in asking to complete her ACT?  She had albuterol refill pending and was told she is due for update.    Completed.    Refill was done by provider.    Pt jose pending provider appt 9/14/21.    Gemma Mendieta RN

## 2021-09-04 ASSESSMENT — ASTHMA QUESTIONNAIRES: ACT_TOTALSCORE: 20

## 2021-09-18 ENCOUNTER — HEALTH MAINTENANCE LETTER (OUTPATIENT)
Age: 76
End: 2021-09-18

## 2021-09-20 ENCOUNTER — OFFICE VISIT (OUTPATIENT)
Dept: FAMILY MEDICINE | Facility: CLINIC | Age: 76
End: 2021-09-20
Payer: COMMERCIAL

## 2021-09-20 VITALS
RESPIRATION RATE: 16 BRPM | SYSTOLIC BLOOD PRESSURE: 118 MMHG | DIASTOLIC BLOOD PRESSURE: 62 MMHG | WEIGHT: 152 LBS | HEART RATE: 72 BPM | OXYGEN SATURATION: 96 % | BODY MASS INDEX: 26.87 KG/M2 | TEMPERATURE: 98.3 F

## 2021-09-20 DIAGNOSIS — I10 BENIGN ESSENTIAL HYPERTENSION: ICD-10-CM

## 2021-09-20 DIAGNOSIS — J45.20 MILD INTERMITTENT ASTHMA WITHOUT COMPLICATION: ICD-10-CM

## 2021-09-20 DIAGNOSIS — F41.9 ANXIETY: ICD-10-CM

## 2021-09-20 DIAGNOSIS — G89.4 CHRONIC PAIN SYNDROME: ICD-10-CM

## 2021-09-20 DIAGNOSIS — M17.0 OSTEOARTHRITIS OF BOTH KNEES, UNSPECIFIED OSTEOARTHRITIS TYPE: ICD-10-CM

## 2021-09-20 DIAGNOSIS — Z23 NEED FOR PROPHYLACTIC VACCINATION AND INOCULATION AGAINST INFLUENZA: ICD-10-CM

## 2021-09-20 DIAGNOSIS — F32.5 MAJOR DEPRESSION IN COMPLETE REMISSION (H): Primary | ICD-10-CM

## 2021-09-20 PROCEDURE — G0008 ADMIN INFLUENZA VIRUS VAC: HCPCS | Performed by: FAMILY MEDICINE

## 2021-09-20 PROCEDURE — 96127 BRIEF EMOTIONAL/BEHAV ASSMT: CPT | Mod: 59 | Performed by: FAMILY MEDICINE

## 2021-09-20 PROCEDURE — 90662 IIV NO PRSV INCREASED AG IM: CPT | Performed by: FAMILY MEDICINE

## 2021-09-20 PROCEDURE — 99214 OFFICE O/P EST MOD 30 MIN: CPT | Mod: 25 | Performed by: FAMILY MEDICINE

## 2021-09-20 RX ORDER — HYDROCHLOROTHIAZIDE 25 MG/1
25 TABLET ORAL DAILY
Qty: 90 TABLET | Refills: 3 | Status: SHIPPED | OUTPATIENT
Start: 2021-09-20 | End: 2022-09-27

## 2021-09-20 RX ORDER — BUPROPION HYDROCHLORIDE 150 MG/1
300 TABLET ORAL EVERY MORNING
Qty: 180 TABLET | Refills: 3 | Status: SHIPPED | OUTPATIENT
Start: 2021-09-20 | End: 2022-05-04 | Stop reason: ALTCHOICE

## 2021-09-20 RX ORDER — MULTIPLE VITAMINS W/ MINERALS TAB 9MG-400MCG
1 TAB ORAL DAILY
Status: ON HOLD | COMMUNITY
End: 2022-12-28

## 2021-09-20 RX ORDER — TRAMADOL HYDROCHLORIDE 50 MG/1
50 TABLET ORAL EVERY 6 HOURS PRN
Qty: 120 TABLET | Refills: 3 | Status: SHIPPED | OUTPATIENT
Start: 2021-09-20 | End: 2022-02-02

## 2021-09-20 RX ORDER — CLONAZEPAM 0.5 MG/1
0.5 TABLET ORAL 2 TIMES DAILY PRN
Qty: 60 TABLET | Refills: 2 | Status: SHIPPED | OUTPATIENT
Start: 2021-09-20 | End: 2021-11-03

## 2021-09-20 RX ORDER — FLUTICASONE PROPIONATE AND SALMETEROL XINAFOATE 115; 21 UG/1; UG/1
2 AEROSOL, METERED RESPIRATORY (INHALATION) 2 TIMES DAILY
Qty: 1 G | Refills: 3 | Status: SHIPPED | OUTPATIENT
Start: 2021-09-20 | End: 2022-09-27

## 2021-09-20 ASSESSMENT — ANXIETY QUESTIONNAIRES
GAD7 TOTAL SCORE: 15
5. BEING SO RESTLESS THAT IT IS HARD TO SIT STILL: SEVERAL DAYS
3. WORRYING TOO MUCH ABOUT DIFFERENT THINGS: NEARLY EVERY DAY
IF YOU CHECKED OFF ANY PROBLEMS ON THIS QUESTIONNAIRE, HOW DIFFICULT HAVE THESE PROBLEMS MADE IT FOR YOU TO DO YOUR WORK, TAKE CARE OF THINGS AT HOME, OR GET ALONG WITH OTHER PEOPLE: VERY DIFFICULT
2. NOT BEING ABLE TO STOP OR CONTROL WORRYING: NEARLY EVERY DAY
7. FEELING AFRAID AS IF SOMETHING AWFUL MIGHT HAPPEN: NOT AT ALL
6. BECOMING EASILY ANNOYED OR IRRITABLE: NEARLY EVERY DAY
1. FEELING NERVOUS, ANXIOUS, OR ON EDGE: NEARLY EVERY DAY

## 2021-09-20 ASSESSMENT — PAIN SCALES - GENERAL: PAINLEVEL: NO PAIN (0)

## 2021-09-20 ASSESSMENT — PATIENT HEALTH QUESTIONNAIRE - PHQ9
SUM OF ALL RESPONSES TO PHQ QUESTIONS 1-9: 12
5. POOR APPETITE OR OVEREATING: MORE THAN HALF THE DAYS

## 2021-09-20 NOTE — PROGRESS NOTES
Assessment & Plan     Major depression in complete remission (H)  Bupropion increased to 300 mg daily.  - buPROPion (WELLBUTRIN XL) 150 MG 24 hr tablet; Take 2 tablets (300 mg) by mouth every morning  - MENTAL HEALTH REFERRAL  - Adult; Outpatient Treatment; Individual/Couples/Family/Group Therapy/Health Psychology; [facility] [phone]; We will contact you to schedule the appointment or please call with any questions; Future    Anxiety  Medication faxed, mental health referral placed.   - clonazePAM (KLONOPIN) 0.5 MG tablet; Take 1 tablet (0.5 mg) by mouth 2 times daily as needed for anxiety  - MENTAL HEALTH REFERRAL  - Adult; Outpatient Treatment; Individual/Couples/Family/Group Therapy/Health Psychology; [facility] [phone]; We will contact you to schedule the appointment or please call with any questions; Future    Mild intermittent asthma without complication  Medication faxed.   - fluticasone-salmeterol (ADVAIR-HFA) 115-21 MCG/ACT inhaler; Inhale 2 puffs into the lungs 2 times daily    Osteoarthritis of both knees, unspecified osteoarthritis type  medication faxed.  - traMADol (ULTRAM) 50 MG tablet; Take 1 tablet (50 mg) by mouth every 6 hours as needed for severe pain    Chronic pain syndrome   - traMADol (ULTRAM) 50 MG tablet; Take 1 tablet (50 mg) by mouth every 6 hours as needed for severe pain    Benign essential hypertension  - hydrochlorothiazide (HYDRODIURIL) 25 MG tablet; Take 1 tablet (25 mg) by mouth daily    Need for prophylactic vaccination and inoculation against influenza  Flu shot given.  - INFLUENZA, QUAD, HIGH DOSE, PF, 65YR + (FLUZONE HD)        Return in about 3 months (around 12/20/2021) for Follow up.    Catie Fraser MD  [facility] BAY Montesinos is a 76 year old who presents for the following health issues     HPI     76 yr old female here for medication refills. She has asthma, chronic pain anxiety and  depression. She denies any side effects to her medications.she reports that her depression has worsened over the last few months as she has two grand kids who live with her and they have been struggling with depression .   This has affected her mood and she is struggling with her depression.     We discussed increasing her medication dose and also to refer to therapy which she is opened to.     Hypertension Follow-up      Do you check your blood pressure regularly outside of the clinic? No     Are you following a low salt diet? Yes    Are your blood pressures ever more than 140 on the top number (systolic) OR more   than 90 on the bottom number (diastolic), for example 140/90? Not applicable    Depression and Anxiety Follow-Up    How are you doing with your depression since your last visit? Worsened - would like to discuss an increase in medication    How are you doing with your anxiety since your last visit?  Worsened - would like to discuss an increase in medication    Are you having other symptoms that might be associated with depression or anxiety? Yes, lack of motivation    Have you had a significant life event? No     Do you have any concerns with your use of alcohol or other drugs? No    Social History     Tobacco Use     Smoking status: Former Smoker     Packs/day: 0.50     Types: Cigarettes     Quit date: 6/16/2011     Years since quitting: 10.2     Smokeless tobacco: Never Used   Vaping Use     Vaping Use: Never used   Substance Use Topics     Alcohol use: No     Drug use: No     PHQ 2/5/2021 6/25/2021 9/20/2021   PHQ-9 Total Score 4 3 12   Q9: Thoughts of better off dead/self-harm past 2 weeks Not at all Not at all Not at all     PAOLA-7 SCORE 2/5/2021 6/25/2021 9/20/2021   Total Score - - -   Total Score 3 7 15     Last PHQ-9 9/20/2021   1.  Little interest or pleasure in doing things 2   2.  Feeling down, depressed, or hopeless 2   3.  Trouble falling or staying asleep, or sleeping too much 0   4.   Feeling tired or having little energy 3   5.  Poor appetite or overeating 0   6.  Feeling bad about yourself 2   7.  Trouble concentrating 3   8.  Moving slowly or restless 0   Q9: Thoughts of better off dead/self-harm past 2 weeks 0   PHQ-9 Total Score 12   Difficulty at work, home, or with people Very difficult     PAOLA-7  9/20/2021   1. Feeling nervous, anxious, or on edge 3   2. Not being able to stop or control worrying 3   3. Worrying too much about different things 3   4. Trouble relaxing 2   5. Being so restless that it is hard to sit still 1   6. Becoming easily annoyed or irritable 3   7. Feeling afraid, as if something awful might happen 0   PAOLA-7 Total Score 15   If you checked any problems, how difficult have they made it for you to do your work, take care of things at home, or get along with other people? Very difficult       Suicide Assessment Five-step Evaluation and Treatment (SAFE-T)    Asthma Follow-Up    Was ACT completed today?    Yes    ACT Total Scores 9/20/2021   ACT TOTAL SCORE (Goal Greater than or Equal to 20) 16   In the past 12 months, how many times did you visit the emergency room for your asthma without being admitted to the hospital? 0   In the past 12 months, how many times were you hospitalized overnight because of your asthma? 0         How many days per week do you miss taking your asthma controller medication?  0    Please describe any recent triggers for your asthma: None    Have you had any Emergency Room Visits, Urgent Care Visits, or Hospital Admissions since your last office visit?  No      How many servings of fruits and vegetables do you eat daily?  4 or more    On average, how many sweetened beverages do you drink each day (Examples: soda, juice, sweet tea, etc.  Do NOT count diet or artificially sweetened beverages)?   0    How many days per week do you exercise enough to make your heart beat faster? 3 or less    How many minutes a day do you exercise enough to make your  heart beat faster? 30 - 60    How many days per week do you miss taking your medication? 0        Review of Systems   Constitutional, HEENT, cardiovascular, pulmonary, gi and gu systems are negative, except as otherwise noted.      Objective    /62 (BP Location: Right arm, Patient Position: Sitting, Cuff Size: Adult Regular)   Pulse 72   Temp 98.3  F (36.8  C)   Resp 16   Wt 68.9 kg (152 lb)   SpO2 96%   Breastfeeding No   BMI 26.87 kg/m    Body mass index is 26.87 kg/m .  Physical Exam   GENERAL: healthy, alert and no distress  EYES: Eyes grossly normal to inspection, PERRL and conjunctivae and sclerae normal  HENT: ear canals and TM's normal, nose and mouth without ulcers or lesions  NECK: no adenopathy, no asymmetry, masses, or scars and thyroid normal to palpation  RESP: lungs clear to auscultation - no rales, rhonchi or wheezes  CV: regular rate and rhythm, normal S1 S2, no S3 or S4, no murmur, click or rub, no peripheral edema and peripheral pulses strong  ABDOMEN: soft, nontender, no hepatosplenomegaly, no masses and bowel sounds normal  MS: no gross musculoskeletal defects noted, no edema

## 2021-09-21 ASSESSMENT — ANXIETY QUESTIONNAIRES: GAD7 TOTAL SCORE: 15

## 2021-09-21 ASSESSMENT — ASTHMA QUESTIONNAIRES: ACT_TOTALSCORE: 16

## 2021-10-19 PROBLEM — F32.9 MAJOR DEPRESSION: Status: ACTIVE | Noted: 2017-03-02

## 2021-10-22 ENCOUNTER — TELEPHONE (OUTPATIENT)
Dept: FAMILY MEDICINE | Facility: CLINIC | Age: 76
End: 2021-10-22

## 2021-10-22 DIAGNOSIS — G47.09 OTHER INSOMNIA: ICD-10-CM

## 2021-10-26 RX ORDER — TRAZODONE HYDROCHLORIDE 50 MG/1
100 TABLET, FILM COATED ORAL AT BEDTIME
Qty: 60 TABLET | Refills: 11 | Status: SHIPPED | OUTPATIENT
Start: 2021-10-26 | End: 2022-09-27

## 2021-10-26 NOTE — TELEPHONE ENCOUNTER
"Routing refill request to provider for review/approval because:  Drug interaction warning    Requested Prescriptions   Pending Prescriptions Disp Refills     traZODone (DESYREL) 50 MG tablet [Pharmacy Med Name: TRAZODONE 50MG TABLETS] 180 tablet 1     Sig: TAKE 2 TABLETS(100 MG) BY MOUTH AT BEDTIME       Serotonin Modulators Passed - 10/22/2021 11:51 AM        Passed - Recent (12 mo) or future (30 days) visit within the authorizing provider's specialty     Patient has had an office visit with the authorizing provider or a provider within the authorizing providers department within the previous 12 mos or has a future within next 30 days. See \"Patient Info\" tab in inbasket, or \"Choose Columns\" in Meds & Orders section of the refill encounter.              Passed - Medication is active on med list        Passed - Patient is age 18 or older        Passed - No active pregnancy on record        Passed - No positive pregnancy test in past 12 months                   "

## 2021-10-26 NOTE — TELEPHONE ENCOUNTER
Patient is calling to follow up medication refill. Patient is out of meds today.    Lucretia Salazar on 10/26/2021 at 4:14 PM

## 2021-11-01 DIAGNOSIS — F41.9 ANXIETY: ICD-10-CM

## 2021-11-03 RX ORDER — CLONAZEPAM 0.5 MG/1
0.5 TABLET ORAL DAILY PRN
Qty: 30 TABLET | Refills: 2 | Status: SHIPPED | OUTPATIENT
Start: 2021-11-03 | End: 2022-02-02

## 2021-11-03 NOTE — TELEPHONE ENCOUNTER
Please inform the patient that I am going to start weaning her off this medication as it is no longer safe for her to use it daily on a chronic basis because of her age. I have reduced to one a day and gradually we will have to stop it altogether.

## 2021-11-13 ENCOUNTER — HEALTH MAINTENANCE LETTER (OUTPATIENT)
Age: 76
End: 2021-11-13

## 2021-11-23 DIAGNOSIS — K21.9 GASTROESOPHAGEAL REFLUX DISEASE WITHOUT ESOPHAGITIS: ICD-10-CM

## 2021-11-23 NOTE — TELEPHONE ENCOUNTER
"Pending Prescriptions:                       Disp   Refills    omeprazole (PRILOSEC) 20 MG DR capsule    90 cap*3            Sig: Take 1 capsule (20 mg) by mouth daily 30-60           minutes before a meal.    Requested Prescriptions   Pending Prescriptions Disp Refills     omeprazole (PRILOSEC) 20 MG DR capsule 90 capsule 3     Sig: Take 1 capsule (20 mg) by mouth daily 30-60 minutes before a meal.       PPI Protocol Passed - 11/23/2021  4:55 PM        Passed - Not on Clopidogrel (unless Pantoprazole ordered)        Passed - No diagnosis of osteoporosis on record        Passed - Recent (12 mo) or future (30 days) visit within the authorizing provider's specialty     Patient has had an office visit with the authorizing provider or a provider within the authorizing providers department within the previous 12 mos or has a future within next 30 days. See \"Patient Info\" tab in inbasket, or \"Choose Columns\" in Meds & Orders section of the refill encounter.              Passed - Medication is active on med list        Passed - Patient is age 18 or older        Passed - No active pregnacy on record        Passed - No positive pregnancy test in past 12 months             "

## 2021-11-23 NOTE — TELEPHONE ENCOUNTER
Reason for Call:  Medication or medication refill:    Do you use a [facility]?  Name of the pharmacy and phone number for the current request:  [address]    Name of the medication requested: Omeprazole    Can we leave a detailed message on this number? YES    Phone number patient can be reached at: Home number on file [phone] (home)    Best Time: Any    Call taken on 11/23/2021 at 4:19 PM by Helen Patricia

## 2022-02-02 ENCOUNTER — TELEPHONE (OUTPATIENT)
Dept: FAMILY MEDICINE | Facility: CLINIC | Age: 77
End: 2022-02-02

## 2022-02-02 ENCOUNTER — OFFICE VISIT (OUTPATIENT)
Dept: FAMILY MEDICINE | Facility: CLINIC | Age: 77
End: 2022-02-02
Payer: COMMERCIAL

## 2022-02-02 VITALS
BODY MASS INDEX: 26.4 KG/M2 | HEART RATE: 72 BPM | RESPIRATION RATE: 14 BRPM | OXYGEN SATURATION: 96 % | TEMPERATURE: 98.5 F | HEIGHT: 63 IN | DIASTOLIC BLOOD PRESSURE: 70 MMHG | WEIGHT: 149 LBS | SYSTOLIC BLOOD PRESSURE: 130 MMHG

## 2022-02-02 DIAGNOSIS — F41.1 GAD (GENERALIZED ANXIETY DISORDER): Primary | ICD-10-CM

## 2022-02-02 DIAGNOSIS — F10.20 ALCOHOLISM (H): ICD-10-CM

## 2022-02-02 DIAGNOSIS — E78.5 HYPERLIPIDEMIA LDL GOAL <100: ICD-10-CM

## 2022-02-02 DIAGNOSIS — F41.9 ANXIETY: ICD-10-CM

## 2022-02-02 DIAGNOSIS — K21.9 GASTROESOPHAGEAL REFLUX DISEASE WITHOUT ESOPHAGITIS: ICD-10-CM

## 2022-02-02 DIAGNOSIS — G89.4 CHRONIC PAIN SYNDROME: ICD-10-CM

## 2022-02-02 DIAGNOSIS — F32.5 MAJOR DEPRESSION IN COMPLETE REMISSION (H): ICD-10-CM

## 2022-02-02 DIAGNOSIS — M62.838 MUSCLE SPASM: ICD-10-CM

## 2022-02-02 DIAGNOSIS — M17.0 OSTEOARTHRITIS OF BOTH KNEES, UNSPECIFIED OSTEOARTHRITIS TYPE: ICD-10-CM

## 2022-02-02 PROCEDURE — 99214 OFFICE O/P EST MOD 30 MIN: CPT | Performed by: FAMILY MEDICINE

## 2022-02-02 RX ORDER — CLONAZEPAM 0.5 MG/1
0.5 TABLET ORAL DAILY PRN
Qty: 30 TABLET | Refills: 3 | Status: SHIPPED | OUTPATIENT
Start: 2022-02-02 | End: 2022-02-02

## 2022-02-02 RX ORDER — ATORVASTATIN CALCIUM 20 MG/1
20 TABLET, FILM COATED ORAL DAILY
Qty: 90 TABLET | Refills: 3 | Status: SHIPPED | OUTPATIENT
Start: 2022-02-02 | End: 2023-03-06

## 2022-02-02 RX ORDER — TRAMADOL HYDROCHLORIDE 50 MG/1
50 TABLET ORAL EVERY 6 HOURS PRN
Qty: 120 TABLET | Refills: 3 | Status: SHIPPED | OUTPATIENT
Start: 2022-02-02 | End: 2022-05-04

## 2022-02-02 RX ORDER — TIZANIDINE 2 MG/1
2 TABLET ORAL 3 TIMES DAILY PRN
Qty: 90 TABLET | Refills: 3 | Status: SHIPPED | OUTPATIENT
Start: 2022-02-02 | End: 2022-09-28

## 2022-02-02 RX ORDER — CLONAZEPAM 0.5 MG/1
0.5 TABLET ORAL DAILY PRN
Qty: 30 TABLET | Refills: 3 | Status: SHIPPED | OUTPATIENT
Start: 2022-02-02 | End: 2022-05-04

## 2022-02-02 ASSESSMENT — PATIENT HEALTH QUESTIONNAIRE - PHQ9
SUM OF ALL RESPONSES TO PHQ QUESTIONS 1-9: 7
5. POOR APPETITE OR OVEREATING: NEARLY EVERY DAY

## 2022-02-02 ASSESSMENT — ANXIETY QUESTIONNAIRES
6. BECOMING EASILY ANNOYED OR IRRITABLE: MORE THAN HALF THE DAYS
7. FEELING AFRAID AS IF SOMETHING AWFUL MIGHT HAPPEN: SEVERAL DAYS
1. FEELING NERVOUS, ANXIOUS, OR ON EDGE: NEARLY EVERY DAY
GAD7 TOTAL SCORE: 18
5. BEING SO RESTLESS THAT IT IS HARD TO SIT STILL: NEARLY EVERY DAY
3. WORRYING TOO MUCH ABOUT DIFFERENT THINGS: NEARLY EVERY DAY
2. NOT BEING ABLE TO STOP OR CONTROL WORRYING: NEARLY EVERY DAY

## 2022-02-02 ASSESSMENT — MIFFLIN-ST. JEOR: SCORE: 1139.99

## 2022-02-02 NOTE — TELEPHONE ENCOUNTER
The patient was seen today.     The medication Klonopin RX did not go through to the pharmacy.  Are you able to resend?  I contacted the pharmacy and they have no record of receiving this medication, I am not sure what the waiting for payer response means?        clonazePAM (KLONOPIN) 0.5 MG tablet 30 tablet 3 2/2/2022  No   Sig - Route: Take 1 tablet (0.5 mg) by mouth daily as needed for anxiety - Oral   Class: E-Prescribe   Order: [id number]   Prior authorization: Waiting for Payer Response   This order has not been released to its destination.     Thank you    Lori BRYAN RN

## 2022-02-02 NOTE — PROGRESS NOTES
Assessment & Plan     Alcoholism (H)  Resolved.     Major depression in complete remission (H)  Patient c/o of worsening symptoms.    Anxiety  Increased Fluoxetine. If symptoms do not improve consider switching to another antidepressants.   - FLUoxetine (PROZAC) 20 MG capsule; Take 2 capsules (40 mg) by mouth daily  - clonazePAM (KLONOPIN) 0.5 MG tablet; Take 1 tablet (0.5 mg) by mouth daily as needed for anxiety    Muscle spasm  Muscle relaxants faxed.  - tiZANidine (ZANAFLEX) 2 MG tablet; Take 1 tablet (2 mg) by mouth 3 times daily as needed for muscle spasms    Osteoarthritis of both knees, unspecified osteoarthritis type  - traMADol (ULTRAM) 50 MG tablet; Take 1 tablet (50 mg) by mouth every 6 hours as needed for severe pain    Chronic pain syndrome   Pain medication faxed.  - traMADol (ULTRAM) 50 MG tablet; Take 1 tablet (50 mg) by mouth every 6 hours as needed for severe pain    Gastroesophageal reflux disease without esophagitis  Medication faxed.  - omeprazole (PRILOSEC) 20 MG DR capsule; Take 1 capsule (20 mg) by mouth daily 30-60 minutes before a meal.    Hyperlipidemia LDL goal <100  - atorvastatin (LIPITOR) 20 MG tablet; Take 1 tablet (20 mg) by mouth daily        FUTURE APPOINTMENTS:       - Follow-up visit in one month or sooner as needed.    Return in about 4 weeks (around 3/2/2022) for Follow up.    Catie Fraser MD  [facility] BAY Montesinos is a 76 year old who presents for the following health issues     HPI     76 yr old female here for medication refills. She reports no side effects.  She did mention that her depression has been worse and she is wondering if she can increase the dose of the fluoxetine. She says she has been more depressed lately. She has no suicidal ideations.    Hyperlipidemia Follow-Up      Are you regularly taking any medication or supplement to lower your cholesterol?   Yes- lipitor    Are you having muscle aches or other side  effects that you think could be caused by your cholesterol lowering medication?  No    Hypertension Follow-up      Do you check your blood pressure regularly outside of the clinic? No     Are you following a low salt diet? Yes    Are your blood pressures ever more than 140 on the top number (systolic) OR more   than 90 on the bottom number (diastolic), for example 140/90? No    Depression and Anxiety Follow-Up    How are you doing with your depression since your last visit? Worsened     How are you doing with your anxiety since your last visit?  Worsened     Are you having other symptoms that might be associated with depression or anxiety? No    Have you had a significant life event? No     Do you have any concerns with your use of alcohol or other drugs? No    Social History     Tobacco Use     Smoking status: Former Smoker     Packs/day: 0.50     Types: Cigarettes     Quit date: 6/16/2011     Years since quitting: 10.6     Smokeless tobacco: Never Used   Vaping Use     Vaping Use: Never used   Substance Use Topics     Alcohol use: No     Drug use: No     PHQ 6/25/2021 9/20/2021 2/2/2022   PHQ-9 Total Score 3 12 7   Q9: Thoughts of better off dead/self-harm past 2 weeks Not at all Not at all Not at all     PAOLA-7 SCORE 6/25/2021 9/20/2021 2/2/2022   Total Score - - -   Total Score 7 15 18     Last PHQ-9 2/2/2022   1.  Little interest or pleasure in doing things 2   2.  Feeling down, depressed, or hopeless 3   3.  Trouble falling or staying asleep, or sleeping too much 0   4.  Feeling tired or having little energy 1   5.  Poor appetite or overeating 0   6.  Feeling bad about yourself 1   7.  Trouble concentrating 0   8.  Moving slowly or restless 0   Q9: Thoughts of better off dead/self-harm past 2 weeks 0   PHQ-9 Total Score 7   Difficulty at work, home, or with people -     PAOLA-7  2/2/2022   1. Feeling nervous, anxious, or on edge 3   2. Not being able to stop or control worrying 3   3. Worrying too much about  "different things 3   4. Trouble relaxing 3   5. Being so restless that it is hard to sit still 3   6. Becoming easily annoyed or irritable 2   7. Feeling afraid, as if something awful might happen 1   PAOLA-7 Total Score 18   If you checked any problems, how difficult have they made it for you to do your work, take care of things at home, or get along with other people? -       Suicide Assessment Five-step Evaluation and Treatment (SAFE-T)      How many servings of fruits and vegetables do you eat daily?  2-3    On average, how many sweetened beverages do you drink each day (Examples: soda, juice, sweet tea, etc.  Do NOT count diet or artificially sweetened beverages)?   0    How many days per week do you exercise enough to make your heart beat faster? 7    How many minutes a day do you exercise enough to make your heart beat faster? 30 - 60    How many days per week do you miss taking your medication? 0        Review of Systems   Constitutional, HEENT, cardiovascular, pulmonary, gi and gu systems are negative, except as otherwise noted.      Objective    /70 (BP Location: Right arm, Patient Position: Sitting, Cuff Size: Adult Regular)   Pulse 72   Temp 98.5  F (36.9  C) (Tympanic)   Resp 14   Ht 1.6 m (5' 3\")   Wt 67.6 kg (149 lb)   SpO2 96%   Breastfeeding No   BMI 26.39 kg/m    Body mass index is 26.39 kg/m .  Physical Exam  Constitutional:       Appearance: Normal appearance.   HENT:      Head: Normocephalic and atraumatic.      Right Ear: Tympanic membrane normal.      Left Ear: Tympanic membrane normal.   Eyes:      Extraocular Movements: Extraocular movements intact.      Pupils: Pupils are equal, round, and reactive to light.   Cardiovascular:      Rate and Rhythm: Normal rate and regular rhythm.      Pulses: Normal pulses.      Heart sounds: Normal heart sounds.   Pulmonary:      Effort: Pulmonary effort is normal.      Breath sounds: Normal breath sounds.   Abdominal:      General: Abdomen is " flat. Bowel sounds are normal.   Musculoskeletal:         General: Normal range of motion.   Neurological:      Mental Status: She is alert and oriented to person, place, and time.   Psychiatric:         Mood and Affect: Mood normal.         Behavior: Behavior normal.

## 2022-02-03 ASSESSMENT — ANXIETY QUESTIONNAIRES: GAD7 TOTAL SCORE: 18

## 2022-02-03 NOTE — TELEPHONE ENCOUNTER
Pt is calling and is out of this medication wondering if this is a PA?    Can we call her please?  [phone] [facility]  [facility]

## 2022-02-03 NOTE — TELEPHONE ENCOUNTER
PRIOR AUTHORIZATION DENIED    Medication: clonazePAM (KLONOPIN) 0.5 MG tablet - DENIED    Denial Date: 2/2/2022    Denial Rational: not covered by INS for DX - Anxiety      Appeal Information:

## 2022-02-04 NOTE — TELEPHONE ENCOUNTER
Dr. Fraser,  PA for clonazepam has been denied due to diagnosis code.    Denial Rational: not covered by INS for DX - Anxiety

## 2022-02-04 NOTE — TELEPHONE ENCOUNTER
"Lori,  \"Waiting for Payer Response\" is in regards to a PA request.    This medication was denied by patient's insurance as they did not cover the diagnosis code, and Dr. Fraser has been notified of status in PA encounter for the med.    Thank you,  Lily"

## 2022-02-07 RX ORDER — HYDROXYZINE HYDROCHLORIDE 10 MG/1
10 TABLET, FILM COATED ORAL 3 TIMES DAILY PRN
Qty: 90 TABLET | Refills: 1 | Status: SHIPPED | OUTPATIENT
Start: 2022-02-07 | End: 2022-04-06

## 2022-02-07 NOTE — TELEPHONE ENCOUNTER
Dr. Fraser,    Do you want to try a different medication or pay out of pocket for this medication? Josi DELGADILLO RN

## 2022-02-07 NOTE — TELEPHONE ENCOUNTER
Message left on identified voice mail. Clonazepam is declined by her insurance. Other medication of anxiety has been sent. She is asked to return call with questions.

## 2022-02-18 DIAGNOSIS — I10 BENIGN ESSENTIAL HYPERTENSION: ICD-10-CM

## 2022-02-18 RX ORDER — HYDROCHLOROTHIAZIDE 25 MG/1
TABLET ORAL
Qty: 90 TABLET | Refills: 3 | OUTPATIENT
Start: 2022-02-18

## 2022-03-17 ENCOUNTER — LAB (OUTPATIENT)
Dept: LAB | Facility: CLINIC | Age: 77
End: 2022-03-17
Payer: COMMERCIAL

## 2022-03-17 ENCOUNTER — VIRTUAL VISIT (OUTPATIENT)
Dept: FAMILY MEDICINE | Facility: CLINIC | Age: 77
End: 2022-03-17
Payer: COMMERCIAL

## 2022-03-17 DIAGNOSIS — R39.9 URINARY TRACT INFECTION SYMPTOMS: Primary | ICD-10-CM

## 2022-03-17 DIAGNOSIS — R39.9 URINARY TRACT INFECTION SYMPTOMS: ICD-10-CM

## 2022-03-17 LAB
ALBUMIN UR-MCNC: 30 MG/DL
APPEARANCE UR: CLEAR
BACTERIA #/AREA URNS HPF: ABNORMAL /HPF
BILIRUB UR QL STRIP: NEGATIVE
COLOR UR AUTO: YELLOW
GLUCOSE UR STRIP-MCNC: NEGATIVE MG/DL
HGB UR QL STRIP: NEGATIVE
HYALINE CASTS #/AREA URNS LPF: ABNORMAL /LPF
KETONES UR STRIP-MCNC: NEGATIVE MG/DL
LEUKOCYTE ESTERASE UR QL STRIP: ABNORMAL
NITRATE UR QL: NEGATIVE
PH UR STRIP: 6.5 [PH] (ref 5–7)
RBC #/AREA URNS AUTO: ABNORMAL /HPF
SP GR UR STRIP: 1.02 (ref 1–1.03)
SQUAMOUS #/AREA URNS AUTO: ABNORMAL /LPF
UROBILINOGEN UR STRIP-ACNC: 0.2 E.U./DL
WBC #/AREA URNS AUTO: ABNORMAL /HPF
WBC CLUMPS #/AREA URNS HPF: PRESENT /HPF

## 2022-03-17 PROCEDURE — 81001 URINALYSIS AUTO W/SCOPE: CPT

## 2022-03-17 PROCEDURE — 87086 URINE CULTURE/COLONY COUNT: CPT

## 2022-03-17 PROCEDURE — 87186 SC STD MICRODIL/AGAR DIL: CPT

## 2022-03-17 PROCEDURE — 87088 URINE BACTERIA CULTURE: CPT

## 2022-03-17 PROCEDURE — 99441 PR PHYSICIAN TELEPHONE EVALUATION 5-10 MIN: CPT | Mod: 95 | Performed by: FAMILY MEDICINE

## 2022-03-17 RX ORDER — SULFAMETHOXAZOLE/TRIMETHOPRIM 800-160 MG
1 TABLET ORAL 2 TIMES DAILY
Qty: 14 TABLET | Refills: 0 | Status: SHIPPED | OUTPATIENT
Start: 2022-03-17 | End: 2022-03-24

## 2022-03-17 RX ORDER — CEPHALEXIN 500 MG/1
500 CAPSULE ORAL 2 TIMES DAILY
COMMUNITY
End: 2022-05-04

## 2022-03-17 ASSESSMENT — ENCOUNTER SYMPTOMS
NEUROLOGICAL NEGATIVE: 1
PSYCHIATRIC NEGATIVE: 1
ENDOCRINE NEGATIVE: 1
FREQUENCY: 1
HEMATOLOGIC/LYMPHATIC NEGATIVE: 1
DYSURIA: 1
CARDIOVASCULAR NEGATIVE: 1
CONSTITUTIONAL NEGATIVE: 1
RESPIRATORY NEGATIVE: 1
EYES NEGATIVE: 1
ALLERGIC/IMMUNOLOGIC NEGATIVE: 1
GASTROINTESTINAL NEGATIVE: 1

## 2022-03-17 ASSESSMENT — PAIN SCALES - GENERAL: PAINLEVEL: MODERATE PAIN (5)

## 2022-03-17 NOTE — PROGRESS NOTES
Yamel is a 76 year old who is being evaluated via a billable telephone visit.      What phone number would you like to be contacted at? [phone]  How would you like to obtain your AVS? Mail a copy    Assessment & Plan       Urinary tract infection symptoms  - UA with Microscopic reflex to Culture - lab collect; Future  --recommend rechecking UA.        FUTURE APPOINTMENTS:       - Follow-up visit in one week.    Return in about 1 week (around 3/24/2022) for Follow up.    Catie Fraser MD  [facility]    Subjective   Yamel is a 76 year old who presents for the following health issues     HPI     Patient is a 76-year-old female presenting with urinary symptoms.  She was seen in the urgent care about a week ago and had antibiotics prescribed to her.  She says she was feeling better and then last night started having urinary symptoms again with burning and frequency.  Today she says her symptoms seem better.  She is still on antibiotics presently and has about 3 days left.  She denies any fevers or chills.  She denies any blood in her urine.  She denies any flank pain.  No other acute symptoms reported.    Genitourinary - Female  Onset/Duration: went to urgent care in [facility] on 3/11 and was given antibiotics. She was feeling better until last night when she got the painful urination and increase in frequency. She is leaving on vacation next week and wants to know if she needs more antibiotics. She has 3 days left on cephalexin.  Description:   Painful urination (Dysuria): YES           Frequency: YES- about 5 times in 2 hours last night  Blood in urine (Hematuria): no  Delay in urine (Hesitency): no  Intensity: moderate, 5/10  Progression of Symptoms:  same  Accompanying Signs & Symptoms:  Fever/chills: YES  Flank pain: no  Nausea and vomiting: no  Vaginal symptoms: none  Abdominal/Pelvic Pain: no  History:   History of frequent UTI s: YES  History of kidney stones:  no  Sexually Active: YES  Possibility of pregnancy: No  Precipitating or alleviating factors: None  Therapies tried and outcome:  is currently taking antibiotics for UTI as prescribed and it did seem to be helping for a few days but has had increased pain and frequency the last day or 2         Review of Systems   Constitutional: Negative.    HENT: Negative.    Eyes: Negative.    Respiratory: Negative.    Cardiovascular: Negative.    Gastrointestinal: Negative.    Endocrine: Negative.    Breasts:  negative.    Genitourinary: Positive for dysuria and frequency.   Allergic/Immunologic: Negative.    Neurological: Negative.    Hematological: Negative.    Psychiatric/Behavioral: Negative.             Objective    Vitals - Patient Reported  Pain Score: Moderate Pain (5)      Vitals:  No vitals were obtained today due to virtual visit.    Physical Exam   healthy, alert and no distress  PSYCH: Alert and oriented times 3; coherent speech, normal   rate and volume, able to articulate logical thoughts, able   to abstract reason, no tangential thoughts, no hallucinations   or delusions  Her affect is normal  RESP: No cough, no audible wheezing, able to talk in full sentences  Remainder of exam unable to be completed due to telephone visits            Phone call duration: 6 minutes

## 2022-03-20 LAB — BACTERIA UR CULT: ABNORMAL

## 2022-04-03 DIAGNOSIS — F41.1 GAD (GENERALIZED ANXIETY DISORDER): ICD-10-CM

## 2022-04-06 RX ORDER — HYDROXYZINE HYDROCHLORIDE 10 MG/1
TABLET, FILM COATED ORAL
Qty: 90 TABLET | Refills: 1 | Status: SHIPPED | OUTPATIENT
Start: 2022-04-06 | End: 2022-06-01

## 2022-05-04 ENCOUNTER — VIRTUAL VISIT (OUTPATIENT)
Dept: FAMILY MEDICINE | Facility: CLINIC | Age: 77
End: 2022-05-04
Payer: COMMERCIAL

## 2022-05-04 DIAGNOSIS — M17.0 OSTEOARTHRITIS OF BOTH KNEES, UNSPECIFIED OSTEOARTHRITIS TYPE: ICD-10-CM

## 2022-05-04 DIAGNOSIS — F41.9 ANXIETY: ICD-10-CM

## 2022-05-04 DIAGNOSIS — G89.4 CHRONIC PAIN SYNDROME: ICD-10-CM

## 2022-05-04 PROCEDURE — 99441 PR PHYSICIAN TELEPHONE EVALUATION 5-10 MIN: CPT | Mod: 95 | Performed by: FAMILY MEDICINE

## 2022-05-04 RX ORDER — TRAMADOL HYDROCHLORIDE 50 MG/1
50 TABLET ORAL EVERY 6 HOURS PRN
Qty: 120 TABLET | Refills: 3 | Status: SHIPPED | OUTPATIENT
Start: 2022-05-04 | End: 2022-09-27

## 2022-05-04 RX ORDER — CLONAZEPAM 0.5 MG/1
0.5 TABLET ORAL DAILY PRN
Qty: 30 TABLET | Refills: 3 | Status: SHIPPED | OUTPATIENT
Start: 2022-05-04 | End: 2022-05-23

## 2022-05-04 ASSESSMENT — ASTHMA QUESTIONNAIRES
QUESTION_4 LAST FOUR WEEKS HOW OFTEN HAVE YOU USED YOUR RESCUE INHALER OR NEBULIZER MEDICATION (SUCH AS ALBUTEROL): ONE OR TWO TIMES PER DAY
QUESTION_5 LAST FOUR WEEKS HOW WOULD YOU RATE YOUR ASTHMA CONTROL: SOMEWHAT CONTROLLED
ACT_TOTALSCORE: 16
QUESTION_2 LAST FOUR WEEKS HOW OFTEN HAVE YOU HAD SHORTNESS OF BREATH: MORE THAN ONCE A DAY
QUESTION_3 LAST FOUR WEEKS HOW OFTEN DID YOUR ASTHMA SYMPTOMS (WHEEZING, COUGHING, SHORTNESS OF BREATH, CHEST TIGHTNESS OR PAIN) WAKE YOU UP AT NIGHT OR EARLIER THAN USUAL IN THE MORNING: NOT AT ALL
ACT_TOTALSCORE: 16
QUESTION_1 LAST FOUR WEEKS HOW MUCH OF THE TIME DID YOUR ASTHMA KEEP YOU FROM GETTING AS MUCH DONE AT WORK, SCHOOL OR AT HOME: NONE OF THE TIME

## 2022-05-04 ASSESSMENT — PATIENT HEALTH QUESTIONNAIRE - PHQ9
5. POOR APPETITE OR OVEREATING: MORE THAN HALF THE DAYS
SUM OF ALL RESPONSES TO PHQ QUESTIONS 1-9: 10

## 2022-05-04 ASSESSMENT — ANXIETY QUESTIONNAIRES
1. FEELING NERVOUS, ANXIOUS, OR ON EDGE: NEARLY EVERY DAY
GAD7 TOTAL SCORE: 13
6. BECOMING EASILY ANNOYED OR IRRITABLE: NOT AT ALL
2. NOT BEING ABLE TO STOP OR CONTROL WORRYING: NEARLY EVERY DAY
7. FEELING AFRAID AS IF SOMETHING AWFUL MIGHT HAPPEN: MORE THAN HALF THE DAYS
3. WORRYING TOO MUCH ABOUT DIFFERENT THINGS: NEARLY EVERY DAY
5. BEING SO RESTLESS THAT IT IS HARD TO SIT STILL: NOT AT ALL

## 2022-05-04 ASSESSMENT — PAIN SCALES - GENERAL: PAINLEVEL: SEVERE PAIN (6)

## 2022-05-04 NOTE — PROGRESS NOTES
Yamel is a 76 year old who is being evaluated via a billable telephone visit.      What phone number would you like to be contacted at? [phone]  How would you like to obtain your AVS? Mail a copy    Assessment & Plan     Anxiety  Medication faxed, patient is reminded again that she will need to be weaned off this medication soon  - clonazePAM (KLONOPIN) 0.5 MG tablet; Take 1 tablet (0.5 mg) by mouth daily as needed for anxiety    Osteoarthritis of both knees, unspecified osteoarthritis type  - traMADol (ULTRAM) 50 MG tablet; Take 1 tablet (50 mg) by mouth every 6 hours as needed for severe pain    Chronic pain syndrome   - traMADol (ULTRAM) 50 MG tablet; Take 1 tablet (50 mg) by mouth every 6 hours as needed for severe pain            No follow-ups on file.    Catie Fraser MD  [facility]    Subjective   Yamel is a 76 year old who presents for the following health issues  accompanied by her self.    HPI     Anxiety Follow-Up    How are you doing with your anxiety since your last visit? Worsened-just recently due to issues with her Mortgage [employer] and moving.  Needing to provide papers that she paid her house off in 2018.    Are you having other symptoms that might be associated with anxiety? No    Have you had a significant life event? Housing Concerns and see above for the issue with her Mortgage Company.     Are you feeling depressed? Yes:  Due to whats listed with moving and the issues.    Do you have any concerns with your use of alcohol or other drugs? No    Social History     Tobacco Use     Smoking status: Former Smoker     Packs/day: 0.50     Types: Cigarettes     Quit date: 6/16/2011     Years since quitting: 10.8     Smokeless tobacco: Never Used   Vaping Use     Vaping Use: Never used   Substance Use Topics     Alcohol use: No     Drug use: No     PAOLA-7 SCORE 9/20/2021 2/2/2022 5/4/2022   Total Score - - -   Total Score 15 18 13     PHQ 9/20/2021 2/2/2022 5/4/2022    PHQ-9 Total Score 12 7 10   Q9: Thoughts of better off dead/self-harm past 2 weeks Not at all Not at all Not at all     Last PHQ-9 5/4/2022   1.  Little interest or pleasure in doing things 3   2.  Feeling down, depressed, or hopeless 3   3.  Trouble falling or staying asleep, or sleeping too much 0   4.  Feeling tired or having little energy 3   5.  Poor appetite or overeating 0   6.  Feeling bad about yourself 0   7.  Trouble concentrating 1   8.  Moving slowly or restless 0   Q9: Thoughts of better off dead/self-harm past 2 weeks 0   PHQ-9 Total Score 10   Difficulty at work, home, or with people -     PAOLA-7  5/4/2022   1. Feeling nervous, anxious, or on edge 3   2. Not being able to stop or control worrying 3   3. Worrying too much about different things 3   4. Trouble relaxing 2   5. Being so restless that it is hard to sit still 0   6. Becoming easily annoyed or irritable 0   7. Feeling afraid, as if something awful might happen 2   PAOLA-7 Total Score 13   If you checked any problems, how difficult have they made it for you to do your work, take care of things at home, or get along with other people? -         How many servings of fruits and vegetables do you eat daily?  4 or more    On average, how many sweetened beverages do you drink each day (Examples: soda, juice, sweet tea, etc.  Do NOT count diet or artificially sweetened beverages)?   0    How many days per week do you exercise enough to make your heart beat faster? 3 or less    How many minutes a day do you exercise enough to make your heart beat faster? 30 minutes    How many days per week do you miss taking your medication? 0    Medication Followup of Tramadol    Taking Medication as prescribed: yes, taking every 6 hours.  Taking 4 daily.    Side Effects:  None    Medication Helping Symptoms:  Yes, this helps with the chronic pain and osteoarthritis of the knees.       Review of Systems   Constitutional: Negative.    HENT: Negative.    Eyes:  Negative.    Respiratory: Negative.    Cardiovascular: Negative.    Gastrointestinal: Negative.    Endocrine: Negative.    Breasts:  negative.    Genitourinary: Negative.  Hematuria: telephone.   Musculoskeletal: Positive for arthralgias.   Allergic/Immunologic: Negative.    Neurological: Negative.    Hematological: Negative.    Psychiatric/Behavioral: Positive for mood changes. The patient is nervous/anxious.             Objective    Vitals - Patient Reported  Pain Score: Severe Pain (6)  Pain Loc: Low Back (Knees and neck.)      Vitals:  No vitals were obtained today due to virtual visit.    Physical Exam   healthy, alert and no distress  PSYCH: Alert and oriented times 3; coherent speech, normal   rate and volume, able to articulate logical thoughts, able   to abstract reason, no tangential thoughts, no hallucinations   or delusions  Her affect is normal  RESP: No cough, no audible wheezing, able to talk in full sentences  Remainder of exam unable to be completed due to telephone visits            Phone call duration: 7 minutes

## 2022-05-04 NOTE — PATIENT INSTRUCTIONS
Thank you for choosing [facility].  You may be receiving an email and/or telephone survey request from [facility] [employer] regarding your visit today.  Please take a few minutes to respond to the survey to let us know how we are doing.      If you have questions or concerns, please contact us via [employer] or you can contact your care team at [phone] option [phone].    Our Clinic hours are:  Monday - Thursday 7am-6pm  Friday 7am-5pm    The [place] outpatient lab hours are:  Monday - Friday 7am-4:30pm    Appointments are required, call [phone]    If you have clinical questions after hours or would like to schedule an appointment,  call the clinic at [phone].

## 2022-05-05 ASSESSMENT — ANXIETY QUESTIONNAIRES: GAD7 TOTAL SCORE: 13

## 2022-05-06 ASSESSMENT — ENCOUNTER SYMPTOMS
EYES NEGATIVE: 1
CONSTITUTIONAL NEGATIVE: 1
ENDOCRINE NEGATIVE: 1
NEUROLOGICAL NEGATIVE: 1
RESPIRATORY NEGATIVE: 1
ALLERGIC/IMMUNOLOGIC NEGATIVE: 1
ARTHRALGIAS: 1
GASTROINTESTINAL NEGATIVE: 1
NERVOUS/ANXIOUS: 1
HEMATOLOGIC/LYMPHATIC NEGATIVE: 1
CARDIOVASCULAR NEGATIVE: 1

## 2022-05-22 DIAGNOSIS — M62.838 MUSCLE SPASM: ICD-10-CM

## 2022-05-23 ENCOUNTER — OFFICE VISIT (OUTPATIENT)
Dept: FAMILY MEDICINE | Facility: CLINIC | Age: 77
End: 2022-05-23
Payer: COMMERCIAL

## 2022-05-23 VITALS
OXYGEN SATURATION: 98 % | RESPIRATION RATE: 16 BRPM | HEART RATE: 74 BPM | WEIGHT: 135.8 LBS | DIASTOLIC BLOOD PRESSURE: 68 MMHG | BODY MASS INDEX: 24.06 KG/M2 | SYSTOLIC BLOOD PRESSURE: 124 MMHG | TEMPERATURE: 97.6 F | HEIGHT: 63 IN

## 2022-05-23 DIAGNOSIS — F41.9 ANXIETY: Primary | ICD-10-CM

## 2022-05-23 PROCEDURE — 99213 OFFICE O/P EST LOW 20 MIN: CPT | Performed by: FAMILY MEDICINE

## 2022-05-23 RX ORDER — CLONAZEPAM 0.5 MG/1
0.5 TABLET ORAL DAILY PRN
Qty: 30 TABLET | Refills: 3 | Status: SHIPPED | OUTPATIENT
Start: 2022-05-23 | End: 2022-09-12

## 2022-05-23 ASSESSMENT — PAIN SCALES - GENERAL: PAINLEVEL: MODERATE PAIN (4)

## 2022-05-23 NOTE — TELEPHONE ENCOUNTER
Routing refill request to provider for review/approval because:  Drug not on the [facility] refill protocol     KRISTEN Martini

## 2022-05-23 NOTE — PROGRESS NOTES
Assessment & Plan     Anxiety  Medication faxed  - clonazePAM (KLONOPIN) 0.5 MG tablet; Take 1 tablet (0.5 mg) by mouth daily as needed for anxiety      FUTURE APPOINTMENTS:       - Follow-up visit in one month or sooner as needed    Return in about 4 weeks (around 6/20/2022).    Catie Fraser MD  [facility] 76 [facility] who presents for the following health issues     Patient here for refills on her medication. She reports no side effects.     History of Present Illness       Reason for visit:  Med check    She eats 2-3 servings of fruits and vegetables daily.She consumes 0 sweetened beverage(s) daily.She exercises with enough effort to increase her heart rate 20 to 29 minutes per day.  She exercises with enough effort to increase her heart rate 4 days per week.   She is taking medications regularly.       Depression and Anxiety Follow-Up    How are you doing with your depression since your last visit? No change    How are you doing with your anxiety since your last visit?  No change    Are you having other symptoms that might be associated with depression or anxiety? No    Have you had a significant life event? Housing Concerns - recently sold her house and moved which caused some increased stress     Do you have any concerns with your use of alcohol or other drugs? No    Social History     Tobacco Use     Smoking status: Former Smoker     Packs/day: 0.50     Types: Cigarettes     Quit date: 6/16/2011     Years since quitting: 10.9     Smokeless tobacco: Never Used   Vaping Use     Vaping Use: Never used   Substance Use Topics     Alcohol use: No     Drug use: No     PHQ 9/20/2021 2/2/2022 5/4/2022   PHQ-9 Total Score 12 7 10   Q9: Thoughts of better off dead/self-harm past 2 weeks Not at all Not at all Not at all     PAOLA-7 SCORE 9/20/2021 2/2/2022 5/4/2022   Total Score - - -   Total Score 15 18 13     Last PHQ-9 5/4/2022   1.  Little interest or  "pleasure in doing things 3   2.  Feeling down, depressed, or hopeless 3   3.  Trouble falling or staying asleep, or sleeping too much 0   4.  Feeling tired or having little energy 3   5.  Poor appetite or overeating 0   6.  Feeling bad about yourself 0   7.  Trouble concentrating 1   8.  Moving slowly or restless 0   Q9: Thoughts of better off dead/self-harm past 2 weeks 0   PHQ-9 Total Score 10   Difficulty at work, home, or with people -     PAOLA-7  5/4/2022   1. Feeling nervous, anxious, or on edge 3   2. Not being able to stop or control worrying 3   3. Worrying too much about different things 3   4. Trouble relaxing 2   5. Being so restless that it is hard to sit still 0   6. Becoming easily annoyed or irritable 0   7. Feeling afraid, as if something awful might happen 2   PAOLA-7 Total Score 13   If you checked any problems, how difficult have they made it for you to do your work, take care of things at home, or get along with other people? -       Suicide Assessment Five-step Evaluation and Treatment (SAFE-T)      Medication Followup of clonazepam 0.5mg    Taking Medication as prescribed: yes    Side Effects:  None    Medication Helping Symptoms:  yes         Review of Systems   Constitutional, HEENT, cardiovascular, pulmonary, gi and gu systems are negative, except as otherwise noted.      Objective    /68 (BP Location: Left arm, Patient Position: Sitting, Cuff Size: Adult Regular)   Pulse 74   Temp 97.6  F (36.4  C) (Tympanic)   Resp 16   Ht 1.6 m (5' 3\")   Wt 61.6 kg (135 lb 12.8 oz)   SpO2 98%   Breastfeeding No   BMI 24.06 kg/m    Body mass index is 24.06 kg/m .  Physical Exam   GENERAL: healthy, alert and no distress  EYES: Eyes grossly normal to inspection, PERRL and conjunctivae and sclerae normal  HENT: ear canals and TM's normal, nose and mouth without ulcers or lesions  NECK: no adenopathy, no asymmetry, masses, or scars and thyroid normal to palpation  RESP: lungs clear to auscultation " - no rales, rhonchi or wheezes  CV: regular rate and rhythm, normal S1 S2, no S3 or S4, no murmur, click or rub, no peripheral edema and peripheral pulses strong  ABDOMEN: soft, nontender, no hepatosplenomegaly, no masses and bowel sounds normal  MS: no gross musculoskeletal defects noted, no edema

## 2022-05-27 RX ORDER — TIZANIDINE 2 MG/1
TABLET ORAL
Qty: 90 TABLET | Refills: 3 | OUTPATIENT
Start: 2022-05-27

## 2022-05-30 DIAGNOSIS — F41.1 GAD (GENERALIZED ANXIETY DISORDER): ICD-10-CM

## 2022-06-01 RX ORDER — HYDROXYZINE HYDROCHLORIDE 10 MG/1
TABLET, FILM COATED ORAL
Qty: 90 TABLET | Refills: 1 | Status: SHIPPED | OUTPATIENT
Start: 2022-06-01 | End: 2022-08-22

## 2022-06-08 DIAGNOSIS — J45.20 MILD INTERMITTENT ASTHMA WITHOUT COMPLICATION: ICD-10-CM

## 2022-06-08 NOTE — TELEPHONE ENCOUNTER
Left message for the patient to call the clinic.  Need to update her ACT for refill. Josi DELGADILLO RN

## 2022-06-09 ENCOUNTER — TELEPHONE (OUTPATIENT)
Dept: FAMILY MEDICINE | Facility: CLINIC | Age: 77
End: 2022-06-09
Payer: COMMERCIAL

## 2022-06-09 ASSESSMENT — ASTHMA QUESTIONNAIRES: ACT_TOTALSCORE: 17

## 2022-06-10 NOTE — TELEPHONE ENCOUNTER
"ACT score on 6/9/22 is 17.      Requested Prescriptions   Pending Prescriptions Disp Refills     albuterol (PROAIR HFA/PROVENTIL HFA/VENTOLIN HFA) 108 (90 Base) MCG/ACT inhaler [Pharmacy Med Name: ALBUTEROL HFA INH (200 PUFFS)8.5GM] 8.5 g 11     Sig: INHALE 2 PUFFS INTO THE LUNGS EVERY 6 HOURS AS NEEDED FOR SHORTNESS OF BREATH OR DIFFICULTY BREATHING OR WHEEZING       Asthma Maintenance Inhalers - Anticholinergics Failed - 6/8/2022 11:01 AM        Failed - Asthma control assessment score within normal limits in last 6 months     Please review ACT score.           Passed - Patient is age 12 years or older        Passed - Medication is active on med list        Passed - Recent (6 mo) or future (30 days) visit within the authorizing provider's specialty     Patient had office visit in the last 6 months or has a visit in the next 30 days with authorizing provider or within the authorizing provider's specialty.  See \"Patient Info\" tab in inbasket, or \"Choose Columns\" in Meds & Orders section of the refill encounter.           Short-Acting Beta Agonist Inhalers Protocol  Failed - 6/8/2022 11:01 AM        Failed - Asthma control assessment score within normal limits in last 6 months     Please review ACT score.           Passed - Patient is age 12 or older        Passed - Medication is active on med list        Passed - Recent (6 mo) or future (30 days) visit within the authorizing provider's specialty     Patient had office visit in the last 6 months or has a visit in the next 30 days with authorizing provider or within the authorizing provider's specialty.  See \"Patient Info\" tab in inbasket, or \"Choose Columns\" in Meds & Orders section of the refill encounter.                 "

## 2022-06-12 RX ORDER — ALBUTEROL SULFATE 90 UG/1
AEROSOL, METERED RESPIRATORY (INHALATION)
Qty: 8.5 G | Refills: 11 | Status: SHIPPED | OUTPATIENT
Start: 2022-06-12 | End: 2022-11-19

## 2022-09-12 ENCOUNTER — VIRTUAL VISIT (OUTPATIENT)
Dept: FAMILY MEDICINE | Facility: CLINIC | Age: 77
End: 2022-09-12
Payer: COMMERCIAL

## 2022-09-12 DIAGNOSIS — F41.9 ANXIETY: ICD-10-CM

## 2022-09-12 PROCEDURE — 99213 OFFICE O/P EST LOW 20 MIN: CPT | Mod: 95 | Performed by: FAMILY MEDICINE

## 2022-09-12 RX ORDER — CLONAZEPAM 0.5 MG/1
0.5 TABLET ORAL DAILY PRN
Qty: 30 TABLET | Refills: 0 | Status: SHIPPED | OUTPATIENT
Start: 2022-09-12 | End: 2022-11-19

## 2022-09-12 ASSESSMENT — ANXIETY QUESTIONNAIRES
IF YOU CHECKED OFF ANY PROBLEMS ON THIS QUESTIONNAIRE, HOW DIFFICULT HAVE THESE PROBLEMS MADE IT FOR YOU TO DO YOUR WORK, TAKE CARE OF THINGS AT HOME, OR GET ALONG WITH OTHER PEOPLE: SOMEWHAT DIFFICULT
5. BEING SO RESTLESS THAT IT IS HARD TO SIT STILL: NOT AT ALL
7. FEELING AFRAID AS IF SOMETHING AWFUL MIGHT HAPPEN: NOT AT ALL
3. WORRYING TOO MUCH ABOUT DIFFERENT THINGS: NEARLY EVERY DAY
GAD7 TOTAL SCORE: 10
6. BECOMING EASILY ANNOYED OR IRRITABLE: SEVERAL DAYS
GAD7 TOTAL SCORE: 10
1. FEELING NERVOUS, ANXIOUS, OR ON EDGE: NEARLY EVERY DAY
2. NOT BEING ABLE TO STOP OR CONTROL WORRYING: NEARLY EVERY DAY

## 2022-09-12 ASSESSMENT — PATIENT HEALTH QUESTIONNAIRE - PHQ9
5. POOR APPETITE OR OVEREATING: NOT AT ALL
SUM OF ALL RESPONSES TO PHQ QUESTIONS 1-9: 3

## 2022-09-12 NOTE — PROGRESS NOTES
Yamel is a 77 year old who is being evaluated via a billable telephone visit.      What phone number would you like to be contacted at? [phone]  How would you like to obtain your AVS? Mail a copy    Assessment & Plan       Anxiety  Medication faxed.  - clonazePAM (KLONOPIN) 0.5 MG tablet; Take 1 tablet (0.5 mg) by mouth daily as needed for anxiety  [id number]}     FUTURE APPOINTMENTS:       - Follow-up visit in two weeks.     Return in about 2 weeks (around 2022) for Follow up.    Catie Fraser MD  [facility]    Subjective   Yamel is a 77 year old, presenting for the following health issues:  Recheck Medication      HPI   77 yr old female here for medication refills. She takes clonazepam for anxiety denies any side effects.    Hyperlipidemia Follow-Up      Are you regularly taking any medication or supplement to lower your cholesterol?   Yes- atorvastatin 20mg    Are you having muscle aches or other side effects that you think could be caused by your cholesterol lowering medication?  No    Hypertension Follow-up      Do you check your blood pressure regularly outside of the clinic? No     Are you following a low salt diet? Yes    Are your blood pressures ever more than 140 on the top number (systolic) OR more   than 90 on the bottom number (diastolic), for example 140/90? Not checking    Anxiety Follow-Up    How are you doing with your anxiety since your last visit? Worsened     Are you having other symptoms that might be associated with anxiety? Yes:  restless, heart palpitations, perspiration    Have you had a significant life event? No     Are you feeling depressed? No    Do you have any concerns with your use of alcohol or other drugs? No    Social History     Tobacco Use     Smoking status: Former Smoker     Packs/day: 0.50     Types: Cigarettes     Quit date: 2011     Years since quittin.2     Smokeless tobacco: Never Used   Vaping Use     Vaping Use: Never used    Substance Use Topics     Alcohol use: No     Drug use: No     PAOLA-7 SCORE 2/2/2022 5/4/2022 9/12/2022   Total Score - - -   Total Score 18 13 10     PHQ 2/2/2022 5/4/2022 9/12/2022   PHQ-9 Total Score 7 10 3   Q9: Thoughts of better off dead/self-harm past 2 weeks Not at all Not at all Not at all     Last PHQ-9 9/12/2022   1.  Little interest or pleasure in doing things 1   2.  Feeling down, depressed, or hopeless 1   3.  Trouble falling or staying asleep, or sleeping too much 0   4.  Feeling tired or having little energy 1   5.  Poor appetite or overeating 0   6.  Feeling bad about yourself 0   7.  Trouble concentrating 0   8.  Moving slowly or restless 0   Q9: Thoughts of better off dead/self-harm past 2 weeks 0   PHQ-9 Total Score 3   Difficulty at work, home, or with people Very difficult     PAOLA-7  9/12/2022   1. Feeling nervous, anxious, or on edge 3   2. Not being able to stop or control worrying 3   3. Worrying too much about different things 3   4. Trouble relaxing 0   5. Being so restless that it is hard to sit still 0   6. Becoming easily annoyed or irritable 1   7. Feeling afraid, as if something awful might happen 0   PAOLA-7 Total Score 10   If you checked any problems, how difficult have they made it for you to do your work, take care of things at home, or get along with other people? Somewhat difficult       Asthma Follow-Up    Was ACT completed today?  No      Do you have a cough?  No    Are you experiencing any wheezing in your chest?  No    Do you have any shortness of breath?  YES     How often are you using a short-acting (rescue) inhaler or nebulizer, such as Albuterol?  2-3 times per day    How many days per week do you miss taking your asthma controller medication?  0    Please describe any recent triggers for your asthma: rag weed    Have you had any Emergency Room Visits, Urgent Care Visits, or Hospital Admissions since your last office visit?  No      Pain History:  When did you first  notice your pain? - Chronic Pain   Have you seen this provider for your pain in the past?   Yes   Where in your body do you have pain? Neck, both knees, right hand  Are you seeing anyone else for your pain? No    PHQ-9 SCORE 2/2/2022 5/4/2022 9/12/2022   PHQ-9 Total Score - - -   PHQ-9 Total Score 7 10 3       PAOLA-7 SCORE 2/2/2022 5/4/2022 9/12/2022   Total Score - - -   Total Score 18 13 10       6}    PHQ-9 SCORE 2/2/2022 5/4/2022 9/12/2022   PHQ-9 Total Score - - -   PHQ-9 Total Score 7 10 3     PAOLA-7 SCORE 2/2/2022 5/4/2022 9/12/2022   Total Score - - -   Total Score 18 13 10     PEG Score 9/12/2022   PEG Total Score 3.33         Adverse effects: No   PDMP Review     None        Last CSA Agreement:   CSA -- Patient Level:    Controlled Substance Agreement - Opioid - Scan on 7/1/2021  7:34 PM       Last UDS: 6/29/2021                Review of Systems   Constitutional, HEENT, cardiovascular, pulmonary, gi and gu systems are negative, except as otherwise noted.      Objective           Vitals:  No vitals were obtained today due to virtual visit.    Physical Exam   healthy, alert and no distress  PSYCH: Alert and oriented times 3; coherent speech, normal   rate and volume, able to articulate logical thoughts, able   to abstract reason, no tangential thoughts, no hallucinations   or delusions  Her affect is normal  RESP: No cough, no audible wheezing, able to talk in full sentences  Remainder of exam unable to be completed due to telephone visits            Phone call duration: 5 minutes

## 2022-09-12 NOTE — LETTER
My Asthma Action Plan  Name: Yamel Lindsay   YOB: 1945  Date: 9/12/2022   My doctor: Catie Fraser   My clinic: [facility]      My Control Medicine:fluticasone-salmeterol (ADVAIR-HFA) 115-21 MCG/ACT inhaler   My Rescue Medicine: albuterol (PROAIR HFA/PROVENTIL HFA/VENTOLIN HFA) 108 (90 Base) MCG/ACT inhaler           My Asthma Severity: mild intermittent  Avoid your asthma triggers:         GREEN ZONE   Good Control    I feel good    No cough or wheeze    Can work, sleep and play without asthma symptoms       Take your asthma control medicine every day.     1. If exercise triggers your asthma, take your rescue medication    15 minutes before exercise or sports, and    During exercise if you have asthma symptoms  2. Spacer to use with inhaler: If you have a spacer, make sure to use it with your inhaler             YELLOW ZONE Getting Worse  I have ANY of these:    I do not feel good    Cough or wheeze    Chest feels tight    Wake up at night   1. Keep taking your Green Zone medications  2. Start taking your rescue medicine:    every 20 minutes for up to 1 hour. Then every 4 hours for 24-48 hours.  3. If you stay in the Yellow Zone for more than 12-24 hours, contact your doctor.  4. If you do not return to the Green Zone in 12-24 hours or you get worse, start taking your oral steroid medicine if prescribed by your provider.           RED ZONE Medical Alert - Get Help  I have ANY of these:    I feel awful    Medicine is not helping    Breathing getting harder    Trouble walking or talking    Nose opens wide to breathe       1. Take your rescue medicine NOW  2. If your provider has prescribed an oral steroid medicine, start taking it NOW  3. Call your doctor NOW  4. If you are still in the Red Zone after 20 minutes and you have not reached your doctor:    Take your rescue medicine again and    Call 911 or go to the emergency room right away    See your regular doctor  within 2 weeks of an Emergency Room or Urgent Care visit for follow-up treatment.            Annual Reminders:  Meet with Asthma Educator,  Flu Shot in the Fall, consider Pneumonia Vaccination for patients with asthma (aged 19 and older).    Pharmacy:    [facility] [place], [place] - [address] [facility] [address] [facility] [place] [place] [address] [id number] [facility] [place], [place] [facility] [address] [facility] [place], [place] [address]  [id number] - [facility] [address] AT [facility] [address] [place] [address] PHARMACY [facility] - [place], [place] - [address]                    Asthma Triggers  How To Control Things That Make Your Asthma Worse    Triggers are things that make your asthma worse.  Look at the list below to help you find your triggers and what you can do about them.  You can help prevent asthma flare-ups by staying away from your triggers.      Trigger                                                          What you can do   Cigarette Smoke  Tobacco smoke can make asthma worse. Do not allow smoking in your home, car or around you.  Be sure no one smokes at a child s day care or school.  If you smoke, ask your health care provider for ways to help you quit.  Ask family members to quit too.  Ask your health care provider for a referral to Quit Plan to help you quit smoking, or call 2-332-878-PLAN.     Colds, Flu, Bronchitis  These are common triggers of asthma. Wash your hands often.  Don t touch your eyes, nose or mouth.  Get a flu shot every year.     Dust Mites  These are tiny bugs that live in cloth or carpet. They are too small to see. Wash sheets and blankets in hot water every week.   Encase pillows and mattress in dust mite proof covers.  Avoid having carpet if you can. If you have carpet, vacuum weekly.   Use a dust mask and HEPA vacuum.   Pollen and  Outdoor Mold  Some people are allergic to trees, grass, or weed pollen, or molds. Try to keep your windows closed.  Limit time out doors when pollen count is high.   Ask you health care provider about taking medicine during allergy season.     Animal Dander  Some people are allergic to skin flakes, urine or saliva from pets with fur or feathers. Keep pets with fur or feathers out of your home.    If you can t keep the pet outdoors, then keep the pet out of your bedroom.  Keep the bedroom door closed.  Keep pets off cloth furniture and away from stuffed toys.     Mice, Rats, and Cockroaches  Some people are allergic to the waste from these pests.   Cover food and garbage.  Clean up spills and food crumbs.  Store grease in the refrigerator.   Keep food out of the bedroom.   Indoor Mold  This can be a trigger if your home has high moisture. Fix leaking faucets, pipes, or other sources of water.   Clean moldy surfaces.  Dehumidify basement if it is damp and smelly.   Smoke, Strong Odors, and Sprays  These can reduce air quality. Stay away from strong odors and sprays, such as perfume, powder, hair spray, paints, smoke incense, paint, cleaning products, candles and new carpet.   Exercise or Sports  Some people with asthma have this trigger. Be active!  Ask your doctor about taking medicine before sports or exercise to prevent symptoms.    Warm up for 5-10 minutes before and after sports or exercise.     Other Triggers of Asthma  Cold air:  Cover your nose and mouth with a scarf.  Sometimes laughing or crying can be a trigger.  Some medicines and food can trigger asthma.

## 2022-09-20 DIAGNOSIS — G89.4 CHRONIC PAIN SYNDROME: ICD-10-CM

## 2022-09-20 DIAGNOSIS — M17.0 OSTEOARTHRITIS OF BOTH KNEES, UNSPECIFIED OSTEOARTHRITIS TYPE: ICD-10-CM

## 2022-09-20 NOTE — TELEPHONE ENCOUNTER
Patient has future appt, however she will run out of tramadol a week before visit on 09/27.    Next 5 appointments (look out 90 days)    Sep 27, 2022  8:20 AM  (Arrive by 8:00 AM)  Annual Wellness Visit with Catie Fraser MD  [facility] ([facility] )  Arrive at: Clinic [address] [facility] [address]  [phone]             Cristel GAMBINO  Station

## 2022-09-21 RX ORDER — TRAMADOL HYDROCHLORIDE 50 MG/1
50 TABLET ORAL EVERY 6 HOURS PRN
Qty: 120 TABLET | Refills: 3 | OUTPATIENT
Start: 2022-09-21

## 2022-09-27 ENCOUNTER — TELEPHONE (OUTPATIENT)
Dept: FAMILY MEDICINE | Facility: CLINIC | Age: 77
End: 2022-09-27

## 2022-09-27 ENCOUNTER — OFFICE VISIT (OUTPATIENT)
Dept: FAMILY MEDICINE | Facility: CLINIC | Age: 77
End: 2022-09-27
Payer: COMMERCIAL

## 2022-09-27 VITALS
SYSTOLIC BLOOD PRESSURE: 132 MMHG | OXYGEN SATURATION: 97 % | HEART RATE: 71 BPM | RESPIRATION RATE: 24 BRPM | HEIGHT: 63 IN | BODY MASS INDEX: 25.69 KG/M2 | DIASTOLIC BLOOD PRESSURE: 70 MMHG | TEMPERATURE: 98.2 F | WEIGHT: 145 LBS

## 2022-09-27 DIAGNOSIS — Z00.00 ENCOUNTER FOR MEDICARE ANNUAL WELLNESS EXAM: Primary | ICD-10-CM

## 2022-09-27 DIAGNOSIS — I10 BENIGN ESSENTIAL HYPERTENSION: ICD-10-CM

## 2022-09-27 DIAGNOSIS — G89.4 CHRONIC PAIN SYNDROME: ICD-10-CM

## 2022-09-27 DIAGNOSIS — G47.09 OTHER INSOMNIA: ICD-10-CM

## 2022-09-27 DIAGNOSIS — M62.838 MUSCLE SPASM: ICD-10-CM

## 2022-09-27 DIAGNOSIS — J45.20 MILD INTERMITTENT ASTHMA WITHOUT COMPLICATION: ICD-10-CM

## 2022-09-27 DIAGNOSIS — M17.0 OSTEOARTHRITIS OF BOTH KNEES, UNSPECIFIED OSTEOARTHRITIS TYPE: ICD-10-CM

## 2022-09-27 LAB
ALBUMIN SERPL BCG-MCNC: 3.7 G/DL (ref 3.5–5.2)
ALP SERPL-CCNC: 116 U/L (ref 35–104)
ALT SERPL W P-5'-P-CCNC: 13 U/L (ref 10–35)
ANION GAP SERPL CALCULATED.3IONS-SCNC: 11 MMOL/L (ref 7–15)
AST SERPL W P-5'-P-CCNC: 24 U/L (ref 10–35)
BILIRUB SERPL-MCNC: 0.2 MG/DL
BUN SERPL-MCNC: 10.7 MG/DL (ref 8–23)
CALCIUM SERPL-MCNC: 9.2 MG/DL (ref 8.8–10.2)
CHLORIDE SERPL-SCNC: 96 MMOL/L (ref 98–107)
CHOLEST SERPL-MCNC: 129 MG/DL
CREAT SERPL-MCNC: 0.59 MG/DL (ref 0.51–0.95)
DEPRECATED HCO3 PLAS-SCNC: 26 MMOL/L (ref 22–29)
GFR SERPL CREATININE-BSD FRML MDRD: >90 ML/MIN/1.73M2
GLUCOSE SERPL-MCNC: 115 MG/DL (ref 70–99)
HDLC SERPL-MCNC: 45 MG/DL
LDLC SERPL CALC-MCNC: 59 MG/DL
NONHDLC SERPL-MCNC: 84 MG/DL
POTASSIUM SERPL-SCNC: 3.9 MMOL/L (ref 3.4–5.3)
PROT SERPL-MCNC: 6.6 G/DL (ref 6.4–8.3)
SODIUM SERPL-SCNC: 133 MMOL/L (ref 136–145)
TRIGL SERPL-MCNC: 124 MG/DL

## 2022-09-27 PROCEDURE — 80061 LIPID PANEL: CPT | Performed by: FAMILY MEDICINE

## 2022-09-27 PROCEDURE — 36415 COLL VENOUS BLD VENIPUNCTURE: CPT | Performed by: FAMILY MEDICINE

## 2022-09-27 PROCEDURE — G0439 PPPS, SUBSEQ VISIT: HCPCS | Performed by: FAMILY MEDICINE

## 2022-09-27 PROCEDURE — G0008 ADMIN INFLUENZA VIRUS VAC: HCPCS | Performed by: FAMILY MEDICINE

## 2022-09-27 PROCEDURE — 80053 COMPREHEN METABOLIC PANEL: CPT | Performed by: FAMILY MEDICINE

## 2022-09-27 PROCEDURE — 99214 OFFICE O/P EST MOD 30 MIN: CPT | Mod: 25 | Performed by: FAMILY MEDICINE

## 2022-09-27 PROCEDURE — 90662 IIV NO PRSV INCREASED AG IM: CPT | Performed by: FAMILY MEDICINE

## 2022-09-27 RX ORDER — HYDROCHLOROTHIAZIDE 25 MG/1
25 TABLET ORAL DAILY
Qty: 90 TABLET | Refills: 3 | Status: ON HOLD | OUTPATIENT
Start: 2022-09-27 | End: 2022-12-28

## 2022-09-27 RX ORDER — TRAZODONE HYDROCHLORIDE 50 MG/1
100 TABLET, FILM COATED ORAL AT BEDTIME
Qty: 60 TABLET | Refills: 11 | Status: SHIPPED | OUTPATIENT
Start: 2022-09-27 | End: 2022-09-30

## 2022-09-27 RX ORDER — FLUTICASONE PROPIONATE AND SALMETEROL XINAFOATE 115; 21 UG/1; UG/1
2 AEROSOL, METERED RESPIRATORY (INHALATION) 2 TIMES DAILY
Qty: 1 G | Refills: 3 | Status: SHIPPED | OUTPATIENT
Start: 2022-09-27 | End: 2023-04-03

## 2022-09-27 RX ORDER — TRAMADOL HYDROCHLORIDE 50 MG/1
50 TABLET ORAL EVERY 6 HOURS PRN
Qty: 120 TABLET | Refills: 3 | Status: SHIPPED | OUTPATIENT
Start: 2022-09-27 | End: 2022-11-02

## 2022-09-27 ASSESSMENT — PATIENT HEALTH QUESTIONNAIRE - PHQ9
10. IF YOU CHECKED OFF ANY PROBLEMS, HOW DIFFICULT HAVE THESE PROBLEMS MADE IT FOR YOU TO DO YOUR WORK, TAKE CARE OF THINGS AT HOME, OR GET ALONG WITH OTHER PEOPLE: VERY DIFFICULT
SUM OF ALL RESPONSES TO PHQ QUESTIONS 1-9: 12
SUM OF ALL RESPONSES TO PHQ QUESTIONS 1-9: 12

## 2022-09-27 ASSESSMENT — ENCOUNTER SYMPTOMS
SHORTNESS OF BREATH: 0
HEMATURIA: 0
MYALGIAS: 1
JOINT SWELLING: 0
WEAKNESS: 0
DYSURIA: 0
PARESTHESIAS: 0
NAUSEA: 1
CHILLS: 1
DIZZINESS: 0
SORE THROAT: 0
HEADACHES: 1
EYE PAIN: 1
FEVER: 1
HEMATOCHEZIA: 0
DIARRHEA: 0
ABDOMINAL PAIN: 0
NERVOUS/ANXIOUS: 0
ARTHRALGIAS: 1
COUGH: 0
PALPITATIONS: 1
CONSTIPATION: 0
HEARTBURN: 0

## 2022-09-27 ASSESSMENT — ASTHMA QUESTIONNAIRES
QUESTION_4 LAST FOUR WEEKS HOW OFTEN HAVE YOU USED YOUR RESCUE INHALER OR NEBULIZER MEDICATION (SUCH AS ALBUTEROL): THREE OR MORE TIMES PER DAY
QUESTION_2 LAST FOUR WEEKS HOW OFTEN HAVE YOU HAD SHORTNESS OF BREATH: ONCE OR TWICE A WEEK
ACT_TOTALSCORE: 16
QUESTION_3 LAST FOUR WEEKS HOW OFTEN DID YOUR ASTHMA SYMPTOMS (WHEEZING, COUGHING, SHORTNESS OF BREATH, CHEST TIGHTNESS OR PAIN) WAKE YOU UP AT NIGHT OR EARLIER THAN USUAL IN THE MORNING: ONCE OR TWICE
ACT_TOTALSCORE: 16
QUESTION_5 LAST FOUR WEEKS HOW WOULD YOU RATE YOUR ASTHMA CONTROL: SOMEWHAT CONTROLLED
QUESTION_1 LAST FOUR WEEKS HOW MUCH OF THE TIME DID YOUR ASTHMA KEEP YOU FROM GETTING AS MUCH DONE AT WORK, SCHOOL OR AT HOME: A LITTLE OF THE TIME

## 2022-09-27 ASSESSMENT — ACTIVITIES OF DAILY LIVING (ADL): CURRENT_FUNCTION: NO ASSISTANCE NEEDED

## 2022-09-27 ASSESSMENT — PAIN SCALES - GENERAL: PAINLEVEL: SEVERE PAIN (7)

## 2022-09-27 NOTE — TELEPHONE ENCOUNTER
Reason for Call:  Other prescription    Detailed comments: patient is out of her Tizanidine. Patient uses [facility] off of [address]. Please give her a call back, she is expecting someone to reach back out to her.     Phone Number Patient can be reached at: Cell number on file:    Telephone Information:   Mobile [phone]       Best Time: any time throughout the day    Can we leave a detailed message on this number? YES    Call taken on 9/27/2022 at 11:07 AM by Maycol Santos

## 2022-09-27 NOTE — PATIENT INSTRUCTIONS
"  Patient Education   Personalized Prevention Plan  You are due for the preventive services outlined below.  Your care team is available to assist you in scheduling these services.  If you have already completed any of these items, please share that information with your care team to update in your medical record.  Health Maintenance Due   Topic Date Due     LUNG CANCER SCREENING  Never done     Zoster (Shingles) Vaccine (2 of 3) 03/14/2013     Osteoporosis Screening  07/05/2014     Colorectal Cancer Screening  06/03/2020     COVID-19 Vaccine (4 - Booster for Moderna series) 01/10/2022     Annual Wellness Visit  03/03/2022     URINE DRUG SCREEN  06/25/2022     Flu Vaccine (1) 09/01/2022       Depression and Suicide in Older Adults    Nearly 2 million older Americans have some type of depression. Some of them even take their own lives. Yet depression among older adults is often ignored. Learn the warning signs. You may help spare a loved one needless pain. You may also save a life.   What is depression?  Depression is a common and serious illness that affects the way you think and feel. It is not a normal part of aging, nor is it a sign of weakness, a character flaw, or something you can snap out of. Most people with depression need treatment to get better. The most common symptom is a feeling of deep sadness. People who are depressed also may seem tired and listless. And nothing seems to give them pleasure. It s normal to grieve or be sad sometimes. But sadness lessens or passes with time. Depression rarely goes away or improves on its own. A person with clinical depression can't \"snap out of it.\" Other symptoms of depression are:     Sleeping more or less than normal    Eating more or less than normal    Having headaches, stomachaches, or other pains that don t go away    Feeling nervous,  empty,  or worthless    Crying a great deal    Thinking or talking about suicide or death    Loss of interest in activities " previously enjoyed    Social isolation    Feeling confused or forgetful  What causes it?  The causes of depression aren t fully known. But it is thought to result from a complex blend of these factors:     Biochemistry. Certain chemicals in the brain play a role.    Genes. Depression does run in families.    Life stress. Life stresses can also trigger depression in some people. Older adults often face many stressors, such as death of friends or a spouse, health problems, and financial concerns.    Chronic conditions. This includes conditions such as diabetes, heart disease, or cancer. These can cause symptoms of depression. Medicine side effects can cause changes in thoughts and behaviors.  How you can help  Often, depressed people may not want to ask for help. When they do, they may be ignored. Or, they may receive the wrong treatment. You can help by showing parents and older friends love and support. If they seem depressed, don t lecture the person, ignore the symptoms, or discount the symptoms as a  normal  part of aging -which they are not. Get involved, listen, and show interest and support.   Help them understand that depression is a treatable illness. Tell them you can help them find the right treatment. Offer to go to their healthcare provider's appointment with them for support when the symptoms are discussed. With their approval, contact a local mental health center, social service agency, or hospital about services.   You can be an advocate for him or her at healthcare appointments. Many older adults have chronic illnesses that can cause symptoms of depression. Medicine side effects can change thoughts and behaviors. You can help make sure that the healthcare provider looks at all of these factors. He or she should refer your family member or friend to a mental healthcare provider when needed. in some cases, untreated depression can lead to a misdiagnosis. A person may be diagnosed with a brain disorder  such as dementia. If the healthcare provider does not take the issue of depression seriously, help your family member or friend to find another provider.   Don't be afraid to ask  If you think an older person you care about could be suicidal, ask,  Have you thought about suicide?  Most people will tell you the truth. If they say  yes,  they may already have a plan for how and when they will attempt it. Find out as much as you can. The more detailed the plan, and the easier it is to carry out, the more danger the person is in right now. Tell the person you are there for them and do not want them to harm him or herself. Don't wait to get help for the person. Call the person's healthcare provider, local hospital, or emergency services.   To learn more    National Suicide Prevention Lifeline (crisis hotline) 762-505-QRCU (699-318-1864)    National [place] of Mental Bqbckd896-939-7897hvj.[facility].nih.gov    National [place] on Mental Hllenhk316-819-6823oez.[place].org    Mental Health Ojsgwye522-115-8202yih.[facility].org    National Suicide Jnjnijp549-ZYEXRWC (747-028-9530)    Call 911  Never leave the person alone. A person who is actively suicidal needs psychiatric care right away. They will need constant supervision. Never leave the person out of sight. Call 911 or the national 24-hour suicide crisis hotline at 821-863-GSKA (537-630-4362). You can also take the person to the closest emergency room.   [employer] last reviewed this educational content on 5/1/2020 2000-2021 [employer]. All rights reserved. This information is not intended as a substitute for professional medical care. Always follow your healthcare professional's instructions.

## 2022-09-27 NOTE — TELEPHONE ENCOUNTER
Duplicate.  There is a refill request from same date.  See refill for details.  Gemma Mendieta RN

## 2022-09-27 NOTE — TELEPHONE ENCOUNTER
Requested Prescriptions   Pending Prescriptions Disp Refills    tiZANidine (ZANAFLEX) 2 MG tablet [Pharmacy Med Name: TIZANIDINE 2MG TABLETS] 90 tablet 3     Sig: TAKE 1 TABLET(2 MG) BY MOUTH THREE TIMES DAILY AS NEEDED FOR MUSCLE SPASMS        There is no refill protocol information for this order

## 2022-09-27 NOTE — PROGRESS NOTES
"p  The patient s PHQ-9 score is consistent with moderate depression. She was provided with information regarding depression and was advised to schedule a follow up appointment in 52 weeks to further address this issue.  SUBJECTIVE:   Yamel is a 77 year old who presents for Preventive Visit.    77 yr old female here for her annual physical. Patient has a history of depression,hypertension,anxiety and chronic pain. She is in need of refills of some of her medication. Seemed a bit upset today that her Tramadol was not refilled. Says  She had some withdrawal symptoms. I apologized to the patient.       Patient has been advised of split billing requirements and indicates understanding: Yes  Are you in the first 12 months of your Medicare coverage?  No    Healthy Habits:     In general, how would you rate your overall health?  Good    Frequency of exercise:  6-7 days/week    Duration of exercise:  30-45 minutes    Do you usually eat at least 4 servings of fruit and vegetables a day, include whole grains    & fiber and avoid regularly eating high fat or \"junk\" foods?  Yes    Taking medications regularly:  Yes    Medication side effects:  None    Ability to successfully perform activities of daily living:  No assistance needed    Home Safety:  No safety concerns identified    Hearing Impairment:  No hearing concerns    In the past 6 months, have you been bothered by leaking of urine?  No    In general, how would you rate your overall mental or emotional health?  Fair      PHQ-2 Total Score: 2    Additional concerns today:  No  Anxiety  Associated symptoms include arthralgias, chills, congestion, a fever, headaches, myalgias and nausea. Pertinent negatives include no abdominal pain, chest pain, coughing, joint swelling, rash, sore throat or weakness.     Do you feel safe in your environment? Yes    Have you ever done Advance Care Planning? (For example, a Health Directive, POLST, or a discussion with a medical provider or " your loved ones about your wishes): No, advance care planning information given to patient to review.  Patient plans to discuss their wishes with loved ones or provider.         Fall risk  Fallen 2 or more times in the past year?: No  Any fall with injury in the past year?: No    Cognitive Screening   1) Repeat 3 items (Leader, Season, Table)    2) Clock draw: ABNORMAL Numbers were in the correct order.  Time was incorrect.  Only placed it at the number 11 when trying to explain.  3) 3 item recall: Recalls 2 objects   Results: ABNORMAL clock, 1-2 items recalled: PROBABLE COGNITIVE IMPAIRMENT, **INFORM PROVIDER**    Mini-CogTM Copyright S Brandon. Licensed by the author for use in [facility]; reprinted with permission (terrance@[facility]). All rights reserved.      Do you have sleep apnea, excessive snoring or daytime drowsiness?: no    Reviewed and updated as needed this visit by clinical staff   Tobacco  Allergies  Meds     Fam Hx  Soc Hx          Reviewed and updated as needed this visit by Provider                   Social History     Tobacco Use     Smoking status: Former Smoker     Packs/day: 0.50     Types: Cigarettes     Quit date: 2011     Years since quittin.2     Smokeless tobacco: Never Used   Substance Use Topics     Alcohol use: No     If you drink alcohol do you typically have >3 drinks per day or >7 drinks per week? No    Alcohol Use 2022   Prescreen: >3 drinks/day or >7 drinks/week? Not Applicable   Prescreen: >3 drinks/day or >7 drinks/week? -           Medication Followup of Insomnia  Taking Medication as prescribed: yes  Side Effects:  None  Medication Helping Symptoms:  yes    Hyperlipidemia Follow-Up    Are you regularly taking any medication or supplement to lower your cholesterol?   Yes- Atorvastatin  Are you having muscle aches or other side effects that you think could be caused by your cholesterol lowering medication?  No    Hypertension Follow-up    Do you check  your blood pressure regularly outside of the clinic? No   Are you following a low salt diet? Yes  Are your blood pressures ever more than 140 on the top number (systolic) OR more   than 90 on the bottom number (diastolic), for example 140/90? Not checking.    Anxiety Follow-Up  How are you doing with your anxiety since your last visit? Worsened in the past two months.  Are you having other symptoms that might be associated with anxiety? Yes:  Is having panic attacks.  Have you had a significant life event? OTHER: Has had some stressors lately.  Did recently move.   Are you feeling depressed? No, not as much for depression.  Feeling stressed.  She does walk daily.  Do you have any concerns with your use of alcohol or other drugs? No    Social History     Tobacco Use     Smoking status: Former Smoker     Packs/day: 0.50     Types: Cigarettes     Quit date: 2011     Years since quittin.2     Smokeless tobacco: Never Used   Vaping Use     Vaping Use: Never used   Substance Use Topics     Alcohol use: No     Drug use: No     PAOLA-7 SCORE 2022   Total Score - - -   Total Score 18 13 10     PHQ 2022   PHQ-9 Total Score 10 3 12   Q9: Thoughts of better off dead/self-harm past 2 weeks Not at all Not at all Not at all     Last PHQ-9 2022   1.  Little interest or pleasure in doing things 2   2.  Feeling down, depressed, or hopeless 2   3.  Trouble falling or staying asleep, or sleeping too much 2   4.  Feeling tired or having little energy 2   5.  Poor appetite or overeating 0   6.  Feeling bad about yourself 1   7.  Trouble concentrating 2   8.  Moving slowly or restless 1   Q9: Thoughts of better off dead/self-harm past 2 weeks 0   PHQ-9 Total Score 12   Difficulty at work, home, or with people -     PAOLA-7  2022   1. Feeling nervous, anxious, or on edge 3   2. Not being able to stop or control worrying 3   3. Worrying too much about different things 3   4.  Trouble relaxing 0   5. Being so restless that it is hard to sit still 0   6. Becoming easily annoyed or irritable 1   7. Feeling afraid, as if something awful might happen 0   PAOLA-7 Total Score 10   If you checked any problems, how difficult have they made it for you to do your work, take care of things at home, or get along with other people? Somewhat difficult         Current providers sharing in care for this patient include:   Patient Care Team:  Catie Fraser MD as PCP - General (Family Practice)  Catie Fraser MD as Assigned PCP    The following health maintenance items are reviewed in Epic and correct as of today:  Health Maintenance   Topic Date Due     LUNG CANCER SCREENING  Never done     COVID-19 Vaccine (4 - Booster for Moderna series) 01/10/2022     URINE DRUG SCREEN  06/25/2022     ZOSTER IMMUNIZATION (2 of 3) 09/27/2022 (Originally 3/14/2013)     DEXA  10/27/2026 (Originally 7/5/2014)     ASTHMA CONTROL TEST  03/27/2023     PHQ-9  03/27/2023     ANNUAL REVIEW OF HM ORDERS  05/04/2023     ASTHMA ACTION PLAN  09/12/2023     MEDICARE ANNUAL WELLNESS VISIT  09/27/2023     FALL RISK ASSESSMENT  09/27/2023     DTAP/TDAP/TD IMMUNIZATION (3 - Td or Tdap) 02/18/2024     LIPID  03/03/2026     ADVANCE CARE PLANNING  09/27/2027     HEPATITIS C SCREENING  Completed     DEPRESSION ACTION PLAN  Completed     INFLUENZA VACCINE  Completed     Pneumococcal Vaccine: 65+ Years  Completed     IPV IMMUNIZATION  Aged Out     MENINGITIS IMMUNIZATION  Aged Out     HEPATITIS B IMMUNIZATION  Aged Out     COLORECTAL CANCER SCREENING  Discontinued     Lab work is in process  Labs reviewed in EPIC  BP Readings from Last 3 Encounters:   09/27/22 132/70   05/23/22 124/68   02/02/22 130/70    Wt Readings from Last 3 Encounters:   09/27/22 65.8 kg (145 lb)   05/23/22 61.6 kg (135 lb 12.8 oz)   02/02/22 67.6 kg (149 lb)                  Patient Active Problem List   Diagnosis     Hyperlipidemia LDL goal <130      Primary localized osteoarthrosis, lower leg     Generalized anxiety disorder     Rhinitis, allergic seasonal     Alcoholism (H)     Mild recurrent major depression (H)     Advanced directives, counseling/discussion     Seasonal allergic rhinitis     Hip pain     GERD (gastroesophageal reflux disease)     S/P laparoscopic hernia repair     OA (osteoarthritis) of knee     Mild intermittent asthma without complication     Major depression in complete remission (H)     Benign essential hypertension     Past Surgical History:   Procedure Laterality Date     ESOPHAGOSCOPY, GASTROSCOPY, DUODENOSCOPY (EGD), COMBINED N/A 2015    Procedure: COMBINED ESOPHAGOSCOPY, GASTROSCOPY, DUODENOSCOPY (EGD), BIOPSY SINGLE OR MULTIPLE;  Surgeon: Dylan Alejandra MD;  Location: [facility]     ESOPHAGOSCOPY, GASTROSCOPY, DUODENOSCOPY (EGD), COMBINED N/A 2018    Procedure: COMBINED ESOPHAGOSCOPY, GASTROSCOPY, DUODENOSCOPY (EGD), BIOPSY SINGLE OR MULTIPLE;  gastroscopy;  Surgeon: Alexander Bautista MD;  Location: [facility]     LAPAROSCOPIC HERNIORRHAPHY INGUINAL Right 2015    Procedure: LAPAROSCOPIC HERNIORRHAPHY INGUINAL;  Surgeon: Favio Olsen MD;  Location: [facility]     TUBAL LIGATION         Social History     Tobacco Use     Smoking status: Former Smoker     Packs/day: 0.50     Types: Cigarettes     Quit date: 2011     Years since quittin.2     Smokeless tobacco: Never Used   Substance Use Topics     Alcohol use: No     Family History   Problem Relation Age of Onset     Cancer Mother      C.A.D. Father      Lipids Father      Hypertension Father      Hypertension Brother      Cancer Brother         esophageal cancer     Breast Cancer Other      Prostate Cancer Brother      Psychotic Disorder Daughter      Diabetes No family hx of      Cerebrovascular Disease No family hx of      Cancer - colorectal No family hx of          Current Outpatient Medications   Medication Sig Dispense Refill     albuterol (PROAIR  HFA/PROVENTIL HFA/VENTOLIN HFA) 108 (90 Base) MCG/ACT inhaler INHALE 2 PUFFS INTO THE LUNGS EVERY 6 HOURS AS NEEDED FOR SHORTNESS OF BREATH OR DIFFICULTY BREATHING OR WHEEZING 8.5 g 11     atorvastatin (LIPITOR) 20 MG tablet Take 1 tablet (20 mg) by mouth daily 90 tablet 3     Calcium Carb-Cholecalciferol (CALCIUM 1000 + D PO) Take 1 tablet by mouth       Cholecalciferol (VITAMIN D-3 PO) Take 2,000 Units by mouth daily        clonazePAM (KLONOPIN) 0.5 MG tablet Take 1 tablet (0.5 mg) by mouth daily as needed for anxiety 30 tablet 0     FLUoxetine (PROZAC) 20 MG capsule Take 2 capsules (40 mg) by mouth daily 60 capsule 11     fluticasone-salmeterol (ADVAIR HFA) 115-21 MCG/ACT inhaler Inhale 2 puffs into the lungs 2 times daily 1 g 3     hydrochlorothiazide (HYDRODIURIL) 25 MG tablet Take 1 tablet (25 mg) by mouth daily 90 tablet 3     hydrOXYzine (ATARAX) 10 MG tablet TAKE 1 TABLET(10 MG) BY MOUTH THREE TIMES DAILY AS NEEDED FOR ANXIETY 90 tablet 1     ibuprofen (ADVIL,MOTRIN) 200 MG tablet Take 200-600 mg by mouth every 2 hours as needed for mild pain       multivitamin w/minerals (THERA-VIT-M) tablet Take 1 tablet by mouth daily       Omega-3 Fatty Acids (FISH OIL) 1200 MG CAPS Take 1 capsule by mouth       omeprazole (PRILOSEC) 20 MG DR capsule Take 1 capsule (20 mg) by mouth daily 30-60 minutes before a meal. 90 capsule 2     tiZANidine (ZANAFLEX) 2 MG tablet Take 1 tablet (2 mg) by mouth 3 times daily as needed for muscle spasms 90 tablet 3     traMADol (ULTRAM) 50 MG tablet Take 1 tablet (50 mg) by mouth every 6 hours as needed for severe pain 120 tablet 3     traZODone (DESYREL) 50 MG tablet Take 2 tablets (100 mg) by mouth At Bedtime 60 tablet 11     Allergies   Allergen Reactions     Nka [No Known Allergies]      Seasonal Allergies      Pneumonia Vaccine:For adults 65 years or older who do not have an immunocompromising condition, cerebrospinal fluid leak, or cochlear implant and want to receive PPSV23 ONLY:  "Administer 1 dose of PPSV23. Anyone who received any doses of PPSV23 before age 65 should receive 1 final dose of the vaccine at age 65 or older. Administer this last dose at least 5 years after the prior PPSV23 dose.  Mammogram Screening: Mammogram Screening - Patient over age 75, has elected to discontinue screenings.      Pertinent mammograms are reviewed under the imaging tab.    Review of Systems   Constitutional: Positive for chills and fever.   HENT: Positive for congestion. Negative for ear pain, hearing loss and sore throat.    Eyes: Positive for pain. Negative for visual disturbance.   Respiratory: Negative for cough and shortness of breath.    Cardiovascular: Positive for palpitations. Negative for chest pain and peripheral edema.   Gastrointestinal: Positive for nausea. Negative for abdominal pain, constipation, diarrhea, heartburn and hematochezia.   Genitourinary: Positive for genital sores. Negative for dysuria, hematuria and urgency.   Musculoskeletal: Positive for arthralgias and myalgias. Negative for joint swelling.   Skin: Negative for rash.   Neurological: Positive for headaches. Negative for dizziness, weakness and paresthesias.   Psychiatric/Behavioral: Positive for mood changes. The patient is not nervous/anxious.          OBJECTIVE:   /70   Pulse 71   Temp 98.2  F (36.8  C) (Tympanic)   Resp 24   Ht 1.6 m (5' 3\")   Wt 65.8 kg (145 lb)   SpO2 97%   BMI 25.69 kg/m   Estimated body mass index is 25.69 kg/m  as calculated from the following:    Height as of this encounter: 1.6 m (5' 3\").    Weight as of this encounter: 65.8 kg (145 lb).  Physical Exam  GENERAL: healthy, alert and no distress  EYES: Eyes grossly normal to inspection, PERRL and conjunctivae and sclerae normal  HENT: ear canals and TM's normal, nose and mouth without ulcers or lesions  NECK: no adenopathy, no asymmetry, masses, or scars and thyroid normal to palpation  RESP: lungs clear to auscultation - no rales, " rhonchi or wheezes  CV: regular rate and rhythm, normal S1 S2, no S3 or S4, no murmur, click or rub, no peripheral edema and peripheral pulses strong  ABDOMEN: soft, nontender, no hepatosplenomegaly, no masses and bowel sounds normal  MS: no gross musculoskeletal defects noted, no edema  SKIN: no suspicious lesions or rashes  PSYCH: mentation appears normal, affect normal/bright    Diagnostic Test Results:  Pending     ASSESSMENT / PLAN:   (Z00.00) Encounter for Medicare annual wellness exam  (primary encounter diagnosis)  Comment: 77 yr old female here for her annual physical  Plan: Reminded of flu shot. Recommend healthy lifestyle    (M17.0) Osteoarthritis of both knees, unspecified osteoarthritis type  Comment: medication faxed.   Plan: traMADol (ULTRAM) 50 MG tablet, OFFICE/OUTPT         VISIT,EST,LEVL IV      (G89.4) Chronic pain syndrome   Comment: medication refilled.   Plan: traMADol (ULTRAM) 50 MG tablet, OFFICE/OUTPT         VISIT,EST,LEVL IV      (I10) Benign essential hypertension  Comment: medication faxed. Labs ordered  Plan: hydrochlorothiazide (HYDRODIURIL) 25 MG tablet,        Lipid panel reflex to direct LDL Fasting,         Comprehensive metabolic panel (BMP + Alb, Alk         Phos, ALT, AST, Total. Bili, TP), OFFICE/OUTPT         VISIT,EST,LEVL IV       (G47.09) Other insomnia  Comment: medication faxed.  Plan: traZODone (DESYREL) 50 MG tablet, OFFICE/OUTPT         VISIT,EST,LEVL IV      (J45.20) Mild intermittent asthma without complication  Comment: appears controlled. Medication faxed.  Plan: fluticasone-salmeterol (ADVAIR HFA) 115-21         MCG/ACT inhaler, OFFICE/OUTPT VISIT,EST,LEVL IV              Patient has been advised of split billing requirements and indicates understanding: Yes    COUNSELING:  Reviewed preventive health counseling, as reflected in patient instructions       Regular exercise       Healthy diet/nutrition       Vision screening    Estimated body mass index is 25.69  "kg/m  as calculated from the following:    Height as of this encounter: 1.6 m (5' 3\").    Weight as of this encounter: 65.8 kg (145 lb).        She reports that she quit smoking about 11 years ago. Her smoking use included cigarettes. She smoked 0.50 packs per day. She has never used smokeless tobacco.      Appropriate preventive services were discussed with this patient, including applicable screening as appropriate for cardiovascular disease, diabetes, osteopenia/osteoporosis, and glaucoma.  As appropriate for age/gender, discussed screening for colorectal cancer, prostate cancer, breast cancer, and cervical cancer. Checklist reviewing preventive services available has been given to the patient.    Reviewed patients plan of care and provided an AVS. The Basic Care Plan (routine screening as documented in Health Maintenance) for Yamel meets the Care Plan requirement. This Care Plan has been established and reviewed with the Patient.    Counseling Resources:  ATP IV Guidelines  Pooled Cohorts Equation Calculator  Breast Cancer Risk Calculator  Breast Cancer: Medication to Reduce Risk  FRAX Risk Assessment  ICSI Preventive Guidelines  Dietary Guidelines for Americans, 2010  [employer] MyPlate  ASA Prophylaxis  Lung CA Screening    Catie Fraser MD  [facility]    Identified Health Risks:  Answers for HPI/ROS submitted by the patient on 9/27/2022  If you checked off any problems, how difficult have these problems made it for you to do your work, take care of things at home, or get along with other people?: Very difficult  PHQ9 TOTAL SCORE: 12      "

## 2022-09-27 NOTE — TELEPHONE ENCOUNTER
Pending Prescriptions:                       Disp   Refills    tiZANidine (ZANAFLEX) 2 MG tablet [Pharma*90 tab*3            Sig: TAKE 1 TABLET(2 MG) BY MOUTH THREE TIMES DAILY AS           NEEDED FOR MUSCLE SPASMS    Routing refill request to provider for review/approval because:  Drug not on the [facility] refill protocol     Last filled 2/2/22 for 90 tabs, one month supply and 3 refills.    Pt saw Dr Fraser this morning.  Pt says that she is out now.    Gemma Mendieta RN

## 2022-09-28 RX ORDER — TIZANIDINE 2 MG/1
2 TABLET ORAL 3 TIMES DAILY PRN
Qty: 90 TABLET | Refills: 3 | Status: SHIPPED | OUTPATIENT
Start: 2022-09-28 | End: 2022-11-22

## 2022-09-28 NOTE — RESULT ENCOUNTER NOTE
Please inform patient that test result was within normal parameters except that the sodium was a bit low and blood glucose was also slightly high. The good cholesterol was also a bit low.recommend exercise and healthy eating to help the good cholesterol improve and blood glucose. For the low sodium recommend hydration.   Thank you.     Caite Fraser M.D.

## 2022-09-29 DIAGNOSIS — G47.09 OTHER INSOMNIA: ICD-10-CM

## 2022-09-30 RX ORDER — TRAZODONE HYDROCHLORIDE 50 MG/1
TABLET, FILM COATED ORAL
Qty: 180 TABLET | Refills: 3 | Status: SHIPPED | OUTPATIENT
Start: 2022-09-30 | End: 2022-12-02

## 2022-09-30 NOTE — TELEPHONE ENCOUNTER
"Prescription approved per [employer] Refill Protocol.    Pending Prescriptions:                       Disp   Refills    traZODone (DESYREL) 50 MG tablet [Pharmac*180 ta*3            Sig: TAKE 2 TABLETS(100 MG) BY MOUTH AT BEDTIME    Requested Prescriptions   Pending Prescriptions Disp Refills     traZODone (DESYREL) 50 MG tablet [Pharmacy Med Name: TRAZODONE 50MG TABLETS] 180 tablet 3     Sig: TAKE 2 TABLETS(100 MG) BY MOUTH AT BEDTIME       Serotonin Modulators Passed - 9/30/2022 11:29 AM        Passed - Recent (12 mo) or future (30 days) visit within the authorizing provider's specialty     Patient has had an office visit with the authorizing provider or a provider within the authorizing providers department within the previous 12 mos or has a future within next 30 days. See \"Patient Info\" tab in inbasket, or \"Choose Columns\" in Meds & Orders section of the refill encounter.              Passed - Medication is active on med list        Passed - Patient is age 18 or older        Passed - No active pregnancy on record        Passed - No positive pregnancy test in past 12 months           Kizzy Garcia RN on 9/30/2022 at 11:31 AM    "

## 2022-10-02 RX ORDER — TIZANIDINE 2 MG/1
TABLET ORAL
Qty: 90 TABLET | Refills: 3 | Status: SHIPPED | OUTPATIENT
Start: 2022-10-02 | End: 2022-11-22

## 2022-10-19 NOTE — PROGRESS NOTES
SUBJECTIVE:                                                    Yamel Lindsay is a 71 year old female who presents to clinic today for the following health issues:      Medication Followup of Klonopin    Taking Medication as prescribed: yes        Medication Helping Symptoms:  yes     Medication refill on her Klonopin    She is using klonopin for panic attack and PAOLA   She has seen psychiatry for this     No side effects   She uses 2 x a day sometimes tid     She has had counseling   She was not given relaxation techniques   Has not have any cognitive behavior therapy   She is willing to consider this   Problem list and histories reviewed & adjusted, as indicated.    Past Medical History   Diagnosis Date     Allergies      Anxiety      Hyperlipidemia      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     Nicotine dependence      Postmenopausal HRT (hormone replacement therapy) 1994     S/P alcohol detoxification 07/06     Recovered and active in AA       Past Surgical History   Procedure Laterality Date     Tubal ligation       Esophagoscopy, gastroscopy, duodenoscopy (egd), combined N/A 1/5/2015     Procedure: COMBINED ESOPHAGOSCOPY, GASTROSCOPY, DUODENOSCOPY (EGD), BIOPSY SINGLE OR MULTIPLE;  Surgeon: Dylan Alejandra MD;  Location: [facility]     Laparoscopic herniorrhaphy inguinal Right 1/26/2015     Procedure: LAPAROSCOPIC HERNIORRHAPHY INGUINAL;  Surgeon: Favio Olsen MD;  Location: [facility]       Family History   Problem Relation Age of Onset     CANCER Mother      C.A.D. Father      Lipids Father      Hypertension Father      Hypertension Brother      CANCER Brother      esophageal cancer     Breast Cancer Other      Prostate Cancer Brother      Psychotic Disorder Daughter      DIABETES No family hx of      CEREBROVASCULAR DISEASE No family hx of      Cancer - colorectal No family hx of        Social History   Substance Use Topics     Smoking status: Former Smoker     Packs/day: 0.50     Types: Cigarettes      Addended by: STEVE HEREDIA on: 10/19/2022 11:15 AM     Modules accepted: Orders     Quit date: 6/16/2011     Smokeless tobacco: Never Used     Alcohol use No     Patient has been sober for 8 years     Ros: no chest pain, chest tightness   No sob   No leg edema   No abdominal pain     Denies fever or chills   Weight stable     Wt Readings from Last 2 Encounters:   03/02/17 158 lb (71.7 kg)   12/20/16 164 lb (74.4 kg)       O; /71  Temp 97.2  F (36.2  C) (Oral)  Wt 158 lb (71.7 kg)  SpO2 98%  Breastfeeding? No  BMI 28.21 kg/m2    Chest wall normal to inspection and palpation. Good excursion bilaterally. Lungs clear to auscultation. Good air movement bilaterally without rales, wheezes, or rhonchi.   Regular rate and  rhythm. S1 and S2 normal, no murmurs, clicks, gallops or rubs. No edema or JVD.    The abdomen is soft without tenderness, guarding, mass, rebound or organomegaly. Bowel sounds are normal. No CVA tenderness or inguinal adenopathy noted.      ICD-10-CM    1. Generalized anxiety disorder F41.1    2. Anxiety F41.9 clonazePAM (KLONOPIN) 0.5 MG tablet   3. Major depression in complete remission (H) F32.5    4. Encounter for screening mammogram for breast cancer Z12.31 *MA Screening Digital Bilateral       I did discuss that the patient should consider counseling to try and deal with anxiety in the future.  Even though at this point she has no history of falls she is an at increased risk of falling as she gets older and at some point her Klonopin may need to be stopped.  We did discuss medications that were on the current beer criteria and she was surprised to hear that Klonopin can be associated with falls

## 2022-10-23 DIAGNOSIS — F41.1 GAD (GENERALIZED ANXIETY DISORDER): ICD-10-CM

## 2022-10-25 RX ORDER — HYDROXYZINE HYDROCHLORIDE 10 MG/1
TABLET, FILM COATED ORAL
Qty: 90 TABLET | Refills: 1 | Status: SHIPPED | OUTPATIENT
Start: 2022-10-25 | End: 2022-11-22

## 2022-10-31 DIAGNOSIS — M17.0 OSTEOARTHRITIS OF BOTH KNEES, UNSPECIFIED OSTEOARTHRITIS TYPE: ICD-10-CM

## 2022-10-31 DIAGNOSIS — G89.4 CHRONIC PAIN SYNDROME: ICD-10-CM

## 2022-10-31 NOTE — TELEPHONE ENCOUNTER
Medication Question or Refill        What medication are you calling about (include dose and sig)?: tramadol 50mg tablet    Controlled Substance Agreement on file:   CSA -- Patient Level:     [Media Unavailable] Controlled Substance Agreement - Opioid - Scan on 7/1/2021  7:34 PM       Who prescribed the medication?: aAKINTOLA    Do you need a refill? Yes:     When did you use the medication last? THIS MORNING    Patient offered an appointment? No    Do you have any questions or concerns?  No    [id number] - [place], [place] - [address] AT [facility] OF [address]  [address] [facility] [address]  Phone: [phone] Fax: [phone]    Okay to leave a detailed message?: Yes at Home number on file [phone] (home)

## 2022-10-31 NOTE — TELEPHONE ENCOUNTER
Pending Prescriptions:                       Disp   Refills    traMADol (ULTRAM) 50 MG tablet            120 ta*3            Sig: Take 1 tablet (50 mg) by mouth every 6 hours as           needed for severe pain    Routing refill request to provider for review/approval because:  Drug not on the G refill protocol       Jovany Jimenez RN

## 2022-11-01 RX ORDER — TRAMADOL HYDROCHLORIDE 50 MG/1
50 TABLET ORAL EVERY 6 HOURS PRN
Qty: 120 TABLET | Refills: 3 | OUTPATIENT
Start: 2022-11-01

## 2022-11-02 DIAGNOSIS — G89.4 CHRONIC PAIN SYNDROME: ICD-10-CM

## 2022-11-02 DIAGNOSIS — M17.0 OSTEOARTHRITIS OF BOTH KNEES, UNSPECIFIED OSTEOARTHRITIS TYPE: ICD-10-CM

## 2022-11-02 RX ORDER — TRAMADOL HYDROCHLORIDE 50 MG/1
50 TABLET ORAL EVERY 6 HOURS PRN
Qty: 120 TABLET | Refills: 0 | Status: SHIPPED | OUTPATIENT
Start: 2022-11-02 | End: 2022-12-05

## 2022-11-02 NOTE — TELEPHONE ENCOUNTER
Patient calls the clinic today, she has requested that the Tramadol be sent to [facility] in [place], this is closer to her.    Medication is cued up, routing to provider out of office provider for review as PCP is signed out.      KRISTEN Martini

## 2022-11-02 NOTE — TELEPHONE ENCOUNTER
Reviewed Chart and PDMP.  Refilled Tramadol for one refill in PCP's absence.  Future refills per PCP.

## 2022-11-18 ENCOUNTER — APPOINTMENT (OUTPATIENT)
Dept: GENERAL RADIOLOGY | Facility: CLINIC | Age: 77
End: 2022-11-18
Attending: EMERGENCY MEDICINE
Payer: COMMERCIAL

## 2022-11-18 ENCOUNTER — HOSPITAL ENCOUNTER (EMERGENCY)
Facility: CLINIC | Age: 77
Discharge: HOME | End: 2022-11-19
Attending: EMERGENCY MEDICINE | Admitting: EMERGENCY MEDICINE
Payer: COMMERCIAL

## 2022-11-18 DIAGNOSIS — F41.9 ANXIETY: ICD-10-CM

## 2022-11-18 DIAGNOSIS — R91.8 LUNG MASS: ICD-10-CM

## 2022-11-18 DIAGNOSIS — J43.1 PANLOBULAR EMPHYSEMA (H): ICD-10-CM

## 2022-11-18 LAB
ALBUMIN SERPL BCG-MCNC: 3.8 G/DL (ref 3.5–5.2)
ALP SERPL-CCNC: 128 U/L (ref 35–104)
ALT SERPL W P-5'-P-CCNC: 14 U/L (ref 10–35)
ANION GAP SERPL CALCULATED.3IONS-SCNC: 9 MMOL/L (ref 7–15)
AST SERPL W P-5'-P-CCNC: 20 U/L (ref 10–35)
BASOPHILS # BLD AUTO: 0.1 10E3/UL (ref 0–0.2)
BASOPHILS NFR BLD AUTO: 1 %
BILIRUB DIRECT SERPL-MCNC: <0.2 MG/DL (ref 0–0.3)
BILIRUB SERPL-MCNC: <0.2 MG/DL
BUN SERPL-MCNC: 10.2 MG/DL (ref 8–23)
CALCIUM SERPL-MCNC: 10 MG/DL (ref 8.8–10.2)
CHLORIDE SERPL-SCNC: 99 MMOL/L (ref 98–107)
CREAT SERPL-MCNC: 0.5 MG/DL (ref 0.51–0.95)
DEPRECATED HCO3 PLAS-SCNC: 30 MMOL/L (ref 22–29)
EOSINOPHIL # BLD AUTO: 0.1 10E3/UL (ref 0–0.7)
EOSINOPHIL NFR BLD AUTO: 1 %
ERYTHROCYTE [DISTWIDTH] IN BLOOD BY AUTOMATED COUNT: 13.2 % (ref 10–15)
GFR SERPL CREATININE-BSD FRML MDRD: >90 ML/MIN/1.73M2
GLUCOSE SERPL-MCNC: 129 MG/DL (ref 70–99)
HCT VFR BLD AUTO: 34.7 % (ref 35–47)
HGB BLD-MCNC: 11.6 G/DL (ref 11.7–15.7)
HOLD SPECIMEN: NORMAL
IMM GRANULOCYTES # BLD: 0 10E3/UL
IMM GRANULOCYTES NFR BLD: 0 %
LYMPHOCYTES # BLD AUTO: 1.7 10E3/UL (ref 0.8–5.3)
LYMPHOCYTES NFR BLD AUTO: 23 %
MCH RBC QN AUTO: 28.6 PG (ref 26.5–33)
MCHC RBC AUTO-ENTMCNC: 33.4 G/DL (ref 31.5–36.5)
MCV RBC AUTO: 86 FL (ref 78–100)
MONOCYTES # BLD AUTO: 0.7 10E3/UL (ref 0–1.3)
MONOCYTES NFR BLD AUTO: 9 %
NEUTROPHILS # BLD AUTO: 4.8 10E3/UL (ref 1.6–8.3)
NEUTROPHILS NFR BLD AUTO: 66 %
NRBC # BLD AUTO: 0 10E3/UL
NRBC BLD AUTO-RTO: 0 /100
PLATELET # BLD AUTO: 328 10E3/UL (ref 150–450)
POTASSIUM SERPL-SCNC: 3.2 MMOL/L (ref 3.4–5.3)
PROT SERPL-MCNC: 7 G/DL (ref 6.4–8.3)
RBC # BLD AUTO: 4.06 10E6/UL (ref 3.8–5.2)
SODIUM SERPL-SCNC: 138 MMOL/L (ref 136–145)
TROPONIN T SERPL HS-MCNC: 9 NG/L
TSH SERPL DL<=0.005 MIU/L-ACNC: 2.55 UIU/ML (ref 0.3–4.2)
WBC # BLD AUTO: 7.4 10E3/UL (ref 4–11)

## 2022-11-18 PROCEDURE — 93005 ELECTROCARDIOGRAM TRACING: CPT | Performed by: EMERGENCY MEDICINE

## 2022-11-18 PROCEDURE — 94640 AIRWAY INHALATION TREATMENT: CPT | Performed by: EMERGENCY MEDICINE

## 2022-11-18 PROCEDURE — 99285 EMERGENCY DEPT VISIT HI MDM: CPT | Mod: 25 | Performed by: EMERGENCY MEDICINE

## 2022-11-18 PROCEDURE — 93010 ELECTROCARDIOGRAM REPORT: CPT | Performed by: EMERGENCY MEDICINE

## 2022-11-18 PROCEDURE — 84443 ASSAY THYROID STIM HORMONE: CPT | Performed by: EMERGENCY MEDICINE

## 2022-11-18 PROCEDURE — 80053 COMPREHEN METABOLIC PANEL: CPT | Performed by: EMERGENCY MEDICINE

## 2022-11-18 PROCEDURE — 82248 BILIRUBIN DIRECT: CPT | Performed by: EMERGENCY MEDICINE

## 2022-11-18 PROCEDURE — 71046 X-RAY EXAM CHEST 2 VIEWS: CPT

## 2022-11-18 PROCEDURE — 85025 COMPLETE CBC W/AUTO DIFF WBC: CPT | Performed by: EMERGENCY MEDICINE

## 2022-11-18 PROCEDURE — 36415 COLL VENOUS BLD VENIPUNCTURE: CPT | Performed by: EMERGENCY MEDICINE

## 2022-11-18 PROCEDURE — 250N000009 HC RX 250: Performed by: EMERGENCY MEDICINE

## 2022-11-18 PROCEDURE — 84484 ASSAY OF TROPONIN QUANT: CPT | Performed by: EMERGENCY MEDICINE

## 2022-11-18 RX ORDER — IPRATROPIUM BROMIDE AND ALBUTEROL SULFATE 2.5; .5 MG/3ML; MG/3ML
3 SOLUTION RESPIRATORY (INHALATION) ONCE
Status: COMPLETED | OUTPATIENT
Start: 2022-11-18 | End: 2022-11-18

## 2022-11-18 RX ADMIN — IPRATROPIUM BROMIDE AND ALBUTEROL SULFATE 3 ML: 2.5; .5 SOLUTION RESPIRATORY (INHALATION) at 22:35

## 2022-11-18 ASSESSMENT — ENCOUNTER SYMPTOMS
CONFUSION: 0
HEADACHES: 0
ACTIVITY CHANGE: 0
DIAPHORESIS: 1
VOMITING: 0
DIARRHEA: 0
PALPITATIONS: 1
FEVER: 0
CHEST TIGHTNESS: 1
ABDOMINAL PAIN: 0
NAUSEA: 0
SHORTNESS OF BREATH: 1
LIGHT-HEADEDNESS: 0
COUGH: 1
CHILLS: 0
WEAKNESS: 0
FATIGUE: 0
APPETITE CHANGE: 0
SORE THROAT: 0
BACK PAIN: 0
WHEEZING: 0
UNEXPECTED WEIGHT CHANGE: 0
RHINORRHEA: 0

## 2022-11-18 ASSESSMENT — ACTIVITIES OF DAILY LIVING (ADL): ADLS_ACUITY_SCORE: 37

## 2022-11-18 NOTE — ED TRIAGE NOTES
Pt reports sob for the past month worsening sob with palpitations, pt reports worsens with activity, thought it was maybe an asthma flare up, been using her inhaler with some relief       Triage Assessment     Row Name 11/18/22 5299       Cardiac WDL    Cardiac WDL WDL       Cognitive/Neuro/Behavioral WDL    Cognitive/Neuro/Behavioral WDL WDL

## 2022-11-19 ENCOUNTER — APPOINTMENT (OUTPATIENT)
Dept: CT IMAGING | Facility: CLINIC | Age: 77
End: 2022-11-19
Attending: EMERGENCY MEDICINE
Payer: COMMERCIAL

## 2022-11-19 VITALS
BODY MASS INDEX: 25.69 KG/M2 | HEIGHT: 63 IN | HEART RATE: 75 BPM | RESPIRATION RATE: 23 BRPM | OXYGEN SATURATION: 95 % | SYSTOLIC BLOOD PRESSURE: 158 MMHG | TEMPERATURE: 98.4 F | DIASTOLIC BLOOD PRESSURE: 84 MMHG | WEIGHT: 145 LBS

## 2022-11-19 PROCEDURE — 250N000011 HC RX IP 250 OP 636: Performed by: EMERGENCY MEDICINE

## 2022-11-19 PROCEDURE — 71260 CT THORAX DX C+: CPT

## 2022-11-19 PROCEDURE — 250N000009 HC RX 250: Performed by: EMERGENCY MEDICINE

## 2022-11-19 RX ORDER — CLONAZEPAM 0.5 MG/1
.25-.5 TABLET ORAL DAILY PRN
Qty: 14 TABLET | Refills: 0 | Status: SHIPPED | OUTPATIENT
Start: 2022-11-19 | End: 2023-01-04

## 2022-11-19 RX ORDER — IPRATROPIUM BROMIDE AND ALBUTEROL SULFATE 2.5; .5 MG/3ML; MG/3ML
1 SOLUTION RESPIRATORY (INHALATION) EVERY 6 HOURS PRN
Qty: 90 ML | Refills: 0 | Status: SHIPPED | OUTPATIENT
Start: 2022-11-19 | End: 2022-12-30

## 2022-11-19 RX ORDER — DEXAMETHASONE 6 MG/1
6 TABLET ORAL DAILY
Qty: 3 TABLET | Refills: 0 | Status: SHIPPED | OUTPATIENT
Start: 2022-11-19 | End: 2022-11-22

## 2022-11-19 RX ORDER — ALBUTEROL SULFATE 90 UG/1
2 AEROSOL, METERED RESPIRATORY (INHALATION) EVERY 6 HOURS PRN
Qty: 18 G | Refills: 0 | Status: SHIPPED | OUTPATIENT
Start: 2022-11-19 | End: 2022-12-05

## 2022-11-19 RX ORDER — IOPAMIDOL 755 MG/ML
63 INJECTION, SOLUTION INTRAVASCULAR ONCE
Status: COMPLETED | OUTPATIENT
Start: 2022-11-19 | End: 2022-11-19

## 2022-11-19 RX ADMIN — DEXAMETHASONE 10 MG: 2 TABLET ORAL at 00:24

## 2022-11-19 RX ADMIN — IOPAMIDOL 63 ML: 755 INJECTION, SOLUTION INTRAVENOUS at 00:34

## 2022-11-19 RX ADMIN — SODIUM CHLORIDE 75 ML: 9 INJECTION, SOLUTION INTRAVENOUS at 00:33

## 2022-11-19 ASSESSMENT — ACTIVITIES OF DAILY LIVING (ADL): ADLS_ACUITY_SCORE: 37

## 2022-11-19 NOTE — ED PROVIDER NOTES
History     Chief Complaint   Patient presents with     Palpitations     Shortness of Breath     Pt reports sob for the past month worsening sob with palpitations, pt reports worsens with activity, thought it was maybe an asthma flare up, been using her inhaler with some relief       HPI  Yamel Lindsay is a 77 year old female with a history of hypertension, asthma, anxiety presenting for evaluation of about a month of increasing difficulty breathing with some palpitations.  Has been using her albuterol inhaler thinking that she may be dealing with an asthma exacerbation and this has provided minimal relief.  She reports symptoms began with mild chest tightness and difficulty breathing about a month ago.  Symptoms have been progressively worsening since then.  She reports some exertional dyspnea.  Denies significant cough but does have an occasional dry cough.  She reports she has a longstanding history of smoking having started in her early 20s and only quit about a month ago.  Symptoms did begin shortly after quitting.  Denies any productive cough.  Denies chest pain but does notice an occasional palpitation which seems to be more frequent recently as well.  Denies any recent weight loss or weight gain.  Does report some random intermittent night sweats both day and night.  Denies any history of cancer.  Normal appetite.  Normal bowel movements and urination.  No rashes.  Does report some mild rhinorrhea but feels this is normal for her allergies.    Allergies:  Allergies   Allergen Reactions     Nka [No Known Allergies]      Seasonal Allergies        Problem List:    Patient Active Problem List    Diagnosis Date Noted     Benign essential hypertension 05/04/2017     Priority: Medium     Major depression in complete remission (H) 03/02/2017     Priority: Medium     Mild intermittent asthma without complication 12/20/2016     Priority: Medium     OA (osteoarthritis) of knee 04/16/2015     Priority: Medium     S/P  laparoscopic hernia repair 01/26/2015     Priority: Medium     GERD (gastroesophageal reflux disease) 11/24/2014     Priority: Medium     Hip pain 04/03/2014     Priority: Medium     Seasonal allergic rhinitis 10/21/2013     Priority: Medium     Advanced directives, counseling/discussion 12/19/2011     Priority: Medium     Advance Directive Problem List Overview:   Name Relationship Phone    Primary Health Care Agent            Alternative Health Care Agent          Discussed advance care planning with patient; information given to patient to review. 12/19/2011        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     Priority: Medium     Alcoholism (H) 08/28/2009     Priority: Medium     Recovered  Active in AA       Hyperlipidemia LDL goal <130 10/06/2008     Priority: Medium     Primary localized osteoarthrosis, lower leg 10/06/2008     Priority: Medium     Generalized anxiety disorder 10/06/2008     Priority: Medium     Diagnosis updated by automated process. Provider to review and confirm.       Rhinitis, allergic seasonal 10/06/2008     Priority: Medium        Past Medical History:    Past Medical History:   Diagnosis Date     Allergies      Anxiety      Hyperlipidemia      Hypertension      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     Nicotine dependence      Postmenopausal HRT (hormone replacement therapy) 1994     S/P alcohol detoxification 07/06       Past Surgical History:    Past Surgical History:   Procedure Laterality Date     ESOPHAGOSCOPY, GASTROSCOPY, DUODENOSCOPY (EGD), COMBINED N/A 1/5/2015    Procedure: COMBINED ESOPHAGOSCOPY, GASTROSCOPY, DUODENOSCOPY (EGD), BIOPSY SINGLE OR MULTIPLE;  Surgeon: Dylan Alejandra MD;  [facility]     ESOPHAGOSCOPY, GASTROSCOPY, DUODENOSCOPY (EGD), COMBINED N/A 5/25/2018    Procedure: COMBINED ESOPHAGOSCOPY, GASTROSCOPY, DUODENOSCOPY (EGD), BIOPSY SINGLE OR MULTIPLE;  gastroscopy;  Surgeon: Alexander Bautista MD;  Location: [facility]     LAPAROSCOPIC  HERNIORRHAPHY INGUINAL Right 2015    Procedure: LAPAROSCOPIC HERNIORRHAPHY INGUINAL;  Surgeon: Favio Olsen MD;  Location: [facility]     TUBAL LIGATION         Family History:    Family History   Problem Relation Age of Onset     Cancer Mother      C.A.D. Father      Lipids Father      Hypertension Father      Hypertension Brother      Cancer Brother         esophageal cancer     Breast Cancer Other      Prostate Cancer Brother      Psychotic Disorder Daughter      Diabetes No family hx of      Cerebrovascular Disease No family hx of      Cancer - colorectal No family hx of        Social History:  Marital Status:  Single [1]  Social History     Tobacco Use     Smoking status: Former     Packs/day: 0.50     Types: Cigarettes     Quit date: 2011     Years since quittin.4     Smokeless tobacco: Never   Vaping Use     Vaping Use: Never used   Substance Use Topics     Alcohol use: No     Drug use: No        Medications:    albuterol (PROAIR HFA/PROVENTIL HFA/VENTOLIN HFA) 108 (90 Base) MCG/ACT inhaler  atorvastatin (LIPITOR) 20 MG tablet  Calcium Carb-Cholecalciferol (CALCIUM 1000 + D PO)  Cholecalciferol (VITAMIN D-3 PO)  clonazePAM (KLONOPIN) 0.5 MG tablet  FLUoxetine (PROZAC) 20 MG capsule  fluticasone-salmeterol (ADVAIR HFA) 115-21 MCG/ACT inhaler  hydrochlorothiazide (HYDRODIURIL) 25 MG tablet  hydrOXYzine (ATARAX) 10 MG tablet  ibuprofen (ADVIL,MOTRIN) 200 MG tablet  multivitamin w/minerals (THERA-VIT-M) tablet  Omega-3 Fatty Acids (FISH OIL) 1200 MG CAPS  omeprazole (PRILOSEC) 20 MG DR capsule  tiZANidine (ZANAFLEX) 2 MG tablet  tiZANidine (ZANAFLEX) 2 MG tablet  traMADol (ULTRAM) 50 MG tablet  traZODone (DESYREL) 50 MG tablet          Review of Systems   Constitutional: Positive for diaphoresis. Negative for activity change, appetite change, chills, fatigue, fever and unexpected weight change.   HENT: Negative for congestion, rhinorrhea and sore throat.    Respiratory: Positive for cough (rare dry  "cough), chest tightness and shortness of breath. Negative for wheezing.    Cardiovascular: Positive for palpitations. Negative for chest pain.   Gastrointestinal: Negative for abdominal pain, diarrhea, nausea and vomiting.   Genitourinary: Negative for decreased urine volume.   Musculoskeletal: Negative for back pain.   Skin: Negative for rash.   Neurological: Negative for weakness, light-headedness and headaches.   Psychiatric/Behavioral: Negative for confusion.   All other systems reviewed and are negative.      Physical Exam   BP: (!) 155/98  Pulse: 94  Temp: 98.4  F (36.9  C)  Resp: 18  Height: 160 cm (5' 3\")  Weight: 65.8 kg (145 lb)  SpO2: 96 %      Physical Exam  Vitals and nursing note reviewed.   Constitutional:       Appearance: She is well-developed. She is not ill-appearing or diaphoretic.   HENT:      Head: Atraumatic.      Mouth/Throat:      Mouth: Mucous membranes are moist.   Cardiovascular:      Rate and Rhythm: Normal rate and regular rhythm.  Extrasystoles (occasional PAC on monitor) are present.  Pulmonary:      Effort: Pulmonary effort is normal.      Breath sounds: Examination of the left-lower field reveals decreased breath sounds. Decreased breath sounds present. No wheezing, rhonchi or rales.   Chest:      Chest wall: No tenderness.   Abdominal:      Palpations: Abdomen is soft.      Tenderness: There is no abdominal tenderness.   Musculoskeletal:         General: Normal range of motion.      Cervical back: Normal range of motion.      Right lower leg: No tenderness. No edema.      Left lower leg: No tenderness. No edema.   Skin:     General: Skin is warm and dry.      Capillary Refill: Capillary refill takes less than 2 seconds.   Neurological:      Mental Status: She is alert and oriented to person, place, and time.   Psychiatric:         Mood and Affect: Mood normal.         ED Course                 Procedures              EKG Interpretation:      Interpreted by Rhett Galvan, " MD  Time reviewed: 1030  Symptoms at time of EKG: dyspnea, palpitations   Rhythm: sinus with occasional PACs  Rate: Normal  Axis: Normal  Ectopy: premature atrial contractions  Conduction: normal  ST Segments/ T Waves: No acute ischemic changes  Q Waves: none  Comparison to prior: Similar to previous EKG 1/26/2015    Clinical Impression: Sinus rhythm with occasional PAC                         Results for orders placed or performed during the hospital encounter of 11/18/22 (from the past 24 hour(s))   [place] Draw    Narrative    The following orders were created for panel order [place] Draw.  Procedure                               Abnormality         Status                     ---------                               -----------         ------                     Extra Blue Top Tube[[id number]]                              Final result               Extra Red Top Tube[[id number]]                               Final result               Extra Green Top (Lithium...[[id number]]                      Final result               Extra Purple Top Tube[[id number]]                            Final result                 Please view results for these tests on the individual orders.   CBC with Platelets & Differential    Narrative    The following orders were created for panel order CBC with Platelets & Differential.  Procedure                               Abnormality         Status                     ---------                               -----------         ------                     CBC with platelets and d...[[id number]]  Abnormal            Final result                 Please view results for these tests on the individual orders.   Basic metabolic panel   Result Value Ref Range    Sodium 138 136 - 145 mmol/L    Potassium 3.2 (L) 3.4 - 5.3 mmol/L    Chloride 99 98 - 107 mmol/L    Carbon Dioxide (CO2) 30 (H) 22 - 29 mmol/L    Anion Gap 9 7 - 15 mmol/L    Urea Nitrogen 10.2 8.0 - 23.0 mg/dL    Creatinine 0.50 (L) 0.51 - 0.95  mg/dL    Calcium 10.0 8.8 - 10.2 mg/dL    Glucose 129 (H) 70 - 99 mg/dL    GFR Estimate >90 >60 mL/min/1.73m2   Troponin T, High Sensitivity   Result Value Ref Range    Troponin T, High Sensitivity 9 <=14 ng/L   Extra Red Top Tube   Result Value Ref Range    Hold Specimen JIC    Extra Green Top (Lithium Heparin) Tube   Result Value Ref Range    Hold Specimen JIC    Extra Purple Top Tube   Result Value Ref Range    Hold Specimen JIC    CBC with platelets and differential   Result Value Ref Range    WBC Count 7.4 4.0 - 11.0 10e3/uL    RBC Count 4.06 3.80 - 5.20 10e6/uL    Hemoglobin 11.6 (L) 11.7 - 15.7 g/dL    Hematocrit 34.7 (L) 35.0 - 47.0 %    MCV 86 78 - 100 fL    MCH 28.6 26.5 - 33.0 pg    MCHC 33.4 31.5 - 36.5 g/dL    RDW 13.2 10.0 - 15.0 %    Platelet Count 328 150 - 450 10e3/uL    % Neutrophils 66 %    % Lymphocytes 23 %    % Monocytes 9 %    % Eosinophils 1 %    % Basophils 1 %    % Immature Granulocytes 0 %    NRBCs per 100 WBC 0 <1 /100    Absolute Neutrophils 4.8 1.6 - 8.3 10e3/uL    Absolute Lymphocytes 1.7 0.8 - 5.3 10e3/uL    Absolute Monocytes 0.7 0.0 - 1.3 10e3/uL    Absolute Eosinophils 0.1 0.0 - 0.7 10e3/uL    Absolute Basophils 0.1 0.0 - 0.2 10e3/uL    Absolute Immature Granulocytes 0.0 <=0.4 10e3/uL    Absolute NRBCs 0.0 10e3/uL   TSH with free T4 reflex   Result Value Ref Range    TSH 2.55 0.30 - 4.20 uIU/mL   Hepatic panel   Result Value Ref Range    Protein Total 7.0 6.4 - 8.3 g/dL    Albumin 3.8 3.5 - 5.2 g/dL    Bilirubin Total <0.2 <=1.2 mg/dL    Alkaline Phosphatase 128 (H) 35 - 104 U/L    AST 20 10 - 35 U/L    ALT 14 10 - 35 U/L    Bilirubin Direct <0.20 0.00 - 0.30 mg/dL   Extra Blue Top Tube   Result Value Ref Range    Hold Specimen JIC    XR Chest 2 Views    Narrative    EXAM: XR CHEST 2 VIEWS  LOCATION: [facility]  DATE/TIME: 11/18/2022 10:56 PM    INDICATION: Cough; diminished left lower lobe breath sounds.  COMPARISON: None.      Impression    IMPRESSION:  There is nodular consolidation at the left lung base, which measures up to 2.4 cm. Correlation with CT imaging is recommended, as an underlying malignancy is not excluded.    Possible additional consolidation within the left lower lobe, as seen on the lateral projection. Attention on CT imaging.    No pleural effusion or pneumothorax.    Cardiomediastinal silhouette is normal. Atherosclerotic calcifications of the thoracic aorta.   Chest CT - IV contrast only - TRAUMA / DISSECTION    Narrative    EXAM: CT CHEST W CONTRAST  LOCATION: [facility]  DATE/TIME: 11/19/2022 12:45 AM    INDICATION: Dyspnea, chronic smoker,  abnormal CXR   likely lung cancer  COMPARISON: Chest radiographs from 11/18/2022  TECHNIQUE: CT chest with IV contrast. Multiplanar reformats were obtained. Dose reduction techniques were used.    CONTRAST: 63 mL Isovue 370    FINDINGS:   LUNGS AND PLEURA: Moderate centrilobular emphysema. In the medial aspect of the right lower lobe abutting the pleura and mediastinal interfaces, there is a heterogeneously enhancing mass measuring 7.3 x 5.0 x 11.0 cm. This mass nearly encases the   interlobar bronchus, abuts the interlobar pulmonary artery, and abuts the mid distal thoracic esophagus and the left atrium. There is an additional spiculated nodule at the left lung base in the lower lobe measuring 2.2 x 2.2 cm on image 238 of series 4.   There is a flattened, smooth bordered nodule within the right minor fissure measuring 7 mm on image 207 which may be an intrafissural lymph node. No pneumothorax or pleural effusion.    MEDIASTINUM/AXILLAE: Heart size is normal. No pericardial effusion. Multivessel coronary artery disease.    CORONARY ARTERY CALCIFICATION: Moderate.    UPPER ABDOMEN: There is an indeterminate 12 mm lesion in the periphery of the right liver lobe on image 148 of series 3.    MUSCULOSKELETAL: Thoracic spine degenerative disc change.      Impression    IMPRESSION:    1.  11 cm right lower lobe mass abutting multiple central bronchovascular and mediastinal structures, consistent with malignancy.    2.  2.2 cm spiculated nodule in the left lower lobe, also consistent with malignancy.    3.  Technically indeterminate 12 mm hypodense lesion in the liver. A metastasis is not excluded.       Medications   ipratropium - albuterol 0.5 mg/2.5 mg/3 mL (DUONEB) neb solution 3 mL (3 mLs Nebulization Given 11/18/22 2235)   dexamethasone (DECADRON) tablet 10 mg (10 mg Oral Given 11/19/22 0024)   iopamidol (ISOVUE-370) solution 63 mL (63 mLs Intravenous Given 11/19/22 0034)   sodium chloride 0.9 % bag 500mL for CT scan flush use (75 mLs Intravenous Given 11/19/22 0033)     12:05 AM Patient re-assessed: Updated regarding abnormal x-ray and recommendation for CT. patient does report feeling improvement in her breathing after the DuoNeb.  Will order a dose of dexamethasone    2:01 AM Patient re-assessed: Breathing much better after neb and dexamethasone.  Reports feeling back to normal with her breathing.  Advised patient and daughter of CT findings concerning for primary malignancy and need for oncology referral.    Assessments & Plan (with Medical Decision Making)  77-year-old female with longstanding history of smoking presenting for evaluation of difficulty breathing.  Has history of emphysema and has been having some progressive difficulty breathing over the past month.  Well-appearing in the ED in no distress.  No increased work of breathing here with normal oxygenation.  Did have some diminished lung sounds on exam so x-ray was obtained which showed a consolidation concerning for possible malignancy.  Obtained labs which showed an elevated alk phos and mildly low potassium.  CT obtained confirmed the presence of likely to areas of primary malignancy in the right and left lung with possible liver metastasis.  Advised patient of findings concerning for primary malignancy with possible  metastasis.  Treated here with albuterol and dexamethasone with notable improvement in her subjective dyspnea.  Referred her to oncology via  referral with a plan for close follow-up for tissue diagnosis and oncology evaluation.     I have reviewed the nursing notes.    I have reviewed the findings, diagnosis, plan and need for follow up with the patient.       New Prescriptions    No medications on file       Final diagnoses:   Panlobular emphysema (H)   Lung mass - Concerning for primary lung cancer       11/18/2022   [facility] EMERGENCY DEPT     Galvan, Rhett Stahl MD  11/19/22 2982

## 2022-11-21 ENCOUNTER — PATIENT OUTREACH (OUTPATIENT)
Dept: ONCOLOGY | Facility: CLINIC | Age: 77
End: 2022-11-21

## 2022-11-21 ENCOUNTER — TRANSCRIBE ORDERS (OUTPATIENT)
Dept: ONCOLOGY | Facility: CLINIC | Age: 77
End: 2022-11-21

## 2022-11-21 DIAGNOSIS — R91.8 LUNG MASS: Primary | ICD-10-CM

## 2022-11-21 NOTE — TELEPHONE ENCOUNTER
RECORDS STATUS - ALL OTHER DIAGNOSIS      RECORDS RECEIVED FROM: [facility]   DATE RECEIVED: 11/21/22   NOTES STATUS DETAILS   OFFICE NOTE from referring provider Epic Dr. Rhett GOLDBERG Galvan   DISCHARGE REPORT from the ER [facility] 11/18/22: [facility] ED   PFT [facility] 11/22/22   MEDICATION LIST [facility]    LABS     ANYTHING RELATED TO DIAGNOSIS Epic Most recent 11/18/22   IMAGING (NEED IMAGES & REPORT)     CT SCANS PACS 11/19/22: CT Chest   XRAYS PACS 11/18/22-09/27/11: XR Chest

## 2022-11-22 ENCOUNTER — PRE VISIT (OUTPATIENT)
Dept: PULMONOLOGY | Facility: CLINIC | Age: 77
End: 2022-11-22

## 2022-11-22 ENCOUNTER — VIRTUAL VISIT (OUTPATIENT)
Dept: PULMONOLOGY | Facility: CLINIC | Age: 77
End: 2022-11-22
Attending: EMERGENCY MEDICINE
Payer: COMMERCIAL

## 2022-11-22 DIAGNOSIS — R91.8 LUNG MASS: ICD-10-CM

## 2022-11-22 DIAGNOSIS — R91.1 LUNG NODULE: Primary | ICD-10-CM

## 2022-11-22 LAB
DLCOUNC-%PRED-PRE: 84 %
DLCOUNC-PRE: 15.49 ML/MIN/MMHG
DLCOUNC-PRED: 18.41 ML/MIN/MMHG
ERV-%PRED-PRE: 55 %
ERV-PRE: 0.33 L
ERV-PRED: 0.59 L
EXPTIME-PRE: 8.08 SEC
FEF2575-%PRED-PRE: 45 %
FEF2575-PRE: 0.74 L/SEC
FEF2575-PRED: 1.63 L/SEC
FEFMAX-%PRED-PRE: 78 %
FEFMAX-PRE: 3.95 L/SEC
FEFMAX-PRED: 5.01 L/SEC
FEV1-%PRED-PRE: 77 %
FEV1-PRE: 1.52 L
FEV1FEV6-PRE: 59 %
FEV1FEV6-PRED: 78 %
FEV1FVC-PRE: 57 %
FEV1FVC-PRED: 78 %
FEV1SVC-PRE: 51 %
FEV1SVC-PRED: 70 %
FIFMAX-PRE: 4.48 L/SEC
FRCPLETH-%PRED-PRE: 144 %
FRCPLETH-PRE: 3.84 L
FRCPLETH-PRED: 2.66 L
FVC-%PRED-PRE: 104 %
FVC-PRE: 2.69 L
FVC-PRED: 2.57 L
IC-%PRED-PRE: 119 %
IC-PRE: 2.64 L
IC-PRED: 2.21 L
RVPLETH-%PRED-PRE: 166 %
RVPLETH-PRE: 3.51 L
RVPLETH-PRED: 2.11 L
TLCPLETH-%PRED-PRE: 135 %
TLCPLETH-PRE: 6.48 L
TLCPLETH-PRED: 4.77 L
VA-%PRED-PRE: 90 %
VA-PRE: 4.05 L
VC-%PRED-PRE: 105 %
VC-PRE: 2.97 L
VC-PRED: 2.81 L

## 2022-11-22 PROCEDURE — 94729 DIFFUSING CAPACITY: CPT | Performed by: INTERNAL MEDICINE

## 2022-11-22 PROCEDURE — 94375 RESPIRATORY FLOW VOLUME LOOP: CPT | Performed by: INTERNAL MEDICINE

## 2022-11-22 PROCEDURE — 99204 OFFICE O/P NEW MOD 45 MIN: CPT | Mod: 25 | Performed by: INTERNAL MEDICINE

## 2022-11-22 PROCEDURE — 94726 PLETHYSMOGRAPHY LUNG VOLUMES: CPT | Performed by: INTERNAL MEDICINE

## 2022-11-22 PROCEDURE — G0463 HOSPITAL OUTPT CLINIC VISIT: HCPCS | Mod: PN,RTG | Performed by: INTERNAL MEDICINE

## 2022-11-22 NOTE — LETTER
11/22/2022       RE: Yamel Lindsay  [address] [facility] [address]     Dear Colleague,    Thank you for referring your patient, Yamel Lindsay, to the [facility] at [facility]. Please see a copy of my visit note below.      LUNG NODULE & INTERVENTIONAL PULMONARY CLINIC  CLINICS & SURGERY CENTER, [facility]     Yamel Lindsay MRN# [id number]   Age: 77 year old YOB: 1945       Requesting Physician: Rhett Galvan MD  [address] [place] [address]       Assessment and Plan:    1. New multiple pulmonary lung mass/nodule. Given the characteristics on current/previous imaging and risk factors; I would classify this to be High (>65%) risk for cancer. Concern for mets vs synchronous primaries.  No evident supraclav nodes.    PET now with likely bronchoscopic biopsy.               History:     Yamel Lindsay is a 77 year old female with sig h/o for HTN, HLD, anxiety, who is here for evaluation/followup of lung mass .    She had 1 month history of increasing dyspnea with palpitations. She trialed using her albuterol inhaler with minimal relief. No recent weight changes but does have random intermittent night sweats day and night. She went to the ED for these symptoms on 11/18/22 and CT was obtained at that time. Trialed with albuterol and dexamethasone with notable improvement in her symptoms.     - My interpretation of the images relevant for this visit includes: 11 cm RLL mass, 2.2 cm LLL spiculated nodule, consistent with malignancy.   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     Nicotine dependence      Postmenopausal HRT (hormone  replacement therapy)      S/P alcohol detoxification     Recovered and active in AA           Past Surgical History:      Past Surgical History:   Procedure Laterality Date     ESOPHAGOSCOPY, GASTROSCOPY, DUODENOSCOPY (EGD), COMBINED N/A 2015    Procedure: COMBINED ESOPHAGOSCOPY, GASTROSCOPY, DUODENOSCOPY (EGD), BIOPSY SINGLE OR MULTIPLE;  Surgeon: Dylan Alejandra MD;  Location: [facility]     ESOPHAGOSCOPY, GASTROSCOPY, DUODENOSCOPY (EGD), COMBINED N/A 2018    Procedure: COMBINED ESOPHAGOSCOPY, GASTROSCOPY, DUODENOSCOPY (EGD), BIOPSY SINGLE OR MULTIPLE;  gastroscopy;  Surgeon: Alexander Bautista MD;  Location: [facility]     LAPAROSCOPIC HERNIORRHAPHY INGUINAL Right 2015    Procedure: LAPAROSCOPIC HERNIORRHAPHY INGUINAL;  Surgeon: Favio Olsen MD;  Location: [facility]     TUBAL LIGATION            Social History:     Social History     Tobacco Use     Smoking status: Former     Packs/day: 0.50     Types: Cigarettes     Quit date: 2011     Years since quittin.4     Smokeless tobacco: Never   Substance Use Topics     Alcohol use: No          Family History:     Family History   Problem Relation Age of Onset     Cancer Mother      C.A.D. Father      Lipids Father      Hypertension Father      Hypertension Brother      Cancer Brother         esophageal cancer     Breast Cancer Other      Prostate Cancer Brother      Psychotic Disorder Daughter      Diabetes No family hx of      Cerebrovascular Disease No family hx of      Cancer - colorectal No family hx of            Allergies:      Allergies   Allergen Reactions     Nka [No Known Allergies]      Seasonal Allergies           Medications:     Current Outpatient Medications   Medication Sig     albuterol (PROAIR HFA/PROVENTIL HFA/VENTOLIN HFA) 108 (90 Base) MCG/ACT inhaler Inhale 2 puffs into the lungs every 6 hours as needed for shortness of breath / dyspnea or wheezing     atorvastatin (LIPITOR) 20 MG tablet Take 1 tablet (20 mg) by mouth  daily     Calcium Carb-Cholecalciferol (CALCIUM 1000 + D PO) Take 1 tablet by mouth     Cholecalciferol (VITAMIN D-3 PO) Take 2,000 Units by mouth daily      clonazePAM (KLONOPIN) 0.5 MG tablet Take 0.5-1 tablets (0.25-0.5 mg) by mouth daily as needed for anxiety     fluticasone-salmeterol (ADVAIR HFA) 115-21 MCG/ACT inhaler Inhale 2 puffs into the lungs 2 times daily     hydrochlorothiazide (HYDRODIURIL) 25 MG tablet Take 1 tablet (25 mg) by mouth daily     ibuprofen (ADVIL,MOTRIN) 200 MG tablet Take 200-600 mg by mouth every 2 hours as needed for mild pain     ipratropium - albuterol 0.5 mg/2.5 mg/3 mL (DUONEB) 0.5-2.5 (3) MG/3ML neb solution Take 1 vial (3 mLs) by nebulization every 6 hours as needed for shortness of breath / dyspnea or wheezing     multivitamin w/minerals (THERA-VIT-M) tablet Take 1 tablet by mouth daily     Omega-3 Fatty Acids (FISH OIL) 1200 MG CAPS Take 1 capsule by mouth     omeprazole (PRILOSEC) 20 MG DR capsule Take 1 capsule (20 mg) by mouth daily 30-60 minutes before a meal.     traMADol (ULTRAM) 50 MG tablet Take 1 tablet (50 mg) by mouth every 6 hours as needed for severe pain COVERING FOR PCP ONLY ONCE     traZODone (DESYREL) 50 MG tablet TAKE 2 TABLETS(100 MG) BY MOUTH AT BEDTIME     dexamethasone (DECADRON) 6 MG tablet Take 1 tablet (6 mg) by mouth daily for 3 days (Patient not taking: Reported on 11/22/2022)     FLUoxetine (PROZAC) 20 MG capsule Take 2 capsules (40 mg) by mouth daily     hydrOXYzine (ATARAX) 10 MG tablet TAKE 1 TABLET(10 MG) BY MOUTH THREE TIMES DAILY AS NEEDED FOR ANXIETY (Patient not taking: Reported on 11/22/2022)     tiZANidine (ZANAFLEX) 2 MG tablet TAKE 1 TABLET(2 MG) BY MOUTH THREE TIMES DAILY AS NEEDED FOR MUSCLE SPASMS (Patient not taking: Reported on 11/22/2022)     tiZANidine (ZANAFLEX) 2 MG tablet Take 1 tablet (2 mg) by mouth 3 times daily as needed for muscle spasms (Patient not taking: Reported on 11/22/2022)     No current facility-administered  medications for this visit.          Review of Systems:     See HPI         Physical Exam:   There were no vitals taken for this visit.    Constitutional - looks well, in no apparent distress  Eyes - no redness or discharge  Respiratory -breathing appears comfortable. No wheeze or rhonchi.   Skin - No appreciable discoloration or lesions (very limited exam)  Neurological - No apparent tremors. Speech fluent and articlate  Psychiatric - no signs of delirium or anxiety          Current Laboratory Data:   All laboratory and imaging data reviewed.    Results for orders placed or performed in visit on 11/22/22 (from the past 24 hour(s))   General PFT Lab (Please always keep checked)   Result Value Ref Range    FVC-Pred 2.57 L    FVC-Pre 2.69 L    FVC-%Pred-Pre 104 %    FEV1-Pre 1.52 L    FEV1-%Pred-Pre 77 %    FEV1FVC-Pred 78 %    FEV1FVC-Pre 57 %    FEFMax-Pred 5.01 L/sec    FEFMax-Pre 3.95 L/sec    FEFMax-%Pred-Pre 78 %    FEF2575-Pred 1.63 L/sec    FEF2575-Pre 0.74 L/sec    PYC2794-%Pred-Pre 45 %    ExpTime-Pre 8.08 sec    FIFMax-Pre 4.48 L/sec    VC-Pred 2.81 L    VC-Pre 2.97 L    VC-%Pred-Pre 105 %    IC-Pred 2.21 L    IC-Pre 2.64 L    IC-%Pred-Pre 119 %    ERV-Pred 0.59 L    ERV-Pre 0.33 L    ERV-%Pred-Pre 55 %    FEV1FEV6-Pred 78 %    FEV1FEV6-Pre 59 %    FRCPleth-Pred 2.66 L    FRCPleth-Pre 3.84 L    FRCPleth-%Pred-Pre 144 %    RVPleth-Pred 2.11 L    RVPleth-Pre 3.51 L    RVPleth-%Pred-Pre 166 %    TLCPleth-Pred 4.77 L    TLCPleth-Pre 6.48 L    TLCPleth-%Pred-Pre 135 %    DLCOunc-Pred 18.41 ml/min/mmHg    DLCOunc-Pre 15.49 ml/min/mmHg    DLCOunc-%Pred-Pre 84 %    VA-Pre 4.05 L    VA-%Pred-Pre 90 %    FEV1SVC-Pred 70 %    FEV1SVC-Pre 51 %                  Again, thank you for allowing me to participate in the care of your patient.      Sincerely,    Meek Buck MD

## 2022-11-22 NOTE — PROGRESS NOTES
Yamel is a 77 year old who is being evaluated via a billable video visit.      How would you like to obtain your AVS? MyChart  If the video visit is dropped, the invitation should be resent by: Text to cell phone: [phone]  Will anyone else be joining your video visit? No      Briannavida Holley VERNON    Video-Visit Details    Video Start Time: 335    Type of service:  Video Visit    Video End Time:350    Originating Location (pt. Location): Home        Distant Location (provider location):  On-site    Platform used for Video Visit: [facility]     LUNG NODULE & INTERVENTIONAL PULMONARY CLINIC  CLINICS & SURGERY CENTER, [facility]     Yamel Lindsay MRN# [id number]   Age: 77 year old YOB: 1945       Requesting Physician: Rhett Galvan MD  [address] [place] [address]       Assessment and Plan:    1. New multiple pulmonary lung mass/nodule. Given the characteristics on current/previous imaging and risk factors; I would classify this to be High (>65%) risk for cancer. Concern for mets vs synchronous primaries.  No evident supraclav nodes.    PET now with likely bronchoscopic biopsy.               History:     Yamel Lindsay is a 77 year old female with sig h/o for HTN, HLD, anxiety, who is here for evaluation/followup of lung mass .    She had 1 month history of increasing dyspnea with palpitations. She trialed using her albuterol inhaler with minimal relief. No recent weight changes but does have random intermittent night sweats day and night. She went to the ED for these symptoms on 11/18/22 and CT was obtained at that time. Trialed with albuterol and dexamethasone with notable improvement in her symptoms.     - My interpretation of the images relevant for this visit includes: 11 cm RLL mass, 2.2 cm LLL spiculated nodule, consistent with malignancy.   - My interpretation of the PFT's relevant for this visit includes: Obstructive pattern             Past Medical History:      Past Medical History:   Diagnosis Date     Allergies      Anxiety      Hyperlipidemia      Hypertension      Mild recurrent major depression (H) 2010     Nicotine dependence      Postmenopausal HRT (hormone replacement therapy)      S/P alcohol detoxification     Recovered and active in AA           Past Surgical History:      Past Surgical History:   Procedure Laterality Date     ESOPHAGOSCOPY, GASTROSCOPY, DUODENOSCOPY (EGD), COMBINED N/A 2015    Procedure: COMBINED ESOPHAGOSCOPY, GASTROSCOPY, DUODENOSCOPY (EGD), BIOPSY SINGLE OR MULTIPLE;  Surgeon: Dylan Alejandra MD;  Location: [facility]     ESOPHAGOSCOPY, GASTROSCOPY, DUODENOSCOPY (EGD), COMBINED N/A 2018    Procedure: COMBINED ESOPHAGOSCOPY, GASTROSCOPY, DUODENOSCOPY (EGD), BIOPSY SINGLE OR MULTIPLE;  gastroscopy;  Surgeon: Alexander Bautista MD;  Location: [facility]     LAPAROSCOPIC HERNIORRHAPHY INGUINAL Right 2015    Procedure: LAPAROSCOPIC HERNIORRHAPHY INGUINAL;  Surgeon: Favio Olsen MD;  Location: [facility]     TUBAL LIGATION            Social History:     Social History     Tobacco Use     Smoking status: Former     Packs/day: 0.50     Types: Cigarettes     Quit date: 2011     Years since quittin.4     Smokeless tobacco: Never   Substance Use Topics     Alcohol use: No          Family History:     Family History   Problem Relation Age of Onset     Cancer Mother      C.A.D. Father      Lipids Father      Hypertension Father      Hypertension Brother      Cancer Brother         esophageal cancer     Breast Cancer Other      Prostate Cancer Brother      Psychotic Disorder Daughter      Diabetes No family hx of      Cerebrovascular Disease No family hx of      Cancer - colorectal No family hx of            Allergies:      Allergies   Allergen Reactions     Nka [No Known Allergies]      Seasonal Allergies           Medications:     Current Outpatient Medications   Medication Sig     albuterol  (PROAIR HFA/PROVENTIL HFA/VENTOLIN HFA) 108 (90 Base) MCG/ACT inhaler Inhale 2 puffs into the lungs every 6 hours as needed for shortness of breath / dyspnea or wheezing     atorvastatin (LIPITOR) 20 MG tablet Take 1 tablet (20 mg) by mouth daily     Calcium Carb-Cholecalciferol (CALCIUM 1000 + D PO) Take 1 tablet by mouth     Cholecalciferol (VITAMIN D-3 PO) Take 2,000 Units by mouth daily      clonazePAM (KLONOPIN) 0.5 MG tablet Take 0.5-1 tablets (0.25-0.5 mg) by mouth daily as needed for anxiety     fluticasone-salmeterol (ADVAIR HFA) 115-21 MCG/ACT inhaler Inhale 2 puffs into the lungs 2 times daily     hydrochlorothiazide (HYDRODIURIL) 25 MG tablet Take 1 tablet (25 mg) by mouth daily     ibuprofen (ADVIL,MOTRIN) 200 MG tablet Take 200-600 mg by mouth every 2 hours as needed for mild pain     ipratropium - albuterol 0.5 mg/2.5 mg/3 mL (DUONEB) 0.5-2.5 (3) MG/3ML neb solution Take 1 vial (3 mLs) by nebulization every 6 hours as needed for shortness of breath / dyspnea or wheezing     multivitamin w/minerals (THERA-VIT-M) tablet Take 1 tablet by mouth daily     Omega-3 Fatty Acids (FISH OIL) 1200 MG CAPS Take 1 capsule by mouth     omeprazole (PRILOSEC) 20 MG DR capsule Take 1 capsule (20 mg) by mouth daily 30-60 minutes before a meal.     traMADol (ULTRAM) 50 MG tablet Take 1 tablet (50 mg) by mouth every 6 hours as needed for severe pain COVERING FOR PCP ONLY ONCE     traZODone (DESYREL) 50 MG tablet TAKE 2 TABLETS(100 MG) BY MOUTH AT BEDTIME     dexamethasone (DECADRON) 6 MG tablet Take 1 tablet (6 mg) by mouth daily for 3 days (Patient not taking: Reported on 11/22/2022)     FLUoxetine (PROZAC) 20 MG capsule Take 2 capsules (40 mg) by mouth daily     hydrOXYzine (ATARAX) 10 MG tablet TAKE 1 TABLET(10 MG) BY MOUTH THREE TIMES DAILY AS NEEDED FOR ANXIETY (Patient not taking: Reported on 11/22/2022)     tiZANidine (ZANAFLEX) 2 MG tablet TAKE 1 TABLET(2 MG) BY MOUTH THREE TIMES DAILY AS NEEDED FOR MUSCLE SPASMS  (Patient not taking: Reported on 11/22/2022)     tiZANidine (ZANAFLEX) 2 MG tablet Take 1 tablet (2 mg) by mouth 3 times daily as needed for muscle spasms (Patient not taking: Reported on 11/22/2022)     No current facility-administered medications for this visit.          Review of Systems:     See HPI         Physical Exam:   There were no vitals taken for this visit.    Constitutional - looks well, in no apparent distress  Eyes - no redness or discharge  Respiratory -breathing appears comfortable. No wheeze or rhonchi.   Skin - No appreciable discoloration or lesions (very limited exam)  Neurological - No apparent tremors. Speech fluent and articlate  Psychiatric - no signs of delirium or anxiety          Current Laboratory Data:   All laboratory and imaging data reviewed.    Results for orders placed or performed in visit on 11/22/22 (from the past 24 hour(s))   General PFT Lab (Please always keep checked)   Result Value Ref Range    FVC-Pred 2.57 L    FVC-Pre 2.69 L    FVC-%Pred-Pre 104 %    FEV1-Pre 1.52 L    FEV1-%Pred-Pre 77 %    FEV1FVC-Pred 78 %    FEV1FVC-Pre 57 %    FEFMax-Pred 5.01 L/sec    FEFMax-Pre 3.95 L/sec    FEFMax-%Pred-Pre 78 %    FEF2575-Pred 1.63 L/sec    FEF2575-Pre 0.74 L/sec    ZIG1274-%Pred-Pre 45 %    ExpTime-Pre 8.08 sec    FIFMax-Pre 4.48 L/sec    VC-Pred 2.81 L    VC-Pre 2.97 L    VC-%Pred-Pre 105 %    IC-Pred 2.21 L    IC-Pre 2.64 L    IC-%Pred-Pre 119 %    ERV-Pred 0.59 L    ERV-Pre 0.33 L    ERV-%Pred-Pre 55 %    FEV1FEV6-Pred 78 %    FEV1FEV6-Pre 59 %    FRCPleth-Pred 2.66 L    FRCPleth-Pre 3.84 L    FRCPleth-%Pred-Pre 144 %    RVPleth-Pred 2.11 L    RVPleth-Pre 3.51 L    RVPleth-%Pred-Pre 166 %    TLCPleth-Pred 4.77 L    TLCPleth-Pre 6.48 L    TLCPleth-%Pred-Pre 135 %    DLCOunc-Pred 18.41 ml/min/mmHg    DLCOunc-Pre 15.49 ml/min/mmHg    DLCOunc-%Pred-Pre 84 %    VA-Pre 4.05 L    VA-%Pred-Pre 90 %    FEV1SVC-Pred 70 %    FEV1SVC-Pre 51 %

## 2022-11-23 ENCOUNTER — VIRTUAL VISIT (OUTPATIENT)
Dept: FAMILY MEDICINE | Facility: CLINIC | Age: 77
End: 2022-11-23
Payer: COMMERCIAL

## 2022-11-23 DIAGNOSIS — J43.1 PANLOBULAR EMPHYSEMA (H): ICD-10-CM

## 2022-11-23 DIAGNOSIS — R06.02 SHORTNESS OF BREATH: Primary | ICD-10-CM

## 2022-11-23 PROCEDURE — 99441 PR PHYSICIAN TELEPHONE EVALUATION 5-10 MIN: CPT | Mod: 95 | Performed by: FAMILY MEDICINE

## 2022-11-23 RX ORDER — DEXAMETHASONE 6 MG/1
6 TABLET ORAL DAILY
Qty: 5 TABLET | Refills: 0 | Status: ON HOLD | OUTPATIENT
Start: 2022-11-23 | End: 2022-12-28

## 2022-11-23 ASSESSMENT — PATIENT HEALTH QUESTIONNAIRE - PHQ9: SUM OF ALL RESPONSES TO PHQ QUESTIONS 1-9: 3

## 2022-11-23 NOTE — PROGRESS NOTES
Yamel is a 77 year old who is being evaluated via a billable telephone visit.      What phone number would you like to be contacted at? [phone]  How would you like to obtain your AVS? MyChart    Assessment & Plan     Shortness of breath  Medication faxed, she also requested a nebulizer  - dexamethasone (DECADRON) 6 MG tablet  Dispense: 5 tablet; Refill: 0  - Miscellaneous Order for DME - ONLY FOR DME    Panlobular emphysema (H)  - dexamethasone (DECADRON) 6 MG tablet  Dispense: 5 tablet; Refill: 0  - Miscellaneous Order for DME - ONLY FOR DME        FUTURE APPOINTMENTS:       - Follow-up visit in one month or sooner as needed.    Return in about 4 weeks (around 12/21/2022) for Follow up.    Catie Fraser MD  [facility]    Thu Montesinos is a 77 year old, presenting for the following health issues:  ER F/U (11/18/2022 SOB)      HPI   Patient is a 77-year-old female here for an emergency room follow-up.  She was seen for shortness of breath and was diagnosed with emphysema and possible lung cancer.  She is going to be following up with the lung specialist for this.  She is requesting prednisone which she was given in the ER and she said it helped with her breathing.  She appears to be taking the news quite well as she has a PET scan scheduled for the next few weeks.  She had no other complaints today.  ED/UC Followup:    Facility:  [facility] ED  Date of visit: 11/18/2022  Reason for visit: Shortness of breath. Panlobular emphysema  Current Status: Patient states she went to the pulmonologist yesterday and Dr. Buck diagnosed her with lung cancer. Notes are in Epic. Patient states she was given Prednisone and that really helped. Patient is wanting more Prednisone.     - Patient states she was prescribed nebulizer solution but didn't get a nebulizer machine. Patient needs a machine.        Review of Systems   Constitutional: Negative.    HENT: Negative.     Respiratory: Positive for cough and shortness of breath.    Cardiovascular: Negative.    Gastrointestinal: Negative.    Endocrine: Negative.    Breasts:  negative.    Genitourinary: Negative.    Musculoskeletal: Negative.    Allergic/Immunologic: Negative.    Neurological: Negative.    Hematological: Negative.    Psychiatric/Behavioral: Negative.             Objective             Physical Exam   healthy, alert and no distress  PSYCH: Alert and oriented times 3; coherent speech, normal   rate and volume, able to articulate logical thoughts, able   to abstract reason, no tangential thoughts, no hallucinations   or delusions  Her affect is normal  RESP: No cough, no audible wheezing, able to talk in full sentences  Remainder of exam unable to be completed due to telephone visits            Phone call duration: 5 minutes

## 2022-11-25 ENCOUNTER — TELEPHONE (OUTPATIENT)
Dept: FAMILY MEDICINE | Facility: CLINIC | Age: 77
End: 2022-11-25

## 2022-11-25 NOTE — TELEPHONE ENCOUNTER
Pt states had ER visit 11/18 & Rx neb solution. Never rec'd neb machine. Called pharmacy & they never rec'd order.  Needs neb machine ordered.   Pt was given [facility] medical supply number & she will call there today.   No further questions.    Ashlee Paige RN

## 2022-11-27 ASSESSMENT — ENCOUNTER SYMPTOMS
CARDIOVASCULAR NEGATIVE: 1
HEMATOLOGIC/LYMPHATIC NEGATIVE: 1
SHORTNESS OF BREATH: 1
CONSTITUTIONAL NEGATIVE: 1
ALLERGIC/IMMUNOLOGIC NEGATIVE: 1
COUGH: 1
GASTROINTESTINAL NEGATIVE: 1
ENDOCRINE NEGATIVE: 1
PSYCHIATRIC NEGATIVE: 1
NEUROLOGICAL NEGATIVE: 1
MUSCULOSKELETAL NEGATIVE: 1

## 2022-12-02 DIAGNOSIS — G47.09 OTHER INSOMNIA: ICD-10-CM

## 2022-12-02 DIAGNOSIS — G89.4 CHRONIC PAIN SYNDROME: ICD-10-CM

## 2022-12-02 DIAGNOSIS — M17.0 OSTEOARTHRITIS OF BOTH KNEES, UNSPECIFIED OSTEOARTHRITIS TYPE: ICD-10-CM

## 2022-12-02 NOTE — TELEPHONE ENCOUNTER
Pending Prescriptions:                       Disp   Refills    traMADol (ULTRAM) 50 MG tablet            120 ta*0            Sig: Take 1 tablet (50 mg) by mouth every 6 hours as           needed for severe pain (7-10) COVERING FOR PCP           ONLY ONCE    traZODone (DESYREL) 50 MG tablet          180 ta*3            Patient is completely out of tramadol.

## 2022-12-05 DIAGNOSIS — J44.9 CHRONIC OBSTRUCTIVE PULMONARY DISEASE, UNSPECIFIED COPD TYPE (H): Primary | ICD-10-CM

## 2022-12-05 RX ORDER — TRAMADOL HYDROCHLORIDE 50 MG/1
50 TABLET ORAL EVERY 6 HOURS PRN
Qty: 120 TABLET | Refills: 0 | Status: SHIPPED | OUTPATIENT
Start: 2022-12-05 | End: 2022-12-30

## 2022-12-05 RX ORDER — ALBUTEROL SULFATE 90 UG/1
2 AEROSOL, METERED RESPIRATORY (INHALATION) EVERY 6 HOURS PRN
Qty: 18 G | Refills: 0 | Status: SHIPPED | OUTPATIENT
Start: 2022-12-05 | End: 2023-06-15

## 2022-12-05 RX ORDER — TRAZODONE HYDROCHLORIDE 50 MG/1
TABLET, FILM COATED ORAL
Qty: 180 TABLET | Refills: 3 | Status: SHIPPED | OUTPATIENT
Start: 2022-12-05 | End: 2023-08-31

## 2022-12-05 NOTE — TELEPHONE ENCOUNTER
Medication Question or Refill    Contacts       Type Contact Phone/Fax    12/05/2022 09:12 AM CST Phone (Incoming) Yamel Lindsay ROBERT (Self) [phone] (M)          What medication are you calling about (include dose and sig)?: Albuterol    Controlled Substance Agreement on file:   CSA -- Patient Level:     [Media Unavailable] Controlled Substance Agreement - Opioid - Scan on 7/1/2021  7:34 PM       Who prescribed the medication?: ED MD, Rhett Galvan    Do you need a refill? Yes:     When did you use the medication last? As needed    Patient offered an appointment? No    Do you have any questions or concerns?  No    Preferred Pharmacy:       [id number] [address] [place] [address] [address] [place] [address]  [facility] [address]  Phone: [phone] Fax: [phone]      Could we send this information to you in [employer] or would you prefer to receive a phone call?:   Patient would prefer a phone call   Okay to leave a detailed message?: Yes at Home number on file [phone] (home)

## 2022-12-06 ENCOUNTER — HOSPITAL ENCOUNTER (OUTPATIENT)
Dept: PET IMAGING | Facility: CLINIC | Age: 77
Discharge: HOME | End: 2022-12-06
Attending: INTERNAL MEDICINE | Admitting: INTERNAL MEDICINE
Payer: COMMERCIAL

## 2022-12-06 DIAGNOSIS — R91.8 LUNG MASS: ICD-10-CM

## 2022-12-06 DIAGNOSIS — R91.1 LUNG NODULE: ICD-10-CM

## 2022-12-06 PROCEDURE — A9552 F18 FDG: HCPCS | Performed by: INTERNAL MEDICINE

## 2022-12-06 PROCEDURE — 78816 PET IMAGE W/CT FULL BODY: CPT | Mod: 26 | Performed by: RADIOLOGY

## 2022-12-06 PROCEDURE — 343N000001 HC RX 343: Performed by: INTERNAL MEDICINE

## 2022-12-06 PROCEDURE — 78816 PET IMAGE W/CT FULL BODY: CPT | Mod: PI

## 2022-12-06 RX ADMIN — FLUDEOXYGLUCOSE F-18 10.09 MCI.: 500 INJECTION, SOLUTION INTRAVENOUS at 13:05

## 2022-12-07 ENCOUNTER — PREP FOR PROCEDURE (OUTPATIENT)
Dept: PULMONOLOGY | Facility: CLINIC | Age: 77
End: 2022-12-07

## 2022-12-07 DIAGNOSIS — R91.8 LUNG MASS: Primary | ICD-10-CM

## 2022-12-09 ENCOUNTER — TELEPHONE (OUTPATIENT)
Dept: PULMONOLOGY | Facility: CLINIC | Age: 77
End: 2022-12-09

## 2022-12-09 DIAGNOSIS — R91.8 LUNG MASS: Primary | ICD-10-CM

## 2022-12-09 NOTE — TELEPHONE ENCOUNTER
Called patient to schedule procedure with Meek Buck, there was no answer.  Left message with my direct line [phone].

## 2022-12-10 NOTE — TELEPHONE ENCOUNTER
Spoke with patient to schedule procedure with Meek Buck    Procedure was scheduled on 12/28 at [facility] OR  Patient will have H&P with Dr Buck will complete    Patient is aware a COVID-19 test is needed before their procedure.   Patient will have a home test due to outpatient status  (Patient is aware IP/Thoracic are still requiring COVID-19 test)     Patient is aware a / is needed day of surgery.   Surgery letter was sent via [employer], patient has my direct contact information for any further questions.        Dec 28, 2022  6:40 AM  (Arrive by 6:25 AM)  CT CHEST W/O CONTRAST with UUCT1  [facility] ([facility], [facility] ) [phone]

## 2022-12-28 ENCOUNTER — APPOINTMENT (OUTPATIENT)
Dept: GENERAL RADIOLOGY | Facility: CLINIC | Age: 77
End: 2022-12-28
Attending: INTERNAL MEDICINE
Payer: COMMERCIAL

## 2022-12-28 ENCOUNTER — HOSPITAL ENCOUNTER (OUTPATIENT)
Dept: CT IMAGING | Facility: CLINIC | Age: 77
Discharge: HOME | End: 2022-12-28
Attending: INTERNAL MEDICINE
Payer: COMMERCIAL

## 2022-12-28 ENCOUNTER — ANESTHESIA EVENT (OUTPATIENT)
Dept: SURGERY | Facility: CLINIC | Age: 77
End: 2022-12-28
Payer: COMMERCIAL

## 2022-12-28 ENCOUNTER — ANESTHESIA (OUTPATIENT)
Dept: SURGERY | Facility: CLINIC | Age: 77
End: 2022-12-28
Payer: COMMERCIAL

## 2022-12-28 ENCOUNTER — PATIENT OUTREACH (OUTPATIENT)
Dept: ONCOLOGY | Facility: CLINIC | Age: 77
End: 2022-12-28

## 2022-12-28 ENCOUNTER — HOSPITAL ENCOUNTER (OUTPATIENT)
Facility: CLINIC | Age: 77
Discharge: HOME | End: 2022-12-28
Attending: INTERNAL MEDICINE | Admitting: INTERNAL MEDICINE
Payer: COMMERCIAL

## 2022-12-28 VITALS
HEIGHT: 63 IN | BODY MASS INDEX: 26.64 KG/M2 | WEIGHT: 150.35 LBS | SYSTOLIC BLOOD PRESSURE: 129 MMHG | OXYGEN SATURATION: 97 % | TEMPERATURE: 98.2 F | HEART RATE: 76 BPM | RESPIRATION RATE: 16 BRPM | DIASTOLIC BLOOD PRESSURE: 78 MMHG

## 2022-12-28 DIAGNOSIS — R91.8 RIGHT LOWER LOBE LUNG MASS: Primary | ICD-10-CM

## 2022-12-28 DIAGNOSIS — R91.8 LUNG MASS: ICD-10-CM

## 2022-12-28 LAB
INTERPRETATION: NORMAL
LAB DIRECTOR COMMENTS: NORMAL
LAB DIRECTOR DISCLAIMER: NORMAL
LAB DIRECTOR INTERPRETATION: NORMAL
LAB DIRECTOR METHODOLOGY: NORMAL
LAB DIRECTOR RESULTS: NORMAL
SIGNIFICANT RESULTS: NORMAL
SPECIMEN DESCRIPTION: NORMAL
SPECIMEN DESCRIPTION: NORMAL
TEST DETAILS, MDL: NORMAL

## 2022-12-28 PROCEDURE — 71250 CT THORAX DX C-: CPT

## 2022-12-28 PROCEDURE — 88305 TISSUE EXAM BY PATHOLOGIST: CPT | Mod: TC | Performed by: INTERNAL MEDICINE

## 2022-12-28 PROCEDURE — 250N000009 HC RX 250: Performed by: ANESTHESIOLOGY

## 2022-12-28 PROCEDURE — 88305 TISSUE EXAM BY PATHOLOGIST: CPT | Mod: 26 | Performed by: STUDENT IN AN ORGANIZED HEALTH CARE EDUCATION/TRAINING PROGRAM

## 2022-12-28 PROCEDURE — 31629 BRONCHOSCOPY/NEEDLE BX EACH: CPT | Performed by: INTERNAL MEDICINE

## 2022-12-28 PROCEDURE — 31641 BRONCHOSCOPY TREAT BLOCKAGE: CPT | Performed by: INTERNAL MEDICINE

## 2022-12-28 PROCEDURE — 710N000010 HC RECOVERY PHASE 1, LEVEL 2, PER MIN: Performed by: INTERNAL MEDICINE

## 2022-12-28 PROCEDURE — 258N000003 HC RX IP 258 OP 636: Performed by: INTERNAL MEDICINE

## 2022-12-28 PROCEDURE — 360N000084 HC SURGERY LEVEL 4 W/ FLUORO, PER MIN: Performed by: INTERNAL MEDICINE

## 2022-12-28 PROCEDURE — 88360 TUMOR IMMUNOHISTOCHEM/MANUAL: CPT | Mod: 26 | Performed by: PATHOLOGY

## 2022-12-28 PROCEDURE — 258N000003 HC RX IP 258 OP 636: Performed by: ANESTHESIOLOGY

## 2022-12-28 PROCEDURE — 31627 NAVIGATIONAL BRONCHOSCOPY: CPT | Performed by: INTERNAL MEDICINE

## 2022-12-28 PROCEDURE — 272N000002 HC OR SUPPLY OTHER OPNP: Performed by: INTERNAL MEDICINE

## 2022-12-28 PROCEDURE — 370N000017 HC ANESTHESIA TECHNICAL FEE, PER MIN: Performed by: INTERNAL MEDICINE

## 2022-12-28 PROCEDURE — 710N000012 HC RECOVERY PHASE 2, PER MINUTE: Performed by: INTERNAL MEDICINE

## 2022-12-28 PROCEDURE — 250N000011 HC RX IP 250 OP 636: Performed by: INTERNAL MEDICINE

## 2022-12-28 PROCEDURE — 71045 X-RAY EXAM CHEST 1 VIEW: CPT | Mod: 26 | Performed by: RADIOLOGY

## 2022-12-28 PROCEDURE — 88344 IMHCHEM/IMCYTCHM EA MLT ANTB: CPT | Mod: 26 | Performed by: PATHOLOGY

## 2022-12-28 PROCEDURE — 88360 TUMOR IMMUNOHISTOCHEM/MANUAL: CPT | Mod: 26 | Performed by: STUDENT IN AN ORGANIZED HEALTH CARE EDUCATION/TRAINING PROGRAM

## 2022-12-28 PROCEDURE — 88173 CYTOPATH EVAL FNA REPORT: CPT | Mod: 26 | Performed by: PATHOLOGY

## 2022-12-28 PROCEDURE — 88341 IMHCHEM/IMCYTCHM EA ADD ANTB: CPT | Mod: 26 | Performed by: STUDENT IN AN ORGANIZED HEALTH CARE EDUCATION/TRAINING PROGRAM

## 2022-12-28 PROCEDURE — 272N000001 HC OR GENERAL SUPPLY STERILE: Performed by: INTERNAL MEDICINE

## 2022-12-28 PROCEDURE — 88305 TISSUE EXAM BY PATHOLOGIST: CPT | Mod: 26 | Performed by: PATHOLOGY

## 2022-12-28 PROCEDURE — 31654 BRONCH EBUS IVNTJ PERPH LES: CPT | Performed by: INTERNAL MEDICINE

## 2022-12-28 PROCEDURE — 999N000181 XR SURGERY CARM FLUORO GREATER THAN 5 MIN W STILLS: Mod: TC

## 2022-12-28 PROCEDURE — 999N000141 HC STATISTIC PRE-PROCEDURE NURSING ASSESSMENT: Performed by: INTERNAL MEDICINE

## 2022-12-28 PROCEDURE — 250N000011 HC RX IP 250 OP 636: Performed by: ANESTHESIOLOGY

## 2022-12-28 PROCEDURE — 88344 IMHCHEM/IMCYTCHM EA MLT ANTB: CPT | Mod: 26 | Performed by: STUDENT IN AN ORGANIZED HEALTH CARE EDUCATION/TRAINING PROGRAM

## 2022-12-28 PROCEDURE — 999N000065 XR CHEST PORT 1 VIEW

## 2022-12-28 PROCEDURE — 71250 CT THORAX DX C-: CPT | Mod: 26 | Performed by: RADIOLOGY

## 2022-12-28 PROCEDURE — 88342 IMHCHEM/IMCYTCHM 1ST ANTB: CPT | Mod: 26 | Performed by: STUDENT IN AN ORGANIZED HEALTH CARE EDUCATION/TRAINING PROGRAM

## 2022-12-28 RX ORDER — SODIUM CHLORIDE, SODIUM LACTATE, POTASSIUM CHLORIDE, CALCIUM CHLORIDE 600; 310; 30; 20 MG/100ML; MG/100ML; MG/100ML; MG/100ML
INJECTION, SOLUTION INTRAVENOUS CONTINUOUS
Status: DISCONTINUED | OUTPATIENT
Start: 2022-12-28 | End: 2022-12-28 | Stop reason: HOSPADM

## 2022-12-28 RX ORDER — FENTANYL CITRATE 50 UG/ML
25 INJECTION, SOLUTION INTRAMUSCULAR; INTRAVENOUS
Status: DISCONTINUED | OUTPATIENT
Start: 2022-12-28 | End: 2022-12-28 | Stop reason: HOSPADM

## 2022-12-28 RX ORDER — LIDOCAINE HYDROCHLORIDE AND EPINEPHRINE 10; 10 MG/ML; UG/ML
10 INJECTION, SOLUTION INFILTRATION; PERINEURAL ONCE
Status: DISCONTINUED | OUTPATIENT
Start: 2022-12-28 | End: 2022-12-28 | Stop reason: HOSPADM

## 2022-12-28 RX ORDER — PROPOFOL 10 MG/ML
INJECTION, EMULSION INTRAVENOUS CONTINUOUS PRN
Status: DISCONTINUED | OUTPATIENT
Start: 2022-12-28 | End: 2022-12-28

## 2022-12-28 RX ORDER — ONDANSETRON 2 MG/ML
INJECTION INTRAMUSCULAR; INTRAVENOUS PRN
Status: DISCONTINUED | OUTPATIENT
Start: 2022-12-28 | End: 2022-12-28

## 2022-12-28 RX ORDER — DEXAMETHASONE SODIUM PHOSPHATE 4 MG/ML
INJECTION, SOLUTION INTRA-ARTICULAR; INTRALESIONAL; INTRAMUSCULAR; INTRAVENOUS; SOFT TISSUE PRN
Status: DISCONTINUED | OUTPATIENT
Start: 2022-12-28 | End: 2022-12-28

## 2022-12-28 RX ORDER — LIDOCAINE HYDROCHLORIDE 20 MG/ML
INJECTION, SOLUTION INFILTRATION; PERINEURAL PRN
Status: DISCONTINUED | OUTPATIENT
Start: 2022-12-28 | End: 2022-12-28

## 2022-12-28 RX ORDER — ONDANSETRON 2 MG/ML
4 INJECTION INTRAMUSCULAR; INTRAVENOUS EVERY 30 MIN PRN
Status: DISCONTINUED | OUTPATIENT
Start: 2022-12-28 | End: 2022-12-28 | Stop reason: HOSPADM

## 2022-12-28 RX ORDER — ONDANSETRON 4 MG/1
4 TABLET, ORALLY DISINTEGRATING ORAL EVERY 30 MIN PRN
Status: DISCONTINUED | OUTPATIENT
Start: 2022-12-28 | End: 2022-12-28 | Stop reason: HOSPADM

## 2022-12-28 RX ORDER — GLYCOPYRROLATE 0.2 MG/ML
INJECTION, SOLUTION INTRAMUSCULAR; INTRAVENOUS PRN
Status: DISCONTINUED | OUTPATIENT
Start: 2022-12-28 | End: 2022-12-28

## 2022-12-28 RX ORDER — SODIUM CHLORIDE, SODIUM LACTATE, POTASSIUM CHLORIDE, CALCIUM CHLORIDE 600; 310; 30; 20 MG/100ML; MG/100ML; MG/100ML; MG/100ML
INJECTION, SOLUTION INTRAVENOUS CONTINUOUS PRN
Status: DISCONTINUED | OUTPATIENT
Start: 2022-12-28 | End: 2022-12-28

## 2022-12-28 RX ORDER — MEPERIDINE HYDROCHLORIDE 25 MG/ML
12.5 INJECTION INTRAMUSCULAR; INTRAVENOUS; SUBCUTANEOUS
Status: DISCONTINUED | OUTPATIENT
Start: 2022-12-28 | End: 2022-12-28 | Stop reason: HOSPADM

## 2022-12-28 RX ORDER — FENTANYL CITRATE 50 UG/ML
INJECTION, SOLUTION INTRAMUSCULAR; INTRAVENOUS PRN
Status: DISCONTINUED | OUTPATIENT
Start: 2022-12-28 | End: 2022-12-28

## 2022-12-28 RX ORDER — OXYCODONE HYDROCHLORIDE 10 MG/1
10 TABLET ORAL EVERY 4 HOURS PRN
Status: DISCONTINUED | OUTPATIENT
Start: 2022-12-28 | End: 2022-12-28 | Stop reason: HOSPADM

## 2022-12-28 RX ORDER — FENTANYL CITRATE 50 UG/ML
50 INJECTION, SOLUTION INTRAMUSCULAR; INTRAVENOUS EVERY 5 MIN PRN
Status: DISCONTINUED | OUTPATIENT
Start: 2022-12-28 | End: 2022-12-28 | Stop reason: HOSPADM

## 2022-12-28 RX ORDER — FENTANYL CITRATE 50 UG/ML
25 INJECTION, SOLUTION INTRAMUSCULAR; INTRAVENOUS EVERY 5 MIN PRN
Status: DISCONTINUED | OUTPATIENT
Start: 2022-12-28 | End: 2022-12-28 | Stop reason: HOSPADM

## 2022-12-28 RX ORDER — HYDROMORPHONE HCL IN WATER/PF 6 MG/30 ML
0.4 PATIENT CONTROLLED ANALGESIA SYRINGE INTRAVENOUS EVERY 5 MIN PRN
Status: DISCONTINUED | OUTPATIENT
Start: 2022-12-28 | End: 2022-12-28 | Stop reason: HOSPADM

## 2022-12-28 RX ORDER — OXYCODONE HYDROCHLORIDE 5 MG/1
5 TABLET ORAL EVERY 4 HOURS PRN
Status: DISCONTINUED | OUTPATIENT
Start: 2022-12-28 | End: 2022-12-28 | Stop reason: HOSPADM

## 2022-12-28 RX ORDER — PROPOFOL 10 MG/ML
INJECTION, EMULSION INTRAVENOUS PRN
Status: DISCONTINUED | OUTPATIENT
Start: 2022-12-28 | End: 2022-12-28

## 2022-12-28 RX ORDER — LABETALOL 20 MG/4 ML (5 MG/ML) INTRAVENOUS SYRINGE
PRN
Status: DISCONTINUED | OUTPATIENT
Start: 2022-12-28 | End: 2022-12-28

## 2022-12-28 RX ORDER — HYDROMORPHONE HCL IN WATER/PF 6 MG/30 ML
0.2 PATIENT CONTROLLED ANALGESIA SYRINGE INTRAVENOUS EVERY 5 MIN PRN
Status: DISCONTINUED | OUTPATIENT
Start: 2022-12-28 | End: 2022-12-28 | Stop reason: HOSPADM

## 2022-12-28 RX ADMIN — GLYCOPYRROLATE 0.1 MG: 0.2 INJECTION, SOLUTION INTRAMUSCULAR; INTRAVENOUS at 11:03

## 2022-12-28 RX ADMIN — LIDOCAINE HYDROCHLORIDE 100 MG: 20 INJECTION, SOLUTION INFILTRATION; PERINEURAL at 09:43

## 2022-12-28 RX ADMIN — Medication 50 MG: at 09:45

## 2022-12-28 RX ADMIN — Medication 10 MG: at 10:38

## 2022-12-28 RX ADMIN — GLYCOPYRROLATE 0.1 MG: 0.2 INJECTION, SOLUTION INTRAMUSCULAR; INTRAVENOUS at 10:52

## 2022-12-28 RX ADMIN — DEXAMETHASONE SODIUM PHOSPHATE 4 MG: 4 INJECTION, SOLUTION INTRA-ARTICULAR; INTRALESIONAL; INTRAMUSCULAR; INTRAVENOUS; SOFT TISSUE at 09:45

## 2022-12-28 RX ADMIN — LABETALOL 20 MG/4 ML (5 MG/ML) INTRAVENOUS SYRINGE 5 MG: at 11:02

## 2022-12-28 RX ADMIN — SUGAMMADEX 200 MG: 100 INJECTION, SOLUTION INTRAVENOUS at 11:28

## 2022-12-28 RX ADMIN — Medication 10 MG: at 10:43

## 2022-12-28 RX ADMIN — LABETALOL 20 MG/4 ML (5 MG/ML) INTRAVENOUS SYRINGE 5 MG: at 10:50

## 2022-12-28 RX ADMIN — PROPOFOL 100 MCG/KG/MIN: 10 INJECTION, EMULSION INTRAVENOUS at 09:43

## 2022-12-28 RX ADMIN — LABETALOL 20 MG/4 ML (5 MG/ML) INTRAVENOUS SYRINGE 5 MG: at 10:52

## 2022-12-28 RX ADMIN — SODIUM CHLORIDE, POTASSIUM CHLORIDE, SODIUM LACTATE AND CALCIUM CHLORIDE: 600; 310; 30; 20 INJECTION, SOLUTION INTRAVENOUS at 11:41

## 2022-12-28 RX ADMIN — FENTANYL CITRATE 100 MCG: 50 INJECTION, SOLUTION INTRAMUSCULAR; INTRAVENOUS at 09:43

## 2022-12-28 RX ADMIN — PROPOFOL 40 MG: 10 INJECTION, EMULSION INTRAVENOUS at 10:00

## 2022-12-28 RX ADMIN — Medication 10 MG: at 11:10

## 2022-12-28 RX ADMIN — PROPOFOL 60 MG: 10 INJECTION, EMULSION INTRAVENOUS at 09:44

## 2022-12-28 RX ADMIN — ONDANSETRON 4 MG: 2 INJECTION INTRAMUSCULAR; INTRAVENOUS at 11:24

## 2022-12-28 RX ADMIN — SODIUM CHLORIDE, POTASSIUM CHLORIDE, SODIUM LACTATE AND CALCIUM CHLORIDE: 600; 310; 30; 20 INJECTION, SOLUTION INTRAVENOUS at 09:38

## 2022-12-28 ASSESSMENT — ACTIVITIES OF DAILY LIVING (ADL)
ADLS_ACUITY_SCORE: 37
ADLS_ACUITY_SCORE: 35

## 2022-12-28 NOTE — PROGRESS NOTES
New Patient Oncology Nurse Navigator Note     Referring provider: Dr. Alma Almaguer    Referring Clinic/Organization: [facility]     Referred to: Medical Oncology    Requested provider (if applicable): First available - did not specify     Referral Received: 12/28/22       Evaluation for : Right lower lobe lung mass - pathology pending     Clinical History (per Nurse review of records provided):      12/06/22 PET (bookmarked) found:  IMPRESSION:      1. Markedly hypermetabolic right lower lobe solid lung mass likely  represents primary lung malignancy. Multiple hypermetabolic,  predominantly pleural-based irregular right middle and right upper  lobe pulmonary nodules are concerning for intrapulmonary metastases.     2. Spiculated, intensely hypermetabolic 2.2 cm left lower lobe  pulmonary mass is favored to represent synchronous lung malignancy.     3. Prominent mildly hypermetabolic right paratracheal node is  suspicious for marisel metastasis. Mildly avid, subcentimeter lower  paraesophageal node is indeterminate for reactive changes versus  metastasis.     4. No evidence of extrathoracic metastasis.      12/28/22 CT Chest (bookmarked) found:  IMPRESSION: Redemonstration of left lower lobe spiculated pulmonary  nodule and right lower hemithorax pleural-based mass abutting the  distal esophagus, bronchus intermedius and right lower lobe bronchus  as well as probable involvement of the interlobar pulmonary artery and  right lower lobar pulmonary artery. New nodularity however is noted in  the right upper lobe anterolaterally in the interim end further  evaluation should be needed to exclude new neoplastic site in this  area. Atherosclerosis. Unchanged probable liver cyst. Continued close  attention on follow-up recommended.      Clinical Assessment / Barriers to Care (Per Nurse):    Pt had bronch today with Dr. Almaguer, will wait until 12/29 to call pt and discuss referral. Pathology pending from biopsy today.       Records Location: [facility]     Additional testing needed prior to consult:   Brain MRI    Referral updates and Plan:     12/29: Writer called Yamel to discuss Dr. Almaguer's referral to medical oncology. There was no answer. Left a voicemail to return call.     Holding spot on Dr. Milan's [place] schedule Jan 2nd.    Writer called Yamel and spoke with her. She would prefer to see a lung cancer specialist, assuming the pathology results come back positive for lung cancer. Yamel understands the lung cancer specialists are in [place] and is willing to travel. Writer has a hold on Dr. Martel's schedule Jan 2nd and will watch for biopsy results. Yamel understands if biopsy results are not back by Monday, Jan 2nd writer will contact pt to move appt.    Message sent to Dr. Martel about ordering Brain MRI.     Will continue to keep pt updated.     JOSE Crump, JEANCARLOSN, RN, LAc, OCN  [facility] Oncology Nurse Navigator  [phone] / [phone]

## 2022-12-28 NOTE — ANESTHESIA PROCEDURE NOTES
Airway       Patient location during procedure: OR       Procedure Start/Stop Times: 12/28/2022 9:47 AM  Staff -        CRNA: Doris Mo APRN CRNA       Performed By: CRNA  Consent for Airway        Urgency: elective  Indications and Patient Condition       Indications for airway management: natali-procedural       Induction type:intravenous       Mask difficulty assessment: 2 - vent by mask + OA or adjuvant +/- NMBA    Final Airway Details       Final airway type: endotracheal airway       Successful airway: ETT - single  Endotracheal Airway Details        ETT size (mm): 8.5       Cuffed: yes       Successful intubation technique: direct laryngoscopy       DL Blade Type: Marquez 2       Grade View of Cords: 1       Adjucts: stylet       Secured at (cm): 22    Post intubation assessment        Placement verified by: capnometry, equal breath sounds and chest rise        Number of attempts at approach: 1       Secured with: silk tape       Ease of procedure: easy       Dentition: Unchanged    Medication(s) Administered   Medication Administration Time: 12/28/2022 9:47 AM

## 2022-12-28 NOTE — ANESTHESIA CARE TRANSFER NOTE
Patient: Yamel Lindsay    Procedure: Procedure(s):  BRONCHOSCOPY, USING ION OPTICAL TRACKING SYSTEM, FINE NEEDLE ASPIRATION  endobronchial biopsy and tumor debulking with cryoprobe       Diagnosis: Lung mass [R91.8]  Diagnosis Additional Information: No value filed.    Anesthesia Type:   General     Note:      Level of Consciousness: awake  Oxygen Supplementation: room air    Independent Airway: airway patency satisfactory and stable  Dentition: dentition unchanged  Vital Signs Stable: post-procedure vital signs reviewed and stable    Patient transferred to: PACU    Handoff Report: Identifed the Patient, Identified the Reponsible Provider, Reviewed the pertinent medical history, Discussed the surgical course, Reviewed Intra-OP anesthesia mangement and issues during anesthesia, Set expectations for post-procedure period and Allowed opportunity for questions and acknowledgement of understanding      Vitals:  Vitals Value Taken Time   BP     Temp     Pulse     Resp     SpO2         Electronically Signed By: NAYA Pitts CRNA  December 28, 2022  11:42 AM

## 2022-12-28 NOTE — H&P
The History and Physical has been reviewed (9/27/2022), the patient has been examined and no changes have occurred in the patient's condition since the H & P was completed.

## 2022-12-28 NOTE — ANESTHESIA POSTPROCEDURE EVALUATION
Patient: Yamel Lindsay    Procedure: Procedure(s):  BRONCHOSCOPY, USING ION OPTICAL TRACKING SYSTEM, FINE NEEDLE ASPIRATION  endobronchial biopsy and tumor debulking with cryoprobe       Anesthesia Type:  General    Note:  Disposition: Outpatient   Postop Pain Control: Uneventful            Sign Out: Well controlled pain   PONV: No   Neuro/Psych: Uneventful            Sign Out: Acceptable/Baseline neuro status   Airway/Respiratory: Uneventful            Sign Out: Acceptable/Baseline resp. status   CV/Hemodynamics: Uneventful            Sign Out: Acceptable CV status; No obvious hypovolemia; No obvious fluid overload   Other NRE: NONE   DID A NON-ROUTINE EVENT OCCUR? No           Last vitals:  Vitals Value Taken Time   /72 12/28/22 1215   Temp 37.1  C (98.8  F) 12/28/22 1200   Pulse 74 12/28/22 1229   Resp 20 12/28/22 1229   SpO2 92 % 12/28/22 1229   Vitals shown include unvalidated device data.    Electronically Signed By: Cirilo Meyers MD  December 28, 2022  12:29 PM

## 2022-12-28 NOTE — ANESTHESIA PREPROCEDURE EVALUATION
Anesthesia Pre-Procedure Evaluation    Patient: Yamel Lindsay   MRN: [id number] : 1945        Procedure : Procedure(s):  BRONCHOSCOPY, USING ION OPTICAL TRACKING SYSTEM  endobronchial ultrasound and transbronchial biopsy          Past Medical History:   Diagnosis Date     Allergies      Anxiety      Hyperlipidemia      Hypertension      Mild recurrent major depression (H) 2010     Nicotine dependence      Postmenopausal HRT (hormone replacement therapy)      S/P alcohol detoxification     Recovered and active in AA      Past Surgical History:   Procedure Laterality Date     ESOPHAGOSCOPY, GASTROSCOPY, DUODENOSCOPY (EGD), COMBINED N/A 2015    Procedure: COMBINED ESOPHAGOSCOPY, GASTROSCOPY, DUODENOSCOPY (EGD), BIOPSY SINGLE OR MULTIPLE;  Surgeon: Dylan Alejandra MD;  Location: [facility]     ESOPHAGOSCOPY, GASTROSCOPY, DUODENOSCOPY (EGD), COMBINED N/A 2018    Procedure: COMBINED ESOPHAGOSCOPY, GASTROSCOPY, DUODENOSCOPY (EGD), BIOPSY SINGLE OR MULTIPLE;  gastroscopy;  Surgeon: Alexander Bautista MD;  Location: [facility]     LAPAROSCOPIC HERNIORRHAPHY INGUINAL Right 2015    Procedure: LAPAROSCOPIC HERNIORRHAPHY INGUINAL;  Surgeon: Favio Olsen MD;  Location: [facility]     TUBAL LIGATION        Allergies   Allergen Reactions     Nka [No Known Allergies]      Seasonal Allergies       Social History     Tobacco Use     Smoking status: Former     Packs/day: 0.50     Types: Cigarettes     Quit date: 2011     Years since quittin.5     Smokeless tobacco: Never   Substance Use Topics     Alcohol use: No      Wt Readings from Last 1 Encounters:   22 68.2 kg (150 lb 5.7 oz)        Anesthesia Evaluation   Pt has had prior anesthetic. Type: General.    No history of anesthetic complications       ROS/MED HX  ENT/Pulmonary: Comment: Suspected primary lung cancer, concurrent R and Left lung presenting for diagnostic biopsy    (+) Mild Persistent, asthma Treatment: Inhaler daily,       Neurologic:  - neg neurologic ROS     Cardiovascular:     (+) hypertension-----    METS/Exercise Tolerance:     Hematologic:  - neg hematologic  ROS     Musculoskeletal:       GI/Hepatic:     (+) GERD, Asymptomatic on medication,     Renal/Genitourinary:       Endo:       Psychiatric/Substance Use:     (+) psychiatric history anxiety     Infectious Disease:  - neg infectious disease ROS     Malignancy:       Other:            Physical Exam    Airway        Mallampati: II   TM distance: > 3 FB   Neck ROM: full   Mouth opening: > 3 cm    Respiratory Devices and Support         Dental       (+) upper dentures and lower dentures      Cardiovascular   cardiovascular exam normal      (-) no weak pulses    Pulmonary   pulmonary exam normal        breath sounds clear to auscultation           OUTSIDE LABS:  CBC:   Lab Results   Component Value Date    WBC 7.4 11/18/2022    WBC 6.9 01/26/2015    HGB 11.6 (L) 11/18/2022    HGB 12.9 01/26/2015    HCT 34.7 (L) 11/18/2022    HCT 37.8 01/26/2015     11/18/2022     01/26/2015     BMP:   Lab Results   Component Value Date     11/18/2022     (L) 09/27/2022    POTASSIUM 3.2 (L) 11/18/2022    POTASSIUM 3.9 09/27/2022    CHLORIDE 99 11/18/2022    CHLORIDE 96 (L) 09/27/2022    CO2 30 (H) 11/18/2022    CO2 26 09/27/2022    BUN 10.2 11/18/2022    BUN 10.7 09/27/2022    CR 0.50 (L) 11/18/2022    CR 0.59 09/27/2022     (H) 11/18/2022     (H) 09/27/2022     COAGS: No results found for: PTT, INR, FIBR  POC: No results found for: BGM, HCG, HCGS  HEPATIC:   Lab Results   Component Value Date    ALBUMIN 3.8 11/18/2022    PROTTOTAL 7.0 11/18/2022    ALT 14 11/18/2022    AST 20 11/18/2022    GGT 68 (H) 07/05/2006    ALKPHOS 128 (H) 11/18/2022    BILITOTAL <0.2 11/18/2022     OTHER:   Lab Results   Component Value Date    LACT 0.6 01/26/2015    A1C 5.8 01/31/2013    DEEPIKA 10.0 11/18/2022    TSH 2.55 11/18/2022    T4 0.93 02/18/2014    CRP 8.7 (H) 06/28/2011     SED 34 (H) 06/28/2011       Anesthesia Plan    ASA Status:  3   NPO Status:  NPO Appropriate    Anesthesia Type: General.     - Airway: ETT   Induction: Intravenous.   Maintenance: Balanced.        Consents    Anesthesia Plan(s) and associated risks, benefits, and realistic alternatives discussed. Questions answered and patient/representative(s) expressed understanding.     - Discussed: Risks, Benefits and Alternatives for BOTH SEDATION and the PROCEDURE were discussed     - Discussed with:  Patient      - Extended Intubation/Ventilatory Support Discussed: Yes.      - Patient is DNR/DNI Status: No    Use of blood products discussed: Yes.     - Discussed with: Patient.     - Consented: consented to blood products            Reason for refusal: other.     Postoperative Care    Pain management: IV analgesics, Oral pain medications.   PONV prophylaxis: Ondansetron (or other 5HT-3), Dexamethasone or Solumedrol     Comments:    Other Comments: Navigational bronch under GA. Progressive SOB recently led to chest CT, R and L lung masses, PET scan suggestive for lung primary, no secondary seen. Recent steroids have been very helpful with shortness of breath. Not on home O2.            Dany Almanza MD

## 2022-12-28 NOTE — OP NOTE
INTERVENTIONAL PULMONOLOGY       Procedure(s):    A flexible and rigid bronchoscopy   Airway exam  Endobronchial biopsies (1 sites)  Therapeutic suctioning (1 sites)  Tissue/tumor debulking   ION Robotic Bronchoscopy     Indication:  Bilateral lung nodules    Attending of Record:  Alma Almaguer MD     Interventional Pulmonary Fellow   None    Trainees Present:   None     Medications:    General Anesthesia - See anesthesia flowsheet for details    Sedation Time:   Per Anesthesia Care Provider    Time Out:  Performed    The patient's medical record has been reviewed.  The indication for the procedure was reviewed.  The necessary history and physical examination was performed and reviewed.  The risks, benefits and alternatives of the procedure were discussed with the the patient in detail and she had the opportunity to ask questions.  I discussed in particular the potential complications including risks of minor or life-threatening bleeding and/or infection, respiratory failure, vocal cord trauma / paralysis, pneumothorax, and discomfort. Sedation risks were also discussed including abnormal heart rhythms, low blood pressure, and respiratory failure. All questions were answered to the best of my ability.  Verbal and written informed consent was obtained.  The proposed procedure and the patient's identification were verified prior to the procedure by the physician and the nurse.    A Tuberculosis risk assessment was performed:  The patient has no known RISK of Tuberculosis    The procedure was performed in a negative airflow room: The patient could not be moved to a negative airflow room because of needed OR for the procedure    Maneuvers / Procedure:      A Flexible  bronchoscope was used for the procedure. The patient was orally intubated with a # 8.5 ETT and the flexible scope was passed through.    Airway Examination: A complete airway examination was performed from the distal trachea to the subsegmental level in  each lobe of both lungs.  Pertinent findings include right lower lobe orifice completely occluded with tumor, tumor also seen on posterior membrane of bronchus intermedius just proximal to orifice.       ION Robotic Bronchoscopy: Planning was performed on the laptop prior to the procedure. The ION successfully completed its pre-procedural check phase. The ION arm was oriented towards the ETT and docked successfully. The robotic catheter was inserted into the ETT and the guide was clamped down. Registration was then completed successfully. The catheter was advanced towards the LLL lesion/nodule using the vision probe. Local confirmation of the lesion utilized with ENB-Targeting, Radial EBUS and Fluoroscopy .. The lesion was biopsied using 21G FNA Needle. A total of 10 passes were performed. BALA was present throughout the procedure. EBL was minimal. Guide catheter and vision probe was removed successfully. The ION platform was undocked without issues.   Tumor/Tissue Debulking: The RLL airway was accessed and tumor/tissue debulking was performed using the 1.9mm ERBE cryoprobe. Hemostasis and patency of the airway was acheived post-debulking. Distal / segmental bronchi not visualized    Any disposable equipment was visually inspected and deemed to be intact immediately post procedure.      Relevant Pictures  Bronchus intermedius (looking at RLL orifice) before and after:      Recommendations:     -->  Successful LLL nodule biopsy using Ion based on BALA  -->  Partial tumor debulking of RLL using cryoprobe and endobronchial biopsies sent  -->  Mediastinal staging not done due concomitant RLL malignant obstruction  -->  Repeat bronchoscopy may be warranted for mediastinal staging and/or RLL obstruction  --> medical oncology consultation    Alma Almaguer MD  Associate Professor of Medicine  Section of Interventional Pulmonology   Division of Pulmonary, Allergy, Critical Care and Sleep Medicine   [facility],  [facility]

## 2022-12-28 NOTE — DISCHARGE INSTRUCTIONS
[facility]  Same-Day Surgery   Adult Discharge Orders & Instructions     For 24 hours after surgery    Get plenty of rest.  A responsible adult must stay with you for at least 24 hours after you leave the hospital.   Do not drive or use heavy equipment.  If you have weakness or tingling, don't drive or use heavy equipment until this feeling goes away.  Do not drink alcohol.  Avoid strenuous or risky activities.  Ask for help when climbing stairs.   You may feel lightheaded.  IF so, sit for a few minutes before standing.  Have someone help you get up.   If you have nausea (feel sick to your stomach): Drink only clear liquids such as apple juice, ginger ale, broth or 7-Up.  Rest may also help.  Be sure to drink enough fluids.  Move to a regular diet as you feel able.  You may have a slight fever. Call the doctor if your fever is over 100 F (37.7 C) (taken under the tongue) or lasts longer than 24 hours.  You may have a dry mouth, a sore throat, muscle aches or trouble sleeping.  These should go away after 24 hours.  Do not make important or legal decisions.   Call your doctor for any of the followin.  Signs of infection (fever, growing tenderness at the surgery site, a large amount of drainage or bleeding, severe pain, foul-smelling drainage, redness, swelling).    2. It has been over 8 to 10 hours since surgery and you are still not able to urinate (pass water).    3.  Headache for over 24 hours.    4.  Numbness, tingling or weakness the day after surgery (if you had spinal anesthesia).  To contact a doctor, call Dr Almaguer at [phone] at the Pulmonary Medicine [facility] at 8 am till 5 pm   or:    '   [phone] and ask for the resident on call for   Pulmonology  (answered 24 hours a day)  '   Emergency Department:    [facility]: [phone]       (TTY for hearing impaired: [phone])    [facility] [place]: [phone]       (TTY for hearing impaired:  [phone])

## 2022-12-29 ENCOUNTER — MYC MEDICAL ADVICE (OUTPATIENT)
Dept: FAMILY MEDICINE | Facility: CLINIC | Age: 77
End: 2022-12-29

## 2022-12-29 DIAGNOSIS — M17.0 OSTEOARTHRITIS OF BOTH KNEES, UNSPECIFIED OSTEOARTHRITIS TYPE: ICD-10-CM

## 2022-12-29 DIAGNOSIS — J45.20 MILD INTERMITTENT ASTHMA WITHOUT COMPLICATION: Primary | ICD-10-CM

## 2022-12-29 DIAGNOSIS — K21.9 GASTROESOPHAGEAL REFLUX DISEASE WITHOUT ESOPHAGITIS: ICD-10-CM

## 2022-12-29 DIAGNOSIS — G89.4 CHRONIC PAIN SYNDROME: ICD-10-CM

## 2022-12-29 NOTE — TELEPHONE ENCOUNTER
Routed to provider.  Please see MyChart message from pt.  Pt is asking for refills of   Tramadol  Omeprazole  DuoNeb    And,   Wants hydroxyzine cancelled and change to clonazepam for panic attacks.    Last seen, 9/27/22.    Gemma Mendieta RN

## 2022-12-29 NOTE — TELEPHONE ENCOUNTER
"Requested Prescriptions   Pending Prescriptions Disp Refills     ipratropium - albuterol 0.5 mg/2.5 mg/3 mL (DUONEB) 0.5-2.5 (3) MG/3ML neb solution 90 mL 0     Sig: Take 1 vial (3 mLs) by nebulization every 6 hours as needed for shortness of breath or wheezing       Short-Acting Beta Agonist Inhalers Protocol  Failed - 12/29/2022  5:36 PM        Failed - Asthma control assessment score within normal limits in last 6 months     Please review ACT score.           Passed - Patient is age 12 or older        Passed - Medication is active on med list        Passed - Recent (6 mo) or future (30 days) visit within the authorizing provider's specialty     Patient had office visit in the last 6 months or has a visit in the next 30 days with authorizing provider or within the authorizing provider's specialty.  See \"Patient Info\" tab in inbasket, or \"Choose Columns\" in Meds & Orders section of the refill encounter.           Asthma Nebs Protocol Failed - 12/29/2022  5:36 PM        Failed - Asthma control assessment score within normal limits in last 6 months     Please review ACT score.           Passed - Patient is age 4 years or older        Passed - Medication is active on med list        Passed - Recent (6 mo) or future (30 days) visit within the authorizing provider's specialty     Patient had office visit in the last 6 months or has a visit in the next 30 days with authorizing provider or within the authorizing provider's specialty.  See \"Patient Info\" tab in inbasket, or \"Choose Columns\" in Meds & Orders section of the refill encounter.               omeprazole (PRILOSEC) 20 MG DR capsule 90 capsule 2     Sig: Take 1 capsule (20 mg) by mouth daily 30-60 minutes before a meal.       PPI Protocol Passed - 12/29/2022  5:36 PM        Passed - Not on Clopidogrel (unless Pantoprazole ordered)        Passed - No diagnosis of osteoporosis on record        Passed - Recent (12 mo) or future (30 days) visit within the authorizing " "provider's specialty     Patient has had an office visit with the authorizing provider or a provider within the authorizing providers department within the previous 12 mos or has a future within next 30 days. See \"Patient Info\" tab in inbasket, or \"Choose Columns\" in Meds & Orders section of the refill encounter.              Passed - Medication is active on med list        Passed - Patient is age 18 or older        Passed - No active pregnacy on record        Passed - No positive pregnancy test in past 12 months           traMADol (ULTRAM) 50 MG tablet 120 tablet 0     Sig: Take 1 tablet (50 mg) by mouth every 6 hours as needed for severe pain (7-10) COVERING FOR PCP ONLY ONCE       There is no refill protocol information for this order          "

## 2022-12-29 NOTE — TELEPHONE ENCOUNTER
Routing refill request to provider for review/approval because:  Tramadol not on the [facility] refill protocol   ACT below protocol limits.  Last checked in Sept.  Clonazepam is new request.    Gemma Mendieta RN

## 2022-12-30 RX ORDER — TRAMADOL HYDROCHLORIDE 50 MG/1
50 TABLET ORAL EVERY 6 HOURS PRN
Qty: 120 TABLET | Refills: 1 | Status: SHIPPED | OUTPATIENT
Start: 2022-12-30 | End: 2023-08-01

## 2022-12-30 RX ORDER — IPRATROPIUM BROMIDE AND ALBUTEROL SULFATE 2.5; .5 MG/3ML; MG/3ML
1 SOLUTION RESPIRATORY (INHALATION) EVERY 6 HOURS PRN
Qty: 90 ML | Refills: 0 | Status: SHIPPED | OUTPATIENT
Start: 2022-12-30 | End: 2023-08-31

## 2022-12-30 NOTE — TELEPHONE ENCOUNTER
RECORDS STATUS - ALL OTHER DIAGNOSIS      RECORDS RECEIVED FROM: [facility]   DATE RECEIVED: 12/30   NOTES STATUS DETAILS   OFFICE NOTE from referring provider Epic Alam Almaguer MD   DISCHARGE SUMMARY from hospital [facility] 12/28/22   DISCHARGE REPORT from the ER [facility] 11/18/22   PFT Epic 11/22/22   OPERATIVE REPORT Epic 12/28/22: Bronchospy   MEDICATION LIST [facility] 12/30/22   LABS     PATHOLOGY REPORTS [facility] 12/28/22: Surg Path/FNA   ANYTHING RELATED TO DIAGNOSIS Epic 11/18/22   IMAGING (NEED IMAGES & REPORT)     CT SCANS PACS 12/28/22, 11/19/22: Epic   XR PACS 12/28/22, 11/18/22, 12/5/18: Epic   PET PACS 12/6/22: Epic

## 2023-01-02 ENCOUNTER — PRE VISIT (OUTPATIENT)
Dept: ONCOLOGY | Facility: CLINIC | Age: 78
End: 2023-01-02

## 2023-01-02 ENCOUNTER — PATIENT OUTREACH (OUTPATIENT)
Dept: ONCOLOGY | Facility: CLINIC | Age: 78
End: 2023-01-02

## 2023-01-02 ENCOUNTER — ONCOLOGY VISIT (OUTPATIENT)
Dept: ONCOLOGY | Facility: CLINIC | Age: 78
End: 2023-01-02
Attending: INTERNAL MEDICINE
Payer: COMMERCIAL

## 2023-01-02 DIAGNOSIS — R91.8 RIGHT LOWER LOBE LUNG MASS: ICD-10-CM

## 2023-01-02 DIAGNOSIS — F41.0 ANXIETY ATTACK: Primary | ICD-10-CM

## 2023-01-02 LAB
PATH REPORT.ADDENDUM SPEC: ABNORMAL
PATH REPORT.ADDENDUM SPEC: ABNORMAL
PATH REPORT.COMMENTS IMP SPEC: ABNORMAL
PATH REPORT.COMMENTS IMP SPEC: YES
PATH REPORT.COMMENTS IMP SPEC: YES
PATH REPORT.FINAL DX SPEC: ABNORMAL
PATH REPORT.FINAL DX SPEC: ABNORMAL
PATH REPORT.GROSS SPEC: ABNORMAL
PATH REPORT.GROSS SPEC: ABNORMAL
PATH REPORT.MICROSCOPIC SPEC OTHER STN: ABNORMAL
PATH REPORT.MICROSCOPIC SPEC OTHER STN: ABNORMAL
PATH REPORT.RELEVANT HX SPEC: ABNORMAL
PATH REPORT.RELEVANT HX SPEC: ABNORMAL
PHOTO IMAGE: ABNORMAL

## 2023-01-02 PROCEDURE — G0463 HOSPITAL OUTPT CLINIC VISIT: HCPCS | Performed by: STUDENT IN AN ORGANIZED HEALTH CARE EDUCATION/TRAINING PROGRAM

## 2023-01-02 PROCEDURE — G0463 HOSPITAL OUTPT CLINIC VISIT: HCPCS

## 2023-01-02 PROCEDURE — 99205 OFFICE O/P NEW HI 60 MIN: CPT | Performed by: STUDENT IN AN ORGANIZED HEALTH CARE EDUCATION/TRAINING PROGRAM

## 2023-01-02 ASSESSMENT — PAIN SCALES - GENERAL: PAINLEVEL: NO PAIN (0)

## 2023-01-02 NOTE — LETTER
1/2/2023         RE: Yamel Lindsay  [address] [facility] [address]        Dear Colleague,    Thank you for referring your patient, Yamel Linsday, to the [facility]. Please see a copy of my visit note below.    MEDICAL ONCOLOGY INITIAL CONSULT NOTE    PATIENT NAME: Yamel Lindsay  ENCOUNTER DATE: 1/2/2023    Care Team  Primary Oncologist:  Chaz Martel MD  Surgery: TBD  Radiation oncology: TBD    REASON FOR CURRENT VISIT: New diagnosis of lung cancer    HISTORY OF PRESENT ILLNESS:  Ms. Yamel Lindsay is a 77 year old  female with PMHx of for HTN, HLD, anxiety, who is here for evaluation/followup of new lung cancer      Oncologic Hx:    Diagnosis:   Synchronous Rt LL Squamous cell carcinoma [id number] Stage (AJCC 8th edition) diagnosed 12/2022  [id number]  NGS- pending    Left LL adenocarcinoma [id number] Stage (AJCC 8th edition) diagnosed 12/2022  [id number]  NGS-pending    Treatment: TBD    Intent of treatment: Palliative/Curative- TBD    Oncologic course:  One month history of increasing dyspnea with palpitations, propting ER visit 11/19.  11/19/22- CT- 1 cm right lower lobe mass abutting multiple central bronchovascular and mediastinal structures, consistent with malignancy.2.2 cm spiculated nodule in the left lower lobe, also consistent with malignancy.   12/6/22- PET/CT-  Right lower lobe lung mass abutting the pleura (SUV max of 24), measuring 8 x 5.2 cm. There is associated complete obliteration of right lower lobe bronchus and loss of fat planes with  the left atrium. Spiculated left lower lobe solid lung mass (SUV 17.9), measuring 2.2 x 2 cm. There are several markedly hypermetabolic, irregular solid nodules in the right middle lobe abutting the pleura (approximately 10-12), as for example 1.2 cm nodule with SUV max of 16.8 (4/126). There is a separate markedly hypermetabolic 1 cm nodule in the right upper lobe. Mild hypermetabolic uptake is noted within a1 x 0.9 cm  right paratracheal node ( SUV max of 4.2) (4/120), Mild uptake is also  noted within a subcentimeter lower paraesophageal node (4/154).   12/28/22- EBUS (no mediastinal staging performed)- - LUNG, RIGHT MAINSTEM, ENDOBRONCHIAL BIOPSY: Squamous cell carcinoma with extensive necrosis (positive for p40 and negative for TTF-1)  Left LL FNAC- Positive for malignancy- Adenocarcinoma  (diffusely positive for TTF-1 while negative for p40)      Interval Hx:  Lives in [facility], by herself,  since 1984  Has 2 daughter  Raeann and dudley, she was here with Dudley today  She was working for [employer] for 30 yrs    She continues to have exertional SOB, but can continue to walk after 4-5 blocks  Most likley can do 1-2 stairs of steps wo SOB  She does have morning cough, occasional expectoration  No hemopthysis  No chest pain, wheezing  She is on advair BID and albuterol prn  No recent wt loss  Appetite is ok  No headaches, vision, problems, no focal neurologic deficits  Noraml bladder and bowel movements    Indpendent of ADL and IADL, ambulatory at home  ECOG PS 1  She is sleeping a lot more, and also feels more tired          REVIEW OF SYSTEMS: 14 point ROS negative other than the symptoms noted above in the HPI.    Wt Readings from Last 4 Encounters:   12/28/22 68.2 kg (150 lb 5.7 oz)   11/18/22 65.8 kg (145 lb)   09/27/22 65.8 kg (145 lb)   05/23/22 61.6 kg (135 lb 12.8 oz)              Review of Systems:  A comprehensive ROS was performed and found to be negative or non-contributory with the exception of that noted in the HPI above.    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     Nicotine dependence      Postmenopausal HRT (hormone replacement therapy) 1994     S/P alcohol detoxification 07/06    Recovered and active in AA       Past Surgical History:  Past  Surgical History:   Procedure Laterality Date     BRONCHOSCOPY, WITH BIOPSY, ROBOT ASSISTED N/A 2022    Procedure: BRONCHOSCOPY, USING ION OPTICAL TRACKING SYSTEM, FINE NEEDLE ASPIRATION;  Surgeon: Alma Almaguer MD;  Location: [facility]     ENDOBRONCHIAL ULTRASOUND FLEXIBLE N/A 2022    Procedure: endobronchial biopsy and tumor debulking with cryoprobe;  Surgeon: Alma Almaguer MD;  Location: [facility] OR     ESOPHAGOSCOPY, GASTROSCOPY, DUODENOSCOPY (EGD), COMBINED N/A 2015    Procedure: COMBINED ESOPHAGOSCOPY, GASTROSCOPY, DUODENOSCOPY (EGD), BIOPSY SINGLE OR MULTIPLE;  Surgeon: Dylan Alejandra MD;  Location: [facility]     ESOPHAGOSCOPY, GASTROSCOPY, DUODENOSCOPY (EGD), COMBINED N/A 2018    Procedure: COMBINED ESOPHAGOSCOPY, GASTROSCOPY, DUODENOSCOPY (EGD), BIOPSY SINGLE OR MULTIPLE;  gastroscopy;  Surgeon: Alexander Bautista MD;  Location: [facility]     LAPAROSCOPIC HERNIORRHAPHY INGUINAL Right 2015    Procedure: LAPAROSCOPIC HERNIORRHAPHY INGUINAL;  Surgeon: Favio Olsen MD;  Location: [facility]     TUBAL LIGATION         Social History:  Social History     Socioeconomic History     Marital status: Single   Tobacco Use     Smoking status: Former     Packs/day: 0.50     Types: Cigarettes     Quit date: 2011     Years since quittin.5     Smokeless tobacco: Never   Vaping Use     Vaping Use: Never used   Substance and Sexual Activity     Alcohol use: No     Drug use: No     Sexual activity: Not Currently   Other Topics Concern     Parent/sibling w/ CABG, MI or angioplasty before 65F 55M? No    2 pack per day for 40 yrs    Family History  Family History   Problem Relation Age of Onset     Cancer Mother      C.A.D. Father      Lipids Father      Hypertension Father      Hypertension Brother      Cancer Brother         esophageal cancer     Breast Cancer Other      Prostate Cancer Brother      Psychotic Disorder Daughter      Diabetes No family hx of      Cerebrovascular Disease No  "family hx of      Cancer - colorectal No family hx of    Mother had lung cancer  Older brother esophageal cancer  ANother brother prostate cancer, throat  2 brothers      Outpatient Medications:  albuterol (PROAIR HFA/PROVENTIL HFA/VENTOLIN HFA) 108 (90 Base) MCG/ACT inhaler, Inhale 2 puffs into the lungs every 6 hours as needed for shortness of breath / dyspnea or wheezing  atorvastatin (LIPITOR) 20 MG tablet, Take 1 tablet (20 mg) by mouth daily  Cholecalciferol (VITAMIN D-3 PO), Take 2,000 Units by mouth daily   clonazePAM (KLONOPIN) 0.5 MG tablet, Take 0.5-1 tablets (0.25-0.5 mg) by mouth daily as needed for anxiety  FLUoxetine (PROZAC) 20 MG capsule, Take 2 capsules (40 mg) by mouth daily  fluticasone-salmeterol (ADVAIR HFA) 115-21 MCG/ACT inhaler, Inhale 2 puffs into the lungs 2 times daily  ibuprofen (ADVIL,MOTRIN) 200 MG tablet, Take 200-600 mg by mouth every 2 hours as needed for mild pain  ipratropium - albuterol 0.5 mg/2.5 mg/3 mL (DUONEB) 0.5-2.5 (3) MG/3ML neb solution, Take 1 vial (3 mLs) by nebulization every 6 hours as needed for shortness of breath or wheezing  omeprazole (PRILOSEC) 20 MG DR capsule, Take 1 capsule (20 mg) by mouth daily 30-60 minutes before a meal.  traMADol (ULTRAM) 50 MG tablet, Take 1 tablet (50 mg) by mouth every 6 hours as needed for severe pain (7-10) COVERING FOR PCP ONLY ONCE  traZODone (DESYREL) 50 MG tablet, TAKE 2 TABLETS(100 MG) BY MOUTH AT BEDTIME    No current facility-administered medications on file prior to visit.       Physical Exam:    Blood pressure (!) (P) 157/84, pulse (P) 86, temperature (P) 98.1  F (36.7  C), temperature source (P) Oral, height (P) 1.573 m (5' 1.91\"), weight (P) 70.9 kg (156 lb 6.4 oz), not currently breastfeeding.  General: alert and cooperative, lying in bed, no acute distress  HEENT: sclera anicteric, EOMI, MMM  Neck: supple, normal ROM  CV: RRR, no murmurs  Resp: CTAB, normal respiratory effort on ambient air  GI: soft, non-tender, " non-distended, bowel sounds present and normoactive  MSK: warm and well-perfused, normal tone  Skin: no rashes on limited exam, no jaundice  Neuro: Alert and interactive, moves all extremities equally, no focal deficits    Labs & Studies: I personally reviewed the following studies:    Recent Labs   Lab Test 11/18/22  2205 01/26/15  1445   WBC 7.4 6.9   RBC 4.06 4.21   HGB 11.6* 12.9   HCT 34.7* 37.8   MCV 86 90   MCH 28.6 30.6   MCHC 33.4 34.1   RDW 13.2 11.9    300   NEUTROPHIL 66 62.7     Recent Labs   Lab Test 11/18/22 2205 09/27/22  0842 03/03/21  1442    133* 140   POTASSIUM 3.2* 3.9 3.9   CHLORIDE 99 96* 103   CO2 30* 26 31   ANIONGAP 9 11 6   * 115* 85   BUN 10.2 10.7 10   CR 0.50* 0.59 0.60   DEEPIKA 10.0 9.2 9.6     Recent Labs   Lab Test 11/18/22 2205 09/27/22  0842 03/03/21  1442   PROTTOTAL 7.0 6.6 7.1   ALBUMIN 3.8 3.7 3.3*   BILITOTAL <0.2 0.2 0.2   ALKPHOS 128* 116* 179*   AST 20 24 13   ALT 14 13 18       ASSESSMENT AND PLAN:    Ms. Yamel Lindsay is a 77 year old  female with PMHx of for HTN, HLD, anxiety, who is here for evaluation/followup of new lung cancer      # Synchronous Rt LL Squamous cell carcinoma [id number] Stage (AJCC 8th edition) diagnosed 12/2022  [id number]  NGS- pending    # Left LL adenocarcinoma rH1MhQe Stage (AJCC 8th edition) diagnosed 12/2022  [id number]  NGS-pending    I reviewed PET CT scan, results of the biopsy with the patient.  I discussed that she most likely has 2 synchronous cancers.  The more urgent and threatening one is the right lower lobe squamous cell cancer which appears to be at least stage III given the size greater than 7 cm suggestive of T4 disease.  She also has mediastinal lymph node that were enlarged and appear pathologic but have not yet been biopsied.  She is also has several FDG avid nodules in the right middle and the right upper lobe that have not yet been biopsied.  Most recently she underwent an EBUS for debulking and her  mediastinum has not been staged.  Given the extent of involvement of the right sided cancer, I suspect this is at least locally advanced cancer, and most likely unresectable.  And I am unsure if this is feasible for concurrent chemoradiation given the large radiation field that is required.  Nonetheless, I will confirm this with my colleagues tomorrow at the tumor board meeting with to discuss feasibility of concurrent chemoradiation and also the need for additional mediastinal staging with repeat EBUS.  She has not had PFTs.  I briefly discussed that if the right-sided disease cannot be treated with definitive intent with concurrent chemoradiation we most likely may have to then do palliative intent chemoimmunotherapy.  Her PD-L1 and NGS test from both the samples are still pending, however I suspect NGS might not change our management especially of the locally advanced squamous cell cancer.  Nonetheless we will make a final decisions once we have all the results.  I discussed completing staging with a brain MRI as well.   Patient is in agreement.     Plan  Brain MRI ASAP   Follow-up with PD-L1 and NGS  Discussed with the thoracic tumor board tomorrow , to discuss repeat EBUS for mediastinal staging and feasibility of concurrent chemoradiation  oncology psychology referral   RTC with me in 2 wee      #Bone health: No visible bone mets on the PET CT scan.    # Cancer related pain: No cancer related pain    #Nutrition: No wt loss, appetite is good    #Psychiatry: Patient has previously had a history of anxiety.now since the diagnosis, is having panic attacks.  PTA on Prozac, and also clonazepam  -Trazodoen for ?insomnia  -Consult with psych oncology    #COVID vaccine: s/p 3 boosters    #Hypertension hyperlipidemia  ON statin        On the day of service,  Preparation time:  10 minutes  Visit time:  60 minutes  Care Coordination:  10 minutes      Chaz Martel M.D.   of Medicine  Division of  Hematology, Oncology and Transplantation  [facility]

## 2023-01-02 NOTE — NURSING NOTE
"Oncology Rooming Note    January 2, 2023 3:46 PM   Yamel Lindsay is a 77 year old female who presents for:    Chief Complaint   Patient presents with     Oncology Clinic Visit     Right lower lobe lung mass     Initial Vitals: BP (!) (P) 157/84   Pulse (P) 86   Temp (P) 98.1  F (36.7  C) (Oral)   Ht (P) 1.573 m (5' 1.91\")   Wt (P) 70.9 kg (156 lb 6.4 oz)   BMI (P) 28.69 kg/m   Estimated body mass index is 28.69 kg/m  (pended) as calculated from the following:    Height as of this encounter: (P) 1.573 m (5' 1.91\").    Weight as of this encounter: (P) 70.9 kg (156 lb 6.4 oz). Body surface area is 1.76 meters squared (pended).  No Pain (0) Comment: Data Unavailable   No LMP recorded. Patient is postmenopausal.  Allergies reviewed: Yes  Medications reviewed: Yes    Medications: Medication refills not needed today.  Pharmacy name entered into EPIC:    [facility] [place], [place] - [address] [place] [address] PHARMACY [id number] - [facility] [address] [place] [address] PHARMACY #[id number] - [facility] [address] [place]ARMACY [place] [address]  [id number] - [place] - [address] [facility] [address] & [facility] [place], [place] - [address] [facility] #[id number] - [place], [place] - [address] [facility] [address]    Clinical concerns:        Jammie Cervantes CMA              "

## 2023-01-02 NOTE — PROGRESS NOTES
[facility]: Cancer Care                                                                                          Met with Pt, provided card with clinic #'s and resources on it. Answered all Pt questions.     Signature:  ABHIJIT CORTÉS RN

## 2023-01-02 NOTE — PROGRESS NOTES
MEDICAL ONCOLOGY INITIAL CONSULT NOTE    PATIENT NAME: Yamel Lindsay  ENCOUNTER DATE: 1/2/2023    Care Team  Primary Oncologist:  Chaz Martel MD  Surgery: TBD  Radiation oncology: TBD    REASON FOR CURRENT VISIT: New diagnosis of lung cancer    HISTORY OF PRESENT ILLNESS:  Ms. Yamel Lindsay is a 77 year old  female with PMHx of for HTN, HLD, anxiety, who is here for evaluation/followup of new lung cancer      Oncologic Hx:    Diagnosis:   Synchronous Rt LL Squamous cell carcinoma [id number] Stage (AJCC 8th edition) diagnosed 12/2022  [id number]  NGS- pending    Left LL adenocarcinoma [id number] Stage (AJCC 8th edition) diagnosed 12/2022  [id number]  NGS-pending    Treatment: TBD    Intent of treatment: Palliative/Curative- TBD    Oncologic course:  One month history of increasing dyspnea with palpitations, propting ER visit 11/19.  11/19/22- CT- 1 cm right lower lobe mass abutting multiple central bronchovascular and mediastinal structures, consistent with malignancy.2.2 cm spiculated nodule in the left lower lobe, also consistent with malignancy.   12/6/22- PET/CT-  Right lower lobe lung mass abutting the pleura (SUV max of 24), measuring 8 x 5.2 cm. There is associated complete obliteration of right lower lobe bronchus and loss of fat planes with  the left atrium. Spiculated left lower lobe solid lung mass (SUV 17.9), measuring 2.2 x 2 cm. There are several markedly hypermetabolic, irregular solid nodules in the right middle lobe abutting the pleura (approximately 10-12), as for example 1.2 cm nodule with SUV max of 16.8 (4/126). There is a separate markedly hypermetabolic 1 cm nodule in the right upper lobe. Mild hypermetabolic uptake is noted within a1 x 0.9 cm right paratracheal node ( SUV max of 4.2) (4/120), Mild uptake is also  noted within a subcentimeter lower paraesophageal node (4/154).   12/28/22- EBUS with deblking in the right hilar mass (no mediastinal staging performed)- - LUNG, RIGHT  MAINSTEM, ENDOBRONCHIAL BIOPSY: Squamous cell carcinoma with extensive necrosis (positive for p40 and negative for TTF-1)  Left LL FNAC- Positive for malignancy- Adenocarcinoma  (diffusely positive for TTF-1 while negative for p40)      Interval Hx:  Lives in [facility], by herself,  since 1984  Has 2 daughter  Raeann and dudley, she was here with Dudley today  She was working for [employer] job for 30 yrs    She continues to have exertional SOB, but can continue to walk after 4-5 blocks  Most likley can do 1-2 stairs of steps wo SOB  She does have morning cough, occasional expectoration  No hemopthysis  No chest pain, wheezing  She is on advair BID and albuterol prn  No recent wt loss  Appetite is ok  No headaches, vision, problems, no focal neurologic deficits  Noraml bladder and bowel movements    Indpendent of ADL and IADL, ambulatory at home  ECOG PS 1  She is sleeping a lot more, and also feels more tired          REVIEW OF SYSTEMS: 14 point ROS negative other than the symptoms noted above in the HPI.    Wt Readings from Last 4 Encounters:   12/28/22 68.2 kg (150 lb 5.7 oz)   11/18/22 65.8 kg (145 lb)   09/27/22 65.8 kg (145 lb)   05/23/22 61.6 kg (135 lb 12.8 oz)              Review of Systems:  A comprehensive ROS was performed and found to be negative or non-contributory with the exception of that noted in the HPI above.    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     Nicotine dependence      Postmenopausal HRT (hormone replacement therapy) 1994     S/P alcohol detoxification 07/06    Recovered and active in AA       Past Surgical History:  Past Surgical History:   Procedure Laterality Date     BRONCHOSCOPY, WITH BIOPSY, ROBOT ASSISTED N/A 12/28/2022    Procedure: BRONCHOSCOPY, USING ION OPTICAL TRACKING SYSTEM, FINE NEEDLE ASPIRATION;  Surgeon: Tripp  Alma Stevenson MD;  Location: [facility]     ENDOBRONCHIAL ULTRASOUND FLEXIBLE N/A 2022    Procedure: endobronchial biopsy and tumor debulking with cryoprobe;  Surgeon: Alma Almaguer MD;  Location: [facility]     ESOPHAGOSCOPY, GASTROSCOPY, DUODENOSCOPY (EGD), COMBINED N/A 2015    Procedure: COMBINED ESOPHAGOSCOPY, GASTROSCOPY, DUODENOSCOPY (EGD), BIOPSY SINGLE OR MULTIPLE;  Surgeon: Dylan Alejandra MD;  Location: [facility]     ESOPHAGOSCOPY, GASTROSCOPY, DUODENOSCOPY (EGD), COMBINED N/A 2018    Procedure: COMBINED ESOPHAGOSCOPY, GASTROSCOPY, DUODENOSCOPY (EGD), BIOPSY SINGLE OR MULTIPLE;  gastroscopy;  Surgeon: Alexander Bautista MD;  Location: [facility]     LAPAROSCOPIC HERNIORRHAPHY INGUINAL Right 2015    Procedure: LAPAROSCOPIC HERNIORRHAPHY INGUINAL;  Surgeon: Favio Olsen MD;  Location: [facility]     TUBAL LIGATION         Social History:  Social History     Socioeconomic History     Marital status: Single   Tobacco Use     Smoking status: Former     Packs/day: 0.50     Types: Cigarettes     Quit date: 2011     Years since quittin.5     Smokeless tobacco: Never   Vaping Use     Vaping Use: Never used   Substance and Sexual Activity     Alcohol use: No     Drug use: No     Sexual activity: Not Currently   Other Topics Concern     Parent/sibling w/ CABG, MI or angioplasty before 65F 55M? No    2 pack per day for 40 yrs    Family History  Family History   Problem Relation Age of Onset     Cancer Mother      C.A.D. Father      Lipids Father      Hypertension Father      Hypertension Brother      Cancer Brother         esophageal cancer     Breast Cancer Other      Prostate Cancer Brother      Psychotic Disorder Daughter      Diabetes No family hx of      Cerebrovascular Disease No family hx of      Cancer - colorectal No family hx of    Mother had lung cancer  Older brother esophageal cancer  ANother brother prostate cancer, throat  2 brothers      Outpatient Medications:  albuterol (PROAIR  "HFA/PROVENTIL HFA/VENTOLIN HFA) 108 (90 Base) MCG/ACT inhaler, Inhale 2 puffs into the lungs every 6 hours as needed for shortness of breath / dyspnea or wheezing  atorvastatin (LIPITOR) 20 MG tablet, Take 1 tablet (20 mg) by mouth daily  Cholecalciferol (VITAMIN D-3 PO), Take 2,000 Units by mouth daily   clonazePAM (KLONOPIN) 0.5 MG tablet, Take 0.5-1 tablets (0.25-0.5 mg) by mouth daily as needed for anxiety  FLUoxetine (PROZAC) 20 MG capsule, Take 2 capsules (40 mg) by mouth daily  fluticasone-salmeterol (ADVAIR HFA) 115-21 MCG/ACT inhaler, Inhale 2 puffs into the lungs 2 times daily  ibuprofen (ADVIL,MOTRIN) 200 MG tablet, Take 200-600 mg by mouth every 2 hours as needed for mild pain  ipratropium - albuterol 0.5 mg/2.5 mg/3 mL (DUONEB) 0.5-2.5 (3) MG/3ML neb solution, Take 1 vial (3 mLs) by nebulization every 6 hours as needed for shortness of breath or wheezing  omeprazole (PRILOSEC) 20 MG DR capsule, Take 1 capsule (20 mg) by mouth daily 30-60 minutes before a meal.  traMADol (ULTRAM) 50 MG tablet, Take 1 tablet (50 mg) by mouth every 6 hours as needed for severe pain (7-10) COVERING FOR PCP ONLY ONCE  traZODone (DESYREL) 50 MG tablet, TAKE 2 TABLETS(100 MG) BY MOUTH AT BEDTIME    No current facility-administered medications on file prior to visit.       Physical Exam:    Blood pressure (!) (P) 157/84, pulse (P) 86, temperature (P) 98.1  F (36.7  C), temperature source (P) Oral, height (P) 1.573 m (5' 1.91\"), weight (P) 70.9 kg (156 lb 6.4 oz), not currently breastfeeding.  General: alert and cooperative, lying in bed, no acute distress  HEENT: sclera anicteric, EOMI, MMM  Neck: supple, normal ROM  CV: RRR, no murmurs  Resp: CTAB, normal respiratory effort on ambient air  GI: soft, non-tender, non-distended, bowel sounds present and normoactive  MSK: warm and well-perfused, normal tone  Skin: no rashes on limited exam, no jaundice  Neuro: Alert and interactive, moves all extremities equally, no focal " deficits    Labs & Studies: I personally reviewed the following studies:    Recent Labs   Lab Test 11/18/22  2205 01/26/15  1445   WBC 7.4 6.9   RBC 4.06 4.21   HGB 11.6* 12.9   HCT 34.7* 37.8   MCV 86 90   MCH 28.6 30.6   MCHC 33.4 34.1   RDW 13.2 11.9    300   NEUTROPHIL 66 62.7     Recent Labs   Lab Test 11/18/22 2205 09/27/22  0842 03/03/21  1442    133* 140   POTASSIUM 3.2* 3.9 3.9   CHLORIDE 99 96* 103   CO2 30* 26 31   ANIONGAP 9 11 6   * 115* 85   BUN 10.2 10.7 10   CR 0.50* 0.59 0.60   DEEPIKA 10.0 9.2 9.6     Recent Labs   Lab Test 11/18/22 2205 09/27/22  0842 03/03/21  1442   PROTTOTAL 7.0 6.6 7.1   ALBUMIN 3.8 3.7 3.3*   BILITOTAL <0.2 0.2 0.2   ALKPHOS 128* 116* 179*   AST 20 24 13   ALT 14 13 18       ASSESSMENT AND PLAN:    Ms. Yamel Lindsay is a 77 year old  female with PMHx of for HTN, HLD, anxiety, who is here for evaluation/followup of new lung cancer      # Synchronous Rt LL Squamous cell carcinoma [id number] Stage (AJCC 8th edition) diagnosed 12/2022  [id number]  NGS- pending    # Left LL adenocarcinoma [id number] Stage (AJCC 8th edition) diagnosed 12/2022  [id number]  NGS-pending    I reviewed PET CT scan, results of the biopsy with the patient.  I discussed that she most likely has 2 synchronous cancers.  The more urgent and threatening one is the right lower lobe squamous cell cancer which appears to be at least stage III given the size greater than 7 cm suggestive of T4 disease.  She also has mediastinal lymph node that were enlarged and appear pathologic but have not yet been biopsied.  She is also has several FDG avid nodules in the right middle and the right upper lobe that have not yet been biopsied.  Most recently she underwent an EBUS for debulking and her mediastinum has not been staged.  Given the extent of involvement of the right sided cancer, I suspect this is at least locally advanced cancer, and most likely unresectable.  And I am unsure if this is feasible for  concurrent chemoradiation given the large radiation field that is required.  Nonetheless, I will confirm this with my colleagues tomorrow at the tumor board meeting with to discuss feasibility of concurrent chemoradiation and also the need for additional mediastinal staging with repeat EBUS.  She has not had PFTs.  I briefly discussed that if the right-sided disease cannot be treated with definitive intent with concurrent chemoradiation we most likely may have to then do palliative intent chemoimmunotherapy.  Her PD-L1 and NGS test from both the samples are still pending, however I suspect NGS might not change our management especially of the locally advanced squamous cell cancer.  Nonetheless we will make a final decisions once we have all the results.  I discussed completing staging with a brain MRI as well.   Patient is in agreement.     Plan  Brain MRI ASAP   Follow-up with PD-L1 and NGS  Discussed with the thoracic tumor board tomorrow , to discuss repeat EBUS for mediastinal staging and feasibility of concurrent chemoradiation  oncology psychology referral   RTC with me in 2 wee      #Bone health: No visible bone mets on the PET CT scan.    # Cancer related pain: No cancer related pain    #Nutrition: No wt loss, appetite is good    #Psychiatry: Patient has previously had a history of anxiety.now since the diagnosis, is having panic attacks.  PTA on Prozac, and also clonazepam  -Trazodoen for ?insomnia  -Consult with psych oncology    #COVID vaccine: s/p 3 boosters    #Hypertension hyperlipidemia  ON statin        On the day of service,  Preparation time:  10 minutes  Visit time:  60 minutes  Care Coordination:  10 minutes      Chaz Martel M.D.   of Medicine  Division of Hematology, Oncology and Transplantation  [facility]

## 2023-01-03 ENCOUNTER — HOSPITAL ENCOUNTER (OUTPATIENT)
Dept: MRI IMAGING | Facility: HOSPITAL | Age: 78
Discharge: HOME | End: 2023-01-03
Attending: STUDENT IN AN ORGANIZED HEALTH CARE EDUCATION/TRAINING PROGRAM | Admitting: STUDENT IN AN ORGANIZED HEALTH CARE EDUCATION/TRAINING PROGRAM
Payer: COMMERCIAL

## 2023-01-03 DIAGNOSIS — R91.8 RIGHT LOWER LOBE LUNG MASS: ICD-10-CM

## 2023-01-03 PROCEDURE — G0452 MOLECULAR PATHOLOGY INTERPR: HCPCS | Mod: 26 | Performed by: PATHOLOGY

## 2023-01-03 PROCEDURE — 255N000002 HC RX 255 OP 636: Performed by: STUDENT IN AN ORGANIZED HEALTH CARE EDUCATION/TRAINING PROGRAM

## 2023-01-03 PROCEDURE — 81445 SO NEO GSAP 5-50DNA/DNA&RNA: CPT | Performed by: INTERNAL MEDICINE

## 2023-01-03 PROCEDURE — 70553 MRI BRAIN STEM W/O & W/DYE: CPT

## 2023-01-03 PROCEDURE — A9585 GADOBUTROL INJECTION: HCPCS | Performed by: STUDENT IN AN ORGANIZED HEALTH CARE EDUCATION/TRAINING PROGRAM

## 2023-01-03 PROCEDURE — 81456 SO/HL 51/>GSAP RNA ALYS: CPT | Performed by: INTERNAL MEDICINE

## 2023-01-03 PROCEDURE — 999N000127 HC STATISTIC PERIPHERAL IV START W US GUIDANCE

## 2023-01-03 RX ORDER — GADOBUTROL 604.72 MG/ML
7 INJECTION INTRAVENOUS ONCE
Status: COMPLETED | OUTPATIENT
Start: 2023-01-03 | End: 2023-01-03

## 2023-01-03 RX ADMIN — GADOBUTROL 7 ML: 604.72 INJECTION INTRAVENOUS at 16:03

## 2023-01-04 ENCOUNTER — VIRTUAL VISIT (OUTPATIENT)
Dept: FAMILY MEDICINE | Facility: CLINIC | Age: 78
End: 2023-01-04
Payer: COMMERCIAL

## 2023-01-04 DIAGNOSIS — J43.1 PANLOBULAR EMPHYSEMA (H): ICD-10-CM

## 2023-01-04 DIAGNOSIS — C34.31 MALIGNANT NEOPLASM OF LOWER LOBE OF RIGHT LUNG (H): ICD-10-CM

## 2023-01-04 DIAGNOSIS — F41.9 ANXIETY: Primary | ICD-10-CM

## 2023-01-04 DIAGNOSIS — F10.20 ALCOHOLISM (H): ICD-10-CM

## 2023-01-04 DIAGNOSIS — F33.42 RECURRENT MAJOR DEPRESSIVE DISORDER, IN FULL REMISSION (H): ICD-10-CM

## 2023-01-04 PROCEDURE — 99213 OFFICE O/P EST LOW 20 MIN: CPT | Mod: 95 | Performed by: FAMILY MEDICINE

## 2023-01-04 RX ORDER — CLONAZEPAM 0.5 MG/1
.25-.5 TABLET ORAL DAILY PRN
Qty: 30 TABLET | Refills: 3 | Status: SHIPPED | OUTPATIENT
Start: 2023-01-04 | End: 2023-01-18

## 2023-01-04 ASSESSMENT — ANXIETY QUESTIONNAIRES
6. BECOMING EASILY ANNOYED OR IRRITABLE: SEVERAL DAYS
7. FEELING AFRAID AS IF SOMETHING AWFUL MIGHT HAPPEN: SEVERAL DAYS
GAD7 TOTAL SCORE: 13
5. BEING SO RESTLESS THAT IT IS HARD TO SIT STILL: SEVERAL DAYS
1. FEELING NERVOUS, ANXIOUS, OR ON EDGE: NEARLY EVERY DAY
2. NOT BEING ABLE TO STOP OR CONTROL WORRYING: NEARLY EVERY DAY
3. WORRYING TOO MUCH ABOUT DIFFERENT THINGS: SEVERAL DAYS
IF YOU CHECKED OFF ANY PROBLEMS ON THIS QUESTIONNAIRE, HOW DIFFICULT HAVE THESE PROBLEMS MADE IT FOR YOU TO DO YOUR WORK, TAKE CARE OF THINGS AT HOME, OR GET ALONG WITH OTHER PEOPLE: VERY DIFFICULT
GAD7 TOTAL SCORE: 13

## 2023-01-04 ASSESSMENT — PATIENT HEALTH QUESTIONNAIRE - PHQ9
SUM OF ALL RESPONSES TO PHQ QUESTIONS 1-9: 13
5. POOR APPETITE OR OVEREATING: NEARLY EVERY DAY

## 2023-01-04 NOTE — PROGRESS NOTES
Yamel is a 77 year old who is being evaluated via a billable video visit.      How would you like to obtain your AVS? MyChart  If the video visit is dropped, the invitation should be resent by: Logging into her My Chart.  Will anyone else be joining your video visit? No        Assessment & Plan   77 yr old female here for refills on anxiety medication. She has a history of chronic pain, anxiety depression,hypertension, copd. She was recently diagnosed with Lung CA and she reports that her anxiety has increased since then. She also take tramadol for chronic pain. I explained to the patient the dangers of taking benzodizepines and narcotics. She declined using hydroxyzine, buspirone or propanolol. I even went to the extent of telling her that I was flagged by the [place] prescription board on these two drug interactions. We came to the consensus that she will be weaning of the tramadol and there will be no more refills on the tramadol.   Anxiety  - clonazePAM (KLONOPIN) 0.5 MG tablet; Take 0.5-1 tablets (0.25-0.5 mg) by mouth daily as needed for anxiety    [id number]}     FUTURE APPOINTMENTS:       - Follow-up visit in one month or sooner as needed.    Return in about 4 weeks (around 2/1/2023) for Follow up.    Catie Fraser MD  [facility]    Subjective   Yamel is a 77 year old accompanied by her daughter, presenting for the following health issues:  Anxiety (Discuss about anxiety symptoms and panic attacks with her cancer diagnosis. )      HPI     Anxiety Follow-Up    How are you doing with your anxiety since your last visit? Worsened in the last few weeks.    Are you having other symptoms that might be associated with anxiety? Yes:  Has been having panic attacks daily.    Have you had a significant life event? Health Concerns Diagnosed with cancer.    Are you feeling depressed? Yes:  Yes, is taking Prozac.  Feels this is a little worse with her cancer diagnosis.    Do you have any concerns  with your use of alcohol or other drugs? No    Social History     Tobacco Use     Smoking status: Former     Packs/day: 0.50     Types: Cigarettes     Quit date: 2011     Years since quittin.5     Smokeless tobacco: Never   Vaping Use     Vaping Use: Never used   Substance Use Topics     Alcohol use: No     Drug use: No     PAOLA-7 SCORE 2022   Total Score - - -   Total Score 13 10 13     PHQ 2022   PHQ-9 Total Score 12 3 13   Q9: Thoughts of better off dead/self-harm past 2 weeks Not at all Not at all Not at all     Last PHQ-9 2023   1.  Little interest or pleasure in doing things 1   2.  Feeling down, depressed, or hopeless 3   3.  Trouble falling or staying asleep, or sleeping too much 1   4.  Feeling tired or having little energy 1   5.  Poor appetite or overeating 3   6.  Feeling bad about yourself 1   7.  Trouble concentrating 1   8.  Moving slowly or restless 2   Q9: Thoughts of better off dead/self-harm past 2 weeks 0   PHQ-9 Total Score 13   Difficulty at work, home, or with people Very difficult     PAOLA-7  2023   1. Feeling nervous, anxious, or on edge 3   2. Not being able to stop or control worrying 3   3. Worrying too much about different things 1   4. Trouble relaxing 3   5. Being so restless that it is hard to sit still 1   6. Becoming easily annoyed or irritable 1   7. Feeling afraid, as if something awful might happen 1   PAOLA-7 Total Score 13   If you checked any problems, how difficult have they made it for you to do your work, take care of things at home, or get along with other people? Very difficult         How many servings of fruits and vegetables do you eat daily?  2-3    On average, how many sweetened beverages do you drink each day (Examples: soda, juice, sweet tea, etc.  Do NOT count diet or artificially sweetened beverages)?   0    How many days per week do you exercise enough to make your heart beat faster? 7    How many  minutes a day do you exercise enough to make your heart beat faster? Walking daily.    How many days per week do you miss taking your medication? 0        Review of Systems   Constitutional, HEENT, cardiovascular, pulmonary, gi and gu systems are negative, except as otherwise noted.      Objective    Vitals - Patient Reported  Pain Score: No Pain (0)      Vitals:  No vitals were obtained today due to virtual visit.    Physical Exam   GENERAL: Healthy, alert and no distress  EYES: Eyes grossly normal to inspection.  No discharge or erythema, or obvious scleral/conjunctival abnormalities.  RESP: No audible wheeze, cough, or visible cyanosis.  No visible retractions or increased work of breathing.    SKIN: Visible skin clear. No significant rash, abnormal pigmentation or lesions.  PSYCH: Mentation appears normal, affect normal/bright, judgement and insight intact, normal speech and appearance well-groomed.            Video-Visit Details    Type of service:  Video Visit   Video Start Time: 3:36 PM  Video End Time:3:43 PM    Originating Location (pt. Location): Home  Distant Location (provider location):  Off-site  Platform used for Video Visit: [employer]

## 2023-01-10 DIAGNOSIS — C34.31 MALIGNANT NEOPLASM OF LOWER LOBE OF RIGHT LUNG (H): Primary | ICD-10-CM

## 2023-01-10 DIAGNOSIS — T45.1X5S ADVERSE EFFECT OF ANTINEOPLASTIC AND IMMUNOSUPPRESSIVE DRUGS, SEQUELA: ICD-10-CM

## 2023-01-12 ENCOUNTER — LAB (OUTPATIENT)
Dept: LAB | Facility: CLINIC | Age: 78
End: 2023-01-12
Payer: COMMERCIAL

## 2023-01-12 DIAGNOSIS — C34.90 LUNG CANCER (H): ICD-10-CM

## 2023-01-12 DIAGNOSIS — R91.8 RIGHT LOWER LOBE LUNG MASS: Primary | ICD-10-CM

## 2023-01-12 LAB
INTERPRETATION: NORMAL
SIGNIFICANT RESULTS: NORMAL
SPECIMEN DESCRIPTION: NORMAL
TEST DETAILS, MDL: NORMAL

## 2023-01-12 PROCEDURE — 81455 SO/HL 51/>GSAP DNA/DNA&RNA: CPT | Performed by: INTERNAL MEDICINE

## 2023-01-12 PROCEDURE — G0452 MOLECULAR PATHOLOGY INTERPR: HCPCS | Mod: 26 | Performed by: STUDENT IN AN ORGANIZED HEALTH CARE EDUCATION/TRAINING PROGRAM

## 2023-01-12 PROCEDURE — 81445 SO NEO GSAP 5-50DNA/DNA&RNA: CPT | Performed by: INTERNAL MEDICINE

## 2023-01-12 PROCEDURE — 99207 ONCOLOGY FUSION GENE ONLY NGS PANEL: CPT | Performed by: PATHOLOGY

## 2023-01-17 NOTE — PROGRESS NOTES
MEDICAL ONCOLOGY INITIAL CONSULT NOTE    PATIENT NAME: Yamel Lindsay  ENCOUNTER DATE: 1/18/2023    Care Team  Primary Oncologist:  Chaz Martel MD  Surgery: TBD  Radiation oncology: TBD    REASON FOR CURRENT VISIT: F/u of lung cancer    HISTORY OF PRESENT ILLNESS:  Ms. Yamel Lindsay is a 77 year old  female with PMHx of for HTN, HLD, anxiety, who is here for followup of lung cancer      Oncologic Hx:    Diagnosis:   Synchronous Rt LL Squamous cell carcinoma [id number] Stage IIIC (AJCC 8th edition) diagnosed 12/2022  PD-L1 <1%  NGS- [facility] panel- Neg (High TMB 16.7 mut/MB)    Left LL adenocarcinoma [id number] Stage (AJCC 8th edition) diagnosed 12/2022  [id number]  NGS-pending    Treatment:   1/20/22- Pembrolizumab+carboplatin+paclitaxel    Intent of treatment: Palliative    Oncologic course:  One month history of increasing dyspnea with palpitations, propting ER visit 11/19.  11/19/22- CT- 1 cm right lower lobe mass abutting multiple central bronchovascular and mediastinal structures, consistent with malignancy.2.2 cm spiculated nodule in the left lower lobe, also consistent with malignancy.   12/6/22- PET/CT-  Right lower lobe lung mass abutting the pleura (SUV max of 24), measuring 8 x 5.2 cm. There is associated complete obliteration of right lower lobe bronchus and loss of fat planes with  the left atrium. Spiculated left lower lobe solid lung mass (SUV 17.9), measuring 2.2 x 2 cm. There are several markedly hypermetabolic, irregular solid nodules in the right middle lobe abutting the pleura (approximately 10-12), as for example 1.2 cm nodule with SUV max of 16.8 (4/126). There is a separate markedly hypermetabolic 1 cm nodule in the right upper lobe. Mild hypermetabolic uptake is noted within a1 x 0.9 cm right paratracheal node ( SUV max of 4.2) (4/120), Mild uptake is also  noted within a subcentimeter lower paraesophageal node (4/154).   12/28/22- EBUS with deblking in the right hilar mass (no mediastinal staging  performed)- - LUNG, RIGHT MAINSTEM, ENDOBRONCHIAL BIOPSY: Squamous cell carcinoma with extensive necrosis (positive for p40 and negative for TTF-1). Left LL FNAC- Positive for malignancy- Adenocarcinoma  (diffusely positive for TTF-1 while negative for p40)  1/3/23: Brain MRI - Neg  1/10/23: Tumor board discussion: Too extensive for concurrent/sequential chemo XRT- plan for palliative intent chemo-IO      Interval Hx:  She has no new symptoms.   She continues to have exertional SOB, but can continue to walk after 4-5 blocks  Most likley can do 1-2 stairs of steps wo SOB  She does have morning cough, occasional expectoration  No hemopthysis  No chest pain, wheezing  She is on advair BID and albuterol prn  No recent wt loss  Appetite is ok  No headaches, vision, problems, no focal neurologic deficits  Noraml bladder and bowel movements    Indpendent of ADL and IADL, ambulatory at home  ECOG PS 1  She is sleeping a lot more, and also feels more tired    Lives in [facility], by herself,  since 1984  Has 2 daughter  Raeann and dudley, she was here with Dudley today  She was working for [employer] job for 30 yrs      REVIEW OF SYSTEMS: 14 point ROS negative other than the symptoms noted above in the HPI.    Wt Readings from Last 4 Encounters:   01/02/23 (P) 70.9 kg (156 lb 6.4 oz)   12/28/22 68.2 kg (150 lb 5.7 oz)   11/18/22 65.8 kg (145 lb)   09/27/22 65.8 kg (145 lb)              Review of Systems:  A comprehensive ROS was performed and found to be negative or non-contributory with the exception of that noted in the HPI above.    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     Nicotine dependence      Postmenopausal HRT (hormone replacement therapy) 1994     S/P alcohol detoxification 07/06    Recovered and active in AA       Past Surgical History:  Past  Surgical History:   Procedure Laterality Date     BRONCHOSCOPY, WITH BIOPSY, ROBOT ASSISTED N/A 2022    Procedure: BRONCHOSCOPY, USING ION OPTICAL TRACKING SYSTEM, FINE NEEDLE ASPIRATION;  Surgeon: Alma Almaguer MD;  [facility]     ENDOBRONCHIAL ULTRASOUND FLEXIBLE N/A 2022    Procedure: endobronchial biopsy and tumor debulking with cryoprobe;  Surgeon: Alma Almaguer MD;  [facility]     ESOPHAGOSCOPY, GASTROSCOPY, DUODENOSCOPY (EGD), COMBINED N/A 2015    Procedure: COMBINED ESOPHAGOSCOPY, GASTROSCOPY, DUODENOSCOPY (EGD), BIOPSY SINGLE OR MULTIPLE;  Surgeon: Dylan Alejandra MD;  [facility]     ESOPHAGOSCOPY, GASTROSCOPY, DUODENOSCOPY (EGD), COMBINED N/A 2018    Procedure: COMBINED ESOPHAGOSCOPY, GASTROSCOPY, DUODENOSCOPY (EGD), BIOPSY SINGLE OR MULTIPLE;  gastroscopy;  Surgeon: Alexander Bautista MD;  [facility]     LAPAROSCOPIC HERNIORRHAPHY INGUINAL Right 2015    Procedure: LAPAROSCOPIC HERNIORRHAPHY INGUINAL;  Surgeon: Favio Olsen MD;  Location: [facility]     TUBAL LIGATION         Social History:  Social History     Socioeconomic History     Marital status: Single   Tobacco Use     Smoking status: Former     Packs/day: 0.50     Types: Cigarettes     Quit date: 2011     Years since quittin.5     Smokeless tobacco: Never   Vaping Use     Vaping Use: Never used   Substance and Sexual Activity     Alcohol use: No     Drug use: No     Sexual activity: Not Currently   Other Topics Concern     Parent/sibling w/ CABG, MI or angioplasty before 65F 55M? No    2 pack per day for 40 yrs    Family History  Family History   Problem Relation Age of Onset     Cancer Mother      C.A.D. Father      Lipids Father      Hypertension Father      Hypertension Brother      Cancer Brother         esophageal cancer     Breast Cancer Other      Prostate Cancer Brother      Psychotic Disorder Daughter      Diabetes No family hx of      Cerebrovascular Disease No  family hx of      Cancer - colorectal No family hx of    Mother had lung cancer  Older brother esophageal cancer  ANother brother prostate cancer, throat  2 brothers      Outpatient Medications:  albuterol (PROAIR HFA/PROVENTIL HFA/VENTOLIN HFA) 108 (90 Base) MCG/ACT inhaler, Inhale 2 puffs into the lungs every 6 hours as needed for shortness of breath / dyspnea or wheezing  atorvastatin (LIPITOR) 20 MG tablet, Take 1 tablet (20 mg) by mouth daily  Cholecalciferol (VITAMIN D-3 PO), Take 2,000 Units by mouth daily   clonazePAM (KLONOPIN) 0.5 MG tablet, Take 0.5-1 tablets (0.25-0.5 mg) by mouth daily as needed for anxiety  FLUoxetine (PROZAC) 20 MG capsule, Take 2 capsules (40 mg) by mouth daily  fluticasone-salmeterol (ADVAIR HFA) 115-21 MCG/ACT inhaler, Inhale 2 puffs into the lungs 2 times daily  ibuprofen (ADVIL,MOTRIN) 200 MG tablet, Take 200-600 mg by mouth every 2 hours as needed for mild pain  ipratropium - albuterol 0.5 mg/2.5 mg/3 mL (DUONEB) 0.5-2.5 (3) MG/3ML neb solution, Take 1 vial (3 mLs) by nebulization every 6 hours as needed for shortness of breath or wheezing  omeprazole (PRILOSEC) 20 MG DR capsule, Take 1 capsule (20 mg) by mouth daily 30-60 minutes before a meal.  traMADol (ULTRAM) 50 MG tablet, Take 1 tablet (50 mg) by mouth every 6 hours as needed for severe pain (7-10) COVERING FOR PCP ONLY ONCE  traZODone (DESYREL) 50 MG tablet, TAKE 2 TABLETS(100 MG) BY MOUTH AT BEDTIME    No current facility-administered medications on file prior to visit.       Physical Exam:    Blood pressure (!) 147/82, pulse 77, temperature 97.7  F (36.5  C), temperature source Oral, resp. rate 16, weight 70.8 kg (156 lb 1.6 oz), SpO2 96 %, not currently breastfeeding.  General: alert and cooperative, lying in bed, no acute distress  HEENT: sclera anicteric, EOMI, MMM  Neck: supple, normal ROM  CV: RRR, no murmurs  Resp: CTAB, normal respiratory effort on ambient air  GI: soft, non-tender, non-distended, bowel sounds  present and normoactive  MSK: warm and well-perfused, normal tone  Skin: no rashes on limited exam, no jaundice  Neuro: Alert and interactive, moves all extremities equally, no focal deficits    Labs & Studies: I personally reviewed the following studies:    Recent Labs   Lab Test 11/18/22  2205 01/26/15  1445   WBC 7.4 6.9   RBC 4.06 4.21   HGB 11.6* 12.9   HCT 34.7* 37.8   MCV 86 90   MCH 28.6 30.6   MCHC 33.4 34.1   RDW 13.2 11.9    300   NEUTROPHIL 66 62.7     Recent Labs   Lab Test 11/18/22 2205 09/27/22  0842 03/03/21  1442    133* 140   POTASSIUM 3.2* 3.9 3.9   CHLORIDE 99 96* 103   CO2 30* 26 31   ANIONGAP 9 11 6   * 115* 85   BUN 10.2 10.7 10   CR 0.50* 0.59 0.60   DEEPIKA 10.0 9.2 9.6     Recent Labs   Lab Test 11/18/22 2205 09/27/22  0842 03/03/21  1442   PROTTOTAL 7.0 6.6 7.1   ALBUMIN 3.8 3.7 3.3*   BILITOTAL <0.2 0.2 0.2   ALKPHOS 128* 116* 179*   AST 20 24 13   ALT 14 13 18       ASSESSMENT AND PLAN:    Ms. Yamel Lindsay is a 77 year old  female with PMHx of for HTN, HLD, anxiety, who is here for evaluation/followup of new lung cancer    Synchronous Rt LL Squamous cell carcinoma [id number] Stage IIIC (AJCC 8th edition) diagnosed 12/2022  PD-L1 <1%  NGS- [facility] panel- Neg (High TMB 16.7 mut/MB)    Left LL adenocarcinoma [id number] Stage (AJCC 8th edition) diagnosed 12/2022  [id number]  NGS-pending    Pt with 2 synchronous cancers.  The more urgent and threatening one is the right lower lobe squamous cell cancer which appears to be at least stage IIIC given the size greater than 7 cm suggestive of T4 disease.  She also has mediastinal lymph node that were enlarged and appear pathologic but have not yet been biopsied.  She is also has several FDG avid nodules in the right middle and the right upper lobe that have not yet been biopsied.  She underwent an EBUS for debulking and her mediastinum has not been staged.  Given the extent of involvement of the right sided cancer,  concurrent/sequential chemoradiation not feasible per tumor board discussion, also no further EBUS required since high probability of cancer. Will proceed with palliative intent chemoimmunotherapy with pembro+carbo+taxol.  Of note, squamous has high TMB and low neg PD-L1.  Adenocarcinoma NGS pending. In the future if she has a good response to chemo-IO may consider consoldtive XRT.   Patient is in agreement.     Plan  Ok for infusion on 1/20   Arrange for future infusions   RTC with NP/PA prior to cycle 2   CT prior to cycle 3   RTC with me after CT (3/1/23)      #Bone health: No visible bone mets on the PET CT scan.    # Cancer related pain: No cancer related pain    #Nutrition: No wt loss, appetite is good    #Psychiatry: Patient has previously had a history of anxiety.now since the diagnosis, is having panic attacks.  PTA on Prozac, and also clonazepam  -Trazodoen for ?insomnia  -Consult with psych oncology pending  -increase cloazepan to BID 0.5 mg    #COVID vaccine: s/p 3 boosters    #Hypertension hyperlipidemia  ON statin        On the day of service,  Preparation time:  5 minutes  Visit time:  30 minutes  Care Coordination:  5 minutes      Chaz Martel M.D.   of Medicine  Division of Hematology, Oncology and Transplantation  [facility]

## 2023-01-18 ENCOUNTER — ONCOLOGY VISIT (OUTPATIENT)
Dept: ONCOLOGY | Facility: CLINIC | Age: 78
End: 2023-01-18
Attending: STUDENT IN AN ORGANIZED HEALTH CARE EDUCATION/TRAINING PROGRAM
Payer: COMMERCIAL

## 2023-01-18 VITALS
BODY MASS INDEX: 27.65 KG/M2 | WEIGHT: 156.1 LBS | DIASTOLIC BLOOD PRESSURE: 82 MMHG | OXYGEN SATURATION: 96 % | HEART RATE: 77 BPM | TEMPERATURE: 97.7 F | RESPIRATION RATE: 16 BRPM | SYSTOLIC BLOOD PRESSURE: 147 MMHG

## 2023-01-18 DIAGNOSIS — C34.31 MALIGNANT NEOPLASM OF LOWER LOBE OF RIGHT LUNG (H): ICD-10-CM

## 2023-01-18 DIAGNOSIS — R91.8 RIGHT LOWER LOBE LUNG MASS: Primary | ICD-10-CM

## 2023-01-18 DIAGNOSIS — F41.9 ANXIETY: ICD-10-CM

## 2023-01-18 PROCEDURE — G0463 HOSPITAL OUTPT CLINIC VISIT: HCPCS | Performed by: STUDENT IN AN ORGANIZED HEALTH CARE EDUCATION/TRAINING PROGRAM

## 2023-01-18 PROCEDURE — 99215 OFFICE O/P EST HI 40 MIN: CPT | Performed by: STUDENT IN AN ORGANIZED HEALTH CARE EDUCATION/TRAINING PROGRAM

## 2023-01-18 PROCEDURE — G0463 HOSPITAL OUTPT CLINIC VISIT: HCPCS

## 2023-01-18 RX ORDER — EPINEPHRINE 1 MG/ML
0.3 INJECTION, SOLUTION INTRAMUSCULAR; SUBCUTANEOUS EVERY 5 MIN PRN
Status: CANCELLED | OUTPATIENT
Start: 2023-01-20

## 2023-01-18 RX ORDER — PROCHLORPERAZINE MALEATE 10 MG
5 TABLET ORAL EVERY 6 HOURS PRN
Qty: 30 TABLET | Refills: 2 | Status: SHIPPED | OUTPATIENT
Start: 2023-01-19 | End: 2024-03-27

## 2023-01-18 RX ORDER — MEPERIDINE HYDROCHLORIDE 25 MG/ML
25 INJECTION INTRAMUSCULAR; INTRAVENOUS; SUBCUTANEOUS EVERY 30 MIN PRN
Status: CANCELLED | OUTPATIENT
Start: 2023-01-20

## 2023-01-18 RX ORDER — ONDANSETRON 8 MG/1
8 TABLET, FILM COATED ORAL EVERY 8 HOURS PRN
Qty: 30 TABLET | Refills: 2 | Status: SHIPPED | OUTPATIENT
Start: 2023-01-19 | End: 2023-02-19

## 2023-01-18 RX ORDER — PALONOSETRON 0.05 MG/ML
0.25 INJECTION, SOLUTION INTRAVENOUS ONCE
Status: CANCELLED | OUTPATIENT
Start: 2023-01-20

## 2023-01-18 RX ORDER — DIPHENHYDRAMINE HCL 25 MG
50 CAPSULE ORAL ONCE
Status: CANCELLED
Start: 2023-01-20

## 2023-01-18 RX ORDER — DEXAMETHASONE 4 MG/1
8 TABLET ORAL DAILY
Qty: 6 TABLET | Refills: 3 | Status: SHIPPED | OUTPATIENT
Start: 2023-01-19 | End: 2023-06-19

## 2023-01-18 RX ORDER — HEPARIN SODIUM,PORCINE 10 UNIT/ML
5 VIAL (ML) INTRAVENOUS
Status: CANCELLED | OUTPATIENT
Start: 2023-01-20

## 2023-01-18 RX ORDER — METHYLPREDNISOLONE SODIUM SUCCINATE 125 MG/2ML
125 INJECTION, POWDER, LYOPHILIZED, FOR SOLUTION INTRAMUSCULAR; INTRAVENOUS
Status: CANCELLED
Start: 2023-01-20

## 2023-01-18 RX ORDER — ALBUTEROL SULFATE 90 UG/1
1-2 AEROSOL, METERED RESPIRATORY (INHALATION)
Status: CANCELLED
Start: 2023-01-20

## 2023-01-18 RX ORDER — ALBUTEROL SULFATE 0.83 MG/ML
2.5 SOLUTION RESPIRATORY (INHALATION)
Status: CANCELLED | OUTPATIENT
Start: 2023-01-20

## 2023-01-18 RX ORDER — DIPHENHYDRAMINE HYDROCHLORIDE 50 MG/ML
50 INJECTION INTRAMUSCULAR; INTRAVENOUS
Status: CANCELLED
Start: 2023-01-20

## 2023-01-18 RX ORDER — CLONAZEPAM 0.5 MG/1
.25-.5 TABLET ORAL 2 TIMES DAILY PRN
Qty: 30 TABLET | Refills: 3 | Status: SHIPPED | OUTPATIENT
Start: 2023-01-18

## 2023-01-18 RX ORDER — LORAZEPAM 2 MG/ML
0.5 INJECTION INTRAMUSCULAR EVERY 4 HOURS PRN
Status: CANCELLED | OUTPATIENT
Start: 2023-01-20

## 2023-01-18 RX ORDER — HEPARIN SODIUM (PORCINE) LOCK FLUSH IV SOLN 100 UNIT/ML 100 UNIT/ML
5 SOLUTION INTRAVENOUS
Status: CANCELLED | OUTPATIENT
Start: 2023-01-20

## 2023-01-18 ASSESSMENT — PAIN SCALES - GENERAL: PAINLEVEL: NO PAIN (0)

## 2023-01-18 NOTE — LETTER
1/18/2023         RE: Yamel Lindsay  [address] [facility] [address]        Dear Colleague,    Thank you for referring your patient, Yamel Lindsay, to the [facility]. Please see a copy of my visit note below.    MEDICAL ONCOLOGY INITIAL CONSULT NOTE    PATIENT NAME: Yamel Lindsay  ENCOUNTER DATE: 1/18/2023    Care Team  Primary Oncologist:  Chaz Martel MD  Surgery: TBD  Radiation oncology: TBD    REASON FOR CURRENT VISIT: F/u of lung cancer    HISTORY OF PRESENT ILLNESS:  Ms. Yamel Lindsay is a 77 year old  female with PMHx of for HTN, HLD, anxiety, who is here for followup of lung cancer      Oncologic Hx:    Diagnosis:   Synchronous Rt LL Squamous cell carcinoma [id number] Stage IIIC (AJCC 8th edition) diagnosed 12/2022  PD-L1 <1%  NGS- [facility] panel- Neg (High TMB 16.7 mut/MB)    Left LL adenocarcinoma [id number] Stage (AJCC 8th edition) diagnosed 12/2022  [id number]  NGS-pending    Treatment:   1/20/22- Pembrolizumab+carboplatin+paclitaxel    Intent of treatment: Palliative    Oncologic course:  One month history of increasing dyspnea with palpitations, propting ER visit 11/19.  11/19/22- CT- 1 cm right lower lobe mass abutting multiple central bronchovascular and mediastinal structures, consistent with malignancy.2.2 cm spiculated nodule in the left lower lobe, also consistent with malignancy.   12/6/22- PET/CT-  Right lower lobe lung mass abutting the pleura (SUV max of 24), measuring 8 x 5.2 cm. There is associated complete obliteration of right lower lobe bronchus and loss of fat planes with  the left atrium. Spiculated left lower lobe solid lung mass (SUV 17.9), measuring 2.2 x 2 cm. There are several markedly hypermetabolic, irregular solid nodules in the right middle lobe abutting the pleura (approximately 10-12), as for example 1.2 cm nodule with SUV max of 16.8 (4/126). There is a separate markedly hypermetabolic 1 cm nodule in the right upper lobe. Mild  hypermetabolic uptake is noted within a1 x 0.9 cm right paratracheal node ( SUV max of 4.2) (4/120), Mild uptake is also  noted within a subcentimeter lower paraesophageal node (4/154).   12/28/22- EBUS with deblking in the right hilar mass (no mediastinal staging performed)- - LUNG, RIGHT MAINSTEM, ENDOBRONCHIAL BIOPSY: Squamous cell carcinoma with extensive necrosis (positive for p40 and negative for TTF-1). Left LL FNAC- Positive for malignancy- Adenocarcinoma  (diffusely positive for TTF-1 while negative for p40)  1/3/23: Brain MRI - Neg  1/10/23: Tumor board discussion: Too extensive for concurrent/sequential chemo XRT- plan for palliative intent chemo-IO      Interval Hx:  She has no new symptoms.   She continues to have exertional SOB, but can continue to walk after 4-5 blocks  Most likley can do 1-2 stairs of steps wo SOB  She does have morning cough, occasional expectoration  No hemopthysis  No chest pain, wheezing  She is on advair BID and albuterol prn  No recent wt loss  Appetite is ok  No headaches, vision, problems, no focal neurologic deficits  Noraml bladder and bowel movements    Indpendent of ADL and IADL, ambulatory at home  ECOG PS 1  She is sleeping a lot more, and also feels more tired    Lives in [facility], by herself,  since 1984  Has 2 daughter  Reaann and dudley, she was here with Dudley today  She was working for [employer] job for 30 yrs      REVIEW OF SYSTEMS: 14 point ROS negative other than the symptoms noted above in the HPI.    Wt Readings from Last 4 Encounters:   01/02/23 (P) 70.9 kg (156 lb 6.4 oz)   12/28/22 68.2 kg (150 lb 5.7 oz)   11/18/22 65.8 kg (145 lb)   09/27/22 65.8 kg (145 lb)              Review of Systems:  A comprehensive ROS was performed and found to be negative or non-contributory with the exception of that noted in the HPI above.    Past Medical History:  Past Medical History:   Diagnosis Date     Allergies      Anxiety       Hyperlipidemia      Hypertension      Mild recurrent major depression (H) 2010     Nicotine dependence      Postmenopausal HRT (hormone replacement therapy)      S/P alcohol detoxification     Recovered and active in AA       Past Surgical History:  Past Surgical History:   Procedure Laterality Date     BRONCHOSCOPY, WITH BIOPSY, ROBOT ASSISTED N/A 2022    Procedure: BRONCHOSCOPY, USING ION OPTICAL TRACKING SYSTEM, FINE NEEDLE ASPIRATION;  Surgeon: Alma Almaguer MD;  [facility]     ENDOBRONCHIAL ULTRASOUND FLEXIBLE N/A 2022    Procedure: endobronchial biopsy and tumor debulking with cryoprobe;  Surgeon: Alma Almaguer MD;  [facility]     ESOPHAGOSCOPY, GASTROSCOPY, DUODENOSCOPY (EGD), COMBINED N/A 2015    Procedure: COMBINED ESOPHAGOSCOPY, GASTROSCOPY, DUODENOSCOPY (EGD), BIOPSY SINGLE OR MULTIPLE;  Surgeon: Dylan Alejandra MD;  [facility]     ESOPHAGOSCOPY, GASTROSCOPY, DUODENOSCOPY (EGD), COMBINED N/A 2018    Procedure: COMBINED ESOPHAGOSCOPY, GASTROSCOPY, DUODENOSCOPY (EGD), BIOPSY SINGLE OR MULTIPLE;  gastroscopy;  Surgeon: Alexander Bautista MD;  [facility]     LAPAROSCOPIC HERNIORRHAPHY INGUINAL Right 2015    Procedure: LAPAROSCOPIC HERNIORRHAPHY INGUINAL;  Surgeon: Favio Olsen MD;  Location: [facility]     TUBAL LIGATION         Social History:  Social History     Socioeconomic History     Marital status: Single   Tobacco Use     Smoking status: Former     Packs/day: 0.50     Types: Cigarettes     Quit date: 2011     Years since quittin.5     Smokeless tobacco: Never   Vaping Use     Vaping Use: Never used   Substance and Sexual Activity     Alcohol use: No     Drug use: No     Sexual activity: Not Currently   Other Topics Concern     Parent/sibling w/ CABG, MI or angioplasty before 65F 55M? No    2 pack per day for 40 yrs    Family History  Family History   Problem Relation Age of Onset     Cancer Mother      C.ANAY  Father      Lipids Father      Hypertension Father      Hypertension Brother      Cancer Brother         esophageal cancer     Breast Cancer Other      Prostate Cancer Brother      Psychotic Disorder Daughter      Diabetes No family hx of      Cerebrovascular Disease No family hx of      Cancer - colorectal No family hx of    Mother had lung cancer  Older brother esophageal cancer  ANother brother prostate cancer, throat  2 brothers      Outpatient Medications:  albuterol (PROAIR HFA/PROVENTIL HFA/VENTOLIN HFA) 108 (90 Base) MCG/ACT inhaler, Inhale 2 puffs into the lungs every 6 hours as needed for shortness of breath / dyspnea or wheezing  atorvastatin (LIPITOR) 20 MG tablet, Take 1 tablet (20 mg) by mouth daily  Cholecalciferol (VITAMIN D-3 PO), Take 2,000 Units by mouth daily   clonazePAM (KLONOPIN) 0.5 MG tablet, Take 0.5-1 tablets (0.25-0.5 mg) by mouth daily as needed for anxiety  FLUoxetine (PROZAC) 20 MG capsule, Take 2 capsules (40 mg) by mouth daily  fluticasone-salmeterol (ADVAIR HFA) 115-21 MCG/ACT inhaler, Inhale 2 puffs into the lungs 2 times daily  ibuprofen (ADVIL,MOTRIN) 200 MG tablet, Take 200-600 mg by mouth every 2 hours as needed for mild pain  ipratropium - albuterol 0.5 mg/2.5 mg/3 mL (DUONEB) 0.5-2.5 (3) MG/3ML neb solution, Take 1 vial (3 mLs) by nebulization every 6 hours as needed for shortness of breath or wheezing  omeprazole (PRILOSEC) 20 MG DR capsule, Take 1 capsule (20 mg) by mouth daily 30-60 minutes before a meal.  traMADol (ULTRAM) 50 MG tablet, Take 1 tablet (50 mg) by mouth every 6 hours as needed for severe pain (7-10) COVERING FOR PCP ONLY ONCE  traZODone (DESYREL) 50 MG tablet, TAKE 2 TABLETS(100 MG) BY MOUTH AT BEDTIME    No current facility-administered medications on file prior to visit.       Physical Exam:    Blood pressure (!) 147/82, pulse 77, temperature 97.7  F (36.5  C), temperature source Oral, resp. rate 16, weight 70.8 kg (156 lb 1.6 oz), SpO2 96 %, not currently  breastfeeding.  General: alert and cooperative, lying in bed, no acute distress  HEENT: sclera anicteric, EOMI, MMM  Neck: supple, normal ROM  CV: RRR, no murmurs  Resp: CTAB, normal respiratory effort on ambient air  GI: soft, non-tender, non-distended, bowel sounds present and normoactive  MSK: warm and well-perfused, normal tone  Skin: no rashes on limited exam, no jaundice  Neuro: Alert and interactive, moves all extremities equally, no focal deficits    Labs & Studies: I personally reviewed the following studies:    Recent Labs   Lab Test 11/18/22  2205 01/26/15  1445   WBC 7.4 6.9   RBC 4.06 4.21   HGB 11.6* 12.9   HCT 34.7* 37.8   MCV 86 90   MCH 28.6 30.6   MCHC 33.4 34.1   RDW 13.2 11.9    300   NEUTROPHIL 66 62.7     Recent Labs   Lab Test 11/18/22 2205 09/27/22  0842 03/03/21  1442    133* 140   POTASSIUM 3.2* 3.9 3.9   CHLORIDE 99 96* 103   CO2 30* 26 31   ANIONGAP 9 11 6   * 115* 85   BUN 10.2 10.7 10   CR 0.50* 0.59 0.60   DEEPIKA 10.0 9.2 9.6     Recent Labs   Lab Test 11/18/22 2205 09/27/22  0842 03/03/21  1442   PROTTOTAL 7.0 6.6 7.1   ALBUMIN 3.8 3.7 3.3*   BILITOTAL <0.2 0.2 0.2   ALKPHOS 128* 116* 179*   AST 20 24 13   ALT 14 13 18       ASSESSMENT AND PLAN:    Ms. Yamel Lindsay is a 77 year old  female with PMHx of for HTN, HLD, anxiety, who is here for evaluation/followup of new lung cancer    Synchronous Rt LL Squamous cell carcinoma [id number] Stage IIIC (AJCC 8th edition) diagnosed 12/2022  PD-L1 <1%  NGS- [facility] panel- Neg (High TMB 16.7 mut/MB)    Left LL adenocarcinoma [id number] Stage (AJCC 8th edition) diagnosed 12/2022  [id number]  NGS-pending    Pt with 2 synchronous cancers.  The more urgent and threatening one is the right lower lobe squamous cell cancer which appears to be at least stage IIIC given the size greater than 7 cm suggestive of T4 disease.  She also has mediastinal lymph node that were enlarged and appear pathologic but have not yet been biopsied.  She is  also has several FDG avid nodules in the right middle and the right upper lobe that have not yet been biopsied.  She underwent an EBUS for debulking and her mediastinum has not been staged.  Given the extent of involvement of the right sided cancer, concurrent/sequential chemoradiation not feasible per tumor board discussion, also no further EBUS required since high probability of cancer. Will proceed with palliative intent chemoimmunotherapy with pembro+carbo+taxol.  Of note, squamous has high TMB and low neg PD-L1.  Adenocarcinoma NGS pending. In the future if she has a good response to chemo-IO may consider consoldtive XRT.   Patient is in agreement.     Plan  Ok for infusion on 1/20   Arrange for future infusions   RTC with NP/PA prior to cycle 2   CT prior to cycle 3   RTC with me after CT (3/1/23)      #Bone health: No visible bone mets on the PET CT scan.    # Cancer related pain: No cancer related pain    #Nutrition: No wt loss, appetite is good    #Psychiatry: Patient has previously had a history of anxiety.now since the diagnosis, is having panic attacks.  PTA on Prozac, and also clonazepam  -Trazodoen for ?insomnia  -Consult with psych oncology pending  -increase cloazepan to BID 0.5 mg    #COVID vaccine: s/p 3 boosters    #Hypertension hyperlipidemia  ON statin        On the day of service,  Preparation time:  5 minutes  Visit time:  30 minutes  Care Coordination:  5 minutes      Chaz Martel M.D.   of Medicine  Division of Hematology, Oncology and Transplantation  [facility]

## 2023-01-18 NOTE — NURSING NOTE
"Oncology Rooming Note    January 18, 2023 9:03 AM   Yamel Lindsay is a 77 year old female who presents for:    Chief Complaint   Patient presents with     Oncology Clinic Visit     Malignant neoplasm of lower lobe of right lung (H)     Initial Vitals: BP (!) 147/82 (BP Location: Right arm, Patient Position: Sitting, Cuff Size: Adult Regular)   Pulse 77   Temp 97.7  F (36.5  C) (Oral)   Resp 16   Wt 70.8 kg (156 lb 1.6 oz)   SpO2 96%   BMI (P) 28.64 kg/m   Estimated body mass index is 28.64 kg/m  (pended) as calculated from the following:    Height as of 1/2/23: (P) 1.573 m (5' 1.91\").    Weight as of this encounter: 70.8 kg (156 lb 1.6 oz). Body surface area is 1.76 meters squared (pended).  No Pain (0) Comment: Data Unavailable   No LMP recorded. Patient is postmenopausal.  Allergies reviewed: Yes  Medications reviewed: Yes    Medications: Medication refills needed today.  Pharmacy name entered into [employer]:    [facility] [place], [place] - [address] [facility] [address] [facility] [address] [place] [address] PHARMACY [id number] - [facility] [address] [address]  [id number] - [place] - [address] [facility] [address] [facility] [place], [place] - [address] [id number] - [place], [place] - [address] [facility] [address] [facility] [address]    Clinical concerns: Patient requesting medication refill for Klonopin. Preferred pharmacy was verified.      Tamera Menon), LPN January 18, 2023 9:05 AM                  "

## 2023-01-19 DIAGNOSIS — T45.1X5S ADVERSE EFFECT OF ANTINEOPLASTIC AND IMMUNOSUPPRESSIVE DRUGS, SEQUELA: ICD-10-CM

## 2023-01-20 ENCOUNTER — LAB (OUTPATIENT)
Dept: INFUSION THERAPY | Facility: HOSPITAL | Age: 78
End: 2023-01-20
Payer: COMMERCIAL

## 2023-01-20 ENCOUNTER — INFUSION THERAPY VISIT (OUTPATIENT)
Dept: INFUSION THERAPY | Facility: HOSPITAL | Age: 78
End: 2023-01-20
Payer: COMMERCIAL

## 2023-01-20 ENCOUNTER — E-VISIT (OUTPATIENT)
Dept: ONCOLOGY | Facility: CLINIC | Age: 78
End: 2023-01-20

## 2023-01-20 VITALS
TEMPERATURE: 98.5 F | HEIGHT: 62 IN | SYSTOLIC BLOOD PRESSURE: 116 MMHG | WEIGHT: 158 LBS | DIASTOLIC BLOOD PRESSURE: 60 MMHG | HEART RATE: 69 BPM | BODY MASS INDEX: 29.08 KG/M2 | OXYGEN SATURATION: 95 % | RESPIRATION RATE: 18 BRPM

## 2023-01-20 DIAGNOSIS — T45.1X5S ADVERSE EFFECT OF ANTINEOPLASTIC AND IMMUNOSUPPRESSIVE DRUGS, SEQUELA: Primary | ICD-10-CM

## 2023-01-20 DIAGNOSIS — C34.31 MALIGNANT NEOPLASM OF LOWER LOBE OF RIGHT LUNG (H): Primary | ICD-10-CM

## 2023-01-20 DIAGNOSIS — C34.31 MALIGNANT NEOPLASM OF LOWER LOBE OF RIGHT LUNG (H): ICD-10-CM

## 2023-01-20 DIAGNOSIS — T45.1X5S ADVERSE EFFECT OF ANTINEOPLASTIC AND IMMUNOSUPPRESSIVE DRUGS, SEQUELA: ICD-10-CM

## 2023-01-20 LAB
ALBUMIN SERPL BCG-MCNC: 3.5 G/DL (ref 3.5–5.2)
ALP SERPL-CCNC: 118 U/L (ref 35–104)
ALT SERPL W P-5'-P-CCNC: 10 U/L (ref 10–35)
ANION GAP SERPL CALCULATED.3IONS-SCNC: 6 MMOL/L (ref 7–15)
AST SERPL W P-5'-P-CCNC: 18 U/L (ref 10–35)
BASOPHILS # BLD AUTO: 0.1 10E3/UL (ref 0–0.2)
BASOPHILS NFR BLD AUTO: 1 %
BILIRUB SERPL-MCNC: 0.2 MG/DL
BUN SERPL-MCNC: 9 MG/DL (ref 8–23)
CALCIUM SERPL-MCNC: 9.1 MG/DL (ref 8.8–10.2)
CHLORIDE SERPL-SCNC: 96 MMOL/L (ref 98–107)
CREAT SERPL-MCNC: 0.61 MG/DL (ref 0.51–0.95)
DEPRECATED HCO3 PLAS-SCNC: 31 MMOL/L (ref 22–29)
EOSINOPHIL # BLD AUTO: 0.2 10E3/UL (ref 0–0.7)
EOSINOPHIL NFR BLD AUTO: 3 %
ERYTHROCYTE [DISTWIDTH] IN BLOOD BY AUTOMATED COUNT: 13.3 % (ref 10–15)
GFR SERPL CREATININE-BSD FRML MDRD: >90 ML/MIN/1.73M2
GLUCOSE SERPL-MCNC: 109 MG/DL (ref 70–99)
HCT VFR BLD AUTO: 34.1 % (ref 35–47)
HGB BLD-MCNC: 11.1 G/DL (ref 11.7–15.7)
IMM GRANULOCYTES # BLD: 0 10E3/UL
IMM GRANULOCYTES NFR BLD: 0 %
LYMPHOCYTES # BLD AUTO: 1.6 10E3/UL (ref 0.8–5.3)
LYMPHOCYTES NFR BLD AUTO: 25 %
MCH RBC QN AUTO: 28.1 PG (ref 26.5–33)
MCHC RBC AUTO-ENTMCNC: 32.6 G/DL (ref 31.5–36.5)
MCV RBC AUTO: 86 FL (ref 78–100)
MONOCYTES # BLD AUTO: 0.6 10E3/UL (ref 0–1.3)
MONOCYTES NFR BLD AUTO: 10 %
NEUTROPHILS # BLD AUTO: 4.1 10E3/UL (ref 1.6–8.3)
NEUTROPHILS NFR BLD AUTO: 61 %
NRBC # BLD AUTO: 0 10E3/UL
NRBC BLD AUTO-RTO: 0 /100
PLATELET # BLD AUTO: 324 10E3/UL (ref 150–450)
POTASSIUM SERPL-SCNC: 3.9 MMOL/L (ref 3.4–5.3)
PROT SERPL-MCNC: 6.6 G/DL (ref 6.4–8.3)
RBC # BLD AUTO: 3.95 10E6/UL (ref 3.8–5.2)
SODIUM SERPL-SCNC: 133 MMOL/L (ref 136–145)
TSH SERPL DL<=0.005 MIU/L-ACNC: 1.1 UIU/ML (ref 0.3–4.2)
WBC # BLD AUTO: 6.5 10E3/UL (ref 4–11)

## 2023-01-20 PROCEDURE — 80053 COMPREHEN METABOLIC PANEL: CPT | Performed by: STUDENT IN AN ORGANIZED HEALTH CARE EDUCATION/TRAINING PROGRAM

## 2023-01-20 PROCEDURE — 96413 CHEMO IV INFUSION 1 HR: CPT

## 2023-01-20 PROCEDURE — 96417 CHEMO IV INFUS EACH ADDL SEQ: CPT

## 2023-01-20 PROCEDURE — 99207 PR NO BILLABLE SERVICE THIS VISIT: CPT | Performed by: PHYSICIAN ASSISTANT

## 2023-01-20 PROCEDURE — 250N000011 HC RX IP 250 OP 636: Performed by: STUDENT IN AN ORGANIZED HEALTH CARE EDUCATION/TRAINING PROGRAM

## 2023-01-20 PROCEDURE — 96375 TX/PRO/DX INJ NEW DRUG ADDON: CPT

## 2023-01-20 PROCEDURE — 84443 ASSAY THYROID STIM HORMONE: CPT | Performed by: STUDENT IN AN ORGANIZED HEALTH CARE EDUCATION/TRAINING PROGRAM

## 2023-01-20 PROCEDURE — 96377 APPLICATON ON-BODY INJECTOR: CPT | Mod: XS

## 2023-01-20 PROCEDURE — 85025 COMPLETE CBC W/AUTO DIFF WBC: CPT | Performed by: STUDENT IN AN ORGANIZED HEALTH CARE EDUCATION/TRAINING PROGRAM

## 2023-01-20 PROCEDURE — 96372 THER/PROPH/DIAG INJ SC/IM: CPT | Performed by: STUDENT IN AN ORGANIZED HEALTH CARE EDUCATION/TRAINING PROGRAM

## 2023-01-20 PROCEDURE — 96367 TX/PROPH/DG ADDL SEQ IV INF: CPT

## 2023-01-20 PROCEDURE — 96415 CHEMO IV INFUSION ADDL HR: CPT

## 2023-01-20 PROCEDURE — 36415 COLL VENOUS BLD VENIPUNCTURE: CPT | Performed by: STUDENT IN AN ORGANIZED HEALTH CARE EDUCATION/TRAINING PROGRAM

## 2023-01-20 PROCEDURE — 258N000003 HC RX IP 258 OP 636: Performed by: STUDENT IN AN ORGANIZED HEALTH CARE EDUCATION/TRAINING PROGRAM

## 2023-01-20 RX ORDER — PALONOSETRON 0.05 MG/ML
0.25 INJECTION, SOLUTION INTRAVENOUS ONCE
Status: COMPLETED | OUTPATIENT
Start: 2023-01-20 | End: 2023-01-20

## 2023-01-20 RX ADMIN — PACLITAXEL 348 MG: 6 INJECTION, SOLUTION INTRAVENOUS at 12:34

## 2023-01-20 RX ADMIN — PEGFILGRASTIM 6 MG: KIT SUBCUTANEOUS at 15:46

## 2023-01-20 RX ADMIN — SODIUM CHLORIDE 200 MG: 9 INJECTION, SOLUTION INTRAVENOUS at 12:00

## 2023-01-20 RX ADMIN — CARBOPLATIN 585 MG: 10 INJECTION INTRAVENOUS at 15:44

## 2023-01-20 RX ADMIN — DEXAMETHASONE SODIUM PHOSPHATE: 10 INJECTION, SOLUTION INTRAMUSCULAR; INTRAVENOUS at 11:40

## 2023-01-20 RX ADMIN — PALONOSETRON 0.25 MG: 0.05 INJECTION, SOLUTION INTRAVENOUS at 11:08

## 2023-01-20 RX ADMIN — DIPHENHYDRAMINE HYDROCHLORIDE 50 MG: 50 INJECTION, SOLUTION INTRAMUSCULAR; INTRAVENOUS at 11:20

## 2023-01-20 RX ADMIN — SODIUM CHLORIDE 250 ML: 9 INJECTION, SOLUTION INTRAVENOUS at 11:07

## 2023-01-20 RX ADMIN — FAMOTIDINE 20 MG: 10 INJECTION, SOLUTION INTRAVENOUS at 11:07

## 2023-01-20 NOTE — PROGRESS NOTES
PT here ambulatory with friend for first treatment.Orientated pt to txt area and amenities. Reviewed treatment and duration. Lab results approved for txt. Iv started with brisk blood return. Txt administered as ordered. Reviewed each medication as given and possible side effect/how to manage at home. Pt up to bathroom when chemo not infusing and iv came out. Placed pressure on site where iv was and restarted iv. Txt completed/ iv dc'd and pressure dressing to site. PT will call her MD and request a port. Neulasta injector reviewed with by pt and friend who is staying with her. Injector placed on at 345 and reviewed it will start tomorrow around 645 pm and be completed around 730 pm. PT dc'd steady gait

## 2023-01-24 ENCOUNTER — VIRTUAL VISIT (OUTPATIENT)
Dept: ONCOLOGY | Facility: CLINIC | Age: 78
End: 2023-01-24
Attending: OBSTETRICS & GYNECOLOGY
Payer: COMMERCIAL

## 2023-01-24 DIAGNOSIS — F41.9 ANXIETY: Primary | ICD-10-CM

## 2023-01-24 PROCEDURE — 90791 PSYCH DIAGNOSTIC EVALUATION: CPT | Mod: 95 | Performed by: PSYCHOLOGIST

## 2023-01-24 NOTE — PATIENT INSTRUCTIONS
Dear Yamel -- Marianne helped with the port issue.  I am closing this E-Visit and you will not be charged for this, given it was just a communication request!     Kathleen Tomas PA-C (Marianne's coworker)

## 2023-01-24 NOTE — PROGRESS NOTES
[facility] Oncology- Psychotherapy                                    Progress Note    Patient Name: Yamel Lindsay  Date: 1/24/23         Service Type: Individual      Session Start Time: 10:00  Session End Time: 10:30     Session Length: 30 minutes    Session #: 1    Attendees: Client attended alone    Service Modality:  Video Visit:      Provider verified identity through the following two step process.  Patient provided:  Patient photo    Telemedicine Visit: The patient's condition can be safely assessed and treated via synchronous audio and visual telemedicine encounter.      Reason for Telemedicine Visit: Services only offered telehealth    Originating Site (Patient Location): Patient's home    Distant Site (Provider Location): Provider Remote Setting- Home Office    Consent:  The patient/guardian has verbally consented to: the potential risks and benefits of telemedicine (video visit) versus in person care; bill my insurance or make self-payment for services provided; and responsibility for payment of non-covered services.     Patient would like the video invitation sent by:  My Chart    Mode of Communication:  Video Conference via [employer]well    Distant Location (Provider):  Off-site    As the provider I attest to compliance with applicable laws and regulations related to telemedicine.    DATA  Interactive Complexity: No  Crisis: No     Current / Ongoing Stressors and Concerns:   Cancer, panic attacks     Treatment Objective(s) Addressed in This Session:   identify coping strategies to more effectively address stressors     Intervention:   Motivational Interviewing: to help her reduce panic attacks     ASSESSMENT: Current Emotional / Mental Status (status of significant symptoms):   Risk status (Self / Other harm or suicidal ideation)   Patient denies current fears or concerns for personal safety.   Patient denies current or recent suicidal ideation or behaviors.   Patient denies current or recent  homicidal ideation or behaviors.   Patient denies current or recent self injurious behavior or ideation.   Patient denies other safety concerns.   Patient reports there has been no change in risk factors since their last session.     Patient reports there has been no change in protective factors since their last session.     Recommended that patient call 911 or go to the local ED should there be a change in any of these risk factors.     Appearance:   Appropriate    Eye Contact:   Good    Psychomotor Behavior: Normal    Attitude:   Cooperative    Orientation:   All   Speech    Rate / Production: Normal     Volume:  Normal    Mood:    Normal   Affect:    Appropriate    Thought Content:  Clear    Thought Form:  Coherent  Logical    Insight:    Good      Medication Review:   No changes to current psychiatric medication(s)     Medication Compliance:   Yes     Changes in Health Issues:   None reported     Chemical Use Review:   Substance Use: Chemical use reviewed, no active concerns identified      Tobacco Use: No current tobacco use.      Diagnosis:  1. Anxiety        Collateral Reports Completed:   Routed note to Care Team Member(s)    PLAN: (Patient Tasks / Therapist Tasks / Other)  Use behavioral tools to reduce panic attack frequency. Discuss medications with PCP        Mckay Elise PsyD                                                         ______________________________________________________________________    Individual Treatment Plan    Patient's Name: Yamel Lindsay  YOB: 1945    Date of Creation: 1/24/23  Date Treatment Plan Last Reviewed/Revised:     DSM5 Diagnoses: 300.01 (F41.0) Panic Disorder  Psychosocial / Contextual Factors: cancer, panic disorder    Anticipated number of session for this episode of care: 3-6 sessions  Anticipation frequency of session: Monthly  Anticipated Duration of each session: 16-37 minutes  Treatment plan will be reviewed in 90 days or when goals have  been changed.       MeasurableTreatment Goal(s) related to diagnosis / functional impairment(s)  Goal 1: Patient will use the behavioral tools to reduce panic frequency    I will know I've met my goal when I get better control.      Patient has reviewed and agreed to the above plan.      Mckay Elise PsyD  January 24, 2023

## 2023-01-29 DIAGNOSIS — C34.31 PRIMARY MALIGNANT NEOPLASM OF RIGHT LOWER LOBE OF LUNG (H): Primary | ICD-10-CM

## 2023-01-30 ENCOUNTER — TELEPHONE (OUTPATIENT)
Dept: SURGERY | Facility: CLINIC | Age: 78
End: 2023-01-30
Payer: COMMERCIAL

## 2023-01-30 NOTE — TELEPHONE ENCOUNTER
Type of surgery: INSERTION, VASCULAR ACCESS PORT (N/A)      Location of surgery: [place] OR  Date and time of surgery: 02/06/2023  Surgeon: SHAN  Pre-Op Appt Date: 02/02/2023  Post-Op Appt Date: none scheduled    Packet sent out: Yes  Pre-cert/Authorization completed:  No  Date:

## 2023-01-30 NOTE — TELEPHONE ENCOUNTER
Left a voice message for patient to call to schedule surgery for 02/06/2023 in [place] with Dr. Valadez. [phone]

## 2023-01-31 LAB
LAB DIRECTOR COMMENTS: NORMAL
LAB DIRECTOR METHODOLOGY: NORMAL
LAB DIRECTOR RESULTS: NORMAL
SPECIMEN DESCRIPTION: NORMAL

## 2023-01-31 RX ORDER — TIZANIDINE 2 MG/1
TABLET ORAL
COMMUNITY
Start: 2023-01-02 | End: 2023-03-03

## 2023-02-02 ENCOUNTER — OFFICE VISIT (OUTPATIENT)
Dept: FAMILY MEDICINE | Facility: CLINIC | Age: 78
End: 2023-02-02
Payer: COMMERCIAL

## 2023-02-02 ENCOUNTER — OFFICE VISIT (OUTPATIENT)
Dept: SURGERY | Facility: CLINIC | Age: 78
End: 2023-02-02
Payer: COMMERCIAL

## 2023-02-02 VITALS
BODY MASS INDEX: 28.9 KG/M2 | OXYGEN SATURATION: 93 % | WEIGHT: 158 LBS | DIASTOLIC BLOOD PRESSURE: 58 MMHG | SYSTOLIC BLOOD PRESSURE: 106 MMHG | TEMPERATURE: 98.2 F | RESPIRATION RATE: 18 BRPM | HEART RATE: 78 BPM

## 2023-02-02 VITALS
WEIGHT: 159 LBS | DIASTOLIC BLOOD PRESSURE: 83 MMHG | OXYGEN SATURATION: 93 % | SYSTOLIC BLOOD PRESSURE: 137 MMHG | BODY MASS INDEX: 29.08 KG/M2 | HEART RATE: 84 BPM

## 2023-02-02 DIAGNOSIS — F41.1 GENERALIZED ANXIETY DISORDER: ICD-10-CM

## 2023-02-02 DIAGNOSIS — C34.31 PRIMARY MALIGNANT NEOPLASM OF RIGHT LOWER LOBE OF LUNG (H): Primary | ICD-10-CM

## 2023-02-02 DIAGNOSIS — K21.9 GASTROESOPHAGEAL REFLUX DISEASE WITHOUT ESOPHAGITIS: ICD-10-CM

## 2023-02-02 DIAGNOSIS — J43.1 PANLOBULAR EMPHYSEMA (H): ICD-10-CM

## 2023-02-02 DIAGNOSIS — I10 BENIGN ESSENTIAL HYPERTENSION: ICD-10-CM

## 2023-02-02 DIAGNOSIS — C34.31 MALIGNANT NEOPLASM OF LOWER LOBE OF RIGHT LUNG (H): ICD-10-CM

## 2023-02-02 DIAGNOSIS — Z01.818 PREOP GENERAL PHYSICAL EXAM: Primary | ICD-10-CM

## 2023-02-02 PROCEDURE — 99203 OFFICE O/P NEW LOW 30 MIN: CPT | Performed by: SURGERY

## 2023-02-02 PROCEDURE — 99214 OFFICE O/P EST MOD 30 MIN: CPT | Performed by: NURSE PRACTITIONER

## 2023-02-02 ASSESSMENT — PAIN SCALES - GENERAL: PAINLEVEL: NO PAIN (0)

## 2023-02-02 NOTE — H&P (VIEW-ONLY)
Surgical Consultation/History and Physical  [facility] Surgery    Yamel is seen in consultation for port placement, at the request of Catie Fraser MD.    Chief Complaint:  Port placement    HPI:  Yamel Lindsay presents in consultation today for evaluation of infusion port placement. She has a history of lung cancer. The patient is right dominant, has never had central venous access, pneumothorax, lung surgery, history of dialysis or renal failure, and denies repetitive movements with the upper extremity (such as pitching, chopping wood, bowling, or hunting).      Patient Active Problem List   Diagnosis     Hyperlipidemia LDL goal <130     Primary localized osteoarthrosis, lower leg     Generalized anxiety disorder     Rhinitis, allergic seasonal     Alcoholism (H)     Mild recurrent major depression (H)     Advanced directives, counseling/discussion     Seasonal allergic rhinitis     Hip pain     GERD (gastroesophageal reflux disease)     S/P laparoscopic hernia repair     OA (osteoarthritis) of knee     Mild intermittent asthma without complication     Major depression in complete remission (H)     Benign essential hypertension     Right lower lobe lung mass     Malignant neoplasm of lower lobe of right lung (H)     Panlobular emphysema (H)     Adverse effect of antineoplastic and immunosuppressive drugs, sequela     Past Medical History:   Diagnosis Date     Allergies      Anxiety      Hyperlipidemia      Hypertension      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     Nicotine dependence      Postmenopausal HRT (hormone replacement therapy) 1994     S/P alcohol detoxification 07/06    Recovered and active in AA     Past Surgical History:   Procedure Laterality Date     BRONCHOSCOPY, WITH BIOPSY, ROBOT ASSISTED N/A 12/28/2022    Procedure: BRONCHOSCOPY, USING ION OPTICAL TRACKING SYSTEM, FINE NEEDLE ASPIRATION;  Surgeon: Alma Almaguer MD;  Location: [facility] OR     ENDOBRONCHIAL  ULTRASOUND FLEXIBLE N/A 2022    Procedure: endobronchial biopsy and tumor debulking with cryoprobe;  Surgeon: Alma Almaguer MD;  Location:  OR     ESOPHAGOSCOPY, GASTROSCOPY, DUODENOSCOPY (EGD), COMBINED N/A 2015    Procedure: COMBINED ESOPHAGOSCOPY, GASTROSCOPY, DUODENOSCOPY (EGD), BIOPSY SINGLE OR MULTIPLE;  Surgeon: Dylan Alejandra MD;  Location: [facility]     ESOPHAGOSCOPY, GASTROSCOPY, DUODENOSCOPY (EGD), COMBINED N/A 2018    Procedure: COMBINED ESOPHAGOSCOPY, GASTROSCOPY, DUODENOSCOPY (EGD), BIOPSY SINGLE OR MULTIPLE;  gastroscopy;  Surgeon: Alexander Bautista MD;  Location: [facility]     LAPAROSCOPIC HERNIORRHAPHY INGUINAL Right 2015    Procedure: LAPAROSCOPIC HERNIORRHAPHY INGUINAL;  Surgeon: Favio Olsen MD;  Location: [facility]     TUBAL LIGATION       Family History   Problem Relation Age of Onset     Cancer Mother      C.A.D. Father      Lipids Father      Hypertension Father      Hypertension Brother      Cancer Brother         esophageal cancer     Breast Cancer Other      Prostate Cancer Brother      Psychotic Disorder Daughter      Diabetes No family hx of      Cerebrovascular Disease No family hx of      Cancer - colorectal No family hx of        Social History     Tobacco Use     Smoking status: Former     Packs/day: 0.50     Types: Cigarettes     Quit date: 2011     Years since quittin.6     Smokeless tobacco: Never   Substance Use Topics     Alcohol use: No        History   Drug Use No       Current Outpatient Medications   Medication Sig Dispense Refill     albuterol (PROAIR HFA/PROVENTIL HFA/VENTOLIN HFA) 108 (90 Base) MCG/ACT inhaler Inhale 2 puffs into the lungs every 6 hours as needed for shortness of breath / dyspnea or wheezing 18 g 0     atorvastatin (LIPITOR) 20 MG tablet Take 1 tablet (20 mg) by mouth daily 90 tablet 3     Cholecalciferol (VITAMIN D-3 PO) Take 2,000 Units by mouth daily        clonazePAM (KLONOPIN) 0.5 MG tablet Take 0.5-1 tablets  (0.25-0.5 mg) by mouth 2 times daily as needed for anxiety 30 tablet 3     dexamethasone (DECADRON) 4 MG tablet Take 2 tablets (8 mg) by mouth daily Take for 3 days, starting the day after chemo. Take with food. 6 tablet 3     fluticasone-salmeterol (ADVAIR HFA) 115-21 MCG/ACT inhaler Inhale 2 puffs into the lungs 2 times daily 1 g 3     ibuprofen (ADVIL,MOTRIN) 200 MG tablet Take 200-600 mg by mouth every 2 hours as needed for mild pain       ipratropium - albuterol 0.5 mg/2.5 mg/3 mL (DUONEB) 0.5-2.5 (3) MG/3ML neb solution Take 1 vial (3 mLs) by nebulization every 6 hours as needed for shortness of breath or wheezing 90 mL 0     omeprazole (PRILOSEC) 20 MG DR capsule Take 1 capsule (20 mg) by mouth daily 30-60 minutes before a meal. 90 capsule 2     ondansetron (ZOFRAN) 8 MG tablet Take 1 tablet (8 mg) by mouth every 8 hours as needed for nausea (vomiting) 30 tablet 2     prochlorperazine (COMPAZINE) 10 MG tablet Take 0.5 tablets (5 mg) by mouth every 6 hours as needed for nausea or vomiting 30 tablet 2     tiZANidine (ZANAFLEX) 2 MG tablet        traMADol (ULTRAM) 50 MG tablet Take 1 tablet (50 mg) by mouth every 6 hours as needed for severe pain (7-10) COVERING FOR PCP ONLY ONCE 120 tablet 1     traZODone (DESYREL) 50 MG tablet TAKE 2 TABLETS(100 MG) BY MOUTH AT BEDTIME 180 tablet 3     FLUoxetine (PROZAC) 20 MG capsule Take 2 capsules (40 mg) by mouth daily 60 capsule 11     Allergies   Allergen Reactions     Nka [No Known Allergies]      Seasonal Allergies      Physical Exam:  /83 (BP Location: Right arm, Patient Position: Sitting, Cuff Size: Adult Regular)   Pulse 84   Wt 72.1 kg (159 lb)   SpO2 93%   BMI 29.08 kg/m      Constitutional- No acute distress, well nourished, non-toxic  Eyes: Anicteric, no injection.  PERRL  ENT:  Normocephalic, atraumatic, Nose midline, moist mucus membranes  Neck - supple, no LAD  Respiratory- Clear to auscultation bilaterally, good inspiratory effort  Cardiovascular  - Heart RRR, no lift's, thrills, murmurs, rubs, or gallop.  No peripheral edema.  No clubbing.  Neuro - No focal neuro deficits, Alert and oriented x 3  Psych: Appropriate mood and affect  Musculoskeletal: Normal gait, symmetric strength.  FROM upper and lower extremities.  Skin: Warm, Dry    LABS:     Lab Results   Component Value Date    WBC 6.5 01/20/2023    HGB 11.1 (L) 01/20/2023    HCT 34.1 (L) 01/20/2023    MCV 86 01/20/2023     01/20/2023      No results found for: INR, PROTIME       INFORMED CONSENT:     A discussion of the indications, risks, benefits and alternatives to surgery was held with the patient, and the risks discussed include beeding, infection, thrombosis, stenosis, port malfunction or malposition, air embolism, pneumothorax, hemothorax, or cardiac arrythmia . The patient understood the information given, questions addressed, and she wishes to proceed. She understands the need for maintenance of the infusion port at least once a month while in place.    ASSESSMENT AND PLAN:     Ms. Yamel Lindsay is in need of an infusion port for lung cancer, and is being expedited to surgery to facilitate care. She  understood the information given, and wishes to proceed accordingly.- and is being accordingly scheduled.    She would prefer a left sided port.    Scot Valadez, DO on 2/2/2023 at 2:16 PM

## 2023-02-02 NOTE — LETTER
2/2/2023         RE: Yamel Lindsay  [address] [id number]        Dear Colleague,    Thank you for referring your patient, Yamel Lindsay, to the [facility]. Please see a copy of my visit note below.    Surgical Consultation/History and Physical  [facility] Surgery    Yamel is seen in consultation for port placement, at the request of Catie Fraser MD.    Chief Complaint:  Port placement    HPI:  Yamel Lindsay presents in consultation today for evaluation of infusion port placement. She has a history of lung cancer. The patient is right dominant, has never had central venous access, pneumothorax, lung surgery, history of dialysis or renal failure, and denies repetitive movements with the upper extremity (such as pitching, chopping wood, bowling, or hunting).      Patient Active Problem List   Diagnosis     Hyperlipidemia LDL goal <130     Primary localized osteoarthrosis, lower leg     Generalized anxiety disorder     Rhinitis, allergic seasonal     Alcoholism (H)     Mild recurrent major depression (H)     Advanced directives, counseling/discussion     Seasonal allergic rhinitis     Hip pain     GERD (gastroesophageal reflux disease)     S/P laparoscopic hernia repair     OA (osteoarthritis) of knee     Mild intermittent asthma without complication     Major depression in complete remission (H)     Benign essential hypertension     Right lower lobe lung mass     Malignant neoplasm of lower lobe of right lung (H)     Panlobular emphysema (H)     Adverse effect of antineoplastic and immunosuppressive drugs, sequela     Past Medical History:   Diagnosis Date     Allergies      Anxiety      Hyperlipidemia      Hypertension      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     Nicotine dependence      Postmenopausal HRT (hormone replacement therapy) 1994     S/P alcohol detoxification 07/06    Recovered and active in AA     Past Surgical History:    Procedure Laterality Date     BRONCHOSCOPY, WITH BIOPSY, ROBOT ASSISTED N/A 2022    Procedure: BRONCHOSCOPY, USING ION OPTICAL TRACKING SYSTEM, FINE NEEDLE ASPIRATION;  Surgeon: Alma Almaguer MD;  Location: [facility] OR     ENDOBRONCHIAL ULTRASOUND FLEXIBLE N/A 2022    Procedure: endobronchial biopsy and tumor debulking with cryoprobe;  Surgeon: Alma Almaguer MD;  Location: [facility] OR     ESOPHAGOSCOPY, GASTROSCOPY, DUODENOSCOPY (EGD), COMBINED N/A 2015    Procedure: COMBINED ESOPHAGOSCOPY, GASTROSCOPY, DUODENOSCOPY (EGD), BIOPSY SINGLE OR MULTIPLE;  Surgeon: Dylan Alejandra MD;  Location: [facility]     ESOPHAGOSCOPY, GASTROSCOPY, DUODENOSCOPY (EGD), COMBINED N/A 2018    Procedure: COMBINED ESOPHAGOSCOPY, GASTROSCOPY, DUODENOSCOPY (EGD), BIOPSY SINGLE OR MULTIPLE;  gastroscopy;  Surgeon: Alexander Bautista MD;  Location: [facility]     LAPAROSCOPIC HERNIORRHAPHY INGUINAL Right 2015    Procedure: LAPAROSCOPIC HERNIORRHAPHY INGUINAL;  Surgeon: Favio Olsen MD;  Location: [facility]     TUBAL LIGATION       Family History   Problem Relation Age of Onset     Cancer Mother      C.A.D. Father      Lipids Father      Hypertension Father      Hypertension Brother      Cancer Brother         esophageal cancer     Breast Cancer Other      Prostate Cancer Brother      Psychotic Disorder Daughter      Diabetes No family hx of      Cerebrovascular Disease No family hx of      Cancer - colorectal No family hx of        Social History     Tobacco Use     Smoking status: Former     Packs/day: 0.50     Types: Cigarettes     Quit date: 2011     Years since quittin.6     Smokeless tobacco: Never   Substance Use Topics     Alcohol use: No        History   Drug Use No       Current Outpatient Medications   Medication Sig Dispense Refill     albuterol (PROAIR HFA/PROVENTIL HFA/VENTOLIN HFA) 108 (90 Base) MCG/ACT inhaler Inhale 2 puffs into the lungs every 6 hours as needed for shortness of breath /  dyspnea or wheezing 18 g 0     atorvastatin (LIPITOR) 20 MG tablet Take 1 tablet (20 mg) by mouth daily 90 tablet 3     Cholecalciferol (VITAMIN D-3 PO) Take 2,000 Units by mouth daily        clonazePAM (KLONOPIN) 0.5 MG tablet Take 0.5-1 tablets (0.25-0.5 mg) by mouth 2 times daily as needed for anxiety 30 tablet 3     dexamethasone (DECADRON) 4 MG tablet Take 2 tablets (8 mg) by mouth daily Take for 3 days, starting the day after chemo. Take with food. 6 tablet 3     fluticasone-salmeterol (ADVAIR HFA) 115-21 MCG/ACT inhaler Inhale 2 puffs into the lungs 2 times daily 1 g 3     ibuprofen (ADVIL,MOTRIN) 200 MG tablet Take 200-600 mg by mouth every 2 hours as needed for mild pain       ipratropium - albuterol 0.5 mg/2.5 mg/3 mL (DUONEB) 0.5-2.5 (3) MG/3ML neb solution Take 1 vial (3 mLs) by nebulization every 6 hours as needed for shortness of breath or wheezing 90 mL 0     omeprazole (PRILOSEC) 20 MG DR capsule Take 1 capsule (20 mg) by mouth daily 30-60 minutes before a meal. 90 capsule 2     ondansetron (ZOFRAN) 8 MG tablet Take 1 tablet (8 mg) by mouth every 8 hours as needed for nausea (vomiting) 30 tablet 2     prochlorperazine (COMPAZINE) 10 MG tablet Take 0.5 tablets (5 mg) by mouth every 6 hours as needed for nausea or vomiting 30 tablet 2     tiZANidine (ZANAFLEX) 2 MG tablet        traMADol (ULTRAM) 50 MG tablet Take 1 tablet (50 mg) by mouth every 6 hours as needed for severe pain (7-10) COVERING FOR PCP ONLY ONCE 120 tablet 1     traZODone (DESYREL) 50 MG tablet TAKE 2 TABLETS(100 MG) BY MOUTH AT BEDTIME 180 tablet 3     FLUoxetine (PROZAC) 20 MG capsule Take 2 capsules (40 mg) by mouth daily 60 capsule 11     Allergies   Allergen Reactions     Nka [No Known Allergies]      Seasonal Allergies      Physical Exam:  /83 (BP Location: Right arm, Patient Position: Sitting, Cuff Size: Adult Regular)   Pulse 84   Wt 72.1 kg (159 lb)   SpO2 93%   BMI 29.08 kg/m      Constitutional- No acute distress,  well nourished, non-toxic  Eyes: Anicteric, no injection.  PERRL  ENT:  Normocephalic, atraumatic, Nose midline, moist mucus membranes  Neck - supple, no LAD  Respiratory- Clear to auscultation bilaterally, good inspiratory effort  Cardiovascular - Heart RRR, no lift's, thrills, murmurs, rubs, or gallop.  No peripheral edema.  No clubbing.  Neuro - No focal neuro deficits, Alert and oriented x 3  Psych: Appropriate mood and affect  Musculoskeletal: Normal gait, symmetric strength.  FROM upper and lower extremities.  Skin: Warm, Dry    LABS:     Lab Results   Component Value Date    WBC 6.5 01/20/2023    HGB 11.1 (L) 01/20/2023    HCT 34.1 (L) 01/20/2023    MCV 86 01/20/2023     01/20/2023      No results found for: INR, PROTIME       INFORMED CONSENT:     A discussion of the indications, risks, benefits and alternatives to surgery was held with the patient, and the risks discussed include beeding, infection, thrombosis, stenosis, port malfunction or malposition, air embolism, pneumothorax, hemothorax, or cardiac arrythmia . The patient understood the information given, questions addressed, and she wishes to proceed. She understands the need for maintenance of the infusion port at least once a month while in place.    ASSESSMENT AND PLAN:     Ms. Yamel Lindsay is in need of an infusion port for lung cancer, and is being expedited to surgery to facilitate care. She  understood the information given, and wishes to proceed accordingly.- and is being accordingly scheduled.    She would prefer a left sided port.    Scot Valadez DO on 2/2/2023 at 2:16 PM          Again, thank you for allowing me to participate in the care of your patient.        Sincerely,        Scot Valadez DO

## 2023-02-02 NOTE — NURSING NOTE
Chief Complaint   Patient presents with     Consult       Vitals:    02/02/23 1403   Weight: 72.1 kg (159 lb)     Wt Readings from Last 1 Encounters:   02/02/23 72.1 kg (159 lb)     Vale Javed MA

## 2023-02-02 NOTE — PROGRESS NOTES
[facility]  [address] [address]  Phone: [phone]  Primary Provider: Catie Fraser  Pre-op Performing Provider: TAURUS FIGUEROA      PREOPERATIVE EVALUATION:  Today's date: 2/2/2023    Yamle Lindsay is a 77 year old female who presents for a preoperative evaluation.    Surgical Information:  Surgery/Procedure: INSERTION, VASCULAR ACCESS PORT  Surgery Location: [facility]  Surgeon: Dr. Valadez  Surgery Date: 2/6/23  Time of Surgery: 12:10pm  Where patient plans to recover: At home with family  Fax number for surgical facility: Note does not need to be faxed, will be available electronically in Epic.    Type of Anesthesia Anticipated: to be determined    Assessment & Plan     The proposed surgical procedure is considered LOW risk.    Preop general physical exam  Cleared for proposed procedure.    Malignant neoplasm of lower lobe of right lung (H)  Procedure for port placement for easier venous access.    Benign essential hypertension  Stable.     Generalized anxiety disorder  Using clonazepam prn, undergoing counseling. Stable at this time.    Gastroesophageal reflux disease without esophagitis  On omeprazole.    Panlobular emphysema (H)  Stable. On Advair.        Risks and Recommendations:  The patient has the following additional risks and recommendations for perioperative complications:   - No identified additional risk factors other than previously addressed    Medication Instructions:  Patient is to take all scheduled medications on the day of surgery    RECOMMENDATION:  APPROVAL GIVEN to proceed with proposed procedure, without further diagnostic evaluation.      Subjective     HPI related to upcoming procedure: Undergoing treatment for lung cancer, vascular access has been challenging. Planned procedure to place port.      Preop Questions 2/2/2023   1. Have you ever had a heart attack or stroke? No   2. Have you ever had surgery on your  heart or blood vessels, such as a stent placement, a coronary artery bypass, or surgery on an artery in your head, neck, heart, or legs? No   3. Do you have chest pain with activity? No   4. Do you have a history of  heart failure? No   5. Do you currently have a cold, bronchitis or symptoms of other infection? No   6. Do you have a cough, shortness of breath, or wheezing? No   7. Do you or anyone in your family have previous history of blood clots? No   8. Do you or does anyone in your family have a serious bleeding problem such as prolonged bleeding following surgeries or cuts? No   9. Have you ever had problems with anemia or been told to take iron pills? No   10. Have you had any abnormal blood loss such as black, tarry or bloody stools, or abnormal vaginal bleeding? No   11. Have you ever had a blood transfusion? No   12. Are you willing to have a blood transfusion if it is medically needed before, during, or after your surgery? Yes   13. Have you or any of your relatives ever had problems with anesthesia? No   14. Do you have sleep apnea, excessive snoring or daytime drowsiness? No   14a. Do you have a CPAP machine? No   15. Do you have any artifical heart valves or other implanted medical devices like a pacemaker, defibrillator, or continuous glucose monitor? No   16. Do you have artificial joints? No   17. Are you allergic to latex? No     Health Care Directive:  Patient does not have a Health Care Directive or Living Will:     Preoperative Review of :   reviewed - controlled substances reflected in medication list.      Status of Chronic Conditions:  ASTHMA - Patient has a longstanding history of moderate-severe Asthma . Patient has been doing well overall noting NO SYMPTOMS and continues on medication regimen consisting of Advair without adverse reactions or side effects.     HYPERTENSION - Patient has longstanding history of HTN , currently denies any symptoms referable to elevated blood pressure.  Specifically denies chest pain, palpitations, dyspnea, orthopnea, PND or peripheral edema. Blood pressure readings have been in normal range. Current medication regimen is as listed below. Patient denies any side effects of medication.       Review of Systems  CONSTITUTIONAL: NEGATIVE for fever, chills, change in weight  INTEGUMENTARY/SKIN: NEGATIVE for worrisome rashes, moles or lesions  EYES: NEGATIVE for vision changes or irritation  ENT/MOUTH: NEGATIVE for ear, mouth and throat problems  RESP: NEGATIVE for significant cough or SOB  CV: NEGATIVE for chest pain, palpitations or peripheral edema  GI: NEGATIVE for nausea, abdominal pain, heartburn, or change in bowel habits  : NEGATIVE for frequency, dysuria, or hematuria  MUSCULOSKELETAL: NEGATIVE for significant arthralgias or myalgia  NEURO: NEGATIVE for weakness, dizziness or paresthesias  ENDOCRINE: NEGATIVE for temperature intolerance, skin/hair changes  HEME: NEGATIVE for bleeding problems  PSYCHIATRIC: NEGATIVE for changes in mood or affect    Patient Active Problem List    Diagnosis Date Noted     Adverse effect of antineoplastic and immunosuppressive drugs, sequela 01/19/2023     Priority: Medium     Malignant neoplasm of lower lobe of right lung (H) 01/04/2023     Priority: Medium     Panlobular emphysema (H) 01/04/2023     Priority: Medium     Right lower lobe lung mass 12/28/2022     Priority: Medium     Benign essential hypertension 05/04/2017     Priority: Medium     Major depression in complete remission (H) 03/02/2017     Priority: Medium     Mild intermittent asthma without complication 12/20/2016     Priority: Medium     OA (osteoarthritis) of knee 04/16/2015     Priority: Medium     S/P laparoscopic hernia repair 01/26/2015     Priority: Medium     GERD (gastroesophageal reflux disease) 11/24/2014     Priority: Medium     Hip pain 04/03/2014     Priority: Medium     Seasonal allergic rhinitis 10/21/2013     Priority: Medium     Advanced  directives, counseling/discussion 12/19/2011     Priority: Medium     Advance Directive Problem List Overview:   Name Relationship Phone    Primary Health Care Agent            Alternative Health Care Agent          Discussed advance care planning with patient; information given to patient to review. 12/19/2011        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     Priority: Medium     Alcoholism (H) 08/28/2009     Priority: Medium     Recovered  Active in AA       Hyperlipidemia LDL goal <130 10/06/2008     Priority: Medium     Primary localized osteoarthrosis, lower leg 10/06/2008     Priority: Medium     Generalized anxiety disorder 10/06/2008     Priority: Medium     Diagnosis updated by automated process. Provider to review and confirm.       Rhinitis, allergic seasonal 10/06/2008     Priority: Medium      Past Medical History:   Diagnosis Date     Allergies      Anxiety      Hyperlipidemia      Hypertension      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     Nicotine dependence      Postmenopausal HRT (hormone replacement therapy) 1994     S/P alcohol detoxification 07/06    Recovered and active in AA     Past Surgical History:   Procedure Laterality Date     BRONCHOSCOPY, WITH BIOPSY, ROBOT ASSISTED N/A 12/28/2022    Procedure: BRONCHOSCOPY, USING ION OPTICAL TRACKING SYSTEM, FINE NEEDLE ASPIRATION;  Surgeon: Alma Almaguer MD;  Location: [facility]     ENDOBRONCHIAL ULTRASOUND FLEXIBLE N/A 12/28/2022    Procedure: endobronchial biopsy and tumor debulking with cryoprobe;  Surgeon: Alma Almaguer MD;  Location: [facility]     ESOPHAGOSCOPY, GASTROSCOPY, DUODENOSCOPY (EGD), COMBINED N/A 1/5/2015    Procedure: COMBINED ESOPHAGOSCOPY, GASTROSCOPY, DUODENOSCOPY (EGD), BIOPSY SINGLE OR MULTIPLE;  Surgeon: Dylan Alejandra MD;  Location: [facility]     ESOPHAGOSCOPY, GASTROSCOPY, DUODENOSCOPY (EGD), COMBINED N/A 5/25/2018    Procedure: COMBINED ESOPHAGOSCOPY, GASTROSCOPY, DUODENOSCOPY (EGD), BIOPSY SINGLE OR  MULTIPLE;  gastroscopy;  Surgeon: Alexander Bautista MD;  Location: [facility]     LAPAROSCOPIC HERNIORRHAPHY INGUINAL Right 1/26/2015    Procedure: LAPAROSCOPIC HERNIORRHAPHY INGUINAL;  Surgeon: Favio Olsen MD;  Location: [facility]     TUBAL LIGATION       Current Outpatient Medications   Medication Sig Dispense Refill     albuterol (PROAIR HFA/PROVENTIL HFA/VENTOLIN HFA) 108 (90 Base) MCG/ACT inhaler Inhale 2 puffs into the lungs every 6 hours as needed for shortness of breath / dyspnea or wheezing 18 g 0     atorvastatin (LIPITOR) 20 MG tablet Take 1 tablet (20 mg) by mouth daily 90 tablet 3     Cholecalciferol (VITAMIN D-3 PO) Take 2,000 Units by mouth daily        clonazePAM (KLONOPIN) 0.5 MG tablet Take 0.5-1 tablets (0.25-0.5 mg) by mouth 2 times daily as needed for anxiety 30 tablet 3     dexamethasone (DECADRON) 4 MG tablet Take 2 tablets (8 mg) by mouth daily Take for 3 days, starting the day after chemo. Take with food. 6 tablet 3     fluticasone-salmeterol (ADVAIR HFA) 115-21 MCG/ACT inhaler Inhale 2 puffs into the lungs 2 times daily 1 g 3     ibuprofen (ADVIL,MOTRIN) 200 MG tablet Take 200-600 mg by mouth every 2 hours as needed for mild pain       ipratropium - albuterol 0.5 mg/2.5 mg/3 mL (DUONEB) 0.5-2.5 (3) MG/3ML neb solution Take 1 vial (3 mLs) by nebulization every 6 hours as needed for shortness of breath or wheezing 90 mL 0     omeprazole (PRILOSEC) 20 MG DR capsule Take 1 capsule (20 mg) by mouth daily 30-60 minutes before a meal. 90 capsule 2     ondansetron (ZOFRAN) 8 MG tablet Take 1 tablet (8 mg) by mouth every 8 hours as needed for nausea (vomiting) 30 tablet 2     prochlorperazine (COMPAZINE) 10 MG tablet Take 0.5 tablets (5 mg) by mouth every 6 hours as needed for nausea or vomiting 30 tablet 2     tiZANidine (ZANAFLEX) 2 MG tablet        traMADol (ULTRAM) 50 MG tablet Take 1 tablet (50 mg) by mouth every 6 hours as needed for severe pain (7-10) COVERING FOR PCP ONLY ONCE 120 tablet 1      traZODone (DESYREL) 50 MG tablet TAKE 2 TABLETS(100 MG) BY MOUTH AT BEDTIME 180 tablet 3     FLUoxetine (PROZAC) 20 MG capsule Take 2 capsules (40 mg) by mouth daily 60 capsule 11       Allergies   Allergen Reactions     Nka [No Known Allergies]      Seasonal Allergies         Social History     Tobacco Use     Smoking status: Former     Packs/day: 0.50     Types: Cigarettes     Quit date: 2011     Years since quittin.6     Smokeless tobacco: Never   Substance Use Topics     Alcohol use: No     Family History   Problem Relation Age of Onset     Cancer Mother      C.A.D. Father      Lipids Father      Hypertension Father      Hypertension Brother      Cancer Brother         esophageal cancer     Breast Cancer Other      Prostate Cancer Brother      Psychotic Disorder Daughter      Diabetes No family hx of      Cerebrovascular Disease No family hx of      Cancer - colorectal No family hx of      History   Drug Use No         Objective     /58   Pulse 78   Temp 98.2  F (36.8  C) (Tympanic)   Resp 18   Wt 71.7 kg (158 lb)   SpO2 93%   BMI 28.90 kg/m      Physical Exam    GENERAL APPEARANCE: healthy, alert and no distress     EYES: EOMI, PERRL     HENT: ear canals and TM's normal and nose and mouth without ulcers or lesions     NECK: no adenopathy, no asymmetry, masses, or scars and thyroid normal to palpation     RESP: lungs clear to auscultation - no rales, rhonchi or wheezes     CV: regular rates and rhythm, normal S1 S2, no S3 or S4 and no murmur, click or rub     ABDOMEN:  soft, nontender, no HSM or masses and bowel sounds normal     MS: extremities normal- no gross deformities noted, no evidence of inflammation in joints, FROM in all extremities.     SKIN: no suspicious lesions or rashes     NEURO: Normal strength and tone, sensory exam grossly normal, mentation intact and speech normal     PSYCH: mentation appears normal. and affect normal/bright     LYMPHATICS: No cervical  adenopathy    Recent Labs   Lab Test 01/20/23  1016 11/18/22  2205   HGB 11.1* 11.6*    328   * 138   POTASSIUM 3.9 3.2*   CR 0.61 0.50*        Diagnostics:  No labs were ordered during this visit.   No EKG this visit, completed in the last 90 days.    Revised Cardiac Risk Index (RCRI):  The patient has the following serious cardiovascular risks for perioperative complications:   - No serious cardiac risks = 0 points     RCRI Interpretation: 0 points: Class I (very low risk - 0.4% complication rate)           Signed Electronically by: NAYA Liu CNP  Copy of this evaluation report is provided to requesting physician.

## 2023-02-02 NOTE — PROGRESS NOTES
Surgical Consultation/History and Physical  [facility]    Yamel is seen in consultation for port placement, at the request of Catie Fraser MD.    Chief Complaint:  Port placement    HPI:  Yamel Lindsay presents in consultation today for evaluation of infusion port placement. She has a history of lung cancer. The patient is right dominant, has never had central venous access, pneumothorax, lung surgery, history of dialysis or renal failure, and denies repetitive movements with the upper extremity (such as pitching, chopping wood, bowling, or hunting).      Patient Active Problem List   Diagnosis     Hyperlipidemia LDL goal <130     Primary localized osteoarthrosis, lower leg     Generalized anxiety disorder     Rhinitis, allergic seasonal     Alcoholism (H)     Mild recurrent major depression (H)     Advanced directives, counseling/discussion     Seasonal allergic rhinitis     Hip pain     GERD (gastroesophageal reflux disease)     S/P laparoscopic hernia repair     OA (osteoarthritis) of knee     Mild intermittent asthma without complication     Major depression in complete remission (H)     Benign essential hypertension     Right lower lobe lung mass     Malignant neoplasm of lower lobe of right lung (H)     Panlobular emphysema (H)     Adverse effect of antineoplastic and immunosuppressive drugs, sequela     Past Medical History:   Diagnosis Date     Allergies      Anxiety      Hyperlipidemia      Hypertension      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     Nicotine dependence      Postmenopausal HRT (hormone replacement therapy) 1994     S/P alcohol detoxification 07/06    Recovered and active in AA     Past Surgical History:   Procedure Laterality Date     BRONCHOSCOPY, WITH BIOPSY, ROBOT ASSISTED N/A 12/28/2022    Procedure: BRONCHOSCOPY, USING ION OPTICAL TRACKING SYSTEM, FINE NEEDLE ASPIRATION;  Surgeon: Alma Almaguer MD;  Location: [facility]     ENDOBRONCHIAL  ULTRASOUND FLEXIBLE N/A 2022    Procedure: endobronchial biopsy and tumor debulking with cryoprobe;  Surgeon: Alma Almaguer MD;  Location:  OR     ESOPHAGOSCOPY, GASTROSCOPY, DUODENOSCOPY (EGD), COMBINED N/A 2015    Procedure: COMBINED ESOPHAGOSCOPY, GASTROSCOPY, DUODENOSCOPY (EGD), BIOPSY SINGLE OR MULTIPLE;  Surgeon: Dylan Alejandra MD;  Location: [facility]     ESOPHAGOSCOPY, GASTROSCOPY, DUODENOSCOPY (EGD), COMBINED N/A 2018    Procedure: COMBINED ESOPHAGOSCOPY, GASTROSCOPY, DUODENOSCOPY (EGD), BIOPSY SINGLE OR MULTIPLE;  gastroscopy;  Surgeon: Alexander Bautista MD;  Location: [facility]     LAPAROSCOPIC HERNIORRHAPHY INGUINAL Right 2015    Procedure: LAPAROSCOPIC HERNIORRHAPHY INGUINAL;  Surgeon: Favio Olsen MD;  Location: [facility]     TUBAL LIGATION       Family History   Problem Relation Age of Onset     Cancer Mother      C.A.D. Father      Lipids Father      Hypertension Father      Hypertension Brother      Cancer Brother         esophageal cancer     Breast Cancer Other      Prostate Cancer Brother      Psychotic Disorder Daughter      Diabetes No family hx of      Cerebrovascular Disease No family hx of      Cancer - colorectal No family hx of        Social History     Tobacco Use     Smoking status: Former     Packs/day: 0.50     Types: Cigarettes     Quit date: 2011     Years since quittin.6     Smokeless tobacco: Never   Substance Use Topics     Alcohol use: No        History   Drug Use No       Current Outpatient Medications   Medication Sig Dispense Refill     albuterol (PROAIR HFA/PROVENTIL HFA/VENTOLIN HFA) 108 (90 Base) MCG/ACT inhaler Inhale 2 puffs into the lungs every 6 hours as needed for shortness of breath / dyspnea or wheezing 18 g 0     atorvastatin (LIPITOR) 20 MG tablet Take 1 tablet (20 mg) by mouth daily 90 tablet 3     Cholecalciferol (VITAMIN D-3 PO) Take 2,000 Units by mouth daily        clonazePAM (KLONOPIN) 0.5 MG tablet Take 0.5-1 tablets  (0.25-0.5 mg) by mouth 2 times daily as needed for anxiety 30 tablet 3     dexamethasone (DECADRON) 4 MG tablet Take 2 tablets (8 mg) by mouth daily Take for 3 days, starting the day after chemo. Take with food. 6 tablet 3     fluticasone-salmeterol (ADVAIR HFA) 115-21 MCG/ACT inhaler Inhale 2 puffs into the lungs 2 times daily 1 g 3     ibuprofen (ADVIL,MOTRIN) 200 MG tablet Take 200-600 mg by mouth every 2 hours as needed for mild pain       ipratropium - albuterol 0.5 mg/2.5 mg/3 mL (DUONEB) 0.5-2.5 (3) MG/3ML neb solution Take 1 vial (3 mLs) by nebulization every 6 hours as needed for shortness of breath or wheezing 90 mL 0     omeprazole (PRILOSEC) 20 MG DR capsule Take 1 capsule (20 mg) by mouth daily 30-60 minutes before a meal. 90 capsule 2     ondansetron (ZOFRAN) 8 MG tablet Take 1 tablet (8 mg) by mouth every 8 hours as needed for nausea (vomiting) 30 tablet 2     prochlorperazine (COMPAZINE) 10 MG tablet Take 0.5 tablets (5 mg) by mouth every 6 hours as needed for nausea or vomiting 30 tablet 2     tiZANidine (ZANAFLEX) 2 MG tablet        traMADol (ULTRAM) 50 MG tablet Take 1 tablet (50 mg) by mouth every 6 hours as needed for severe pain (7-10) COVERING FOR PCP ONLY ONCE 120 tablet 1     traZODone (DESYREL) 50 MG tablet TAKE 2 TABLETS(100 MG) BY MOUTH AT BEDTIME 180 tablet 3     FLUoxetine (PROZAC) 20 MG capsule Take 2 capsules (40 mg) by mouth daily 60 capsule 11     Allergies   Allergen Reactions     Nka [No Known Allergies]      Seasonal Allergies      Physical Exam:  /83 (BP Location: Right arm, Patient Position: Sitting, Cuff Size: Adult Regular)   Pulse 84   Wt 72.1 kg (159 lb)   SpO2 93%   BMI 29.08 kg/m      Constitutional- No acute distress, well nourished, non-toxic  Eyes: Anicteric, no injection.  PERRL  ENT:  Normocephalic, atraumatic, Nose midline, moist mucus membranes  Neck - supple, no LAD  Respiratory- Clear to auscultation bilaterally, good inspiratory effort  Cardiovascular  - Heart RRR, no lift's, thrills, murmurs, rubs, or gallop.  No peripheral edema.  No clubbing.  Neuro - No focal neuro deficits, Alert and oriented x 3  Psych: Appropriate mood and affect  Musculoskeletal: Normal gait, symmetric strength.  FROM upper and lower extremities.  Skin: Warm, Dry    LABS:     Lab Results   Component Value Date    WBC 6.5 01/20/2023    HGB 11.1 (L) 01/20/2023    HCT 34.1 (L) 01/20/2023    MCV 86 01/20/2023     01/20/2023      No results found for: INR, PROTIME       INFORMED CONSENT:     A discussion of the indications, risks, benefits and alternatives to surgery was held with the patient, and the risks discussed include beeding, infection, thrombosis, stenosis, port malfunction or malposition, air embolism, pneumothorax, hemothorax, or cardiac arrythmia . The patient understood the information given, questions addressed, and she wishes to proceed. She understands the need for maintenance of the infusion port at least once a month while in place.    ASSESSMENT AND PLAN:     Ms. Yamel Lindsay is in need of an infusion port for lung cancer, and is being expedited to surgery to facilitate care. She  understood the information given, and wishes to proceed accordingly.- and is being accordingly scheduled.    She would prefer a left sided port.    Scot Valadez, DO on 2/2/2023 at 2:16 PM

## 2023-02-03 ENCOUNTER — ANESTHESIA EVENT (OUTPATIENT)
Dept: SURGERY | Facility: CLINIC | Age: 78
End: 2023-02-03
Payer: COMMERCIAL

## 2023-02-03 ASSESSMENT — COPD QUESTIONNAIRES: COPD: 1

## 2023-02-03 ASSESSMENT — LIFESTYLE VARIABLES: TOBACCO_USE: 1

## 2023-02-03 NOTE — ANESTHESIA PREPROCEDURE EVALUATION
Anesthesia Pre-Procedure Evaluation    Patient: Yamel Lindsay   MRN: [id number] : 1945        Procedure : Procedure(s):  INSERTION, VASCULAR ACCESS PORT          Past Medical History:   Diagnosis Date     Allergies      Anxiety      Hyperlipidemia      Hypertension      Mild recurrent major depression (H) 2010     Nicotine dependence      Postmenopausal HRT (hormone replacement therapy)      S/P alcohol detoxification     Recovered and active in AA      Past Surgical History:   Procedure Laterality Date     BRONCHOSCOPY, WITH BIOPSY, ROBOT ASSISTED N/A 2022    Procedure: BRONCHOSCOPY, USING ION OPTICAL TRACKING SYSTEM, FINE NEEDLE ASPIRATION;  Surgeon: Alma Almaguer MD;  Location: [facility]     ENDOBRONCHIAL ULTRASOUND FLEXIBLE N/A 2022    Procedure: endobronchial biopsy and tumor debulking with cryoprobe;  Surgeon: Alma Almaguer MD;  Location:  OR     ESOPHAGOSCOPY, GASTROSCOPY, DUODENOSCOPY (EGD), COMBINED N/A 2015    Procedure: COMBINED ESOPHAGOSCOPY, GASTROSCOPY, DUODENOSCOPY (EGD), BIOPSY SINGLE OR MULTIPLE;  Surgeon: Dylan Alejandra MD;  Location: [facility]     ESOPHAGOSCOPY, GASTROSCOPY, DUODENOSCOPY (EGD), COMBINED N/A 2018    Procedure: COMBINED ESOPHAGOSCOPY, GASTROSCOPY, DUODENOSCOPY (EGD), BIOPSY SINGLE OR MULTIPLE;  gastroscopy;  Surgeon: Alexander Bautista MD;  Location: [facility]     LAPAROSCOPIC HERNIORRHAPHY INGUINAL Right 2015    Procedure: LAPAROSCOPIC HERNIORRHAPHY INGUINAL;  Surgeon: Favio Olsen MD;  Location: [facility]     TUBAL LIGATION        Allergies   Allergen Reactions     Nka [No Known Allergies]      Seasonal Allergies       Social History     Tobacco Use     Smoking status: Former     Packs/day: 0.50     Types: Cigarettes     Quit date: 2011     Years since quittin.6     Smokeless tobacco: Never   Substance Use Topics     Alcohol use: No      Wt Readings from Last 1 Encounters:   23 72.1 kg (159 lb)         Anesthesia Evaluation   Pt has had prior anesthetic. Type: General and MAC.    No history of anesthetic complications       ROS/MED HX  ENT/Pulmonary: Comment: Hx bronch  RLL mass      (+) allergic rhinitis, tobacco use, Past use, Intermittent, asthma Treatment: Inhaler prn, Inhaler daily and Nebulizer prn,  COPD,     Neurologic:  - neg neurologic ROS     Cardiovascular:     (+) Dyslipidemia hypertension-----Previous cardiac testing   Echo: Date: Results:    Stress Test: Date: Results:    ECG Reviewed: Date: 11/18/22 Results:  Sinus Rhythm - occasional PAC     # PACs = 1.  WITHIN NORMAL LIMITS  Cath: Date: Results:      METS/Exercise Tolerance: >4 METS    Hematologic:  - neg hematologic  ROS     Musculoskeletal:   (+) arthritis,     GI/Hepatic:     (+) GERD,     Renal/Genitourinary:  - neg Renal ROS     Endo:  - neg endo ROS     Psychiatric/Substance Use:     (+) psychiatric history depression and anxiety alcohol abuse     Infectious Disease:  - neg infectious disease ROS     Malignancy:   (+) Malignancy, History of Lung.  Lung CA status post Surgery.        Other:  - neg other ROS          Physical Exam    Airway  airway exam normal      Mallampati: I   TM distance: > 3 FB   Neck ROM: full   Mouth opening: > 3 cm    Respiratory Devices and Support         Dental       (+) Minor Abnormalities - some fillings, tiny chips      Cardiovascular   cardiovascular exam normal       Rhythm and rate: regular and normal     Pulmonary   pulmonary exam normal                OUTSIDE LABS:  CBC:   Lab Results   Component Value Date    WBC 6.5 01/20/2023    WBC 7.4 11/18/2022    HGB 11.1 (L) 01/20/2023    HGB 11.6 (L) 11/18/2022    HCT 34.1 (L) 01/20/2023    HCT 34.7 (L) 11/18/2022     01/20/2023     11/18/2022     BMP:   Lab Results   Component Value Date     (L) 01/20/2023     11/18/2022    POTASSIUM 3.9 01/20/2023    POTASSIUM 3.2 (L) 11/18/2022    CHLORIDE 96 (L) 01/20/2023    CHLORIDE 99  11/18/2022    CO2 31 (H) 01/20/2023    CO2 30 (H) 11/18/2022    BUN 9.0 01/20/2023    BUN 10.2 11/18/2022    CR 0.61 01/20/2023    CR 0.50 (L) 11/18/2022     (H) 01/20/2023     (H) 11/18/2022     COAGS: No results found for: PTT, INR, FIBR  POC: No results found for: BGM, HCG, HCGS  HEPATIC:   Lab Results   Component Value Date    ALBUMIN 3.5 01/20/2023    PROTTOTAL 6.6 01/20/2023    ALT 10 01/20/2023    AST 18 01/20/2023    GGT 68 (H) 07/05/2006    ALKPHOS 118 (H) 01/20/2023    BILITOTAL 0.2 01/20/2023     OTHER:   Lab Results   Component Value Date    LACT 0.6 01/26/2015    A1C 5.8 01/31/2013    DEEPIKA 9.1 01/20/2023    TSH 1.10 01/20/2023    T4 0.93 02/18/2014    CRP 8.7 (H) 06/28/2011    SED 34 (H) 06/28/2011       Anesthesia Plan    ASA Status:  3   NPO Status:  NPO Appropriate    Anesthesia Type: General.     - Airway: Native airway   Induction: Intravenous, Propofol.   Maintenance: TIVA.        Consents    Anesthesia Plan(s) and associated risks, benefits, and realistic alternatives discussed. Questions answered and patient/representative(s) expressed understanding.     - Discussed: Risks, Benefits and Alternatives for BOTH SEDATION and the PROCEDURE were discussed     - Discussed with:  Patient      - Extended Intubation/Ventilatory Support Discussed: No.      - Patient is DNR/DNI Status: No    Use of blood products discussed: No .     Postoperative Care    Pain management: Multi-modal analgesia, Oral pain medications, IV analgesics.   PONV prophylaxis: Ondansetron (or other 5HT-3), Dexamethasone or Solumedrol, Background Propofol Infusion     Comments:                NAYA Frias CRNA

## 2023-02-06 ENCOUNTER — APPOINTMENT (OUTPATIENT)
Dept: GENERAL RADIOLOGY | Facility: CLINIC | Age: 78
End: 2023-02-06
Attending: SURGERY
Payer: COMMERCIAL

## 2023-02-06 ENCOUNTER — ANESTHESIA (OUTPATIENT)
Dept: SURGERY | Facility: CLINIC | Age: 78
End: 2023-02-06
Payer: COMMERCIAL

## 2023-02-06 ENCOUNTER — HOSPITAL ENCOUNTER (OUTPATIENT)
Facility: CLINIC | Age: 78
Discharge: HOME | End: 2023-02-06
Attending: SURGERY | Admitting: SURGERY
Payer: COMMERCIAL

## 2023-02-06 VITALS
OXYGEN SATURATION: 93 % | WEIGHT: 159 LBS | TEMPERATURE: 98.9 F | SYSTOLIC BLOOD PRESSURE: 139 MMHG | RESPIRATION RATE: 18 BRPM | HEIGHT: 62 IN | BODY MASS INDEX: 29.26 KG/M2 | DIASTOLIC BLOOD PRESSURE: 70 MMHG | HEART RATE: 71 BPM

## 2023-02-06 PROCEDURE — 250N000009 HC RX 250: Performed by: SURGERY

## 2023-02-06 PROCEDURE — 77001 FLUOROGUIDE FOR VEIN DEVICE: CPT | Mod: 26 | Performed by: SURGERY

## 2023-02-06 PROCEDURE — 250N000009 HC RX 250: Performed by: NURSE ANESTHETIST, CERTIFIED REGISTERED

## 2023-02-06 PROCEDURE — 258N000003 HC RX IP 258 OP 636: Performed by: NURSE ANESTHETIST, CERTIFIED REGISTERED

## 2023-02-06 PROCEDURE — 999N000065 XR CHEST PORT 1 VIEW

## 2023-02-06 PROCEDURE — 36561 INSERT TUNNELED CV CATH: CPT | Performed by: SURGERY

## 2023-02-06 PROCEDURE — 250N000013 HC RX MED GY IP 250 OP 250 PS 637: Performed by: NURSE ANESTHETIST, CERTIFIED REGISTERED

## 2023-02-06 PROCEDURE — 250N000011 HC RX IP 250 OP 636: Performed by: SURGERY

## 2023-02-06 PROCEDURE — 272N000001 HC OR GENERAL SUPPLY STERILE: Performed by: SURGERY

## 2023-02-06 PROCEDURE — 999N000180 XR SURGERY CARM FLUORO LESS THAN 5 MIN: Mod: TC

## 2023-02-06 PROCEDURE — 271N000001 HC OR GENERAL SUPPLY NON-STERILE: Performed by: SURGERY

## 2023-02-06 PROCEDURE — C1788 PORT, INDWELLING, IMP: HCPCS | Performed by: SURGERY

## 2023-02-06 PROCEDURE — 710N000012 HC RECOVERY PHASE 2, PER MINUTE: Performed by: SURGERY

## 2023-02-06 PROCEDURE — 370N000017 HC ANESTHESIA TECHNICAL FEE, PER MIN: Performed by: SURGERY

## 2023-02-06 PROCEDURE — 250N000011 HC RX IP 250 OP 636: Performed by: NURSE ANESTHETIST, CERTIFIED REGISTERED

## 2023-02-06 PROCEDURE — 999N000141 HC STATISTIC PRE-PROCEDURE NURSING ASSESSMENT: Performed by: SURGERY

## 2023-02-06 PROCEDURE — 360N000082 HC SURGERY LEVEL 2 W/ FLUORO, PER MIN: Performed by: SURGERY

## 2023-02-06 DEVICE — CATH PORT MRI POWERPORT 8FR SL 1808000: Type: IMPLANTABLE DEVICE | Site: CHEST | Status: FUNCTIONAL

## 2023-02-06 RX ORDER — DEXAMETHASONE SODIUM PHOSPHATE 4 MG/ML
INJECTION, SOLUTION INTRA-ARTICULAR; INTRALESIONAL; INTRAMUSCULAR; INTRAVENOUS; SOFT TISSUE PRN
Status: DISCONTINUED | OUTPATIENT
Start: 2023-02-06 | End: 2023-02-06

## 2023-02-06 RX ORDER — NALOXONE HYDROCHLORIDE 0.4 MG/ML
0.2 INJECTION, SOLUTION INTRAMUSCULAR; INTRAVENOUS; SUBCUTANEOUS
Status: DISCONTINUED | OUTPATIENT
Start: 2023-02-06 | End: 2023-02-06 | Stop reason: HOSPADM

## 2023-02-06 RX ORDER — HEPARIN SODIUM 1000 [USP'U]/ML
INJECTION, SOLUTION INTRAVENOUS; SUBCUTANEOUS PRN
Status: DISCONTINUED | OUTPATIENT
Start: 2023-02-06 | End: 2023-02-06 | Stop reason: HOSPADM

## 2023-02-06 RX ORDER — GABAPENTIN 100 MG/1
100 CAPSULE ORAL
Status: COMPLETED | OUTPATIENT
Start: 2023-02-06 | End: 2023-02-06

## 2023-02-06 RX ORDER — SODIUM CHLORIDE, SODIUM LACTATE, POTASSIUM CHLORIDE, CALCIUM CHLORIDE 600; 310; 30; 20 MG/100ML; MG/100ML; MG/100ML; MG/100ML
INJECTION, SOLUTION INTRAVENOUS CONTINUOUS
Status: DISCONTINUED | OUTPATIENT
Start: 2023-02-06 | End: 2023-02-06 | Stop reason: HOSPADM

## 2023-02-06 RX ORDER — CEFAZOLIN SODIUM/WATER 2 G/20 ML
2 SYRINGE (ML) INTRAVENOUS
Status: COMPLETED | OUTPATIENT
Start: 2023-02-06 | End: 2023-02-06

## 2023-02-06 RX ORDER — OXYCODONE HYDROCHLORIDE 5 MG/1
5 TABLET ORAL EVERY 4 HOURS PRN
Status: DISCONTINUED | OUTPATIENT
Start: 2023-02-06 | End: 2023-02-06 | Stop reason: HOSPADM

## 2023-02-06 RX ORDER — NALOXONE HYDROCHLORIDE 0.4 MG/ML
0.4 INJECTION, SOLUTION INTRAMUSCULAR; INTRAVENOUS; SUBCUTANEOUS
Status: DISCONTINUED | OUTPATIENT
Start: 2023-02-06 | End: 2023-02-06 | Stop reason: HOSPADM

## 2023-02-06 RX ORDER — OXYCODONE HYDROCHLORIDE 5 MG/1
10 TABLET ORAL EVERY 4 HOURS PRN
Status: DISCONTINUED | OUTPATIENT
Start: 2023-02-06 | End: 2023-02-06 | Stop reason: HOSPADM

## 2023-02-06 RX ORDER — FENTANYL CITRATE 50 UG/ML
25 INJECTION, SOLUTION INTRAMUSCULAR; INTRAVENOUS
Status: DISCONTINUED | OUTPATIENT
Start: 2023-02-06 | End: 2023-02-06 | Stop reason: HOSPADM

## 2023-02-06 RX ORDER — CEFAZOLIN SODIUM/WATER 2 G/20 ML
2 SYRINGE (ML) INTRAVENOUS SEE ADMIN INSTRUCTIONS
Status: DISCONTINUED | OUTPATIENT
Start: 2023-02-06 | End: 2023-02-06 | Stop reason: HOSPADM

## 2023-02-06 RX ORDER — PROPOFOL 10 MG/ML
INJECTION, EMULSION INTRAVENOUS CONTINUOUS PRN
Status: DISCONTINUED | OUTPATIENT
Start: 2023-02-06 | End: 2023-02-06

## 2023-02-06 RX ORDER — LIDOCAINE HYDROCHLORIDE AND EPINEPHRINE 10; 10 MG/ML; UG/ML
INJECTION, SOLUTION INFILTRATION; PERINEURAL PRN
Status: DISCONTINUED | OUTPATIENT
Start: 2023-02-06 | End: 2023-02-06 | Stop reason: HOSPADM

## 2023-02-06 RX ORDER — LIDOCAINE HYDROCHLORIDE 10 MG/ML
INJECTION, SOLUTION INFILTRATION; PERINEURAL PRN
Status: DISCONTINUED | OUTPATIENT
Start: 2023-02-06 | End: 2023-02-06

## 2023-02-06 RX ORDER — PROPOFOL 10 MG/ML
INJECTION, EMULSION INTRAVENOUS PRN
Status: DISCONTINUED | OUTPATIENT
Start: 2023-02-06 | End: 2023-02-06

## 2023-02-06 RX ORDER — ACETAMINOPHEN 325 MG/1
975 TABLET ORAL ONCE
Status: COMPLETED | OUTPATIENT
Start: 2023-02-06 | End: 2023-02-06

## 2023-02-06 RX ORDER — BUPIVACAINE HYDROCHLORIDE 5 MG/ML
INJECTION, SOLUTION PERINEURAL PRN
Status: DISCONTINUED | OUTPATIENT
Start: 2023-02-06 | End: 2023-02-06 | Stop reason: HOSPADM

## 2023-02-06 RX ORDER — ONDANSETRON 2 MG/ML
INJECTION INTRAMUSCULAR; INTRAVENOUS PRN
Status: DISCONTINUED | OUTPATIENT
Start: 2023-02-06 | End: 2023-02-06

## 2023-02-06 RX ORDER — LIDOCAINE 40 MG/G
CREAM TOPICAL
Status: DISCONTINUED | OUTPATIENT
Start: 2023-02-06 | End: 2023-02-06 | Stop reason: HOSPADM

## 2023-02-06 RX ADMIN — Medication 2 G: at 13:36

## 2023-02-06 RX ADMIN — PROPOFOL 100 MCG/KG/MIN: 10 INJECTION, EMULSION INTRAVENOUS at 13:43

## 2023-02-06 RX ADMIN — LIDOCAINE HYDROCHLORIDE 0.1 ML: 10 INJECTION, SOLUTION EPIDURAL; INFILTRATION; INTRACAUDAL; PERINEURAL at 11:55

## 2023-02-06 RX ADMIN — ONDANSETRON 4 MG: 2 INJECTION INTRAMUSCULAR; INTRAVENOUS at 13:50

## 2023-02-06 RX ADMIN — LIDOCAINE HYDROCHLORIDE 5 ML: 10 INJECTION, SOLUTION INFILTRATION; PERINEURAL at 13:43

## 2023-02-06 RX ADMIN — DEXAMETHASONE SODIUM PHOSPHATE 4 MG: 4 INJECTION, SOLUTION INTRA-ARTICULAR; INTRALESIONAL; INTRAMUSCULAR; INTRAVENOUS; SOFT TISSUE at 13:50

## 2023-02-06 RX ADMIN — SODIUM CHLORIDE, POTASSIUM CHLORIDE, SODIUM LACTATE AND CALCIUM CHLORIDE: 600; 310; 30; 20 INJECTION, SOLUTION INTRAVENOUS at 11:54

## 2023-02-06 RX ADMIN — GABAPENTIN 100 MG: 100 CAPSULE ORAL at 11:40

## 2023-02-06 RX ADMIN — ACETAMINOPHEN 975 MG: 325 TABLET, FILM COATED ORAL at 11:40

## 2023-02-06 RX ADMIN — PROPOFOL 75 MG: 10 INJECTION, EMULSION INTRAVENOUS at 13:43

## 2023-02-06 ASSESSMENT — ACTIVITIES OF DAILY LIVING (ADL)
ADLS_ACUITY_SCORE: 37
ADLS_ACUITY_SCORE: 37
ADLS_ACUITY_SCORE: 35

## 2023-02-06 NOTE — ANESTHESIA CARE TRANSFER NOTE
Patient: Yamel Lindsay    Procedure: Procedure(s):  INSERTION, VASCULAR ACCESS PORT       Diagnosis: Primary malignant neoplasm of right lower lobe of lung (H) [C34.31]  Diagnosis Additional Information: No value filed.    Anesthesia Type:   General     Note:    Oropharynx: oropharynx clear of all foreign objects and spontaneously breathing  Level of Consciousness: awake  Oxygen Supplementation: nasal cannula and room air    Independent Airway: airway patency satisfactory and stable  Dentition: dentition unchanged  Vital Signs Stable: post-procedure vital signs reviewed and stable  Report to RN Given: handoff report given  Patient transferred to: Phase II    Handoff Report: Identifed the Patient, Identified the Reponsible Provider, Reviewed the pertinent medical history, Discussed the surgical course, Reviewed Intra-OP anesthesia mangement and issues during anesthesia, Set expectations for post-procedure period and Allowed opportunity for questions and acknowledgement of understanding      Vitals:  Vitals Value Taken Time   BP     Temp     Pulse     Resp     SpO2 94 % 02/06/23 1436   Vitals shown include unvalidated device data.    Electronically Signed By: Darien Bartholomew CRNA, APRN CRNA  February 6, 2023  2:36 PM   Yes

## 2023-02-06 NOTE — OR NURSING
Per Dr. Valadez, patient may eat and drink as he did see xray. MD has asked that patient stay in facility until read comes back from radiology to confirm.

## 2023-02-06 NOTE — INTERVAL H&P NOTE
"I have reviewed the surgical (or preoperative) H&P that is linked to this encounter, and examined the patient. There are no significant changes    Clinical Conditions Present on Arrival:  Clinically Significant Risk Factors Present on Admission           # Hyponatremia: Lowest Na = 133 mmol/L in last 30 days, will monitor as appropriate          # Overweight: Estimated body mass index is 29.08 kg/m  as calculated from the following:    Height as of this encounter: 1.575 m (5' 2\").    Weight as of this encounter: 72.1 kg (159 lb).       "

## 2023-02-06 NOTE — PROGRESS NOTES
"SPIRITUAL HEALTH SERVICES  [facility] - Surgery    Referral Source: Patient Request during Pre-Op Phone Call    - Yamel was being readied for surgery.  Daughter Sivakumar was with her.  Yamel stated that \"it's all good\".  She has had one round of chemo and had no side effects but she is aware that things could be more challenging in the days ahead.  She is taking her chemo at [facility].  Her clinic is here at [facility] and her daughter lives in this area.    - I provided prayer for her surgery and her future chemotherapy.  When I said \"Amen\" Yamel started singing a little \"Amen\" and said \"you've got to have fun.\"    Alexander Goldstein M.A., [facility]  Lead   [facility]  Office: [phone]    "

## 2023-02-06 NOTE — DISCHARGE INSTRUCTIONS
Discharge for Port Placement  Routine Postoperative Care  You may shower now   Extra Strength Tylenol and over the counter Motrin (if you can take these medications) should be sufficient for your painis sufficient.   Eat a balanced diet and try to maintain good nutrition. Also drink plenty of fluids. Some of the pain medications can cause constipation, and drinking plenty of water will help prevent this.  If you do get constipated, you may need a stool softener or laxative. We or your pharmacist can help you with this.  You may resume light activity as you feel up to it. Avoid any heavy lifting (heavier than 10 pounds), or strenuous activity (including exercise) for one week.    Incision Sites  After 24 hours, you may shower.  The glue will follow-off on its own  2)            After 24-48 hours you may shower, cleaning wounds with soapy water. After showering, pat the wound dry with a clean towel. Do not apply any creams or ointments                   to the incision.   3)            You should call with any signs that the wound may be infected. Signs of infection include: the skin around the wound becoming redder, swollen or feeling hot; the                 wound has green or yellow drainage or smells bad, you have fever over 101oF or increasing pain.       Follow-up  You do not require routine follow-up for your procedure.  You must have your port accessed at least once a month to be flushed.  If there are any issues with your port or you are finished with use and would like it removed, please call the office to schedule an appointment.    Remember that healing from surgery, even if that surgery seems small, takes time. If you have any questions about your procedure, your follow-up instructions or the plan of care, do not hesitate to call.  For urgent calls weekends and holidays or prior to 8 am or after 4:30 pm weekdays, call the [facility] Surgery Office ([phone]) will be forwarded to a nurse  and if necessary Dr. Valadez or the on call general surgeon.                        Same Day Surgery Discharge Instructions  Special Precautions After Surgery - Adult    It is not unusual to feel lightheaded or faint, up to 24 hours after surgery or while taking pain medication.  If you have these symptoms; sit for a few minutes before standing and have someone assist you when getting up.  You should rest and relax for the next 24 hours and must have someone stay with you for at least 24 hours after your discharge.  DO NOT DRIVE any vehicle or operate mechanical equipment for 24 hours following the end of your surgery.  DO NOT DRIVE while taking narcotic pain medications that have been prescribed by your physician.  If you had a limb operated on, you must be able to use it fully to drive.  DO NOT drink alcoholic beverages for 24 hours following surgery or while taking prescription pain medication.  Drink clear liquids (apple juice, ginger ale, broth, 7-Up, etc.).  Progress to your regular diet as you feel able.  Any questions call your physician and do not make important decisions for 24 hours.    __________________________________________________________________________________________________________________________________  IMPORTANT NUMBERS:    [facility] [place] Main Number:  [phone], [phone]  Pharmacy:  [phone]  Same Day Surgery:  [phone], Monday - Friday until 8:30 p.m.  Surgery Specialty Clinic:  [phone]       Nausea and Vomiting: Nausea and vomiting can occur any time after receiving anesthesia. If you experience nausea and vomiting we encourage you to move to a clear liquid diet and advance your diet as tolerated. If nausea and vomiting do not improve within 12 hours please call the surgeon or present to the Emergency department.     Break-through Bleeding: If your experience bleeding from your surgical site apply pressure and additional dressing per nurse instruction. For simple problems  such as a saturated dressing, you may need to reinforce the dressing with more gauze and tape and put slight pressure on the site. If bleeding does not subside contact the surgeon or present to the Emergency Department.    Post-op Infection: If you develop a fever of 100.4 or greater, have pus like drainage, redness, swelling or severe pain at the surgical site not alleviated with pain medications; please contact the surgeon or present to the Emergency Department.

## 2023-02-06 NOTE — ANESTHESIA POSTPROCEDURE EVALUATION
Patient: Yamel Lindsay    Procedure: Procedure(s):  INSERTION, VASCULAR ACCESS PORT       Anesthesia Type:  General    Note:  Disposition: Outpatient   Postop Pain Control: Uneventful            Sign Out: Well controlled pain   PONV: No   Neuro/Psych: Uneventful            Sign Out: Acceptable/Baseline neuro status   Airway/Respiratory: Uneventful            Sign Out: Acceptable/Baseline resp. status   CV/Hemodynamics: Uneventful            Sign Out: Acceptable CV status; No obvious hypovolemia; No obvious fluid overload   Other NRE: NONE   DID A NON-ROUTINE EVENT OCCUR? No           Last vitals:  Vitals Value Taken Time   /70 02/06/23 1530   Temp 37.2  C (98.9  F) 02/06/23 1545   Pulse 71 02/06/23 1530   Resp 18 02/06/23 1515   SpO2 93 % 02/06/23 1544   Vitals shown include unvalidated device data.    Electronically Signed By: Darien Bartholomew CRNA, APRN CRNA  February 6, 2023  3:52 PM

## 2023-02-06 NOTE — OP NOTE
"Procedure Date: 02/06/23     PREOPERATIVE DIAGNOSIS: 1. Lung cancer    POSTOPERATIVE DIAGNOSIS: Same    PROCEDURE:    1. Placement of Left Subclavian Infusion port (8 [place] Bard Power-Port)  2. Fluoroscopic guidance and confirmation of port placement.    SURGEON: Scot Valadez DO    ESTIMATED BLOOD LOSS: 3 mL    INDICATIONS:Ms. Yamel Lindsay is a 77 year old white female who presents with a history of lung cancer. The patient is in need of an infusion port for chemotherapy access, and a discussion was held with the patient regarding indications, risks, benefits, and alternatives (including, but not limited to, risks of bleeding, infection, pneumothorax, need for revision, thrombosis, stenosis, port malposition/malfunction, cardiac arrhythmia, and the rare risk of gas embolism). She understood the information given, questions were addressed, and she wishes to proceed.     DESCRIPTION OF PROCEDURE: The patient was brought to the operating room and placed supine on the operating room table. Sedative agents were administered by the anesthesia service. The bilateral chest and neck were then prepped and draped in the usual sterile fashion. The patient was placed in Trendelenburg position. The left subclavian vein was percutaneously accessed after \"a single pass of the introducer needle. A guidewire was advanced under direct fluoroscopic guidance, and the trajectory was confirmed toward the superior vena cava/heart. Additional local anesthesia was used, and a cutdown was created inferior to the entry point of the guidewire, and the incision was deepened to create an appropriate pocket to allow the port reservoir to comfortably reside. An introducer sheath was then passed over the guidewire without difficulty, under direct fluoroscopic guidance, and the dilator and guidewire removed. The infusion port catheter was then passed until the tip the catheter was in the region of the cavo-atrial junction via fluoroscopy. The " sheath was torn away in the standard fashion. The catheter was tunneled to the reservoir site, manicured to an appropriate length, and secured to the infusion port reservoir and the locking collar affixed. The port was easily flushed with a heparin solution, after assuring ease of aspiration. The infusion port itself was then secured to the tissue in the base of the pocket using interrupted 0-Ethibond suture. The final catheter position was again assessed under fluoroscopy and appeared satisfactory, with no abnormal trajectory or kinks noted, and with the tip of the catheter again noted at the cavo-atrial junction. The pocket was irrigated. Hemostasis was assured. An instrument count was conducted, and included laparotomy pads, needles and sponges, and was found to be correct. The subcutaneous tissue was closed with interrupted 3-0 Vicryl. Skin edges were re-approximated using 4-0 Monocryl, and the wound was cleansed and dried prior to application of sterile glue.     The patient tolerated the procedure well. She was then transferred to the recovery room in stable condition, was allowed to recover and was seated upright for a post-procedure chest x-ray.     Scot Valadez DO on 2/6/2023 at 2:24 PM

## 2023-02-07 RX ORDER — LORAZEPAM 2 MG/ML
0.5 INJECTION INTRAMUSCULAR EVERY 4 HOURS PRN
Status: CANCELLED | OUTPATIENT
Start: 2023-02-10

## 2023-02-07 RX ORDER — EPINEPHRINE 1 MG/ML
0.3 INJECTION, SOLUTION INTRAMUSCULAR; SUBCUTANEOUS EVERY 5 MIN PRN
Status: CANCELLED | OUTPATIENT
Start: 2023-02-10

## 2023-02-07 RX ORDER — ALBUTEROL SULFATE 0.83 MG/ML
2.5 SOLUTION RESPIRATORY (INHALATION)
Status: CANCELLED | OUTPATIENT
Start: 2023-02-10

## 2023-02-07 RX ORDER — DIPHENHYDRAMINE HCL 25 MG
50 CAPSULE ORAL ONCE
Status: CANCELLED
Start: 2023-02-10

## 2023-02-07 RX ORDER — PALONOSETRON 0.05 MG/ML
0.25 INJECTION, SOLUTION INTRAVENOUS ONCE
Status: CANCELLED | OUTPATIENT
Start: 2023-02-10

## 2023-02-07 RX ORDER — METHYLPREDNISOLONE SODIUM SUCCINATE 125 MG/2ML
125 INJECTION, POWDER, LYOPHILIZED, FOR SOLUTION INTRAMUSCULAR; INTRAVENOUS
Status: CANCELLED
Start: 2023-02-10

## 2023-02-07 RX ORDER — DIPHENHYDRAMINE HYDROCHLORIDE 50 MG/ML
50 INJECTION INTRAMUSCULAR; INTRAVENOUS
Status: CANCELLED
Start: 2023-02-10

## 2023-02-07 RX ORDER — ALBUTEROL SULFATE 90 UG/1
1-2 AEROSOL, METERED RESPIRATORY (INHALATION)
Status: CANCELLED
Start: 2023-02-10

## 2023-02-07 RX ORDER — HEPARIN SODIUM (PORCINE) LOCK FLUSH IV SOLN 100 UNIT/ML 100 UNIT/ML
5 SOLUTION INTRAVENOUS
Status: CANCELLED | OUTPATIENT
Start: 2023-02-10

## 2023-02-07 RX ORDER — HEPARIN SODIUM,PORCINE 10 UNIT/ML
5 VIAL (ML) INTRAVENOUS
Status: CANCELLED | OUTPATIENT
Start: 2023-02-10

## 2023-02-07 RX ORDER — MEPERIDINE HYDROCHLORIDE 25 MG/ML
25 INJECTION INTRAMUSCULAR; INTRAVENOUS; SUBCUTANEOUS EVERY 30 MIN PRN
Status: CANCELLED | OUTPATIENT
Start: 2023-02-10

## 2023-02-10 ENCOUNTER — ONCOLOGY VISIT (OUTPATIENT)
Dept: ONCOLOGY | Facility: HOSPITAL | Age: 78
End: 2023-02-10
Attending: NURSE PRACTITIONER
Payer: COMMERCIAL

## 2023-02-10 ENCOUNTER — INFUSION THERAPY VISIT (OUTPATIENT)
Dept: INFUSION THERAPY | Facility: HOSPITAL | Age: 78
End: 2023-02-10
Attending: NURSE PRACTITIONER
Payer: COMMERCIAL

## 2023-02-10 VITALS
RESPIRATION RATE: 18 BRPM | OXYGEN SATURATION: 95 % | WEIGHT: 161 LBS | BODY MASS INDEX: 28.53 KG/M2 | TEMPERATURE: 98 F | HEIGHT: 63 IN | DIASTOLIC BLOOD PRESSURE: 86 MMHG | SYSTOLIC BLOOD PRESSURE: 161 MMHG | HEART RATE: 70 BPM

## 2023-02-10 DIAGNOSIS — C34.31 MALIGNANT NEOPLASM OF LOWER LOBE OF RIGHT LUNG (H): Primary | ICD-10-CM

## 2023-02-10 DIAGNOSIS — C34.32 MALIGNANT NEOPLASM OF LOWER LOBE OF LEFT LUNG (H): ICD-10-CM

## 2023-02-10 DIAGNOSIS — T45.1X5S ADVERSE EFFECT OF ANTINEOPLASTIC AND IMMUNOSUPPRESSIVE DRUGS, SEQUELA: ICD-10-CM

## 2023-02-10 DIAGNOSIS — T45.1X5S ADVERSE EFFECT OF ANTINEOPLASTIC AND IMMUNOSUPPRESSIVE DRUGS, SEQUELA: Primary | ICD-10-CM

## 2023-02-10 DIAGNOSIS — C34.31 MALIGNANT NEOPLASM OF LOWER LOBE OF RIGHT LUNG (H): ICD-10-CM

## 2023-02-10 LAB
ALBUMIN SERPL BCG-MCNC: 3.6 G/DL (ref 3.5–5.2)
ALP SERPL-CCNC: 113 U/L (ref 35–104)
ALT SERPL W P-5'-P-CCNC: 14 U/L (ref 10–35)
ANION GAP SERPL CALCULATED.3IONS-SCNC: 8 MMOL/L (ref 7–15)
AST SERPL W P-5'-P-CCNC: 19 U/L (ref 10–35)
BASOPHILS # BLD AUTO: 0.1 10E3/UL (ref 0–0.2)
BASOPHILS NFR BLD AUTO: 1 %
BILIRUB SERPL-MCNC: <0.2 MG/DL
BUN SERPL-MCNC: 7.2 MG/DL (ref 8–23)
CALCIUM SERPL-MCNC: 8.5 MG/DL (ref 8.8–10.2)
CHLORIDE SERPL-SCNC: 101 MMOL/L (ref 98–107)
CREAT SERPL-MCNC: 0.52 MG/DL (ref 0.51–0.95)
DEPRECATED HCO3 PLAS-SCNC: 31 MMOL/L (ref 22–29)
EOSINOPHIL # BLD AUTO: 0.3 10E3/UL (ref 0–0.7)
EOSINOPHIL NFR BLD AUTO: 5 %
ERYTHROCYTE [DISTWIDTH] IN BLOOD BY AUTOMATED COUNT: 15 % (ref 10–15)
GFR SERPL CREATININE-BSD FRML MDRD: >90 ML/MIN/1.73M2
GLUCOSE SERPL-MCNC: 102 MG/DL (ref 70–99)
HCT VFR BLD AUTO: 32.3 % (ref 35–47)
HGB BLD-MCNC: 10.1 G/DL (ref 11.7–15.7)
IMM GRANULOCYTES # BLD: 0 10E3/UL
IMM GRANULOCYTES NFR BLD: 1 %
LYMPHOCYTES # BLD AUTO: 1.1 10E3/UL (ref 0.8–5.3)
LYMPHOCYTES NFR BLD AUTO: 17 %
MCH RBC QN AUTO: 27.7 PG (ref 26.5–33)
MCHC RBC AUTO-ENTMCNC: 31.3 G/DL (ref 31.5–36.5)
MCV RBC AUTO: 89 FL (ref 78–100)
MONOCYTES # BLD AUTO: 0.5 10E3/UL (ref 0–1.3)
MONOCYTES NFR BLD AUTO: 9 %
NEUTROPHILS # BLD AUTO: 4.3 10E3/UL (ref 1.6–8.3)
NEUTROPHILS NFR BLD AUTO: 67 %
NRBC # BLD AUTO: 0 10E3/UL
NRBC BLD AUTO-RTO: 0 /100
PLATELET # BLD AUTO: 378 10E3/UL (ref 150–450)
POTASSIUM SERPL-SCNC: 4.5 MMOL/L (ref 3.4–5.3)
PROT SERPL-MCNC: 6.6 G/DL (ref 6.4–8.3)
RBC # BLD AUTO: 3.64 10E6/UL (ref 3.8–5.2)
SODIUM SERPL-SCNC: 140 MMOL/L (ref 136–145)
TSH SERPL DL<=0.005 MIU/L-ACNC: 1.1 UIU/ML (ref 0.3–4.2)
WBC # BLD AUTO: 6.3 10E3/UL (ref 4–11)

## 2023-02-10 PROCEDURE — 96417 CHEMO IV INFUS EACH ADDL SEQ: CPT

## 2023-02-10 PROCEDURE — 250N000011 HC RX IP 250 OP 636: Performed by: NURSE PRACTITIONER

## 2023-02-10 PROCEDURE — 96372 THER/PROPH/DIAG INJ SC/IM: CPT | Performed by: STUDENT IN AN ORGANIZED HEALTH CARE EDUCATION/TRAINING PROGRAM

## 2023-02-10 PROCEDURE — 80053 COMPREHEN METABOLIC PANEL: CPT | Performed by: STUDENT IN AN ORGANIZED HEALTH CARE EDUCATION/TRAINING PROGRAM

## 2023-02-10 PROCEDURE — 96377 APPLICATON ON-BODY INJECTOR: CPT | Mod: XS

## 2023-02-10 PROCEDURE — 258N000003 HC RX IP 258 OP 636: Performed by: NURSE PRACTITIONER

## 2023-02-10 PROCEDURE — 96413 CHEMO IV INFUSION 1 HR: CPT

## 2023-02-10 PROCEDURE — 96375 TX/PRO/DX INJ NEW DRUG ADDON: CPT

## 2023-02-10 PROCEDURE — 258N000003 HC RX IP 258 OP 636: Performed by: STUDENT IN AN ORGANIZED HEALTH CARE EDUCATION/TRAINING PROGRAM

## 2023-02-10 PROCEDURE — 250N000011 HC RX IP 250 OP 636: Performed by: STUDENT IN AN ORGANIZED HEALTH CARE EDUCATION/TRAINING PROGRAM

## 2023-02-10 PROCEDURE — 99214 OFFICE O/P EST MOD 30 MIN: CPT | Performed by: NURSE PRACTITIONER

## 2023-02-10 PROCEDURE — 250N000013 HC RX MED GY IP 250 OP 250 PS 637: Performed by: STUDENT IN AN ORGANIZED HEALTH CARE EDUCATION/TRAINING PROGRAM

## 2023-02-10 PROCEDURE — G0463 HOSPITAL OUTPT CLINIC VISIT: HCPCS | Mod: 25 | Performed by: NURSE PRACTITIONER

## 2023-02-10 PROCEDURE — 96415 CHEMO IV INFUSION ADDL HR: CPT

## 2023-02-10 PROCEDURE — 84443 ASSAY THYROID STIM HORMONE: CPT | Performed by: STUDENT IN AN ORGANIZED HEALTH CARE EDUCATION/TRAINING PROGRAM

## 2023-02-10 PROCEDURE — 96367 TX/PROPH/DG ADDL SEQ IV INF: CPT

## 2023-02-10 PROCEDURE — 85025 COMPLETE CBC W/AUTO DIFF WBC: CPT | Performed by: STUDENT IN AN ORGANIZED HEALTH CARE EDUCATION/TRAINING PROGRAM

## 2023-02-10 RX ORDER — EPINEPHRINE 1 MG/ML
0.3 INJECTION, SOLUTION INTRAMUSCULAR; SUBCUTANEOUS EVERY 5 MIN PRN
Status: DISCONTINUED | OUTPATIENT
Start: 2023-02-10 | End: 2023-02-10 | Stop reason: HOSPADM

## 2023-02-10 RX ORDER — MEPERIDINE HYDROCHLORIDE 25 MG/ML
25 INJECTION INTRAMUSCULAR; INTRAVENOUS; SUBCUTANEOUS EVERY 30 MIN PRN
Status: DISCONTINUED | OUTPATIENT
Start: 2023-02-10 | End: 2023-02-10 | Stop reason: HOSPADM

## 2023-02-10 RX ORDER — DIPHENHYDRAMINE HCL 50 MG
50 CAPSULE ORAL ONCE
Status: COMPLETED | OUTPATIENT
Start: 2023-02-10 | End: 2023-02-10

## 2023-02-10 RX ORDER — POLYETHYLENE GLYCOL 3350 17 G/17G
1 POWDER, FOR SOLUTION ORAL DAILY
COMMUNITY

## 2023-02-10 RX ORDER — LORAZEPAM 2 MG/ML
0.5 INJECTION INTRAMUSCULAR EVERY 4 HOURS PRN
Status: DISCONTINUED | OUTPATIENT
Start: 2023-02-10 | End: 2023-02-10 | Stop reason: HOSPADM

## 2023-02-10 RX ORDER — PALONOSETRON 0.05 MG/ML
0.25 INJECTION, SOLUTION INTRAVENOUS ONCE
Status: COMPLETED | OUTPATIENT
Start: 2023-02-10 | End: 2023-02-10

## 2023-02-10 RX ORDER — HEPARIN SODIUM (PORCINE) LOCK FLUSH IV SOLN 100 UNIT/ML 100 UNIT/ML
5 SOLUTION INTRAVENOUS
Status: DISCONTINUED | OUTPATIENT
Start: 2023-02-10 | End: 2023-02-10 | Stop reason: HOSPADM

## 2023-02-10 RX ORDER — METHYLPREDNISOLONE SODIUM SUCCINATE 125 MG/2ML
125 INJECTION, POWDER, LYOPHILIZED, FOR SOLUTION INTRAMUSCULAR; INTRAVENOUS
Status: DISCONTINUED | OUTPATIENT
Start: 2023-02-10 | End: 2023-02-10 | Stop reason: HOSPADM

## 2023-02-10 RX ORDER — ALBUTEROL SULFATE 90 UG/1
1-2 AEROSOL, METERED RESPIRATORY (INHALATION)
Status: DISCONTINUED | OUTPATIENT
Start: 2023-02-10 | End: 2023-02-10 | Stop reason: HOSPADM

## 2023-02-10 RX ORDER — DIPHENHYDRAMINE HYDROCHLORIDE 50 MG/ML
50 INJECTION INTRAMUSCULAR; INTRAVENOUS
Status: DISCONTINUED | OUTPATIENT
Start: 2023-02-10 | End: 2023-02-10 | Stop reason: HOSPADM

## 2023-02-10 RX ORDER — ALBUTEROL SULFATE 0.83 MG/ML
2.5 SOLUTION RESPIRATORY (INHALATION)
Status: DISCONTINUED | OUTPATIENT
Start: 2023-02-10 | End: 2023-02-10 | Stop reason: HOSPADM

## 2023-02-10 RX ADMIN — Medication 5 ML: at 15:03

## 2023-02-10 RX ADMIN — SODIUM CHLORIDE 200 MG: 9 INJECTION, SOLUTION INTRAVENOUS at 10:17

## 2023-02-10 RX ADMIN — PACLITAXEL 348 MG: 6 INJECTION, SOLUTION INTRAVENOUS at 10:56

## 2023-02-10 RX ADMIN — DEXAMETHASONE SODIUM PHOSPHATE: 10 INJECTION, SOLUTION INTRAMUSCULAR; INTRAVENOUS at 09:50

## 2023-02-10 RX ADMIN — PALONOSETRON 0.25 MG: 0.05 INJECTION, SOLUTION INTRAVENOUS at 09:39

## 2023-02-10 RX ADMIN — DIPHENHYDRAMINE HYDROCHLORIDE 50 MG: 50 CAPSULE ORAL at 09:35

## 2023-02-10 RX ADMIN — FAMOTIDINE 20 MG: 10 INJECTION, SOLUTION INTRAVENOUS at 09:39

## 2023-02-10 RX ADMIN — SODIUM CHLORIDE 250 ML: 9 INJECTION, SOLUTION INTRAVENOUS at 09:35

## 2023-02-10 RX ADMIN — CARBOPLATIN 675 MG: 10 INJECTION INTRAVENOUS at 14:26

## 2023-02-10 RX ADMIN — PEGFILGRASTIM 6 MG: KIT SUBCUTANEOUS at 14:30

## 2023-02-10 ASSESSMENT — PAIN SCALES - GENERAL: PAINLEVEL: NO PAIN (0)

## 2023-02-10 NOTE — LETTER
2/10/2023         RE: Yamel Lindsay  [address] [facility] [address] [facility] [address]        Dear Colleague,    Thank you for referring your patient, Yamel Lindsay, to the [facility] [place]. Please see a copy of my visit note below.    [facility] Hematology and Oncology Progress Note    Patient: Yamel Lindsay  MRN: [id number]  Date of Service: Feb 10, 2023          Reason for Visit    Chief Complaint   Patient presents with     Oncology Clinic Visit     3 week return Right lower lobe lung mass, review labs & Infusion        Assessment and Plan     Cancer Staging   Malignant neoplasm of lower lobe of left lung (H)  Staging form: Lung, AJCC 8th Edition  - Clinical: Stage IA3 (cT1c, cN0, cM0) - Signed by Ronit Grayson APRN CNP on 2/10/2023    Malignant neoplasm of lower lobe of right lung (H)  Staging form: Lung, AJCC 8th Edition  - Clinical: Stage IIIC (cT4, cN3, cM0) - Signed by Ronit Grayson APRN CNP on 2/10/2023      1.  Synchronous lung cancer, right side and left side: Patient has started on palliative chemotherapy with immunotherapy on January 20.  She is here for cycle #2.  She tolerated the first cycle really quite well except for fatigue.  Her labs look good for treatment today.  She will continue at full dose today with cycle 2.  She will get a CT scan and see Dr. Martel for cycle 3.     2. Elevated liver function tests: likely from mild fatty liver. Was present before chemo. These are mild. We will continue to monitor and make adjustments as necessary if worsens with chemo.     3. Anemia: chemo induced and usually cumulative. Overall asymptomatic except fatigue. Continue to monitor.         ECOG Performance    0 - Independent    Distress Screening (within last 30 days)    No data recorded     Pain  Pain Score: No Pain (0)    Problem List    Patient Active Problem List   Diagnosis     Hyperlipidemia LDL goal <130     Primary localized osteoarthrosis, lower leg      Generalized anxiety disorder     Rhinitis, allergic seasonal     Alcoholism (H)     Mild recurrent major depression (H)     Advanced directives, counseling/discussion     Seasonal allergic rhinitis     Hip pain     GERD (gastroesophageal reflux disease)     S/P laparoscopic hernia repair     OA (osteoarthritis) of knee     Mild intermittent asthma without complication     Major depression in complete remission (H)     Benign essential hypertension     Right lower lobe lung mass     Malignant neoplasm of lower lobe of right lung (H)     Panlobular emphysema (H)     Adverse effect of antineoplastic and immunosuppressive drugs, sequela     Malignant neoplasm of lower lobe of left lung (H)        ______________________________________________________________________________    History of Present Illness    Oncology history  DIAGNOSIS:   Lung cancer, right side, squamous cell carcinoma, 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    Lung cancer, left side, adenocarcinoma, T1 N0 M0, stage I, diagnosed December 2022  PD-L1- 2%  K-lillie G12 C mutation found on lung cancer focused NGS panel  Unable to complete oncology fusion NGS panel due to quantity of DNA not sufficient for testing.    TREATMENT: It was felt that her right-sided lung cancer was too extensive for concurrent/sequential chemoradiation so she has started on palliative chemo with Pembro, carboplatin and Taxol.  Today is cycle 2.  First cycle given January 20.    INTERIM HISTORY: Patient is here today for her second cycle of chemo.  She states that the chemotherapy went surprisingly well.  She says she actually is feeling great.  Her appetite is good.  No nausea.  She says she did feel tired and spent a little bit more time in bed than she normally does but that really is the only side effect that she experienced.  She denies any eating difficulties.  She denies  "any new bone or back pain.  Denies any weight loss.    Review of Systems    Pertinent items are noted in HPI.    Past History    Past Medical History:   Diagnosis Date     Allergies      Anxiety      Hyperlipidemia      Hypertension      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     Nicotine dependence      Postmenopausal HRT (hormone replacement therapy) 1994     S/P alcohol detoxification 07/06    Recovered and active in AA       PHYSICAL EXAM  BP (!) 161/86 (BP Location: Left arm, Patient Position: Sitting, Cuff Size: Adult Regular)   Pulse 70   Temp 98  F (36.7  C) (Oral)   Resp 18   Ht 1.594 m (5' 2.75\")   Wt 73 kg (161 lb)   SpO2 95%   BMI 28.75 kg/m      GENERAL: no acute distress. Cooperative in conversation. Here alone. Mask on  RESP: Regular respiratory rate. No expiratory wheezes   MUSCULOSKELETAL: no bilateral leg swelling  NEURO: non focal. Alert and oriented x3.   PSYCH: within normal limits. No depression or anxiety.  SKIN: exposed skin is dry intact.     Lab Results    Recent Results (from the past 168 hour(s))   Comprehensive metabolic panel   Result Value Ref Range    Sodium 140 136 - 145 mmol/L    Potassium 4.5 3.4 - 5.3 mmol/L    Chloride 101 98 - 107 mmol/L    Carbon Dioxide (CO2) 31 (H) 22 - 29 mmol/L    Anion Gap 8 7 - 15 mmol/L    Urea Nitrogen 7.2 (L) 8.0 - 23.0 mg/dL    Creatinine 0.52 0.51 - 0.95 mg/dL    Calcium 8.5 (L) 8.8 - 10.2 mg/dL    Glucose 102 (H) 70 - 99 mg/dL    Alkaline Phosphatase 113 (H) 35 - 104 U/L    AST 19 10 - 35 U/L    ALT 14 10 - 35 U/L    Protein Total 6.6 6.4 - 8.3 g/dL    Albumin 3.6 3.5 - 5.2 g/dL    Bilirubin Total <0.2 <=1.2 mg/dL    GFR Estimate >90 >60 mL/min/1.73m2   TSH with free T4 reflex   Result Value Ref Range    TSH 1.10 0.30 - 4.20 uIU/mL   CBC with platelets and differential   Result Value Ref Range    WBC Count 6.3 4.0 - 11.0 10e3/uL    RBC Count 3.64 (L) 3.80 - 5.20 10e6/uL    Hemoglobin 10.1 (L) 11.7 - 15.7 g/dL    Hematocrit 32.3 (L) " "35.0 - 47.0 %    MCV 89 78 - 100 fL    MCH 27.7 26.5 - 33.0 pg    MCHC 31.3 (L) 31.5 - 36.5 g/dL    RDW 15.0 10.0 - 15.0 %    Platelet Count 378 150 - 450 10e3/uL    % Neutrophils 67 %    % Lymphocytes 17 %    % Monocytes 9 %    % Eosinophils 5 %    % Basophils 1 %    % Immature Granulocytes 1 %    NRBCs per 100 WBC 0 <1 /100    Absolute Neutrophils 4.3 1.6 - 8.3 10e3/uL    Absolute Lymphocytes 1.1 0.8 - 5.3 10e3/uL    Absolute Monocytes 0.5 0.0 - 1.3 10e3/uL    Absolute Eosinophils 0.3 0.0 - 0.7 10e3/uL    Absolute Basophils 0.1 0.0 - 0.2 10e3/uL    Absolute Immature Granulocytes 0.0 <=0.4 10e3/uL    Absolute NRBCs 0.0 10e3/uL       Imaging    XR Chest Port 1 View    Result Date: 2/6/2023  CHEST ONE VIEW PORTABLE   2/6/2023 3:01 PM HISTORY: Status post left subclavian port placement. COMPARISON: 12/28/2022     IMPRESSION: Cardiac silhouette is mildly enlarged. Prominent vertebral vasculature is noted without evidence for edema. Left subclavian port is seen with the tip overlying the distal SVC. No evidence for pneumothorax. No significant bony abnormalities. No effusions. MARLA PATTERSON MD   SYSTEM ID:  [id number]    XR Surgery SUDHEER Fluoro Less Than 5 Min    Result Date: 2/6/2023  This exam was marked as non-reportable because it will not be read by a radiologist or a [place] non-radiologist provider.         Signed by: NAYA Sifuentes CNP    Oncology Rooming Note    February 10, 2023 8:53 AM   Yamel Lindsay is a 77 year old female who presents for:    Chief Complaint   Patient presents with     Oncology Clinic Visit     3 week return Right lower lobe lung mass, review labs & Infusion      Initial Vitals: BP (!) 176/74 (BP Location: Left arm, Patient Position: Sitting, Cuff Size: Adult Regular)   Pulse 70   Temp 98  F (36.7  C) (Oral)   Resp 18   Ht 1.594 m (5' 2.75\")   Wt 73 kg (161 lb)   SpO2 95%   BMI 28.75 kg/m   Estimated body mass index is 28.75 kg/m  as calculated from the following:    " "Height as of this encounter: 1.594 m (5' 2.75\").    Weight as of this encounter: 73 kg (161 lb). Body surface area is 1.8 meters squared.  No Pain (0) Comment: Data Unavailable   No LMP recorded. Patient is postmenopausal.  Allergies reviewed: Yes  Medications reviewed: Yes    Medications: Medication refills not needed today.  Pharmacy name entered into [facility]:      [id number] - [place] [address] [address] AT [place] [address] & [facility]    Clinical concerns: 3 week return Right lower lobe lung mass, review labs & Infusion       Latonia Lizarraga CMA                Again, thank you for allowing me to participate in the care of your patient.        Sincerely,        NAYA Sifuentes CNP    "

## 2023-02-10 NOTE — PROGRESS NOTES
Infusion Nursing Note:  Yamel Lindsay presents today for cycle 2 day 1 pembrolizumab/paclitaxel/carboplatin.    Patient seen by provider today: Yes: Ronit Grayson CNP   present during visit today: Not Applicable.    Note: Yamel arrived ambulatory and in stable condition accompanied by her granddaughter. Reports that first cycle went very well for her, and she is feeling well. Port was accessed using sterile technique, brisk blood return noted, and labs were collected. She was premedicated, and treatment was administered per orders. Tolerated infusions without issue. Neulasta OnPro was applied to left abdomen at 1430. Patient was instructed that injection would start around 1730 on 2/11 and she would be able to remove applicator around 1815. Port de-accessed and site covered with a band-aid. Will return on 3/3 for next appointment.    Intravenous Access:  Labs drawn without difficulty.  Implanted Port.    Treatment Conditions:  Lab Results   Component Value Date    HGB 10.1 (L) 02/10/2023    WBC 6.3 02/10/2023    ANEU 4.3 01/26/2015    ANEUTAUTO 4.3 02/10/2023     02/10/2023      Lab Results   Component Value Date     02/10/2023    POTASSIUM 4.5 02/10/2023    CR 0.52 02/10/2023    DEEPIKA 8.5 (L) 02/10/2023    BILITOTAL <0.2 02/10/2023    ALBUMIN 3.6 02/10/2023    ALT 14 02/10/2023    AST 19 02/10/2023     Results reviewed, labs MET treatment parameters, ok to proceed with treatment.    Post Infusion Assessment:  Patient tolerated infusion without incident.  Blood return noted pre and post infusion.  Site patent and intact, free from redness, edema or discomfort.  No evidence of extravasations.  Access discontinued per protocol.     Discharge Plan:   Patient and/or family verbalized understanding of discharge instructions and all questions answered.  Copy of AVS reviewed with patient and/or family.  Patient will return 3/3 for next appointment.  Patient discharged in stable condition accompanied  by: granddaughter.  Departure Mode: Ambulatory.      Linda Corral RN

## 2023-02-10 NOTE — PROGRESS NOTES
"Oncology Rooming Note    February 10, 2023 8:53 AM   Yamel Lindsay is a 77 year old female who presents for:    Chief Complaint   Patient presents with     Oncology Clinic Visit     3 week return Right lower lobe lung mass, review labs & Infusion      Initial Vitals: BP (!) 176/74 (BP Location: Left arm, Patient Position: Sitting, Cuff Size: Adult Regular)   Pulse 70   Temp 98  F (36.7  C) (Oral)   Resp 18   Ht 1.594 m (5' 2.75\")   Wt 73 kg (161 lb)   SpO2 95%   BMI 28.75 kg/m   Estimated body mass index is 28.75 kg/m  as calculated from the following:    Height as of this encounter: 1.594 m (5' 2.75\").    Weight as of this encounter: 73 kg (161 lb). Body surface area is 1.8 meters squared.  No Pain (0) Comment: Data Unavailable   No LMP recorded. Patient is postmenopausal.  Allergies reviewed: Yes  Medications reviewed: Yes    Medications: Medication refills not needed today.  Pharmacy name entered into Contractually:      [id number] - [address] & [facility]    Clinical concerns: 3 week return Right lower lobe lung mass, review labs & Infusion       Latonia Lizarraga CMA            "

## 2023-02-10 NOTE — PROGRESS NOTES
[facility] Hematology and Oncology Progress Note    Patient: Yamel Lindsay  MRN: [id number]  Date of Service: Feb 10, 2023          Reason for Visit    Chief Complaint   Patient presents with     Oncology Clinic Visit     3 week return Right lower lobe lung mass, review labs & Infusion        Assessment and Plan     Cancer Staging   Malignant neoplasm of lower lobe of left lung (H)  Staging form: Lung, AJCC 8th Edition  - Clinical: Stage IA3 (cT1c, cN0, cM0) - Signed by Ronit Grayson APRN CNP on 2/10/2023    Malignant neoplasm of lower lobe of right lung (H)  Staging form: Lung, AJCC 8th Edition  - Clinical: Stage IIIC (cT4, cN3, cM0) - Signed by Ronit Grayson APRN CNP on 2/10/2023      1.  Synchronous lung cancer, right side and left side: Patient has started on palliative chemotherapy with immunotherapy on January 20.  She is here for cycle #2.  She tolerated the first cycle really quite well except for fatigue.  Her labs look good for treatment today.  She will continue at full dose today with cycle 2.  She will get a CT scan and see Dr. Martel for cycle 3.     2. Elevated liver function tests: likely from mild fatty liver. Was present before chemo. These are mild. We will continue to monitor and make adjustments as necessary if worsens with chemo.     3. Anemia: chemo induced and usually cumulative. Overall asymptomatic except fatigue. Continue to monitor.         ECOG Performance    0 - Independent    Distress Screening (within last 30 days)    No data recorded     Pain  Pain Score: No Pain (0)    Problem List    Patient Active Problem List   Diagnosis     Hyperlipidemia LDL goal <130     Primary localized osteoarthrosis, lower leg     Generalized anxiety disorder     Rhinitis, allergic seasonal     Alcoholism (H)     Mild recurrent major depression (H)     Advanced directives, counseling/discussion     Seasonal allergic rhinitis     Hip pain     GERD (gastroesophageal reflux disease)      S/P laparoscopic hernia repair     OA (osteoarthritis) of knee     Mild intermittent asthma without complication     Major depression in complete remission (H)     Benign essential hypertension     Right lower lobe lung mass     Malignant neoplasm of lower lobe of right lung (H)     Panlobular emphysema (H)     Adverse effect of antineoplastic and immunosuppressive drugs, sequela     Malignant neoplasm of lower lobe of left lung (H)        ______________________________________________________________________________    History of Present Illness    Oncology history  DIAGNOSIS:   Lung cancer, right side, squamous cell carcinoma, 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    Lung cancer, left side, adenocarcinoma, T1 N0 M0, stage I, diagnosed December 2022  PD-L1- 2%  K-lillie G12 C mutation found on lung cancer focused NGS panel  Unable to complete oncology fusion NGS panel due to quantity of DNA not sufficient for testing.    TREATMENT: It was felt that her right-sided lung cancer was too extensive for concurrent/sequential chemoradiation so she has started on palliative chemo with Pembro, carboplatin and Taxol.  Today is cycle 2.  First cycle given January 20.    INTERIM HISTORY: Patient is here today for her second cycle of chemo.  She states that the chemotherapy went surprisingly well.  She says she actually is feeling great.  Her appetite is good.  No nausea.  She says she did feel tired and spent a little bit more time in bed than she normally does but that really is the only side effect that she experienced.  She denies any eating difficulties.  She denies any new bone or back pain.  Denies any weight loss.    Review of Systems    Pertinent items are noted in HPI.    Past History    Past Medical History:   Diagnosis Date     Allergies      Anxiety      Hyperlipidemia      Hypertension      Mild recurrent major  "depression (H) 11/11/2010     Nicotine dependence      Postmenopausal HRT (hormone replacement therapy) 1994     S/P alcohol detoxification 07/06    Recovered and active in AA       PHYSICAL EXAM  BP (!) 161/86 (BP Location: Left arm, Patient Position: Sitting, Cuff Size: Adult Regular)   Pulse 70   Temp 98  F (36.7  C) (Oral)   Resp 18   Ht 1.594 m (5' 2.75\")   Wt 73 kg (161 lb)   SpO2 95%   BMI 28.75 kg/m      GENERAL: no acute distress. Cooperative in conversation. Here alone. Mask on  RESP: Regular respiratory rate. No expiratory wheezes   MUSCULOSKELETAL: no bilateral leg swelling  NEURO: non focal. Alert and oriented x3.   PSYCH: within normal limits. No depression or anxiety.  SKIN: exposed skin is dry intact.     Lab Results    Recent Results (from the past 168 hour(s))   Comprehensive metabolic panel   Result Value Ref Range    Sodium 140 136 - 145 mmol/L    Potassium 4.5 3.4 - 5.3 mmol/L    Chloride 101 98 - 107 mmol/L    Carbon Dioxide (CO2) 31 (H) 22 - 29 mmol/L    Anion Gap 8 7 - 15 mmol/L    Urea Nitrogen 7.2 (L) 8.0 - 23.0 mg/dL    Creatinine 0.52 0.51 - 0.95 mg/dL    Calcium 8.5 (L) 8.8 - 10.2 mg/dL    Glucose 102 (H) 70 - 99 mg/dL    Alkaline Phosphatase 113 (H) 35 - 104 U/L    AST 19 10 - 35 U/L    ALT 14 10 - 35 U/L    Protein Total 6.6 6.4 - 8.3 g/dL    Albumin 3.6 3.5 - 5.2 g/dL    Bilirubin Total <0.2 <=1.2 mg/dL    GFR Estimate >90 >60 mL/min/1.73m2   TSH with free T4 reflex   Result Value Ref Range    TSH 1.10 0.30 - 4.20 uIU/mL   CBC with platelets and differential   Result Value Ref Range    WBC Count 6.3 4.0 - 11.0 10e3/uL    RBC Count 3.64 (L) 3.80 - 5.20 10e6/uL    Hemoglobin 10.1 (L) 11.7 - 15.7 g/dL    Hematocrit 32.3 (L) 35.0 - 47.0 %    MCV 89 78 - 100 fL    MCH 27.7 26.5 - 33.0 pg    MCHC 31.3 (L) 31.5 - 36.5 g/dL    RDW 15.0 10.0 - 15.0 %    Platelet Count 378 150 - 450 10e3/uL    % Neutrophils 67 %    % Lymphocytes 17 %    % Monocytes 9 %    % Eosinophils 5 %    % Basophils " 1 %    % Immature Granulocytes 1 %    NRBCs per 100 WBC 0 <1 /100    Absolute Neutrophils 4.3 1.6 - 8.3 10e3/uL    Absolute Lymphocytes 1.1 0.8 - 5.3 10e3/uL    Absolute Monocytes 0.5 0.0 - 1.3 10e3/uL    Absolute Eosinophils 0.3 0.0 - 0.7 10e3/uL    Absolute Basophils 0.1 0.0 - 0.2 10e3/uL    Absolute Immature Granulocytes 0.0 <=0.4 10e3/uL    Absolute NRBCs 0.0 10e3/uL       Imaging    XR Chest Port 1 View    Result Date: 2/6/2023  CHEST ONE VIEW PORTABLE   2/6/2023 3:01 PM HISTORY: Status post left subclavian port placement. COMPARISON: 12/28/2022     IMPRESSION: Cardiac silhouette is mildly enlarged. Prominent vertebral vasculature is noted without evidence for edema. Left subclavian port is seen with the tip overlying the distal SVC. No evidence for pneumothorax. No significant bony abnormalities. No effusions. MARLA PATTERSON MD   SYSTEM ID:  [id number]    XR Surgery SUDHEER Fluoro Less Than 5 Min    Result Date: 2/6/2023  This exam was marked as non-reportable because it will not be read by a radiologist or a [place] non-radiologist provider.         Signed by: NAYA Sifuentes CNP

## 2023-02-13 ENCOUNTER — PATIENT OUTREACH (OUTPATIENT)
Dept: CARE COORDINATION | Facility: CLINIC | Age: 78
End: 2023-02-13
Payer: COMMERCIAL

## 2023-02-13 NOTE — PROGRESS NOTES
Oncology Distress Screening Follow-up  Clinical Social Work  [facility]    Identified Concern and Score From Distress Screening:   How concerned do you feel regarding the following common issues people with cancer face. Please answer with a number from 0-10 where 0=not concerned and 10=very concerned. PT response of >=8  will result in outreach from Support Team.   1. How concerned are you about your ability to eat?  0       2. How concerned are you about unintended weight loss or your current weight?  0       3. How concerned are you about feeling depressed or very sad?  3       4. How concerned are you about feeling anxious or very scared?  3       5. Do you struggle with the loss of meaning and mikki in your life?  Not at all       6. How concerned are you about work and home life issues that may be affected by your cancer?  0       7. How concerned are you about knowing what resources are available to help you?  0       8. Do you currently have what you would describe as Alevism or spiritual struggles?             Not at all       You can also ask to be contacted by one of our Oncology Supportive Care professionals.    9. If you want to be contacted by one of our professionals, I can send a message to them right now.  Oncology Social Worker         Date of Distress Screenin/10/23      Data: At time of last visit, Yamel requested call from RAY. She did not score positive on distress screen.    Intervention/Education Provided: RAY called Yamel to introduce self and inquire about needs. Left voicemail with contact information and invited her to call back when able.       Follow-up Required:  will remain available as needed.    YAMILE Valdez, [facility]  Adult Oncology Clinics  [place] (M,W), [place] (T) and [facility] ()  *I am off Friday  Office: [phone]

## 2023-02-15 ENCOUNTER — NURSE TRIAGE (OUTPATIENT)
Dept: ONCOLOGY | Facility: CLINIC | Age: 78
End: 2023-02-15
Payer: COMMERCIAL

## 2023-02-15 DIAGNOSIS — M79.669 LOWER LEG PAIN: Primary | ICD-10-CM

## 2023-02-15 NOTE — TELEPHONE ENCOUNTER
"Nurse Triage SBAR    Situation: Bilateral leg pain x 3 days    Background:    DX: Lung cancer  Provider: Dr. Martel  Most recent appointment: 1/18/23 with Dr. Martel  Upcoming appointments: 3/1/23 with Dr. Martel  Most recent treatment: 2/10/23: cycle 2 day 1 pembrolizumab/paclitaxel/carboplatin.   Recent hospitalizations 2/6/23 for port placement    Assessment:   Pt's daughter is calling because her mom is having a lot of leg pain since chemo on 2/10.   Pain started Sunday; having difficulty walking--she has slowed down; Needed to be driven home on Sunday.   Describes as increasingly painful, deep muscle ache, stronger than restless leg  Pain is at rest and with activity.  No redness, warmth or swelling anywhere  Not taking any medications for pain  Has not used heat  Is not currently taking Claritin but does have some seasonal allergies that she will take an antihistamine for.     Having problems with \"chemo fog\"    Oral food/fluid intake is good. No N/V.  No problems with bowel or bladder function.  No other pain.  No fever, chills, sob, chest pain    Prior to dx and start of chemo, she had palpitations and night sweats. These are gone.     Recommendation:   Informed caller that this writer will route message to Dr. Martel for his recommendations regarding pain.  Advised pt to start taking Tylenol 1000 mg q8h.  Elevate legs when at rest.  Use heat on legs for comfort.   Continue to push fluids and eat nutritional meals.     Routed to Dr. Martel and Matheus Barragan, RNCC  "

## 2023-02-16 NOTE — TELEPHONE ENCOUNTER
1447: Checked in with RNCC regarding any further recommendations from Dr. Martel. Has not received any so far; current recommendations are fine and he will watch for response from Dr. Martel which he expects later this afternoon.

## 2023-02-16 NOTE — TELEPHONE ENCOUNTER
"Call made to Sivakumar to obtain further information about pain.  Per Sivakumar, pain is in bilateral legs, from 6 inches above knee to feet; not a \"pins and needles\" sensation/pain. Just feels like a deep muscle pain. Pain is not in th plantar region of foot.  Pt was doing well last night using tylenol and heat, but did not feel well enough to go out which she usually does stay active. Interventions helping, not resolving.    Message routed to Dr. Martel and Matheus Barragan, RNCC    "

## 2023-02-17 DIAGNOSIS — C34.31 MALIGNANT NEOPLASM OF LOWER LOBE OF RIGHT LUNG (H): ICD-10-CM

## 2023-02-17 NOTE — TELEPHONE ENCOUNTER
Ondansetron 8 mg tab    Last prescribing provider: Dr. Martel on 1/18/23.    Last clinic visit date:     Recommendations for requested medication (if none, N/A): n/a    Routed to provider.

## 2023-02-17 NOTE — TELEPHONE ENCOUNTER
"Oncology Nurse Triage - Edema/DVT/Leg pain      Situation: Shayna (dtr) calling concern for deep pain located six inches above knee (on the thigh) in both legs    Background:   Has patient on active treatment: (if yes, when and drug): Yes:     Recent surgery: No    Recent hospitalization: NO    Does patient take anticoagulation medication: NO    Hx of heart palpitations    Assessment of Symptoms:  Onset of symptoms: started on Sunday 2/12    Duration of symptoms:1 week  Interventions recommended on 2/15/23, has helped alleviate some of the pain to help pt sleep at night, but pt still experiencing pain in both legs.   Pain radiates down to leg making feet feel numb.     Symptoms Evaluation:   Denies swelling, redness, warmth to touch.  \"legs visibly appear normal\".     History of DVT or PE: NO    Family history of clotting disorder: YES , pt's father did have a heart attack, but no other known clotting disorders    Has patient had long period of being inactive recently (travel): YES  Since Friday 2/10/23, resting more than usual.     This writer educated to seek care immediately if pt tarts   o Chest pain  o Shortness of breath  o Tachycardia   o Hemoptysis  o Headache  o Fever  o Facial numbness, drooping face.     [facility]   Recommendations:   1110 Paged Dr. Martel  1349 2nd page Dr. Martel  6170 per Dr. Martel, pt is at low risk for blood closk but is okay with doing US  Lower Extremity Venous Duplex Bilateral to rule it out.   Then pending US results, for next week plan would be to follow up with CT Scan and CHEMO visit.     3912 This writer spoke to Yamel (pt) and Daughter Sivakumar who verbalized understanding of plan. Did not think it was blood clot, In agreement with US then pending results if need CHEMO visit next week and add any additional scan to Monday 2/27/23 CT scan appt at [facility].     2280 When entering the US order, it failed diagnosis coverage with warning of costs " being $1000+ , so after further discussion with Yamel/Sivakumar, who reconfirmed no swelling, redness, discoloration, no warmth  no pain with flexing of feet agree to not do US and have in-person CHEMO visit next to to further assess pain.   Pt/Dtr verbalized understanding to self-monitor for any additional symptoms of possible blood clot and know to seek immediate care as discussed earlier.     1510 update sent to Dr. Martel/Care team.     1514 Scheduling request sent  1530 Dr. Martel aware and in agreement with update to the plan for In-Person CHEMO visit next week.

## 2023-02-19 RX ORDER — ONDANSETRON 8 MG/1
8 TABLET, FILM COATED ORAL EVERY 8 HOURS PRN
Qty: 30 TABLET | Refills: 2 | Status: SHIPPED | OUTPATIENT
Start: 2023-02-19 | End: 2024-04-16

## 2023-02-20 ENCOUNTER — PATIENT OUTREACH (OUTPATIENT)
Dept: ONCOLOGY | Facility: CLINIC | Age: 78
End: 2023-02-20
Payer: COMMERCIAL

## 2023-02-20 NOTE — PROGRESS NOTES
Returned Raeann's VM Re; an order for a walker and to see how Pt's legs were doing. No answer, left VM. Reached Pt.    Pt reports legs are very painful today, attributes this to arthritis and weather change. Notes that leg pain used to increase with barometric changes, but that the levels of pain reached are higher/more intense. Since beginning Tx. Would be interested in discussing a walker or mobility aid with provider tomorrow. Messaged Provider.    Pt also notes SW reached out but she accidentally deleted the message, would like them to call her back. Messaged RAY.

## 2023-02-21 ENCOUNTER — PATIENT OUTREACH (OUTPATIENT)
Dept: ONCOLOGY | Facility: CLINIC | Age: 78
End: 2023-02-21
Payer: COMMERCIAL

## 2023-02-21 DIAGNOSIS — C34.31 MALIGNANT NEOPLASM OF LOWER LOBE OF RIGHT LUNG (H): Primary | ICD-10-CM

## 2023-02-21 DIAGNOSIS — C34.32 MALIGNANT NEOPLASM OF LOWER LOBE OF LEFT LUNG (H): ICD-10-CM

## 2023-02-21 DIAGNOSIS — M17.10 PRIMARY LOCALIZED OSTEOARTHROSIS, LOWER LEG: ICD-10-CM

## 2023-02-21 NOTE — PROGRESS NOTES
"Called Pt to relay, Per Marianne JOHN, that visit may not be needed today given condition and intense weather reports. We can certainly order a walker, but A PT referral may be more appropriate.   No answer Left message on machine to call back. Same for Pt's daughter Raeann.    Spoke with Pt, goal for today is pain management. Per Marianne we can refer to palliative and PT, and are happy to do so. Complicating the issue of a Rx are notes regarding this pain that extends well before becoming an Onc Pt. Also, last notes with Dr Fraser 1/4/23 state \"We came to the consensus that she will be weaning of the tramadol and there will be no more refills on the tramadol.\"    This is likely a more nuanced pain issue that is not cancer related, and given prior notes our PA would not be able to fill an Rx beyond tylenol or ibuprofen.     Discussed with Raeann, ok to cancel and issue PT and palliative referral.  "

## 2023-02-27 ENCOUNTER — LAB (OUTPATIENT)
Dept: INFUSION THERAPY | Facility: HOSPITAL | Age: 78
End: 2023-02-27
Attending: STUDENT IN AN ORGANIZED HEALTH CARE EDUCATION/TRAINING PROGRAM
Payer: COMMERCIAL

## 2023-02-27 ENCOUNTER — HOSPITAL ENCOUNTER (OUTPATIENT)
Dept: CT IMAGING | Facility: HOSPITAL | Age: 78
Discharge: HOME | End: 2023-02-27
Attending: STUDENT IN AN ORGANIZED HEALTH CARE EDUCATION/TRAINING PROGRAM
Payer: COMMERCIAL

## 2023-02-27 DIAGNOSIS — C34.31 MALIGNANT NEOPLASM OF LOWER LOBE OF RIGHT LUNG (H): Primary | ICD-10-CM

## 2023-02-27 DIAGNOSIS — C34.31 MALIGNANT NEOPLASM OF LOWER LOBE OF RIGHT LUNG (H): ICD-10-CM

## 2023-02-27 DIAGNOSIS — C34.32 MALIGNANT NEOPLASM OF LOWER LOBE OF LEFT LUNG (H): ICD-10-CM

## 2023-02-27 LAB
CRP SERPL-MCNC: 12.2 MG/L
ERYTHROCYTE [SEDIMENTATION RATE] IN BLOOD BY WESTERGREN METHOD: 54 MM/HR (ref 0–20)

## 2023-02-27 PROCEDURE — 36591 DRAW BLOOD OFF VENOUS DEVICE: CPT

## 2023-02-27 PROCEDURE — 86140 C-REACTIVE PROTEIN: CPT

## 2023-02-27 PROCEDURE — 74177 CT ABD & PELVIS W/CONTRAST: CPT

## 2023-02-27 PROCEDURE — 85652 RBC SED RATE AUTOMATED: CPT

## 2023-02-27 PROCEDURE — 250N000011 HC RX IP 250 OP 636: Performed by: STUDENT IN AN ORGANIZED HEALTH CARE EDUCATION/TRAINING PROGRAM

## 2023-02-27 RX ORDER — HEPARIN SODIUM (PORCINE) LOCK FLUSH IV SOLN 100 UNIT/ML 100 UNIT/ML
5 SOLUTION INTRAVENOUS
Status: DISCONTINUED | OUTPATIENT
Start: 2023-02-27 | End: 2023-02-27 | Stop reason: HOSPADM

## 2023-02-27 RX ORDER — IOPAMIDOL 755 MG/ML
100 INJECTION, SOLUTION INTRAVASCULAR ONCE
Status: COMPLETED | OUTPATIENT
Start: 2023-02-27 | End: 2023-02-27

## 2023-02-27 RX ORDER — HEPARIN SODIUM (PORCINE) LOCK FLUSH IV SOLN 100 UNIT/ML 100 UNIT/ML
5 SOLUTION INTRAVENOUS
Status: CANCELLED | OUTPATIENT
Start: 2023-02-27

## 2023-02-27 RX ADMIN — IOPAMIDOL 100 ML: 755 INJECTION, SOLUTION INTRAVENOUS at 13:18

## 2023-02-27 RX ADMIN — Medication 5 ML: at 13:25

## 2023-02-27 NOTE — PROGRESS NOTES
MEDICAL ONCOLOGY FOLLOW UP NOTE    PATIENT NAME: Yamel Lindsay  ENCOUNTER DATE: 3/1/2023    Care Team  Primary Oncologist:  Chaz Martel MD  Surgery: TBD  Radiation oncology: TBD    REASON FOR CURRENT VISIT: F/u of lung cancer    HISTORY OF PRESENT ILLNESS:  Ms. Yamel Lindsay is a 77 year old  female with PMHx of for HTN, HLD, anxiety, who is here for followup of lung cancer      Oncologic Hx:    Diagnosis:   Synchronous Rt LL Squamous cell carcinoma [id number] Stage IIIC (AJCC 8th edition) diagnosed 12/2022  PD-L1 <1%  NGS- [facility] panel- Neg (High TMB 16.7 mut/MB)    Left LL adenocarcinoma [id number] Stage (AJCC 8th edition) diagnosed 12/2022  PD-L1-2%  NGS-KRAS G12C, TMB 2.4    Treatment:   1/20/22- Pembrolizumab+carboplatin+paclitaxel    Intent of treatment: Palliative    Oncologic course:  One month history of increasing dyspnea with palpitations, propting ER visit 11/19.  11/19/22- CT- 1 cm right lower lobe mass abutting multiple central bronchovascular and mediastinal structures, consistent with malignancy.2.2 cm spiculated nodule in the left lower lobe, also consistent with malignancy.   12/6/22- PET/CT-  Right lower lobe lung mass abutting the pleura (SUV max of 24), measuring 8 x 5.2 cm. There is associated complete obliteration of right lower lobe bronchus and loss of fat planes with  the left atrium. Spiculated left lower lobe solid lung mass (SUV 17.9), measuring 2.2 x 2 cm. There are several markedly hypermetabolic, irregular solid nodules in the right middle lobe abutting the pleura (approximately 10-12), as for example 1.2 cm nodule with SUV max of 16.8 (4/126). There is a separate markedly hypermetabolic 1 cm nodule in the right upper lobe. Mild hypermetabolic uptake is noted within a1 x 0.9 cm right paratracheal node ( SUV max of 4.2) (4/120), Mild uptake is also  noted within a subcentimeter lower paraesophageal node (4/154).   12/28/22- EBUS with deblking in the right hilar mass (no mediastinal staging  performed)- - LUNG, RIGHT MAINSTEM, ENDOBRONCHIAL BIOPSY: Squamous cell carcinoma with extensive necrosis (positive for p40 and negative for TTF-1). Left LL FNAC- Positive for malignancy- Adenocarcinoma  (diffusely positive for TTF-1 while negative for p40)  1/3/23: Brain MRI - Neg  1/10/23: Tumor board discussion: Too extensive for concurrent/sequential chemo XRT- plan for palliative intent chemo-IO  1/20/23- C1 Pembrolizumab+carboplatin+paclitaxel  2/10/23-C2 Pembrolizumab+carboplatin+paclitaxel  2/27/23- CT CAP- Decreased size of the hypoattenuating mass in the central right lower lobe and small irregularly-shaped nodules in the right upper lobe consistent with positive treatment response. There is persistent postobstructive right lower lobe atelectasis.Unchanged heterogeneous lobulated and spiculated solid nodule in the lateral basal left lower lobe. No findings to suggest new extrapulmonary metastatic disease.    Interval Hx:  Yamel is here prior to cycle 3 to discuss CT scan results.  She has no new symptoms.   She continues to have exertional SOB, but can continue to walk after 4-5 blocks  Most likley can do 1-2 stairs of steps wo SOB  She does have morning cough, occasional expectoration  No hemopthysis  No chest pain, wheezing  She is on advair BID and albuterol prn  No recent wt loss  Appetite is ok  No headaches, vision, problems, no focal neurologic deficits  Noraml bladder and bowel movements    Indpendent of ADL and IADL, ambulatory at home  ECOG PS 1  She is sleeping a lot more, and also feels more tired    Lives in [place] [facility], by herself,  since 1984  Has 2 daughter  Raeann and dudley, she was here with Dudley today  She was working for [employer] job for 30 yrs    REVIEW OF SYSTEMS: 14 point ROS negative other than the symptoms noted above in the HPI.    Wt Readings from Last 4 Encounters:   02/10/23 73 kg (161 lb)   02/06/23 72.1 kg (159 lb)   02/02/23 72.1 kg (159 lb)    02/02/23 71.7 kg (158 lb)              Review of Systems:  A comprehensive ROS was performed and found to be negative or non-contributory with the exception of that noted in the HPI above.    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     Nicotine dependence      Postmenopausal HRT (hormone replacement therapy) 1994     S/P alcohol detoxification 07/06    Recovered and active in AA       Past Surgical History:  Past Surgical History:   Procedure Laterality Date     BRONCHOSCOPY, WITH BIOPSY, ROBOT ASSISTED N/A 12/28/2022    Procedure: BRONCHOSCOPY, USING ION OPTICAL TRACKING SYSTEM, FINE NEEDLE ASPIRATION;  Surgeon: Alma Almaguer MD;  Location: [facility] OR     ENDOBRONCHIAL ULTRASOUND FLEXIBLE N/A 12/28/2022    Procedure: endobronchial biopsy and tumor debulking with cryoprobe;  Surgeon: Alma Almaguer MD;  Location:  OR     ESOPHAGOSCOPY, GASTROSCOPY, DUODENOSCOPY (EGD), COMBINED N/A 1/5/2015    Procedure: COMBINED ESOPHAGOSCOPY, GASTROSCOPY, DUODENOSCOPY (EGD), BIOPSY SINGLE OR MULTIPLE;  Surgeon: Dylan Alejandra MD;  Location: [facility]     ESOPHAGOSCOPY, GASTROSCOPY, DUODENOSCOPY (EGD), COMBINED N/A 5/25/2018    Procedure: COMBINED ESOPHAGOSCOPY, GASTROSCOPY, DUODENOSCOPY (EGD), BIOPSY SINGLE OR MULTIPLE;  gastroscopy;  Surgeon: Alexander Bautista MD;  Location: [facility]     INSERT PORT VASCULAR ACCESS Left 2/6/2023    Procedure: INSERTION, VASCULAR ACCESS PORT left;  Surgeon: Scot Valadez DO;  Location: [facility] OR     LAPAROSCOPIC HERNIORRHAPHY INGUINAL Right 1/26/2015    Procedure: LAPAROSCOPIC HERNIORRHAPHY INGUINAL;  Surgeon: Favio Olsen MD;  Location: [facility] OR     TUBAL LIGATION         Social History:  Social History     Socioeconomic History     Marital status: Single   Tobacco Use     Smoking status: Former     Packs/day: 0.50     Types: Cigarettes      Quit date: 2011     Years since quittin.7     Smokeless tobacco: Never   Vaping Use     Vaping Use: Never used   Substance and Sexual Activity     Alcohol use: No     Drug use: No     Sexual activity: Not Currently   Other Topics Concern     Parent/sibling w/ CABG, MI or angioplasty before 65F 55M? No    2 pack per day for 40 yrs    Family History  Family History   Problem Relation Age of Onset     Cancer Mother      C.A.D. Father      Lipids Father      Hypertension Father      Hypertension Brother      Cancer Brother         esophageal cancer     Breast Cancer Other      Prostate Cancer Brother      Psychotic Disorder Daughter      Diabetes No family hx of      Cerebrovascular Disease No family hx of      Cancer - colorectal No family hx of    Mother had lung cancer  Older brother esophageal cancer  ANother brother prostate cancer, throat  2 brothers      Outpatient Medications:  albuterol (PROAIR HFA/PROVENTIL HFA/VENTOLIN HFA) 108 (90 Base) MCG/ACT inhaler, Inhale 2 puffs into the lungs every 6 hours as needed for shortness of breath / dyspnea or wheezing  atorvastatin (LIPITOR) 20 MG tablet, Take 1 tablet (20 mg) by mouth daily  Cholecalciferol (VITAMIN D-3 PO), Take 2,000 Units by mouth daily   clonazePAM (KLONOPIN) 0.5 MG tablet, Take 0.5-1 tablets (0.25-0.5 mg) by mouth 2 times daily as needed for anxiety  dexamethasone (DECADRON) 4 MG tablet, Take 2 tablets (8 mg) by mouth daily Take for 3 days, starting the day after chemo. Take with food. (Patient not taking: Reported on 2/10/2023)  FLUoxetine (PROZAC) 20 MG capsule, Take 2 capsules (40 mg) by mouth daily  fluticasone-salmeterol (ADVAIR HFA) 115-21 MCG/ACT inhaler, Inhale 2 puffs into the lungs 2 times daily  ibuprofen (ADVIL,MOTRIN) 200 MG tablet, Take 200-600 mg by mouth every 2 hours as needed for mild pain  ipratropium - albuterol 0.5 mg/2.5 mg/3 mL (DUONEB) 0.5-2.5 (3) MG/3ML neb solution, Take 1 vial (3 mLs) by nebulization every 6 hours  as needed for shortness of breath or wheezing  omeprazole (PRILOSEC) 20 MG DR capsule, Take 1 capsule (20 mg) by mouth daily 30-60 minutes before a meal.  ondansetron (ZOFRAN) 8 MG tablet, Take 1 tablet (8 mg) by mouth every 8 hours as needed for nausea (vomiting)  polyethylene glycol (MIRALAX) 17 g packet, Take 1 packet by mouth daily Taking PRN  prochlorperazine (COMPAZINE) 10 MG tablet, Take 0.5 tablets (5 mg) by mouth every 6 hours as needed for nausea or vomiting  tiZANidine (ZANAFLEX) 2 MG tablet,   traMADol (ULTRAM) 50 MG tablet, Take 1 tablet (50 mg) by mouth every 6 hours as needed for severe pain (7-10) COVERING FOR PCP ONLY ONCE  traZODone (DESYREL) 50 MG tablet, TAKE 2 TABLETS(100 MG) BY MOUTH AT BEDTIME    heparin 100 UNIT/ML injection 5 mL  [COMPLETED] iopamidol (ISOVUE-370) solution 100 mL  sodium chloride (PF) 0.9% PF flush 3-20 mL         Physical Exam:    Blood pressure (!) 147/85, pulse 96, temperature 98  F (36.7  C), temperature source Oral, weight 72.9 kg (160 lb 11.2 oz), SpO2 96 %, not currently breastfeeding.  General: alert and cooperative, lying in bed, no acute distress  HEENT: sclera anicteric, EOMI, MMM  Neck: supple, normal ROM  CV: RRR, no murmurs  Resp: CTAB, normal respiratory effort on ambient air  GI: soft, non-tender, non-distended, bowel sounds present and normoactive  MSK: warm and well-perfused, normal tone  Skin: no rashes on limited exam, no jaundice  Neuro: Alert and interactive, moves all extremities equally, no focal deficits    Labs & Studies: I personally reviewed the following studies:    Recent Labs   Lab Test 11/18/22  2205 01/26/15  1445   WBC 7.4 6.9   RBC 4.06 4.21   HGB 11.6* 12.9   HCT 34.7* 37.8   MCV 86 90   MCH 28.6 30.6   MCHC 33.4 34.1   RDW 13.2 11.9    300   NEUTROPHIL 66 62.7     Recent Labs   Lab Test 11/18/22 2205 09/27/22  0842 03/03/21  1442    133* 140   POTASSIUM 3.2* 3.9 3.9   CHLORIDE 99 96* 103   CO2 30* 26 31   ANIONGAP 9 11 6    * 115* 85   BUN 10.2 10.7 10   CR 0.50* 0.59 0.60   DEEPIKA 10.0 9.2 9.6     Recent Labs   Lab Test 11/18/22  2205 09/27/22  0842 03/03/21  1442   PROTTOTAL 7.0 6.6 7.1   ALBUMIN 3.8 3.7 3.3*   BILITOTAL <0.2 0.2 0.2   ALKPHOS 128* 116* 179*   AST 20 24 13   ALT 14 13 18       ASSESSMENT AND PLAN:    Ms. Yamel Lindsay is a 77 year old  female with PMHx of for HTN, HLD, anxiety, who is here for evaluation/followup of new lung cancer    Synchronous Rt LL Squamous cell carcinoma [id number] Stage IIIC (AJCC 8th edition) diagnosed 12/2022  PD-L1 <1%  NGS- [facility] panel- Neg (High TMB 16.7 mut/MB)    Left LL adenocarcinoma [id number] Stage (AJCC 8th edition) diagnosed 12/2022  PD-L1-2%  NGS-KRAS G12C, TMB 2.4    Pt with 2 synchronous cancers.  The more urgent and threatening one is the right lower lobe squamous cell cancer which appears to be at least stage IIIC given the size greater than 7 cm suggestive of T4 disease.  She also has mediastinal lymph node that were enlarged and appear pathologic but have not yet been biopsied.  She is also has several FDG avid nodules in the right middle and the right upper lobe that have not yet been biopsied.  She underwent an EBUS for debulking and her mediastinum has not been staged.  Given the extent of involvement of the right sided cancer, concurrent/sequential chemoradiation not feasible per tumor board discussion, also no further EBUS required since high probability of cancer. Will proceed with palliative intent chemoimmunotherapy with pembro+carbo+taxol.  Of note, squamous has high TMB and low neg PD-L1.  Adenocarcinoma has KRAS G12C.   CT after 2 cycles showing good response in the rt SCC. We will do 2 more cycles and restage and then drop chemo. In the future if she has a good response to chemo-IO may consider consoldtive XRT.     Plan  Ok for infusion on 3/3   US doppler of LE ASAP   Arrange for future infusions   RTC with CHEMO in 1 week ( for follow up of DVT and possible  steroid initiation)  RTC with CHEMO prior to cycle 4   CT in 6 weeks   RTC with me after CT    # LE pain, bilateral LE pain, intermittent, starts in knee and lower doen, mostly bone/muscle like pain, can be bothering  -Will do doppler to r/o DVT  -IF doppler negative this could be related to Immune related from pembrolizumab given the timing and ESR and CRP is high, can consdier 0.5 mg mg/kg prednisone, with quick taper over 2 weeks (ok to continue pembrolizumab)      #Bone health: No visible bone mets on the PET CT scan.    # Cancer related pain: No cancer related pain    #Nutrition: No wt loss, appetite is good    #Psychiatry: Patient has previously had a history of anxiety.now since the diagnosis, is having panic attacks.  PTA on Prozac, and also clonazepam  -Trazodoen for ?insomnia  -increase cloazepan to BID 0.5 mg    #COVID vaccine: s/p 3 boosters    #Hypertension hyperlipidemia  ON statin        On the day of service,  Preparation time:  5 minutes  Visit time:  30 minutes  Care Coordination:  5 minutes      Chaz Martel M.D.   of Medicine  Division of Hematology, Oncology and Transplantation  [facility]

## 2023-03-01 ENCOUNTER — ONCOLOGY VISIT (OUTPATIENT)
Dept: ONCOLOGY | Facility: CLINIC | Age: 78
End: 2023-03-01
Attending: STUDENT IN AN ORGANIZED HEALTH CARE EDUCATION/TRAINING PROGRAM
Payer: COMMERCIAL

## 2023-03-01 VITALS
DIASTOLIC BLOOD PRESSURE: 85 MMHG | BODY MASS INDEX: 28.69 KG/M2 | SYSTOLIC BLOOD PRESSURE: 147 MMHG | OXYGEN SATURATION: 96 % | WEIGHT: 160.7 LBS | HEART RATE: 96 BPM | TEMPERATURE: 98 F

## 2023-03-01 DIAGNOSIS — I82.4Y9 DEEP VEIN THROMBOSIS (DVT) OF PROXIMAL LOWER EXTREMITY, UNSPECIFIED CHRONICITY, UNSPECIFIED LATERALITY (H): Primary | ICD-10-CM

## 2023-03-01 DIAGNOSIS — C34.31 MALIGNANT NEOPLASM OF LOWER LOBE OF RIGHT LUNG (H): ICD-10-CM

## 2023-03-01 PROCEDURE — G0463 HOSPITAL OUTPT CLINIC VISIT: HCPCS | Performed by: STUDENT IN AN ORGANIZED HEALTH CARE EDUCATION/TRAINING PROGRAM

## 2023-03-01 PROCEDURE — 99215 OFFICE O/P EST HI 40 MIN: CPT | Performed by: STUDENT IN AN ORGANIZED HEALTH CARE EDUCATION/TRAINING PROGRAM

## 2023-03-01 RX ORDER — ALBUTEROL SULFATE 0.83 MG/ML
2.5 SOLUTION RESPIRATORY (INHALATION)
Status: CANCELLED | OUTPATIENT
Start: 2023-03-03

## 2023-03-01 RX ORDER — MEPERIDINE HYDROCHLORIDE 25 MG/ML
25 INJECTION INTRAMUSCULAR; INTRAVENOUS; SUBCUTANEOUS EVERY 30 MIN PRN
Status: CANCELLED | OUTPATIENT
Start: 2023-03-03

## 2023-03-01 RX ORDER — ALBUTEROL SULFATE 90 UG/1
1-2 AEROSOL, METERED RESPIRATORY (INHALATION)
Status: CANCELLED
Start: 2023-03-03

## 2023-03-01 RX ORDER — DIPHENHYDRAMINE HCL 25 MG
50 CAPSULE ORAL ONCE
Status: CANCELLED
Start: 2023-03-03

## 2023-03-01 RX ORDER — DIPHENHYDRAMINE HYDROCHLORIDE 50 MG/ML
50 INJECTION INTRAMUSCULAR; INTRAVENOUS
Status: CANCELLED
Start: 2023-03-03

## 2023-03-01 RX ORDER — LORAZEPAM 2 MG/ML
0.5 INJECTION INTRAMUSCULAR EVERY 4 HOURS PRN
Status: CANCELLED | OUTPATIENT
Start: 2023-03-03

## 2023-03-01 RX ORDER — HEPARIN SODIUM,PORCINE 10 UNIT/ML
5 VIAL (ML) INTRAVENOUS
Status: CANCELLED | OUTPATIENT
Start: 2023-03-03

## 2023-03-01 RX ORDER — HEPARIN SODIUM (PORCINE) LOCK FLUSH IV SOLN 100 UNIT/ML 100 UNIT/ML
5 SOLUTION INTRAVENOUS
Status: CANCELLED | OUTPATIENT
Start: 2023-03-03

## 2023-03-01 RX ORDER — EPINEPHRINE 1 MG/ML
0.3 INJECTION, SOLUTION INTRAMUSCULAR; SUBCUTANEOUS EVERY 5 MIN PRN
Status: CANCELLED | OUTPATIENT
Start: 2023-03-03

## 2023-03-01 RX ORDER — METHYLPREDNISOLONE SODIUM SUCCINATE 125 MG/2ML
125 INJECTION, POWDER, LYOPHILIZED, FOR SOLUTION INTRAMUSCULAR; INTRAVENOUS
Status: CANCELLED
Start: 2023-03-03

## 2023-03-01 RX ORDER — PALONOSETRON 0.05 MG/ML
0.25 INJECTION, SOLUTION INTRAVENOUS ONCE
Status: CANCELLED | OUTPATIENT
Start: 2023-03-03

## 2023-03-01 ASSESSMENT — PAIN SCALES - GENERAL: PAINLEVEL: MODERATE PAIN (4)

## 2023-03-01 NOTE — NURSING NOTE
"Oncology Rooming Note    March 1, 2023 3:46 PM   Yamel Lindsay is a 77 year old female who presents for:    Chief Complaint   Patient presents with     Oncology Clinic Visit     Malignant neoplasm of lower lobe of right lung      Initial Vitals: BP (!) 147/85 (BP Location: Right arm, Patient Position: Sitting, Cuff Size: Adult Regular)   Pulse 96   Temp 98  F (36.7  C) (Oral)   Wt 72.9 kg (160 lb 11.2 oz)   SpO2 96%   BMI 28.69 kg/m   Estimated body mass index is 28.69 kg/m  as calculated from the following:    Height as of 2/10/23: 1.594 m (5' 2.75\").    Weight as of this encounter: 72.9 kg (160 lb 11.2 oz). Body surface area is 1.8 meters squared.  Moderate Pain (4) Comment: can be as high as 8   No LMP recorded. Patient is postmenopausal.  Allergies reviewed: Yes  Medications reviewed: Yes    Medications: MEDICATION REFILLS NEEDED TODAY. Provider was notified.  Pharmacy name entered into [facility]:    [facility] [place], [place] - [address] [facility] [place], [place] - [address] [facility] [address]. [id number] - [place], [place] - [address]    Clinical concerns: needs refill for clonazepam        Dagoberto Mckeon            "

## 2023-03-01 NOTE — LETTER
3/1/2023         RE: Yamel Lindsay  [address] [facility] [address]        Dear Colleague,    Thank you for referring your patient, Yamel Lindsay, to the [facility]. Please see a copy of my visit note below.    MEDICAL ONCOLOGY FOLLOW UP NOTE    PATIENT NAME: Yamel Lindsay  ENCOUNTER DATE: 3/1/2023    Care Team  Primary Oncologist:  Chaz Martel MD  Surgery: TBD  Radiation oncology: TBD    REASON FOR CURRENT VISIT: F/u of lung cancer    HISTORY OF PRESENT ILLNESS:  Ms. Yamel Lindsay is a 77 year old  female with PMHx of for HTN, HLD, anxiety, who is here for followup of lung cancer      Oncologic Hx:    Diagnosis:   Synchronous Rt LL Squamous cell carcinoma [id number] Stage IIIC (AJCC 8th edition) diagnosed 12/2022  PD-L1 <1%  NGS- [facility] panel- Neg (High TMB 16.7 mut/MB)    Left LL adenocarcinoma [id number] Stage (AJCC 8th edition) diagnosed 12/2022  PD-L1-2%  NGS-KRAS G12C, TMB 2.4    Treatment:   1/20/22- Pembrolizumab+carboplatin+paclitaxel    Intent of treatment: Palliative    Oncologic course:  One month history of increasing dyspnea with palpitations, propting ER visit 11/19.  11/19/22- CT- 1 cm right lower lobe mass abutting multiple central bronchovascular and mediastinal structures, consistent with malignancy.2.2 cm spiculated nodule in the left lower lobe, also consistent with malignancy.   12/6/22- PET/CT-  Right lower lobe lung mass abutting the pleura (SUV max of 24), measuring 8 x 5.2 cm. There is associated complete obliteration of right lower lobe bronchus and loss of fat planes with  the left atrium. Spiculated left lower lobe solid lung mass (SUV 17.9), measuring 2.2 x 2 cm. There are several markedly hypermetabolic, irregular solid nodules in the right middle lobe abutting the pleura (approximately 10-12), as for example 1.2 cm nodule with SUV max of 16.8 (4/126). There is a separate markedly hypermetabolic 1 cm nodule in the right upper lobe. Mild hypermetabolic  uptake is noted within a1 x 0.9 cm right paratracheal node ( SUV max of 4.2) (4/120), Mild uptake is also  noted within a subcentimeter lower paraesophageal node (4/154).   12/28/22- EBUS with deblking in the right hilar mass (no mediastinal staging performed)- - LUNG, RIGHT MAINSTEM, ENDOBRONCHIAL BIOPSY: Squamous cell carcinoma with extensive necrosis (positive for p40 and negative for TTF-1). Left LL FNAC- Positive for malignancy- Adenocarcinoma  (diffusely positive for TTF-1 while negative for p40)  1/3/23: Brain MRI - Neg  1/10/23: Tumor board discussion: Too extensive for concurrent/sequential chemo XRT- plan for palliative intent chemo-IO  1/20/23- C1 Pembrolizumab+carboplatin+paclitaxel  2/10/23-C2 Pembrolizumab+carboplatin+paclitaxel  2/27/23- CT CAP- Decreased size of the hypoattenuating mass in the central right lower lobe and small irregularly-shaped nodules in the right upper lobe consistent with positive treatment response. There is persistent postobstructive right lower lobe atelectasis.Unchanged heterogeneous lobulated and spiculated solid nodule in the lateral basal left lower lobe. No findings to suggest new extrapulmonary metastatic disease.    Interval Hx:  Yamel is here prior to cycle 3 to discuss CT scan results.  She has no new symptoms.   She continues to have exertional SOB, but can continue to walk after 4-5 blocks  Most likley can do 1-2 stairs of steps wo SOB  She does have morning cough, occasional expectoration  No hemopthysis  No chest pain, wheezing  She is on advair BID and albuterol prn  No recent wt loss  Appetite is ok  No headaches, vision, problems, no focal neurologic deficits  Noraml bladder and bowel movements    Indpendent of ADL and IADL, ambulatory at home  ECOG PS 1  She is sleeping a lot more, and also feels more tired    Lives in [facility], by herself,  since 1984  Has 2 daughter  Raeann and dudley, she was here with Dudley today  She was working for  [employer]k job for 30 yrs    REVIEW OF SYSTEMS: 14 point ROS negative other than the symptoms noted above in the HPI.    Wt Readings from Last 4 Encounters:   02/10/23 73 kg (161 lb)   02/06/23 72.1 kg (159 lb)   02/02/23 72.1 kg (159 lb)   02/02/23 71.7 kg (158 lb)              Review of Systems:  A comprehensive ROS was performed and found to be negative or non-contributory with the exception of that noted in the HPI above.    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     Nicotine dependence      Postmenopausal HRT (hormone replacement therapy) 1994     S/P alcohol detoxification 07/06    Recovered and active in AA       Past Surgical History:  Past Surgical History:   Procedure Laterality Date     BRONCHOSCOPY, WITH BIOPSY, ROBOT ASSISTED N/A 12/28/2022    Procedure: BRONCHOSCOPY, USING ION OPTICAL TRACKING SYSTEM, FINE NEEDLE ASPIRATION;  Surgeon: Alma Almaguer MD;  Location: [facility] OR     ENDOBRONCHIAL ULTRASOUND FLEXIBLE N/A 12/28/2022    Procedure: endobronchial biopsy and tumor debulking with cryoprobe;  Surgeon: Alma Almaguer MD;  Location:  OR     ESOPHAGOSCOPY, GASTROSCOPY, DUODENOSCOPY (EGD), COMBINED N/A 1/5/2015    Procedure: COMBINED ESOPHAGOSCOPY, GASTROSCOPY, DUODENOSCOPY (EGD), BIOPSY SINGLE OR MULTIPLE;  Surgeon: Dylan Alejandra MD;  Location: [facility]     ESOPHAGOSCOPY, GASTROSCOPY, DUODENOSCOPY (EGD), COMBINED N/A 5/25/2018    Procedure: COMBINED ESOPHAGOSCOPY, GASTROSCOPY, DUODENOSCOPY (EGD), BIOPSY SINGLE OR MULTIPLE;  gastroscopy;  Surgeon: Alexander Bautista MD;  Location: [facility]     INSERT PORT VASCULAR ACCESS Left 2/6/2023    Procedure: INSERTION, VASCULAR ACCESS PORT left;  Surgeon: Scot Valadez DO;  Location: [facility]     LAPAROSCOPIC HERNIORRHAPHY INGUINAL Right 1/26/2015    Procedure: LAPAROSCOPIC HERNIORRHAPHY  INGUINAL;  Surgeon: Favio Olsen MD;  Location: [facility]     TUBAL LIGATION         Social History:  Social History     Socioeconomic History     Marital status: Single   Tobacco Use     Smoking status: Former     Packs/day: 0.50     Types: Cigarettes     Quit date: 2011     Years since quittin.7     Smokeless tobacco: Never   Vaping Use     Vaping Use: Never used   Substance and Sexual Activity     Alcohol use: No     Drug use: No     Sexual activity: Not Currently   Other Topics Concern     Parent/sibling w/ CABG, MI or angioplasty before 65F 55M? No    2 pack per day for 40 yrs    Family History  Family History   Problem Relation Age of Onset     Cancer Mother      C.A.D. Father      Lipids Father      Hypertension Father      Hypertension Brother      Cancer Brother         esophageal cancer     Breast Cancer Other      Prostate Cancer Brother      Psychotic Disorder Daughter      Diabetes No family hx of      Cerebrovascular Disease No family hx of      Cancer - colorectal No family hx of    Mother had lung cancer  Older brother esophageal cancer  ANother brother prostate cancer, throat  2 brothers      Outpatient Medications:  albuterol (PROAIR HFA/PROVENTIL HFA/VENTOLIN HFA) 108 (90 Base) MCG/ACT inhaler, Inhale 2 puffs into the lungs every 6 hours as needed for shortness of breath / dyspnea or wheezing  atorvastatin (LIPITOR) 20 MG tablet, Take 1 tablet (20 mg) by mouth daily  Cholecalciferol (VITAMIN D-3 PO), Take 2,000 Units by mouth daily   clonazePAM (KLONOPIN) 0.5 MG tablet, Take 0.5-1 tablets (0.25-0.5 mg) by mouth 2 times daily as needed for anxiety  dexamethasone (DECADRON) 4 MG tablet, Take 2 tablets (8 mg) by mouth daily Take for 3 days, starting the day after chemo. Take with food. (Patient not taking: Reported on 2/10/2023)  FLUoxetine (PROZAC) 20 MG capsule, Take 2 capsules (40 mg) by mouth daily  fluticasone-salmeterol (ADVAIR HFA) 115-21 MCG/ACT inhaler, Inhale 2 puffs into the lungs  2 times daily  ibuprofen (ADVIL,MOTRIN) 200 MG tablet, Take 200-600 mg by mouth every 2 hours as needed for mild pain  ipratropium - albuterol 0.5 mg/2.5 mg/3 mL (DUONEB) 0.5-2.5 (3) MG/3ML neb solution, Take 1 vial (3 mLs) by nebulization every 6 hours as needed for shortness of breath or wheezing  omeprazole (PRILOSEC) 20 MG DR capsule, Take 1 capsule (20 mg) by mouth daily 30-60 minutes before a meal.  ondansetron (ZOFRAN) 8 MG tablet, Take 1 tablet (8 mg) by mouth every 8 hours as needed for nausea (vomiting)  polyethylene glycol (MIRALAX) 17 g packet, Take 1 packet by mouth daily Taking PRN  prochlorperazine (COMPAZINE) 10 MG tablet, Take 0.5 tablets (5 mg) by mouth every 6 hours as needed for nausea or vomiting  tiZANidine (ZANAFLEX) 2 MG tablet,   traMADol (ULTRAM) 50 MG tablet, Take 1 tablet (50 mg) by mouth every 6 hours as needed for severe pain (7-10) COVERING FOR PCP ONLY ONCE  traZODone (DESYREL) 50 MG tablet, TAKE 2 TABLETS(100 MG) BY MOUTH AT BEDTIME    heparin 100 UNIT/ML injection 5 mL  [COMPLETED] iopamidol (ISOVUE-370) solution 100 mL  sodium chloride (PF) 0.9% PF flush 3-20 mL         Physical Exam:    Blood pressure (!) 147/85, pulse 96, temperature 98  F (36.7  C), temperature source Oral, weight 72.9 kg (160 lb 11.2 oz), SpO2 96 %, not currently breastfeeding.  General: alert and cooperative, lying in bed, no acute distress  HEENT: sclera anicteric, EOMI, MMM  Neck: supple, normal ROM  CV: RRR, no murmurs  Resp: CTAB, normal respiratory effort on ambient air  GI: soft, non-tender, non-distended, bowel sounds present and normoactive  MSK: warm and well-perfused, normal tone  Skin: no rashes on limited exam, no jaundice  Neuro: Alert and interactive, moves all extremities equally, no focal deficits    Labs & Studies: I personally reviewed the following studies:    Recent Labs   Lab Test 11/18/22  4369 01/26/15  1445   WBC 7.4 6.9   RBC 4.06 4.21   HGB 11.6* 12.9   HCT 34.7* 37.8   MCV 86 90    MCH 28.6 30.6   MCHC 33.4 34.1   RDW 13.2 11.9    300   NEUTROPHIL 66 62.7     Recent Labs   Lab Test 11/18/22 2205 09/27/22  0842 03/03/21  1442    133* 140   POTASSIUM 3.2* 3.9 3.9   CHLORIDE 99 96* 103   CO2 30* 26 31   ANIONGAP 9 11 6   * 115* 85   BUN 10.2 10.7 10   CR 0.50* 0.59 0.60   DEEPIKA 10.0 9.2 9.6     Recent Labs   Lab Test 11/18/22 2205 09/27/22  0842 03/03/21  1442   PROTTOTAL 7.0 6.6 7.1   ALBUMIN 3.8 3.7 3.3*   BILITOTAL <0.2 0.2 0.2   ALKPHOS 128* 116* 179*   AST 20 24 13   ALT 14 13 18       ASSESSMENT AND PLAN:    Ms. Yamel Lindsay is a 77 year old  female with PMHx of for HTN, HLD, anxiety, who is here for evaluation/followup of new lung cancer    Synchronous Rt LL Squamous cell carcinoma [id number] Stage IIIC (AJCC 8th edition) diagnosed 12/2022  PD-L1 <1%  NGS- [facility] panel- Neg (High TMB 16.7 mut/MB)    Left LL adenocarcinoma [id number] Stage (AJCC 8th edition) diagnosed 12/2022  PD-L1-2%  NGS-KRAS G12C, TMB 2.4    Pt with 2 synchronous cancers.  The more urgent and threatening one is the right lower lobe squamous cell cancer which appears to be at least stage IIIC given the size greater than 7 cm suggestive of T4 disease.  She also has mediastinal lymph node that were enlarged and appear pathologic but have not yet been biopsied.  She is also has several FDG avid nodules in the right middle and the right upper lobe that have not yet been biopsied.  She underwent an EBUS for debulking and her mediastinum has not been staged.  Given the extent of involvement of the right sided cancer, concurrent/sequential chemoradiation not feasible per tumor board discussion, also no further EBUS required since high probability of cancer. Will proceed with palliative intent chemoimmunotherapy with pembro+carbo+taxol.  Of note, squamous has high TMB and low neg PD-L1.  Adenocarcinoma has KRAS G12C.   CT after 2 cycles showing good response in the rt SCC. We will do 2 more cycles and restage and  then drop chemo. In the future if she has a good response to chemo-IO may consider consoldtive XRT.     Plan  Ok for infusion on 3/3   US doppler of LE ASAP   Arrange for future infusions   RTC with CHEMO in 1 week ( for follow up of DVT and possible steroid initiation)  RTC with CHEMO prior to cycle 4   CT in 6 weeks   RTC with me after CT    # LE pain, bilateral LE pain, intermittent, starts in knee and lower doen, mostly bone/muscle like pain, can be bothering  -Will do doppler to r/o DVT  -IF doppler negative this could be related to Immune related from pembrolizumab given the timing and ESR and CRP is high, can consdier 0.5 mg mg/kg prednisone, with quick taper over 2 weeks (ok to continue pembrolizumab)      #Bone health: No visible bone mets on the PET CT scan.    # Cancer related pain: No cancer related pain    #Nutrition: No wt loss, appetite is good    #Psychiatry: Patient has previously had a history of anxiety.now since the diagnosis, is having panic attacks.  PTA on Prozac, and also clonazepam  -Trazodoen for ?insomnia  -increase cloazepan to BID 0.5 mg    #COVID vaccine: s/p 3 boosters    #Hypertension hyperlipidemia  ON statin        On the day of service,  Preparation time:  5 minutes  Visit time:  30 minutes  Care Coordination:  5 minutes      Chaz Martel M.D.   of Medicine  Division of Hematology, Oncology and Transplantation  [facility]

## 2023-03-02 ENCOUNTER — HOSPITAL ENCOUNTER (OUTPATIENT)
Dept: ULTRASOUND IMAGING | Facility: HOSPITAL | Age: 78
Discharge: HOME | End: 2023-03-02
Attending: STUDENT IN AN ORGANIZED HEALTH CARE EDUCATION/TRAINING PROGRAM | Admitting: STUDENT IN AN ORGANIZED HEALTH CARE EDUCATION/TRAINING PROGRAM
Payer: COMMERCIAL

## 2023-03-02 DIAGNOSIS — M62.838 MUSCLE SPASM: Primary | ICD-10-CM

## 2023-03-02 DIAGNOSIS — I82.4Y9 DEEP VEIN THROMBOSIS (DVT) OF PROXIMAL LOWER EXTREMITY, UNSPECIFIED CHRONICITY, UNSPECIFIED LATERALITY (H): ICD-10-CM

## 2023-03-02 PROCEDURE — 93970 EXTREMITY STUDY: CPT

## 2023-03-03 ENCOUNTER — LAB (OUTPATIENT)
Dept: INFUSION THERAPY | Facility: HOSPITAL | Age: 78
End: 2023-03-03
Attending: NURSE PRACTITIONER
Payer: COMMERCIAL

## 2023-03-03 VITALS
SYSTOLIC BLOOD PRESSURE: 138 MMHG | HEIGHT: 63 IN | BODY MASS INDEX: 28.47 KG/M2 | RESPIRATION RATE: 18 BRPM | HEART RATE: 85 BPM | TEMPERATURE: 97.4 F | DIASTOLIC BLOOD PRESSURE: 72 MMHG | WEIGHT: 160.7 LBS | OXYGEN SATURATION: 93 %

## 2023-03-03 DIAGNOSIS — C34.31 MALIGNANT NEOPLASM OF LOWER LOBE OF RIGHT LUNG (H): ICD-10-CM

## 2023-03-03 DIAGNOSIS — C34.31 MALIGNANT NEOPLASM OF LOWER LOBE OF RIGHT LUNG (H): Primary | ICD-10-CM

## 2023-03-03 DIAGNOSIS — T45.1X5S ADVERSE EFFECT OF ANTINEOPLASTIC AND IMMUNOSUPPRESSIVE DRUGS, SEQUELA: Primary | ICD-10-CM

## 2023-03-03 DIAGNOSIS — T45.1X5S ADVERSE EFFECT OF ANTINEOPLASTIC AND IMMUNOSUPPRESSIVE DRUGS, SEQUELA: ICD-10-CM

## 2023-03-03 DIAGNOSIS — Z98.890 HISTORY OF VASCULAR ACCESS DEVICE: ICD-10-CM

## 2023-03-03 LAB
ALBUMIN SERPL BCG-MCNC: 3.9 G/DL (ref 3.5–5.2)
ALP SERPL-CCNC: 109 U/L (ref 35–104)
ALT SERPL W P-5'-P-CCNC: 12 U/L (ref 10–35)
ANION GAP SERPL CALCULATED.3IONS-SCNC: 7 MMOL/L (ref 7–15)
AST SERPL W P-5'-P-CCNC: 19 U/L (ref 10–35)
BASOPHILS # BLD AUTO: 0.1 10E3/UL (ref 0–0.2)
BASOPHILS NFR BLD AUTO: 2 %
BILIRUB SERPL-MCNC: 0.2 MG/DL
BUN SERPL-MCNC: 12.9 MG/DL (ref 8–23)
CALCIUM SERPL-MCNC: 8.8 MG/DL (ref 8.8–10.2)
CHLORIDE SERPL-SCNC: 97 MMOL/L (ref 98–107)
CREAT SERPL-MCNC: 0.55 MG/DL (ref 0.51–0.95)
DEPRECATED HCO3 PLAS-SCNC: 30 MMOL/L (ref 22–29)
EOSINOPHIL # BLD AUTO: 0.2 10E3/UL (ref 0–0.7)
EOSINOPHIL NFR BLD AUTO: 3 %
ERYTHROCYTE [DISTWIDTH] IN BLOOD BY AUTOMATED COUNT: 17.1 % (ref 10–15)
GFR SERPL CREATININE-BSD FRML MDRD: >90 ML/MIN/1.73M2
GLUCOSE SERPL-MCNC: 115 MG/DL (ref 70–99)
HCT VFR BLD AUTO: 33.8 % (ref 35–47)
HGB BLD-MCNC: 10.8 G/DL (ref 11.7–15.7)
IMM GRANULOCYTES # BLD: 0 10E3/UL
IMM GRANULOCYTES NFR BLD: 0 %
LYMPHOCYTES # BLD AUTO: 1.2 10E3/UL (ref 0.8–5.3)
LYMPHOCYTES NFR BLD AUTO: 22 %
MCH RBC QN AUTO: 28.2 PG (ref 26.5–33)
MCHC RBC AUTO-ENTMCNC: 32 G/DL (ref 31.5–36.5)
MCV RBC AUTO: 88 FL (ref 78–100)
MONOCYTES # BLD AUTO: 0.5 10E3/UL (ref 0–1.3)
MONOCYTES NFR BLD AUTO: 9 %
NEUTROPHILS # BLD AUTO: 3.5 10E3/UL (ref 1.6–8.3)
NEUTROPHILS NFR BLD AUTO: 64 %
NRBC # BLD AUTO: 0 10E3/UL
NRBC BLD AUTO-RTO: 0 /100
PLATELET # BLD AUTO: 392 10E3/UL (ref 150–450)
POTASSIUM SERPL-SCNC: 3.8 MMOL/L (ref 3.4–5.3)
PROT SERPL-MCNC: 7 G/DL (ref 6.4–8.3)
RBC # BLD AUTO: 3.83 10E6/UL (ref 3.8–5.2)
SODIUM SERPL-SCNC: 134 MMOL/L (ref 136–145)
TSH SERPL DL<=0.005 MIU/L-ACNC: 2.16 UIU/ML (ref 0.3–4.2)
WBC # BLD AUTO: 5.4 10E3/UL (ref 4–11)

## 2023-03-03 PROCEDURE — 96413 CHEMO IV INFUSION 1 HR: CPT

## 2023-03-03 PROCEDURE — 96415 CHEMO IV INFUSION ADDL HR: CPT

## 2023-03-03 PROCEDURE — 96372 THER/PROPH/DIAG INJ SC/IM: CPT | Performed by: STUDENT IN AN ORGANIZED HEALTH CARE EDUCATION/TRAINING PROGRAM

## 2023-03-03 PROCEDURE — 96377 APPLICATON ON-BODY INJECTOR: CPT | Mod: XS

## 2023-03-03 PROCEDURE — 250N000013 HC RX MED GY IP 250 OP 250 PS 637: Performed by: STUDENT IN AN ORGANIZED HEALTH CARE EDUCATION/TRAINING PROGRAM

## 2023-03-03 PROCEDURE — 96367 TX/PROPH/DG ADDL SEQ IV INF: CPT

## 2023-03-03 PROCEDURE — 258N000003 HC RX IP 258 OP 636: Performed by: STUDENT IN AN ORGANIZED HEALTH CARE EDUCATION/TRAINING PROGRAM

## 2023-03-03 PROCEDURE — 250N000011 HC RX IP 250 OP 636: Performed by: STUDENT IN AN ORGANIZED HEALTH CARE EDUCATION/TRAINING PROGRAM

## 2023-03-03 PROCEDURE — 96375 TX/PRO/DX INJ NEW DRUG ADDON: CPT

## 2023-03-03 PROCEDURE — 96417 CHEMO IV INFUS EACH ADDL SEQ: CPT

## 2023-03-03 PROCEDURE — 80053 COMPREHEN METABOLIC PANEL: CPT | Performed by: STUDENT IN AN ORGANIZED HEALTH CARE EDUCATION/TRAINING PROGRAM

## 2023-03-03 PROCEDURE — 85025 COMPLETE CBC W/AUTO DIFF WBC: CPT | Performed by: STUDENT IN AN ORGANIZED HEALTH CARE EDUCATION/TRAINING PROGRAM

## 2023-03-03 PROCEDURE — 84443 ASSAY THYROID STIM HORMONE: CPT | Performed by: STUDENT IN AN ORGANIZED HEALTH CARE EDUCATION/TRAINING PROGRAM

## 2023-03-03 RX ORDER — ALBUTEROL SULFATE 90 UG/1
1-2 AEROSOL, METERED RESPIRATORY (INHALATION)
Status: DISCONTINUED | OUTPATIENT
Start: 2023-03-03 | End: 2023-03-03 | Stop reason: HOSPADM

## 2023-03-03 RX ORDER — EPINEPHRINE 1 MG/ML
0.3 INJECTION, SOLUTION INTRAMUSCULAR; SUBCUTANEOUS EVERY 5 MIN PRN
Status: DISCONTINUED | OUTPATIENT
Start: 2023-03-03 | End: 2023-03-03 | Stop reason: HOSPADM

## 2023-03-03 RX ORDER — PALONOSETRON 0.05 MG/ML
0.25 INJECTION, SOLUTION INTRAVENOUS ONCE
Status: COMPLETED | OUTPATIENT
Start: 2023-03-03 | End: 2023-03-03

## 2023-03-03 RX ORDER — TIZANIDINE 2 MG/1
TABLET ORAL
Qty: 90 TABLET | Refills: 3 | Status: SHIPPED | OUTPATIENT
Start: 2023-03-03 | End: 2023-08-31

## 2023-03-03 RX ORDER — DIPHENHYDRAMINE HYDROCHLORIDE 50 MG/ML
50 INJECTION INTRAMUSCULAR; INTRAVENOUS
Status: DISCONTINUED | OUTPATIENT
Start: 2023-03-03 | End: 2023-03-03 | Stop reason: HOSPADM

## 2023-03-03 RX ORDER — LIDOCAINE/PRILOCAINE 2.5 %-2.5%
CREAM (GRAM) TOPICAL PRN
Qty: 30 G | Refills: 1 | Status: SHIPPED | OUTPATIENT
Start: 2023-03-03

## 2023-03-03 RX ORDER — MEPERIDINE HYDROCHLORIDE 25 MG/ML
25 INJECTION INTRAMUSCULAR; INTRAVENOUS; SUBCUTANEOUS EVERY 30 MIN PRN
Status: DISCONTINUED | OUTPATIENT
Start: 2023-03-03 | End: 2023-03-03 | Stop reason: HOSPADM

## 2023-03-03 RX ORDER — HEPARIN SODIUM (PORCINE) LOCK FLUSH IV SOLN 100 UNIT/ML 100 UNIT/ML
5 SOLUTION INTRAVENOUS
Status: DISCONTINUED | OUTPATIENT
Start: 2023-03-03 | End: 2023-03-03 | Stop reason: HOSPADM

## 2023-03-03 RX ORDER — LEVALBUTEROL INHALATION SOLUTION 1.25 MG/3ML
1.25 SOLUTION RESPIRATORY (INHALATION) EVERY 6 HOURS PRN
Status: ACTIVE | OUTPATIENT
Start: 2023-03-03

## 2023-03-03 RX ORDER — ALBUTEROL SULFATE 0.83 MG/ML
2.5 SOLUTION RESPIRATORY (INHALATION)
Status: DISCONTINUED | OUTPATIENT
Start: 2023-03-03 | End: 2023-03-03

## 2023-03-03 RX ORDER — DIPHENHYDRAMINE HCL 50 MG
50 CAPSULE ORAL ONCE
Status: COMPLETED | OUTPATIENT
Start: 2023-03-03 | End: 2023-03-03

## 2023-03-03 RX ORDER — METHYLPREDNISOLONE SODIUM SUCCINATE 125 MG/2ML
125 INJECTION, POWDER, LYOPHILIZED, FOR SOLUTION INTRAMUSCULAR; INTRAVENOUS
Status: DISCONTINUED | OUTPATIENT
Start: 2023-03-03 | End: 2023-03-03 | Stop reason: HOSPADM

## 2023-03-03 RX ADMIN — Medication 5 ML: at 15:05

## 2023-03-03 RX ADMIN — SODIUM CHLORIDE 250 ML: 9 INJECTION, SOLUTION INTRAVENOUS at 09:51

## 2023-03-03 RX ADMIN — DEXAMETHASONE SODIUM PHOSPHATE: 10 INJECTION, SOLUTION INTRAMUSCULAR; INTRAVENOUS at 10:24

## 2023-03-03 RX ADMIN — DIPHENHYDRAMINE HYDROCHLORIDE 50 MG: 50 CAPSULE ORAL at 09:51

## 2023-03-03 RX ADMIN — PALONOSETRON 0.25 MG: 0.05 INJECTION, SOLUTION INTRAVENOUS at 09:52

## 2023-03-03 RX ADMIN — PACLITAXEL 348 MG: 6 INJECTION, SOLUTION, CONCENTRATE INTRAVENOUS at 11:25

## 2023-03-03 RX ADMIN — CARBOPLATIN 645 MG: 10 INJECTION INTRAVENOUS at 14:32

## 2023-03-03 RX ADMIN — FAMOTIDINE 20 MG: 10 INJECTION, SOLUTION INTRAVENOUS at 09:52

## 2023-03-03 RX ADMIN — PEGFILGRASTIM 6 MG: KIT SUBCUTANEOUS at 14:36

## 2023-03-03 RX ADMIN — SODIUM CHLORIDE 200 MG: 9 INJECTION, SOLUTION INTRAVENOUS at 10:52

## 2023-03-03 NOTE — PROGRESS NOTES
Infusion Nursing Note:  Yamel Lindsay presents today for cycle 3 day 1 pembrolizumab/paclitaxel/carboplatin.    Patient seen by provider today: No   present during visit today: Not Applicable.    Note: Yamel arrived ambulatory and in stable condition accompanied by her brother. Feeling well today. Port accessed using sterile technique, good blood return noted, and labs collected. She was premedicated and treatment was administered per orders. Tolerated infusions without issue. Neulasta OnPro applied to left abdomen and patient was instructed that applicator would begin around 1740 on 3/4 and she would be able to remove the applicator by 1830. Port de-accessed and site covered with a band-aid. Will return on 3/10 for next appointment.    Intravenous Access:  Labs drawn without difficulty.  Implanted Port.    Treatment Conditions:  Lab Results   Component Value Date    HGB 10.8 (L) 03/03/2023    WBC 5.4 03/03/2023    ANEU 4.3 01/26/2015    ANEUTAUTO 3.5 03/03/2023     03/03/2023      Lab Results   Component Value Date     (L) 03/03/2023    POTASSIUM 3.8 03/03/2023    CR 0.55 03/03/2023    DEEPIKA 8.8 03/03/2023    BILITOTAL 0.2 03/03/2023    ALBUMIN 3.9 03/03/2023    ALT 12 03/03/2023    AST 19 03/03/2023     Results reviewed, labs MET treatment parameters, ok to proceed with treatment.    Post Infusion Assessment:  Patient tolerated infusion without incident.   Blood return noted pre and post infusion.  No evidence of extravasations.  Access discontinued per protocol.    Discharge Plan:   Patient and/or family verbalized understanding of discharge instructions and all questions answered.  AVS to patient via [employer].  Patient will return 3/10 for next appointment.   Patient discharged in stable condition accompanied by: brother.  Departure Mode: Ambulatory.      Linda Corral RN

## 2023-03-05 DIAGNOSIS — E78.5 HYPERLIPIDEMIA LDL GOAL <100: ICD-10-CM

## 2023-03-06 RX ORDER — ATORVASTATIN CALCIUM 20 MG/1
TABLET, FILM COATED ORAL
Qty: 90 TABLET | Refills: 1 | Status: SHIPPED | OUTPATIENT
Start: 2023-03-06 | End: 2023-08-31

## 2023-03-08 ENCOUNTER — ONCOLOGY VISIT (OUTPATIENT)
Dept: PALLIATIVE CARE | Facility: CLINIC | Age: 78
End: 2023-03-08
Attending: PSYCHIATRY & NEUROLOGY
Payer: COMMERCIAL

## 2023-03-08 DIAGNOSIS — G62.0 CHEMOTHERAPY-INDUCED PERIPHERAL NEUROPATHY (H): ICD-10-CM

## 2023-03-08 DIAGNOSIS — G89.3 NEOPLASM RELATED PAIN: ICD-10-CM

## 2023-03-08 DIAGNOSIS — T45.1X5A CHEMOTHERAPY-INDUCED PERIPHERAL NEUROPATHY (H): ICD-10-CM

## 2023-03-08 DIAGNOSIS — C34.32 MALIGNANT NEOPLASM OF LOWER LOBE OF LEFT LUNG (H): ICD-10-CM

## 2023-03-08 DIAGNOSIS — C34.31 MALIGNANT NEOPLASM OF LOWER LOBE OF RIGHT LUNG (H): Primary | ICD-10-CM

## 2023-03-08 PROCEDURE — G0463 HOSPITAL OUTPT CLINIC VISIT: HCPCS | Performed by: PSYCHIATRY & NEUROLOGY

## 2023-03-08 PROCEDURE — 99204 OFFICE O/P NEW MOD 45 MIN: CPT | Mod: GC | Performed by: PSYCHIATRY & NEUROLOGY

## 2023-03-08 PROCEDURE — G0463 HOSPITAL OUTPT CLINIC VISIT: HCPCS

## 2023-03-08 RX ORDER — GABAPENTIN 300 MG/1
300 CAPSULE ORAL
Qty: 30 CAPSULE | Refills: 1 | Status: SHIPPED | OUTPATIENT
Start: 2023-03-08 | End: 2023-05-09

## 2023-03-08 NOTE — NURSING NOTE
"Oncology Rooming Note    March 8, 2023 2:41 PM   Yamel Lindsay is a 77 year old female who presents for:    Chief Complaint   Patient presents with     Oncology Clinic Visit     Malignant neoplasm of lower lobe of right lung      Initial Vitals: There were no vitals taken for this visit. Estimated body mass index is 28.69 kg/m  as calculated from the following:    Height as of 3/3/23: 1.594 m (5' 2.75\").    Weight as of 3/3/23: 72.9 kg (160 lb 11.2 oz). There is no height or weight on file to calculate BSA.  Data Unavailable Comment: Data Unavailable   No LMP recorded. Patient is postmenopausal.  Allergies reviewed: Yes  Medications reviewed: Yes    Medications: MEDICATION REFILLS NEEDED TODAY. Provider was notified.  Pharmacy name entered into New Screens:    [facility] [place], [place] - [address] [facility] [place], [place] - [address] [facility]. [id number] - [place], [place] - [address] [facility] [address] [address]    Clinical concerns: needs refills for clonazepam        Dagoberto Mckeon            "

## 2023-03-08 NOTE — PROGRESS NOTES
"Palliative Care Outpatient Clinic Consultation Note    Patient:  Yamel Lindsay    Medical Information/ID:   Yamel Lindsay 77 year old female who is presenting to the palliative medicine clinic today to discuss lung cancer.     Came to attention in Dec 2022 -- presented with worsening SOB/syncope  Found to have two cancers, right LL squamous and L lobe adeno  Per initial oncology note: She underwent an EBUS for debulking and her mediastinum has not been staged.  Given the extent of involvement of the right sided cancer, concurrent/sequential chemoradiation not feasible per tumor board discussion, also no further EBUS required since high probability of cancer. Will proceed with palliative intent chemoimmunotherapy with pembro+carbo+taxol.  Initial CTs show response to therapy    History of Present Illness:    Yamel is here with her daughter Raeann. She did not have expectations or prior knowledge of palliative care prior to her visit and didn't have something specific on her mind. After talking generally about our palliative care program, she thought it would be good to discuss her pain today. She also provided details about her life as below.     Her pain is primarily in her right lower leg but also in her left. Present all the time. Just pain feeling, difficult to describe, moderate/severe intensity, limits her activity. Worse at night. In addition she also has intermittent painful tingling in her fingers and toes, also worse at night and this sometimes makes it difficult to sleep.     She has been on tramadol for 6-7 years for knee osteoarthritis. The tramadol hasn't made a big difference in this new pain; if she takes an extra it helps some but nothing dramatic.     Reviewing her last oncology note, Dr. Martel is considering this could be related to her pembroluzimab. She has steroids with her chemotherapy but has not done a course for this.     Patient's Disease Understanding:     At diagnosis, \"it wasn't good.\" " Recent scans have shown some response to therapy. Tolerating chemo so far.     Coping:      Spirits are okay. Gets support from family and being around an extended community.     Social History  Living Situation: Lives by herself in a one story town house. Recently moved there.   Children: Raeann (here) and Sivakumar, four grand children 34-27. No prospect for great grandchildren, which she would love.   Actual/Potential Caregiver(s): No caregivers right now, she drives to her appts, lives independently.   Children are main supports but also reports extended family of 20 who check in on her, text her, help when they can.   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   Hobbies: Going to theater and going to the [employer].   Financial Concerns: Worried about having enough money to get through her treatments with copays etc.   Spiritual Background: Spiritual not Congregation.     Social History     Tobacco Use     Smoking status: Former     Packs/day: 0.50     Types: Cigarettes     Quit date: 2011     Years since quittin.7     Smokeless tobacco: Never   Vaping Use     Vaping Use: Never used   Substance Use Topics     Alcohol use: No     Drug use: No       Advance Care Planning:  Not specifically addressed today.     REVIEW OF SYSTEMS:   ROS: 10 point ROS neg other than the symptoms noted above in the HPI and here:  Palliative Symptom Review (0=no symptom/no concern, 1=mild, 2=moderate, 3=severe):      Pain: 2      Fatigue: 1      Nausea: 0      Constipation: 0      Diarrhea: 0      Anxiety: 1      Poor Appetite: 0      Shortness of Breath: 1      Insomnia: 1           Physical Exam:                        In clinic, in general appears well  R/L  4/4 l hip flexors  5/5 knee flexion/extension  4+/4+dorsiflexion  Strongly positive Romberg (felt unsteady and even swayed with eyes open)    No  pain with palpation of the legs    Data Reviewed:  LABS: Reviewed B12, TSH, A1c in Epic  IMAGING: MRI Brain, CT C/A/P from Feb 2023    Images personally reviewed: MRI Brain, CT C/A/P    Impressions, Recommendations & Counseling:  Yamel Lindsay is a 76 yo woman with simultaneous presentation of squamous cell lung cancer as well as lung adenocarcinoma. The squamous cell is the higher treatment priority. She was seen as an initial visit with palliative care.     Her lower leg pain is an unusual syndrome -- I am hopeful that it responds to steroids if Dr. Martel feels this is the appropriate course. We deferred changing her pain medications today pending the evaluation of whether this could be related to her immunotherapy. Long term she may benefit from an alternate agent from Tramadol but we only began addressing this and will monitor over time.     She does have symptoms of a neuropathy and was interested in trying gabapentin today. He strongly positive Romberg also supports an underlying neuropathy. She has had the basic neuropathy labs other than B12 and I will follow up on this if not done when I see her next.     --300 mg GBP PRN at bedtime  --f/u 4-6 weeks pending steroids with onc    Sujit Xavier  Hospice and Palliative Medicine Fellow  Pager [phone]    Attending Note:  Patient seen and evaluated with Dr Xavier and I agree with/confirm their findings/recs in this note.      Thank you for involving us in the patient's care.   Torres Gan MD / Palliative Medicine / Text me via [facility].

## 2023-03-08 NOTE — LETTER
3/8/2023       RE: Yamel Lindsay  [address] [facility] [address]     Dear Colleague,    Thank you for referring your patient, [facility]amel Lindsay, [facility]. Please see a copy of my visit note below.    Palliative Care Outpatient Clinic Consultation Note    Patient:  Yamel Lindsay    Medical Information/ID:   Yamel Lindsay 77 year old female who is presenting to the palliative medicine clinic today to discuss lung cancer.     Came to attention in Dec 2022 -- presented with worsening SOB/syncope  Found to have two cancers, right LL squamous and L lobe adeno  Per initial oncology note: She underwent an EBUS for debulking and her mediastinum has not been staged.  Given the extent of involvement of the right sided cancer, concurrent/sequential chemoradiation not feasible per tumor board discussion, also no further EBUS required since high probability of cancer. Will proceed with palliative intent chemoimmunotherapy with pembro+carbo+taxol.  Initial CTs show response to therapy    History of Present Illness:    Yamel is here with her daughter Raeann. She did not have expectations or prior knowledge of palliative care prior to her visit and didn't have something specific on her mind. After talking generally about our palliative care program, she thought it would be good to discuss her pain today. She also provided details about her life as below.     Her pain is primarily in her right lower leg but also in her left. Present all the time. Just pain feeling, difficult to describe, moderate/severe intensity, limits her activity. Worse at night. In addition she also has intermittent painful tingling in her fingers and toes, also worse at night and this sometimes makes it difficult to sleep.     She has been on tramadol for 6-7 years for knee osteoarthritis. The tramadol hasn't made a big difference in this new pain; if she takes  "an extra it helps some but nothing dramatic.     Reviewing her last oncology note, Dr. Martel is considering this could be related to her pembroluzimab. She has steroids with her chemotherapy but has not done a course for this.     Patient's Disease Understanding:     At diagnosis, \"it wasn't good.\" Recent scans have shown some response to therapy. Tolerating chemo so far.     Coping:      Spirits are okay. Gets support from family and being around an extended community.     Social History  Living Situation: Lives by herself in a one story town house. Recently moved there.   Children: Raeann (here) and Sivakumar, four grand children 34-27. No prospect for great grandchildren, which she would love.   Actual/Potential Caregiver(s): No caregivers right now, she drives to her appts, lives independently.   Children are main supports but also reports extended family of 20 who check in on her, text her, help when they can.   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   Hobbies: Going to theater and going to the [employer].   Financial Concerns: Worried about having enough money to get through her treatments with copays etc.   Spiritual Background: Spiritual not Holiness.     Social History     Tobacco Use     Smoking status: Former     Packs/day: 0.50     Types: Cigarettes     Quit date: 2011     Years since quittin.7     Smokeless tobacco: Never   Vaping Use     Vaping Use: Never used   Substance Use Topics     Alcohol use: No     Drug use: No       Advance Care Planning:  Not specifically addressed today.     REVIEW OF SYSTEMS:   ROS: 10 point ROS neg other than the symptoms noted above in the HPI and here:  Palliative Symptom Review (0=no symptom/no concern, 1=mild, 2=moderate, 3=severe):      Pain: 2      Fatigue: 1      Nausea: 0      Constipation: 0      Diarrhea: 0      Anxiety: " 1      Poor Appetite: 0      Shortness of Breath: 1      Insomnia: 1           Physical Exam:                        In clinic, in general appears well  R/L  4/4 l hip flexors  5/5 knee flexion/extension  4+/4+dorsiflexion  Strongly positive Romberg (felt unsteady and even swayed with eyes open)    No pain with palpation of the legs    Data Reviewed:  LABS: Reviewed B12, TSH, A1c in Epic  IMAGING: MRI Brain, CT C/A/P from Feb 2023    Images personally reviewed: MRI Brain, CT C/A/P    Impressions, Recommendations & Counseling:  Yamel Lindsay is a 76 yo woman with simultaneous presentation of squamous cell lung cancer as well as lung adenocarcinoma. The squamous cell is the higher treatment priority. She was seen as an initial visit with palliative care.     Her lower leg pain is an unusual syndrome -- I am hopeful that it responds to steroids if Dr. Martel feels this is the appropriate course. We deferred changing her pain medications today pending the evaluation of whether this could be related to her immunotherapy. Long term she may benefit from an alternate agent from Tramadol but we only began addressing this and will monitor over time.     She does have symptoms of a neuropathy and was interested in trying gabapentin today. He strongly positive Romberg also supports an underlying neuropathy. She has had the basic neuropathy labs other than B12 and I will follow up on this if not done when I see her next.     --300 mg GBP PRN at bedtime  --f/u 4-6 weeks pending steroids with onc    Sujit Xavier  Hospice and Palliative Medicine Fellow  Pager [phone]    Attending Note:  Patient seen and evaluated with Dr Xavier and I agree with/confirm their findings/recs in this note.      Thank you for involving us in the patient's care.   Torres Gan MD / Palliative Medicine / Text me via [facility].      Sincerely,    Sujit Xavier MD

## 2023-03-10 ENCOUNTER — ONCOLOGY VISIT (OUTPATIENT)
Dept: ONCOLOGY | Facility: CLINIC | Age: 78
End: 2023-03-10
Attending: NURSE PRACTITIONER
Payer: COMMERCIAL

## 2023-03-10 VITALS
WEIGHT: 161 LBS | BODY MASS INDEX: 28.75 KG/M2 | HEART RATE: 90 BPM | TEMPERATURE: 98.5 F | OXYGEN SATURATION: 95 % | SYSTOLIC BLOOD PRESSURE: 136 MMHG | RESPIRATION RATE: 18 BRPM | DIASTOLIC BLOOD PRESSURE: 80 MMHG

## 2023-03-10 DIAGNOSIS — C34.32 MALIGNANT NEOPLASM OF LOWER LOBE OF LEFT LUNG (H): Primary | ICD-10-CM

## 2023-03-10 DIAGNOSIS — M79.662 PAIN OF LEFT LOWER LEG: ICD-10-CM

## 2023-03-10 LAB
CK SERPL-CCNC: 30 U/L (ref 26–192)
CRP SERPL-MCNC: 8.52 MG/L
ERYTHROCYTE [SEDIMENTATION RATE] IN BLOOD BY WESTERGREN METHOD: 27 MM/HR (ref 0–30)

## 2023-03-10 PROCEDURE — 99214 OFFICE O/P EST MOD 30 MIN: CPT | Performed by: NURSE PRACTITIONER

## 2023-03-10 PROCEDURE — 85652 RBC SED RATE AUTOMATED: CPT | Performed by: NURSE PRACTITIONER

## 2023-03-10 PROCEDURE — G0463 HOSPITAL OUTPT CLINIC VISIT: HCPCS | Performed by: NURSE PRACTITIONER

## 2023-03-10 PROCEDURE — 36591 DRAW BLOOD OFF VENOUS DEVICE: CPT | Performed by: NURSE PRACTITIONER

## 2023-03-10 PROCEDURE — 86140 C-REACTIVE PROTEIN: CPT | Performed by: NURSE PRACTITIONER

## 2023-03-10 PROCEDURE — 82550 ASSAY OF CK (CPK): CPT | Performed by: NURSE PRACTITIONER

## 2023-03-10 RX ORDER — PREDNISONE 10 MG/1
TABLET ORAL
Qty: 40 TABLET | Refills: 0 | Status: SHIPPED | OUTPATIENT
Start: 2023-03-10 | End: 2023-04-03

## 2023-03-10 ASSESSMENT — PAIN SCALES - GENERAL: PAINLEVEL: MILD PAIN (3)

## 2023-03-10 NOTE — LETTER
3/10/2023         RE: Yamel Lindsay  [address] [facility] [address]        Dear Colleague,    Thank you for referring your patient, Yamel Lindsay, to the [facility]. Please see a copy of my visit note below.    Reason for Visit: seen in f/u of lung cancer    Oncology HPI:   Diagnosis:   Synchronous Rt LL Squamous cell carcinoma [id number] Stage IIIC (AJCC 8th edition) diagnosed 12/2022  PD-L1 <1%  NGS- [facility] panel- Neg (High TMB 16.7 mut/MB)     Left LL adenocarcinoma [id number] Stage (AJCC 8th edition) diagnosed 12/2022  PD-L1-2%  NGS-KRAS G12C, TMB 2.4     Treatment:   1/20/22- Pembrolizumab+carboplatin+paclitaxel     Intent of treatment: Palliative     Oncologic course:  One month history of increasing dyspnea with palpitations, propting ER visit 11/19.  11/19/22- CT- 1 cm right lower lobe mass abutting multiple central bronchovascular and mediastinal structures, consistent with malignancy.2.2 cm spiculated nodule in the left lower lobe, also consistent with malignancy.   12/6/22- PET/CT-  Right lower lobe lung mass abutting the pleura (SUV max of 24), measuring 8 x 5.2 cm. There is associated complete obliteration of right lower lobe bronchus and loss of fat planes with  the left atrium. Spiculated left lower lobe solid lung mass (SUV 17.9), measuring 2.2 x 2 cm. There are several markedly hypermetabolic, irregular solid nodules in the right middle lobe abutting the pleura (approximately 10-12), as for example 1.2 cm nodule with SUV max of 16.8 (4/126). There is a separate markedly hypermetabolic 1 cm nodule in the right upper lobe. Mild hypermetabolic uptake is noted within a1 x 0.9 cm right paratracheal node ( SUV max of 4.2) (4/120), Mild uptake is also  noted within a subcentimeter lower paraesophageal node (4/154).   12/28/22- EBUS with deblking in the right hilar mass (no mediastinal staging performed)- - LUNG, RIGHT MAINSTEM, ENDOBRONCHIAL BIOPSY: Squamous cell  carcinoma with extensive necrosis (positive for p40 and negative for TTF-1). Left LL FNAC- Positive for malignancy- Adenocarcinoma  (diffusely positive for TTF-1 while negative for p40)  1/3/23: Brain MRI - Neg  1/10/23: Tumor board discussion: Too extensive for concurrent/sequential chemo XRT- plan for palliative intent chemo-IO  1/20/23- C1 Pembrolizumab+carboplatin+paclitaxel  2/10/23-C2 Pembrolizumab+carboplatin+paclitaxel  2/27/23- CT CAP- Decreased size of the hypoattenuating mass in the central right lower lobe and small irregularly-shaped nodules in the right upper lobe consistent with positive treatment response. There is persistent postobstructive right lower lobe atelectasis.Unchanged heterogeneous lobulated and spiculated solid nodule in the lateral basal left lower lobe. No findings to suggest new extrapulmonary metastatic disease.   3/3/23: C3 pembro/paclitaxel/carbo    Interval history: Yamel is seen in f/u of leg pain. She is here with her son in law, Jaquan.  Since starting chemoimmunotherapy, she has had increased pain the bilateral lower extremities. She has a history of arthritis in the knee, but this pain feels more intense and involves the muscles. Pain starts above the knee and radiates through the calf and ankle. No joint swelling or redness. There is tenderness to the touch on the muscles of the calf sometimes. No edema, but notes she was identified to have some enlargement of the right leg at her last visit. She did have an US done to evaluate for DVT that was negative. Pain seems more prominent in the R leg over the left. No significant hip or back pain. Has some muscle tension in the upper back since her cancer diagnosis, but that isn't worsening. No elbow, wrist or finger/toe tenderness. No rashes. Bowels are regular. No shortness of breath or wheezing.   -feels a little more fatigue on each cycle of chemo.   -appetite is good, has been gaining weight. Notes she has been eating a lot of  icecream and has cut it down to 1 pint/day.    Current Outpatient Medications   Medication Sig Dispense Refill     albuterol (PROAIR HFA/PROVENTIL HFA/VENTOLIN HFA) 108 (90 Base) MCG/ACT inhaler Inhale 2 puffs into the lungs every 6 hours as needed for shortness of breath / dyspnea or wheezing 18 g 0     atorvastatin (LIPITOR) 20 MG tablet TAKE 1 TABLET(20 MG) BY MOUTH DAILY 90 tablet 1     Cholecalciferol (VITAMIN D-3 PO) Take 2,000 Units by mouth daily        clonazePAM (KLONOPIN) 0.5 MG tablet Take 0.5-1 tablets (0.25-0.5 mg) by mouth 2 times daily as needed for anxiety 30 tablet 3     dexamethasone (DECADRON) 4 MG tablet Take 2 tablets (8 mg) by mouth daily Take for 3 days, starting the day after chemo. Take with food. 6 tablet 3     FLUoxetine (PROZAC) 20 MG capsule Take 2 capsules (40 mg) by mouth daily 60 capsule 11     fluticasone-salmeterol (ADVAIR HFA) 115-21 MCG/ACT inhaler Inhale 2 puffs into the lungs 2 times daily 1 g 3     gabapentin (NEURONTIN) 300 MG capsule Take 1 capsule (300 mg) by mouth every evening as needed for neuropathic pain ((for painful tingling and numbness)) 30 capsule 1     ibuprofen (ADVIL,MOTRIN) 200 MG tablet Take 200-600 mg by mouth every 2 hours as needed for mild pain       ipratropium - albuterol 0.5 mg/2.5 mg/3 mL (DUONEB) 0.5-2.5 (3) MG/3ML neb solution Take 1 vial (3 mLs) by nebulization every 6 hours as needed for shortness of breath or wheezing 90 mL 0     lidocaine-prilocaine (EMLA) 2.5-2.5 % external cream Apply topically as needed for moderate pain (4-6) Apply 1 hour prior to port access and cover with saran wrap or press and seal. 30 g 1     omeprazole (PRILOSEC) 20 MG DR capsule Take 1 capsule (20 mg) by mouth daily 30-60 minutes before a meal. 90 capsule 2     ondansetron (ZOFRAN) 8 MG tablet Take 1 tablet (8 mg) by mouth every 8 hours as needed for nausea (vomiting) 30 tablet 2     polyethylene glycol (MIRALAX) 17 g packet Take 1 packet by mouth daily Taking PRN  (Patient not taking: Reported on 3/3/2023)       prochlorperazine (COMPAZINE) 10 MG tablet Take 0.5 tablets (5 mg) by mouth every 6 hours as needed for nausea or vomiting 30 tablet 2     tiZANidine (ZANAFLEX) 2 MG tablet TAKE 1 TABLET(2 MG) BY MOUTH THREE TIMES DAILY AS NEEDED FOR MUSCLE SPASMS 90 tablet 3     traMADol (ULTRAM) 50 MG tablet Take 1 tablet (50 mg) by mouth every 6 hours as needed for severe pain (7-10) COVERING FOR PCP ONLY ONCE 120 tablet 1     traZODone (DESYREL) 50 MG tablet TAKE 2 TABLETS(100 MG) BY MOUTH AT BEDTIME 180 tablet 3          Allergies   Allergen Reactions     Nka [No Known Allergies]      Seasonal Allergies          Exam: alert, appears well.  Blood pressure 136/80, pulse 90, temperature 98.5  F (36.9  C), temperature source Oral, resp. rate 18, weight 73 kg (161 lb), SpO2 95 %, not currently breastfeeding.  Wt Readings from Last 4 Encounters:   03/03/23 72.9 kg (160 lb 11.2 oz)   03/01/23 72.9 kg (160 lb 11.2 oz)   02/10/23 73 kg (161 lb)   02/06/23 72.1 kg (159 lb)     Extremities: warm, no pitting edema. No knee or ankle effusions or joint erythema or tenderness. Mild tenderness to touch on the R distal quadricep and gastrocnemius muscles. No palpable cord or erythema.  Speech is clear. CN wnl. Gait/station wnl. Skin: port on the right chest intact.       Labs:    Latest Reference Range & Units 02/27/23 12:32   CRP Inflammation <5.00 mg/L 12.20 (H)   Sed Rate 0 - 20 mm/hr 54 (H)   (H): Data is abnormally high    Imaging:   EXAM: US LOWER EXTREMITY VENOUS DUPLEX BILATERAL  LOCATION: [facility]  DATE/TIME: 3/2/2023 12:00 PM     INDICATION: Bilateral leg pain. Metachronous lung cancers  COMPARISON: None.  TECHNIQUE: Venous Duplex ultrasound of bilateral lower extremities with and without compression, augmentation and duplex. Color flow and spectral Doppler with waveform analysis performed.     FINDINGS: Exam includes the common femoral, femoral, popliteal  veins as well as segmentally visualized deep calf veins and greater saphenous vein.      RIGHT: No deep vein thrombosis. No superficial thrombophlebitis. No popliteal cyst.     LEFT: No deep vein thrombosis. No superficial thrombophlebitis. No popliteal cyst.                                                                      IMPRESSION:  1.  No deep venous thrombosis in either leg.    Impression/plan:     Synchronous Rt LL Squamous cell carcinoma [id number] Stage IIIC (AJCC 8th edition) diagnosed 12/2022  PD-L1 <1%  NGS- [facility] panel- Neg (High TMB 16.7 mut/MB)     Left LL adenocarcinoma [id number] Stage (AJCC 8th edition) diagnosed 12/2022  PD-L1-2%  NGS-KRAS G12C, TMB 2.4     Pt with 2 synchronous cancers. RLL squamous cell cancer which appears to be at least stage IIIC given the size greater than 7 cm suggestive of T4 disease.  She also has mediastinal lymph node that were enlarged and appear pathologic but have not yet been biopsied.  She is also has several FDG avid nodules in the right middle and the right upper lobe that have not yet been biopsied.  She underwent an EBUS for debulking and her mediastinum has not been staged.  Given the extent of involvement of the right sided cancer, concurrent/sequential chemoradiation not feasible per tumor board discussion, also no further EBUS required since high probability of cancer.   -on palliative intent chemoimmunotherapy with pembro+carbo+taxol.  Of note, squamous has high TMB and low neg PD-L1.  Adenocarcinoma has KRAS G12C.   CT after 2 cycles showing good response in the rt SCC.   -completed cycle 3 last week.    -reviewed the plan to treat for possible arthritis/myositis s/t immunotherapy  -will f/u with CHEMO prior to her next infusion and discuss whether we continue with the immunotherapy depending on her response to steroids.  -plan is to have 4 cycles of chemoimmunotherapy then drop the chemo and continue immunotherapy. If she has a good response, consider  consolidation radiation    #BLE leg pain, with elevation in CRP and ESR  -possible myositis/arthritis s/t immunotherapy  -dopplar negative for DVT  -will repeat CRP and ESR today and also add CK  -start prednisone taper: 40 mg daily x 4, then 30 mg daily x 4, then 20 mg daily x 4, then 10 mg daily x 4.   -if no improvement consider further CT imaging of the R LE to evaluate for bone metastases. No known bone mets on baseline PET/CT.  -has tramadol, started on gabapentin 300 mg at bedtime per palliative    #Nutrition: Gaining about 20 #, eating a lot of ice cream, but is planning to cut back     #Psychiatry: hx of anxiety, on prozac and clonazepam  -worsened with cancer diagnosis and clonazepam was increased to 0.5 mg bid recently  Trazodone for insomnia  -reviewed the side effects of prednisone that may worsen anxiety and insomnia. She is aware as she has some increased energy, feels wired when she is on the dexamethasone       #COVID vaccine: s/p 3 boosters     #Hypertension hyperlipidemia  On statin       Sincerely,        NAYA Gonzalez CNP

## 2023-03-10 NOTE — PROGRESS NOTES
Reason for Visit: seen in f/u of lung cancer    Oncology HPI:   Diagnosis:   Synchronous Rt LL Squamous cell carcinoma [id number] Stage IIIC (AJCC 8th edition) diagnosed 12/2022  PD-L1 <1%  NGS- [facility] panel- Neg (High TMB 16.7 mut/MB)     Left LL adenocarcinoma [id number] Stage (AJCC 8th edition) diagnosed 12/2022  PD-L1-2%  NGS-KRAS G12C, TMB 2.4     Treatment:   1/20/22- Pembrolizumab+carboplatin+paclitaxel     Intent of treatment: Palliative     Oncologic course:  One month history of increasing dyspnea with palpitations, propting ER visit 11/19.  11/19/22- CT- 1 cm right lower lobe mass abutting multiple central bronchovascular and mediastinal structures, consistent with malignancy.2.2 cm spiculated nodule in the left lower lobe, also consistent with malignancy.   12/6/22- PET/CT-  Right lower lobe lung mass abutting the pleura (SUV max of 24), measuring 8 x 5.2 cm. There is associated complete obliteration of right lower lobe bronchus and loss of fat planes with  the left atrium. Spiculated left lower lobe solid lung mass (SUV 17.9), measuring 2.2 x 2 cm. There are several markedly hypermetabolic, irregular solid nodules in the right middle lobe abutting the pleura (approximately 10-12), as for example 1.2 cm nodule with SUV max of 16.8 (4/126). There is a separate markedly hypermetabolic 1 cm nodule in the right upper lobe. Mild hypermetabolic uptake is noted within a1 x 0.9 cm right paratracheal node ( SUV max of 4.2) (4/120), Mild uptake is also  noted within a subcentimeter lower paraesophageal node (4/154).   12/28/22- EBUS with deblking in the right hilar mass (no mediastinal staging performed)- - LUNG, RIGHT MAINSTEM, ENDOBRONCHIAL BIOPSY: Squamous cell carcinoma with extensive necrosis (positive for p40 and negative for TTF-1). Left LL FNAC- Positive for malignancy- Adenocarcinoma  (diffusely positive for TTF-1 while negative for p40)  1/3/23: Brain MRI - Neg  1/10/23: Tumor board discussion: Too  extensive for concurrent/sequential chemo XRT- plan for palliative intent chemo-IO  1/20/23- C1 Pembrolizumab+carboplatin+paclitaxel  2/10/23-C2 Pembrolizumab+carboplatin+paclitaxel  2/27/23- CT CAP- Decreased size of the hypoattenuating mass in the central right lower lobe and small irregularly-shaped nodules in the right upper lobe consistent with positive treatment response. There is persistent postobstructive right lower lobe atelectasis.Unchanged heterogeneous lobulated and spiculated solid nodule in the lateral basal left lower lobe. No findings to suggest new extrapulmonary metastatic disease.   3/3/23: C3 pembro/paclitaxel/carbo    Interval history: Yamel is seen in f/u of leg pain. She is here with her son in law, Jaquan.  Since starting chemoimmunotherapy, she has had increased pain the bilateral lower extremities. She has a history of arthritis in the knee, but this pain feels more intense and involves the muscles. Pain starts above the knee and radiates through the calf and ankle. No joint swelling or redness. There is tenderness to the touch on the muscles of the calf sometimes. No edema, but notes she was identified to have some enlargement of the right leg at her last visit. She did have an US done to evaluate for DVT that was negative. Pain seems more prominent in the R leg over the left. No significant hip or back pain. Has some muscle tension in the upper back since her cancer diagnosis, but that isn't worsening. No elbow, wrist or finger/toe tenderness. No rashes. Bowels are regular. No shortness of breath or wheezing.   -feels a little more fatigue on each cycle of chemo.   -appetite is good, has been gaining weight. Notes she has been eating a lot of icecream and has cut it down to 1 pint/day.    Current Outpatient Medications   Medication Sig Dispense Refill     albuterol (PROAIR HFA/PROVENTIL HFA/VENTOLIN HFA) 108 (90 Base) MCG/ACT inhaler Inhale 2 puffs into the lungs every 6 hours as needed for  shortness of breath / dyspnea or wheezing 18 g 0     atorvastatin (LIPITOR) 20 MG tablet TAKE 1 TABLET(20 MG) BY MOUTH DAILY 90 tablet 1     Cholecalciferol (VITAMIN D-3 PO) Take 2,000 Units by mouth daily        clonazePAM (KLONOPIN) 0.5 MG tablet Take 0.5-1 tablets (0.25-0.5 mg) by mouth 2 times daily as needed for anxiety 30 tablet 3     dexamethasone (DECADRON) 4 MG tablet Take 2 tablets (8 mg) by mouth daily Take for 3 days, starting the day after chemo. Take with food. 6 tablet 3     FLUoxetine (PROZAC) 20 MG capsule Take 2 capsules (40 mg) by mouth daily 60 capsule 11     fluticasone-salmeterol (ADVAIR HFA) 115-21 MCG/ACT inhaler Inhale 2 puffs into the lungs 2 times daily 1 g 3     gabapentin (NEURONTIN) 300 MG capsule Take 1 capsule (300 mg) by mouth every evening as needed for neuropathic pain ((for painful tingling and numbness)) 30 capsule 1     ibuprofen (ADVIL,MOTRIN) 200 MG tablet Take 200-600 mg by mouth every 2 hours as needed for mild pain       ipratropium - albuterol 0.5 mg/2.5 mg/3 mL (DUONEB) 0.5-2.5 (3) MG/3ML neb solution Take 1 vial (3 mLs) by nebulization every 6 hours as needed for shortness of breath or wheezing 90 mL 0     lidocaine-prilocaine (EMLA) 2.5-2.5 % external cream Apply topically as needed for moderate pain (4-6) Apply 1 hour prior to port access and cover with saran wrap or press and seal. 30 g 1     omeprazole (PRILOSEC) 20 MG DR capsule Take 1 capsule (20 mg) by mouth daily 30-60 minutes before a meal. 90 capsule 2     ondansetron (ZOFRAN) 8 MG tablet Take 1 tablet (8 mg) by mouth every 8 hours as needed for nausea (vomiting) 30 tablet 2     polyethylene glycol (MIRALAX) 17 g packet Take 1 packet by mouth daily Taking PRN (Patient not taking: Reported on 3/3/2023)       prochlorperazine (COMPAZINE) 10 MG tablet Take 0.5 tablets (5 mg) by mouth every 6 hours as needed for nausea or vomiting 30 tablet 2     tiZANidine (ZANAFLEX) 2 MG tablet TAKE 1 TABLET(2 MG) BY MOUTH THREE  TIMES DAILY AS NEEDED FOR MUSCLE SPASMS 90 tablet 3     traMADol (ULTRAM) 50 MG tablet Take 1 tablet (50 mg) by mouth every 6 hours as needed for severe pain (7-10) COVERING FOR PCP ONLY ONCE 120 tablet 1     traZODone (DESYREL) 50 MG tablet TAKE 2 TABLETS(100 MG) BY MOUTH AT BEDTIME 180 tablet 3          Allergies   Allergen Reactions     Nka [No Known Allergies]      Seasonal Allergies          Exam: alert, appears well.  Blood pressure 136/80, pulse 90, temperature 98.5  F (36.9  C), temperature source Oral, resp. rate 18, weight 73 kg (161 lb), SpO2 95 %, not currently breastfeeding.  Wt Readings from Last 4 Encounters:   03/03/23 72.9 kg (160 lb 11.2 oz)   03/01/23 72.9 kg (160 lb 11.2 oz)   02/10/23 73 kg (161 lb)   02/06/23 72.1 kg (159 lb)     Extremities: warm, no pitting edema. No knee or ankle effusions or joint erythema or tenderness. Mild tenderness to touch on the R distal quadricep and gastrocnemius muscles. No palpable cord or erythema.  Speech is clear. CN wnl. Gait/station wnl. Skin: port on the right chest intact.       Labs:    Latest Reference Range & Units 02/27/23 12:32   CRP Inflammation <5.00 mg/L 12.20 (H)   Sed Rate 0 - 20 mm/hr 54 (H)   (H): Data is abnormally high    Imaging:   EXAM: US LOWER EXTREMITY VENOUS DUPLEX BILATERAL  LOCATION: [facility]  DATE/TIME: 3/2/2023 12:00 PM     INDICATION: Bilateral leg pain. Metachronous lung cancers  COMPARISON: None.  TECHNIQUE: Venous Duplex ultrasound of bilateral lower extremities with and without compression, augmentation and duplex. Color flow and spectral Doppler with waveform analysis performed.     FINDINGS: Exam includes the common femoral, femoral, popliteal veins as well as segmentally visualized deep calf veins and greater saphenous vein.      RIGHT: No deep vein thrombosis. No superficial thrombophlebitis. No popliteal cyst.     LEFT: No deep vein thrombosis. No superficial thrombophlebitis. No popliteal  cyst.                                                                      IMPRESSION:  1.  No deep venous thrombosis in either leg.    Impression/plan:     Synchronous Rt LL Squamous cell carcinoma [id number] Stage IIIC (AJCC 8th edition) diagnosed 12/2022  PD-L1 <1%  NGS- [facility] panel- Neg (High TMB 16.7 mut/MB)     Left LL adenocarcinoma [id number] Stage (AJCC 8th edition) diagnosed 12/2022  PD-L1-2%  NGS-[facility] G12C, TMB 2.4     Pt with 2 synchronous cancers. RLL squamous cell cancer which appears to be at least stage IIIC given the size greater than 7 cm suggestive of T4 disease.  She also has mediastinal lymph node that were enlarged and appear pathologic but have not yet been biopsied.  She is also has several FDG avid nodules in the right middle and the right upper lobe that have not yet been biopsied.  She underwent an EBUS for debulking and her mediastinum has not been staged.  Given the extent of involvement of the right sided cancer, concurrent/sequential chemoradiation not feasible per tumor board discussion, also no further EBUS required since high probability of cancer.   -on palliative intent chemoimmunotherapy with pembro+carbo+taxol.  Of note, squamous has high TMB and low neg PD-L1.  Adenocarcinoma has KRAS G12C.   CT after 2 cycles showing good response in the rt SCC.   -completed cycle 3 last week.    -reviewed the plan to treat for possible arthritis/myositis s/t immunotherapy  -will f/u with CHEMO prior to her next infusion and discuss whether we continue with the immunotherapy depending on her response to steroids.  -plan is to have 4 cycles of chemoimmunotherapy then drop the chemo and continue immunotherapy. If she has a good response, consider consolidation radiation    #BLE leg pain, with elevation in CRP and ESR  -possible myositis/arthritis s/t immunotherapy  -dopplar negative for DVT  -will repeat CRP and ESR today and also add CK  -start prednisone taper: 40 mg daily x 4, then 30 mg daily x 4,  then 20 mg daily x 4, then 10 mg daily x 4.   -if no improvement consider further CT imaging of the R LE to evaluate for bone metastases. No known bone mets on baseline PET/CT.  -has tramadol, started on gabapentin 300 mg at bedtime per palliative    #Nutrition: Gaining about 20 #, eating a lot of ice cream, but is planning to cut back     #Psychiatry: hx of anxiety, on prozac and clonazepam  -worsened with cancer diagnosis and clonazepam was increased to 0.5 mg bid recently  Trazodone for insomnia  -reviewed the side effects of prednisone that may worsen anxiety and insomnia. She is aware as she has some increased energy, feels wired when she is on the dexamethasone       #COVID vaccine: s/p 3 boosters     #Hypertension hyperlipidemia  On statin

## 2023-03-10 NOTE — NURSING NOTE
"Oncology Rooming Note    March 10, 2023 4:18 PM   Yamel Lindsay is a 77 year old female who presents for:    Chief Complaint   Patient presents with     Oncology Clinic Visit     Lung Ca     Initial Vitals: /80   Pulse 90   Temp 98.5  F (36.9  C) (Oral)   Resp 18   Wt 73 kg (161 lb)   SpO2 95%   BMI 28.75 kg/m   Estimated body mass index is 28.75 kg/m  as calculated from the following:    Height as of 3/3/23: 1.594 m (5' 2.75\").    Weight as of this encounter: 73 kg (161 lb). Body surface area is 1.8 meters squared.  Mild Pain (3) Comment: Data Unavailable   No LMP recorded. Patient is postmenopausal.  Allergies reviewed: Yes  Medications reviewed: Yes    Medications: Medication refills not needed today.  Pharmacy name entered into [employer]:    [facility] [place], [place] - [address] [facility] [place], [place] - [address] [id number] - [place], [place] - [address] [place] [address] [address] [place] [id number] & [facility]    Clinical concerns:        Jammie Cervantes CMA              "

## 2023-03-16 ENCOUNTER — VIRTUAL VISIT (OUTPATIENT)
Dept: ONCOLOGY | Facility: CLINIC | Age: 78
End: 2023-03-16
Attending: NURSE PRACTITIONER
Payer: COMMERCIAL

## 2023-03-16 DIAGNOSIS — C34.32 MALIGNANT NEOPLASM OF LOWER LOBE OF LEFT LUNG (H): ICD-10-CM

## 2023-03-16 DIAGNOSIS — M79.662 PAIN OF LEFT LOWER LEG: ICD-10-CM

## 2023-03-16 PROCEDURE — 99213 OFFICE O/P EST LOW 20 MIN: CPT | Mod: 95 | Performed by: NURSE PRACTITIONER

## 2023-03-16 NOTE — NURSING NOTE
Is the patient currently in the state of [place]? YES    Visit mode:TELEPHONE    If the visit is dropped, the patient can be reconnected by: TELEPHONE VISIT: Phone number: [phone]    Will anyone else be joining the visit? NO      How would you like to obtain your AVS? MyChart    Are changes needed to the allergy or medication list? NO    Reason for visit: Return CCSL  Pt has pain in lower legs from below the knees, right leg is worse.     Brianna Holley VF

## 2023-03-16 NOTE — PROGRESS NOTES
Telephone-Visit Details    Phone visit: duration of call 12 minutes    Originating Location (pt. Location): Home    Distant Location (provider location):  Off-site    Mode of Communication:  Video Conference via [employer]      Reason for Visit: follow-up leg pain    Oncology HPI:   Diagnosis:   Synchronous Rt LL Squamous cell carcinoma [id number] Stage IIIC (AJCC 8th edition) diagnosed 12/2022  PD-L1 <1%  NGS- [facility] panel- Neg (High TMB 16.7 mut/MB)     Left LL adenocarcinoma [id number] Stage (AJCC 8th edition) diagnosed 12/2022  PD-L1-2%  NGS-[facility] G12C, TMB 2.4     Treatment:   1/20/22- Pembrolizumab+carboplatin+paclitaxel     Intent of treatment: Palliative     Oncologic course:  One month history of increasing dyspnea with palpitations, propting ER visit 11/19.  11/19/22- CT- 1 cm right lower lobe mass abutting multiple central bronchovascular and mediastinal structures, consistent with malignancy.2.2 cm spiculated nodule in the left lower lobe, also consistent with malignancy.   12/6/22- PET/CT-  Right lower lobe lung mass abutting the pleura (SUV max of 24), measuring 8 x 5.2 cm. There is associated complete obliteration of right lower lobe bronchus and loss of fat planes with  the left atrium. Spiculated left lower lobe solid lung mass (SUV 17.9), measuring 2.2 x 2 cm. There are several markedly hypermetabolic, irregular solid nodules in the right middle lobe abutting the pleura (approximately 10-12), as for example 1.2 cm nodule with SUV max of 16.8 (4/126). There is a separate markedly hypermetabolic 1 cm nodule in the right upper lobe. Mild hypermetabolic uptake is noted within a1 x 0.9 cm right paratracheal node ( SUV max of 4.2) (4/120), Mild uptake is also  noted within a subcentimeter lower paraesophageal node (4/154).   12/28/22- EBUS with deblking in the right hilar mass (no mediastinal staging performed)- - LUNG, RIGHT MAINSTEM, ENDOBRONCHIAL BIOPSY: Squamous cell carcinoma with extensive  necrosis (positive for p40 and negative for TTF-1). Left LL FNAC- Positive for malignancy- Adenocarcinoma  (diffusely positive for TTF-1 while negative for p40)  1/3/23: Brain MRI - Neg  1/10/23: Tumor board discussion: Too extensive for concurrent/sequential chemo XRT- plan for palliative intent chemo-IO  1/20/23- C1 Pembrolizumab+carboplatin+paclitaxel  2/10/23-C2 Pembrolizumab+carboplatin+paclitaxel  2/27/23- CT CAP- Decreased size of the hypoattenuating mass in the central right lower lobe and small irregularly-shaped nodules in the right upper lobe consistent with positive treatment response. There is persistent postobstructive right lower lobe atelectasis.Unchanged heterogeneous lobulated and spiculated solid nodule in the lateral basal left lower lobe. No findings to suggest new extrapulmonary metastatic disease.   3/3/23: C3 pembro/paclitaxel/carbo  3/10/23: started prednisone taper for immunotherapy related serositis/arthritis       Interval history: Yamel has noted improvement in her leg pain. She is now able to climb stairs and was not able to do that last week. No leg swelling, erythema, rash.  Pain is not resolved 100%, but much improved. Still has R>L leg pain, but both sides are improving. Is now on her 2nd day of the 30 mg/day dosing.  -appetite has increased. She is trying to watch her diet and cut back on ice cream since she had been gaining weight prior to starting prednisone.   -mood has been good. She hasn't feel increased anxiety on the prednisone, is sleeping well.  -no N/V  -energy is good.    Current Outpatient Medications   Medication Sig Dispense Refill     atorvastatin (LIPITOR) 20 MG tablet TAKE 1 TABLET(20 MG) BY MOUTH DAILY 90 tablet 1     Cholecalciferol (VITAMIN D-3 PO) Take 2,000 Units by mouth daily        dexamethasone (DECADRON) 4 MG tablet Take 2 tablets (8 mg) by mouth daily Take for 3 days, starting the day after chemo. Take with food. 6 tablet 3     fluticasone-salmeterol  (ADVAIR HFA) 115-21 MCG/ACT inhaler Inhale 2 puffs into the lungs 2 times daily 1 g 3     gabapentin (NEURONTIN) 300 MG capsule Take 1 capsule (300 mg) by mouth every evening as needed for neuropathic pain ((for painful tingling and numbness)) 30 capsule 1     ibuprofen (ADVIL,MOTRIN) 200 MG tablet Take 200-600 mg by mouth every 2 hours as needed for mild pain       lidocaine-prilocaine (EMLA) 2.5-2.5 % external cream Apply topically as needed for moderate pain (4-6) Apply 1 hour prior to port access and cover with saran wrap or press and seal. 30 g 1     omeprazole (PRILOSEC) 20 MG DR capsule Take 1 capsule (20 mg) by mouth daily 30-60 minutes before a meal. 90 capsule 2     ondansetron (ZOFRAN) 8 MG tablet Take 1 tablet (8 mg) by mouth every 8 hours as needed for nausea (vomiting) 30 tablet 2     predniSONE (DELTASONE) 10 MG tablet Take 4 tablets (40 mg) by mouth daily for 4 days, THEN 3 tablets (30 mg) daily for 4 days, THEN 2 tablets (20 mg) daily for 4 days, THEN 1 tablet (10 mg) daily for 4 days. 40 tablet 0     prochlorperazine (COMPAZINE) 10 MG tablet Take 0.5 tablets (5 mg) by mouth every 6 hours as needed for nausea or vomiting 30 tablet 2     tiZANidine (ZANAFLEX) 2 MG tablet TAKE 1 TABLET(2 MG) BY MOUTH THREE TIMES DAILY AS NEEDED FOR MUSCLE SPASMS 90 tablet 3     traMADol (ULTRAM) 50 MG tablet Take 1 tablet (50 mg) by mouth every 6 hours as needed for severe pain (7-10) COVERING FOR PCP ONLY ONCE 120 tablet 1     traZODone (DESYREL) 50 MG tablet TAKE 2 TABLETS(100 MG) BY MOUTH AT BEDTIME 180 tablet 3     albuterol (PROAIR HFA/PROVENTIL HFA/VENTOLIN HFA) 108 (90 Base) MCG/ACT inhaler Inhale 2 puffs into the lungs every 6 hours as needed for shortness of breath / dyspnea or wheezing (Patient not taking: Reported on 3/10/2023) 18 g 0     clonazePAM (KLONOPIN) 0.5 MG tablet Take 0.5-1 tablets (0.25-0.5 mg) by mouth 2 times daily as needed for anxiety (Patient not taking: Reported on 3/10/2023) 30 tablet 3      FLUoxetine (PROZAC) 20 MG capsule Take 2 capsules (40 mg) by mouth daily 60 capsule 11     ipratropium - albuterol 0.5 mg/2.5 mg/3 mL (DUONEB) 0.5-2.5 (3) MG/3ML neb solution Take 1 vial (3 mLs) by nebulization every 6 hours as needed for shortness of breath or wheezing (Patient not taking: Reported on 3/10/2023) 90 mL 0     polyethylene glycol (MIRALAX) 17 g packet Take 1 packet by mouth daily Taking PRN (Patient not taking: Reported on 3/3/2023)            Allergies   Allergen Reactions     Nka [No Known Allergies]      Seasonal Allergies          Phone visit: Voice is clear, pleasant and interactive. answers questions appropriately.       Impression/plan:     #BLE leg pain, with elevation in CRP and ESR  -treating for  serositie/arthritis s/t immunotherapy  -dopplar negative for DVT  -responding well to the prednisone taper, currently on day 2 of 4 of the 30 mg daily, then will decrease to 20 mg daily x 4, then 10 mg daily x 4.   -will recheck CRP/ESR at the conclusion of steroids  -has f/u with Ronit Renuka prior to her next infusion and if pain is resolved and markers improved, consider resuming immunotherapy  -has tramadol, started on gabapentin 300 mg at bedtime per palliative    #Synchronous Rt LL Squamous cell carcinoma [id number] Stage IIIC (AJCC 8th edition) diagnosed 12/2022  PD-L1 <1%  NGS- [facility] panel- Neg (High TMB 16.7 mut/MB)     Left LL adenocarcinoma [id number] Stage (AJCC 8th edition) diagnosed 12/2022  PD-L1-2%  NGS-KRAS G12C, TMB 2.4     Pt with 2 synchronous cancers. RLL squamous cell cancer which appears to be at least stage IIIC given the size greater than 7 cm suggestive of T4 disease.  She also has mediastinal lymph node that were enlarged and appear pathologic but have not yet been biopsied.  She is also has several FDG avid nodules in the right middle and the right upper lobe that have not yet been biopsied.  She underwent an EBUS for debulking and her mediastinum has not been staged.   Given the extent of involvement of the right sided cancer, concurrent/sequential chemoradiation not feasible per tumor board discussion, also no further EBUS required since high probability of cancer.   -on palliative intent chemoimmunotherapy with pembro+carbo+taxol.  Of note, squamous has high TMB and low neg PD-L1.  Adenocarcinoma has KRAS G12C.   CT after 2 cycles showing good response in the rt SCC.   -now s/p cycle 3  -tolerating well except for leg pain (see above)  F/u with Ronit Muse NP prior to her next infusion and discuss whether we continue with the immunotherapy depending on her response to steroids.  -plan is to have 4 cycles of chemoimmunotherapy then drop the chemo and continue immunotherapy. If she has a good response, consider consolidation radiation     #Nutrition: Gaining about 20 #, eating a lot of ice cream  -watching her diet, cutting back on ice cream.   -monitor     #Psychiatry: hx of anxiety, on prozac  -no longer taking clonazepam, anxiety is well managed  Trazodone for insomnia. Sleeps well, except on days she takes dexamethasone    #COVID vaccine: s/p 3 boosters     #Hypertension hyperlipidemia  On statin

## 2023-03-16 NOTE — LETTER
3/16/2023         RE: Yamel Lindsay  [address] [facility] [id number]        Dear Colleague,    Thank you for referring your patient, Yamel Lindsay, to the [facility]. Please see a copy of my visit note below.    Telephone-Visit Details    Phone visit: duration of call 12 minutes    Originating Location (pt. 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  11/19/22- CT- 1 cm right lower lobe mass abutting multiple central bronchovascular and mediastinal structures, consistent with malignancy.2.2 cm spiculated nodule in the left lower lobe, also consistent with malignancy.   12/6/22- PET/CT-  Right lower lobe lung mass abutting the pleura (SUV max of 24), measuring 8 x 5.2 cm. There is associated complete obliteration of right lower lobe bronchus and loss of fat planes with  the left atrium. Spiculated left lower lobe solid lung mass (SUV 17.9), measuring 2.2 x 2 cm. There are several markedly hypermetabolic, irregular solid nodules in the right middle lobe abutting the pleura (approximately 10-12), as for example 1.2 cm nodule with SUV max of 16.8 (4/126). There is a separate markedly hypermetabolic 1 cm nodule in the right upper lobe. Mild hypermetabolic uptake is noted within a1 x 0.9 cm right paratracheal node ( SUV max of 4.2) (4/120), Mild uptake is  also  noted within a subcentimeter lower paraesophageal node (4/154).   12/28/22- EBUS with deblking in the right hilar mass (no mediastinal staging performed)- - LUNG, RIGHT MAINSTEM, ENDOBRONCHIAL BIOPSY: Squamous cell carcinoma with extensive necrosis (positive for p40 and negative for TTF-1). Left LL FNAC- Positive for malignancy- Adenocarcinoma  (diffusely positive for TTF-1 while negative for p40)  1/3/23: Brain MRI - Neg  1/10/23: Tumor board discussion: Too extensive for concurrent/sequential chemo XRT- plan for palliative intent chemo-IO  1/20/23- C1 Pembrolizumab+carboplatin+paclitaxel  2/10/23-C2 Pembrolizumab+carboplatin+paclitaxel  2/27/23- CT CAP- Decreased size of the hypoattenuating mass in the central right lower lobe and small irregularly-shaped nodules in the right upper lobe consistent with positive treatment response. There is persistent postobstructive right lower lobe atelectasis.Unchanged heterogeneous lobulated and spiculated solid nodule in the lateral basal left lower lobe. No findings to suggest new extrapulmonary metastatic disease.   3/3/23: C3 pembro/paclitaxel/carbo  3/10/23: started prednisone taper for immunotherapy related serositis/arthritis       Interval history: Yamel has noted improvement in her leg pain. She is now able to climb stairs and was not able to do that last week. No leg swelling, erythema, rash.  Pain is not resolved 100%, but much improved. Still has R>L leg pain, but both sides are improving. Is now on her 2nd day of the 30 mg/day dosing.  -appetite has increased. She is trying to watch her diet and cut back on ice cream since she had been gaining weight prior to starting prednisone.   -mood has been good. She hasn't feel increased anxiety on the prednisone, is sleeping well.  -no N/V  -energy is good.    Current Outpatient Medications   Medication Sig Dispense Refill     atorvastatin (LIPITOR) 20 MG tablet TAKE 1 TABLET(20 MG) BY MOUTH DAILY 90 tablet  1     Cholecalciferol (VITAMIN D-3 PO) Take 2,000 Units by mouth daily        dexamethasone (DECADRON) 4 MG tablet Take 2 tablets (8 mg) by mouth daily Take for 3 days, starting the day after chemo. Take with food. 6 tablet 3     fluticasone-salmeterol (ADVAIR HFA) 115-21 MCG/ACT inhaler Inhale 2 puffs into the lungs 2 times daily 1 g 3     gabapentin (NEURONTIN) 300 MG capsule Take 1 capsule (300 mg) by mouth every evening as needed for neuropathic pain ((for painful tingling and numbness)) 30 capsule 1     ibuprofen (ADVIL,MOTRIN) 200 MG tablet Take 200-600 mg by mouth every 2 hours as needed for mild pain       lidocaine-prilocaine (EMLA) 2.5-2.5 % external cream Apply topically as needed for moderate pain (4-6) Apply 1 hour prior to port access and cover with saran wrap or press and seal. 30 g 1     omeprazole (PRILOSEC) 20 MG DR capsule Take 1 capsule (20 mg) by mouth daily 30-60 minutes before a meal. 90 capsule 2     ondansetron (ZOFRAN) 8 MG tablet Take 1 tablet (8 mg) by mouth every 8 hours as needed for nausea (vomiting) 30 tablet 2     predniSONE (DELTASONE) 10 MG tablet Take 4 tablets (40 mg) by mouth daily for 4 days, THEN 3 tablets (30 mg) daily for 4 days, THEN 2 tablets (20 mg) daily for 4 days, THEN 1 tablet (10 mg) daily for 4 days. 40 tablet 0     prochlorperazine (COMPAZINE) 10 MG tablet Take 0.5 tablets (5 mg) by mouth every 6 hours as needed for nausea or vomiting 30 tablet 2     tiZANidine (ZANAFLEX) 2 MG tablet TAKE 1 TABLET(2 MG) BY MOUTH THREE TIMES DAILY AS NEEDED FOR MUSCLE SPASMS 90 tablet 3     traMADol (ULTRAM) 50 MG tablet Take 1 tablet (50 mg) by mouth every 6 hours as needed for severe pain (7-10) COVERING FOR PCP ONLY ONCE 120 tablet 1     traZODone (DESYREL) 50 MG tablet TAKE 2 TABLETS(100 MG) BY MOUTH AT BEDTIME 180 tablet 3     albuterol (PROAIR HFA/PROVENTIL HFA/VENTOLIN HFA) 108 (90 Base) MCG/ACT inhaler Inhale 2 puffs into the lungs every 6 hours as needed for shortness of  breath / dyspnea or wheezing (Patient not taking: Reported on 3/10/2023) 18 g 0     clonazePAM (KLONOPIN) 0.5 MG tablet Take 0.5-1 tablets (0.25-0.5 mg) by mouth 2 times daily as needed for anxiety (Patient not taking: Reported on 3/10/2023) 30 tablet 3     FLUoxetine (PROZAC) 20 MG capsule Take 2 capsules (40 mg) by mouth daily 60 capsule 11     ipratropium - albuterol 0.5 mg/2.5 mg/3 mL (DUONEB) 0.5-2.5 (3) MG/3ML neb solution Take 1 vial (3 mLs) by nebulization every 6 hours as needed for shortness of breath or wheezing (Patient not taking: Reported on 3/10/2023) 90 mL 0     polyethylene glycol (MIRALAX) 17 g packet Take 1 packet by mouth daily Taking PRN (Patient not taking: Reported on 3/3/2023)            Allergies   Allergen Reactions     Nka [No Known Allergies]      Seasonal Allergies      Phone visit: Voice is clear, pleasant and interactive. answers questions appropriately.     Impression/plan:     #BLE leg pain, with elevation in CRP and ESR  -treating for  serositie/arthritis s/t immunotherapy  -dopplar negative for DVT  -responding well to the prednisone taper, currently on day 2 of 4 of the 30 mg daily, then will decrease to 20 mg daily x 4, then 10 mg daily x 4.   -will recheck CRP/ESR at the conclusion of steroids  -has f/u with Ronit Grayson prior to her next infusion and if pain is resolved and markers improved, consider resuming immunotherapy  -has tramadol, started on gabapentin 300 mg at bedtime per palliative    #Synchronous Rt LL Squamous cell carcinoma [id number] Stage IIIC (AJCC 8th edition) diagnosed 12/2022  PD-L1 <1%  NGS- [facility] panel- Neg (High TMB 16.7 mut/MB)     Left LL adenocarcinoma [id number] Stage (AJCC 8th edition) diagnosed 12/2022  PD-L1-2%  NGS-KRAS G12C, TMB 2.4     Pt with 2 synchronous cancers. RLL squamous cell cancer which appears to be at least stage IIIC given the size greater than 7 cm suggestive of T4 disease.  She also has mediastinal lymph node that were enlarged and  appear pathologic but have not yet been biopsied.  She is also has several FDG avid nodules in the right middle and the right upper lobe that have not yet been biopsied.  She underwent an EBUS for debulking and her mediastinum has not been staged.  Given the extent of involvement of the right sided cancer, concurrent/sequential chemoradiation not feasible per tumor board discussion, also no further EBUS required since high probability of cancer.   -on palliative intent chemoimmunotherapy with pembro+carbo+taxol.  Of note, squamous has high TMB and low neg PD-L1.  Adenocarcinoma has KRAS G12C.   CT after 2 cycles showing good response in the rt SCC.   -now s/p cycle 3  -tolerating well except for leg pain (see above)  F/u with Ronit Muse NP prior to her next infusion and discuss whether we continue with the immunotherapy depending on her response to steroids.  -plan is to have 4 cycles of chemoimmunotherapy then drop the chemo and continue immunotherapy. If she has a good response, consider consolidation radiation     #Nutrition: Gaining about 20 #, eating a lot of ice cream  -watching her diet, cutting back on ice cream.   -monitor     #Psychiatry: hx of anxiety, on prozac  -no longer taking clonazepam, anxiety is well managed  Trazodone for insomnia. Sleeps well, except on days she takes dexamethasone    #COVID vaccine: s/p 3 boosters     #Hypertension hyperlipidemia  On statin    Sincerely,      NAYA Gonzalez CNP

## 2023-03-21 RX ORDER — DIPHENHYDRAMINE HCL 25 MG
50 CAPSULE ORAL ONCE
Status: CANCELLED
Start: 2023-03-24

## 2023-03-21 RX ORDER — MEPERIDINE HYDROCHLORIDE 25 MG/ML
25 INJECTION INTRAMUSCULAR; INTRAVENOUS; SUBCUTANEOUS EVERY 30 MIN PRN
Status: CANCELLED | OUTPATIENT
Start: 2023-03-24

## 2023-03-21 RX ORDER — HEPARIN SODIUM,PORCINE 10 UNIT/ML
5 VIAL (ML) INTRAVENOUS
Status: CANCELLED | OUTPATIENT
Start: 2023-03-24

## 2023-03-21 RX ORDER — DIPHENHYDRAMINE HYDROCHLORIDE 50 MG/ML
50 INJECTION INTRAMUSCULAR; INTRAVENOUS
Status: CANCELLED
Start: 2023-03-24

## 2023-03-21 RX ORDER — HEPARIN SODIUM (PORCINE) LOCK FLUSH IV SOLN 100 UNIT/ML 100 UNIT/ML
5 SOLUTION INTRAVENOUS
Status: CANCELLED | OUTPATIENT
Start: 2023-03-24

## 2023-03-21 RX ORDER — ALBUTEROL SULFATE 90 UG/1
1-2 AEROSOL, METERED RESPIRATORY (INHALATION)
Status: CANCELLED
Start: 2023-03-24

## 2023-03-21 RX ORDER — ALBUTEROL SULFATE 0.83 MG/ML
2.5 SOLUTION RESPIRATORY (INHALATION)
Status: CANCELLED | OUTPATIENT
Start: 2023-03-24

## 2023-03-21 RX ORDER — LORAZEPAM 2 MG/ML
0.5 INJECTION INTRAMUSCULAR EVERY 4 HOURS PRN
Status: CANCELLED | OUTPATIENT
Start: 2023-03-24

## 2023-03-21 RX ORDER — EPINEPHRINE 1 MG/ML
0.3 INJECTION, SOLUTION INTRAMUSCULAR; SUBCUTANEOUS EVERY 5 MIN PRN
Status: CANCELLED | OUTPATIENT
Start: 2023-03-24

## 2023-03-21 RX ORDER — PALONOSETRON 0.05 MG/ML
0.25 INJECTION, SOLUTION INTRAVENOUS ONCE
Status: CANCELLED | OUTPATIENT
Start: 2023-03-24

## 2023-03-21 RX ORDER — METHYLPREDNISOLONE SODIUM SUCCINATE 125 MG/2ML
125 INJECTION, POWDER, LYOPHILIZED, FOR SOLUTION INTRAMUSCULAR; INTRAVENOUS
Status: CANCELLED
Start: 2023-03-24

## 2023-03-24 ENCOUNTER — LAB (OUTPATIENT)
Dept: LAB | Facility: HOSPITAL | Age: 78
End: 2023-03-24
Attending: STUDENT IN AN ORGANIZED HEALTH CARE EDUCATION/TRAINING PROGRAM
Payer: COMMERCIAL

## 2023-03-24 ENCOUNTER — LAB (OUTPATIENT)
Dept: INFUSION THERAPY | Facility: HOSPITAL | Age: 78
End: 2023-03-24
Attending: STUDENT IN AN ORGANIZED HEALTH CARE EDUCATION/TRAINING PROGRAM
Payer: COMMERCIAL

## 2023-03-24 ENCOUNTER — ONCOLOGY VISIT (OUTPATIENT)
Dept: ONCOLOGY | Facility: HOSPITAL | Age: 78
End: 2023-03-24
Attending: NURSE PRACTITIONER
Payer: COMMERCIAL

## 2023-03-24 VITALS
TEMPERATURE: 98.4 F | SYSTOLIC BLOOD PRESSURE: 143 MMHG | RESPIRATION RATE: 18 BRPM | DIASTOLIC BLOOD PRESSURE: 81 MMHG | WEIGHT: 159.1 LBS | HEART RATE: 75 BPM | OXYGEN SATURATION: 94 % | HEIGHT: 63 IN | BODY MASS INDEX: 28.19 KG/M2

## 2023-03-24 DIAGNOSIS — T45.1X5S ADVERSE EFFECT OF ANTINEOPLASTIC AND IMMUNOSUPPRESSIVE DRUGS, SEQUELA: Primary | ICD-10-CM

## 2023-03-24 DIAGNOSIS — M79.662 PAIN OF LEFT LOWER LEG: ICD-10-CM

## 2023-03-24 DIAGNOSIS — C34.31 MALIGNANT NEOPLASM OF LOWER LOBE OF RIGHT LUNG (H): Primary | ICD-10-CM

## 2023-03-24 DIAGNOSIS — C34.32 MALIGNANT NEOPLASM OF LOWER LOBE OF LEFT LUNG (H): ICD-10-CM

## 2023-03-24 DIAGNOSIS — C34.31 MALIGNANT NEOPLASM OF LOWER LOBE OF RIGHT LUNG (H): ICD-10-CM

## 2023-03-24 DIAGNOSIS — T45.1X5S ADVERSE EFFECT OF ANTINEOPLASTIC AND IMMUNOSUPPRESSIVE DRUGS, SEQUELA: ICD-10-CM

## 2023-03-24 DIAGNOSIS — C34.32 MALIGNANT NEOPLASM OF LOWER LOBE OF LEFT LUNG (H): Primary | ICD-10-CM

## 2023-03-24 LAB
ALBUMIN SERPL BCG-MCNC: 3.9 G/DL (ref 3.5–5.2)
ALP SERPL-CCNC: 89 U/L (ref 35–104)
ALT SERPL W P-5'-P-CCNC: 16 U/L (ref 10–35)
ANION GAP SERPL CALCULATED.3IONS-SCNC: 7 MMOL/L (ref 7–15)
AST SERPL W P-5'-P-CCNC: 16 U/L (ref 10–35)
BASOPHILS # BLD AUTO: 0.1 10E3/UL (ref 0–0.2)
BASOPHILS NFR BLD AUTO: 1 %
BILIRUB SERPL-MCNC: 0.2 MG/DL
BUN SERPL-MCNC: 7.8 MG/DL (ref 8–23)
CALCIUM SERPL-MCNC: 9.1 MG/DL (ref 8.8–10.2)
CHLORIDE SERPL-SCNC: 101 MMOL/L (ref 98–107)
CK SERPL-CCNC: 33 U/L (ref 26–192)
CREAT SERPL-MCNC: 0.6 MG/DL (ref 0.51–0.95)
CRP SERPL-MCNC: <3 MG/L
DEPRECATED HCO3 PLAS-SCNC: 31 MMOL/L (ref 22–29)
EOSINOPHIL # BLD AUTO: 0.1 10E3/UL (ref 0–0.7)
EOSINOPHIL NFR BLD AUTO: 2 %
ERYTHROCYTE [DISTWIDTH] IN BLOOD BY AUTOMATED COUNT: 19.9 % (ref 10–15)
ERYTHROCYTE [SEDIMENTATION RATE] IN BLOOD BY WESTERGREN METHOD: 22 MM/HR (ref 0–20)
GFR SERPL CREATININE-BSD FRML MDRD: >90 ML/MIN/1.73M2
GLUCOSE SERPL-MCNC: 93 MG/DL (ref 70–99)
HCT VFR BLD AUTO: 33.4 % (ref 35–47)
HGB BLD-MCNC: 10.7 G/DL (ref 11.7–15.7)
IMM GRANULOCYTES # BLD: 0 10E3/UL
IMM GRANULOCYTES NFR BLD: 0 %
LYMPHOCYTES # BLD AUTO: 1.1 10E3/UL (ref 0.8–5.3)
LYMPHOCYTES NFR BLD AUTO: 20 %
MCH RBC QN AUTO: 29.4 PG (ref 26.5–33)
MCHC RBC AUTO-ENTMCNC: 32 G/DL (ref 31.5–36.5)
MCV RBC AUTO: 92 FL (ref 78–100)
MONOCYTES # BLD AUTO: 0.7 10E3/UL (ref 0–1.3)
MONOCYTES NFR BLD AUTO: 13 %
NEUTROPHILS # BLD AUTO: 3.5 10E3/UL (ref 1.6–8.3)
NEUTROPHILS NFR BLD AUTO: 64 %
NRBC # BLD AUTO: 0 10E3/UL
NRBC BLD AUTO-RTO: 0 /100
PLATELET # BLD AUTO: 247 10E3/UL (ref 150–450)
POTASSIUM SERPL-SCNC: 4.2 MMOL/L (ref 3.4–5.3)
PROT SERPL-MCNC: 6.5 G/DL (ref 6.4–8.3)
RBC # BLD AUTO: 3.64 10E6/UL (ref 3.8–5.2)
SODIUM SERPL-SCNC: 139 MMOL/L (ref 136–145)
TSH SERPL DL<=0.005 MIU/L-ACNC: 1.06 UIU/ML (ref 0.3–4.2)
WBC # BLD AUTO: 5.4 10E3/UL (ref 4–11)

## 2023-03-24 PROCEDURE — 250N000013 HC RX MED GY IP 250 OP 250 PS 637: Performed by: STUDENT IN AN ORGANIZED HEALTH CARE EDUCATION/TRAINING PROGRAM

## 2023-03-24 PROCEDURE — 36415 COLL VENOUS BLD VENIPUNCTURE: CPT

## 2023-03-24 PROCEDURE — 99214 OFFICE O/P EST MOD 30 MIN: CPT | Performed by: NURSE PRACTITIONER

## 2023-03-24 PROCEDURE — 86140 C-REACTIVE PROTEIN: CPT

## 2023-03-24 PROCEDURE — 96417 CHEMO IV INFUS EACH ADDL SEQ: CPT

## 2023-03-24 PROCEDURE — 96372 THER/PROPH/DIAG INJ SC/IM: CPT | Performed by: STUDENT IN AN ORGANIZED HEALTH CARE EDUCATION/TRAINING PROGRAM

## 2023-03-24 PROCEDURE — 82550 ASSAY OF CK (CPK): CPT

## 2023-03-24 PROCEDURE — 258N000003 HC RX IP 258 OP 636: Performed by: STUDENT IN AN ORGANIZED HEALTH CARE EDUCATION/TRAINING PROGRAM

## 2023-03-24 PROCEDURE — 85025 COMPLETE CBC W/AUTO DIFF WBC: CPT | Performed by: STUDENT IN AN ORGANIZED HEALTH CARE EDUCATION/TRAINING PROGRAM

## 2023-03-24 PROCEDURE — 96415 CHEMO IV INFUSION ADDL HR: CPT

## 2023-03-24 PROCEDURE — 96367 TX/PROPH/DG ADDL SEQ IV INF: CPT

## 2023-03-24 PROCEDURE — 96413 CHEMO IV INFUSION 1 HR: CPT

## 2023-03-24 PROCEDURE — G0463 HOSPITAL OUTPT CLINIC VISIT: HCPCS | Mod: 25 | Performed by: NURSE PRACTITIONER

## 2023-03-24 PROCEDURE — 84443 ASSAY THYROID STIM HORMONE: CPT | Performed by: STUDENT IN AN ORGANIZED HEALTH CARE EDUCATION/TRAINING PROGRAM

## 2023-03-24 PROCEDURE — 85652 RBC SED RATE AUTOMATED: CPT

## 2023-03-24 PROCEDURE — 80053 COMPREHEN METABOLIC PANEL: CPT | Performed by: STUDENT IN AN ORGANIZED HEALTH CARE EDUCATION/TRAINING PROGRAM

## 2023-03-24 PROCEDURE — 96377 APPLICATON ON-BODY INJECTOR: CPT | Mod: XS

## 2023-03-24 PROCEDURE — 250N000011 HC RX IP 250 OP 636

## 2023-03-24 PROCEDURE — 96549 UNLISTED CHEMOTHERAPY PX: CPT

## 2023-03-24 PROCEDURE — 96375 TX/PRO/DX INJ NEW DRUG ADDON: CPT

## 2023-03-24 PROCEDURE — 250N000011 HC RX IP 250 OP 636: Performed by: STUDENT IN AN ORGANIZED HEALTH CARE EDUCATION/TRAINING PROGRAM

## 2023-03-24 RX ORDER — HEPARIN SODIUM (PORCINE) LOCK FLUSH IV SOLN 100 UNIT/ML 100 UNIT/ML
SOLUTION INTRAVENOUS
Status: COMPLETED
Start: 2023-03-24 | End: 2023-03-24

## 2023-03-24 RX ORDER — ALBUTEROL SULFATE 90 UG/1
1-2 AEROSOL, METERED RESPIRATORY (INHALATION)
Status: DISCONTINUED | OUTPATIENT
Start: 2023-03-24 | End: 2023-03-24 | Stop reason: HOSPADM

## 2023-03-24 RX ORDER — HEPARIN SODIUM (PORCINE) LOCK FLUSH IV SOLN 100 UNIT/ML 100 UNIT/ML
5 SOLUTION INTRAVENOUS
Status: DISCONTINUED | OUTPATIENT
Start: 2023-03-24 | End: 2023-03-24 | Stop reason: HOSPADM

## 2023-03-24 RX ORDER — EPINEPHRINE 1 MG/ML
0.3 INJECTION, SOLUTION INTRAMUSCULAR; SUBCUTANEOUS EVERY 5 MIN PRN
Status: DISCONTINUED | OUTPATIENT
Start: 2023-03-24 | End: 2023-03-24 | Stop reason: HOSPADM

## 2023-03-24 RX ORDER — MEPERIDINE HYDROCHLORIDE 25 MG/ML
25 INJECTION INTRAMUSCULAR; INTRAVENOUS; SUBCUTANEOUS EVERY 30 MIN PRN
Status: DISCONTINUED | OUTPATIENT
Start: 2023-03-24 | End: 2023-03-24 | Stop reason: HOSPADM

## 2023-03-24 RX ORDER — HEPARIN SODIUM,PORCINE 10 UNIT/ML
5 VIAL (ML) INTRAVENOUS
Status: DISCONTINUED | OUTPATIENT
Start: 2023-03-24 | End: 2023-03-24 | Stop reason: HOSPADM

## 2023-03-24 RX ORDER — PALONOSETRON 0.05 MG/ML
0.25 INJECTION, SOLUTION INTRAVENOUS ONCE
Status: COMPLETED | OUTPATIENT
Start: 2023-03-24 | End: 2023-03-24

## 2023-03-24 RX ORDER — ACETAMINOPHEN 500 MG
1000 TABLET ORAL EVERY 6 HOURS PRN
COMMUNITY

## 2023-03-24 RX ORDER — DIPHENHYDRAMINE HYDROCHLORIDE 50 MG/ML
50 INJECTION INTRAMUSCULAR; INTRAVENOUS
Status: DISCONTINUED | OUTPATIENT
Start: 2023-03-24 | End: 2023-03-24 | Stop reason: HOSPADM

## 2023-03-24 RX ORDER — METHYLPREDNISOLONE SODIUM SUCCINATE 125 MG/2ML
125 INJECTION, POWDER, LYOPHILIZED, FOR SOLUTION INTRAMUSCULAR; INTRAVENOUS
Status: DISCONTINUED | OUTPATIENT
Start: 2023-03-24 | End: 2023-03-24 | Stop reason: HOSPADM

## 2023-03-24 RX ORDER — DIPHENHYDRAMINE HCL 50 MG
50 CAPSULE ORAL ONCE
Status: COMPLETED | OUTPATIENT
Start: 2023-03-24 | End: 2023-03-24

## 2023-03-24 RX ORDER — ALBUTEROL SULFATE 0.83 MG/ML
2.5 SOLUTION RESPIRATORY (INHALATION)
Status: DISCONTINUED | OUTPATIENT
Start: 2023-03-24 | End: 2023-03-24 | Stop reason: HOSPADM

## 2023-03-24 RX ADMIN — DEXAMETHASONE SODIUM PHOSPHATE: 10 INJECTION, SOLUTION INTRAMUSCULAR; INTRAVENOUS at 10:36

## 2023-03-24 RX ADMIN — Medication 500 UNITS: at 14:50

## 2023-03-24 RX ADMIN — PACLITAXEL 348 MG: 6 INJECTION, SOLUTION, CONCENTRATE INTRAVENOUS at 11:25

## 2023-03-24 RX ADMIN — FAMOTIDINE 20 MG: 10 INJECTION INTRAVENOUS at 10:14

## 2023-03-24 RX ADMIN — PALONOSETRON 0.25 MG: 0.05 INJECTION, SOLUTION INTRAVENOUS at 10:16

## 2023-03-24 RX ADMIN — DIPHENHYDRAMINE HYDROCHLORIDE 50 MG: 50 CAPSULE ORAL at 10:17

## 2023-03-24 RX ADMIN — PEGFILGRASTIM 6 MG: KIT SUBCUTANEOUS at 14:51

## 2023-03-24 RX ADMIN — SODIUM CHLORIDE 250 ML: 9 INJECTION, SOLUTION INTRAVENOUS at 10:11

## 2023-03-24 RX ADMIN — SODIUM CHLORIDE 200 MG: 9 INJECTION, SOLUTION INTRAVENOUS at 11:00

## 2023-03-24 RX ADMIN — CARBOPLATIN 605 MG: 10 INJECTION INTRAVENOUS at 14:21

## 2023-03-24 ASSESSMENT — PAIN SCALES - GENERAL: PAINLEVEL: MILD PAIN (2)

## 2023-03-24 NOTE — PROGRESS NOTES
"Oncology Rooming Note    March 24, 2023 9:26 AM   Yamel Lindsay is a 77 year old female who presents for:    Chief Complaint   Patient presents with     Oncology Clinic Visit     3 week return Malignant neoplasm of lower lobe of left lung, review labs & Infusion      Initial Vitals: BP (!) 143/81 (BP Location: Left arm, Patient Position: Sitting, Cuff Size: Adult Regular)   Pulse 75   Temp 98.4  F (36.9  C) (Oral)   Resp 18   Ht 1.594 m (5' 2.76\")   Wt 72.2 kg (159 lb 1.6 oz)   SpO2 94%   BMI 28.40 kg/m   Estimated body mass index is 28.4 kg/m  as calculated from the following:    Height as of this encounter: 1.594 m (5' 2.76\").    Weight as of this encounter: 72.2 kg (159 lb 1.6 oz). Body surface area is 1.79 meters squared.  Mild Pain (2) Comment: Data Unavailable   No LMP recorded. Patient is postmenopausal.  Allergies reviewed: Yes  Medications reviewed: Yes    Medications: Medication refills not needed today.  Pharmacy name entered into [employer]:      [id number] - [address]    Clinical concerns:   3 week return Malignant neoplasm of lower lobe of left lung, review labs & Infusion     Latonia Lizarraga CMA            "

## 2023-03-24 NOTE — LETTER
"    3/24/2023         RE: Yamel Lindsay  [address] [facility] [address]        Dear Colleague,    Thank you for referring your patient, Yamel Lindsay, to the [facility] [place]. Please see a copy of my visit note below.    Oncology Rooming Note    March 24, 2023 9:26 AM   Yamel Lindsay is a 77 year old female who presents for:    Chief Complaint   Patient presents with     Oncology Clinic Visit     3 week return Malignant neoplasm of lower lobe of left lung, review labs & Infusion      Initial Vitals: BP (!) 143/81 (BP Location: Left arm, Patient Position: Sitting, Cuff Size: Adult Regular)   Pulse 75   Temp 98.4  F (36.9  C) (Oral)   Resp 18   Ht 1.594 m (5' 2.76\")   Wt 72.2 kg (159 lb 1.6 oz)   SpO2 94%   BMI 28.40 kg/m   Estimated body mass index is 28.4 kg/m  as calculated from the following:    Height as of this encounter: 1.594 m (5' 2.76\").    Weight as of this encounter: 72.2 kg (159 lb 1.6 oz). Body surface area is 1.79 meters squared.  Mild Pain (2) Comment: Data Unavailable   No LMP recorded. Patient is postmenopausal.  Allergies reviewed: Yes  Medications reviewed: Yes    Medications: Medication refills not needed today.  Pharmacy name entered into [facility]:      [id number] - [place] [id number] [facility] [address]    Clinical concerns:   3 week return Malignant neoplasm of lower lobe of left lung, review labs & Infusion     Latonia Lizarraga CMA              [facility] Oncology Progress Note    Patient: Yamel Lindsay  MRN: [id number]  Date of Service: Mar 24, 2023          Reason for Visit    Chief Complaint   Patient presents with     Oncology Clinic Visit     3 week return Malignant neoplasm of lower lobe of left lung, review labs & Infusion        Assessment and Plan     Cancer Staging   Malignant neoplasm of lower lobe of left lung (H)  Staging form: Lung, AJCC 8th Edition  - Clinical: Stage IA3 " (cT1c, cN0, cM0) - Signed by Ronit Grayson APRN CNP on 2/10/2023    Malignant neoplasm of lower lobe of right lung (H)  Staging form: Lung, AJCC 8th Edition  - Clinical: Stage IIIC (cT4, cN3, cM0) - Signed by Ronit Grayson APRN CNP on 2/10/2023      1.  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 Had good response after 2 cycles. Here today for cycle 4. Will continue full dose. Return in 3 weeks to see Dr. Martel with CT. Will decide on further treatment after CT scan.     2. 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  She did do a quick prednisone taper with 40 mg daily for 4, 30 mg daily for 4, 20 mg daily for 4 then 10 mg daily for 4 then stop.  She is off prednisone at this point.  She states that her legs feel significantly better.  She did have some elevated CRP and ESR.  We did check those today and they were basically back to normal.  We will continue to monitor the symptom closely.  Unclear if it is from immunotherapy but if it continues to happen after she gets a dose, her oncologist will decide if she can continue the immunotherapy or need a break.    3.  Mild anemia: chemo induced and usually cumulative. Overall asymptomatic except fatigue. Continue to monitor.         ECOG Performance    0 - Independent    Distress Screening (within last 30 days)    No data recorded     Pain  Pain Score: Mild Pain (2)  Pain Loc: Other - see comment (Right knee - works down to foot)    Problem List    Patient Active Problem List   Diagnosis     Hyperlipidemia LDL goal <130     Primary localized osteoarthrosis, lower leg     Generalized anxiety disorder     Rhinitis, allergic seasonal     Alcoholism (H)     Mild recurrent major depression (H)     Advanced directives, counseling/discussion      Seasonal allergic rhinitis     Hip pain     GERD (gastroesophageal reflux disease)     S/P laparoscopic hernia repair     OA (osteoarthritis) of knee     Mild intermittent asthma without complication     Major depression in complete remission (H)     Benign essential hypertension     Right lower lobe lung mass     Malignant neoplasm of lower lobe of right lung (H)     Panlobular emphysema (H)     Adverse effect of antineoplastic and immunosuppressive drugs, sequela     Malignant neoplasm of lower lobe of left lung (H)        ______________________________________________________________________________    History of Present Illness    Oncology history  DIAGNOSIS:   Lung cancer, right side, squamous cell carcinoma, 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    Lung cancer, left side, adenocarcinoma, T1 N0 M0, stage I, diagnosed December 2022  PD-L1- 2%  K-lillie G12 C mutation found on lung cancer focused NGS panel  Unable to complete oncology fusion NGS panel due to quantity of DNA not sufficient for testing.    TREATMENT: It was felt that her right-sided lung cancer was too extensive for concurrent/sequential chemoradiation so she has started on palliative chemo with Pembro, carboplatin and Taxol.  Today is cycle 4.  First cycle given January 20.  Patient did have a improvement in her disease after 2 cycles.    INTERIM HISTORY: Patient is here today for her fourth cycle of chemo.  She states that overall the chemotherapy is going quite well.  She is very happy with how she is doing.  Her biggest issues after her third cycle she had significant bilateral lower extremity pain.  She was checked for DVTs and it was negative so it was felt that it might be a immune O related myositis.  She had elevated inflammatory markers.  She was put on prednisone and she got significantly better.  She states that she is off the  "prednisone and feels that the pain is gone.  She still has some arthritis in her knees but the other pain that was there is gone.  She denies any other new bone or back pain.  Denies any infectious complaints.  She feels ready to continue on treatment.    Review of Systems    Pertinent items are noted in HPI.    Past History    Past Medical History:   Diagnosis Date     Allergies      Anxiety      Hyperlipidemia      Hypertension      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     Nicotine dependence      Postmenopausal HRT (hormone replacement therapy) 1994     S/P alcohol detoxification 07/06    Recovered and active in AA       PHYSICAL EXAM:  BP (!) 143/81 (BP Location: Left arm, Patient Position: Sitting, Cuff Size: Adult Regular)   Pulse 75   Temp 98.4  F (36.9  C) (Oral)   Resp 18   Ht 1.594 m (5' 2.76\")   Wt 72.2 kg (159 lb 1.6 oz)   SpO2 94%   BMI 28.40 kg/m    GENERAL: no acute distress. Cooperative in conversation. Here with her cousin  HEENT: pupils are equal, round and reactive. Oromucosa is clean and intact. No ulcerations or mucositis noted. No bleeding noted.  RESP: lungs are clear bilaterally per auscultation. Regular respiratory rate. No wheezes or rhonchi.  CV: Regular, rate and rhythm. No murmurs.  ABD: soft, nontender.    MUSCULOSKELETAL: No lower extremity swelling.   NEURO: non focal. Alert and oriented x3.   PSYCH: within normal limits. No depression or anxiety.  SKIN: warm dry intact       Lab Results    Recent Results (from the past 168 hour(s))   CRP inflammation   Result Value Ref Range    CRP Inflammation <3.00 <5.00 mg/L   Erythrocyte sedimentation rate auto   Result Value Ref Range    Erythrocyte Sedimentation Rate 22 (H) 0 - 20 mm/hr   CK total   Result Value Ref Range    CK 33 26 - 192 U/L   Comprehensive metabolic panel   Result Value Ref Range    Sodium 139 136 - 145 mmol/L    Potassium 4.2 3.4 - 5.3 mmol/L    Chloride 101 98 - 107 mmol/L    Carbon Dioxide (CO2) 31 (H) 22 " - 29 mmol/L    Anion Gap 7 7 - 15 mmol/L    Urea Nitrogen 7.8 (L) 8.0 - 23.0 mg/dL    Creatinine 0.60 0.51 - 0.95 mg/dL    Calcium 9.1 8.8 - 10.2 mg/dL    Glucose 93 70 - 99 mg/dL    Alkaline Phosphatase 89 35 - 104 U/L    AST 16 10 - 35 U/L    ALT 16 10 - 35 U/L    Protein Total 6.5 6.4 - 8.3 g/dL    Albumin 3.9 3.5 - 5.2 g/dL    Bilirubin Total 0.2 <=1.2 mg/dL    GFR Estimate >90 >60 mL/min/1.73m2   TSH with free T4 reflex   Result Value Ref Range    TSH 1.06 0.30 - 4.20 uIU/mL   CBC with platelets and differential   Result Value Ref Range    WBC Count 5.4 4.0 - 11.0 10e3/uL    RBC Count 3.64 (L) 3.80 - 5.20 10e6/uL    Hemoglobin 10.7 (L) 11.7 - 15.7 g/dL    Hematocrit 33.4 (L) 35.0 - 47.0 %    MCV 92 78 - 100 fL    MCH 29.4 26.5 - 33.0 pg    MCHC 32.0 31.5 - 36.5 g/dL    RDW 19.9 (H) 10.0 - 15.0 %    Platelet Count 247 150 - 450 10e3/uL    % Neutrophils 64 %    % Lymphocytes 20 %    % Monocytes 13 %    % Eosinophils 2 %    % Basophils 1 %    % Immature Granulocytes 0 %    NRBCs per 100 WBC 0 <1 /100    Absolute Neutrophils 3.5 1.6 - 8.3 10e3/uL    Absolute Lymphocytes 1.1 0.8 - 5.3 10e3/uL    Absolute Monocytes 0.7 0.0 - 1.3 10e3/uL    Absolute Eosinophils 0.1 0.0 - 0.7 10e3/uL    Absolute Basophils 0.1 0.0 - 0.2 10e3/uL    Absolute Immature Granulocytes 0.0 <=0.4 10e3/uL    Absolute NRBCs 0.0 10e3/uL       Imaging    CT Chest/Abdomen/Pelvis w Contrast    Result Date: 2/27/2023  EXAM: CT CHEST/ABDOMEN/PELVIS W CONTRAST LOCATION: [facility] DATE/TIME: 2/27/2023 1:18 PM INDICATION: Stage IIIc right lower lobe squamous cell carcinoma and stage I left lower lobe adenocarcinoma of lung COMPARISON: CT of the chest 12/28/2022; PET/CT the sixth 2022 TECHNIQUE: CT scan of the chest, abdomen, and pelvis was performed following injection of IV contrast. Multiplanar reformats were obtained. Dose reduction techniques were used. CONTRAST: [id number] 100ml FINDINGS: LUNGS AND PLEURA: Low-attenuation mass  centered in the upper right lower lobe towards the hilum is smaller compared to 12/28/2022 measuring 4.9 cm AP by 3.0 cm transverse (series 3, image 203) compared to 6.1 x 4.9 cm 12/28/2022 when measured in a similar fashion. There is a nodular component which extends lateral at the level of the minor fissure which is also slightly smaller. Based on sagittal reformations measuring 13 mm (series 6, image 64) compared to 16 mm. In the basilar right lower lobe the parenchyma is enhancing consistent with postobstructive atelectasis. There are multiple subcentimeter nodules with irregular borders within the anterior and inferior aspects of the right upper lobe which had substantial FDG avidity on PET/CT which have decreased in size from 12/28/2022. A larger irregular nodule near the anterior costal pleura measures 5 x 9 mm (series 3, image 133), previously 6 x 11 mm and smaller slightly more inferior peripheral nodule measures to 5 mm (series 3, image 155 previously 7 mm. A solid nodule with lobulated and spiculated borders in the lateral basal left lower lobe is similar in size measuring 2.0 x 2.1 cm (series 2, image 119) previously 1.7 x 2.2 cm. Upper lung predominant emphysema and related hyperinflation. MEDIASTINUM: Cardiac chambers are normal in size. No pericardial effusion. Right subclavian approach chest port catheter terminates in the lower third of the SVC. There are no pulmonary artery filling defects. Normal caliber thoracic aorta. Mild arch and  descending aorta mixed attenuation atheroma. Small hiatal hernia. Esophagus is decompressed. Mildly enlarged lower paraesophageal lymph node measures 7 mm short axis (series 3, image 219). Right lower paratracheal lymph nodes which were FDG avid are not  enlarged by size criteria. CORONARY ARTERY CALCIFICATION: Mild. HEPATOBILIARY: Unchanged circumscribed benign right anterolateral liver cyst. No new liver lesions. Nondistended gallbladder. No calcified gallstones.  No bile duct enlargement. PANCREAS: Normal. SPLEEN: Normal. ADRENAL GLANDS: Normal. KIDNEYS/BLADDER: No significant mass, stones, or hydronephrosis. There are simple or benign cysts. No follow up is needed. BOWEL: Normal with no obstruction or acute inflammatory change. Normal appendix. LYMPH NODES: No enlarged lymph nodes in the abdomen or pelvis. VASCULATURE: Patchy abdominal aorta and iliac artery atheromatous calcifications. No aneurysm or critical stenosis. The renal arteries and mesenteric arteries are patent. PELVIC ORGANS: Uterus is normal in size. No pelvic mass or free fluid. MUSCULOSKELETAL: Diffuse thoracolumbar disc space narrowing, small marginal osteophytes, endplate irregularities and vacuum disc phenomenon at a few lumbar levels. No aggressive or destructive bone lesions.     IMPRESSION: 1.  Decreased size of the hypoattenuating mass in the central right lower lobe and small irregularly-shaped nodules in the right upper lobe consistent with positive treatment response. There is persistent postobstructive right lower lobe atelectasis. 2.  Unchanged heterogeneous lobulated and spiculated solid nodule in the lateral basal left lower lobe. 3.  No findings to suggest new extrapulmonary metastatic disease.    US Lower Extremity Venous Duplex Bilateral    Result Date: 3/2/2023  EXAM: US LOWER EXTREMITY VENOUS DUPLEX BILATERAL LOCATION: [facility] DATE/TIME: 3/2/2023 12:00 PM INDICATION: Bilateral leg pain. Metachronous lung cancers COMPARISON: None. TECHNIQUE: Venous Duplex ultrasound of bilateral lower extremities with and without compression, augmentation and duplex. Color flow and spectral Doppler with waveform analysis performed. FINDINGS: Exam includes the common femoral, femoral, popliteal veins as well as segmentally visualized deep calf veins and greater saphenous vein. RIGHT: No deep vein thrombosis. No superficial thrombophlebitis. No popliteal cyst. LEFT: No deep vein  thrombosis. No superficial thrombophlebitis. No popliteal cyst.     IMPRESSION: 1.  No deep venous thrombosis in either leg.        Signed by: NAYA Sifuentes CNP      Again, thank you for allowing me to participate in the care of your patient.        Sincerely,        NAYA Sifuentes CNP

## 2023-03-24 NOTE — PROGRESS NOTES
[facility] Hematology and Oncology Progress Note    Patient: Yamel Lindsay  MRN: [id number]  Date of Service: Mar 24, 2023          Reason for Visit    Chief Complaint   Patient presents with     Oncology Clinic Visit     3 week return Malignant neoplasm of lower lobe of left lung, review labs & Infusion        Assessment and Plan     Cancer Staging   Malignant neoplasm of lower lobe of left lung (H)  Staging form: Lung, AJCC 8th Edition  - Clinical: Stage IA3 (cT1c, cN0, cM0) - Signed by Ronit Grayson APRN CNP on 2/10/2023    Malignant neoplasm of lower lobe of right lung (H)  Staging form: Lung, AJCC 8th Edition  - Clinical: Stage IIIC (cT4, cN3, cM0) - Signed by Ronit Grayson APRN CNP on 2/10/2023      1.  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 Had good response after 2 cycles. Here today for cycle 4. Will continue full dose. Return in 3 weeks to see Dr. Martel with CT. Will decide on further treatment after CT scan.     2. 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  She did do a quick prednisone taper with 40 mg daily for 4, 30 mg daily for 4, 20 mg daily for 4 then 10 mg daily for 4 then stop.  She is off prednisone at this point.  She states that her legs feel significantly better.  She did have some elevated CRP and ESR.  We did check those today and they were basically back to normal.  We will continue to monitor the symptom closely.  Unclear if it is from immunotherapy but if it continues to happen after she gets a dose, her oncologist will decide if she can continue the immunotherapy or need a break.    3.  Mild anemia: chemo induced and usually cumulative. Overall asymptomatic except fatigue. Continue to monitor.         ECOG Performance    0 -  Independent    Distress Screening (within last 30 days)    No data recorded     Pain  Pain Score: Mild Pain (2)  Pain Loc: Other - see comment (Right knee - works down to foot)    Problem List    Patient Active Problem List   Diagnosis     Hyperlipidemia LDL goal <130     Primary localized osteoarthrosis, lower leg     Generalized anxiety disorder     Rhinitis, allergic seasonal     Alcoholism (H)     Mild recurrent major depression (H)     Advanced directives, counseling/discussion     Seasonal allergic rhinitis     Hip pain     GERD (gastroesophageal reflux disease)     S/P laparoscopic hernia repair     OA (osteoarthritis) of knee     Mild intermittent asthma without complication     Major depression in complete remission (H)     Benign essential hypertension     Right lower lobe lung mass     Malignant neoplasm of lower lobe of right lung (H)     Panlobular emphysema (H)     Adverse effect of antineoplastic and immunosuppressive drugs, sequela     Malignant neoplasm of lower lobe of left lung (H)        ______________________________________________________________________________    History of Present Illness    Oncology history  DIAGNOSIS:   Lung cancer, right side, squamous cell carcinoma, 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    Lung cancer, left side, adenocarcinoma, T1 N0 M0, stage I, diagnosed December 2022  PD-L1- 2%  K-lillie G12 C mutation found on lung cancer focused NGS panel  Unable to complete oncology fusion NGS panel due to quantity of DNA not sufficient for testing.    TREATMENT: It was felt that her right-sided lung cancer was too extensive for concurrent/sequential chemoradiation so she has started on palliative chemo with Pembro, carboplatin and Taxol.  Today is cycle 4.  First cycle given January 20.  Patient did have a improvement in her disease after 2 cycles.    INTERIM HISTORY: Patient  "is here today for her fourth cycle of chemo.  She states that overall the chemotherapy is going quite well.  She is very happy with how she is doing.  Her biggest issues after her third cycle she had significant bilateral lower extremity pain.  She was checked for DVTs and it was negative so it was felt that it might be a immune O related myositis.  She had elevated inflammatory markers.  She was put on prednisone and she got significantly better.  She states that she is off the prednisone and feels that the pain is gone.  She still has some arthritis in her knees but the other pain that was there is gone.  She denies any other new bone or back pain.  Denies any infectious complaints.  She feels ready to continue on treatment.    Review of Systems    Pertinent items are noted in HPI.    Past History    Past Medical History:   Diagnosis Date     Allergies      Anxiety      Hyperlipidemia      Hypertension      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     Nicotine dependence      Postmenopausal HRT (hormone replacement therapy) 1994     S/P alcohol detoxification 07/06    Recovered and active in AA       PHYSICAL EXAM:  BP (!) 143/81 (BP Location: Left arm, Patient Position: Sitting, Cuff Size: Adult Regular)   Pulse 75   Temp 98.4  F (36.9  C) (Oral)   Resp 18   Ht 1.594 m (5' 2.76\")   Wt 72.2 kg (159 lb 1.6 oz)   SpO2 94%   BMI 28.40 kg/m    GENERAL: no acute distress. Cooperative in conversation. Here with her cousin  HEENT: pupils are equal, round and reactive. Oromucosa is clean and intact. No ulcerations or mucositis noted. No bleeding noted.  RESP: lungs are clear bilaterally per auscultation. Regular respiratory rate. No wheezes or rhonchi.  CV: Regular, rate and rhythm. No murmurs.  ABD: soft, nontender.    MUSCULOSKELETAL: No lower extremity swelling.   NEURO: non focal. Alert and oriented x3.   PSYCH: within normal limits. No depression or anxiety.  SKIN: warm dry intact       Lab " Results    Recent Results (from the past 168 hour(s))   CRP inflammation   Result Value Ref Range    CRP Inflammation <3.00 <5.00 mg/L   Erythrocyte sedimentation rate auto   Result Value Ref Range    Erythrocyte Sedimentation Rate 22 (H) 0 - 20 mm/hr   CK total   Result Value Ref Range    CK 33 26 - 192 U/L   Comprehensive metabolic panel   Result Value Ref Range    Sodium 139 136 - 145 mmol/L    Potassium 4.2 3.4 - 5.3 mmol/L    Chloride 101 98 - 107 mmol/L    Carbon Dioxide (CO2) 31 (H) 22 - 29 mmol/L    Anion Gap 7 7 - 15 mmol/L    Urea Nitrogen 7.8 (L) 8.0 - 23.0 mg/dL    Creatinine 0.60 0.51 - 0.95 mg/dL    Calcium 9.1 8.8 - 10.2 mg/dL    Glucose 93 70 - 99 mg/dL    Alkaline Phosphatase 89 35 - 104 U/L    AST 16 10 - 35 U/L    ALT 16 10 - 35 U/L    Protein Total 6.5 6.4 - 8.3 g/dL    Albumin 3.9 3.5 - 5.2 g/dL    Bilirubin Total 0.2 <=1.2 mg/dL    GFR Estimate >90 >60 mL/min/1.73m2   TSH with free T4 reflex   Result Value Ref Range    TSH 1.06 0.30 - 4.20 uIU/mL   CBC with platelets and differential   Result Value Ref Range    WBC Count 5.4 4.0 - 11.0 10e3/uL    RBC Count 3.64 (L) 3.80 - 5.20 10e6/uL    Hemoglobin 10.7 (L) 11.7 - 15.7 g/dL    Hematocrit 33.4 (L) 35.0 - 47.0 %    MCV 92 78 - 100 fL    MCH 29.4 26.5 - 33.0 pg    MCHC 32.0 31.5 - 36.5 g/dL    RDW 19.9 (H) 10.0 - 15.0 %    Platelet Count 247 150 - 450 10e3/uL    % Neutrophils 64 %    % Lymphocytes 20 %    % Monocytes 13 %    % Eosinophils 2 %    % Basophils 1 %    % Immature Granulocytes 0 %    NRBCs per 100 WBC 0 <1 /100    Absolute Neutrophils 3.5 1.6 - 8.3 10e3/uL    Absolute Lymphocytes 1.1 0.8 - 5.3 10e3/uL    Absolute Monocytes 0.7 0.0 - 1.3 10e3/uL    Absolute Eosinophils 0.1 0.0 - 0.7 10e3/uL    Absolute Basophils 0.1 0.0 - 0.2 10e3/uL    Absolute Immature Granulocytes 0.0 <=0.4 10e3/uL    Absolute NRBCs 0.0 10e3/uL       Imaging    CT Chest/Abdomen/Pelvis w Contrast    Result Date: 2/27/2023  EXAM: CT CHEST/ABDOMEN/PELVIS W CONTRAST  LOCATION: [facility] DATE/TIME: 2/27/2023 1:18 PM INDICATION: Stage IIIc right lower lobe squamous cell carcinoma and stage I left lower lobe adenocarcinoma of lung COMPARISON: CT of the chest 12/28/2022; PET/CT the sixth 2022 TECHNIQUE: CT scan of the chest, abdomen, and pelvis was performed following injection of IV contrast. Multiplanar reformats were obtained. Dose reduction techniques were used. CONTRAST: [id number] 100ml FINDINGS: LUNGS AND PLEURA: Low-attenuation mass centered in the upper right lower lobe towards the hilum is smaller compared to 12/28/2022 measuring 4.9 cm AP by 3.0 cm transverse (series 3, image 203) compared to 6.1 x 4.9 cm 12/28/2022 when measured in a similar fashion. There is a nodular component which extends lateral at the level of the minor fissure which is also slightly smaller. Based on sagittal reformations measuring 13 mm (series 6, image 64) compared to 16 mm. In the basilar right lower lobe the parenchyma is enhancing consistent with postobstructive atelectasis. There are multiple subcentimeter nodules with irregular borders within the anterior and inferior aspects of the right upper lobe which had substantial FDG avidity on PET/CT which have decreased in size from 12/28/2022. A larger irregular nodule near the anterior costal pleura measures 5 x 9 mm (series 3, image 133), previously 6 x 11 mm and smaller slightly more inferior peripheral nodule measures to 5 mm (series 3, image 155 previously 7 mm. A solid nodule with lobulated and spiculated borders in the lateral basal left lower lobe is similar in size measuring 2.0 x 2.1 cm (series 2, image 119) previously 1.7 x 2.2 cm. Upper lung predominant emphysema and related hyperinflation. MEDIASTINUM: Cardiac chambers are normal in size. No pericardial effusion. Right subclavian approach chest port catheter terminates in the lower third of the SVC. There are no pulmonary artery filling defects. Normal  caliber thoracic aorta. Mild arch and  descending aorta mixed attenuation atheroma. Small hiatal hernia. Esophagus is decompressed. Mildly enlarged lower paraesophageal lymph node measures 7 mm short axis (series 3, image 219). Right lower paratracheal lymph nodes which were FDG avid are not  enlarged by size criteria. CORONARY ARTERY CALCIFICATION: Mild. HEPATOBILIARY: Unchanged circumscribed benign right anterolateral liver cyst. No new liver lesions. Nondistended gallbladder. No calcified gallstones. No bile duct enlargement. PANCREAS: Normal. SPLEEN: Normal. ADRENAL GLANDS: Normal. KIDNEYS/BLADDER: No significant mass, stones, or hydronephrosis. There are simple or benign cysts. No follow up is needed. BOWEL: Normal with no obstruction or acute inflammatory change. Normal appendix. LYMPH NODES: No enlarged lymph nodes in the abdomen or pelvis. VASCULATURE: Patchy abdominal aorta and iliac artery atheromatous calcifications. No aneurysm or critical stenosis. The renal arteries and mesenteric arteries are patent. PELVIC ORGANS: Uterus is normal in size. No pelvic mass or free fluid. MUSCULOSKELETAL: Diffuse thoracolumbar disc space narrowing, small marginal osteophytes, endplate irregularities and vacuum disc phenomenon at a few lumbar levels. No aggressive or destructive bone lesions.     IMPRESSION: 1.  Decreased size of the hypoattenuating mass in the central right lower lobe and small irregularly-shaped nodules in the right upper lobe consistent with positive treatment response. There is persistent postobstructive right lower lobe atelectasis. 2.  Unchanged heterogeneous lobulated and spiculated solid nodule in the lateral basal left lower lobe. 3.  No findings to suggest new extrapulmonary metastatic disease.    US Lower Extremity Venous Duplex Bilateral    Result Date: 3/2/2023  EXAM: US LOWER EXTREMITY VENOUS DUPLEX BILATERAL LOCATION: [facility] DATE/TIME: 3/2/2023 12:00 PM  INDICATION: Bilateral leg pain. Metachronous lung cancers COMPARISON: None. TECHNIQUE: Venous Duplex ultrasound of bilateral lower extremities with and without compression, augmentation and duplex. Color flow and spectral Doppler with waveform analysis performed. FINDINGS: Exam includes the common femoral, femoral, popliteal veins as well as segmentally visualized deep calf veins and greater saphenous vein. RIGHT: No deep vein thrombosis. No superficial thrombophlebitis. No popliteal cyst. LEFT: No deep vein thrombosis. No superficial thrombophlebitis. No popliteal cyst.     IMPRESSION: 1.  No deep venous thrombosis in either leg.        Signed by: NAYA Sifuentes CNP

## 2023-03-24 NOTE — PROGRESS NOTES
Infusion Nursing Note:  Yamel Lindsay presents today for labs, dr. Hirsch, and D1 C4 infusion  Patient seen by provider today: Yes: Constanza   present during visit today: Not Applicable.    Note: Pt arrived with cousin for treatment. Pre treatment meds given-Amend, Aloxi,  Benadryl PO and Pepsid.    Intravenous Access:  Implanted Port. Port accessed with good blood return.    Treatment Conditions:  Lab Results   Component Value Date    HGB 10.7 (L) 03/24/2023    WBC 5.4 03/24/2023    ANEU 4.3 01/26/2015    ANEUTAUTO 3.5 03/24/2023     03/24/2023        Post Infusion Assessment:  Patient tolerated infusion without incident. Neulasta injector applied to left upper abdomen. I informed pt that it is due to go off tommorow at around 6pm and she can take it off at around 7pm.     Discharge Plan:   Departure Mode: Ambulatory with family.      Alaina Jaime RN

## 2023-03-27 ENCOUNTER — PATIENT OUTREACH (OUTPATIENT)
Dept: ONCOLOGY | Facility: CLINIC | Age: 78
End: 2023-03-27
Payer: COMMERCIAL

## 2023-03-27 DIAGNOSIS — F41.9 ANXIETY: Primary | ICD-10-CM

## 2023-03-27 RX ORDER — CLONAZEPAM 0.5 MG/1
.5-1 TABLET ORAL 2 TIMES DAILY PRN
Qty: 120 TABLET | Refills: 1 | Status: SHIPPED | OUTPATIENT
Start: 2023-03-27 | End: 2023-06-23

## 2023-03-27 NOTE — PROGRESS NOTES
Returned Pt call Re: Increase in klonopin dosing. Pt reports that she is doing well, no other Sx but greatly increased anxiety and panic attacks. Noted yesterday while out to get groceries pulled over with a panic attack. Currently taking 1 tab (0.5 mg) BID.    Per Dr Martel can Rx new dosing of 0.5-1.0 mg BID.

## 2023-03-30 ENCOUNTER — TELEPHONE (OUTPATIENT)
Dept: ONCOLOGY | Facility: CLINIC | Age: 78
End: 2023-03-30
Payer: COMMERCIAL

## 2023-03-30 DIAGNOSIS — K65.8 SEROSITIS (H): Primary | ICD-10-CM

## 2023-03-30 DIAGNOSIS — M19.90 ARTHRITIS: ICD-10-CM

## 2023-03-30 NOTE — TELEPHONE ENCOUNTER
"Oncology Nurse Triage    Situation:   Yamel reporting the following symptoms: Recurrence of BLE pain noted previously. Pt saw Peri Ellis for this, most recently on 3/10 and 3/16 and was rxd a prednisone taper which really helped her symptoms. Pt is asking for more prednisone as her leg pain is flaring up again.    Copied from Peri's note:  \"#BLE leg pain, with elevation in CRP and ESR  -possible myositis/arthritis s/t immunotherapy  -dopplar negative for DVT  -will repeat CRP and ESR today and also add CK  -start prednisone taper: 40 mg daily x 4, then 30 mg daily x 4, then 20 mg daily x 4, then 10 mg daily x 4.   -if no improvement consider further CT imaging of the R LE to evaluate for bone metastases. No known bone mets on baseline PET/CT.  -has tramadol, started on gabapentin 300 mg at bedtime per palliative\"    Copied from 3/24 note with Ronit Grayson:  \"Possible myositis from immunotherapy with leg pain: present after cycle 3.  She did do a quick prednisone taper with 40 mg daily for 4, 30 mg daily for 4, 20 mg daily for 4 then 10 mg daily for 4 then stop.  She is off prednisone at this point.  She states that her legs feel significantly better.  She did have some elevated CRP and ESR.  We did check those today and they were basically back to normal.  We will continue to monitor the symptom closely.  Unclear if it is from immunotherapy but if it continues to happen after she gets a dose, her oncologist will decide if she can continue the immunotherapy or need a break.\"    Bilateral, right has always been worse, lower legs/calves, last US for DVT negative 2 weeks ago. Denies any new redness, swelling, discoloration, numbness. Pain same as it was previously, no change in presentation.    Pt has tried elevation and tylenol, but states only thing that helped was prednisone.    Background:   Treating Provider:   Dr. Martel/Peri Ellis    Date of last office visit: 3/16/23 with Peri Ellis    Recent " Treatments: Carbo+Taxol+pembrolizumab C4D1 3/24/23.    Recommendations:   Pt requesting more prednisone for BLE pain previously evaluated by Peri Bains.    Routed to Peri Bains and Dr. Martel.  Paged Peri at [phone] after no response.  Paged Tahmina at [phone].    Peri bains returned page at [phone]. Will be placing labs for CRP and ESR to be done today or tomorrow then will assess results before resuming prednisone potentially tomorrow. She said to let RNCC Matheus know to follow the labs tomorrow for plan as Peir will be out.     Pt notified. Matheus Barragan RNCC notified of plan.     Dr. Martel also aware of plan after returned page.

## 2023-03-31 ENCOUNTER — PATIENT OUTREACH (OUTPATIENT)
Dept: ONCOLOGY | Facility: CLINIC | Age: 78
End: 2023-03-31
Payer: COMMERCIAL

## 2023-03-31 DIAGNOSIS — J45.20 MILD INTERMITTENT ASTHMA WITHOUT COMPLICATION: ICD-10-CM

## 2023-03-31 NOTE — TELEPHONE ENCOUNTER
"Pending Prescriptions:                       Disp   Refills    ADVAIR -21 MCG/ACT inhaler [Pharmac*12 g            Sig: INHALE 2 PUFFS INTO THE LUNGS TWICE DAILY    Routing refill request to provider for review/approval because:  Requested Prescriptions   Pending Prescriptions Disp Refills    ADVAIR -21 MCG/ACT inhaler [Pharmacy Med Name: ADVAIR /21MCG ORALINH 120S] 12 g      Sig: INHALE 2 PUFFS INTO THE LUNGS TWICE DAILY       Inhaled Steroids Protocol Failed - 3/31/2023 10:25 AM        Failed - Asthma control assessment score within normal limits in last 6 months     Please review ACT score.           Passed - Patient is age 12 or older        Passed - Medication is active on med list        Passed - Recent (6 mo) or future (30 days) visit within the authorizing provider's specialty     Patient had office visit in the last 6 months or has a visit in the next 30 days with authorizing provider or within the authorizing provider's specialty.  See \"Patient Info\" tab in inWallopet, or \"Choose Columns\" in Meds & Orders section of the refill encounter.           Long-Acting Beta Agonist Inhalers Protocol  Failed - 3/31/2023 10:25 AM        Failed - Asthma control assessment score within normal limits in last 6 months     Please review ACT score.           Passed - Patient is age 12 or older        Passed - Medication is active on med list        Passed - Recent (6 mo) or future (30 days) visit within the authorizing provider's specialty     Patient had office visit in the last 6 months or has a visit in the next 30 days with authorizing provider or within the authorizing provider's specialty.  See \"Patient Info\" tab in inbasket, or \"Choose Columns\" in Meds & Orders section of the refill encounter.               ACT Total Scores 5/4/2022 6/9/2022 9/27/2022   ACT TOTAL SCORE (Goal Greater than or Equal to 20) 16 17 16   In the past 12 months, how many times did you visit the emergency room for your " asthma without being admitted to the hospital? 0 0 0   In the past 12 months, how many times were you hospitalized overnight because of your asthma? 0 0 0      Last refill 9/27/22    Ashlee Paige RN

## 2023-04-03 ENCOUNTER — PATIENT OUTREACH (OUTPATIENT)
Dept: ONCOLOGY | Facility: CLINIC | Age: 78
End: 2023-04-03
Payer: COMMERCIAL

## 2023-04-03 ENCOUNTER — LAB (OUTPATIENT)
Dept: LAB | Facility: HOSPITAL | Age: 78
End: 2023-04-03
Payer: COMMERCIAL

## 2023-04-03 DIAGNOSIS — M79.662 PAIN OF LEFT LOWER LEG: ICD-10-CM

## 2023-04-03 DIAGNOSIS — M19.90 ARTHRITIS: ICD-10-CM

## 2023-04-03 DIAGNOSIS — C34.32 MALIGNANT NEOPLASM OF LOWER LOBE OF LEFT LUNG (H): ICD-10-CM

## 2023-04-03 LAB
CRP SERPL-MCNC: 35.1 MG/L
ERYTHROCYTE [SEDIMENTATION RATE] IN BLOOD BY WESTERGREN METHOD: 53 MM/HR (ref 0–20)

## 2023-04-03 PROCEDURE — 85652 RBC SED RATE AUTOMATED: CPT

## 2023-04-03 PROCEDURE — 36415 COLL VENOUS BLD VENIPUNCTURE: CPT

## 2023-04-03 PROCEDURE — 86140 C-REACTIVE PROTEIN: CPT

## 2023-04-03 RX ORDER — SULFAMETHOXAZOLE/TRIMETHOPRIM 800-160 MG
1 TABLET ORAL DAILY
Qty: 30 TABLET | Refills: 0 | Status: SHIPPED | OUTPATIENT
Start: 2023-04-03 | End: 2023-05-08

## 2023-04-03 RX ORDER — FLUTICASONE PROPIONATE AND SALMETEROL XINAFOATE 115; 21 UG/1; UG/1
AEROSOL, METERED RESPIRATORY (INHALATION)
Qty: 12 G | Refills: 11 | Status: SHIPPED | OUTPATIENT
Start: 2023-04-03 | End: 2024-04-16

## 2023-04-03 RX ORDER — PREDNISONE 10 MG/1
TABLET ORAL
Qty: 84 TABLET | Refills: 0 | Status: SHIPPED | OUTPATIENT
Start: 2023-04-03 | End: 2023-04-27

## 2023-04-03 NOTE — PROGRESS NOTES
"Called Pt to relay, Per Peri LACY, that \"I reviewed the ESR and sent in a script for prednisone again. I would also like her to start bactrim prophy.  Could you call to advise her on both of those and to let her know that if her pain should increase as she tapers the prednisone we may need to slow the taper.\"  Prednisone & Bactrim were sent to [id number] - [place] [address] AT [place] [address] & [facility]    Pt verbalized understanding.    "

## 2023-04-10 ENCOUNTER — HOSPITAL ENCOUNTER (OUTPATIENT)
Dept: CT IMAGING | Facility: HOSPITAL | Age: 78
Discharge: HOME | End: 2023-04-10
Attending: STUDENT IN AN ORGANIZED HEALTH CARE EDUCATION/TRAINING PROGRAM | Admitting: STUDENT IN AN ORGANIZED HEALTH CARE EDUCATION/TRAINING PROGRAM
Payer: COMMERCIAL

## 2023-04-10 DIAGNOSIS — C34.31 MALIGNANT NEOPLASM OF LOWER LOBE OF RIGHT LUNG (H): ICD-10-CM

## 2023-04-10 PROCEDURE — 74177 CT ABD & PELVIS W/CONTRAST: CPT

## 2023-04-10 PROCEDURE — 250N000011 HC RX IP 250 OP 636: Performed by: STUDENT IN AN ORGANIZED HEALTH CARE EDUCATION/TRAINING PROGRAM

## 2023-04-10 RX ORDER — IOPAMIDOL 755 MG/ML
90 INJECTION, SOLUTION INTRAVASCULAR ONCE
Status: COMPLETED | OUTPATIENT
Start: 2023-04-10 | End: 2023-04-10

## 2023-04-10 RX ADMIN — IOPAMIDOL 90 ML: 755 INJECTION, SOLUTION INTRAVENOUS at 11:56

## 2023-04-11 NOTE — PROGRESS NOTES
MEDICAL ONCOLOGY FOLLOW UP NOTE    PATIENT NAME: Yamel Lindsay  ENCOUNTER DATE: 4/12/2023    Care Team  Primary Oncologist:  Chaz Martel MD  Surgery: TBD  Radiation oncology: TBD    REASON FOR CURRENT VISIT: F/u of lung cancer    HISTORY OF PRESENT ILLNESS:  Ms. Yamel Lindsay is a 77 year old  female with PMHx of for HTN, HLD, anxiety, who is here for followup of lung cancer      Oncologic Hx:    Diagnosis:   Synchronous Rt LL Squamous cell carcinoma [id number] Stage IIIC (AJCC 8th edition) diagnosed 12/2022  PD-L1 <1%  NGS- [facility] panel- Neg (High TMB 16.7 mut/MB)    Left LL adenocarcinoma [id number] Stage (AJCC 8th edition) diagnosed 12/2022  PD-L1-2%  NGS-KRAS G12C, TMB 2.4    Treatment:   1/20/22- Pembrolizumab+carboplatin+paclitaxel    Intent of treatment: Palliative    Oncologic course:  One month history of increasing dyspnea with palpitations, propting ER visit 11/19.  11/19/22- CT- 1 cm right lower lobe mass abutting multiple central bronchovascular and mediastinal structures, consistent with malignancy.2.2 cm spiculated nodule in the left lower lobe, also consistent with malignancy.   12/6/22- PET/CT-  Right lower lobe lung mass abutting the pleura (SUV max of 24), measuring 8 x 5.2 cm. There is associated complete obliteration of right lower lobe bronchus and loss of fat planes with  the left atrium. Spiculated left lower lobe solid lung mass (SUV 17.9), measuring 2.2 x 2 cm. There are several markedly hypermetabolic, irregular solid nodules in the right middle lobe abutting the pleura (approximately 10-12), as for example 1.2 cm nodule with SUV max of 16.8 (4/126). There is a separate markedly hypermetabolic 1 cm nodule in the right upper lobe. Mild hypermetabolic uptake is noted within a1 x 0.9 cm right paratracheal node ( SUV max of 4.2) (4/120), Mild uptake is also  noted within a subcentimeter lower paraesophageal node (4/154).   12/28/22- EBUS with deblking in the right hilar mass (no mediastinal staging  performed)- - LUNG, RIGHT MAINSTEM, ENDOBRONCHIAL BIOPSY: Squamous cell carcinoma with extensive necrosis (positive for p40 and negative for TTF-1). Left LL FNAC- Positive for malignancy- Adenocarcinoma  (diffusely positive for TTF-1 while negative for p40)  1/3/23: Brain MRI - Neg  1/10/23: Tumor board discussion: Too extensive for concurrent/sequential chemo XRT- plan for palliative intent chemo-IO  1/20/23- C1 Pembrolizumab+carboplatin+paclitaxel  2/10/23-C2 Pembrolizumab+carboplatin+paclitaxel  2/27/23- CT CAP- Decreased size of the hypoattenuating mass in the central right lower lobe and small irregularly-shaped nodules in the right upper lobe consistent with positive treatment response. There is persistent postobstructive right lower lobe atelectasis.Unchanged heterogeneous lobulated and spiculated solid nodule in the lateral basal left lower lobe. No findings to suggest new extrapulmonary metastatic disease.  3/3, 3/24-  Pembrolizumab+carboplatin+paclitaxel     4/10/23- CT CAP- - some Rx response- Mass distal right lower lobe bronchus causing complete post obstructive atelectasis has decreased from 6.5 x 4.5 x 3.5 cm to 5.5 x 3.8 x 2.4 cm today. Spiculated left lower lobe mass has decreased from 2.1 cm to 1.5 cm today. Right upper lobe nodularity continues to decrease. Ill-defined nodularity in the right middle lobe has developed and is most likely postinflammatory.    Interval Hx:  Yamel is here prior to cycle 5 to discuss CT scan results.  Currently on prednisone taper. Restarted prednisone 4/3, previosuly took it from 3/10/23  Continues to have LE artharlgia, however about 70% better since she has restarted the prednisone.  Currently taking the prednisone at 30 to 40 mg/day with plan for taper over the next 2 to 3 weeks.  She continues to have exertional SOB, that is stable, she does have morning cough, occasional expectoration  No hemopthysis  No chest pain, wheezing.  No recent wt  loss  Appetite is ok  No headaches, vision, problems, no focal neurologic deficits  Noraml bladder and bowel movements    Indpendent of ADL and IADL, ambulatory at home  ECOG PS 1  She is sleeping a lot more, and also feels more tired    L[place] [facility], by herself,  since 1984  Has 2 daughter  Raeann and dudley, she was here with Dudley today  She was working for [employer] job for 30 yrs    REVIEW OF SYSTEMS: 14 point ROS negative other than the symptoms noted above in the HPI.    Wt Readings from Last 4 Encounters:   03/24/23 72.2 kg (159 lb 1.6 oz)   03/10/23 73 kg (161 lb)   03/03/23 72.9 kg (160 lb 11.2 oz)   03/01/23 72.9 kg (160 lb 11.2 oz)              Review of Systems:  A comprehensive ROS was performed and found to be negative or non-contributory with the exception of that noted in the HPI above.    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     Nicotine dependence      Postmenopausal HRT (hormone replacement therapy) 1994     S/P alcohol detoxification 07/06    Recovered and active in AA       Past Surgical History:  Past Surgical History:   Procedure Laterality Date     BRONCHOSCOPY, WITH BIOPSY, ROBOT ASSISTED N/A 12/28/2022    Procedure: BRONCHOSCOPY, USING ION OPTICAL TRACKING SYSTEM, FINE NEEDLE ASPIRATION;  Surgeon: Alma Almaguer MD;  Location: [facility] OR     ENDOBRONCHIAL ULTRASOUND FLEXIBLE N/A 12/28/2022    Procedure: endobronchial biopsy and tumor debulking with cryoprobe;  Surgeon: Alma Almaguer MD;  Location:  OR     ESOPHAGOSCOPY, GASTROSCOPY, DUODENOSCOPY (EGD), COMBINED N/A 1/5/2015    Procedure: COMBINED ESOPHAGOSCOPY, GASTROSCOPY, DUODENOSCOPY (EGD), BIOPSY SINGLE OR MULTIPLE;  Surgeon: Dylan Alejandra MD;  Location[facility]     ESOPHAGOSCOPY, GASTROSCOPY, DUODENOSCOPY (EGD), COMBINED N/A 5/25/2018    Procedure:  COMBINED ESOPHAGOSCOPY, GASTROSCOPY, DUODENOSCOPY (EGD), BIOPSY SINGLE OR MULTIPLE;  gastroscopy;  Surgeon: Alexander Bautista MD;  Location: [facility]     INSERT PORT VASCULAR ACCESS Left 2023    Procedure: INSERTION, VASCULAR ACCESS PORT left;  Surgeon: Scot Valadez DO;  Location: [facility]     LAPAROSCOPIC HERNIORRHAPHY INGUINAL Right 2015    Procedure: LAPAROSCOPIC HERNIORRHAPHY INGUINAL;  Surgeon: Favio Olsen MD;  Location: [facility]     TUBAL LIGATION         Social History:  Social History     Socioeconomic History     Marital status: Single   Tobacco Use     Smoking status: Former     Packs/day: 0.50     Types: Cigarettes     Quit date: 2011     Years since quittin.8     Smokeless tobacco: Never   Vaping Use     Vaping status: Never Used   Substance and Sexual Activity     Alcohol use: No     Drug use: No     Sexual activity: Not Currently   Other Topics Concern     Parent/sibling w/ CABG, MI or angioplasty before 65F 55M? No    2 pack per day for 40 yrs    Family History  Family History   Problem Relation Age of Onset     Cancer Mother      C.A.D. Father      Lipids Father      Hypertension Father      Hypertension Brother      Cancer Brother         esophageal cancer     Breast Cancer Other      Prostate Cancer Brother      Psychotic Disorder Daughter      Diabetes No family hx of      Cerebrovascular Disease No family hx of      Cancer - colorectal No family hx of    Mother had lung cancer  Older brother esophageal cancer  ANother brother prostate cancer, throat  2 brothers      Outpatient Medications:  acetaminophen (TYLENOL) 500 MG tablet, Take 1,000 mg by mouth every 6 hours as needed for mild pain Taking once a day  ADVAIR -21 MCG/ACT inhaler, INHALE 2 PUFFS INTO THE LUNGS TWICE DAILY  albuterol (PROAIR HFA/PROVENTIL HFA/VENTOLIN HFA) 108 (90 Base) MCG/ACT inhaler, Inhale 2 puffs into the lungs every 6 hours as needed for shortness of breath / dyspnea or  wheezing  atorvastatin (LIPITOR) 20 MG tablet, TAKE 1 TABLET(20 MG) BY MOUTH DAILY  Cholecalciferol (VITAMIN D-3 PO), Take 2,000 Units by mouth daily   clonazePAM (KLONOPIN) 0.5 MG tablet, Take 1-2 tablets (0.5-1 mg) by mouth 2 times daily as needed for anxiety  clonazePAM (KLONOPIN) 0.5 MG tablet, Take 0.5-1 tablets (0.25-0.5 mg) by mouth 2 times daily as needed for anxiety (Patient not taking: Reported on 3/24/2023)  dexamethasone (DECADRON) 4 MG tablet, Take 2 tablets (8 mg) by mouth daily Take for 3 days, starting the day after chemo. Take with food.  FLUoxetine (PROZAC) 20 MG capsule, Take 2 capsules (40 mg) by mouth daily  gabapentin (NEURONTIN) 300 MG capsule, Take 1 capsule (300 mg) by mouth every evening as needed for neuropathic pain ((for painful tingling and numbness))  ibuprofen (ADVIL,MOTRIN) 200 MG tablet, Take 200-600 mg by mouth every 2 hours as needed for mild pain  ipratropium - albuterol 0.5 mg/2.5 mg/3 mL (DUONEB) 0.5-2.5 (3) MG/3ML neb solution, Take 1 vial (3 mLs) by nebulization every 6 hours as needed for shortness of breath or wheezing (Patient not taking: Reported on 3/24/2023)  lidocaine-prilocaine (EMLA) 2.5-2.5 % external cream, Apply topically as needed for moderate pain (4-6) Apply 1 hour prior to port access and cover with saran wrap or press and seal.  omeprazole (PRILOSEC) 20 MG DR capsule, Take 1 capsule (20 mg) by mouth daily 30-60 minutes before a meal.  ondansetron (ZOFRAN) 8 MG tablet, Take 1 tablet (8 mg) by mouth every 8 hours as needed for nausea (vomiting) (Patient not taking: Reported on 3/24/2023)  polyethylene glycol (MIRALAX) 17 g packet, Take 1 packet by mouth daily Taking PRN (Patient not taking: Reported on 3/24/2023)  predniSONE (DELTASONE) 10 MG tablet, Take 6 tablets (60 mg) by mouth daily for 4 days, THEN 5 tablets (50 mg) daily for 4 days, THEN 4 tablets (40 mg) daily for 4 days, THEN 3 tablets (30 mg) daily for 4 days, THEN 2 tablets (20 mg) daily for 4 days,  THEN 1 tablet (10 mg) daily for 4 days. Please call clinic if pain increases when you reduce the prednisone as we may direct on a slower taper.  prochlorperazine (COMPAZINE) 10 MG tablet, Take 0.5 tablets (5 mg) by mouth every 6 hours as needed for nausea or vomiting (Patient not taking: Reported on 3/24/2023)  sulfamethoxazole-trimethoprim (BACTRIM DS) 800-160 MG tablet, Take 1 tablet by mouth daily While on prednisone  tiZANidine (ZANAFLEX) 2 MG tablet, TAKE 1 TABLET(2 MG) BY MOUTH THREE TIMES DAILY AS NEEDED FOR MUSCLE SPASMS  traMADol (ULTRAM) 50 MG tablet, Take 1 tablet (50 mg) by mouth every 6 hours as needed for severe pain (7-10) COVERING FOR PCP ONLY ONCE  traZODone (DESYREL) 50 MG tablet, TAKE 2 TABLETS(100 MG) BY MOUTH AT BEDTIME    [COMPLETED] iopamidol (ISOVUE-370) solution 90 mL  levalbuterol (XOPENEX) neb solution 1.25 mg         Physical Exam:    Blood pressure (!) 143/78, pulse 78, temperature 98.6  F (37  C), temperature source Oral, resp. rate 17, weight 71.8 kg (158 lb 4.8 oz), SpO2 96 %, not currently breastfeeding.  General: alert and cooperative, lying in bed, no acute distress  HEENT: sclera anicteric, EOMI, MMM  Neck: supple, normal ROM  CV: RRR, no murmurs  Resp: CTAB, normal respiratory effort on ambient air  GI: soft, non-tender, non-distended, bowel sounds present and normoactive  MSK: warm and well-perfused, normal tone  Skin: no rashes on limited exam, no jaundice  Neuro: Alert and interactive, moves all extremities equally, no focal deficits    Labs & Studies: I personally reviewed the following studies:    Recent Labs   Lab Test 11/18/22  2205 01/26/15  1445   WBC 7.4 6.9   RBC 4.06 4.21   HGB 11.6* 12.9   HCT 34.7* 37.8   MCV 86 90   MCH 28.6 30.6   MCHC 33.4 34.1   RDW 13.2 11.9    300   NEUTROPHIL 66 62.7     Recent Labs   Lab Test 11/18/22 2205 09/27/22  0842 03/03/21  1442    133* 140   POTASSIUM 3.2* 3.9 3.9   CHLORIDE 99 96* 103   CO2 30* 26 31   ANIONGAP 9 11 6    * 115* 85   BUN 10.2 10.7 10   CR 0.50* 0.59 0.60   DEEPIKA 10.0 9.2 9.6     Recent Labs   Lab Test 11/18/22  2205 09/27/22  0842 03/03/21  1442   PROTTOTAL 7.0 6.6 7.1   ALBUMIN 3.8 3.7 3.3*   BILITOTAL <0.2 0.2 0.2   ALKPHOS 128* 116* 179*   AST 20 24 13   ALT 14 13 18       ASSESSMENT AND PLAN:    Ms. Yamel Lindsay is a 77 year old  female with PMHx of for HTN, HLD, anxiety, who is here for evaluation/followup of new lung cancer    Synchronous Rt LL Squamous cell carcinoma [id number] Stage IIIC (AJCC 8th edition) diagnosed 12/2022  PD-L1 <1%  NGS- [facility] panel- Neg (High TMB 16.7 mut/MB)    Left LL adenocarcinoma [id number] Stage (AJCC 8th edition) diagnosed 12/2022  PD-L1-2%  NGS-KRAS G12C, TMB 2.4    Pt with 2 synchronous cancers.  The more urgent and threatening one is the right lower lobe squamous cell cancer which appears to be at least stage IIIC given the size greater than 7 cm suggestive of T4 disease.  She also has mediastinal lymph node that were enlarged and appear pathologic but have not yet been biopsied.  She is also has several FDG avid nodules in the right middle and the right upper lobe that have not yet been biopsied.  She underwent an EBUS for debulking and her mediastinum has not been staged.  Given the extent of involvement of the right sided cancer, concurrent/sequential chemoradiation not feasible per tumor board discussion, also no further EBUS required since high probability of cancer.   She proceeded with palliative intent chemoimmunotherapy with pembro+carbo+taxol.  Of note, squamous has high TMB and low neg PD-L1.  Adenocarcinoma has KRAS G12C.   CT after 4 cycles showing good partial response in the right-sided tumor and small amount of response in the left side adenocarcinoma.  I discussed the results of the CT scan.  Since there is about a 50% shrinkage in the right-sided tumor, we could explore the potential for some kind of definitive chemoradiation treatment for cancer on the right  "side now if feasible.   The right-sided cancer could be treated with SBRT.I will discuss her case in the thoracic tumor board.   If definitive chemoradiation may not be feasible we will continue palliative intent immunotherapy alone going forward once her arthralgias have improved and she is off of prednisone.  She is in agreement with the plan.     Plan  We will discuss with the thoracic tumor board next week  Return to clinic with me in 2 weeks        ##BLE leg pain, with elevation in CRP and ESR  -possible myositis/arthritis s/t immunotherapy  -dopplar negative for DVT, No known bone mets on baseline PET/CT.  -CRP high, CK normal  Currently on prednisone taper. Restarted prednisone 4/3, previosuly took it from 3/10/23  -started prednisone taper: 40 mg daily x 4, then 30 mg daily x 4, then 20 mg daily x 4, then 10 mg daily x 4., will finish 4/27  -has tramadol, started on gabapentin 300 mg at bedtime per palliative\"         #Psychiatry: Patient has previously had a history of anxiety.now since the diagnosis, is having panic attacks.  PTA on Prozac, and also clonazepam  -Trazodoen for ?insomnia  -increase cloazepan to BID 0.5 mg    #COVID vaccine: s/p 3 boosters    #Hypertension hyperlipidemia  ON statin      On the day of service,  Preparation time:  5 minutes  Visit time:  30 minutes  Care Coordination:  5 minutes      Chaz Martel M.D.   of Medicine  Division of Hematology, Oncology and Transplantation  [facility]  "

## 2023-04-12 ENCOUNTER — ONCOLOGY VISIT (OUTPATIENT)
Dept: ONCOLOGY | Facility: CLINIC | Age: 78
End: 2023-04-12
Attending: STUDENT IN AN ORGANIZED HEALTH CARE EDUCATION/TRAINING PROGRAM
Payer: COMMERCIAL

## 2023-04-12 VITALS
OXYGEN SATURATION: 96 % | WEIGHT: 158.3 LBS | RESPIRATION RATE: 17 BRPM | TEMPERATURE: 98.6 F | DIASTOLIC BLOOD PRESSURE: 78 MMHG | HEART RATE: 78 BPM | SYSTOLIC BLOOD PRESSURE: 143 MMHG | BODY MASS INDEX: 28.26 KG/M2

## 2023-04-12 DIAGNOSIS — C34.31 MALIGNANT NEOPLASM OF LOWER LOBE OF RIGHT LUNG (H): ICD-10-CM

## 2023-04-12 PROCEDURE — 99215 OFFICE O/P EST HI 40 MIN: CPT | Performed by: STUDENT IN AN ORGANIZED HEALTH CARE EDUCATION/TRAINING PROGRAM

## 2023-04-12 PROCEDURE — G0463 HOSPITAL OUTPT CLINIC VISIT: HCPCS | Performed by: STUDENT IN AN ORGANIZED HEALTH CARE EDUCATION/TRAINING PROGRAM

## 2023-04-12 ASSESSMENT — PAIN SCALES - GENERAL: PAINLEVEL: NO PAIN (0)

## 2023-04-12 NOTE — LETTER
4/12/2023         RE: Yamel Lindsay  [address] [facility] [address]        Dear Colleague,    Thank you for referring your patient, Yamel Lindsay, to the [facility]. Please see a copy of my visit note below.    MEDICAL ONCOLOGY FOLLOW UP NOTE    PATIENT NAME: Yamel Lindsay  ENCOUNTER DATE: 4/12/2023    Care Team  Primary Oncologist:  Chaz Martel MD  Surgery: TBD  Radiation oncology: TBD    REASON FOR CURRENT VISIT: F/u of lung cancer    HISTORY OF PRESENT ILLNESS:  Ms. Yamel Lindsay is a 77 year old  female with PMHx of for HTN, HLD, anxiety, who is here for followup of lung cancer      Oncologic Hx:    Diagnosis:   Synchronous Rt LL Squamous cell carcinoma [id number] Stage IIIC (AJCC 8th edition) diagnosed 12/2022  PD-L1 <1%  NGS- [facility] panel- Neg (High TMB 16.7 mut/MB)    Left LL adenocarcinoma [id number] Stage (AJCC 8th edition) diagnosed 12/2022  PD-L1-2%  NGS-KRAS G12C, TMB 2.4    Treatment:   1/20/22- Pembrolizumab+carboplatin+paclitaxel    Intent of treatment: Palliative    Oncologic course:  One month history of increasing dyspnea with palpitations, propting ER visit 11/19.  11/19/22- CT- 1 cm right lower lobe mass abutting multiple central bronchovascular and mediastinal structures, consistent with malignancy.2.2 cm spiculated nodule in the left lower lobe, also consistent with malignancy.   12/6/22- PET/CT-  Right lower lobe lung mass abutting the pleura (SUV max of 24), measuring 8 x 5.2 cm. There is associated complete obliteration of right lower lobe bronchus and loss of fat planes with  the left atrium. Spiculated left lower lobe solid lung mass (SUV 17.9), measuring 2.2 x 2 cm. There are several markedly hypermetabolic, irregular solid nodules in the right middle lobe abutting the pleura (approximately 10-12), as for example 1.2 cm nodule with SUV max of 16.8 (4/126). There is a separate markedly hypermetabolic 1 cm nodule in the right upper lobe. Mild  hypermetabolic uptake is noted within a1 x 0.9 cm right paratracheal node ( SUV max of 4.2) (4/120), Mild uptake is also  noted within a subcentimeter lower paraesophageal node (4/154).   12/28/22- EBUS with deblking in the right hilar mass (no mediastinal staging performed)- - LUNG, RIGHT MAINSTEM, ENDOBRONCHIAL BIOPSY: Squamous cell carcinoma with extensive necrosis (positive for p40 and negative for TTF-1). Left LL FNAC- Positive for malignancy- Adenocarcinoma  (diffusely positive for TTF-1 while negative for p40)  1/3/23: Brain MRI - Neg  1/10/23: Tumor board discussion: Too extensive for concurrent/sequential chemo XRT- plan for palliative intent chemo-IO  1/20/23- C1 Pembrolizumab+carboplatin+paclitaxel  2/10/23-C2 Pembrolizumab+carboplatin+paclitaxel  2/27/23- CT CAP- Decreased size of the hypoattenuating mass in the central right lower lobe and small irregularly-shaped nodules in the right upper lobe consistent with positive treatment response. There is persistent postobstructive right lower lobe atelectasis.Unchanged heterogeneous lobulated and spiculated solid nodule in the lateral basal left lower lobe. No findings to suggest new extrapulmonary metastatic disease.  3/3, 3/24-  Pembrolizumab+carboplatin+paclitaxel     4/10/23- CT CAP- - some Rx response- Mass distal right lower lobe bronchus causing complete post obstructive atelectasis has decreased from 6.5 x 4.5 x 3.5 cm to 5.5 x 3.8 x 2.4 cm today. Spiculated left lower lobe mass has decreased from 2.1 cm to 1.5 cm today. Right upper lobe nodularity continues to decrease. Ill-defined nodularity in the right middle lobe has developed and is most likely postinflammatory.    Interval Hx:  Yamel is here prior to cycle 5 to discuss CT scan results.  Currently on prednisone taper. Restarted prednisone 4/3, previosuly took it from 3/10/23  Continues to have LE artharlgia, however about 70% better since she has restarted the prednisone.  Currently  taking the prednisone at 30 to 40 mg/day with plan for taper over the next 2 to 3 weeks.  She continues to have exertional SOB, that is stable, she does have morning cough, occasional expectoration  No hemopthysis  No chest pain, wheezing.  No recent wt loss  Appetite is ok  No headaches, vision, problems, no focal neurologic deficits  Noraml bladder and bowel movements    Indpendent of ADL and IADL, ambulatory at home  ECOG PS 1  She is sleeping a lot more, and also feels more tired    Lives in [place], by herself,  since 1984  Has 2 daughter  Raeann and dudley, she was here with Dudley today  She was working for [email or web address] for 30 yrs    REVIEW OF SYSTEMS: 14 point ROS negative other than the symptoms noted above in the HPI.    Wt Readings from Last 4 Encounters:   03/24/23 72.2 kg (159 lb 1.6 oz)   03/10/23 73 kg (161 lb)   03/03/23 72.9 kg (160 lb 11.2 oz)   03/01/23 72.9 kg (160 lb 11.2 oz)              Review of Systems:  A comprehensive ROS was performed and found to be negative or non-contributory with the exception of that noted in the HPI above.    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    Nicotine dependence     Postmenopausal HRT (hormone replacement therapy) 1994    S/P alcohol detoxification 07/06    Recovered and active in AA       Past Surgical History:  Past Surgical History:   Procedure Laterality Date    BRONCHOSCOPY, WITH BIOPSY, ROBOT ASSISTED N/A 12/28/2022    Procedure: BRONCHOSCOPY, USING ION OPTICAL TRACKING SYSTEM, FINE NEEDLE ASPIRATION;  Surgeon: Alma Almaguer MD;  Location: [facility]    ENDOBRONCHIAL ULTRASOUND FLEXIBLE N/A 12/28/2022    Procedure: endobronchial biopsy and tumor debulking with cryoprobe;  Surgeon: Alma Almaguer MD;  Location: [facility] OR    ESOPHAGOSCOPY, GASTROSCOPY, DUODENOSCOPY (EGD), COMBINED N/A 1/5/2015     Procedure: COMBINED ESOPHAGOSCOPY, GASTROSCOPY, DUODENOSCOPY (EGD), BIOPSY SINGLE OR MULTIPLE;  Surgeon: Dylan Alejandra MD;  Location: [facility]    ESOPHAGOSCOPY, GASTROSCOPY, DUODENOSCOPY (EGD), COMBINED N/A 2018    Procedure: COMBINED ESOPHAGOSCOPY, GASTROSCOPY, DUODENOSCOPY (EGD), BIOPSY SINGLE OR MULTIPLE;  gastroscopy;  Surgeon: Alexander Bautista MD;  Location: [facility]    INSERT PORT VASCULAR ACCESS Left 2023    Procedure: INSERTION, VASCULAR ACCESS PORT left;  Surgeon: Scot Valadez DO;  Location: [facility]    LAPAROSCOPIC HERNIORRHAPHY INGUINAL Right 2015    Procedure: LAPAROSCOPIC HERNIORRHAPHY INGUINAL;  Surgeon: Favio Olsen MD;  Location: [facility]    TUBAL LIGATION         Social History:  Social History     Socioeconomic History    Marital status: Single   Tobacco Use    Smoking status: Former     Packs/day: 0.50     Types: Cigarettes     Quit date: 2011     Years since quittin.8    Smokeless tobacco: Never   Vaping Use    Vaping status: Never Used   Substance and Sexual Activity    Alcohol use: No    Drug use: No    Sexual activity: Not Currently   Other Topics Concern    Parent/sibling w/ CABG, MI or angioplasty before 65F 55M? No    2 pack per day for 40 yrs    Family History  Family History   Problem Relation Age of Onset    Cancer Mother     C.A.D. Father     Lipids Father     Hypertension Father     Hypertension Brother     Cancer Brother         esophageal cancer    Breast Cancer Other     Prostate Cancer Brother     Psychotic Disorder Daughter     Diabetes No family hx of     Cerebrovascular Disease No family hx of     Cancer - colorectal No family hx of    Mother had lung cancer  Older brother esophageal cancer  ANother brother prostate cancer, throat  2 brothers      Outpatient Medications:  acetaminophen (TYLENOL) 500 MG tablet, Take 1,000 mg by mouth every 6 hours as needed for mild pain Taking once a day  ADVAIR -21 MCG/ACT inhaler, INHALE 2 PUFFS  INTO THE LUNGS TWICE DAILY  albuterol (PROAIR HFA/PROVENTIL HFA/VENTOLIN HFA) 108 (90 Base) MCG/ACT inhaler, Inhale 2 puffs into the lungs every 6 hours as needed for shortness of breath / dyspnea or wheezing  atorvastatin (LIPITOR) 20 MG tablet, TAKE 1 TABLET(20 MG) BY MOUTH DAILY  Cholecalciferol (VITAMIN D-3 PO), Take 2,000 Units by mouth daily   clonazePAM (KLONOPIN) 0.5 MG tablet, Take 1-2 tablets (0.5-1 mg) by mouth 2 times daily as needed for anxiety  clonazePAM (KLONOPIN) 0.5 MG tablet, Take 0.5-1 tablets (0.25-0.5 mg) by mouth 2 times daily as needed for anxiety (Patient not taking: Reported on 3/24/2023)  dexamethasone (DECADRON) 4 MG tablet, Take 2 tablets (8 mg) by mouth daily Take for 3 days, starting the day after chemo. Take with food.  FLUoxetine (PROZAC) 20 MG capsule, Take 2 capsules (40 mg) by mouth daily  gabapentin (NEURONTIN) 300 MG capsule, Take 1 capsule (300 mg) by mouth every evening as needed for neuropathic pain ((for painful tingling and numbness))  ibuprofen (ADVIL,MOTRIN) 200 MG tablet, Take 200-600 mg by mouth every 2 hours as needed for mild pain  ipratropium - albuterol 0.5 mg/2.5 mg/3 mL (DUONEB) 0.5-2.5 (3) MG/3ML neb solution, Take 1 vial (3 mLs) by nebulization every 6 hours as needed for shortness of breath or wheezing (Patient not taking: Reported on 3/24/2023)  lidocaine-prilocaine (EMLA) 2.5-2.5 % external cream, Apply topically as needed for moderate pain (4-6) Apply 1 hour prior to port access and cover with saran wrap or press and seal.  omeprazole (PRILOSEC) 20 MG DR capsule, Take 1 capsule (20 mg) by mouth daily 30-60 minutes before a meal.  ondansetron (ZOFRAN) 8 MG tablet, Take 1 tablet (8 mg) by mouth every 8 hours as needed for nausea (vomiting) (Patient not taking: Reported on 3/24/2023)  polyethylene glycol (MIRALAX) 17 g packet, Take 1 packet by mouth daily Taking PRN (Patient not taking: Reported on 3/24/2023)  predniSONE (DELTASONE) 10 MG tablet, Take 6 tablets  (60 mg) by mouth daily for 4 days, THEN 5 tablets (50 mg) daily for 4 days, THEN 4 tablets (40 mg) daily for 4 days, THEN 3 tablets (30 mg) daily for 4 days, THEN 2 tablets (20 mg) daily for 4 days, THEN 1 tablet (10 mg) daily for 4 days. Please call clinic if pain increases when you reduce the prednisone as we may direct on a slower taper.  prochlorperazine (COMPAZINE) 10 MG tablet, Take 0.5 tablets (5 mg) by mouth every 6 hours as needed for nausea or vomiting (Patient not taking: Reported on 3/24/2023)  sulfamethoxazole-trimethoprim (BACTRIM DS) 800-160 MG tablet, Take 1 tablet by mouth daily While on prednisone  tiZANidine (ZANAFLEX) 2 MG tablet, TAKE 1 TABLET(2 MG) BY MOUTH THREE TIMES DAILY AS NEEDED FOR MUSCLE SPASMS  traMADol (ULTRAM) 50 MG tablet, Take 1 tablet (50 mg) by mouth every 6 hours as needed for severe pain (7-10) COVERING FOR PCP ONLY ONCE  traZODone (DESYREL) 50 MG tablet, TAKE 2 TABLETS(100 MG) BY MOUTH AT BEDTIME    [COMPLETED] iopamidol (ISOVUE-370) solution 90 mL  levalbuterol (XOPENEX) neb solution 1.25 mg         Physical Exam:    Blood pressure (!) 143/78, pulse 78, temperature 98.6  F (37  C), temperature source Oral, resp. rate 17, weight 71.8 kg (158 lb 4.8 oz), SpO2 96 %, not currently breastfeeding.  General: alert and cooperative, lying in bed, no acute distress  HEENT: sclera anicteric, EOMI, MMM  Neck: supple, normal ROM  CV: RRR, no murmurs  Resp: CTAB, normal respiratory effort on ambient air  GI: soft, non-tender, non-distended, bowel sounds present and normoactive  MSK: warm and well-perfused, normal tone  Skin: no rashes on limited exam, no jaundice  Neuro: Alert and interactive, moves all extremities equally, no focal deficits    Labs & Studies: I personally reviewed the following studies:    Recent Labs   Lab Test 11/18/22  2205 01/26/15  1445   WBC 7.4 6.9   RBC 4.06 4.21   HGB 11.6* 12.9   HCT 34.7* 37.8   MCV 86 90   MCH 28.6 30.6   MCHC 33.4 34.1   RDW 13.2 11.9   PLT  328 300   NEUTROPHIL 66 62.7     Recent Labs   Lab Test 11/18/22 2205 09/27/22  0842 03/03/21  1442    133* 140   POTASSIUM 3.2* 3.9 3.9   CHLORIDE 99 96* 103   CO2 30* 26 31   ANIONGAP 9 11 6   * 115* 85   BUN 10.2 10.7 10   CR 0.50* 0.59 0.60   DEEPIKA 10.0 9.2 9.6     Recent Labs   Lab Test 11/18/22 2205 09/27/22  0842 03/03/21  1442   PROTTOTAL 7.0 6.6 7.1   ALBUMIN 3.8 3.7 3.3*   BILITOTAL <0.2 0.2 0.2   ALKPHOS 128* 116* 179*   AST 20 24 13   ALT 14 13 18       ASSESSMENT AND PLAN:    Ms. Yamel Lindsay is a 77 year old  female with PMHx of for HTN, HLD, anxiety, who is here for evaluation/followup of new lung cancer    Synchronous Rt LL Squamous cell carcinoma [id number] Stage IIIC (AJCC 8th edition) diagnosed 12/2022  PD-L1 <1%  NGS- [facility] panel- Neg (High TMB 16.7 mut/MB)    Left LL adenocarcinoma [id number] Stage (AJCC 8th edition) diagnosed 12/2022  PD-L1-2%  NGS-KRAS G12C, TMB 2.4    Pt with 2 synchronous cancers.  The more urgent and threatening one is the right lower lobe squamous cell cancer which appears to be at least stage IIIC given the size greater than 7 cm suggestive of T4 disease.  She also has mediastinal lymph node that were enlarged and appear pathologic but have not yet been biopsied.  She is also has several FDG avid nodules in the right middle and the right upper lobe that have not yet been biopsied.  She underwent an EBUS for debulking and her mediastinum has not been staged.  Given the extent of involvement of the right sided cancer, concurrent/sequential chemoradiation not feasible per tumor board discussion, also no further EBUS required since high probability of cancer.   She proceeded with palliative intent chemoimmunotherapy with pembro+carbo+taxol.  Of note, squamous has high TMB and low neg PD-L1.  Adenocarcinoma has KRAS G12C.   CT after 4 cycles showing good partial response in the right-sided tumor and small amount of response in the left side adenocarcinoma.  I discussed  "the results of the CT scan.  Since there is about a 50% shrinkage in the right-sided tumor, we could explore the potential for some kind of definitive chemoradiation treatment for cancer on the right side now if feasible.   The right-sided cancer could be treated with SBRT.I will discuss her case in the thoracic tumor board.   If definitive chemoradiation may not be feasible we will continue palliative intent immunotherapy alone going forward once her arthralgias have improved and she is off of prednisone.  She is in agreement with the plan.     Plan  We will discuss with the thoracic tumor board next week  Return to clinic with me in 2 weeks        ##BLE leg pain, with elevation in CRP and ESR  -possible myositis/arthritis s/t immunotherapy  -dopplar negative for DVT, No known bone mets on baseline PET/CT.  -CRP high, CK normal  Currently on prednisone taper. Restarted prednisone 4/3, previosuly took it from 3/10/23  -started prednisone taper: 40 mg daily x 4, then 30 mg daily x 4, then 20 mg daily x 4, then 10 mg daily x 4., will finish 4/27  -has tramadol, started on gabapentin 300 mg at bedtime per palliative\"         #Psychiatry: Patient has previously had a history of anxiety.now since the diagnosis, is having panic attacks.  PTA on Prozac, and also clonazepam  -Trazodoen for ?insomnia  -increase cloazepan to BID 0.5 mg    #COVID vaccine: s/p 3 boosters    #Hypertension hyperlipidemia  ON statin      On the day of service,  Preparation time:  5 minutes  Visit time:  30 minutes  Care Coordination:  5 minutes      Chaz Martel M.D.   of Medicine  Division of Hematology, Oncology and Transplantation  [facility]  "

## 2023-04-25 NOTE — PROGRESS NOTES
MEDICAL ONCOLOGY FOLLOW UP NOTE    PATIENT NAME: Yamel Lindsay  ENCOUNTER DATE: 4/26/2023    Care Team  Primary Oncologist:  Chaz Martel MD  Surgery: TBD  Radiation oncology: TBD    REASON FOR CURRENT VISIT: F/u of lung cancer    HISTORY OF PRESENT ILLNESS:  Ms. Yamel Lindsay is a 77 year old  female with PMHx of for HTN, HLD, anxiety, who is here for followup of lung cancer      Oncologic Hx:    Diagnosis:   Synchronous Rt LL Squamous cell carcinoma [id number] Stage IIIC (AJCC 8th edition) diagnosed 12/2022  PD-L1 <1%  NGS- [facility] panel- Neg (High TMB 16.7 mut/MB)    Left LL adenocarcinoma [id number] Stage (AJCC 8th edition) diagnosed 12/2022  PD-L1-2%  NGS-KRAS G12C, TMB 2.4    Treatment:   1/20/22- Pembrolizumab+carboplatin+paclitaxel    Intent of treatment: Palliative    Oncologic course:  One month history of increasing dyspnea with palpitations, propting ER visit 11/19.  11/19/22- CT- 1 cm right lower lobe mass abutting multiple central bronchovascular and mediastinal structures, consistent with malignancy.2.2 cm spiculated nodule in the left lower lobe, also consistent with malignancy.   12/6/22- PET/CT-  Right lower lobe lung mass abutting the pleura (SUV max of 24), measuring 8 x 5.2 cm. There is associated complete obliteration of right lower lobe bronchus and loss of fat planes with  the left atrium. Spiculated left lower lobe solid lung mass (SUV 17.9), measuring 2.2 x 2 cm. There are several markedly hypermetabolic, irregular solid nodules in the right middle lobe abutting the pleura (approximately 10-12), as for example 1.2 cm nodule with SUV max of 16.8 (4/126). There is a separate markedly hypermetabolic 1 cm nodule in the right upper lobe. Mild hypermetabolic uptake is noted within a1 x 0.9 cm right paratracheal node ( SUV max of 4.2) (4/120), Mild uptake is also  noted within a subcentimeter lower paraesophageal node (4/154).   12/28/22- EBUS with deblking in the right hilar mass (no mediastinal staging  performed)- - LUNG, RIGHT MAINSTEM, ENDOBRONCHIAL BIOPSY: Squamous cell carcinoma with extensive necrosis (positive for p40 and negative for TTF-1). Left LL FNAC- Positive for malignancy- Adenocarcinoma  (diffusely positive for TTF-1 while negative for p40)  1/3/23: Brain MRI - Neg  1/10/23: Tumor board discussion: Too extensive for concurrent/sequential chemo XRT- plan for palliative intent chemo-IO  1/20/23- C1 Pembrolizumab+carboplatin+paclitaxel  2/10/23-C2 Pembrolizumab+carboplatin+paclitaxel  2/27/23- CT CAP- Decreased size of the hypoattenuating mass in the central right lower lobe and small irregularly-shaped nodules in the right upper lobe consistent with positive treatment response. There is persistent postobstructive right lower lobe atelectasis.Unchanged heterogeneous lobulated and spiculated solid nodule in the lateral basal left lower lobe. No findings to suggest new extrapulmonary metastatic disease.  3/3, 3/24-  Pembrolizumab+carboplatin+paclitaxel     4/10/23- CT CAP- - some Rx response- Mass distal right lower lobe bronchus causing complete post obstructive atelectasis has decreased from 6.5 x 4.5 x 3.5 cm to 5.5 x 3.8 x 2.4 cm today. Spiculated left lower lobe mass has decreased from 2.1 cm to 1.5 cm today. Right upper lobe nodularity continues to decrease. Ill-defined nodularity in the right middle lobe has developed and is most likely postinflammatory.    Interval Hx:  Yamel is here  To discuss next steps.  She finished prednisone taper. The LE arthralgia did come back after stopping it, but it is mild and is being managed by tylenol/NSAIDs.  She continues to have mild exertional SOB, that isgettig better  She is very active, walks 3-4 miles/day  Appetite is ok  No headaches, vision, problems, no focal neurologic deficits  Noraml bladder and bowel movements    Indpendent of ADL and IADL, ambulatory at home  ECOG PS 1  She is sleeping a lot more, and also feels more tired    Lives in  [place] herself,  since 1984  Has 2 daughter  Raeann and dudley, she was here with Dudley today  She was working for [employer] job for 30 yrs    REVIEW OF SYSTEMS: 14 point ROS negative other than the symptoms noted above in the HPI.    Wt Readings from Last 4 Encounters:   04/12/23 71.8 kg (158 lb 4.8 oz)   03/24/23 72.2 kg (159 lb 1.6 oz)   03/10/23 73 kg (161 lb)   03/03/23 72.9 kg (160 lb 11.2 oz)              Review of Systems:  A comprehensive ROS was performed and found to be negative or non-contributory with the exception of that noted in the HPI above.    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     Nicotine dependence      Postmenopausal HRT (hormone replacement therapy) 1994     S/P alcohol detoxification 07/06    Recovered and active in AA       Past Surgical History:  Past Surgical History:   Procedure Laterality Date     BRONCHOSCOPY, WITH BIOPSY, ROBOT ASSISTED N/A 12/28/2022    Procedure: BRONCHOSCOPY, USING ION OPTICAL TRACKING SYSTEM, FINE NEEDLE ASPIRATION;  Surgeon: Alma Almaguer MD;  Location: [facility] OR     ENDOBRONCHIAL ULTRASOUND FLEXIBLE N/A 12/28/2022    Procedure: endobronchial biopsy and tumor debulking with cryoprobe;  Surgeon: Alma Almaguer MD;  Location: [facility] OR     ESOPHAGOSCOPY, GASTROSCOPY, DUODENOSCOPY (EGD), COMBINED N/A 1/5/2015    Procedure: COMBINED ESOPHAGOSCOPY, GASTROSCOPY, DUODENOSCOPY (EGD), BIOPSY SINGLE OR MULTIPLE;  Surgeon: Dylan Alejandra MD;  Location: [facility]     ESOPHAGOSCOPY, GASTROSCOPY, DUODENOSCOPY (EGD), COMBINED N/A 5/25/2018    Procedure: COMBINED ESOPHAGOSCOPY, GASTROSCOPY, DUODENOSCOPY (EGD), BIOPSY SINGLE OR MULTIPLE;  gastroscopy;  Surgeon: Alexander Bautista MD;  Location: [facility]     INSERT PORT VASCULAR ACCESS Left 2/6/2023    Procedure: INSERTION, VASCULAR ACCESS PORT left;   Surgeon: Scot Valadez DO;  Location: [facility]     LAPAROSCOPIC HERNIORRHAPHY INGUINAL Right 2015    Procedure: LAPAROSCOPIC HERNIORRHAPHY INGUINAL;  Surgeon: Favio Olsen MD;  Location: [facility]     TUBAL LIGATION         Social History:  Social History     Socioeconomic History     Marital status: Single   Tobacco Use     Smoking status: Former     Packs/day: 0.50     Types: Cigarettes     Quit date: 2011     Years since quittin.8     Smokeless tobacco: Never   Vaping Use     Vaping status: Never Used   Substance and Sexual Activity     Alcohol use: No     Drug use: No     Sexual activity: Not Currently   Other Topics Concern     Parent/sibling w/ CABG, MI or angioplasty before 65F 55M? No    2 pack per day for 40 yrs    Family History  Family History   Problem Relation Age of Onset     Cancer Mother      C.A.D. Father      Lipids Father      Hypertension Father      Hypertension Brother      Cancer Brother         esophageal cancer     Breast Cancer Other      Prostate Cancer Brother      Psychotic Disorder Daughter      Diabetes No family hx of      Cerebrovascular Disease No family hx of      Cancer - colorectal No family hx of    Mother had lung cancer  Older brother esophageal cancer  ANother brother prostate cancer, throat  2 brothers      Outpatient Medications:  acetaminophen (TYLENOL) 500 MG tablet, Take 1,000 mg by mouth every 6 hours as needed for mild pain Taking once a day  ADVAIR -21 MCG/ACT inhaler, INHALE 2 PUFFS INTO THE LUNGS TWICE DAILY  albuterol (PROAIR HFA/PROVENTIL HFA/VENTOLIN HFA) 108 (90 Base) MCG/ACT inhaler, Inhale 2 puffs into the lungs every 6 hours as needed for shortness of breath / dyspnea or wheezing  atorvastatin (LIPITOR) 20 MG tablet, TAKE 1 TABLET(20 MG) BY MOUTH DAILY  Cholecalciferol (VITAMIN D-3 PO), Take 2,000 Units by mouth daily   clonazePAM (KLONOPIN) 0.5 MG tablet, Take 0.5-1 tablets (0.25-0.5 mg) by mouth 2 times daily as needed for  anxiety  gabapentin (NEURONTIN) 300 MG capsule, Take 1 capsule (300 mg) by mouth every evening as needed for neuropathic pain ((for painful tingling and numbness))  ibuprofen (ADVIL,MOTRIN) 200 MG tablet, Take 200-600 mg by mouth every 2 hours as needed for mild pain  lidocaine-prilocaine (EMLA) 2.5-2.5 % external cream, Apply topically as needed for moderate pain (4-6) Apply 1 hour prior to port access and cover with saran wrap or press and seal.  omeprazole (PRILOSEC) 20 MG DR capsule, Take 1 capsule (20 mg) by mouth daily 30-60 minutes before a meal.  ondansetron (ZOFRAN) 8 MG tablet, Take 1 tablet (8 mg) by mouth every 8 hours as needed for nausea (vomiting)  predniSONE (DELTASONE) 10 MG tablet, Take 6 tablets (60 mg) by mouth daily for 4 days, THEN 5 tablets (50 mg) daily for 4 days, THEN 4 tablets (40 mg) daily for 4 days, THEN 3 tablets (30 mg) daily for 4 days, THEN 2 tablets (20 mg) daily for 4 days, THEN 1 tablet (10 mg) daily for 4 days. Please call clinic if pain increases when you reduce the prednisone as we may direct on a slower taper.  sulfamethoxazole-trimethoprim (BACTRIM DS) 800-160 MG tablet, Take 1 tablet by mouth daily While on prednisone  tiZANidine (ZANAFLEX) 2 MG tablet, TAKE 1 TABLET(2 MG) BY MOUTH THREE TIMES DAILY AS NEEDED FOR MUSCLE SPASMS  traMADol (ULTRAM) 50 MG tablet, Take 1 tablet (50 mg) by mouth every 6 hours as needed for severe pain (7-10) COVERING FOR PCP ONLY ONCE  traZODone (DESYREL) 50 MG tablet, TAKE 2 TABLETS(100 MG) BY MOUTH AT BEDTIME  clonazePAM (KLONOPIN) 0.5 MG tablet, Take 1-2 tablets (0.5-1 mg) by mouth 2 times daily as needed for anxiety (Patient not taking: Reported on 4/26/2023)  dexamethasone (DECADRON) 4 MG tablet, Take 2 tablets (8 mg) by mouth daily Take for 3 days, starting the day after chemo. Take with food. (Patient not taking: Reported on 4/26/2023)  FLUoxetine (PROZAC) 20 MG capsule, Take 2 capsules (40 mg) by mouth daily  ipratropium - albuterol 0.5  mg/2.5 mg/3 mL (DUONEB) 0.5-2.5 (3) MG/3ML neb solution, Take 1 vial (3 mLs) by nebulization every 6 hours as needed for shortness of breath or wheezing (Patient not taking: Reported on 3/24/2023)  polyethylene glycol (MIRALAX) 17 g packet, Take 1 packet by mouth daily Taking PRN (Patient not taking: Reported on 4/12/2023)  prochlorperazine (COMPAZINE) 10 MG tablet, Take 0.5 tablets (5 mg) by mouth every 6 hours as needed for nausea or vomiting (Patient not taking: Reported on 4/26/2023)    levalbuterol (XOPENEX) neb solution 1.25 mg         Physical Exam:    Blood pressure 123/73, pulse 78, temperature 97.7  F (36.5  C), temperature source Oral, weight 71.7 kg (158 lb 1.6 oz), SpO2 96 %, not currently breastfeeding.  General: alert and cooperative, lying in bed, no acute distress  HEENT: sclera anicteric, EOMI, MMM  Neck: supple, normal ROM  CV: RRR, no murmurs  Resp: CTAB, normal respiratory effort on ambient air  GI: soft, non-tender, non-distended, bowel sounds present and normoactive  MSK: warm and well-perfused, normal tone  Skin: no rashes on limited exam, no jaundice  Neuro: Alert and interactive, moves all extremities equally, no focal deficits    Labs & Studies: I personally reviewed the following studies:    Recent Labs   Lab Test 11/18/22  2205 01/26/15  1445   WBC 7.4 6.9   RBC 4.06 4.21   HGB 11.6* 12.9   HCT 34.7* 37.8   MCV 86 90   MCH 28.6 30.6   MCHC 33.4 34.1   RDW 13.2 11.9    300   NEUTROPHIL 66 62.7     Recent Labs   Lab Test 11/18/22 2205 09/27/22  0842 03/03/21  1442    133* 140   POTASSIUM 3.2* 3.9 3.9   CHLORIDE 99 96* 103   CO2 30* 26 31   ANIONGAP 9 11 6   * 115* 85   BUN 10.2 10.7 10   CR 0.50* 0.59 0.60   DEEPIKA 10.0 9.2 9.6     Recent Labs   Lab Test 11/18/22  2205 09/27/22  0842 03/03/21  1442   PROTTOTAL 7.0 6.6 7.1   ALBUMIN 3.8 3.7 3.3*   BILITOTAL <0.2 0.2 0.2   ALKPHOS 128* 116* 179*   AST 20 24 13   ALT 14 13 18       ASSESSMENT AND PLAN:    Ms. Yamel JONES Neftali  is a 77 year old  female with PMHx of for HTN, HLD, anxiety, who is here for evaluation/followup of new lung cancer    Synchronous Rt LL Squamous cell carcinoma [id number] Stage IIIC (AJCC 8th edition) diagnosed 12/2022  PD-L1 <1%  NGS- [facility] panel- Neg (High TMB 16.7 mut/MB)    Left LL adenocarcinoma [id number] Stage (AJCC 8th edition) diagnosed 12/2022  PD-L1-2%  NGS-KRAS G12C, TMB 2.4    Pt with 2 synchronous cancers.  The more urgent and threatening one is the right lower lobe squamous cell cancer which appears to be at least stage IIIC given the size greater than 7 cm suggestive of T4 disease.  She also has mediastinal lymph node that were enlarged and appear pathologic but have not yet been biopsied.  She is also has several FDG avid nodules in the right middle and the right upper lobe that have not yet been biopsied.  She underwent an EBUS for debulking and her mediastinum has not been staged.  Given the extent of involvement of the right sided cancer, concurrent/sequential chemoradiation not feasible per tumor board discussion, also no further EBUS required since high probability of cancer.   She proceeded with palliative intent chemoimmunotherapy with pembro+carbo+taxol.  Of note, squamous has high TMB and low neg PD-L1.  Adenocarcinoma has KRAS G12C.   CT after 4 cycles showing good partial response in the right-sided tumor and small amount of response in the left side adenocarcinoma.  After discussion at tumor board, Dr. Escoto agreed for definitive radiation treatment (without chemo, as she already had 4 cycles of chemo) for cancer on the right side and possible SBRT on left side both with definitive intent.   Will refer rad onc, will plan on doing CT scan in ~8 weeks at the end of radiation and see me back. I will plan on continuing IO for 1 more year at least after finishing XRT. This was discussed today and pt was agreeable.       Plan  Radiation oncology consult  CT in 8-9 weeks  RTC with me after CT    ##BLE  "leg pain, with elevation in CRP and ESR- nealry resolved  -possible myositis/arthritis s/t immunotherapy  -dopplar negative for DVT, No known bone mets on baseline PET/CT.  -CRP high, CK normal  Finished prednisone taper last week, did have mild flare up after stopping but managing it well,  I asked to get in touch if this becomes more severe.  -has tramadol, started on gabapentin 300 mg at bedtime per palliative\"         #Psychiatry: Patient has previously had a history of anxiety.now since the diagnosis, is having panic attacks.  PTA on Prozac, and also clonazepam  -Trazodoen for ?insomnia  -increase cloazepan to BID 0.5 mg    #COVID vaccine: s/p 3 boosters    #Hypertension hyperlipidemia  ON statin      On the day of service,  Preparation time:  5 minutes  Visit time:  30 minutes  Care Coordination:  5 minutes      Chaz Martel M.D.   of Medicine  Division of Hematology, Oncology and Transplantation  [facility]  "

## 2023-04-26 ENCOUNTER — PATIENT OUTREACH (OUTPATIENT)
Dept: ONCOLOGY | Facility: CLINIC | Age: 78
End: 2023-04-26

## 2023-04-26 ENCOUNTER — ONCOLOGY VISIT (OUTPATIENT)
Dept: ONCOLOGY | Facility: CLINIC | Age: 78
End: 2023-04-26
Attending: STUDENT IN AN ORGANIZED HEALTH CARE EDUCATION/TRAINING PROGRAM
Payer: COMMERCIAL

## 2023-04-26 VITALS
OXYGEN SATURATION: 96 % | SYSTOLIC BLOOD PRESSURE: 123 MMHG | DIASTOLIC BLOOD PRESSURE: 73 MMHG | BODY MASS INDEX: 28.22 KG/M2 | WEIGHT: 158.1 LBS | TEMPERATURE: 97.7 F | HEART RATE: 78 BPM

## 2023-04-26 DIAGNOSIS — C34.31 MALIGNANT NEOPLASM OF LOWER LOBE OF RIGHT LUNG (H): Primary | ICD-10-CM

## 2023-04-26 PROCEDURE — G0463 HOSPITAL OUTPT CLINIC VISIT: HCPCS | Performed by: STUDENT IN AN ORGANIZED HEALTH CARE EDUCATION/TRAINING PROGRAM

## 2023-04-26 PROCEDURE — 99215 OFFICE O/P EST HI 40 MIN: CPT | Performed by: STUDENT IN AN ORGANIZED HEALTH CARE EDUCATION/TRAINING PROGRAM

## 2023-04-26 ASSESSMENT — PAIN SCALES - GENERAL: PAINLEVEL: MODERATE PAIN (4)

## 2023-04-26 NOTE — PROGRESS NOTES
New Patient Oncology Nurse Navigator Note     Referring provider: Dr. Chaz Martel    Referring Clinic/Organization: [facility]     Referred to: Radiation Oncology    Requested provider (if applicable): First available - did not specify     Referral Received: 04/26/23       Evaluation for :   Diagnosis   C34.31 (ICD-10-CM) - Malignant neoplasm of lower lobe of right lung (H)     Clinical History (per Nurse review of records provided):    12/28/2022 Surgical Pathology (bookmarked) showed:   Final Diagnosis   A.  LUNG, RIGHT MAINSTEM, ENDOBRONCHIAL BIOPSY:  - Squamous cell carcinoma with extensive necrosis   Electronically signed by Jessica Stephens MD on 12/30/2022 at 11:13 AM     Addendum   RESULT FOR IMMUNOHISTOCHEMICAL VENTANA CLONE  PD-L1 ASSAY  TUMOR PROPORTION SCORE (TPS): <1 %  INTERPRETATION: NEGATIVE PD-L1 EXPRESSION (TPS <1%)     01/03/2023 MR Brain (bookmarked) showed:   IMPRESSION:  1.  No evidence for intracranial metastatic disease.  2.  Mild to moderate global brain parenchymal volume loss with presumed sequelae mild chronic small vessel ischemic disease.    04/10/2023 CT Chest/Abdomen/Pelvis (bookmarked) showed:   IMPRESSION:  1.  Mass distal right lower lobe bronchus causing complete post obstructive atelectasis has decreased from 6.5 x 4.5 x 3.5 cm to 5.5 x 3.8 x 2.4 cm today.  2.  Spiculated left lower lobe mass has decreased from 2.1 cm to 1.5 cm today.  3.  Right upper lobe nodularity continues to decrease.  4.  Ill-defined nodularity in the right middle lobe has developed and is most likely postinflammatory.    04/26/2023 OV notes from referring provider bookmarked.    Records Location: [facility]   Referral updates and Plan:   Sending scheduling instructions to NPS for scheduling with rad onc in [place].     JOSE Crump, JEANCARLOSN, RN, LAc, OCN  [facility] Oncology Nurse Navigator  [phone] / [phone]

## 2023-04-26 NOTE — LETTER
4/26/2023         RE: Yamel Lindsay  [address] [place] [address]        Dear Colleague,    Thank you for referring your patient, Yamel Lindsay, to the [facility]. Please see a copy of my visit note below.    MEDICAL ONCOLOGY FOLLOW UP NOTE    PATIENT NAME: Yamel Lindsay  ENCOUNTER DATE: 4/26/2023    Care Team  Primary Oncologist:  Chaz Martel MD  Surgery: TBD  Radiation oncology: TBD    REASON FOR CURRENT VISIT: F/u of lung cancer    HISTORY OF PRESENT ILLNESS:  Ms. 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  11/19/22- CT- 1 cm right lower lobe mass abutting multiple central bronchovascular and mediastinal structures, consistent with malignancy.2.2 cm spiculated nodule in the left lower lobe, also consistent with malignancy.   12/6/22- PET/CT-  Right lower lobe lung mass abutting the pleura (SUV max of 24), measuring 8 x 5.2 cm. There is associated complete obliteration of right lower lobe bronchus and loss of fat planes with  the left atrium. Spiculated left lower lobe solid lung mass (SUV 17.9), measuring 2.2 x 2 cm. There are several markedly hypermetabolic, irregular solid nodules in the right middle lobe abutting the pleura (approximately 10-12), as for example 1.2 cm nodule with SUV max of 16.8 (4/126). There is a separate markedly hypermetabolic 1 cm nodule in the right upper lobe. Mild  hypermetabolic uptake is noted within a1 x 0.9 cm right paratracheal node ( SUV max of 4.2) (4/120), Mild uptake is also  noted within a subcentimeter lower paraesophageal node (4/154).   12/28/22- EBUS with deblking in the right hilar mass (no mediastinal staging performed)- - LUNG, RIGHT MAINSTEM, ENDOBRONCHIAL BIOPSY: Squamous cell carcinoma with extensive necrosis (positive for p40 and negative for TTF-1). Left LL FNAC- Positive for malignancy- Adenocarcinoma  (diffusely positive for TTF-1 while negative for p40)  1/3/23: Brain MRI - Neg  1/10/23: Tumor board discussion: Too extensive for concurrent/sequential chemo XRT- plan for palliative intent chemo-IO  1/20/23- C1 Pembrolizumab+carboplatin+paclitaxel  2/10/23-C2 Pembrolizumab+carboplatin+paclitaxel  2/27/23- CT CAP- Decreased size of the hypoattenuating mass in the central right lower lobe and small irregularly-shaped nodules in the right upper lobe consistent with positive treatment response. There is persistent postobstructive right lower lobe atelectasis.Unchanged heterogeneous lobulated and spiculated solid nodule in the lateral basal left lower lobe. No findings to suggest new extrapulmonary metastatic disease.  3/3, 3/24-  Pembrolizumab+carboplatin+paclitaxel     4/10/23- CT CAP- - some Rx response- Mass distal right lower lobe bronchus causing complete post obstructive atelectasis has decreased from 6.5 x 4.5 x 3.5 cm to 5.5 x 3.8 x 2.4 cm today. Spiculated left lower lobe mass has decreased from 2.1 cm to 1.5 cm today. Right upper lobe nodularity continues to decrease. Ill-defined nodularity in the right middle lobe has developed and is most likely postinflammatory.    Interval Hx:  Yamel is here  To discuss next steps.  She finished prednisone taper. The LE arthralgia did come back after stopping it, but it is mild and is being managed by tylenol/NSAIDs.  She continues to have mild exertional SOB, that isgettig better  She is very active,  walks 3-4 miles/day  Appetite is ok  No headaches, vision, problems, no focal neurologic deficits  Noraml bladder and bowel movements    Indpendent of ADL and IADL, ambulatory at home  ECOG PS 1  She is sleeping a lot more, and also feels more tired    Lives in [place] [facility], by herself,  since 1984  Has 2 daughter  Raeann and dudley, she was here with Dudley today  She was working for [employer] job for 30 yrs    REVIEW OF SYSTEMS: 14 point ROS negative other than the symptoms noted above in the HPI.    Wt Readings from Last 4 Encounters:   04/12/23 71.8 kg (158 lb 4.8 oz)   03/24/23 72.2 kg (159 lb 1.6 oz)   03/10/23 73 kg (161 lb)   03/03/23 72.9 kg (160 lb 11.2 oz)              Review of Systems:  A comprehensive ROS was performed and found to be negative or non-contributory with the exception of that noted in the HPI above.    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    Nicotine dependence     Postmenopausal HRT (hormone replacement therapy) 1994    S/P alcohol detoxification 07/06    Recovered and active in AA       Past Surgical History:  Past Surgical History:   Procedure Laterality Date    BRONCHOSCOPY, WITH BIOPSY, ROBOT ASSISTED N/A 12/28/2022    Procedure: BRONCHOSCOPY, USING ION OPTICAL TRACKING SYSTEM, FINE NEEDLE ASPIRATION;  Surgeon: Alma Almaguer MD;  Location: [facility] OR    ENDOBRONCHIAL ULTRASOUND FLEXIBLE N/A 12/28/2022    Procedure: endobronchial biopsy and tumor debulking with cryoprobe;  Surgeon: Alma Almaguer MD;  Location:  OR    ESOPHAGOSCOPY, GASTROSCOPY, DUODENOSCOPY (EGD), COMBINED N/A 1/5/2015    Procedure: COMBINED ESOPHAGOSCOPY, GASTROSCOPY, DUODENOSCOPY (EGD), BIOPSY SINGLE OR MULTIPLE;  Surgeon: Dylan Alejandra MD;  Location: [facility]    ESOPHAGOSCOPY, GASTROSCOPY, DUODENOSCOPY (EGD), COMBINED N/A 5/25/2018    Procedure:  COMBINED ESOPHAGOSCOPY, GASTROSCOPY, DUODENOSCOPY (EGD), BIOPSY SINGLE OR MULTIPLE;  gastroscopy;  Surgeon: Alexander Bautista MD;  Location: [facility]    INSERT PORT VASCULAR ACCESS Left 2023    Procedure: INSERTION, VASCULAR ACCESS PORT left;  Surgeon: Scot Valadez DO;  Location: [facility]    LAPAROSCOPIC HERNIORRHAPHY INGUINAL Right 2015    Procedure: LAPAROSCOPIC HERNIORRHAPHY INGUINAL;  Surgeon: Favio Olsen MD;  Location: [facility]    TUBAL LIGATION         Social History:  Social History     Socioeconomic History    Marital status: Single   Tobacco Use    Smoking status: Former     Packs/day: 0.50     Types: Cigarettes     Quit date: 2011     Years since quittin.8    Smokeless tobacco: Never   Vaping Use    Vaping status: Never Used   Substance and Sexual Activity    Alcohol use: No    Drug use: No    Sexual activity: Not Currently   Other Topics Concern    Parent/sibling w/ CABG, MI or angioplasty before 65F 55M? No    2 pack per day for 40 yrs    Family History  Family History   Problem Relation Age of Onset    Cancer Mother     C.A.D. Father     Lipids Father     Hypertension Father     Hypertension Brother     Cancer Brother         esophageal cancer    Breast Cancer Other     Prostate Cancer Brother     Psychotic Disorder Daughter     Diabetes No family hx of     Cerebrovascular Disease No family hx of     Cancer - colorectal No family hx of    Mother had lung cancer  Older brother esophageal cancer  ANother brother prostate cancer, throat  2 brothers      Outpatient Medications:  acetaminophen (TYLENOL) 500 MG tablet, Take 1,000 mg by mouth every 6 hours as needed for mild pain Taking once a day  ADVAIR -21 MCG/ACT inhaler, INHALE 2 PUFFS INTO THE LUNGS TWICE DAILY  albuterol (PROAIR HFA/PROVENTIL HFA/VENTOLIN HFA) 108 (90 Base) MCG/ACT inhaler, Inhale 2 puffs into the lungs every 6 hours as needed for shortness of breath / dyspnea or wheezing  atorvastatin (LIPITOR) 20  MG tablet, TAKE 1 TABLET(20 MG) BY MOUTH DAILY  Cholecalciferol (VITAMIN D-3 PO), Take 2,000 Units by mouth daily   clonazePAM (KLONOPIN) 0.5 MG tablet, Take 0.5-1 tablets (0.25-0.5 mg) by mouth 2 times daily as needed for anxiety  gabapentin (NEURONTIN) 300 MG capsule, Take 1 capsule (300 mg) by mouth every evening as needed for neuropathic pain ((for painful tingling and numbness))  ibuprofen (ADVIL,MOTRIN) 200 MG tablet, Take 200-600 mg by mouth every 2 hours as needed for mild pain  lidocaine-prilocaine (EMLA) 2.5-2.5 % external cream, Apply topically as needed for moderate pain (4-6) Apply 1 hour prior to port access and cover with saran wrap or press and seal.  omeprazole (PRILOSEC) 20 MG DR capsule, Take 1 capsule (20 mg) by mouth daily 30-60 minutes before a meal.  ondansetron (ZOFRAN) 8 MG tablet, Take 1 tablet (8 mg) by mouth every 8 hours as needed for nausea (vomiting)  predniSONE (DELTASONE) 10 MG tablet, Take 6 tablets (60 mg) by mouth daily for 4 days, THEN 5 tablets (50 mg) daily for 4 days, THEN 4 tablets (40 mg) daily for 4 days, THEN 3 tablets (30 mg) daily for 4 days, THEN 2 tablets (20 mg) daily for 4 days, THEN 1 tablet (10 mg) daily for 4 days. Please call clinic if pain increases when you reduce the prednisone as we may direct on a slower taper.  sulfamethoxazole-trimethoprim (BACTRIM DS) 800-160 MG tablet, Take 1 tablet by mouth daily While on prednisone  tiZANidine (ZANAFLEX) 2 MG tablet, TAKE 1 TABLET(2 MG) BY MOUTH THREE TIMES DAILY AS NEEDED FOR MUSCLE SPASMS  traMADol (ULTRAM) 50 MG tablet, Take 1 tablet (50 mg) by mouth every 6 hours as needed for severe pain (7-10) COVERING FOR PCP ONLY ONCE  traZODone (DESYREL) 50 MG tablet, TAKE 2 TABLETS(100 MG) BY MOUTH AT BEDTIME  clonazePAM (KLONOPIN) 0.5 MG tablet, Take 1-2 tablets (0.5-1 mg) by mouth 2 times daily as needed for anxiety (Patient not taking: Reported on 4/26/2023)  dexamethasone (DECADRON) 4 MG tablet, Take 2 tablets (8 mg) by  mouth daily Take for 3 days, starting the day after chemo. Take with food. (Patient not taking: Reported on 4/26/2023)  FLUoxetine (PROZAC) 20 MG capsule, Take 2 capsules (40 mg) by mouth daily  ipratropium - albuterol 0.5 mg/2.5 mg/3 mL (DUONEB) 0.5-2.5 (3) MG/3ML neb solution, Take 1 vial (3 mLs) by nebulization every 6 hours as needed for shortness of breath or wheezing (Patient not taking: Reported on 3/24/2023)  polyethylene glycol (MIRALAX) 17 g packet, Take 1 packet by mouth daily Taking PRN (Patient not taking: Reported on 4/12/2023)  prochlorperazine (COMPAZINE) 10 MG tablet, Take 0.5 tablets (5 mg) by mouth every 6 hours as needed for nausea or vomiting (Patient not taking: Reported on 4/26/2023)    levalbuterol (XOPENEX) neb solution 1.25 mg         Physical Exam:    Blood pressure 123/73, pulse 78, temperature 97.7  F (36.5  C), temperature source Oral, weight 71.7 kg (158 lb 1.6 oz), SpO2 96 %, not currently breastfeeding.  General: alert and cooperative, lying in bed, no acute distress  HEENT: sclera anicteric, EOMI, MMM  Neck: supple, normal ROM  CV: RRR, no murmurs  Resp: CTAB, normal respiratory effort on ambient air  GI: soft, non-tender, non-distended, bowel sounds present and normoactive  MSK: warm and well-perfused, normal tone  Skin: no rashes on limited exam, no jaundice  Neuro: Alert and interactive, moves all extremities equally, no focal deficits    Labs & Studies: I personally reviewed the following studies:    Recent Labs   Lab Test 11/18/22  2205 01/26/15  1445   WBC 7.4 6.9   RBC 4.06 4.21   HGB 11.6* 12.9   HCT 34.7* 37.8   MCV 86 90   MCH 28.6 30.6   MCHC 33.4 34.1   RDW 13.2 11.9    300   NEUTROPHIL 66 62.7     Recent Labs   Lab Test 11/18/22 2205 09/27/22  0842 03/03/21  1442    133* 140   POTASSIUM 3.2* 3.9 3.9   CHLORIDE 99 96* 103   CO2 30* 26 31   ANIONGAP 9 11 6   * 115* 85   BUN 10.2 10.7 10   CR 0.50* 0.59 0.60   DEEPIKA 10.0 9.2 9.6     Recent Labs   Lab  Test 11/18/22  2205 09/27/22  0842 03/03/21  1442   PROTTOTAL 7.0 6.6 7.1   ALBUMIN 3.8 3.7 3.3*   BILITOTAL <0.2 0.2 0.2   ALKPHOS 128* 116* 179*   AST 20 24 13   ALT 14 13 18       ASSESSMENT AND PLAN:    Ms. Yamel Lindsay is a 77 year old  female with PMHx of for HTN, HLD, anxiety, who is here for evaluation/followup of new lung cancer    Synchronous Rt LL Squamous cell carcinoma [id number] Stage IIIC (AJCC 8th edition) diagnosed 12/2022  PD-L1 <1%  NGS- [facility] panel- Neg (High TMB 16.7 mut/MB)    Left LL adenocarcinoma [id number] Stage (AJCC 8th edition) diagnosed 12/2022  PD-L1-2%  NGS-KRAS G12C, TMB 2.4    Pt with 2 synchronous cancers.  The more urgent and threatening one is the right lower lobe squamous cell cancer which appears to be at least stage IIIC given the size greater than 7 cm suggestive of T4 disease.  She also has mediastinal lymph node that were enlarged and appear pathologic but have not yet been biopsied.  She is also has several FDG avid nodules in the right middle and the right upper lobe that have not yet been biopsied.  She underwent an EBUS for debulking and her mediastinum has not been staged.  Given the extent of involvement of the right sided cancer, concurrent/sequential chemoradiation not feasible per tumor board discussion, also no further EBUS required since high probability of cancer.   She proceeded with palliative intent chemoimmunotherapy with pembro+carbo+taxol.  Of note, squamous has high TMB and low neg PD-L1.  Adenocarcinoma has KRAS G12C.   CT after 4 cycles showing good partial response in the right-sided tumor and small amount of response in the left side adenocarcinoma.  After discussion at tumor board, Dr. Escoto agreed for definitive radiation treatment (without chemo, as she already had 4 cycles of chemo) for cancer on the right side and possible SBRT on left side both with definitive intent.   Will refer rad onc, will plan on doing CT scan in ~8 weeks at the end of radiation  "and see me back. I will plan on continuing IO for 1 more year at least after finishing XRT. This was discussed today and pt was agreeable.       Plan  Radiation oncology consult  CT in 8-9 weeks  RTC with me after CT    ##BLE leg pain, with elevation in CRP and ESR- nealry resolved  -possible myositis/arthritis s/t immunotherapy  -dopplar negative for DVT, No known bone mets on baseline PET/CT.  -CRP high, CK normal  Finished prednisone taper last week, did have mild flare up after stopping but managing it well,  I asked to get in touch if this becomes more severe.  -has tramadol, started on gabapentin 300 mg at bedtime per palliative\"         #Psychiatry: Patient has previously had a history of anxiety.now since the diagnosis, is having panic attacks.  PTA on Prozac, and also clonazepam  -Trazodoen for ?insomnia  -increase cloazepan to BID 0.5 mg    #COVID vaccine: s/p 3 boosters    #Hypertension hyperlipidemia  ON statin      On the day of service,  Preparation time:  5 minutes  Visit time:  30 minutes  Care Coordination:  5 minutes      Chaz Martel M.D.   of Medicine  Division of Hematology, Oncology and Transplantation  [facility]    "

## 2023-04-26 NOTE — NURSING NOTE
"Oncology Rooming Note    April 26, 2023 1:40 PM   Yamel Lindsay is a 77 year old female who presents for:    Chief Complaint   Patient presents with     Oncology Clinic Visit     Right lower lobe lung mass      Initial Vitals: /73 (BP Location: Right arm, Patient Position: Sitting, Cuff Size: Adult Regular)   Pulse 78   Temp 97.7  F (36.5  C) (Oral)   Wt 71.7 kg (158 lb 1.6 oz)   SpO2 96%   BMI 28.22 kg/m   Estimated body mass index is 28.22 kg/m  as calculated from the following:    Height as of 3/24/23: 1.594 m (5' 2.76\").    Weight as of this encounter: 71.7 kg (158 lb 1.6 oz). Body surface area is 1.78 meters squared.  Moderate Pain (4) Comment: Data Unavailable   No LMP recorded. Patient is postmenopausal.  Allergies reviewed: Yes  Medications reviewed: Yes    Medications: Medication refills not needed today.  Pharmacy name entered into [employer]Official[employer]:    [facility] [place], [place] - [address] [facility] [place], [place] - [address] [id number] - [place], [place] - [facility] [facility] [address]    Clinical concerns: Patient states there are no new concerns to discuss with provider.    Nica Garcia              "

## 2023-05-02 ENCOUNTER — PATIENT OUTREACH (OUTPATIENT)
Dept: ONCOLOGY | Facility: CLINIC | Age: 78
End: 2023-05-02
Payer: COMMERCIAL

## 2023-05-02 DIAGNOSIS — F41.9 ANXIETY: ICD-10-CM

## 2023-05-02 DIAGNOSIS — M79.662 PAIN OF LEFT LOWER LEG: Primary | ICD-10-CM

## 2023-05-02 RX ORDER — PREDNISONE 10 MG/1
10 TABLET ORAL DAILY
Qty: 30 TABLET | Refills: 0 | Status: SHIPPED | OUTPATIENT
Start: 2023-05-02 | End: 2023-06-19

## 2023-05-02 NOTE — TELEPHONE ENCOUNTER
"Routing refill request to provider for review/approval because:  Drug interaction warning    Requested Prescriptions   Pending Prescriptions Disp Refills     FLUoxetine (PROZAC) 20 MG capsule [Pharmacy Med Name: FLUOXETINE 20MG CAPSULES] 180 capsule 1     Sig: TAKE 2 CAPSULES(40 MG) BY MOUTH DAILY       SSRIs Protocol Passed - 5/2/2023 10:23 AM        Passed - Recent (12 mo) or future (30 days) visit within the authorizing provider's specialty     Patient has had an office visit with the authorizing provider or a provider within the authorizing providers department within the previous 12 mos or has a future within next 30 days. See \"Patient Info\" tab in inbasket, or \"Choose Columns\" in Meds & Orders section of the refill encounter.              Passed - Medication is active on med list        Passed - Patient is age 18 or older        Passed - No active pregnancy on record        Passed - No positive pregnancy test in last 12 months                   "

## 2023-05-02 NOTE — PROGRESS NOTES
Pt calls to [place] about leg pain, Pain was improving while on Prednisone, finished taper about 1.5 weeks go, pain has been gradually returning since. Pian is described as a burning pain, worse at night, hurts from knees to ankle bilaterally, right leg is worse. No discoloration or temperature gradient. Pain is a 6/10, currently 5/10. No other Sx. No other exacerbating or alleviating factors.     Pt pharmacy is  #[id number] in [facility].    Called pt to inform of Rx and work on follow-up appt. Pt verbalized understanding.

## 2023-05-07 DIAGNOSIS — C34.31 MALIGNANT NEOPLASM OF LOWER LOBE OF RIGHT LUNG (H): ICD-10-CM

## 2023-05-07 DIAGNOSIS — G62.0 CHEMOTHERAPY-INDUCED PERIPHERAL NEUROPATHY (H): ICD-10-CM

## 2023-05-07 DIAGNOSIS — T45.1X5A CHEMOTHERAPY-INDUCED PERIPHERAL NEUROPATHY (H): ICD-10-CM

## 2023-05-08 ENCOUNTER — VIRTUAL VISIT (OUTPATIENT)
Dept: ONCOLOGY | Facility: CLINIC | Age: 78
End: 2023-05-08
Attending: NURSE PRACTITIONER
Payer: COMMERCIAL

## 2023-05-08 VITALS — BODY MASS INDEX: 27.46 KG/M2 | WEIGHT: 155 LBS | HEIGHT: 63 IN

## 2023-05-08 DIAGNOSIS — C34.32 MALIGNANT NEOPLASM OF LOWER LOBE OF LEFT LUNG (H): ICD-10-CM

## 2023-05-08 DIAGNOSIS — M19.90 ARTHRITIS: Primary | ICD-10-CM

## 2023-05-08 PROCEDURE — 99214 OFFICE O/P EST MOD 30 MIN: CPT | Mod: VID | Performed by: NURSE PRACTITIONER

## 2023-05-08 PROCEDURE — G0463 HOSPITAL OUTPT CLINIC VISIT: HCPCS | Mod: PN,GT | Performed by: NURSE PRACTITIONER

## 2023-05-08 RX ORDER — SULFAMETHOXAZOLE/TRIMETHOPRIM 800-160 MG
1 TABLET ORAL DAILY
Qty: 30 TABLET | Refills: 0 | Status: SHIPPED | OUTPATIENT
Start: 2023-05-08 | End: 2023-06-19

## 2023-05-08 RX ORDER — PREDNISONE 20 MG/1
20 TABLET ORAL DAILY
Qty: 30 TABLET | Refills: 0 | Status: SHIPPED | OUTPATIENT
Start: 2023-05-08 | End: 2023-06-16

## 2023-05-08 ASSESSMENT — PAIN SCALES - GENERAL: PAINLEVEL: NO PAIN (0)

## 2023-05-08 NOTE — LETTER
5/8/2023         RE: Yamel Lindsay  [address] [facility] [address] [place] [address]        Dear Colleague,    Thank you for referring your patient, Yamel Lindsay, to the [facility]. Please see a copy of my visit note below.    Virtual Visit Details    Type of service:  Video Visit   Video Start Time: 400  Video End Time:411    Originating Location (pt. Location): Home  Distant Location (provider location):  On-site  [facility]     Reason for Visit: f/u lung cancer, joint pain      Diagnosis:   Synchronous Rt LL Squamous cell carcinoma [id number] Stage IIIC (AJCC 8th edition) diagnosed 12/2022  PD-L1 <1%  NGS- [facility] panel- Neg (High TMB 16.7 mut/MB)     Left LL adenocarcinoma [id number] Stage (AJCC 8th edition) diagnosed 12/2022  PD-L1-2%  NGS-KRAS G12C, TMB 2.4    Onc HPI:    One month history of increasing dyspnea with palpitations, propting ER visit 11/19.  11/19/22- CT- 1 cm right lower lobe mass abutting multiple central bronchovascular and mediastinal structures, consistent with malignancy.2.2 cm spiculated nodule in the left lower lobe, also consistent with malignancy.   12/6/22- PET/CT-  Right lower lobe lung mass abutting the pleura (SUV max of 24), measuring 8 x 5.2 cm. There is associated complete obliteration of right lower lobe bronchus and loss of fat planes with  the left atrium. Spiculated left lower lobe solid lung mass (SUV 17.9), measuring 2.2 x 2 cm. There are several markedly hypermetabolic, irregular solid nodules in the right middle lobe abutting the pleura (approximately 10-12), as for example 1.2 cm nodule with SUV max of 16.8 (4/126). There is a separate markedly hypermetabolic 1 cm nodule in the right upper lobe. Mild hypermetabolic uptake is noted within a1 x 0.9 cm right paratracheal node ( SUV max of 4.2) (4/120), Mild uptake is also  noted within a subcentimeter lower paraesophageal node (4/154).   12/28/22- EBUS with deblking in the right  hilar mass (no mediastinal staging performed)- - LUNG, RIGHT MAINSTEM, ENDOBRONCHIAL BIOPSY: Squamous cell carcinoma with extensive necrosis (positive for p40 and negative for TTF-1). Left LL FNAC- Positive for malignancy- Adenocarcinoma  (diffusely positive for TTF-1 while negative for p40)  1/3/23: Brain MRI - Neg  1/10/23: Tumor board discussion: Too extensive for concurrent/sequential chemo XRT- plan for palliative intent chemo-IO  1/20/23- C1 Pembrolizumab+carboplatin+paclitaxel  2/10/23-C2 Pembrolizumab+carboplatin+paclitaxel  2/27/23- CT CAP- Decreased size of the hypoattenuating mass in the central right lower lobe and small irregularly-shaped nodules in the right upper lobe consistent with positive treatment response. There is persistent postobstructive right lower lobe atelectasis.Unchanged heterogeneous lobulated and spiculated solid nodule in the lateral basal left lower lobe. No findings to suggest new extrapulmonary metastatic disease.  3/3, 3/24-  Pembrolizumab+carboplatin+paclitaxel      4/10/23- CT CAP- - some Rx response- Mass distal right lower lobe bronchus causing complete post obstructive atelectasis has decreased from 6.5 x 4.5 x 3.5 cm to 5.5 x 3.8 x 2.4 cm today. Spiculated left lower lobe mass has decreased from 2.1 cm to 1.5 cm today. Right upper lobe nodularity continues to decrease. Ill-defined nodularity in the right middle lobe has developed and is most likely postinflammatory.    Interval history: Yamel has been having difficulty with bilateral calf pain, radiates from the knee to the ankle. The pain is worse on the R side. Pain is the same as before. She had been off of prednisone for a week prior to the pain increasing again. She was resumed on prednisone 10 mg last week, but had to increase to 20 mg over the weekend. She is managing ok at this dose and use of ibuprofen.  No leg swelling or rash. No heartburn or reflux. No cough or shortness of breath. No mouth  sores.    Current Outpatient Medications   Medication Sig Dispense Refill    acetaminophen (TYLENOL) 500 MG tablet Take 1,000 mg by mouth every 6 hours as needed for mild pain Taking once a day      ADVAIR -21 MCG/ACT inhaler INHALE 2 PUFFS INTO THE LUNGS TWICE DAILY 12 g 11    albuterol (PROAIR HFA/PROVENTIL HFA/VENTOLIN HFA) 108 (90 Base) MCG/ACT inhaler Inhale 2 puffs into the lungs every 6 hours as needed for shortness of breath / dyspnea or wheezing 18 g 0    atorvastatin (LIPITOR) 20 MG tablet TAKE 1 TABLET(20 MG) BY MOUTH DAILY 90 tablet 1    Cholecalciferol (VITAMIN D-3 PO) Take 2,000 Units by mouth daily       clonazePAM (KLONOPIN) 0.5 MG tablet Take 1-2 tablets (0.5-1 mg) by mouth 2 times daily as needed for anxiety (Patient not taking: Reported on 4/26/2023) 120 tablet 1    clonazePAM (KLONOPIN) 0.5 MG tablet Take 0.5-1 tablets (0.25-0.5 mg) by mouth 2 times daily as needed for anxiety 30 tablet 3    dexamethasone (DECADRON) 4 MG tablet Take 2 tablets (8 mg) by mouth daily Take for 3 days, starting the day after chemo. Take with food. (Patient not taking: Reported on 4/26/2023) 6 tablet 3    FLUoxetine (PROZAC) 20 MG capsule TAKE 2 CAPSULES(40 MG) BY MOUTH DAILY 180 capsule 1    gabapentin (NEURONTIN) 300 MG capsule Take 1 capsule (300 mg) by mouth every evening as needed for neuropathic pain ((for painful tingling and numbness)) 30 capsule 1    ibuprofen (ADVIL,MOTRIN) 200 MG tablet Take 200-600 mg by mouth every 2 hours as needed for mild pain      ipratropium - albuterol 0.5 mg/2.5 mg/3 mL (DUONEB) 0.5-2.5 (3) MG/3ML neb solution Take 1 vial (3 mLs) by nebulization every 6 hours as needed for shortness of breath or wheezing (Patient not taking: Reported on 3/24/2023) 90 mL 0    lidocaine-prilocaine (EMLA) 2.5-2.5 % external cream Apply topically as needed for moderate pain (4-6) Apply 1 hour prior to port access and cover with saran wrap or press and seal. 30 g 1    omeprazole (PRILOSEC) 20 MG  "DR capsule Take 1 capsule (20 mg) by mouth daily 30-60 minutes before a meal. 90 capsule 2    ondansetron (ZOFRAN) 8 MG tablet Take 1 tablet (8 mg) by mouth every 8 hours as needed for nausea (vomiting) 30 tablet 2    polyethylene glycol (MIRALAX) 17 g packet Take 1 packet by mouth daily Taking PRN (Patient not taking: Reported on 4/12/2023)      predniSONE (DELTASONE) 10 MG tablet Take 1 tablet (10 mg) by mouth daily 30 tablet 0    prochlorperazine (COMPAZINE) 10 MG tablet Take 0.5 tablets (5 mg) by mouth every 6 hours as needed for nausea or vomiting (Patient not taking: Reported on 4/26/2023) 30 tablet 2    sulfamethoxazole-trimethoprim (BACTRIM DS) 800-160 MG tablet Take 1 tablet by mouth daily While on prednisone 30 tablet 0    tiZANidine (ZANAFLEX) 2 MG tablet TAKE 1 TABLET(2 MG) BY MOUTH THREE TIMES DAILY AS NEEDED FOR MUSCLE SPASMS 90 tablet 3    traMADol (ULTRAM) 50 MG tablet Take 1 tablet (50 mg) by mouth every 6 hours as needed for severe pain (7-10) COVERING FOR PCP ONLY ONCE 120 tablet 1    traZODone (DESYREL) 50 MG tablet TAKE 2 TABLETS(100 MG) BY MOUTH AT BEDTIME 180 tablet 3          Allergies   Allergen Reactions    Nka [No Known Allergies]     Seasonal Allergies          Video physical exam  General: Patient appears well in no acute distress.   Skin: No visualized rash or lesions on visualized skin  Eyes: EOMI, no erythema, sclera icterus or discharge noted  Resp: Appears to be breathing comfortably without accessory muscle usage, speaking in full sentences, no cough  MSK: Appears to have normal range of motion based on visualized movements  Neurologic: No apparent tremors, facial movements symmetric  Psych: affect engaged, pleasant, alert and oriented   Height 1.6 m (5' 3\"), weight 70.3 kg (155 lb), not currently breastfeeding.  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 Has been attributed to immunotherapy  -has had 2 prednisone tapers, the 2nd was a longer taper. Her pain started to increase the week after stopping prednisone  -she was resumed on prednisone 10 mg last week without improvement and increased to 20 mg. Also taking NSAIDs and pain is improving  -likely will need to remain a low dose of prednisone chronically if we resume immunotherapy post radiation. For now, will continue 20 mg daily. If pain increases again, she will call and we can increase to 40 mg daily and again slowly taper.  -I will f/u with her in 10-14 days.  When pain is gone, may try to keep her at 10 mg daily    PJP prophy-resume bactrim with the restart of prednisone    Lung cancer: 2 synchronous cancers, s/p 4 cycles of Carbo/Pemetrexed/pembrolizumab with a partial response  -has been referred to Dr. Escoto for definitive radiation on the right and possible SBRT on the left  -has f/u with Dr. Martel in June at which time resuming immunotherapy x 1 year will be discussed.    Not addressed today:  Hx of anxiety, on prozac, clonazepam    HTN        Peri Ellis, NAYA CNP

## 2023-05-08 NOTE — NURSING NOTE
"  Patient declined medication review and said they are all up to date.       Is the patient currently in the state of [place]? YES    Visit mode:VIDEO    If the visit is dropped, the patient can be reconnected by: VIDEO VISIT: Send to e-mail at: [email or web address]    Will anyone else be joining the visit? NO      How would you like to obtain your AVS? MyChart    Are changes needed to the allergy or medication list? NO    Reason for visit: Video Visit (Patient said, \"Worse side effect from Chemo is leg pain that starts by the knee and worst in calves. The medication isnt working. \")      Aimee Turpin, VF  "

## 2023-05-08 NOTE — TELEPHONE ENCOUNTER
[facility]                                                                           Received Interface, Eprescribing from pharmacy  requesting refill of Gabapentin     Last refill ordered: 3.8.2023  Last office visit with Palliative Care 3.8.2023  Next office visit with Palliative Care:  Not scheduled/ due now      Reviewed [place]  Report with no concerns  Last sold on: 4.8.2023    Spoke with patient, she does say symptoms have improved since the addition of gabapentin. She was unable to review potential follow up dates as she was grocery shopping.  We agreed I will send potential dates and times to her     Will route request to MD for review.    Yamini Amador LPN  Palliative Care Nurse Coordinator  [phone]

## 2023-05-08 NOTE — PROGRESS NOTES
Virtual Visit Details    Type of service:  Video Visit   Video Start Time: 400  Video End Time:411    Originating Location (pt. Location): Home  Distant Location (provider location):  On-site  Platform used for Video Visit: [employer]     Reason for Visit: f/u lung cancer, joint pain      Diagnosis:   Synchronous Rt LL Squamous cell carcinoma [id number] Stage IIIC (AJCC 8th edition) diagnosed 12/2022  PD-L1 <1%  NGS- [facility] panel- Neg (High TMB 16.7 mut/MB)     Left LL adenocarcinoma [id number] Stage (AJCC 8th edition) diagnosed 12/2022  PD-L1-2%  NGS-KRAS G12C, TMB 2.4    Onc HPI:    One month history of increasing dyspnea with palpitations, propting ER visit 11/19.  11/19/22- CT- 1 cm right lower lobe mass abutting multiple central bronchovascular and mediastinal structures, consistent with malignancy.2.2 cm spiculated nodule in the left lower lobe, also consistent with malignancy.   12/6/22- PET/CT-  Right lower lobe lung mass abutting the pleura (SUV max of 24), measuring 8 x 5.2 cm. There is associated complete obliteration of right lower lobe bronchus and loss of fat planes with  the left atrium. Spiculated left lower lobe solid lung mass (SUV 17.9), measuring 2.2 x 2 cm. There are several markedly hypermetabolic, irregular solid nodules in the right middle lobe abutting the pleura (approximately 10-12), as for example 1.2 cm nodule with SUV max of 16.8 (4/126). There is a separate markedly hypermetabolic 1 cm nodule in the right upper lobe. Mild hypermetabolic uptake is noted within a1 x 0.9 cm right paratracheal node ( SUV max of 4.2) (4/120), Mild uptake is also  noted within a subcentimeter lower paraesophageal node (4/154).   12/28/22- EBUS with deblking in the right hilar mass (no mediastinal staging performed)- - LUNG, RIGHT MAINSTEM, ENDOBRONCHIAL BIOPSY: Squamous cell carcinoma with extensive necrosis (positive for p40 and negative for TTF-1). Left LL FNAC- Positive for malignancy- Adenocarcinoma   (diffusely positive for TTF-1 while negative for p40)  1/3/23: Brain MRI - Neg  1/10/23: Tumor board discussion: Too extensive for concurrent/sequential chemo XRT- plan for palliative intent chemo-IO  1/20/23- C1 Pembrolizumab+carboplatin+paclitaxel  2/10/23-C2 Pembrolizumab+carboplatin+paclitaxel  2/27/23- CT CAP- Decreased size of the hypoattenuating mass in the central right lower lobe and small irregularly-shaped nodules in the right upper lobe consistent with positive treatment response. There is persistent postobstructive right lower lobe atelectasis.Unchanged heterogeneous lobulated and spiculated solid nodule in the lateral basal left lower lobe. No findings to suggest new extrapulmonary metastatic disease.  3/3, 3/24-  Pembrolizumab+carboplatin+paclitaxel      4/10/23- CT CAP- - some Rx response- Mass distal right lower lobe bronchus causing complete post obstructive atelectasis has decreased from 6.5 x 4.5 x 3.5 cm to 5.5 x 3.8 x 2.4 cm today. Spiculated left lower lobe mass has decreased from 2.1 cm to 1.5 cm today. Right upper lobe nodularity continues to decrease. Ill-defined nodularity in the right middle lobe has developed and is most likely postinflammatory.    Interval history: Yamel has been having difficulty with bilateral calf pain, radiates from the knee to the ankle. The pain is worse on the R side. Pain is the same as before. She had been off of prednisone for a week prior to the pain increasing again. She was resumed on prednisone 10 mg last week, but had to increase to 20 mg over the weekend. She is managing ok at this dose and use of ibuprofen.  No leg swelling or rash. No heartburn or reflux. No cough or shortness of breath. No mouth sores.    Current Outpatient Medications   Medication Sig Dispense Refill     acetaminophen (TYLENOL) 500 MG tablet Take 1,000 mg by mouth every 6 hours as needed for mild pain Taking once a day       ADVAIR -21 MCG/ACT inhaler INHALE 2 PUFFS INTO THE  LUNGS TWICE DAILY 12 g 11     albuterol (PROAIR HFA/PROVENTIL HFA/VENTOLIN HFA) 108 (90 Base) MCG/ACT inhaler Inhale 2 puffs into the lungs every 6 hours as needed for shortness of breath / dyspnea or wheezing 18 g 0     atorvastatin (LIPITOR) 20 MG tablet TAKE 1 TABLET(20 MG) BY MOUTH DAILY 90 tablet 1     Cholecalciferol (VITAMIN D-3 PO) Take 2,000 Units by mouth daily        clonazePAM (KLONOPIN) 0.5 MG tablet Take 1-2 tablets (0.5-1 mg) by mouth 2 times daily as needed for anxiety (Patient not taking: Reported on 4/26/2023) 120 tablet 1     clonazePAM (KLONOPIN) 0.5 MG tablet Take 0.5-1 tablets (0.25-0.5 mg) by mouth 2 times daily as needed for anxiety 30 tablet 3     dexamethasone (DECADRON) 4 MG tablet Take 2 tablets (8 mg) by mouth daily Take for 3 days, starting the day after chemo. Take with food. (Patient not taking: Reported on 4/26/2023) 6 tablet 3     FLUoxetine (PROZAC) 20 MG capsule TAKE 2 CAPSULES(40 MG) BY MOUTH DAILY 180 capsule 1     gabapentin (NEURONTIN) 300 MG capsule Take 1 capsule (300 mg) by mouth every evening as needed for neuropathic pain ((for painful tingling and numbness)) 30 capsule 1     ibuprofen (ADVIL,MOTRIN) 200 MG tablet Take 200-600 mg by mouth every 2 hours as needed for mild pain       ipratropium - albuterol 0.5 mg/2.5 mg/3 mL (DUONEB) 0.5-2.5 (3) MG/3ML neb solution Take 1 vial (3 mLs) by nebulization every 6 hours as needed for shortness of breath or wheezing (Patient not taking: Reported on 3/24/2023) 90 mL 0     lidocaine-prilocaine (EMLA) 2.5-2.5 % external cream Apply topically as needed for moderate pain (4-6) Apply 1 hour prior to port access and cover with saran wrap or press and seal. 30 g 1     omeprazole (PRILOSEC) 20 MG DR capsule Take 1 capsule (20 mg) by mouth daily 30-60 minutes before a meal. 90 capsule 2     ondansetron (ZOFRAN) 8 MG tablet Take 1 tablet (8 mg) by mouth every 8 hours as needed for nausea (vomiting) 30 tablet 2     polyethylene glycol  "(MIRALAX) 17 g packet Take 1 packet by mouth daily Taking PRN (Patient not taking: Reported on 4/12/2023)       predniSONE (DELTASONE) 10 MG tablet Take 1 tablet (10 mg) by mouth daily 30 tablet 0     prochlorperazine (COMPAZINE) 10 MG tablet Take 0.5 tablets (5 mg) by mouth every 6 hours as needed for nausea or vomiting (Patient not taking: Reported on 4/26/2023) 30 tablet 2     sulfamethoxazole-trimethoprim (BACTRIM DS) 800-160 MG tablet Take 1 tablet by mouth daily While on prednisone 30 tablet 0     tiZANidine (ZANAFLEX) 2 MG tablet TAKE 1 TABLET(2 MG) BY MOUTH THREE TIMES DAILY AS NEEDED FOR MUSCLE SPASMS 90 tablet 3     traMADol (ULTRAM) 50 MG tablet Take 1 tablet (50 mg) by mouth every 6 hours as needed for severe pain (7-10) COVERING FOR PCP ONLY ONCE 120 tablet 1     traZODone (DESYREL) 50 MG tablet TAKE 2 TABLETS(100 MG) BY MOUTH AT BEDTIME 180 tablet 3          Allergies   Allergen Reactions     Nka [No Known Allergies]      Seasonal Allergies          Video physical exam  General: Patient appears well in no acute distress.   Skin: No visualized rash or lesions on visualized skin  Eyes: EOMI, no erythema, sclera icterus or discharge noted  Resp: Appears to be breathing comfortably without accessory muscle usage, speaking in full sentences, no cough  MSK: Appears to have normal range of motion based on visualized movements  Neurologic: No apparent tremors, facial movements symmetric  Psych: affect engaged, pleasant, alert and oriented   Height 1.6 m (5' 3\"), weight 70.3 kg (155 lb), not currently breastfeeding.  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 Has been attributed to immunotherapy  -has had 2 prednisone tapers, the 2nd was a longer taper. Her pain started to increase the week after stopping " prednisone  -she was resumed on prednisone 10 mg last week without improvement and increased to 20 mg. Also taking NSAIDs and pain is improving  -likely will need to remain a low dose of prednisone chronically if we resume immunotherapy post radiation. For now, will continue 20 mg daily. If pain increases again, she will call and we can increase to 40 mg daily and again slowly taper.  -I will f/u with her in 10-14 days.  When pain is gone, may try to keep her at 10 mg daily    PJP prophy-resume bactrim with the restart of prednisone    Lung cancer: 2 synchronous cancers, s/p 4 cycles of Carbo/Pemetrexed/pembrolizumab with a partial response  -has been referred to Dr. Escoto for definitive radiation on the right and possible SBRT on the left  -has f/u with Dr. Martel in June at which time resuming immunotherapy x 1 year will be discussed.    Not addressed today:  Hx of anxiety, on prozac, clonazepam    HTN

## 2023-05-09 RX ORDER — GABAPENTIN 300 MG/1
CAPSULE ORAL
Qty: 30 CAPSULE | Refills: 1 | Status: SHIPPED | OUTPATIENT
Start: 2023-05-09 | End: 2023-07-11

## 2023-05-18 ENCOUNTER — VIRTUAL VISIT (OUTPATIENT)
Dept: ONCOLOGY | Facility: CLINIC | Age: 78
End: 2023-05-18
Attending: NURSE PRACTITIONER
Payer: COMMERCIAL

## 2023-05-18 DIAGNOSIS — T45.1X5S ADVERSE EFFECT OF ANTINEOPLASTIC AND IMMUNOSUPPRESSIVE DRUGS, SEQUELA: ICD-10-CM

## 2023-05-18 DIAGNOSIS — C34.32 MALIGNANT NEOPLASM OF LOWER LOBE OF LEFT LUNG (H): Primary | ICD-10-CM

## 2023-05-18 DIAGNOSIS — C34.31 MALIGNANT NEOPLASM OF LOWER LOBE OF RIGHT LUNG (H): ICD-10-CM

## 2023-05-18 PROCEDURE — 99213 OFFICE O/P EST LOW 20 MIN: CPT | Mod: 95 | Performed by: NURSE PRACTITIONER

## 2023-05-18 NOTE — PROGRESS NOTES
Virtual Visit Details    15 minutes spent on the date of the encounter doing chart review, patient visit and documentation       Originating Location (pt. Location): Home    Distant Location (provider location):  Off-site  Platform used for Video Visit: Octaviano     Reason for Visit: seen in f/u of lung cancer, arthralgias    Diagnosis:   Synchronous Rt LL Squamous cell carcinoma [id number] Stage IIIC (AJCC 8th edition) diagnosed 12/2022  PD-L1 <1%  NGS- [facility] panel- Neg (High TMB 16.7 mut/MB)     Left LL adenocarcinoma [id number] Stage (AJCC 8th edition) diagnosed 12/2022  PD-L1-2%  NGS-[facility] G12C, TMB 2.4     Onc HPI:     One month history of increasing dyspnea with palpitations, propting ER visit 11/19.  11/19/22- CT- 1 cm right lower lobe mass abutting multiple central bronchovascular and mediastinal structures, consistent with malignancy.2.2 cm spiculated nodule in the left lower lobe, also consistent with malignancy.   12/6/22- PET/CT-  Right lower lobe lung mass abutting the pleura (SUV max of 24), measuring 8 x 5.2 cm. There is associated complete obliteration of right lower lobe bronchus and loss of fat planes with  the left atrium. Spiculated left lower lobe solid lung mass (SUV 17.9), measuring 2.2 x 2 cm. There are several markedly hypermetabolic, irregular solid nodules in the right middle lobe abutting the pleura (approximately 10-12), as for example 1.2 cm nodule with SUV max of 16.8 (4/126). There is a separate markedly hypermetabolic 1 cm nodule in the right upper lobe. Mild hypermetabolic uptake is noted within a1 x 0.9 cm right paratracheal node ( SUV max of 4.2) (4/120), Mild uptake is also  noted within a subcentimeter lower paraesophageal node (4/154).   12/28/22- EBUS with deblking in the right hilar mass (no mediastinal staging performed)- - LUNG, RIGHT MAINSTEM, ENDOBRONCHIAL BIOPSY: Squamous cell carcinoma with extensive necrosis (positive for p40 and negative for TTF-1). Left LL FNAC-  Positive for malignancy- Adenocarcinoma  (diffusely positive for TTF-1 while negative for p40)  1/3/23: Brain MRI - Neg  1/10/23: Tumor board discussion: Too extensive for concurrent/sequential chemo XRT- plan for palliative intent chemo-IO  1/20/23- C1 Pembrolizumab+carboplatin+paclitaxel  2/10/23-C2 Pembrolizumab+carboplatin+paclitaxel  2/27/23- CT CAP- Decreased size of the hypoattenuating mass in the central right lower lobe and small irregularly-shaped nodules in the right upper lobe consistent with positive treatment response. There is persistent postobstructive right lower lobe atelectasis.Unchanged heterogeneous lobulated and spiculated solid nodule in the lateral basal left lower lobe. No findings to suggest new extrapulmonary metastatic disease.  3/3, 3/24-  Pembrolizumab+carboplatin+paclitaxel      4/10/23- CT CAP- - some Rx response- Mass distal right lower lobe bronchus causing complete post obstructive atelectasis has decreased from 6.5 x 4.5 x 3.5 cm to 5.5 x 3.8 x 2.4 cm today. Spiculated left lower lobe mass has decreased from 2.1 cm to 1.5 cm today. Right upper lobe nodularity continues to decrease. Ill-defined nodularity in the right middle lobe has developed and is most likely postinflammatory.    5/2: resumed on prednisone for myositis/arthritis    Interval history:   Yamel has remained at prednisone 20 mg daily since my last visit. Leg pain is improving. She is using less tylenol and ibuprofen. Energy and stamina are improving. Appetite has been good. No GERD or reflux symptoms. No leg swelling, joint swelling or redness or rash. No cough or shortness of breath.    Current Outpatient Medications   Medication Sig Dispense Refill     acetaminophen (TYLENOL) 500 MG tablet Take 1,000 mg by mouth every 6 hours as needed for mild pain Taking once a day       ADVAIR -21 MCG/ACT inhaler INHALE 2 PUFFS INTO THE LUNGS TWICE DAILY 12 g 11     albuterol (PROAIR HFA/PROVENTIL HFA/VENTOLIN HFA) 108  (90 Base) MCG/ACT inhaler Inhale 2 puffs into the lungs every 6 hours as needed for shortness of breath / dyspnea or wheezing 18 g 0     atorvastatin (LIPITOR) 20 MG tablet TAKE 1 TABLET(20 MG) BY MOUTH DAILY 90 tablet 1     Cholecalciferol (VITAMIN D-3 PO) Take 2,000 Units by mouth daily        clonazePAM (KLONOPIN) 0.5 MG tablet Take 1-2 tablets (0.5-1 mg) by mouth 2 times daily as needed for anxiety (Patient not taking: Reported on 4/26/2023) 120 tablet 1     clonazePAM (KLONOPIN) 0.5 MG tablet Take 0.5-1 tablets (0.25-0.5 mg) by mouth 2 times daily as needed for anxiety 30 tablet 3     dexamethasone (DECADRON) 4 MG tablet Take 2 tablets (8 mg) by mouth daily Take for 3 days, starting the day after chemo. Take with food. (Patient not taking: Reported on 4/26/2023) 6 tablet 3     FLUoxetine (PROZAC) 20 MG capsule TAKE 2 CAPSULES(40 MG) BY MOUTH DAILY 180 capsule 1     gabapentin (NEURONTIN) 300 MG capsule TAKE ONE CAPSULE BY MOUTH EVERY EVENING AS NEEDED FOR NEUROPATHIC PAIN(FOR PAINFUL TINGLING AND NUMBNESS) 30 capsule 1     ibuprofen (ADVIL,MOTRIN) 200 MG tablet Take 200-600 mg by mouth every 2 hours as needed for mild pain       ipratropium - albuterol 0.5 mg/2.5 mg/3 mL (DUONEB) 0.5-2.5 (3) MG/3ML neb solution Take 1 vial (3 mLs) by nebulization every 6 hours as needed for shortness of breath or wheezing (Patient not taking: Reported on 3/24/2023) 90 mL 0     lidocaine-prilocaine (EMLA) 2.5-2.5 % external cream Apply topically as needed for moderate pain (4-6) Apply 1 hour prior to port access and cover with saran wrap or press and seal. 30 g 1     omeprazole (PRILOSEC) 20 MG DR capsule Take 1 capsule (20 mg) by mouth daily 30-60 minutes before a meal. 90 capsule 2     ondansetron (ZOFRAN) 8 MG tablet Take 1 tablet (8 mg) by mouth every 8 hours as needed for nausea (vomiting) 30 tablet 2     polyethylene glycol (MIRALAX) 17 g packet Take 1 packet by mouth daily Taking PRN (Patient not taking: Reported on  4/12/2023)       predniSONE (DELTASONE) 10 MG tablet Take 1 tablet (10 mg) by mouth daily 30 tablet 0     predniSONE (DELTASONE) 20 MG tablet Take 1 tablet (20 mg) by mouth daily 30 tablet 0     prochlorperazine (COMPAZINE) 10 MG tablet Take 0.5 tablets (5 mg) by mouth every 6 hours as needed for nausea or vomiting (Patient not taking: Reported on 4/26/2023) 30 tablet 2     sulfamethoxazole-trimethoprim (BACTRIM DS) 800-160 MG tablet Take 1 tablet by mouth daily While on prednisone 30 tablet 0     tiZANidine (ZANAFLEX) 2 MG tablet TAKE 1 TABLET(2 MG) BY MOUTH THREE TIMES DAILY AS NEEDED FOR MUSCLE SPASMS 90 tablet 3     traMADol (ULTRAM) 50 MG tablet Take 1 tablet (50 mg) by mouth every 6 hours as needed for severe pain (7-10) COVERING FOR PCP ONLY ONCE 120 tablet 1     traZODone (DESYREL) 50 MG tablet TAKE 2 TABLETS(100 MG) BY MOUTH AT BEDTIME 180 tablet 3          Allergies   Allergen Reactions     Nka [No Known Allergies]      Seasonal Allergies          Video physical exam  General: Patient appears well in no acute distress.   Skin: No visualized rash or lesions on visualized skin  Eyes: EOMI, no erythema, sclera icterus or discharge noted  Resp: Appears to be breathing comfortably without accessory muscle usage, speaking in full sentences, no cough  MSK: Appears to have normal range of motion based on visualized movements  Neurologic: No apparent tremors, facial movements symmetric      Impression/plan:     Myositis/arthritis s/t immunotherapy, hx of elevated CRP and ESR  -has had recurrent symptoms after 2 prednisone tapers and now is back on prednisone 20 mg with good control of symptoms. Will keep her at this dose for another 2 weeks, then try to decrease to 10 mg daily. Will plan to remain on this dose daily. Can discuss at her next follow-up in 1 month if we again try to stop prednisone completely.    Continue PJP prophy with Bactrim    Lung cancer--see prior notes  -s/p 4 cycles of  carbo/pem/pem  -referred to Dr. Escoto for radiation  -has f/u with Dr. Martel in June, will discuss resuming immunotherapy x 1 year and consider whether she will be able to proceed with the myositis/arthritis side effects

## 2023-05-18 NOTE — NURSING NOTE
Is the patient currently in the state of [place]? YES    Visit mode:VIDEO    If the visit is dropped, the patient can be reconnected by: VIDEO VISIT: Text to cell phone: [phone]    Will anyone else be joining the visit? NO      How would you like to obtain your AVS? MyChart    Are changes needed to the allergy or medication list? NO    Reason for visit: No chief complaint on file.

## 2023-05-18 NOTE — LETTER
5/18/2023         RE: Yamel Lindsay  [address] [facility] [address]        Dear Colleague,    Thank you for referring your patient, Yamel Lindsay, to the [facility]. Please see a copy of my visit note below.    Virtual Visit Details    15 minutes spent on the date of the encounter doing chart review, patient visit and documentation       Originating Location (pt. Location): Home    Distant Location (provider location):  Off-site  Platform used for Video Visit: Octaviano     Reason for Visit: seen in f/u of lung cancer, arthralgias    Diagnosis:   Synchronous Rt LL Squamous cell carcinoma [id number] Stage IIIC (AJCC 8th edition) diagnosed 12/2022  PD-L1 <1%  NGS- [facility] panel- Neg (High TMB 16.7 mut/MB)     Left LL adenocarcinoma [id number] Stage (AJCC 8th edition) diagnosed 12/2022  PD-L1-2%  NGS-[facility] G12C, TMB 2.4     Onc HPI:     One month history of increasing dyspnea with palpitations, propting ER visit 11/19.  11/19/22- CT- 1 cm right lower lobe mass abutting multiple central bronchovascular and mediastinal structures, consistent with malignancy.2.2 cm spiculated nodule in the left lower lobe, also consistent with malignancy.   12/6/22- PET/CT-  Right lower lobe lung mass abutting the pleura (SUV max of 24), measuring 8 x 5.2 cm. There is associated complete obliteration of right lower lobe bronchus and loss of fat planes with  the left atrium. Spiculated left lower lobe solid lung mass (SUV 17.9), measuring 2.2 x 2 cm. There are several markedly hypermetabolic, irregular solid nodules in the right middle lobe abutting the pleura (approximately 10-12), as for example 1.2 cm nodule with SUV max of 16.8 (4/126). There is a separate markedly hypermetabolic 1 cm nodule in the right upper lobe. Mild hypermetabolic uptake is noted within a1 x 0.9 cm right paratracheal node ( SUV max of 4.2) (4/120), Mild uptake is also  noted within a subcentimeter lower paraesophageal node (4/154).    12/28/22- EBUS with deblking in the right hilar mass (no mediastinal staging performed)- - LUNG, RIGHT MAINSTEM, ENDOBRONCHIAL BIOPSY: Squamous cell carcinoma with extensive necrosis (positive for p40 and negative for TTF-1). Left LL FNAC- Positive for malignancy- Adenocarcinoma  (diffusely positive for TTF-1 while negative for p40)  1/3/23: Brain MRI - Neg  1/10/23: Tumor board discussion: Too extensive for concurrent/sequential chemo XRT- plan for palliative intent chemo-IO  1/20/23- C1 Pembrolizumab+carboplatin+paclitaxel  2/10/23-C2 Pembrolizumab+carboplatin+paclitaxel  2/27/23- CT CAP- Decreased size of the hypoattenuating mass in the central right lower lobe and small irregularly-shaped nodules in the right upper lobe consistent with positive treatment response. There is persistent postobstructive right lower lobe atelectasis.Unchanged heterogeneous lobulated and spiculated solid nodule in the lateral basal left lower lobe. No findings to suggest new extrapulmonary metastatic disease.  3/3, 3/24-  Pembrolizumab+carboplatin+paclitaxel      4/10/23- CT CAP- - some Rx response- Mass distal right lower lobe bronchus causing complete post obstructive atelectasis has decreased from 6.5 x 4.5 x 3.5 cm to 5.5 x 3.8 x 2.4 cm today. Spiculated left lower lobe mass has decreased from 2.1 cm to 1.5 cm today. Right upper lobe nodularity continues to decrease. Ill-defined nodularity in the right middle lobe has developed and is most likely postinflammatory.    5/2: resumed on prednisone for myositis/arthritis    Interval history:   Yamel has remained at prednisone 20 mg daily since my last visit. Leg pain is improving. She is using less tylenol and ibuprofen. Energy and stamina are improving. Appetite has been good. No GERD or reflux symptoms. No leg swelling, joint swelling or redness or rash. No cough or shortness of breath.    Current Outpatient Medications   Medication Sig Dispense Refill    acetaminophen  (TYLENOL) 500 MG tablet Take 1,000 mg by mouth every 6 hours as needed for mild pain Taking once a day      ADVAIR -21 MCG/ACT inhaler INHALE 2 PUFFS INTO THE LUNGS TWICE DAILY 12 g 11    albuterol (PROAIR HFA/PROVENTIL HFA/VENTOLIN HFA) 108 (90 Base) MCG/ACT inhaler Inhale 2 puffs into the lungs every 6 hours as needed for shortness of breath / dyspnea or wheezing 18 g 0    atorvastatin (LIPITOR) 20 MG tablet TAKE 1 TABLET(20 MG) BY MOUTH DAILY 90 tablet 1    Cholecalciferol (VITAMIN D-3 PO) Take 2,000 Units by mouth daily       clonazePAM (KLONOPIN) 0.5 MG tablet Take 1-2 tablets (0.5-1 mg) by mouth 2 times daily as needed for anxiety (Patient not taking: Reported on 4/26/2023) 120 tablet 1    clonazePAM (KLONOPIN) 0.5 MG tablet Take 0.5-1 tablets (0.25-0.5 mg) by mouth 2 times daily as needed for anxiety 30 tablet 3    dexamethasone (DECADRON) 4 MG tablet Take 2 tablets (8 mg) by mouth daily Take for 3 days, starting the day after chemo. Take with food. (Patient not taking: Reported on 4/26/2023) 6 tablet 3    FLUoxetine (PROZAC) 20 MG capsule TAKE 2 CAPSULES(40 MG) BY MOUTH DAILY 180 capsule 1    gabapentin (NEURONTIN) 300 MG capsule TAKE ONE CAPSULE BY MOUTH EVERY EVENING AS NEEDED FOR NEUROPATHIC PAIN(FOR PAINFUL TINGLING AND NUMBNESS) 30 capsule 1    ibuprofen (ADVIL,MOTRIN) 200 MG tablet Take 200-600 mg by mouth every 2 hours as needed for mild pain      ipratropium - albuterol 0.5 mg/2.5 mg/3 mL (DUONEB) 0.5-2.5 (3) MG/3ML neb solution Take 1 vial (3 mLs) by nebulization every 6 hours as needed for shortness of breath or wheezing (Patient not taking: Reported on 3/24/2023) 90 mL 0    lidocaine-prilocaine (EMLA) 2.5-2.5 % external cream Apply topically as needed for moderate pain (4-6) Apply 1 hour prior to port access and cover with saran wrap or press and seal. 30 g 1    omeprazole (PRILOSEC) 20 MG DR capsule Take 1 capsule (20 mg) by mouth daily 30-60 minutes before a meal. 90 capsule 2     ondansetron (ZOFRAN) 8 MG tablet Take 1 tablet (8 mg) by mouth every 8 hours as needed for nausea (vomiting) 30 tablet 2    polyethylene glycol (MIRALAX) 17 g packet Take 1 packet by mouth daily Taking PRN (Patient not taking: Reported on 4/12/2023)      predniSONE (DELTASONE) 10 MG tablet Take 1 tablet (10 mg) by mouth daily 30 tablet 0    predniSONE (DELTASONE) 20 MG tablet Take 1 tablet (20 mg) by mouth daily 30 tablet 0    prochlorperazine (COMPAZINE) 10 MG tablet Take 0.5 tablets (5 mg) by mouth every 6 hours as needed for nausea or vomiting (Patient not taking: Reported on 4/26/2023) 30 tablet 2    sulfamethoxazole-trimethoprim (BACTRIM DS) 800-160 MG tablet Take 1 tablet by mouth daily While on prednisone 30 tablet 0    tiZANidine (ZANAFLEX) 2 MG tablet TAKE 1 TABLET(2 MG) BY MOUTH THREE TIMES DAILY AS NEEDED FOR MUSCLE SPASMS 90 tablet 3    traMADol (ULTRAM) 50 MG tablet Take 1 tablet (50 mg) by mouth every 6 hours as needed for severe pain (7-10) COVERING FOR PCP ONLY ONCE 120 tablet 1    traZODone (DESYREL) 50 MG tablet TAKE 2 TABLETS(100 MG) BY MOUTH AT BEDTIME 180 tablet 3          Allergies   Allergen Reactions    Nka [No Known Allergies]     Seasonal Allergies          Video physical exam  General: Patient appears well in no acute distress.   Skin: No visualized rash or lesions on visualized skin  Eyes: EOMI, no erythema, sclera icterus or discharge noted  Resp: Appears to be breathing comfortably without accessory muscle usage, speaking in full sentences, no cough  MSK: Appears to have normal range of motion based on visualized movements  Neurologic: No apparent tremors, facial movements symmetric      Impression/plan:     Myositis/arthritis s/t immunotherapy, hx of elevated CRP and ESR  -has had recurrent symptoms after 2 prednisone tapers and now is back on prednisone 20 mg with good control of symptoms. Will keep her at this dose for another 2 weeks, then try to decrease to 10 mg daily. Will plan  to remain on this dose daily. Can discuss at her next follow-up in 1 month if we again try to stop prednisone completely.    Continue PJP prophy with Bactrim    Lung cancer--see prior notes  -s/p 4 cycles of carbo/pem/pem  -referred to Dr. Escoto for radiation  -has f/u with Dr. Martel in June, will discuss resuming immunotherapy x 1 year and consider whether she will be able to proceed with the myositis/arthritis side effects        NAYA Gonzalez CNP

## 2023-05-22 NOTE — PROGRESS NOTES
Department of Radiation Oncology  [facility] [address]  [address] [place] [address]  Office: [phone]  Fax: [phone]    Radiation Oncology Clinic  [address] [place] [id number]  Phone: [phone]  Fax: [phone]    Patient:  Yamel Lindsay  MRN:   [id number]  :  1945    Radiation Oncology Consult Note  Date:  May 23, 2023    Chief Complaint:    RLL squamous cell carcinoma, T4N2M0  LLL adenocarcinoma, [id number]    History of Present Illness:    Ms. Lindsay is a 76 yo woman with synchronous diagnoses LLL adenocarcinoma, [id number] and right lung squamous cell carcinoma, [id number],  who presents for discussion of radiation.    Her oncologic diagnosis came to light when she presented with dyspnea (). CXR (22) showed left lung base consolidation 2.4cm. Chest CT (22) showed 11cm RLL mass abutting multiple central bronchovascular and mediastinal structures, a 2.2cm spiculated nodule in the LLL and an indeterminate 12mm liver lesion.    Pulmonary function testing (22) showed FEV1 1.52, FEV1 77%, and DLCO 84%.    PET/CT (22) showed markedly hypermetabolic RLL mass 8 x 5.2cm (SUV max 24) with multiple hypermetabolic RML and RUL nodules concerning for intrapulmonary metastases. There was also a spiculated hypermetabolic LLL mass 2.2 x 2cm (SUV max 17.9) thought to be a synchronous lung cancer. There was a prominent mildly hypermetabolic right paratracheal node (SUV max 4.2) measuring 1 x 0.9cm and mildly avid, subcentimeter lower paraesophageal node.    CT chest (22) showed redemonstration of LLL spiculated pulmonary nodule and right lower hemithorax pleural based mass abutting the distal esophagus, bronchus intermedius and right lower lobe bronchus as well as probable involvement of the interlobar pulmonary artery and RLL pulmonary artery. New nodularity is noted in the RUL anterolaterally.    EBUS guided right main stem biopsy (22,  [id number]) showed squamous cell carcinoma with extensive necrosis. TPS <1%. NGS from right main stem sample showed detected alterations of known or potential pathogenicity and no gene fusion events. TMB was high. There were no detected alterations in ALK, BRAF, CDKN2A, EGFR, IDH1, IDH2, KEAP1, KRAS, MET, NRAS, PIK3CA, RET, ROS1, and STK11. There was a detected alteration of ERBB2 of uncertain significance.    FNA of LLL sample ([id number]) showed adenocarcinoma, TPS 2%, low PD-L1. NGS from left lower lobe sample (1-12-23) showed detected alteration of KRAS G12C, TMB low. There was alteration of unknown significance in ERBB2. There were no detected alterations in ALK, BRAF, CDKN2A, EGFR,IDH1, IDH2, KEAP1, KRAS, MET, NRAS, PIK3CA, RET, ROS1 and STK11. There were no fusion events.    MRI brain (1-3-23) showed no evidence for intracranial metastatic disease.    Her case was presented at multidisciplinary tumor conference (1-10-23) and was deemed to be too extensive for concurrent chemoradiation. She was started on palliative intent Pembrolizumab/Carboplatin/Paclitaxel (1-20-23). She received two cycles (1-20-23, 2-10-23).    CT chest, abdomen and pelvis (2-27-23) showed decreased size of the hypoattenuating mass in the central RLL and small irregularly shaped nodules in the RUL consistent with positive treatment response. The lateral basal LLL nodule is unchanged.    She received another two cycles of chemoimmunotherapy (3-3-23, 3-24-23).    CT chest, abdomen and pelvis (4-10-23) showed mass distal RLL bronchus causing complete post-obstructive atelectasis decreased in size, now 5.5 x 3.8 x 2.4cm. A spiculated LLL mass decreased in size now 1.5cm. The right upper lobe nodularity continues to decrease. An ill-defined nodule in the RML has developed and is thought to be inflammatory.    At this time, definitive radiation is being pursued for bilateral cancers.    Today, Ms. Lindsay reports fatigue, leg pain, neuropathy in  her hands and feet, and alopecia. She denies other issues. She denies dyspnea, cough and hemoptysis.    REVIEW OF SYSTEMS:    General:  No fever/chills, no weight loss, + fatigue, no anorexia  HEENT:  No sore throat, no earache, no rhinitis, no vision changes  Cardiac:  No chest pain, no edema  Pulmonary:  No shortness of breath, no cough, no hemoptysis  Gastrointestinal:  No nausea, no vomiting, no diarrhea, no constipation, no melena, no dysphagia, no odynophagia  Genitourinary:  No hematuria, no dysuria  Skin:  No rash, no itch, no jaundice  Hematologic:  No palpable lymph nodes, no bruising or bleeding tendencies  Neurologic:  No headache, no dizziness, + numbness or tingling, no weakness  Musculoskeletal:  No arthralgias, + myalgias  Psychiatric:  No anxiety, no depression      Past Medical History:    Past Medical History:   Diagnosis Date     Allergies      Anxiety      Hyperlipidemia      Hypertension      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     Nicotine dependence      Postmenopausal HRT (hormone replacement therapy) 1994     S/P alcohol detoxification 07/06    Recovered and active in AA         Past Surgical History:    Past Surgical History:   Procedure Laterality Date     BRONCHOSCOPY, WITH BIOPSY, ROBOT ASSISTED N/A 12/28/2022    Procedure: BRONCHOSCOPY, USING ION OPTICAL TRACKING SYSTEM, FINE NEEDLE ASPIRATION;  Surgeon: Alma Almaguer MD;  Location: [facility]     ENDOBRONCHIAL ULTRASOUND FLEXIBLE N/A 12/28/2022    Procedure: endobronchial biopsy and tumor debulking with cryoprobe;  Surgeon: Alma Almaguer MD;  [facility]     ESOPHAGOSCOPY, GASTROSCOPY, DUODENOSCOPY (EGD), COMBINED N/A 1/5/2015    Procedure: COMBINED ESOPHAGOSCOPY, GASTROSCOPY, DUODENOSCOPY (EGD), BIOPSY SINGLE OR MULTIPLE;  Surgeon: Dylan Alejandra MD;  Location: [facility]     ESOPHAGOSCOPY, GASTROSCOPY, DUODENOSCOPY (EGD), COMBINED N/A 5/25/2018    Procedure: COMBINED ESOPHAGOSCOPY, GASTROSCOPY, DUODENOSCOPY  (EGD), BIOPSY SINGLE OR MULTIPLE;  gastroscopy;  Surgeon: Alexander Bautista MD;  Location: [facility]     INSERT PORT VASCULAR ACCESS Left 2/6/2023    Procedure: INSERTION, VASCULAR ACCESS PORT left;  Surgeon: Scot Valadez DO;  Location: [facility]     LAPAROSCOPIC HERNIORRHAPHY INGUINAL Right 1/26/2015    Procedure: LAPAROSCOPIC HERNIORRHAPHY INGUINAL;  Surgeon: Favio Olsen MD;  Location: [facility]     TUBAL LIGATION           History of Radiation:  no      ICD: no  Crohn's disease or ulcerative colitis:  no  Scleroderma:  no      Medications:    Current Outpatient Medications   Medication Sig Dispense Refill     acetaminophen (TYLENOL) 500 MG tablet Take 1,000 mg by mouth every 6 hours as needed for mild pain Taking once a day       ADVAIR -21 MCG/ACT inhaler INHALE 2 PUFFS INTO THE LUNGS TWICE DAILY 12 g 11     albuterol (PROAIR HFA/PROVENTIL HFA/VENTOLIN HFA) 108 (90 Base) MCG/ACT inhaler Inhale 2 puffs into the lungs every 6 hours as needed for shortness of breath / dyspnea or wheezing 18 g 0     atorvastatin (LIPITOR) 20 MG tablet TAKE 1 TABLET(20 MG) BY MOUTH DAILY 90 tablet 1     Cholecalciferol (VITAMIN D-3 PO) Take 2,000 Units by mouth daily        clonazePAM (KLONOPIN) 0.5 MG tablet Take 1-2 tablets (0.5-1 mg) by mouth 2 times daily as needed for anxiety (Patient not taking: Reported on 4/26/2023) 120 tablet 1     clonazePAM (KLONOPIN) 0.5 MG tablet Take 0.5-1 tablets (0.25-0.5 mg) by mouth 2 times daily as needed for anxiety 30 tablet 3     dexamethasone (DECADRON) 4 MG tablet Take 2 tablets (8 mg) by mouth daily Take for 3 days, starting the day after chemo. Take with food. (Patient not taking: Reported on 4/26/2023) 6 tablet 3     FLUoxetine (PROZAC) 20 MG capsule TAKE 2 CAPSULES(40 MG) BY MOUTH DAILY 180 capsule 1     gabapentin (NEURONTIN) 300 MG capsule TAKE ONE CAPSULE BY MOUTH EVERY EVENING AS NEEDED FOR NEUROPATHIC PAIN(FOR PAINFUL TINGLING AND NUMBNESS) 30 capsule 1     ibuprofen  (ADVIL,MOTRIN) 200 MG tablet Take 200-600 mg by mouth every 2 hours as needed for mild pain       ipratropium - albuterol 0.5 mg/2.5 mg/3 mL (DUONEB) 0.5-2.5 (3) MG/3ML neb solution Take 1 vial (3 mLs) by nebulization every 6 hours as needed for shortness of breath or wheezing (Patient not taking: Reported on 3/24/2023) 90 mL 0     lidocaine-prilocaine (EMLA) 2.5-2.5 % external cream Apply topically as needed for moderate pain (4-6) Apply 1 hour prior to port access and cover with saran wrap or press and seal. 30 g 1     omeprazole (PRILOSEC) 20 MG DR capsule Take 1 capsule (20 mg) by mouth daily 30-60 minutes before a meal. 90 capsule 2     ondansetron (ZOFRAN) 8 MG tablet Take 1 tablet (8 mg) by mouth every 8 hours as needed for nausea (vomiting) 30 tablet 2     polyethylene glycol (MIRALAX) 17 g packet Take 1 packet by mouth daily Taking PRN (Patient not taking: Reported on 4/12/2023)       predniSONE (DELTASONE) 10 MG tablet Take 1 tablet (10 mg) by mouth daily 30 tablet 0     predniSONE (DELTASONE) 20 MG tablet Take 1 tablet (20 mg) by mouth daily 30 tablet 0     prochlorperazine (COMPAZINE) 10 MG tablet Take 0.5 tablets (5 mg) by mouth every 6 hours as needed for nausea or vomiting (Patient not taking: Reported on 4/26/2023) 30 tablet 2     sulfamethoxazole-trimethoprim (BACTRIM DS) 800-160 MG tablet Take 1 tablet by mouth daily While on prednisone 30 tablet 0     tiZANidine (ZANAFLEX) 2 MG tablet TAKE 1 TABLET(2 MG) BY MOUTH THREE TIMES DAILY AS NEEDED FOR MUSCLE SPASMS 90 tablet 3     traMADol (ULTRAM) 50 MG tablet Take 1 tablet (50 mg) by mouth every 6 hours as needed for severe pain (7-10) COVERING FOR PCP ONLY ONCE 120 tablet 1     traZODone (DESYREL) 50 MG tablet TAKE 2 TABLETS(100 MG) BY MOUTH AT BEDTIME 180 tablet 3         Allergies:    Allergies   Allergen Reactions     Nka [No Known Allergies]      Seasonal Allergies          Family History:    mom - lung cancer  Brother - esophageal cancer  Brother  - prostate cancer and throat cancer      Social History:    Social History     Tobacco Use     Smoking status: Former     Packs/day: 1     Types: Cigarettes     Quit date: 2011     Years since quittin.9     Passive exposure: Past     Pack Years 40   Vaping Use     Vaping status: Never Used   Substance Use Topics     Alcohol use: No   Formerly employed at [employer]    ECOG Performance Status: 0, walks 2-4 miles per day      PHYSICAL EXAMINATION:    /69   Pulse 89   Wt 71.1 kg (156 lb 11.2 oz)   BMI 27.76 kg/m    General:  Well-developed, well-appearing, NAD  HEENT:  NCAT, anicteric sclera, moist oral mucosa, alopecia -- wearing wig  Cardiac:  Strong peripheral pulses, no JVD, no peripheral edema  Pulmonary:  Breathing comfortably on room air, no stridor, no audible wheeze  Abdomen:  Nontender, nondistended, no palpable hepatosplenomegaly  Genitourinary:  No CVA tenderness, no liu catheter, TATIANA/speculum exam deferred  Skin:  Pink, warm, no rash  Lymph Nodes:  No palpable lymphadenopathy  Neurologic:  CN II - XII grossly intact, strength 5/5 in upper and lower extremities, sensation intact, alert and oriented x 3  MSK:  normal muscular bulk and tone, no tender bones or joints  Psychiatric:  normal affect, normal attention      DATA REVIEWED:  IMAGING:  PET (22)              IMPRESSION:  Ms. Lindsay is a 78yo lady with RLL squamous cell carcinoma T4N2M0 and synchronous diagnosis of LLL adenocarcinoma S2bA2O0 who presents for discussion of radiation.    RECOMMENDATIONS:  We recommend treatment of bilateral lung cancers. The right sided squamous cell carcinoma will be treated to a dose of 60Gy with photons via IMRT technique. The left sided adenocarcinoma will be treated to 50Gy in 5 every other day fractions with photons via SBRT technique. The benefit of treatment is to improve local control and prevent locoregional recurrence.    We briefly discussed the acute risks associated with chest  radiation including but not limited to pneumonitis, esophagitis, pericarditis, radiation dermatitis and fatigue. We also discussed late effects which can include transverse myelitis, pulmonary fibrosis, esophageal stenosis and secondary malignancy. Ms. Lindsay understands the risks and wants to proceed. She signed consents today.    We will perform CT simulation in the supine position with 4D CT to track movement of the tumors with respiration. The left lung cancer will be treated first, followed by the right lung cancer.    Ms. Lindsay is going on vacation 7-8 through 7-15. We will attempt to hypofractionate her treatment course to accommodate her vacation.    Thank you for allowing us to participate in this patient's care.  Please feel free to call with any questions or concerns.    The patient was seen by and discussed with attending physician Dr. Escoto who agrees with the above history, physical exam, assessment and plan.      Siena Pollock MD PGY-3  Radiation Oncology, [facility]  [phone] clinic    I saw the patient with the resident.  I agree with the resident's note and plan of care.      MAKI Escoto M.D.  Department of Radiation Oncology  [facility]

## 2023-05-23 ENCOUNTER — OFFICE VISIT (OUTPATIENT)
Dept: RADIATION ONCOLOGY | Facility: CLINIC | Age: 78
End: 2023-05-23
Attending: STUDENT IN AN ORGANIZED HEALTH CARE EDUCATION/TRAINING PROGRAM
Payer: COMMERCIAL

## 2023-05-23 VITALS
WEIGHT: 156.7 LBS | SYSTOLIC BLOOD PRESSURE: 129 MMHG | DIASTOLIC BLOOD PRESSURE: 69 MMHG | BODY MASS INDEX: 27.76 KG/M2 | HEART RATE: 89 BPM

## 2023-05-23 DIAGNOSIS — C34.31 MALIGNANT NEOPLASM OF LOWER LOBE OF RIGHT LUNG (H): ICD-10-CM

## 2023-05-23 PROCEDURE — 99205 OFFICE O/P NEW HI 60 MIN: CPT | Mod: 25 | Performed by: RADIOLOGY

## 2023-05-23 PROCEDURE — G0463 HOSPITAL OUTPT CLINIC VISIT: HCPCS

## 2023-05-23 ASSESSMENT — ENCOUNTER SYMPTOMS
CONSTIPATION: 0
WEIGHT LOSS: 0
NERVOUS/ANXIOUS: 0
SHORTNESS OF BREATH: 1
NAUSEA: 0
BACK PAIN: 0
WHEEZING: 1
DIARRHEA: 0
COUGH: 0
CHILLS: 0
VOMITING: 0
DIZZINESS: 0
SORE THROAT: 0
DEPRESSION: 0
BLURRED VISION: 0
NECK PAIN: 0
FALLS: 0
HEADACHES: 0
FEVER: 0
HEARTBURN: 0
FREQUENCY: 0

## 2023-05-23 NOTE — LETTER
2023         RE: Yamel Lindsay  [address] [facility] [address]        Dear Colleague,    Thank you for referring your patient, Yamel Lindsay, to the [facility] RADIATION ONCOLOGY. Please see a copy of my visit note below.    Department of Radiation Oncology  [address]  [address] [place] [address]  Office: [phone]  Fax: [phone]    Radiation Oncology Clinic  [address] [place] [address]  Phone: [phone]  Fax: [phone]    Patient:  Yamel Lindsay  MRN:   [id number]  :  1945    Radiation Oncology Consult Note  Date:  May 23, 2023    Chief Complaint:    RLL squamous cell carcinoma, T4N2M0  LLL adenocarcinoma, C1rD6M7    History of Present Illness:    Ms. Lindsay is a 78 yo woman with synchronous diagnoses LLL adenocarcinoma, oO2H6B2 and right lung squamous cell carcinoma, [id number],  who presents for discussion of radiation.    Her oncologic diagnosis came to light when she presented with dyspnea (). CXR (22) showed left lung base consolidation 2.4cm. Chest CT (22) showed 11cm RLL mass abutting multiple central bronchovascular and mediastinal structures, a 2.2cm spiculated nodule in the LLL and an indeterminate 12mm liver lesion.    Pulmonary function testing (22) showed FEV1 1.52, FEV1 77%, and DLCO 84%.    PET/CT (22) showed markedly hypermetabolic RLL mass 8 x 5.2cm (SUV max 24) with multiple hypermetabolic RML and RUL nodules concerning for intrapulmonary metastases. There was also a spiculated hypermetabolic LLL mass 2.2 x 2cm (SUV max 17.9) thought to be a synchronous lung cancer. There was a prominent mildly hypermetabolic right paratracheal node (SUV max 4.2) measuring 1 x 0.9cm and mildly avid, subcentimeter lower paraesophageal node.    CT chest (22) showed redemonstration of LLL spiculated pulmonary nodule and right lower hemithorax pleural based mass abutting the distal esophagus,  bronchus intermedius and right lower lobe bronchus as well as probable involvement of the interlobar pulmonary artery and RLL pulmonary artery. New nodularity is noted in the RUL anterolaterally.    EBUS guided right main stem biopsy (12-28-22, [id number]) showed squamous cell carcinoma with extensive necrosis. TPS <1%. NGS from right main stem sample showed detected alterations of known or potential pathogenicity and no gene fusion events. TMB was high. There were no detected alterations in ALK, BRAF, CDKN2A, EGFR, IDH1, IDH2, KEAP1, KRAS, MET, NRAS, PIK3CA, RET, ROS1, and STK11. There was a detected alteration of ERBB2 of uncertain significance.    FNA of LLL sample ([id number]) showed adenocarcinoma, TPS 2%, low PD-L1. NGS from left lower lobe sample (1-12-23) showed detected alteration of KRAS G12C, TMB low. There was alteration of unknown significance in ERBB2. There were no detected alterations in ALK, BRAF, CDKN2A, EGFR,IDH1, IDH2, KEAP1, KRAS, MET, NRAS, PIK3CA, RET, ROS1 and STK11. There were no fusion events.    MRI brain (1-3-23) showed no evidence for intracranial metastatic disease.    Her case was presented at multidisciplinary tumor conference (1-10-23) and was deemed to be too extensive for concurrent chemoradiation. She was started on palliative intent Pembrolizumab/Carboplatin/Paclitaxel (1-20-23). She received two cycles (1-20-23, 2-10-23).    CT chest, abdomen and pelvis (2-27-23) showed decreased size of the hypoattenuating mass in the central RLL and small irregularly shaped nodules in the RUL consistent with positive treatment response. The lateral basal LLL nodule is unchanged.    She received another two cycles of chemoimmunotherapy (3-3-23, 3-24-23).    CT chest, abdomen and pelvis (4-10-23) showed mass distal RLL bronchus causing complete post-obstructive atelectasis decreased in size, now 5.5 x 3.8 x 2.4cm. A spiculated LLL mass decreased in size now 1.5cm. The right upper lobe nodularity  continues to decrease. An ill-defined nodule in the RML has developed and is thought to be inflammatory.    At this time, definitive radiation is being pursued for bilateral cancers.    Today, Ms. Lindsay reports fatigue, leg pain, neuropathy in her hands and feet, and alopecia. She denies other issues. She denies dyspnea, cough and hemoptysis.    REVIEW OF SYSTEMS:    General:  No fever/chills, no weight loss, + fatigue, no anorexia  HEENT:  No sore throat, no earache, no rhinitis, no vision changes  Cardiac:  No chest pain, no edema  Pulmonary:  No shortness of breath, no cough, no hemoptysis  Gastrointestinal:  No nausea, no vomiting, no diarrhea, no constipation, no melena, no dysphagia, no odynophagia  Genitourinary:  No hematuria, no dysuria  Skin:  No rash, no itch, no jaundice  Hematologic:  No palpable lymph nodes, no bruising or bleeding tendencies  Neurologic:  No headache, no dizziness, + numbness or tingling, no weakness  Musculoskeletal:  No arthralgias, + myalgias  Psychiatric:  No anxiety, no depression      Past Medical History:    Past Medical History:   Diagnosis Date     Allergies      Anxiety      Hyperlipidemia      Hypertension      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     Nicotine dependence      Postmenopausal HRT (hormone replacement therapy) 1994     S/P alcohol detoxification 07/06    Recovered and active in AA         Past Surgical History:    Past Surgical History:   Procedure Laterality Date     BRONCHOSCOPY, WITH BIOPSY, ROBOT ASSISTED N/A 12/28/2022    Procedure: BRONCHOSCOPY, USING ION OPTICAL TRACKING SYSTEM, FINE NEEDLE ASPIRATION;  Surgeon: Alma Almaguer MD;  Location: [facility]     ENDOBRONCHIAL ULTRASOUND FLEXIBLE N/A 12/28/2022    Procedure: endobronchial biopsy and tumor debulking with cryoprobe;  Surgeon: Alma Almaguer MD;  Location: [facility] OR     ESOPHAGOSCOPY, GASTROSCOPY, DUODENOSCOPY (EGD), COMBINED N/A 1/5/2015    Procedure: COMBINED ESOPHAGOSCOPY,  GASTROSCOPY, DUODENOSCOPY (EGD), BIOPSY SINGLE OR MULTIPLE;  Surgeon: Dylan Alejandra MD;  Location: [facility]     ESOPHAGOSCOPY, GASTROSCOPY, DUODENOSCOPY (EGD), COMBINED N/A 5/25/2018    Procedure: COMBINED ESOPHAGOSCOPY, GASTROSCOPY, DUODENOSCOPY (EGD), BIOPSY SINGLE OR MULTIPLE;  gastroscopy;  Surgeon: Alexander Bautista MD;  Location: [facility]     INSERT PORT VASCULAR ACCESS Left 2/6/2023    Procedure: INSERTION, VASCULAR ACCESS PORT left;  Surgeon: Scot Valadez DO;  Location: [facility]     LAPAROSCOPIC HERNIORRHAPHY INGUINAL Right 1/26/2015    Procedure: LAPAROSCOPIC HERNIORRHAPHY INGUINAL;  Surgeon: Favio Olsen MD;  Location: [facility]     TUBAL LIGATION           History of Radiation:  no      ICD: no  Crohn's disease or ulcerative colitis:  no  Scleroderma:  no      Medications:    Current Outpatient Medications   Medication Sig Dispense Refill     acetaminophen (TYLENOL) 500 MG tablet Take 1,000 mg by mouth every 6 hours as needed for mild pain Taking once a day       ADVAIR -21 MCG/ACT inhaler INHALE 2 PUFFS INTO THE LUNGS TWICE DAILY 12 g 11     albuterol (PROAIR HFA/PROVENTIL HFA/VENTOLIN HFA) 108 (90 Base) MCG/ACT inhaler Inhale 2 puffs into the lungs every 6 hours as needed for shortness of breath / dyspnea or wheezing 18 g 0     atorvastatin (LIPITOR) 20 MG tablet TAKE 1 TABLET(20 MG) BY MOUTH DAILY 90 tablet 1     Cholecalciferol (VITAMIN D-3 PO) Take 2,000 Units by mouth daily        clonazePAM (KLONOPIN) 0.5 MG tablet Take 1-2 tablets (0.5-1 mg) by mouth 2 times daily as needed for anxiety (Patient not taking: Reported on 4/26/2023) 120 tablet 1     clonazePAM (KLONOPIN) 0.5 MG tablet Take 0.5-1 tablets (0.25-0.5 mg) by mouth 2 times daily as needed for anxiety 30 tablet 3     dexamethasone (DECADRON) 4 MG tablet Take 2 tablets (8 mg) by mouth daily Take for 3 days, starting the day after chemo. Take with food. (Patient not taking: Reported on 4/26/2023) 6 tablet 3     FLUoxetine  (PROZAC) 20 MG capsule TAKE 2 CAPSULES(40 MG) BY MOUTH DAILY 180 capsule 1     gabapentin (NEURONTIN) 300 MG capsule TAKE ONE CAPSULE BY MOUTH EVERY EVENING AS NEEDED FOR NEUROPATHIC PAIN(FOR PAINFUL TINGLING AND NUMBNESS) 30 capsule 1     ibuprofen (ADVIL,MOTRIN) 200 MG tablet Take 200-600 mg by mouth every 2 hours as needed for mild pain       ipratropium - albuterol 0.5 mg/2.5 mg/3 mL (DUONEB) 0.5-2.5 (3) MG/3ML neb solution Take 1 vial (3 mLs) by nebulization every 6 hours as needed for shortness of breath or wheezing (Patient not taking: Reported on 3/24/2023) 90 mL 0     lidocaine-prilocaine (EMLA) 2.5-2.5 % external cream Apply topically as needed for moderate pain (4-6) Apply 1 hour prior to port access and cover with saran wrap or press and seal. 30 g 1     omeprazole (PRILOSEC) 20 MG DR capsule Take 1 capsule (20 mg) by mouth daily 30-60 minutes before a meal. 90 capsule 2     ondansetron (ZOFRAN) 8 MG tablet Take 1 tablet (8 mg) by mouth every 8 hours as needed for nausea (vomiting) 30 tablet 2     polyethylene glycol (MIRALAX) 17 g packet Take 1 packet by mouth daily Taking PRN (Patient not taking: Reported on 4/12/2023)       predniSONE (DELTASONE) 10 MG tablet Take 1 tablet (10 mg) by mouth daily 30 tablet 0     predniSONE (DELTASONE) 20 MG tablet Take 1 tablet (20 mg) by mouth daily 30 tablet 0     prochlorperazine (COMPAZINE) 10 MG tablet Take 0.5 tablets (5 mg) by mouth every 6 hours as needed for nausea or vomiting (Patient not taking: Reported on 4/26/2023) 30 tablet 2     sulfamethoxazole-trimethoprim (BACTRIM DS) 800-160 MG tablet Take 1 tablet by mouth daily While on prednisone 30 tablet 0     tiZANidine (ZANAFLEX) 2 MG tablet TAKE 1 TABLET(2 MG) BY MOUTH THREE TIMES DAILY AS NEEDED FOR MUSCLE SPASMS 90 tablet 3     traMADol (ULTRAM) 50 MG tablet Take 1 tablet (50 mg) by mouth every 6 hours as needed for severe pain (7-10) COVERING FOR PCP ONLY ONCE 120 tablet 1     traZODone (DESYREL) 50 MG  tablet TAKE 2 TABLETS(100 MG) BY MOUTH AT BEDTIME 180 tablet 3         Allergies:    Allergies   Allergen Reactions     Nka [No Known Allergies]      Seasonal Allergies          Family History:    mom - lung cancer  Brother - esophageal cancer  Brother - prostate cancer and throat cancer      Social History:    Social History     Tobacco Use     Smoking status: Former     Packs/day: 1     Types: Cigarettes     Quit date: 2011     Years since quittin.9     Passive exposure: Past     Pack Years 40   Vaping Use     Vaping status: Never Used   Substance Use Topics     Alcohol use: No   Formerly employed at [employer]    ECOG Performance Status: 0, walks 2-4 miles per day      PHYSICAL EXAMINATION:    /69   Pulse 89   Wt 71.1 kg (156 lb 11.2 oz)   BMI 27.76 kg/m    General:  Well-developed, well-appearing, NAD  HEENT:  NCAT, anicteric sclera, moist oral mucosa, alopecia -- wearing wig  Cardiac:  Strong peripheral pulses, no JVD, no peripheral edema  Pulmonary:  Breathing comfortably on room air, no stridor, no audible wheeze  Abdomen:  Nontender, nondistended, no palpable hepatosplenomegaly  Genitourinary:  No CVA tenderness, no liu catheter, TATIANA/speculum exam deferred  Skin:  Pink, warm, no rash  Lymph Nodes:  No palpable lymphadenopathy  Neurologic:  CN II - XII grossly intact, strength 5/5 in upper and lower extremities, sensation intact, alert and oriented x 3  MSK:  normal muscular bulk and tone, no tender bones or joints  Psychiatric:  normal affect, normal attention      DATA REVIEWED:  IMAGING:  PET (22)              IMPRESSION:  Ms. Lindsay is a 76yo lady with RLL squamous cell carcinoma T4N2M0 and synchronous diagnosis of LLL adenocarcinoma [id number] who presents for discussion of radiation.    RECOMMENDATIONS:  We recommend treatment of bilateral lung cancers. The right sided squamous cell carcinoma will be treated to a dose of 60Gy with photons via IMRT technique. The left sided  adenocarcinoma will be treated to 50Gy in 5 every other day fractions with photons via SBRT technique. The benefit of treatment is to improve local control and prevent locoregional recurrence.    We briefly discussed the acute risks associated with chest radiation including but not limited to pneumonitis, esophagitis, pericarditis, radiation dermatitis and fatigue. We also discussed late effects which can include transverse myelitis, pulmonary fibrosis, esophageal stenosis and secondary malignancy. Ms. Lindsay understands the risks and wants to proceed. She signed consents today.    We will perform CT simulation in the supine position with 4D CT to track movement of the tumors with respiration. The left lung cancer will be treated first, followed by the right lung cancer.    Ms. Lindsay is going on vacation 7-8 through 7-15. We will attempt to hypofractionate her treatment course to accommodate her vacation.    Thank you for allowing us to participate in this patient's care.  Please feel free to call with any questions or concerns.    The patient was seen by and discussed with attending physician Dr. Escoto who agrees with the above history, physical exam, assessment and plan.      Siena Pollock MD PGY-3  Radiation Oncology, [facility]  [phone] clinic    I saw the patient with the resident.  I agree with the resident's note and plan of care.      MAKI Escoto M.D.  Department of Radiation Oncology  [facility]        HPI  INITIAL PATIENT ASSESSMENT    Diagnosis: lung cancer    Prior radiation therapy: None    Prior chemotherapy:   Protocol: pembro/carbo/taxol  Facility: [facility]  Dates:       Prior hormonal therapy:No    Pain Eval:  Denies    Psychosocial  Living arrangements: self  Fall Risk: independent   referral needs: Not needed    Advanced Directive: No  Implantable Cardiac Device? No    Onset of menarche:   LMP: No LMP recorded. Patient is  postmenopausal.  Onset of menopause:   Abnormal vaginal bleeding/discharge: No  Are you pregnant? No  Reproductive note: grown children    Nurse face-to-face time: Level 4:  15 min face to face time    Review of Systems   Constitutional: Negative for chills, fever, malaise/fatigue and weight loss.   HENT: Negative for congestion, hearing loss, sore throat and tinnitus.    Eyes: Negative for blurred vision.   Respiratory: Positive for shortness of breath and wheezing. Negative for cough.    Cardiovascular: Negative for chest pain and leg swelling.   Gastrointestinal: Negative for constipation, diarrhea, heartburn, nausea and vomiting.   Genitourinary: Negative for frequency and urgency.   Musculoskeletal: Negative for back pain, falls and neck pain.   Skin: Negative for itching and rash.   Neurological: Negative for dizziness and headaches.   Psychiatric/Behavioral: Negative for depression. The patient is not nervous/anxious.                  Again, thank you for allowing me to participate in the care of your patient.        Sincerely,        Татьяна Escoto MD

## 2023-05-23 NOTE — PROGRESS NOTES
HPI  INITIAL PATIENT ASSESSMENT    Diagnosis: lung cancer    Prior radiation therapy: None    Prior chemotherapy:   Protocol: pembro/carbo/taxol  Facility: [facility]  Dates:       Prior hormonal therapy:No    Pain Eval:  Denies    Psychosocial  Living arrangements: self  Fall Risk: independent   referral needs: Not needed    Advanced Directive: No  Implantable Cardiac Device? No    Onset of menarche:   LMP: No LMP recorded. Patient is postmenopausal.  Onset of menopause:   Abnormal vaginal bleeding/discharge: No  Are you pregnant? No  Reproductive note: grown children    Nurse face-to-face time: Level 4:  15 min face to face time    Review of Systems   Constitutional: Negative for chills, fever, malaise/fatigue and weight loss.   HENT: Negative for congestion, hearing loss, sore throat and tinnitus.    Eyes: Negative for blurred vision.   Respiratory: Positive for shortness of breath and wheezing. Negative for cough.    Cardiovascular: Negative for chest pain and leg swelling.   Gastrointestinal: Negative for constipation, diarrhea, heartburn, nausea and vomiting.   Genitourinary: Negative for frequency and urgency.   Musculoskeletal: Negative for back pain, falls and neck pain.   Skin: Negative for itching and rash.   Neurological: Negative for dizziness and headaches.   Psychiatric/Behavioral: Negative for depression. The patient is not nervous/anxious.

## 2023-05-25 ENCOUNTER — OFFICE VISIT (OUTPATIENT)
Dept: RADIATION ONCOLOGY | Facility: CLINIC | Age: 78
End: 2023-05-25
Attending: RADIOLOGY
Payer: COMMERCIAL

## 2023-05-25 DIAGNOSIS — C34.32 MALIGNANT NEOPLASM OF LOWER LOBE OF LEFT LUNG (H): ICD-10-CM

## 2023-05-25 DIAGNOSIS — C34.31 MALIGNANT NEOPLASM OF LOWER LOBE OF RIGHT LUNG (H): Primary | ICD-10-CM

## 2023-05-25 PROCEDURE — 77334 RADIATION TREATMENT AID(S): CPT | Performed by: RADIOLOGY

## 2023-05-25 PROCEDURE — 77334 RADIATION TREATMENT AID(S): CPT | Mod: 26 | Performed by: RADIOLOGY

## 2023-05-25 PROCEDURE — 77263 THER RADIOLOGY TX PLNG CPLX: CPT | Performed by: RADIOLOGY

## 2023-05-25 NOTE — LETTER
5/25/2023         RE: Yamel Lindsay  [address] [facility] [address]        Dear Colleague,    Thank you for referring your patient, Yamel Lindsay, to the [facility] RADIATION ONCOLOGY. Please see a copy of my visit note below.    Simulation Note    Date: 5/25/2023     Patient: Yamel Lindsay    Diagnosis:    Malignant neoplasm of lower lobe of right lung (H)  Malignant neoplasm of lower lobe of left lung (H)    Type of Simulation:  Cure/ Definitive     Patient Position: Supine    Patient Immobilization: Wing Board  Vac-Loc    Simulation Aid(s): None    Image Acquisition: Conventional CT simulation with 4D for respiratory studies    Total Dose Planned: 6000 cGy (right lung mass)     Dose/fraction:300 cGy(right lung mass)    Energy of machine:  10 MV           Type of Radiotherapy Technique: IMRT(Intensity Modulated Radiotherapy)      Continuing Physcis/Dosimetry  and Dose calculations are ordered.  Simple simulations will be done prior to new start and changes in fields.  Weekly on treat visit.      MAKI Escoto M.D.  Department of Radiation Oncology  [facility]      Radiation Therapy Patient Education    Person involved with teaching: Patient    Patient educational needs for self management of treatment-related side effects assessment completed.  EPIC Patient Ed tab contains Patient Learning Assessment    Education Materials Given  Radiation Therapy and You    Educational Topics Discussed  Side effects expected, Pain management, Skin care, Activity, Nutrition and weight loss and When to call MD/RN    Response To Teaching  Verbalizes understanding    GYN Only  Vaginal Dilator-given and educated: N/A    Referrals sent: None    Chemotherapy?            Again, thank you for allowing me to participate in the care of your patient.        Sincerely,        Татьяна Escoto MD

## 2023-05-25 NOTE — PROGRESS NOTES
Radiation Therapy Patient Education    Person involved with teaching: Patient    Patient educational needs for self management of treatment-related side effects assessment completed.  [facility] Patient Ed tab contains Patient Learning Assessment    Education Materials Given  Radiation Therapy and You    Educational Topics Discussed  Side effects expected, Pain management, Skin care, Activity, Nutrition and weight loss and When to call MD/RN    Response To Teaching  Verbalizes understanding    GYN Only  Vaginal Dilator-given and educated: N/A    Referrals sent: None    Chemotherapy?

## 2023-05-25 NOTE — PROGRESS NOTES
Simulation Note    Date: 5/25/2023     Patient: Yamel Lindsay    Diagnosis:    Malignant neoplasm of lower lobe of right lung (H)  Malignant neoplasm of lower lobe of left lung (H)    Type of Simulation:  Cure/ Definitive     Patient Position: Supine    Patient Immobilization: Wing Board  Vac-Loc    Simulation Aid(s): None    Image Acquisition: Conventional CT simulation with 4D for respiratory studies    Total Dose Planned: 6000 cGy (right lung mass)     Dose/fraction:300 cGy(right lung mass)    Energy of machine:  10 MV           Type of Radiotherapy Technique: IMRT(Intensity Modulated Radiotherapy)      Continuing Physcis/Dosimetry  and Dose calculations are ordered.  Simple simulations will be done prior to new start and changes in fields.  Weekly on treat visit.      MAKI Escoto M.D.  Department of Radiation Oncology  [facility]

## 2023-06-06 ENCOUNTER — APPOINTMENT (OUTPATIENT)
Dept: RADIATION ONCOLOGY | Facility: CLINIC | Age: 78
End: 2023-06-06
Attending: RADIOLOGY
Payer: COMMERCIAL

## 2023-06-06 PROCEDURE — 77014 PR CT GUIDE FOR PLACEMENT RADIATION THERAPY FIELDS: CPT | Mod: 26 | Performed by: RADIOLOGY

## 2023-06-06 PROCEDURE — 77386 HC IMRT TREATMENT DELIVERY, COMPLEX: CPT | Performed by: RADIOLOGY

## 2023-06-07 ENCOUNTER — APPOINTMENT (OUTPATIENT)
Dept: RADIATION ONCOLOGY | Facility: CLINIC | Age: 78
End: 2023-06-07
Attending: RADIOLOGY
Payer: COMMERCIAL

## 2023-06-07 DIAGNOSIS — F41.9 ANXIETY: ICD-10-CM

## 2023-06-07 PROCEDURE — 77386 HC IMRT TREATMENT DELIVERY, COMPLEX: CPT | Performed by: RADIOLOGY

## 2023-06-07 PROCEDURE — 77338 DESIGN MLC DEVICE FOR IMRT: CPT | Mod: 26 | Performed by: RADIOLOGY

## 2023-06-07 PROCEDURE — 77338 DESIGN MLC DEVICE FOR IMRT: CPT | Mod: XU | Performed by: RADIOLOGY

## 2023-06-07 PROCEDURE — 77300 RADIATION THERAPY DOSE PLAN: CPT | Mod: 26 | Performed by: RADIOLOGY

## 2023-06-07 PROCEDURE — 77300 RADIATION THERAPY DOSE PLAN: CPT | Performed by: RADIOLOGY

## 2023-06-08 ENCOUNTER — APPOINTMENT (OUTPATIENT)
Dept: RADIATION ONCOLOGY | Facility: CLINIC | Age: 78
End: 2023-06-08
Attending: RADIOLOGY
Payer: COMMERCIAL

## 2023-06-08 VITALS
SYSTOLIC BLOOD PRESSURE: 124 MMHG | BODY MASS INDEX: 28.17 KG/M2 | DIASTOLIC BLOOD PRESSURE: 76 MMHG | WEIGHT: 159 LBS | HEART RATE: 88 BPM

## 2023-06-08 DIAGNOSIS — C34.32 MALIGNANT NEOPLASM OF LOWER LOBE OF LEFT LUNG (H): ICD-10-CM

## 2023-06-08 DIAGNOSIS — C34.31 MALIGNANT NEOPLASM OF LOWER LOBE OF RIGHT LUNG (H): Primary | ICD-10-CM

## 2023-06-08 PROCEDURE — 77386 HC IMRT TREATMENT DELIVERY, COMPLEX: CPT | Performed by: RADIOLOGY

## 2023-06-08 NOTE — PROGRESS NOTES
WEEKLY MANAGEMENT NOTE  Radiation Oncology            Patient Name: Yamel Lindsay  MRN: [id number]     Chest Right Lung Cancer   Chest Left Lung Cancer  600 cGy / 6000 cGy   0 cgy/ 5000 cGy   3/30    0/10        DAILY DOSE:     200 cGy/ day,  5 times/week for right lung mass, 500 cGy for left lung mass         DISEASE UNDER TREATMENT:   Right Lung(RLL): [id number], squamous cell cancer, PD-L1<1%, NGS Neg, TMB(16.7, high)  Left Lung (LLL): [id number], adenocarcinoma, PD-L1-2%, NGS-KRAS G12C, TMB 2.4    SYSTEMIC THERAPY: None concurrent    SUBJECTIVE:  Pain score:  0 /10    CTC V5.0 Toxicity Criteria  Fatigue: Grade 0: No toxicity  Skin: Grade 0: No toxicity  Comment:    PULMONARY:  Dyspnea: Grade 0: No toxicity    GI:  Nausea: Grade 0: No toxicity  Mucositis: Grade 0: No toxicity  Comment:        OBJECTIVE:There were no vitals taken for this visit.   Wt Readings from Last 2 Encounters:   05/23/23 71.1 kg (156 lb 11.2 oz)   05/08/23 70.3 kg (155 lb)          IMPRESSION: The patient is tolerating the treatment.  The patient set up, dose, and cone beam CT images were reviewed.      PLAN: Continue radiotherapy. The patient will go on a trip on 7/8/2023 for a week.    PAIN MANAGEMENT PLAN: The patient does not require pain management    MAKI Escoto M.D.  Department of Radiation Oncology  [facility]

## 2023-06-08 NOTE — LETTER
6/8/2023         RE: Yamel Lindsay  [address] [facility] [address]        Dear Colleague,    Thank you for referring your patient, Yamel Lindsay, to the [facility] RADIATION ONCOLOGY. Please see a copy of my visit note below.    WEEKLY MANAGEMENT NOTE  Radiation Oncology            Patient Name: Yamel Lindsay  MRN: [id number]     Chest Right Lung Cancer   Chest Left Lung Cancer  600 cGy / 6000 cGy   0 cgy/ 5000 cGy   3/30    0/10        DAILY DOSE:     200 cGy/ day,  5 times/week for right lung mass, 500 cGy for left lung mass         DISEASE UNDER TREATMENT:   Right Lung(RLL): [id number], squamous cell cancer, PD-L1<1%, NGS Neg, TMB(16.7, high)  Left Lung (LLL): [id number], adenocarcinoma, PD-L1-2%, NGS-KRAS G12C, TMB 2.4    SYSTEMIC THERAPY: None concurrent    SUBJECTIVE:  Pain score:  0 /10    CTC V5.0 Toxicity Criteria  Fatigue: Grade 0: No toxicity  Skin: Grade 0: No toxicity  Comment:    PULMONARY:  Dyspnea: Grade 0: No toxicity    GI:  Nausea: Grade 0: No toxicity  Mucositis: Grade 0: No toxicity  Comment:        OBJECTIVE:There were no vitals taken for this visit.   Wt Readings from Last 2 Encounters:   05/23/23 71.1 kg (156 lb 11.2 oz)   05/08/23 70.3 kg (155 lb)          IMPRESSION: The patient is tolerating the treatment.  The patient set up, dose, and cone beam CT images were reviewed.      PLAN: Continue radiotherapy. The patient will go on a trip on 7/8/2023 for a week.    PAIN MANAGEMENT PLAN: The patient does not require pain management    MAKI Escoto M.D.  Department of Radiation Oncology  [facility]

## 2023-06-09 ENCOUNTER — APPOINTMENT (OUTPATIENT)
Dept: RADIATION ONCOLOGY | Facility: CLINIC | Age: 78
End: 2023-06-09
Attending: RADIOLOGY
Payer: COMMERCIAL

## 2023-06-09 PROCEDURE — 77014 PR CT GUIDE FOR PLACEMENT RADIATION THERAPY FIELDS: CPT | Mod: 26 | Performed by: RADIOLOGY

## 2023-06-09 PROCEDURE — 77386 HC IMRT TREATMENT DELIVERY, COMPLEX: CPT | Performed by: RADIOLOGY

## 2023-06-12 ENCOUNTER — APPOINTMENT (OUTPATIENT)
Dept: RADIATION ONCOLOGY | Facility: CLINIC | Age: 78
End: 2023-06-12
Attending: RADIOLOGY
Payer: COMMERCIAL

## 2023-06-12 DIAGNOSIS — C34.32 MALIGNANT NEOPLASM OF LOWER LOBE OF LEFT LUNG (H): ICD-10-CM

## 2023-06-12 DIAGNOSIS — M19.90 ARTHRITIS: ICD-10-CM

## 2023-06-12 PROCEDURE — 77427 RADIATION TX MANAGEMENT X5: CPT | Performed by: RADIOLOGY

## 2023-06-12 PROCEDURE — 77386 HC IMRT TREATMENT DELIVERY, COMPLEX: CPT | Performed by: RADIOLOGY

## 2023-06-12 PROCEDURE — 77336 RADIATION PHYSICS CONSULT: CPT | Performed by: RADIOLOGY

## 2023-06-12 PROCEDURE — 77014 PR CT GUIDE FOR PLACEMENT RADIATION THERAPY FIELDS: CPT | Mod: 26 | Performed by: RADIOLOGY

## 2023-06-12 NOTE — TELEPHONE ENCOUNTER
"Prednisone 20mg tab  Last prescribing provider: Peri Ellis on 5/8/23    Last clinic visit date: 5/18/23     Recommendations for requested medication (if none, N/A): 5/18/23, \"has had recurrent symptoms after 2 prednisone tapers and now is back on prednisone 20 mg with good control of symptoms. Will keep her at this dose for another 2 weeks, then try to decrease to 10 mg daily. Will plan to remain on this dose daily. Can discuss at her next follow-up in 1 month if we again try to stop prednisone completely.\"    Any other pertinent information (if none, N/A): There is also Prednisone 10mg tab filled on 5/2/23 by Ronit Grayson on med list.     Refilled: Y/N, if NO, why?    Routed to Peri Ellis.    "

## 2023-06-13 ENCOUNTER — APPOINTMENT (OUTPATIENT)
Dept: RADIATION ONCOLOGY | Facility: CLINIC | Age: 78
End: 2023-06-13
Attending: RADIOLOGY
Payer: COMMERCIAL

## 2023-06-13 PROCEDURE — 77014 PR CT GUIDE FOR PLACEMENT RADIATION THERAPY FIELDS: CPT | Mod: 26 | Performed by: RADIOLOGY

## 2023-06-13 PROCEDURE — 77386 HC IMRT TREATMENT DELIVERY, COMPLEX: CPT | Performed by: RADIOLOGY

## 2023-06-14 ENCOUNTER — APPOINTMENT (OUTPATIENT)
Dept: RADIATION ONCOLOGY | Facility: CLINIC | Age: 78
End: 2023-06-14
Attending: RADIOLOGY
Payer: COMMERCIAL

## 2023-06-14 PROCEDURE — 77014 PR CT GUIDE FOR PLACEMENT RADIATION THERAPY FIELDS: CPT | Mod: 26 | Performed by: RADIOLOGY

## 2023-06-14 PROCEDURE — 77386 HC IMRT TREATMENT DELIVERY, COMPLEX: CPT | Performed by: RADIOLOGY

## 2023-06-15 ENCOUNTER — OFFICE VISIT (OUTPATIENT)
Dept: RADIATION ONCOLOGY | Facility: CLINIC | Age: 78
End: 2023-06-15
Attending: RADIOLOGY
Payer: COMMERCIAL

## 2023-06-15 VITALS
HEART RATE: 80 BPM | BODY MASS INDEX: 29.72 KG/M2 | WEIGHT: 167.8 LBS | SYSTOLIC BLOOD PRESSURE: 154 MMHG | DIASTOLIC BLOOD PRESSURE: 87 MMHG

## 2023-06-15 DIAGNOSIS — C34.31 MALIGNANT NEOPLASM OF LOWER LOBE OF RIGHT LUNG (H): Primary | ICD-10-CM

## 2023-06-15 DIAGNOSIS — J44.9 CHRONIC OBSTRUCTIVE PULMONARY DISEASE, UNSPECIFIED COPD TYPE (H): ICD-10-CM

## 2023-06-15 DIAGNOSIS — C34.32 MALIGNANT NEOPLASM OF LOWER LOBE OF LEFT LUNG (H): ICD-10-CM

## 2023-06-15 PROCEDURE — 77386 HC IMRT TREATMENT DELIVERY, COMPLEX: CPT | Performed by: RADIOLOGY

## 2023-06-15 RX ORDER — ALBUTEROL SULFATE 90 UG/1
AEROSOL, METERED RESPIRATORY (INHALATION)
Qty: 8.5 G | Refills: 1 | Status: SHIPPED | OUTPATIENT
Start: 2023-06-15 | End: 2023-08-31

## 2023-06-15 NOTE — PROGRESS NOTES
WEEKLY MANAGEMENT NOTE  Radiation Oncology            Patient Name: Yamel Lindsay  MRN: [id number]     Chest Right Lung Cancer   Chest Left Lung Cancer  1600 cGy / 6000 cGy   0 cgy/ 5000 cGy   8/30    0/10        DAILY DOSE:     200 cGy/ day,  5 times/week for right lung mass, 500 cGy for left lung mass         DISEASE UNDER TREATMENT:   Right Lung(RLL): [id number], squamous cell cancer, PD-L1<1%, NGS Neg, TMB(16.7, high)  Left Lung (LLL): [id number], adenocarcinoma, PD-L1-2%, NGS-KRAS G12C, TMB 2.4    SYSTEMIC THERAPY: None concurrent    SUBJECTIVE:  Pain score:  0 /10    CTC V5.0 Toxicity Criteria  Fatigue: Grade 1: Fatigue relieved by rest  Skin: Grade 0: No toxicity  Comment:    PULMONARY:  Dyspnea: Grade 0: No toxicity    GI:  Nausea: Grade 0: No toxicity  Mucositis: Grade 0: No toxicity  Comment:        OBJECTIVE:BP (!) 154/87   Pulse 80   Wt 76.1 kg (167 lb 12.8 oz)   BMI 29.72 kg/m     Wt Readings from Last 2 Encounters:   06/15/23 76.1 kg (167 lb 12.8 oz)   06/08/23 72.1 kg (159 lb)          IMPRESSION: The patient is tolerating the treatment.  The patient set up, dose, and cone beam CT images were reviewed. She has no new clinical problem since last week      PLAN: Continue radiotherapy. The patient will go on a trip on 7/8/2023 for a week.    PAIN MANAGEMENT PLAN: The patient does not require pain management    MAKI Escoto M.D.  Department of Radiation Oncology  [facility]

## 2023-06-15 NOTE — TELEPHONE ENCOUNTER
"Requested Prescriptions   Pending Prescriptions Disp Refills     albuterol (PROAIR HFA/PROVENTIL HFA/VENTOLIN HFA) 108 (90 Base) MCG/ACT inhaler [Pharmacy Med Name: ALBUTEROL HFA INH (200 PUFFS) 8.5GM] 8.5 g      Sig: INHALE 2 PUFFS INTO THE LUNGS EVERY 6 HOURS AS NEEDED FOR SHORTNESS OF BREATH OR DIFFICULTY BREATHING OR WHEEZING       Asthma Maintenance Inhalers - Anticholinergics Failed - 6/15/2023  9:06 AM        Failed - Asthma control assessment score within normal limits in last 6 months     Please review ACT score.           Passed - Patient is age 12 years or older        Passed - Medication is active on med list        Passed - Recent (6 mo) or future (30 days) visit within the authorizing provider's specialty     Patient had office visit in the last 6 months or has a visit in the next 30 days with authorizing provider or within the authorizing provider's specialty.  See \"Patient Info\" tab in inWhistlesket, or \"Choose Columns\" in Meds & Orders section of the refill encounter.           Short-Acting Beta Agonist Inhalers Protocol  Failed - 6/15/2023  9:06 AM        Failed - Asthma control assessment score within normal limits in last 6 months     Please review ACT score.           Passed - Patient is age 12 or older        Passed - Medication is active on med list        Passed - Recent (6 mo) or future (30 days) visit within the authorizing provider's specialty     Patient had office visit in the last 6 months or has a visit in the next 30 days with authorizing provider or within the authorizing provider's specialty.  See \"Patient Info\" tab in inbasket, or \"Choose Columns\" in Meds & Orders section of the refill encounter.                 "

## 2023-06-15 NOTE — LETTER
6/15/2023         RE: Yamel Lindsay  [address] [facility] [address]        Dear Colleague,    Thank you for referring your patient, Yamel Lindsay, to the [facility] RADIATION ONCOLOGY. Please see a copy of my visit note below.    WEEKLY MANAGEMENT NOTE  Radiation Oncology            Patient Name: Yamel Lindsay  MRN: [id number]     Chest Right Lung Cancer   Chest Left Lung Cancer  1600 cGy / 6000 cGy   0 cgy/ 5000 cGy   8/30    0/10        DAILY DOSE:     200 cGy/ day,  5 times/week for right lung mass, 500 cGy for left lung mass         DISEASE UNDER TREATMENT:   Right Lung(RLL): [id number], squamous cell cancer, PD-L1<1%, NGS Neg, TMB(16.7, high)  Left Lung (LLL): [id number], adenocarcinoma, PD-L1-2%, NGS-KRAS G12C, TMB 2.4    SYSTEMIC THERAPY: None concurrent    SUBJECTIVE:  Pain score:  0 /10    CTC V5.0 Toxicity Criteria  Fatigue: Grade 1: Fatigue relieved by rest  Skin: Grade 0: No toxicity  Comment:    PULMONARY:  Dyspnea: Grade 0: No toxicity    GI:  Nausea: Grade 0: No toxicity  Mucositis: Grade 0: No toxicity  Comment:        OBJECTIVE:BP (!) 154/87   Pulse 80   Wt 76.1 kg (167 lb 12.8 oz)   BMI 29.72 kg/m     Wt Readings from Last 2 Encounters:   06/15/23 76.1 kg (167 lb 12.8 oz)   06/08/23 72.1 kg (159 lb)          IMPRESSION: The patient is tolerating the treatment.  The patient set up, dose, and cone beam CT images were reviewed. She has new clinical problem since last week      PLAN: Continue radiotherapy. The patient will go on a trip on 7/8/2023 for a week.    PAIN MANAGEMENT PLAN: The patient does not require pain management    MAKI Escoto M.D.  Department of Radiation Oncology  [facility]      Again, thank you for allowing me to participate in the care of your patient.        Sincerely,        Татьяна Escoto MD

## 2023-06-15 NOTE — TELEPHONE ENCOUNTER
Pt asking for inhaler refill ASAP/TODAY - Pt is out.  No need to call patient back, unless there are questions or problems.

## 2023-06-16 ENCOUNTER — APPOINTMENT (OUTPATIENT)
Dept: RADIATION ONCOLOGY | Facility: CLINIC | Age: 78
End: 2023-06-16
Attending: RADIOLOGY
Payer: COMMERCIAL

## 2023-06-16 ENCOUNTER — HOSPITAL ENCOUNTER (OUTPATIENT)
Dept: CT IMAGING | Facility: HOSPITAL | Age: 78
Discharge: HOME | End: 2023-06-16
Attending: STUDENT IN AN ORGANIZED HEALTH CARE EDUCATION/TRAINING PROGRAM | Admitting: STUDENT IN AN ORGANIZED HEALTH CARE EDUCATION/TRAINING PROGRAM
Payer: COMMERCIAL

## 2023-06-16 DIAGNOSIS — C34.31 MALIGNANT NEOPLASM OF LOWER LOBE OF RIGHT LUNG (H): ICD-10-CM

## 2023-06-16 LAB
CREAT BLD-MCNC: 0.6 MG/DL (ref 0.6–1.1)
GFR SERPL CREATININE-BSD FRML MDRD: >60 ML/MIN/1.73M2

## 2023-06-16 PROCEDURE — 250N000011 HC RX IP 250 OP 636: Performed by: STUDENT IN AN ORGANIZED HEALTH CARE EDUCATION/TRAINING PROGRAM

## 2023-06-16 PROCEDURE — 71260 CT THORAX DX C+: CPT

## 2023-06-16 PROCEDURE — 77386 HC IMRT TREATMENT DELIVERY, COMPLEX: CPT | Performed by: RADIOLOGY

## 2023-06-16 PROCEDURE — 82565 ASSAY OF CREATININE: CPT

## 2023-06-16 RX ORDER — IOPAMIDOL 755 MG/ML
90 INJECTION, SOLUTION INTRAVASCULAR ONCE
Status: COMPLETED | OUTPATIENT
Start: 2023-06-16 | End: 2023-06-16

## 2023-06-16 RX ORDER — HEPARIN SODIUM (PORCINE) LOCK FLUSH IV SOLN 100 UNIT/ML 100 UNIT/ML
500 SOLUTION INTRAVENOUS ONCE
Status: COMPLETED | OUTPATIENT
Start: 2023-06-16 | End: 2023-06-16

## 2023-06-16 RX ORDER — PREDNISONE 20 MG/1
10 TABLET ORAL DAILY
Qty: 30 TABLET | Refills: 0 | Status: SHIPPED | OUTPATIENT
Start: 2023-06-16 | End: 2023-06-19

## 2023-06-16 RX ADMIN — IOPAMIDOL 90 ML: 755 INJECTION, SOLUTION INTRAVENOUS at 15:10

## 2023-06-16 RX ADMIN — Medication 500 UNITS: at 15:08

## 2023-06-18 NOTE — PROGRESS NOTES
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 Stage IIIC (AJCC 8th edition) diagnosed 12/2022  PD-L1 <1%  NGS- [facility] panel- Neg (High TMB 16.7 mut/MB)     Left LL adenocarcinoma [id number] Stage (AJCC 8th edition) diagnosed 12/2022  PD-L1-2%  NGS-KRAS G12C, TMB 2.4      Treatment:  1/20/23- 4 cycles of Pembrolizumab+carboplatin+paclitaxel (pembro on hold ucrrently ofr immune mediated myalgia/arthralgia)       6/5/23- Started XRT  Chest Left Lung Cancer: 5000 cGy in 10nfractions  Chest Right Lung Cancer: 6000 cGy in 30 fraction    Intent of treatment: Palliative    Onc HPI:    One month history of increasing dyspnea with palpitations, propting ER visit 11/19.  11/19/22- CT- 1 cm right lower lobe mass abutting multiple central bronchovascular and mediastinal structures, consistent with malignancy.2.2 cm spiculated nodule in the left lower lobe, also consistent with malignancy.   12/6/22- PET/CT-  Right lower lobe lung mass abutting the pleura (SUV max of 24), measuring 8 x 5.2 cm. There is associated complete obliteration of right lower lobe bronchus and loss of fat planes with  the left atrium. Spiculated left lower lobe solid lung mass (SUV 17.9), measuring 2.2 x 2 cm. There are several markedly hypermetabolic, irregular solid nodules in the right middle lobe abutting the pleura (approximately 10-12), as for example 1.2 cm nodule with SUV max of 16.8 (4/126). There is a separate markedly hypermetabolic 1 cm nodule in the right upper lobe. Mild hypermetabolic uptake is noted within a1 x 0.9 cm right paratracheal node ( SUV max of 4.2) (4/120), Mild uptake is also  noted within a subcentimeter lower paraesophageal node (4/154).   12/28/22- EBUS with deblking in the right hilar mass (no mediastinal staging performed)- - LUNG, RIGHT MAINSTEM, ENDOBRONCHIAL BIOPSY: Squamous cell carcinoma with extensive necrosis (positive for p40 and  negative for TTF-1). Left LL FNAC- Positive for malignancy- Adenocarcinoma  (diffusely positive for TTF-1 while negative for p40)  1/3/23: Brain MRI - Neg  1/10/23: Tumor board discussion: Too extensive for concurrent/sequential chemo XRT- plan for palliative intent chemo-IO  1/20/23- C1 Pembrolizumab+carboplatin+paclitaxel  2/10/23-C2 Pembrolizumab+carboplatin+paclitaxel  2/27/23- CT CAP- Decreased size of the hypoattenuating mass in the central right lower lobe and small irregularly-shaped nodules in the right upper lobe consistent with positive treatment response. There is persistent postobstructive right lower lobe atelectasis.Unchanged heterogeneous lobulated and spiculated solid nodule in the lateral basal left lower lobe. No findings to suggest new extrapulmonary metastatic disease.  3/3, 3/24-  Pembrolizumab+carboplatin+paclitaxel      4/10/23- CT CAP- - some Rx response- Mass distal right lower lobe bronchus causing complete post obstructive atelectasis has decreased from 6.5 x 4.5 x 3.5 cm to 5.5 x 3.8 x 2.4 cm today. Spiculated left lower lobe mass has decreased from 2.1 cm to 1.5 cm today. Right upper lobe nodularity continues to decrease. Ill-defined nodularity in the right middle lobe has developed and is most likely postinflammatory.     6/5/23- After discussion at tumor board, Dr. Escoto agreed for definitive radiation treatment (without chemo, as she already had 4 cycles of chemo) for cancer on the right side and possible SBRT on left side both with definitive intent    6/5/23- Started XRT   Chest Left Lung Cancer: 5000 cGy in 10 fractions  Chest Right Lung Cancer: 6000 cGy in 30 fraction    6/16/23- CT Chest-  Unchanged complete atelectasis of the heterogeneous right lower lobe due to central obstructing mass status post treatment. No change in the posterolateral left lower lobe nodule with surrounding cicatrization. No findings to suggest progressive   malignancy in the chest.      Interval history:    Yamel is here for follow and discuss results of CT scan.  Currently on XRT to chest on rt side. Done with 10/30 fractions, not yet started XRT to left. HHas 20 more fraction left, taking a break from July 8 through 15 to attend a family reunion in [facility], will resume the rest of the radiation after that.  Tolerating radiation ok, has some fatigue, takes nap.   Remains every active, independent of ADL and IADL  Her pembro on hold currently for immune mediated myalgia/arthralgia, currently remains on 10 mg of prednisone for bilateral calf pain, radiates from the knee to the ankle. The pain is now controlled and tolerable on 10 mg prednisone.   Also taking the bactrim. No leg swelling or rash. No heartburn or reflux. No cough or shortness of breath. No mouth sores.    ECOG PS 1    Current Outpatient Medications   Medication Sig Dispense Refill     acetaminophen (TYLENOL) 500 MG tablet Take 1,000 mg by mouth every 6 hours as needed for mild pain Taking once a day       ADVAIR -21 MCG/ACT inhaler INHALE 2 PUFFS INTO THE LUNGS TWICE DAILY 12 g 11     albuterol (PROAIR HFA/PROVENTIL HFA/VENTOLIN HFA) 108 (90 Base) MCG/ACT inhaler INHALE 2 PUFFS INTO THE LUNGS EVERY 6 HOURS AS NEEDED FOR SHORTNESS OF BREATH OR DIFFICULTY BREATHING OR WHEEZING 8.5 g 1     atorvastatin (LIPITOR) 20 MG tablet TAKE 1 TABLET(20 MG) BY MOUTH DAILY 90 tablet 1     Cholecalciferol (VITAMIN D-3 PO) Take 2,000 Units by mouth daily        clonazePAM (KLONOPIN) 0.5 MG tablet Take 1-2 tablets (0.5-1 mg) by mouth 2 times daily as needed for anxiety (Patient not taking: Reported on 4/26/2023) 120 tablet 1     clonazePAM (KLONOPIN) 0.5 MG tablet Take 0.5-1 tablets (0.25-0.5 mg) by mouth 2 times daily as needed for anxiety 30 tablet 3     dexamethasone (DECADRON) 4 MG tablet Take 2 tablets (8 mg) by mouth daily Take for 3 days, starting the day after chemo. Take with food. (Patient not taking: Reported on 4/26/2023) 6 tablet 3      FLUoxetine (PROZAC) 20 MG capsule TAKE 2 CAPSULES(40 MG) BY MOUTH DAILY 180 capsule 1     gabapentin (NEURONTIN) 300 MG capsule TAKE ONE CAPSULE BY MOUTH EVERY EVENING AS NEEDED FOR NEUROPATHIC PAIN(FOR PAINFUL TINGLING AND NUMBNESS) 30 capsule 1     ibuprofen (ADVIL,MOTRIN) 200 MG tablet Take 200-600 mg by mouth every 2 hours as needed for mild pain       ipratropium - albuterol 0.5 mg/2.5 mg/3 mL (DUONEB) 0.5-2.5 (3) MG/3ML neb solution Take 1 vial (3 mLs) by nebulization every 6 hours as needed for shortness of breath or wheezing (Patient not taking: Reported on 3/24/2023) 90 mL 0     lidocaine-prilocaine (EMLA) 2.5-2.5 % external cream Apply topically as needed for moderate pain (4-6) Apply 1 hour prior to port access and cover with saran wrap or press and seal. 30 g 1     omeprazole (PRILOSEC) 20 MG DR capsule Take 1 capsule (20 mg) by mouth daily 30-60 minutes before a meal. 90 capsule 2     ondansetron (ZOFRAN) 8 MG tablet Take 1 tablet (8 mg) by mouth every 8 hours as needed for nausea (vomiting) 30 tablet 2     polyethylene glycol (MIRALAX) 17 g packet Take 1 packet by mouth daily Taking PRN (Patient not taking: Reported on 4/12/2023)       predniSONE (DELTASONE) 10 MG tablet Take 1 tablet (10 mg) by mouth daily 30 tablet 0     predniSONE (DELTASONE) 20 MG tablet Take 0.5 tablets (10 mg) by mouth daily 30 tablet 0     prochlorperazine (COMPAZINE) 10 MG tablet Take 0.5 tablets (5 mg) by mouth every 6 hours as needed for nausea or vomiting (Patient not taking: Reported on 4/26/2023) 30 tablet 2     sulfamethoxazole-trimethoprim (BACTRIM DS) 800-160 MG tablet Take 1 tablet by mouth daily While on prednisone 30 tablet 0     tiZANidine (ZANAFLEX) 2 MG tablet TAKE 1 TABLET(2 MG) BY MOUTH THREE TIMES DAILY AS NEEDED FOR MUSCLE SPASMS 90 tablet 3     traMADol (ULTRAM) 50 MG tablet Take 1 tablet (50 mg) by mouth every 6 hours as needed for severe pain (7-10) COVERING FOR PCP ONLY ONCE 120 tablet 1     traZODone  (DESYREL) 50 MG tablet TAKE 2 TABLETS(100 MG) BY MOUTH AT BEDTIME 180 tablet 3          Allergies   Allergen Reactions     Nka [No Known Allergies]      Seasonal Allergies        BP (!) 142/80 (BP Location: Right arm, Patient Position: Sitting, Cuff Size: Adult Regular)   Pulse 100   Temp 98.1  F (36.7  C) (Oral)   Resp 16   Wt 73.8 kg (162 lb 11.2 oz)   SpO2 94%   BMI 28.82 kg/m    General: Patient appears well in no acute distress.   Skin: No visualized rash or lesions on visualized skin  Eyes: EOMI, no erythema, sclera icterus or discharge noted  Resp: Appears to be breathing comfortably without accessory muscle usage, speaking in full sentences, no cough  MSK: Appears to have normal range of motion based on visualized movements  Neurologic: No apparent tremors, facial movements symmetric  Psych: affect engaged, pleasant, alert and oriented   not currently breastfeeding.  Wt Readings from Last 4 Encounters:   06/15/23 76.1 kg (167 lb 12.8 oz)   06/08/23 72.1 kg (159 lb)   05/23/23 71.1 kg (156 lb 11.2 oz)   05/08/23 70.3 kg (155 lb)         Impression/plan:     Ms. Yamel Lindsay is a 77 year old  female with PMHx of for HTN, HLD, anxiety, who is here for evaluation/followup of new lung cancer     Synchronous Rt LL Squamous cell carcinoma [id number] Stage IIIC (AJCC 8th edition) diagnosed 12/2022  PD-L1 <1%  NGS- [facility] panel- Neg (High TMB 16.7 mut/MB)     Left LL adenocarcinoma [id number] Stage (AJCC 8th edition) diagnosed 12/2022  PD-L1-2%  NGS-KRAS G12C, TMB 2.4     Pt with 2 synchronous cancers.  The more urgent and threatening one is the right lower lobe squamous cell cancer which appears to be at least stage IIIC given the size greater than 7 cm suggestive of T4 disease.  She also has mediastinal lymph node that were enlarged and appear pathologic but have not yet been biopsied.  She is also has several FDG avid nodules in the right middle and the right upper lobe that have not yet been biopsied.  She  underwent an EBUS for debulking and her mediastinum has not been staged.  Given the extent of involvement of the right sided cancer, concurrent/sequential chemoradiation not feasible per tumor board discussion, also no further EBUS required since high probability of cancer.   She proceeded with palliative intent chemoimmunotherapy with pembro+carbo+taxol.  Of note, squamous has high TMB and low neg PD-L1.  Adenocarcinoma has KRAS G12C.   CT after 4 cycles showing good partial response in the right-sided tumor and small amount of response in the left side adenocarcinoma.  After discussion at tumor board, Dr. Escoto agreed for definitive radiation treatment (without chemo, as she already had 4 cycles of chemo) for cancer on the right side and possible SBRT on left side both with definitive intent.   Started XRT 2 weeks ago, with plan for Chest Left Lung Cancer: 5000 cGy in 10 fractions and Chest Right Lung Cancer: 6000 cGy in 30 fraction. Most recent CT showing stable disease. Will continue to hold Io while on XRT. Will plan to see her in Aug 1 st week. Will repeat another scan in end of august (~1.5 months after finishing XRT). When we meet in August, we will discuss resuming immunotherapy.          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 Has been attributed to immunotherapy. dopplar negative for DVT, No known bone mets on baseline PET/CT.  -CK normal  -has had 2 prednisone tapers, the 2nd was a longer taper. Her pain started to increase the week after stopping prednisone  -she was resumed on prednisone 10 mg without improvement and increased to 20 mg. Also taking NSAIDs and pain is improving  - remains on 10 mg daily, plan to continue on it (If pain increases again, she will call and we can increase to 20 mg daily and again slowly taper.  -Ok to stop  bactrim ( prednisone dose is 10 mg or less)  -has tramadol, started on  "gabapentin 300 mg at bedtime per palliative\"        #Psychiatry: Patient has previously had a history of anxiety.now since the diagnosis, is having panic attacks.  PTA on Prozac, and also clonazepam  -Trazodoen for ?insomnia  -increase cloazepan to BID 0.5 mg     #COVID vaccine: s/p 3 boosters     #Hypertension hyperlipidemia  ON statin        On the day of service,  Preparation time:  5 minutes  Visit time:  30 minutes  Care Coordination:  5 minutes        Chaz Martel M.D.   of Medicine  Division of Hematology, Oncology and Transplantation  [facility]      "

## 2023-06-19 ENCOUNTER — ONCOLOGY VISIT (OUTPATIENT)
Dept: ONCOLOGY | Facility: CLINIC | Age: 78
End: 2023-06-19
Attending: STUDENT IN AN ORGANIZED HEALTH CARE EDUCATION/TRAINING PROGRAM
Payer: COMMERCIAL

## 2023-06-19 ENCOUNTER — APPOINTMENT (OUTPATIENT)
Dept: RADIATION ONCOLOGY | Facility: CLINIC | Age: 78
End: 2023-06-19
Attending: RADIOLOGY
Payer: COMMERCIAL

## 2023-06-19 VITALS
DIASTOLIC BLOOD PRESSURE: 80 MMHG | TEMPERATURE: 98.1 F | WEIGHT: 162.7 LBS | RESPIRATION RATE: 16 BRPM | OXYGEN SATURATION: 94 % | HEART RATE: 100 BPM | BODY MASS INDEX: 28.82 KG/M2 | SYSTOLIC BLOOD PRESSURE: 142 MMHG

## 2023-06-19 DIAGNOSIS — M79.662 PAIN OF LEFT LOWER LEG: ICD-10-CM

## 2023-06-19 DIAGNOSIS — C34.31 MALIGNANT NEOPLASM OF LOWER LOBE OF RIGHT LUNG (H): ICD-10-CM

## 2023-06-19 DIAGNOSIS — M19.90 ARTHRITIS: Primary | ICD-10-CM

## 2023-06-19 PROCEDURE — 77386 HC IMRT TREATMENT DELIVERY, COMPLEX: CPT | Performed by: RADIOLOGY

## 2023-06-19 PROCEDURE — 77336 RADIATION PHYSICS CONSULT: CPT | Performed by: RADIOLOGY

## 2023-06-19 PROCEDURE — 77427 RADIATION TX MANAGEMENT X5: CPT | Performed by: RADIOLOGY

## 2023-06-19 PROCEDURE — G0463 HOSPITAL OUTPT CLINIC VISIT: HCPCS | Performed by: STUDENT IN AN ORGANIZED HEALTH CARE EDUCATION/TRAINING PROGRAM

## 2023-06-19 PROCEDURE — 99215 OFFICE O/P EST HI 40 MIN: CPT | Performed by: STUDENT IN AN ORGANIZED HEALTH CARE EDUCATION/TRAINING PROGRAM

## 2023-06-19 PROCEDURE — 77014 PR CT GUIDE FOR PLACEMENT RADIATION THERAPY FIELDS: CPT | Mod: 26 | Performed by: RADIOLOGY

## 2023-06-19 RX ORDER — PREDNISONE 10 MG/1
10 TABLET ORAL DAILY
Qty: 30 TABLET | Refills: 1 | Status: SHIPPED | OUTPATIENT
Start: 2023-06-19 | End: 2023-08-28

## 2023-06-19 ASSESSMENT — PAIN SCALES - GENERAL: PAINLEVEL: NO PAIN (0)

## 2023-06-19 NOTE — LETTER
6/19/2023         RE: Yamel Lindsay  [address] [facility] [id number]        Dear Colleague,    Thank you for referring your patient, Yamel Lindsay, to the [facility]. Please see a copy of my visit note below.    6/19/23    Reason for Visit: f/u lung cancer, joint pain      Diagnosis:   Synchronous Rt LL Squamous cell carcinoma [id number] Stage IIIC (AJCC 8th edition) diagnosed 12/2022  PD-L1 <1%  NGS- [facility] panel- Neg (High TMB 16.7 mut/MB)     Left LL adenocarcinoma [id number] Stage (AJCC 8th edition) diagnosed 12/2022  PD-L1-2%  NGS-KRAS G12C, TMB 2.4      Treatment:  1/20/23- 4 cycles of Pembrolizumab+carboplatin+paclitaxel (pembro on hold ucrrently ofr immune mediated myalgia/arthralgia)       6/5/23- Started XRT  Chest Left Lung Cancer: 5000 cGy in 10nfractions  Chest Right Lung Cancer: 6000 cGy in 30 fraction    Intent of treatment: Palliative    Onc HPI:    One month history of increasing dyspnea with palpitations, propting ER visit 11/19.  11/19/22- CT- 1 cm right lower lobe mass abutting multiple central bronchovascular and mediastinal structures, consistent with malignancy.2.2 cm spiculated nodule in the left lower lobe, also consistent with malignancy.   12/6/22- PET/CT-  Right lower lobe lung mass abutting the pleura (SUV max of 24), measuring 8 x 5.2 cm. There is associated complete obliteration of right lower lobe bronchus and loss of fat planes with  the left atrium. Spiculated left lower lobe solid lung mass (SUV 17.9), measuring 2.2 x 2 cm. There are several markedly hypermetabolic, irregular solid nodules in the right middle lobe abutting the pleura (approximately 10-12), as for example 1.2 cm nodule with SUV max of 16.8 (4/126). There is a separate markedly hypermetabolic 1 cm nodule in the right upper lobe. Mild hypermetabolic uptake is noted within a1 x 0.9 cm right paratracheal node ( SUV max of 4.2) (4/120), Mild uptake is also  noted within a subcentimeter  lower paraesophageal node (4/154).   12/28/22- EBUS with deblking in the right hilar mass (no mediastinal staging performed)- - LUNG, RIGHT MAINSTEM, ENDOBRONCHIAL BIOPSY: Squamous cell carcinoma with extensive necrosis (positive for p40 and negative for TTF-1). Left LL FNAC- Positive for malignancy- Adenocarcinoma  (diffusely positive for TTF-1 while negative for p40)  1/3/23: Brain MRI - Neg  1/10/23: Tumor board discussion: Too extensive for concurrent/sequential chemo XRT- plan for palliative intent chemo-IO  1/20/23- C1 Pembrolizumab+carboplatin+paclitaxel  2/10/23-C2 Pembrolizumab+carboplatin+paclitaxel  2/27/23- CT CAP- Decreased size of the hypoattenuating mass in the central right lower lobe and small irregularly-shaped nodules in the right upper lobe consistent with positive treatment response. There is persistent postobstructive right lower lobe atelectasis.Unchanged heterogeneous lobulated and spiculated solid nodule in the lateral basal left lower lobe. No findings to suggest new extrapulmonary metastatic disease.  3/3, 3/24-  Pembrolizumab+carboplatin+paclitaxel      4/10/23- CT CAP- - some Rx response- Mass distal right lower lobe bronchus causing complete post obstructive atelectasis has decreased from 6.5 x 4.5 x 3.5 cm to 5.5 x 3.8 x 2.4 cm today. Spiculated left lower lobe mass has decreased from 2.1 cm to 1.5 cm today. Right upper lobe nodularity continues to decrease. Ill-defined nodularity in the right middle lobe has developed and is most likely postinflammatory.     6/5/23- After discussion at tumor board, Dr. Escoto agreed for definitive radiation treatment (without chemo, as she already had 4 cycles of chemo) for cancer on the right side and possible SBRT on left side both with definitive intent    6/5/23- Started XRT   Chest Left Lung Cancer: 5000 cGy in 10 fractions  Chest Right Lung Cancer: 6000 cGy in 30 fraction    6/16/23- CT Chest-  Unchanged complete atelectasis of the  heterogeneous right lower lobe due to central obstructing mass status post treatment. No change in the posterolateral left lower lobe nodule with surrounding cicatrization. No findings to suggest progressive   malignancy in the chest.      Interval history:   Yamel is here for follow and discuss results of CT scan.  Currently on XRT to chest on rt side. Done with 10/30 fractions, not yet started XRT to left. HHas 20 more fraction left, taking a break from July 8 through 15 to attend a family reunion in [facility], will resume the rest of the radiation after that.  Tolerating radiation ok, has some fatigue, takes nap.   Remains every active, independent of ADL and IADL  Her pembro on hold currently for immune mediated myalgia/arthralgia, currently remains on 10 mg of prednisone for bilateral calf pain, radiates from the knee to the ankle. The pain is now controlled and tolerable on 10 mg prednisone.   Also taking the bactrim. No leg swelling or rash. No heartburn or reflux. No cough or shortness of breath. No mouth sores.    ECOG PS 1    Current Outpatient Medications   Medication Sig Dispense Refill    acetaminophen (TYLENOL) 500 MG tablet Take 1,000 mg by mouth every 6 hours as needed for mild pain Taking once a day      ADVAIR -21 MCG/ACT inhaler INHALE 2 PUFFS INTO THE LUNGS TWICE DAILY 12 g 11    albuterol (PROAIR HFA/PROVENTIL HFA/VENTOLIN HFA) 108 (90 Base) MCG/ACT inhaler INHALE 2 PUFFS INTO THE LUNGS EVERY 6 HOURS AS NEEDED FOR SHORTNESS OF BREATH OR DIFFICULTY BREATHING OR WHEEZING 8.5 g 1    atorvastatin (LIPITOR) 20 MG tablet TAKE 1 TABLET(20 MG) BY MOUTH DAILY 90 tablet 1    Cholecalciferol (VITAMIN D-3 PO) Take 2,000 Units by mouth daily       clonazePAM (KLONOPIN) 0.5 MG tablet Take 1-2 tablets (0.5-1 mg) by mouth 2 times daily as needed for anxiety (Patient not taking: Reported on 4/26/2023) 120 tablet 1    clonazePAM (KLONOPIN) 0.5 MG tablet Take 0.5-1 tablets (0.25-0.5 mg) by  mouth 2 times daily as needed for anxiety 30 tablet 3    dexamethasone (DECADRON) 4 MG tablet Take 2 tablets (8 mg) by mouth daily Take for 3 days, starting the day after chemo. Take with food. (Patient not taking: Reported on 4/26/2023) 6 tablet 3    FLUoxetine (PROZAC) 20 MG capsule TAKE 2 CAPSULES(40 MG) BY MOUTH DAILY 180 capsule 1    gabapentin (NEURONTIN) 300 MG capsule TAKE ONE CAPSULE BY MOUTH EVERY EVENING AS NEEDED FOR NEUROPATHIC PAIN(FOR PAINFUL TINGLING AND NUMBNESS) 30 capsule 1    ibuprofen (ADVIL,MOTRIN) 200 MG tablet Take 200-600 mg by mouth every 2 hours as needed for mild pain      ipratropium - albuterol 0.5 mg/2.5 mg/3 mL (DUONEB) 0.5-2.5 (3) MG/3ML neb solution Take 1 vial (3 mLs) by nebulization every 6 hours as needed for shortness of breath or wheezing (Patient not taking: Reported on 3/24/2023) 90 mL 0    lidocaine-prilocaine (EMLA) 2.5-2.5 % external cream Apply topically as needed for moderate pain (4-6) Apply 1 hour prior to port access and cover with saran wrap or press and seal. 30 g 1    omeprazole (PRILOSEC) 20 MG DR capsule Take 1 capsule (20 mg) by mouth daily 30-60 minutes before a meal. 90 capsule 2    ondansetron (ZOFRAN) 8 MG tablet Take 1 tablet (8 mg) by mouth every 8 hours as needed for nausea (vomiting) 30 tablet 2    polyethylene glycol (MIRALAX) 17 g packet Take 1 packet by mouth daily Taking PRN (Patient not taking: Reported on 4/12/2023)      predniSONE (DELTASONE) 10 MG tablet Take 1 tablet (10 mg) by mouth daily 30 tablet 0    predniSONE (DELTASONE) 20 MG tablet Take 0.5 tablets (10 mg) by mouth daily 30 tablet 0    prochlorperazine (COMPAZINE) 10 MG tablet Take 0.5 tablets (5 mg) by mouth every 6 hours as needed for nausea or vomiting (Patient not taking: Reported on 4/26/2023) 30 tablet 2    sulfamethoxazole-trimethoprim (BACTRIM DS) 800-160 MG tablet Take 1 tablet by mouth daily While on prednisone 30 tablet 0    tiZANidine (ZANAFLEX) 2 MG tablet TAKE 1 TABLET(2  MG) BY MOUTH THREE TIMES DAILY AS NEEDED FOR MUSCLE SPASMS 90 tablet 3    traMADol (ULTRAM) 50 MG tablet Take 1 tablet (50 mg) by mouth every 6 hours as needed for severe pain (7-10) COVERING FOR PCP ONLY ONCE 120 tablet 1    traZODone (DESYREL) 50 MG tablet TAKE 2 TABLETS(100 MG) BY MOUTH AT BEDTIME 180 tablet 3          Allergies   Allergen Reactions    Nka [No Known Allergies]     Seasonal Allergies        BP (!) 142/80 (BP Location: Right arm, Patient Position: Sitting, Cuff Size: Adult Regular)   Pulse 100   Temp 98.1  F (36.7  C) (Oral)   Resp 16   Wt 73.8 kg (162 lb 11.2 oz)   SpO2 94%   BMI 28.82 kg/m    General: Patient appears well in no acute distress.   Skin: No visualized rash or lesions on visualized skin  Eyes: EOMI, no erythema, sclera icterus or discharge noted  Resp: Appears to be breathing comfortably without accessory muscle usage, speaking in full sentences, no cough  MSK: Appears to have normal range of motion based on visualized movements  Neurologic: No apparent tremors, facial movements symmetric  Psych: affect engaged, pleasant, alert and oriented   not currently breastfeeding.  Wt Readings from Last 4 Encounters:   06/15/23 76.1 kg (167 lb 12.8 oz)   06/08/23 72.1 kg (159 lb)   05/23/23 71.1 kg (156 lb 11.2 oz)   05/08/23 70.3 kg (155 lb)         Impression/plan:     Ms. Yamel Lindsay is a 77 year old  female with PMHx of for HTN, HLD, anxiety, who is here for evaluation/followup of new lung cancer     Synchronous Rt LL Squamous cell carcinoma [id number] Stage IIIC (AJCC 8th edition) diagnosed 12/2022  PD-L1 <1%  NGS- [facility] panel- Neg (High TMB 16.7 mut/MB)     Left LL adenocarcinoma [id number] Stage (AJCC 8th edition) diagnosed 12/2022  PD-L1-2%  NGS-KRAS G12C, TMB 2.4     Pt with 2 synchronous cancers.  The more urgent and threatening one is the right lower lobe squamous cell cancer which appears to be at least stage IIIC given the size greater than 7 cm suggestive of T4 disease.  She  also has mediastinal lymph node that were enlarged and appear pathologic but have not yet been biopsied.  She is also has several FDG avid nodules in the right middle and the right upper lobe that have not yet been biopsied.  She underwent an EBUS for debulking and her mediastinum has not been staged.  Given the extent of involvement of the right sided cancer, concurrent/sequential chemoradiation not feasible per tumor board discussion, also no further EBUS required since high probability of cancer.   She proceeded with palliative intent chemoimmunotherapy with pembro+carbo+taxol.  Of note, squamous has high TMB and low neg PD-L1.  Adenocarcinoma has KRAS G12C.   CT after 4 cycles showing good partial response in the right-sided tumor and small amount of response in the left side adenocarcinoma.  After discussion at tumor board, Dr. Escoto agreed for definitive radiation treatment (without chemo, as she already had 4 cycles of chemo) for cancer on the right side and possible SBRT on left side both with definitive intent.   Started XRT 2 weeks ago, with plan for Chest Left Lung Cancer: 5000 cGy in 10 fractions and Chest Right Lung Cancer: 6000 cGy in 30 fraction. Most recent CT showing stable disease. Will continue to hold Io while on XRT. Will plan to see her in Aug 1 st week. Will repeat another scan in end of august (~1.5 months after finishing XRT). When we meet in August, we will discuss resuming immunotherapy.          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 Has been attributed to immunotherapy. dopplar negative for DVT, No known bone mets on baseline PET/CT.  -CK normal  -has had 2 prednisone tapers, the 2nd was a longer taper. Her pain started to increase the week after stopping prednisone  -she was resumed on prednisone 10 mg without improvement and increased to 20 mg. Also taking NSAIDs and pain is improving  - remains  "on 10 mg daily, plan to continue on it (If pain increases again, she will call and we can increase to 20 mg daily and again slowly taper.  -Ok to stop  bactrim ( prednisone dose is 10 mg or less)  -has tramadol, started on gabapentin 300 mg at bedtime per palliative\"        #Psychiatry: Patient has previously had a history of anxiety.now since the diagnosis, is having panic attacks.  PTA on Prozac, and also clonazepam  -Trazodoen for ?insomnia  -increase cloazepan to BID 0.5 mg     #COVID vaccine: s/p 3 boosters     #Hypertension hyperlipidemia  ON statin        On the day of service,  Preparation time:  5 minutes  Visit time:  30 minutes  Care Coordination:  5 minutes        Chaz Martel M.D.   of Medicine  Division of Hematology, Oncology and Transplantation  [facility]    "

## 2023-06-19 NOTE — NURSING NOTE
"Oncology Rooming Note    June 19, 2023 2:41 PM   Yamel Lindsay is a 78 year old female who presents for:    Chief Complaint   Patient presents with     Oncology Clinic Visit     Right Lower Lobe Lung Mass     Initial Vitals: Wt 73.8 kg (162 lb 11.2 oz)   BMI 28.82 kg/m   Estimated body mass index is 28.82 kg/m  as calculated from the following:    Height as of 5/8/23: 1.6 m (5' 3\").    Weight as of this encounter: 73.8 kg (162 lb 11.2 oz). Body surface area is 1.81 meters squared.  Data Unavailable Comment: Data Unavailable   No LMP recorded. Patient is postmenopausal.  Allergies reviewed: Yes  Medications reviewed: Yes    Medications: Medication refills not needed today.  Pharmacy name entered into [email or web address]:    [facility] [place], [place] - [address] [facility] [place], [place] - [address] [id number] - [place], [place] - [facility] [address] [address] [address] & [facility]    Clinical concerns: Pt presents today for f/u.       Raeann Salmon LPN  6/19/2023              "

## 2023-06-20 ENCOUNTER — APPOINTMENT (OUTPATIENT)
Dept: RADIATION ONCOLOGY | Facility: CLINIC | Age: 78
End: 2023-06-20
Attending: RADIOLOGY
Payer: COMMERCIAL

## 2023-06-20 PROCEDURE — 77014 PR CT GUIDE FOR PLACEMENT RADIATION THERAPY FIELDS: CPT | Mod: 26 | Performed by: RADIOLOGY

## 2023-06-20 PROCEDURE — 77386 HC IMRT TREATMENT DELIVERY, COMPLEX: CPT | Performed by: RADIOLOGY

## 2023-06-21 ENCOUNTER — APPOINTMENT (OUTPATIENT)
Dept: RADIATION ONCOLOGY | Facility: CLINIC | Age: 78
End: 2023-06-21
Attending: RADIOLOGY
Payer: COMMERCIAL

## 2023-06-21 PROCEDURE — 77386 HC IMRT TREATMENT DELIVERY, COMPLEX: CPT | Performed by: RADIOLOGY

## 2023-06-22 ENCOUNTER — APPOINTMENT (OUTPATIENT)
Dept: RADIATION ONCOLOGY | Facility: CLINIC | Age: 78
End: 2023-06-22
Attending: RADIOLOGY
Payer: COMMERCIAL

## 2023-06-22 VITALS
WEIGHT: 159.4 LBS | HEART RATE: 85 BPM | DIASTOLIC BLOOD PRESSURE: 77 MMHG | BODY MASS INDEX: 28.24 KG/M2 | SYSTOLIC BLOOD PRESSURE: 145 MMHG

## 2023-06-22 DIAGNOSIS — C34.31 MALIGNANT NEOPLASM OF LOWER LOBE OF RIGHT LUNG (H): Primary | ICD-10-CM

## 2023-06-22 DIAGNOSIS — C34.32 MALIGNANT NEOPLASM OF LOWER LOBE OF LEFT LUNG (H): ICD-10-CM

## 2023-06-22 PROCEDURE — 77386 HC IMRT TREATMENT DELIVERY, COMPLEX: CPT | Performed by: RADIOLOGY

## 2023-06-22 NOTE — PROGRESS NOTES
WEEKLY MANAGEMENT NOTE  Radiation Oncology            Patient Name: Yamel Lindsay  MRN: [id number]     Chest Right Lung Cancer   Chest Left Lung Cancer  2600 cGy / 6000 cGy   500 cGy/ 5000 cGy   13/30    1/10        DAILY DOSE:     200 cGy/ day,  5 times/week for right lung mass, 500 cGy for left lung mass         DISEASE UNDER TREATMENT:   Right Lung(RLL): [id number], squamous cell cancer, PD-L1<1%, NGS Neg, TMB(16.7, high)  Left Lung (LLL): [id number], adenocarcinoma, PD-L1-2%, NGS-KRAS G12C, TMB 2.4    SYSTEMIC THERAPY: None concurrent    SUBJECTIVE:  Pain score:  0 /10    CTC V5.0 Toxicity Criteria  Fatigue: Grade 1: Fatigue relieved by rest  Skin: Grade 0: No toxicity  Comment:    PULMONARY:  Dyspnea: Grade 0: No toxicity    GI:  Nausea: Grade 0: No toxicity  Mucositis: Grade 0: No toxicity  Comment:        OBJECTIVE:BP (!) 145/77   Pulse 85   Wt 72.3 kg (159 lb 6.4 oz)   BMI 28.24 kg/m     Wt Readings from Last 2 Encounters:   06/22/23 72.3 kg (159 lb 6.4 oz)   06/19/23 73.8 kg (162 lb 11.2 oz)          IMPRESSION: The patient is tolerating the treatment.  The patient set up, dose, and cone beam CT images were reviewed.       PLAN: Continue radiotherapy. The patient will go on a trip on 7/8/2023 for a week. Left lung lesion treatment started today.    PAIN MANAGEMENT PLAN: The patient does not require pain management    MAKI Escoto M.D.  Department of Radiation Oncology  [facility]

## 2023-06-22 NOTE — LETTER
6/22/2023         RE: Yamel Lindsay  [address] [facility] [address]        Dear Colleague,    Thank you for referring your patient, Yamel Lindsay, to the [facility] RADIATION ONCOLOGY. Please see a copy of my visit note below.    WEEKLY MANAGEMENT NOTE  Radiation Oncology            Patient Name: Yamel Lindsay  MRN: [id number]     Chest Right Lung Cancer   Chest Left Lung Cancer  2600 cGy / 6000 cGy   500 cGy/ 5000 cGy   13/30    1/10        DAILY DOSE:     200 cGy/ day,  5 times/week for right lung mass, 500 cGy for left lung mass         DISEASE UNDER TREATMENT:   Right Lung(RLL): [id number], squamous cell cancer, PD-L1<1%, NGS Neg, TMB(16.7, high)  Left Lung (LLL): [id number], adenocarcinoma, PD-L1-2%, NGS-KRAS G12C, TMB 2.4    SYSTEMIC THERAPY: None concurrent    SUBJECTIVE:  Pain score:  0 /10    CTC V5.0 Toxicity Criteria  Fatigue: Grade 1: Fatigue relieved by rest  Skin: Grade 0: No toxicity  Comment:    PULMONARY:  Dyspnea: Grade 0: No toxicity    GI:  Nausea: Grade 0: No toxicity  Mucositis: Grade 0: No toxicity  Comment:        OBJECTIVE:BP (!) 145/77   Pulse 85   Wt 72.3 kg (159 lb 6.4 oz)   BMI 28.24 kg/m     Wt Readings from Last 2 Encounters:   06/22/23 72.3 kg (159 lb 6.4 oz)   06/19/23 73.8 kg (162 lb 11.2 oz)          IMPRESSION: The patient is tolerating the treatment.  The patient set up, dose, and cone beam CT images were reviewed.       PLAN: Continue radiotherapy. The patient will go on a trip on 7/8/2023 for a week. Left lung lesion treatment started today.    PAIN MANAGEMENT PLAN: The patient does not require pain management    MAKI Escoto M.D.  Department of Radiation Oncology  [facility]      Again, thank you for allowing me to participate in the care of your patient.        Sincerely,        Татьяна Escoto MD

## 2023-06-23 ENCOUNTER — APPOINTMENT (OUTPATIENT)
Dept: RADIATION ONCOLOGY | Facility: CLINIC | Age: 78
End: 2023-06-23
Attending: RADIOLOGY
Payer: COMMERCIAL

## 2023-06-23 DIAGNOSIS — F41.9 ANXIETY: ICD-10-CM

## 2023-06-23 PROCEDURE — 77014 PR CT GUIDE FOR PLACEMENT RADIATION THERAPY FIELDS: CPT | Mod: 26 | Performed by: RADIOLOGY

## 2023-06-23 PROCEDURE — 77386 HC IMRT TREATMENT DELIVERY, COMPLEX: CPT | Performed by: RADIOLOGY

## 2023-06-23 RX ORDER — CLONAZEPAM 0.5 MG/1
.5-1 TABLET ORAL 2 TIMES DAILY PRN
Qty: 120 TABLET | Refills: 1 | Status: SHIPPED | OUTPATIENT
Start: 2023-06-23 | End: 2023-08-25

## 2023-06-23 NOTE — TELEPHONE ENCOUNTER
"Clonazepam 0.5mg tab  Last prescribing provider: Dr. Martel on 3/27/23     Last clinic visit date: 6/19/23     Recommendations for requested medication (if none, N/A): 6/19/23, \"Patient has previously had a history of anxiety.now since the diagnosis, is having panic attacks.  PTA on Prozac, and also clonazepam  -Trazodoen for ?insomnia  -increase cloazepan to BID 0.5 mg\"    Any other pertinent information (if none, N/A): NA    Refilled: Y/N, if NO, why?    "
Time-based billing (NON-critical care)

## 2023-06-26 ENCOUNTER — APPOINTMENT (OUTPATIENT)
Dept: RADIATION ONCOLOGY | Facility: CLINIC | Age: 78
End: 2023-06-26
Attending: RADIOLOGY
Payer: COMMERCIAL

## 2023-06-26 PROCEDURE — 77427 RADIATION TX MANAGEMENT X5: CPT | Performed by: RADIOLOGY

## 2023-06-26 PROCEDURE — 77336 RADIATION PHYSICS CONSULT: CPT | Performed by: RADIOLOGY

## 2023-06-26 PROCEDURE — 77386 HC IMRT TREATMENT DELIVERY, COMPLEX: CPT | Performed by: RADIOLOGY

## 2023-06-26 PROCEDURE — 77014 PR CT GUIDE FOR PLACEMENT RADIATION THERAPY FIELDS: CPT | Mod: 26 | Performed by: RADIOLOGY

## 2023-06-27 ENCOUNTER — APPOINTMENT (OUTPATIENT)
Dept: RADIATION ONCOLOGY | Facility: CLINIC | Age: 78
End: 2023-06-27
Attending: RADIOLOGY
Payer: COMMERCIAL

## 2023-06-27 PROCEDURE — 77014 PR CT GUIDE FOR PLACEMENT RADIATION THERAPY FIELDS: CPT | Mod: 26 | Performed by: RADIOLOGY

## 2023-06-27 PROCEDURE — 77386 HC IMRT TREATMENT DELIVERY, COMPLEX: CPT | Performed by: RADIOLOGY

## 2023-06-28 ENCOUNTER — APPOINTMENT (OUTPATIENT)
Dept: RADIATION ONCOLOGY | Facility: CLINIC | Age: 78
End: 2023-06-28
Attending: RADIOLOGY
Payer: COMMERCIAL

## 2023-06-28 PROCEDURE — 77386 HC IMRT TREATMENT DELIVERY, COMPLEX: CPT | Performed by: RADIOLOGY

## 2023-06-28 PROCEDURE — 77014 PR CT GUIDE FOR PLACEMENT RADIATION THERAPY FIELDS: CPT | Mod: 26 | Performed by: RADIOLOGY

## 2023-06-29 ENCOUNTER — APPOINTMENT (OUTPATIENT)
Dept: RADIATION ONCOLOGY | Facility: CLINIC | Age: 78
End: 2023-06-29
Attending: RADIOLOGY
Payer: COMMERCIAL

## 2023-06-29 VITALS
BODY MASS INDEX: 28.43 KG/M2 | WEIGHT: 160.5 LBS | HEART RATE: 79 BPM | SYSTOLIC BLOOD PRESSURE: 119 MMHG | DIASTOLIC BLOOD PRESSURE: 75 MMHG

## 2023-06-29 DIAGNOSIS — C34.31 MALIGNANT NEOPLASM OF LOWER LOBE OF RIGHT LUNG (H): Primary | ICD-10-CM

## 2023-06-29 PROCEDURE — 77386 HC IMRT TREATMENT DELIVERY, COMPLEX: CPT | Performed by: RADIOLOGY

## 2023-06-29 NOTE — LETTER
6/29/2023         RE: Yamel Lindsay  [address] [facility] [address]        Dear Colleague,    Thank you for referring your patient, Yamel Lindsay, to the [facility] RADIATION ONCOLOGY. Please see a copy of my visit note below.    WEEKLY MANAGEMENT NOTE  Radiation Oncology            Patient Name: Yamel Lindsay  MRN: [id number]     Chest Right Lung Cancer   Chest Left Lung Cancer  3600 cGy / 6000 cGy   3000 cGy/ 5000 cGy   18/30    6/10        DAILY DOSE:     200 cGy/ day,  5 times/week for right lung mass, 500 cGy for left lung mass         DISEASE UNDER TREATMENT:   Right Lung(RLL): [id number], squamous cell cancer, PD-L1<1%, NGS Neg, TMB(16.7, high)  Left Lung (LLL): [id number], adenocarcinoma, PD-L1-2%, NGS-KRAS G12C, TMB 2.4    SYSTEMIC THERAPY: None concurrent. Completed Pembrolizumab/Carboplatin/Paclitaxel     SUBJECTIVE: 79 yo woman undergoing XRT for locally advanced with multiple primary NSCLC  Pain score:  0 /10    CTC V5.0 Toxicity Criteria  Fatigue: Grade 1: Fatigue relieved by rest  Skin: Grade 1: Faint erythema or dry desquamation  Comment:    PULMONARY:  Dyspnea: Grade 0: No toxicity    GI:  Nausea: Grade 1: Loss of appetite without alteration in eating habits  Mucositis: Grade 1: Asymptomatic or mild symptoms; intervention not indicated  Comment:        OBJECTIVE:/75   Pulse 79   Wt 72.8 kg (160 lb 8 oz)   BMI 28.43 kg/m     Wt Readings from Last 2 Encounters:   06/29/23 72.8 kg (160 lb 8 oz)   06/22/23 72.3 kg (159 lb 6.4 oz)          IMPRESSION: The patient is tolerating the treatment.  The patient set up, dose, and cone beam CT images were reviewed.       PLAN: Continue radiotherapy. The patient will go on a trip on 7/8/2023 for a week. Left lung lesion treatment started today.    PAIN MANAGEMENT PLAN: The patient does not require pain management    MAKI Escoto M.D.  Department of Radiation Oncology  [facility]

## 2023-06-29 NOTE — PROGRESS NOTES
WEEKLY MANAGEMENT NOTE  Radiation Oncology            Patient Name: Yamel Lindsay  MRN: [id number]     Chest Right Lung Cancer   Chest Left Lung Cancer  3600 cGy / 6000 cGy   3000 cGy/ 5000 cGy   18/30    6/10        DAILY DOSE:     200 cGy/ day,  5 times/week for right lung mass, 500 cGy for left lung mass         DISEASE UNDER TREATMENT:   Right Lung(RLL): pD0A3C3, squamous cell cancer, PD-L1<1%, NGS Neg, TMB(16.7, high)  Left Lung (LLL): cW5cH8V4, adenocarcinoma, PD-L1-2%, NGS-KRAS G12C, TMB 2.4    SYSTEMIC THERAPY: None concurrent. Completed Pembrolizumab/Carboplatin/Paclitaxel     SUBJECTIVE: 79 yo woman undergoing XRT for locally advanced with multiple primary NSCLC  Pain score:  0 /10    CTC V5.0 Toxicity Criteria  Fatigue: Grade 1: Fatigue relieved by rest  Skin: Grade 1: Faint erythema or dry desquamation  Comment:    PULMONARY:  Dyspnea: Grade 0: No toxicity    GI:  Nausea: Grade 1: Loss of appetite without alteration in eating habits  Mucositis: Grade 1: Asymptomatic or mild symptoms; intervention not indicated  Comment:        OBJECTIVE:/75   Pulse 79   Wt 72.8 kg (160 lb 8 oz)   BMI 28.43 kg/m     Wt Readings from Last 2 Encounters:   06/29/23 72.8 kg (160 lb 8 oz)   06/22/23 72.3 kg (159 lb 6.4 oz)          IMPRESSION: The patient is tolerating the treatment.  The patient set up, dose, and cone beam CT images were reviewed.       PLAN: Continue radiotherapy. The patient will go on a trip on 7/8/2023 for a week. Left lung lesion treatment started today.    PAIN MANAGEMENT PLAN: The patient does not require pain management    MAKI Escoto M.D.  Department of Radiation Oncology  [facility]

## 2023-06-30 ENCOUNTER — APPOINTMENT (OUTPATIENT)
Dept: RADIATION ONCOLOGY | Facility: CLINIC | Age: 78
End: 2023-06-30
Attending: RADIOLOGY
Payer: COMMERCIAL

## 2023-06-30 PROCEDURE — 77014 PR CT GUIDE FOR PLACEMENT RADIATION THERAPY FIELDS: CPT | Mod: 26 | Performed by: RADIOLOGY

## 2023-06-30 PROCEDURE — 77427 RADIATION TX MANAGEMENT X5: CPT | Performed by: RADIOLOGY

## 2023-06-30 PROCEDURE — 77386 HC IMRT TREATMENT DELIVERY, COMPLEX: CPT | Performed by: RADIOLOGY

## 2023-07-03 ENCOUNTER — APPOINTMENT (OUTPATIENT)
Dept: RADIATION ONCOLOGY | Facility: CLINIC | Age: 78
End: 2023-07-03
Attending: RADIOLOGY
Payer: COMMERCIAL

## 2023-07-03 PROCEDURE — 77336 RADIATION PHYSICS CONSULT: CPT | Performed by: RADIOLOGY

## 2023-07-03 PROCEDURE — 77386 HC IMRT TREATMENT DELIVERY, COMPLEX: CPT | Performed by: RADIOLOGY

## 2023-07-03 PROCEDURE — 77014 PR CT GUIDE FOR PLACEMENT RADIATION THERAPY FIELDS: CPT | Mod: 26 | Performed by: RADIOLOGY

## 2023-07-05 ENCOUNTER — APPOINTMENT (OUTPATIENT)
Dept: RADIATION ONCOLOGY | Facility: CLINIC | Age: 78
End: 2023-07-05
Attending: RADIOLOGY
Payer: COMMERCIAL

## 2023-07-05 PROCEDURE — 77014 PR CT GUIDE FOR PLACEMENT RADIATION THERAPY FIELDS: CPT | Mod: 26 | Performed by: RADIOLOGY

## 2023-07-05 PROCEDURE — 77386 HC IMRT TREATMENT DELIVERY, COMPLEX: CPT | Performed by: RADIOLOGY

## 2023-07-06 ENCOUNTER — OFFICE VISIT (OUTPATIENT)
Dept: RADIATION ONCOLOGY | Facility: CLINIC | Age: 78
End: 2023-07-06
Attending: RADIOLOGY
Payer: COMMERCIAL

## 2023-07-06 VITALS
SYSTOLIC BLOOD PRESSURE: 122 MMHG | WEIGHT: 159.1 LBS | DIASTOLIC BLOOD PRESSURE: 73 MMHG | BODY MASS INDEX: 28.18 KG/M2 | HEART RATE: 70 BPM

## 2023-07-06 DIAGNOSIS — C34.32 MALIGNANT NEOPLASM OF LOWER LOBE OF LEFT LUNG (H): ICD-10-CM

## 2023-07-06 DIAGNOSIS — C34.31 MALIGNANT NEOPLASM OF LOWER LOBE OF RIGHT LUNG (H): Primary | ICD-10-CM

## 2023-07-06 PROCEDURE — 77386 HC IMRT TREATMENT DELIVERY, COMPLEX: CPT | Performed by: RADIOLOGY

## 2023-07-06 NOTE — LETTER
7/6/2023         RE: Yamel Lindsay  [address] [address]        Dear Colleague,    Thank you for referring your patient, Yamel Lindsay, to the [facility] RADIATION ONCOLOGY. Please see a copy of my visit note below.    WEEKLY MANAGEMENT NOTE  Radiation Oncology            Patient Name: Yamel Lindsay  MRN: [id number]     Chest Right Lung Cancer   Chest Left Lung Cancer  4400 cGy / 6000 cGy   5000 cGy/ 5000 cGy   22/30    10/10        DAILY DOSE:     200 cGy/ day,  5 times/week for right lung mass, 500 cGy for left lung mass             DISEASE UNDER TREATMENT:   Right Lung(RLL): cD9F5G0, squamous cell cancer, PD-L1<1%, NGS Neg, TMB(16.7, high)  Left Lung (LLL): [id number], adenocarcinoma, PD-L1-2%, NGS-KRAS G12C, TMB 2.4    SYSTEMIC THERAPY: None concurrent. Completed Pembrolizumab/Carboplatin/Paclitaxel     SUBJECTIVE: 79 yo woman undergoing XRT for locally advanced with multiple primary NSCLC  Pain score:  0 /10    CTC V5.0 Toxicity Criteria  Fatigue: Grade 1: Fatigue relieved by rest  Skin: Grade 1: Faint erythema or dry desquamation  Comment:    PULMONARY:  Dyspnea: Grade 0: No toxicity    GI:  Nausea: Grade 1: Loss of appetite without alteration in eating habits  Mucositis: Grade 1: Asymptomatic or mild symptoms; intervention not indicated  Comment:        OBJECTIVE:/73   Pulse 70   Wt 72.2 kg (159 lb 1.6 oz)   BMI 28.18 kg/m     Wt Readings from Last 2 Encounters:   07/06/23 72.2 kg (159 lb 1.6 oz)   06/29/23 72.8 kg (160 lb 8 oz)        IMPRESSION: The patient is tolerating the treatment.  The patient set up, dose, and cone beam CT images were reviewed.       PLAN: Continue radiotherapy. The patient will go on a trip on 7/8/2023 for a week for a family reunion. The left lung treatment is completed.    PAIN MANAGEMENT PLAN: The patient does not require pain management    MAKI Escoto M.D.  Department of Radiation Oncology  [facility]

## 2023-07-06 NOTE — PROGRESS NOTES
WEEKLY MANAGEMENT NOTE  Radiation Oncology            Patient Name: Yamel Lindsay  MRN: [id number]     Chest Right Lung Cancer   Chest Left Lung Cancer  4400 cGy / 6000 cGy   5000 cGy/ 5000 cGy   22/30    10/10        DAILY DOSE:     200 cGy/ day,  5 times/week for right lung mass, 500 cGy for left lung mass             DISEASE UNDER TREATMENT:   Right Lung(RLL): [id number], squamous cell cancer, PD-L1<1%, NGS Neg, TMB(16.7, high)  Left Lung (LLL): [id number], adenocarcinoma, PD-L1-2%, NGS-KRAS G12C, TMB 2.4    SYSTEMIC THERAPY: None concurrent. Completed Pembrolizumab/Carboplatin/Paclitaxel     SUBJECTIVE: 77 yo woman undergoing XRT for locally advanced with multiple primary NSCLC  Pain score:  0 /10    CTC V5.0 Toxicity Criteria  Fatigue: Grade 1: Fatigue relieved by rest  Skin: Grade 1: Faint erythema or dry desquamation  Comment:    PULMONARY:  Dyspnea: Grade 0: No toxicity    GI:  Nausea: Grade 1: Loss of appetite without alteration in eating habits  Mucositis: Grade 1: Asymptomatic or mild symptoms; intervention not indicated  Comment:        OBJECTIVE:/73   Pulse 70   Wt 72.2 kg (159 lb 1.6 oz)   BMI 28.18 kg/m     Wt Readings from Last 2 Encounters:   07/06/23 72.2 kg (159 lb 1.6 oz)   06/29/23 72.8 kg (160 lb 8 oz)        IMPRESSION: The patient is tolerating the treatment.  The patient set up, dose, and cone beam CT images were reviewed.       PLAN: Continue radiotherapy. The patient will go on a trip on 7/8/2023 for a week for a family reunion. The left lung treatment is completed.    PAIN MANAGEMENT PLAN: The patient does not require pain management    MAKI Escoto M.D.  Department of Radiation Oncology  [facility]

## 2023-07-07 ENCOUNTER — APPOINTMENT (OUTPATIENT)
Dept: RADIATION ONCOLOGY | Facility: CLINIC | Age: 78
End: 2023-07-07
Attending: RADIOLOGY
Payer: COMMERCIAL

## 2023-07-07 PROCEDURE — 77014 PR CT GUIDE FOR PLACEMENT RADIATION THERAPY FIELDS: CPT | Mod: 26 | Performed by: RADIOLOGY

## 2023-07-07 PROCEDURE — 77386 HC IMRT TREATMENT DELIVERY, COMPLEX: CPT | Performed by: RADIOLOGY

## 2023-07-11 DIAGNOSIS — T45.1X5A CHEMOTHERAPY-INDUCED PERIPHERAL NEUROPATHY (H): ICD-10-CM

## 2023-07-11 DIAGNOSIS — C34.31 MALIGNANT NEOPLASM OF LOWER LOBE OF RIGHT LUNG (H): ICD-10-CM

## 2023-07-11 DIAGNOSIS — G62.0 CHEMOTHERAPY-INDUCED PERIPHERAL NEUROPATHY (H): ICD-10-CM

## 2023-07-11 RX ORDER — GABAPENTIN 300 MG/1
300 CAPSULE ORAL
Qty: 30 CAPSULE | Refills: 0 | Status: SHIPPED | OUTPATIENT
Start: 2023-07-14 | End: 2023-08-11

## 2023-07-11 NOTE — TELEPHONE ENCOUNTER
Received [employer] message from pharmacy requesting refill of Gabapentin.     Last refill: 6/16/2023  Last office visit: 3/8/2023  Scheduled for follow up pending, message sent to scheduling      Will route request to MD for review.     Reviewed [place]  Report.

## 2023-07-17 ENCOUNTER — APPOINTMENT (OUTPATIENT)
Dept: RADIATION ONCOLOGY | Facility: CLINIC | Age: 78
End: 2023-07-17
Attending: RADIOLOGY
Payer: COMMERCIAL

## 2023-07-17 PROCEDURE — 77014 PR CT GUIDE FOR PLACEMENT RADIATION THERAPY FIELDS: CPT | Mod: 26 | Performed by: RADIOLOGY

## 2023-07-17 PROCEDURE — 77386 HC IMRT TREATMENT DELIVERY, COMPLEX: CPT | Performed by: RADIOLOGY

## 2023-07-18 ENCOUNTER — APPOINTMENT (OUTPATIENT)
Dept: RADIATION ONCOLOGY | Facility: CLINIC | Age: 78
End: 2023-07-18
Attending: RADIOLOGY
Payer: COMMERCIAL

## 2023-07-18 PROCEDURE — 77427 RADIATION TX MANAGEMENT X5: CPT | Performed by: RADIOLOGY

## 2023-07-18 PROCEDURE — 77014 PR CT GUIDE FOR PLACEMENT RADIATION THERAPY FIELDS: CPT | Mod: 26 | Performed by: RADIOLOGY

## 2023-07-18 PROCEDURE — 77386 HC IMRT TREATMENT DELIVERY, COMPLEX: CPT | Performed by: RADIOLOGY

## 2023-07-18 PROCEDURE — 77336 RADIATION PHYSICS CONSULT: CPT | Performed by: RADIOLOGY

## 2023-07-19 ENCOUNTER — APPOINTMENT (OUTPATIENT)
Dept: RADIATION ONCOLOGY | Facility: CLINIC | Age: 78
End: 2023-07-19
Attending: RADIOLOGY
Payer: COMMERCIAL

## 2023-07-19 PROCEDURE — 77014 PR CT GUIDE FOR PLACEMENT RADIATION THERAPY FIELDS: CPT | Mod: 26 | Performed by: RADIOLOGY

## 2023-07-19 PROCEDURE — 77386 HC IMRT TREATMENT DELIVERY, COMPLEX: CPT | Performed by: RADIOLOGY

## 2023-07-20 ENCOUNTER — APPOINTMENT (OUTPATIENT)
Dept: RADIATION ONCOLOGY | Facility: CLINIC | Age: 78
End: 2023-07-20
Attending: RADIOLOGY
Payer: COMMERCIAL

## 2023-07-20 VITALS
WEIGHT: 163 LBS | HEART RATE: 100 BPM | BODY MASS INDEX: 28.87 KG/M2 | DIASTOLIC BLOOD PRESSURE: 81 MMHG | SYSTOLIC BLOOD PRESSURE: 145 MMHG

## 2023-07-20 DIAGNOSIS — C34.31 MALIGNANT NEOPLASM OF LOWER LOBE OF RIGHT LUNG (H): Primary | ICD-10-CM

## 2023-07-20 DIAGNOSIS — C34.32 MALIGNANT NEOPLASM OF LOWER LOBE OF LEFT LUNG (H): ICD-10-CM

## 2023-07-20 PROCEDURE — 77386 HC IMRT TREATMENT DELIVERY, COMPLEX: CPT | Performed by: RADIOLOGY

## 2023-07-20 NOTE — LETTER
7/20/2023         RE: Yamel Lindsay  [address] [place] [address]        Dear Colleague,    Thank you for referring your patient, Yamel Lindsay, to the [facility] RADIATION ONCOLOGY. Please see a copy of my visit note below.    WEEKLY MANAGEMENT NOTE  Radiation Oncology            Patient Name: Yamel Lindsay  MRN: [id number]     Chest Right Lung Cancer   Chest Left Lung Cancer  5400 cGy / 6000 cGy   5000 cGy/ 5000 cGy   27/30    10/10        DAILY DOSE:     200 cGy/ day,  5 times/week for right lung mass, 500 cGy for left lung mass             DISEASE UNDER TREATMENT:   Right Lung(RLL): [id number], squamous cell cancer, PD-L1<1%, NGS Neg, TMB(16.7, high)  Left Lung (LLL): [id number], adenocarcinoma, PD-L1-2%, NGS-KRAS G12C, TMB 2.4    SYSTEMIC THERAPY: None concurrent. Completed Pembrolizumab/Carboplatin/Paclitaxel     SUBJECTIVE: 77 yo woman undergoing XRT for locally advanced with multiple primary NSCLC. The patient went on a trip to meet her family reunion on 7/8/2023(after 4600 cGy) for a week and returned to complete radiotherapy. The left lung cancer treatment is completed.    Pain score:  0 /10    CTC V5.0 Toxicity Criteria  Fatigue: Grade 1: Fatigue relieved by rest  Skin: Grade 1: Faint erythema or dry desquamation  Comment:    PULMONARY:  Dyspnea: Grade 0: No toxicity    GI:  Nausea: Grade 1: Loss of appetite without alteration in eating habits  Mucositis: Grade 1: Asymptomatic or mild symptoms; intervention not indicated  Comment:Minimum symptoms    OBJECTIVE:BP (!) 145/81   Pulse 100   Wt 73.9 kg (163 lb)   BMI 28.87 kg/m     Wt Readings from Last 2 Encounters:   07/20/23 73.9 kg (163 lb)   07/06/23 72.2 kg (159 lb 1.6 oz)        IMPRESSION: The patient is tolerating the treatment.  The patient set up, dose, and cone beam CT images were reviewed.       PLAN: Continue radiotherapy. CT scan is scheduled for 8/25/2023. She will be considered for restarting immunotherapy in a follow  up on 8/28/2023 with Dr Martel.    PAIN MANAGEMENT PLAN: The patient does not require pain management    MAKI Escoto M.D.  Department of Radiation Oncology  [facility]      Again, thank you for allowing me to participate in the care of your patient.        Sincerely,        Татьяна Escoto MD

## 2023-07-20 NOTE — PROGRESS NOTES
WEEKLY MANAGEMENT NOTE  Radiation Oncology            Patient Name: Yamel Lindsay  MRN: [id number]     Chest Right Lung Cancer   Chest Left Lung Cancer  5400 cGy / 6000 cGy   5000 cGy/ 5000 cGy   27/30    10/10        DAILY DOSE:     200 cGy/ day,  5 times/week for right lung mass, 500 cGy for left lung mass             DISEASE UNDER TREATMENT:   Right Lung(RLL): [id number], squamous cell cancer, PD-L1<1%, NGS Neg, TMB(16.7, high)  Left Lung (LLL): [id number], adenocarcinoma, PD-L1-2%, NGS-KRAS G12C, TMB 2.4    SYSTEMIC THERAPY: None concurrent. Completed Pembrolizumab/Carboplatin/Paclitaxel     SUBJECTIVE: 79 yo woman undergoing XRT for locally advanced with multiple primary NSCLC. The patient went on a trip to meet her family reunion on 7/8/2023(after 4600 cGy) for a week and returned to complete radiotherapy. The left lung cancer treatment is completed.    Pain score:  0 /10    CTC V5.0 Toxicity Criteria  Fatigue: Grade 1: Fatigue relieved by rest  Skin: Grade 1: Faint erythema or dry desquamation  Comment:    PULMONARY:  Dyspnea: Grade 0: No toxicity    GI:  Nausea: Grade 1: Loss of appetite without alteration in eating habits  Mucositis: Grade 1: Asymptomatic or mild symptoms; intervention not indicated  Comment:Minimum symptoms    OBJECTIVE:BP (!) 145/81   Pulse 100   Wt 73.9 kg (163 lb)   BMI 28.87 kg/m     Wt Readings from Last 2 Encounters:   07/20/23 73.9 kg (163 lb)   07/06/23 72.2 kg (159 lb 1.6 oz)        IMPRESSION: The patient is tolerating the treatment.  The patient set up, dose, and cone beam CT images were reviewed.       PLAN: Continue radiotherapy. CT scan is scheduled for 8/25/2023. She will be considered for restarting immunotherapy in a follow up on 8/28/2023 with Dr Martel.    PAIN MANAGEMENT PLAN: The patient does not require pain management    MAKI Escoto M.D.  Department of Radiation Oncology  [facility]

## 2023-07-21 ENCOUNTER — APPOINTMENT (OUTPATIENT)
Dept: RADIATION ONCOLOGY | Facility: CLINIC | Age: 78
End: 2023-07-21
Attending: RADIOLOGY
Payer: COMMERCIAL

## 2023-07-21 PROCEDURE — 77386 HC IMRT TREATMENT DELIVERY, COMPLEX: CPT | Performed by: RADIOLOGY

## 2023-07-21 PROCEDURE — 77014 PR CT GUIDE FOR PLACEMENT RADIATION THERAPY FIELDS: CPT | Mod: 26 | Performed by: RADIOLOGY

## 2023-07-25 ENCOUNTER — APPOINTMENT (OUTPATIENT)
Dept: RADIATION ONCOLOGY | Facility: CLINIC | Age: 78
End: 2023-07-25
Attending: RADIOLOGY
Payer: COMMERCIAL

## 2023-07-25 PROCEDURE — 77014 PR CT GUIDE FOR PLACEMENT RADIATION THERAPY FIELDS: CPT | Mod: 26 | Performed by: RADIOLOGY

## 2023-07-25 PROCEDURE — 77386 HC IMRT TREATMENT DELIVERY, COMPLEX: CPT | Performed by: RADIOLOGY

## 2023-07-26 ENCOUNTER — APPOINTMENT (OUTPATIENT)
Dept: RADIATION ONCOLOGY | Facility: CLINIC | Age: 78
End: 2023-07-26
Attending: RADIOLOGY
Payer: COMMERCIAL

## 2023-07-26 PROCEDURE — 77336 RADIATION PHYSICS CONSULT: CPT | Performed by: RADIOLOGY

## 2023-07-26 PROCEDURE — 77427 RADIATION TX MANAGEMENT X5: CPT | Performed by: RADIOLOGY

## 2023-07-26 PROCEDURE — 77386 HC IMRT TREATMENT DELIVERY, COMPLEX: CPT | Performed by: RADIOLOGY

## 2023-07-26 PROCEDURE — 77014 PR CT GUIDE FOR PLACEMENT RADIATION THERAPY FIELDS: CPT | Mod: 26 | Performed by: RADIOLOGY

## 2023-07-31 NOTE — PROGRESS NOTES
August 1, 2023    Reason for Visit: f/u lung cancer, joint pain    Diagnosis:   Synchronous Rt LL Squamous cell carcinoma [id number] Stage IIIC (AJCC 8th edition) diagnosed 12/2022  PD-L1 <1%  NGS- [facility] panel- Neg (High TMB 16.7 mut/MB)     Left LL adenocarcinoma [id number] Stage (AJCC 8th edition) diagnosed 12/2022  PD-L1-2%  NGS-KRAS G12C, TMB 2.4    Treatment:  1/20/23- 4 cycles of Pembrolizumab+carboplatin+paclitaxel (pembro on hold ucrrently ofr immune mediated myalgia/arthralgia)     6/5/23- Started XRT  Chest Left Lung Cancer: 5000 cGy in 10nfractions  Chest Right Lung Cancer: 6000 cGy in 30 fraction    Intent of treatment: Palliative    Onc HPI:    One month history of increasing dyspnea with palpitations, propting ER visit 11/19.  11/19/22- CT- 1 cm right lower lobe mass abutting multiple central bronchovascular and mediastinal structures, consistent with malignancy.2.2 cm spiculated nodule in the left lower lobe, also consistent with malignancy.   12/6/22- PET/CT-  Right lower lobe lung mass abutting the pleura (SUV max of 24), measuring 8 x 5.2 cm. There is associated complete obliteration of right lower lobe bronchus and loss of fat planes with  the left atrium. Spiculated left lower lobe solid lung mass (SUV 17.9), measuring 2.2 x 2 cm. There are several markedly hypermetabolic, irregular solid nodules in the right middle lobe abutting the pleura (approximately 10-12), as for example 1.2 cm nodule with SUV max of 16.8 (4/126). There is a separate markedly hypermetabolic 1 cm nodule in the right upper lobe. Mild hypermetabolic uptake is noted within a1 x 0.9 cm right paratracheal node ( SUV max of 4.2) (4/120), Mild uptake is also  noted within a subcentimeter lower paraesophageal node (4/154).   12/28/22- EBUS with deblking in the right hilar mass (no mediastinal staging performed)- - LUNG, RIGHT MAINSTEM, ENDOBRONCHIAL BIOPSY: Squamous cell carcinoma with extensive necrosis (positive for p40 and  negative for TTF-1). Left LL FNAC- Positive for malignancy- Adenocarcinoma  (diffusely positive for TTF-1 while negative for p40)  1/3/23: Brain MRI - Neg  1/10/23: Tumor board discussion: Too extensive for concurrent/sequential chemo XRT- plan for palliative intent chemo-IO  1/20/23- C1 Pembrolizumab+carboplatin+paclitaxel  2/10/23-C2 Pembrolizumab+carboplatin+paclitaxel  2/27/23- CT CAP- Decreased size of the hypoattenuating mass in the central right lower lobe and small irregularly-shaped nodules in the right upper lobe consistent with positive treatment response. There is persistent postobstructive right lower lobe atelectasis.Unchanged heterogeneous lobulated and spiculated solid nodule in the lateral basal left lower lobe. No findings to suggest new extrapulmonary metastatic disease.  3/3, 3/24-  Pembrolizumab+carboplatin+paclitaxel      4/10/23- CT CAP- - some Rx response- Mass distal right lower lobe bronchus causing complete post obstructive atelectasis has decreased from 6.5 x 4.5 x 3.5 cm to 5.5 x 3.8 x 2.4 cm today. Spiculated left lower lobe mass has decreased from 2.1 cm to 1.5 cm today. Right upper lobe nodularity continues to decrease. Ill-defined nodularity in the right middle lobe has developed and is most likely postinflammatory.     6/5/23- After discussion at tumor board, Dr. Escoto agreed for definitive radiation treatment (without chemo, as she already had 4 cycles of chemo) for cancer on the right side and possible SBRT on left side both with definitive intent    6/5/23- 7/26/23   Chest Left Lung Cancer: 5000 cGy in 10 fractions  Chest Right Lung Cancer: 6000 cGy in 30 fraction    6/16/23- CT Chest-  Unchanged complete atelectasis of the heterogeneous right lower lobe due to central obstructing mass status post treatment. No change in the posterolateral left lower lobe nodule with surrounding cicatrization. No findings to suggest progressive   malignancy in the chest.    Interval history:    Yamel is here for follow up. She completed radiation last Wednesday. Main SE was fatigue and feeling more blah. She denies chest pain and shortness of breath. She is eating and drinking well. Remains on 10 mg prednisone with stable arthralgais. She has been feeling more down with the fatigue 2/2 radiation/cancer treatments.    ECOG PS 1    Current Outpatient Medications   Medication Sig Dispense Refill    acetaminophen (TYLENOL) 500 MG tablet Take 1,000 mg by mouth every 6 hours as needed for mild pain Taking once a day      ADVAIR -21 MCG/ACT inhaler INHALE 2 PUFFS INTO THE LUNGS TWICE DAILY 12 g 11    albuterol (PROAIR HFA/PROVENTIL HFA/VENTOLIN HFA) 108 (90 Base) MCG/ACT inhaler INHALE 2 PUFFS INTO THE LUNGS EVERY 6 HOURS AS NEEDED FOR SHORTNESS OF BREATH OR DIFFICULTY BREATHING OR WHEEZING 8.5 g 1    atorvastatin (LIPITOR) 20 MG tablet TAKE 1 TABLET(20 MG) BY MOUTH DAILY 90 tablet 1    Cholecalciferol (VITAMIN D-3 PO) Take 2,000 Units by mouth daily       clonazePAM (KLONOPIN) 0.5 MG tablet Take 1-2 tablets (0.5-1 mg) by mouth 2 times daily as needed for anxiety 120 tablet 1    clonazePAM (KLONOPIN) 0.5 MG tablet Take 0.5-1 tablets (0.25-0.5 mg) by mouth 2 times daily as needed for anxiety 30 tablet 3    FLUoxetine (PROZAC) 20 MG capsule TAKE 2 CAPSULES(40 MG) BY MOUTH DAILY 180 capsule 1    gabapentin (NEURONTIN) 300 MG capsule Take 1 capsule (300 mg) by mouth every evening as needed for neuropathic pain (. Last refill until you are seen.) 30 capsule 0    ibuprofen (ADVIL,MOTRIN) 200 MG tablet Take 200-600 mg by mouth every 2 hours as needed for mild pain      ipratropium - albuterol 0.5 mg/2.5 mg/3 mL (DUONEB) 0.5-2.5 (3) MG/3ML neb solution Take 1 vial (3 mLs) by nebulization every 6 hours as needed for shortness of breath or wheezing 90 mL 0    lidocaine-prilocaine (EMLA) 2.5-2.5 % external cream Apply topically as needed for moderate pain (4-6) Apply 1 hour prior to port access and cover with saran  wrap or press and seal. 30 g 1    omeprazole (PRILOSEC) 20 MG DR capsule TAKE 1 CAPSULE(20 MG) BY MOUTH DAILY 30 TO 60 MINUTES BEFORE A MEAL 90 capsule 2    ondansetron (ZOFRAN) 8 MG tablet Take 1 tablet (8 mg) by mouth every 8 hours as needed for nausea (vomiting) 30 tablet 2    polyethylene glycol (MIRALAX) 17 g packet Take 1 packet by mouth daily Taking PRN      predniSONE (DELTASONE) 10 MG tablet Take 1 tablet (10 mg) by mouth daily 30 tablet 1    prochlorperazine (COMPAZINE) 10 MG tablet Take 0.5 tablets (5 mg) by mouth every 6 hours as needed for nausea or vomiting (Patient not taking: Reported on 4/26/2023) 30 tablet 2    tiZANidine (ZANAFLEX) 2 MG tablet TAKE 1 TABLET(2 MG) BY MOUTH THREE TIMES DAILY AS NEEDED FOR MUSCLE SPASMS 90 tablet 3    traMADol (ULTRAM) 50 MG tablet Take 1 tablet (50 mg) by mouth every 6 hours as needed for severe pain (7-10) COVERING FOR PCP ONLY ONCE 120 tablet 1    traZODone (DESYREL) 50 MG tablet TAKE 2 TABLETS(100 MG) BY MOUTH AT BEDTIME 180 tablet 3          Allergies   Allergen Reactions    Nka [No Known Allergies]     Seasonal Allergies        /81 (BP Location: Left arm, Patient Position: Sitting, Cuff Size: Adult Regular)   Pulse 88   Temp 97.9  F (36.6  C) (Oral)   Resp 16   Wt 74.3 kg (163 lb 12.8 oz)   SpO2 95%   BMI 29.02 kg/m    General: Patient appears well in no acute distress.   Skin: No visualized rash or lesions on visualized skin  Eyes: EOMI, no erythema, sclera icterus or discharge noted  Resp: Appears to be breathing comfortably without accessory muscle usage, speaking in full sentences, no cough  MSK: Appears to have normal range of motion based on visualized movements  Neurologic: No apparent tremors, facial movements symmetric  Psych: affect engaged, pleasant, alert and oriented      Impression/plan:     Ms. Yamel Lindsay is a 78 year old  female with PMHx of for HTN, HLD, anxiety, who is here for evaluation/followup of lung cancer     #Synchronous  Rt LL Squamous cell carcinoma [id number] Stage IIIC (AJCC 8th edition) diagnosed 12/2022  PD-L1 <1%  NGS- [facility] panel- Neg (High TMB 16.7 mut/MB)     #Left LL adenocarcinoma [id number] Stage (AJCC 8th edition) diagnosed 12/2022  PD-L1-2%  NGS-KRAS G12C, TMB 2.4     Pt with 2 synchronous cancers.  The more urgent and threatening one is the right lower lobe squamous cell cancer which appears to be at least stage IIIC given the size greater than 7 cm suggestive of T4 disease.  She also has mediastinal lymph node that were enlarged and appear pathologic but have not yet been biopsied.  She is also has several FDG avid nodules in the right middle and the right upper lobe that have not yet been biopsied.  She underwent an EBUS for debulking and her mediastinum has not been staged.  Given the extent of involvement of the right sided cancer, concurrent/sequential chemoradiation not feasible per tumor board discussion, also no further EBUS required since high probability of cancer.     She proceeded with palliative intent chemoimmunotherapy with pembro+carbo+taxol.  Of note, squamous has high TMB and low neg PD-L1.  Adenocarcinoma has KRAS G12C.   CT after 4 cycles showing good partial response in the right-sided tumor and small amount of response in the left side adenocarcinoma.  After discussion at tumor board, Dr. Escoto agreed for definitive radiation treatment (without chemo, as she already had 4 cycles of chemo) for cancer on the right side and possible SBRT on left side both with definitive intent.     As of last week Yamel has completed radiation to Left Lung Cancer: 5000 cGy in 10 fractions and radiation to Right Lung Cancer: 6000 cGy in 30 fraction. Will repeat scan in end of august (~1.5 months after finishing XRT) and discuss resuming immunotherapy.    Plan  Imaging and RTC with Dr. Martel as scheduled. Defer scheduling keytruda infusion until after his visit.     Myositis/arthritis with hx of elevated CRP and ESR. Has  been attributed to immunotherapy. dopplar negative for DVT, No known bone mets on baseline PET/CT.  -CK normal  -has had 2 prednisone tapers, the 2nd was a longer taper. Her pain started to increase the week after stopping prednisone  -she was resumed on prednisone 10 mg without improvement and increased to 20 mg. Also taking NSAIDs and pain is improving  - remains on 10 mg daily  -has tramadol, started on gabapentin 300 mg at bedtime per palliative     #Psychiatry: Patient has previously had a history of anxiety/panic attacks.   - PTA on Prozac 40 mg. Feeling more depressed over the last couple months, I told her this could be reactive in response to RT side effects but we could trial an increase to 60 mg. She has follow up with her PCP end of the month and I asked her to discuss future dosing with her which she understands.   -Trazodoen for ?insomnia  -prn cloazepan BID 0.5 mg       40 minutes spent on the date of the encounter doing chart review, review of outside records, review of test results, interpretation of tests, patient visit, documentation, and discussion with other provider(s)       Marianne Juarez CNP on 8/1/2023 at 1:37 PM

## 2023-08-01 ENCOUNTER — ONCOLOGY VISIT (OUTPATIENT)
Dept: ONCOLOGY | Facility: CLINIC | Age: 78
End: 2023-08-01
Attending: NURSE PRACTITIONER
Payer: COMMERCIAL

## 2023-08-01 VITALS
RESPIRATION RATE: 16 BRPM | BODY MASS INDEX: 29.02 KG/M2 | WEIGHT: 163.8 LBS | SYSTOLIC BLOOD PRESSURE: 130 MMHG | DIASTOLIC BLOOD PRESSURE: 81 MMHG | OXYGEN SATURATION: 95 % | TEMPERATURE: 97.9 F | HEART RATE: 88 BPM

## 2023-08-01 DIAGNOSIS — C34.32 MALIGNANT NEOPLASM OF LOWER LOBE OF LEFT LUNG (H): Primary | ICD-10-CM

## 2023-08-01 DIAGNOSIS — K21.9 GASTROESOPHAGEAL REFLUX DISEASE WITHOUT ESOPHAGITIS: ICD-10-CM

## 2023-08-01 DIAGNOSIS — F41.9 ANXIETY: ICD-10-CM

## 2023-08-01 DIAGNOSIS — C34.31 MALIGNANT NEOPLASM OF LOWER LOBE OF RIGHT LUNG (H): ICD-10-CM

## 2023-08-01 DIAGNOSIS — F32.A DEPRESSION, UNSPECIFIED DEPRESSION TYPE: ICD-10-CM

## 2023-08-01 PROCEDURE — G0463 HOSPITAL OUTPT CLINIC VISIT: HCPCS | Performed by: NURSE PRACTITIONER

## 2023-08-01 PROCEDURE — 99215 OFFICE O/P EST HI 40 MIN: CPT | Performed by: NURSE PRACTITIONER

## 2023-08-01 RX ORDER — HYDROCHLOROTHIAZIDE 25 MG/1
25 TABLET ORAL DAILY
COMMUNITY
Start: 2023-07-30 | End: 2023-10-24

## 2023-08-01 ASSESSMENT — PAIN SCALES - GENERAL: PAINLEVEL: NO PAIN (0)

## 2023-08-01 NOTE — NURSING NOTE
"Oncology Rooming Note    August 1, 2023 1:06 PM   Yamel Lindsay is a 78 year old female who presents for:    Chief Complaint   Patient presents with    Oncology Clinic Visit     Malignant Neoplasm of Lower Lobe of Right Lung     Initial Vitals: /81 (BP Location: Left arm, Patient Position: Sitting, Cuff Size: Adult Regular)   Pulse 88   Temp 97.9  F (36.6  C) (Oral)   Resp 16   Wt 74.3 kg (163 lb 12.8 oz)   SpO2 95%   BMI 29.02 kg/m   Estimated body mass index is 29.02 kg/m  as calculated from the following:    Height as of 5/8/23: 1.6 m (5' 3\").    Weight as of this encounter: 74.3 kg (163 lb 12.8 oz). Body surface area is 1.82 meters squared.  No Pain (0) Comment: Data Unavailable   No LMP recorded. Patient is postmenopausal.  Allergies reviewed: Yes  Medications reviewed: Yes    Medications: MEDICATION REFILLS NEEDED TODAY. Provider was notified.  Pharmacy name entered into [facility]:    [facility] [place], [place] - [address] [facility] [place], [place] - [address] [id number] - [place], [place] - [address] [place] [address] E AT [place] [address] [address]    Clinical concerns: Pt has concerns today with increased sx's of depression.  She is requesting an increase in dosage of Prozac.  Marianne Juarez was notified.      Raeann Salmon LPN  8/1/2023                "

## 2023-08-01 NOTE — Clinical Note
8/1/2023         RE: Yamel Lindsay  [address] [facility] [address]        Dear Colleague,    Thank you for referring your patient, Yamel Lindsay, to the [facility]. Please see a copy of my visit note below.    August 1, 2023    Reason for Visit: f/u lung cancer, joint pain    Diagnosis:   Synchronous Rt LL Squamous cell carcinoma [id number] Stage IIIC (AJCC 8th edition) diagnosed 12/2022  PD-L1 <1%  NGS- [facility] panel- Neg (High TMB 16.7 mut/MB)     Left LL adenocarcinoma [id number] Stage (AJCC 8th edition) diagnosed 12/2022  PD-L1-2%  NGS-KRAS G12C, TMB 2.4    Treatment:  1/20/23- 4 cycles of Pembrolizumab+carboplatin+paclitaxel (pembro on hold ucrrently ofr immune mediated myalgia/arthralgia)     6/5/23- Started XRT  Chest Left Lung Cancer: 5000 cGy in 10nfractions  Chest Right Lung Cancer: 6000 cGy in 30 fraction    Intent of treatment: Palliative    Onc HPI:    One month history of increasing dyspnea with palpitations, propting ER visit 11/19.  11/19/22- CT- 1 cm right lower lobe mass abutting multiple central bronchovascular and mediastinal structures, consistent with malignancy.2.2 cm spiculated nodule in the left lower lobe, also consistent with malignancy.   12/6/22- PET/CT-  Right lower lobe lung mass abutting the pleura (SUV max of 24), measuring 8 x 5.2 cm. There is associated complete obliteration of right lower lobe bronchus and loss of fat planes with  the left atrium. Spiculated left lower lobe solid lung mass (SUV 17.9), measuring 2.2 x 2 cm. There are several markedly hypermetabolic, irregular solid nodules in the right middle lobe abutting the pleura (approximately 10-12), as for example 1.2 cm nodule with SUV max of 16.8 (4/126). There is a separate markedly hypermetabolic 1 cm nodule in the right upper lobe. Mild hypermetabolic uptake is noted within a1 x 0.9 cm right paratracheal node ( SUV max of 4.2) (4/120), Mild uptake is also  noted within a subcentimeter  lower paraesophageal node (4/154).   12/28/22- EBUS with deblking in the right hilar mass (no mediastinal staging performed)- - LUNG, RIGHT MAINSTEM, ENDOBRONCHIAL BIOPSY: Squamous cell carcinoma with extensive necrosis (positive for p40 and negative for TTF-1). Left LL FNAC- Positive for malignancy- Adenocarcinoma  (diffusely positive for TTF-1 while negative for p40)  1/3/23: Brain MRI - Neg  1/10/23: Tumor board discussion: Too extensive for concurrent/sequential chemo XRT- plan for palliative intent chemo-IO  1/20/23- C1 Pembrolizumab+carboplatin+paclitaxel  2/10/23-C2 Pembrolizumab+carboplatin+paclitaxel  2/27/23- CT CAP- Decreased size of the hypoattenuating mass in the central right lower lobe and small irregularly-shaped nodules in the right upper lobe consistent with positive treatment response. There is persistent postobstructive right lower lobe atelectasis.Unchanged heterogeneous lobulated and spiculated solid nodule in the lateral basal left lower lobe. No findings to suggest new extrapulmonary metastatic disease.  3/3, 3/24-  Pembrolizumab+carboplatin+paclitaxel      4/10/23- CT CAP- - some Rx response- Mass distal right lower lobe bronchus causing complete post obstructive atelectasis has decreased from 6.5 x 4.5 x 3.5 cm to 5.5 x 3.8 x 2.4 cm today. Spiculated left lower lobe mass has decreased from 2.1 cm to 1.5 cm today. Right upper lobe nodularity continues to decrease. Ill-defined nodularity in the right middle lobe has developed and is most likely postinflammatory.     6/5/23- After discussion at tumor board, Dr. Escoto agreed for definitive radiation treatment (without chemo, as she already had 4 cycles of chemo) for cancer on the right side and possible SBRT on left side both with definitive intent    6/5/23- 7/26/23   Chest Left Lung Cancer: 5000 cGy in 10 fractions  Chest Right Lung Cancer: 6000 cGy in 30 fraction    6/16/23- CT Chest-  Unchanged complete atelectasis of the  heterogeneous right lower lobe due to central obstructing mass status post treatment. No change in the posterolateral left lower lobe nodule with surrounding cicatrization. No findings to suggest progressive   malignancy in the chest.    Interval history:   Yamel is here for follow up. She completed radiation last Wednesday. Main SE was fatigue and feeling more blah. She denies chest pain and shortness of breath. She is eating and drinking well. Remains on 10 mg prednisone with stable arthralgais. She has been feeling more down with the fatigue 2/2 radiation/cancer treatments.    ECOG PS 1    Current Outpatient Medications   Medication Sig Dispense Refill    acetaminophen (TYLENOL) 500 MG tablet Take 1,000 mg by mouth every 6 hours as needed for mild pain Taking once a day      ADVAIR -21 MCG/ACT inhaler INHALE 2 PUFFS INTO THE LUNGS TWICE DAILY 12 g 11    albuterol (PROAIR HFA/PROVENTIL HFA/VENTOLIN HFA) 108 (90 Base) MCG/ACT inhaler INHALE 2 PUFFS INTO THE LUNGS EVERY 6 HOURS AS NEEDED FOR SHORTNESS OF BREATH OR DIFFICULTY BREATHING OR WHEEZING 8.5 g 1    atorvastatin (LIPITOR) 20 MG tablet TAKE 1 TABLET(20 MG) BY MOUTH DAILY 90 tablet 1    Cholecalciferol (VITAMIN D-3 PO) Take 2,000 Units by mouth daily       clonazePAM (KLONOPIN) 0.5 MG tablet Take 1-2 tablets (0.5-1 mg) by mouth 2 times daily as needed for anxiety 120 tablet 1    clonazePAM (KLONOPIN) 0.5 MG tablet Take 0.5-1 tablets (0.25-0.5 mg) by mouth 2 times daily as needed for anxiety 30 tablet 3    FLUoxetine (PROZAC) 20 MG capsule TAKE 2 CAPSULES(40 MG) BY MOUTH DAILY 180 capsule 1    gabapentin (NEURONTIN) 300 MG capsule Take 1 capsule (300 mg) by mouth every evening as needed for neuropathic pain (. Last refill until you are seen.) 30 capsule 0    ibuprofen (ADVIL,MOTRIN) 200 MG tablet Take 200-600 mg by mouth every 2 hours as needed for mild pain      ipratropium - albuterol 0.5 mg/2.5 mg/3 mL (DUONEB) 0.5-2.5 (3) MG/3ML neb solution Take 1  vial (3 mLs) by nebulization every 6 hours as needed for shortness of breath or wheezing 90 mL 0    lidocaine-prilocaine (EMLA) 2.5-2.5 % external cream Apply topically as needed for moderate pain (4-6) Apply 1 hour prior to port access and cover with saran wrap or press and seal. 30 g 1    omeprazole (PRILOSEC) 20 MG DR capsule TAKE 1 CAPSULE(20 MG) BY MOUTH DAILY 30 TO 60 MINUTES BEFORE A MEAL 90 capsule 2    ondansetron (ZOFRAN) 8 MG tablet Take 1 tablet (8 mg) by mouth every 8 hours as needed for nausea (vomiting) 30 tablet 2    polyethylene glycol (MIRALAX) 17 g packet Take 1 packet by mouth daily Taking PRN      predniSONE (DELTASONE) 10 MG tablet Take 1 tablet (10 mg) by mouth daily 30 tablet 1    prochlorperazine (COMPAZINE) 10 MG tablet Take 0.5 tablets (5 mg) by mouth every 6 hours as needed for nausea or vomiting (Patient not taking: Reported on 4/26/2023) 30 tablet 2    tiZANidine (ZANAFLEX) 2 MG tablet TAKE 1 TABLET(2 MG) BY MOUTH THREE TIMES DAILY AS NEEDED FOR MUSCLE SPASMS 90 tablet 3    traMADol (ULTRAM) 50 MG tablet Take 1 tablet (50 mg) by mouth every 6 hours as needed for severe pain (7-10) COVERING FOR PCP ONLY ONCE 120 tablet 1    traZODone (DESYREL) 50 MG tablet TAKE 2 TABLETS(100 MG) BY MOUTH AT BEDTIME 180 tablet 3          Allergies   Allergen Reactions    Nka [No Known Allergies]     Seasonal Allergies        /81 (BP Location: Left arm, Patient Position: Sitting, Cuff Size: Adult Regular)   Pulse 88   Temp 97.9  F (36.6  C) (Oral)   Resp 16   Wt 74.3 kg (163 lb 12.8 oz)   SpO2 95%   BMI 29.02 kg/m    General: Patient appears well in no acute distress.   Skin: No visualized rash or lesions on visualized skin  Eyes: EOMI, no erythema, sclera icterus or discharge noted  Resp: Appears to be breathing comfortably without accessory muscle usage, speaking in full sentences, no cough  MSK: Appears to have normal range of motion based on visualized movements  Neurologic: No apparent  tremors, facial movements symmetric  Psych: affect engaged, pleasant, alert and oriented      Impression/plan:     Ms. Yamel Lindsay is a 78 year old  female with PMHx of for HTN, HLD, anxiety, who is here for evaluation/followup of lung cancer     #Synchronous Rt LL Squamous cell carcinoma [id number] Stage IIIC (AJCC 8th edition) diagnosed 12/2022  PD-L1 <1%  NGS- [facility] panel- Neg (High TMB 16.7 mut/MB)     #Left LL adenocarcinoma [id number] Stage (AJCC 8th edition) diagnosed 12/2022  PD-L1-2%  NGS-KRAS G12C, TMB 2.4     Pt with 2 synchronous cancers.  The more urgent and threatening one is the right lower lobe squamous cell cancer which appears to be at least stage IIIC given the size greater than 7 cm suggestive of T4 disease.  She also has mediastinal lymph node that were enlarged and appear pathologic but have not yet been biopsied.  She is also has several FDG avid nodules in the right middle and the right upper lobe that have not yet been biopsied.  She underwent an EBUS for debulking and her mediastinum has not been staged.  Given the extent of involvement of the right sided cancer, concurrent/sequential chemoradiation not feasible per tumor board discussion, also no further EBUS required since high probability of cancer.     She proceeded with palliative intent chemoimmunotherapy with pembro+carbo+taxol.  Of note, squamous has high TMB and low neg PD-L1.  Adenocarcinoma has KRAS G12C.   CT after 4 cycles showing good partial response in the right-sided tumor and small amount of response in the left side adenocarcinoma.  After discussion at tumor board, Dr. Escoto agreed for definitive radiation treatment (without chemo, as she already had 4 cycles of chemo) for cancer on the right side and possible SBRT on left side both with definitive intent.     As of last week Yamel has completed radiation to Left Lung Cancer: 5000 cGy in 10 fractions and radiation to Right Lung Cancer: 6000 cGy in 30 fraction. Will repeat scan  in end of august (~1.5 months after finishing XRT) and discuss resuming immunotherapy.    Plan  Imaging and RTC with Dr. Martel as scheduled. Defer scheduling keytruda infusion until after his visit.     Myositis/arthritis with hx of elevated CRP and ESR. Has been attributed to immunotherapy. dopplar negative for DVT, No known bone mets on baseline PET/CT.  -CK normal  -has had 2 prednisone tapers, the 2nd was a longer taper. Her pain started to increase the week after stopping prednisone  -she was resumed on prednisone 10 mg without improvement and increased to 20 mg. Also taking NSAIDs and pain is improving  - remains on 10 mg daily  -has tramadol, started on gabapentin 300 mg at bedtime per palliative     #Psychiatry: Patient has previously had a history of anxiety/panic attacks.   - PTA on Prozac 40 mg. Feeling more depressed over the last couple months, I told her this could be reactive in response to RT side effects but we could trial an increase to 60 mg. She has follow up with her PCP end of the month and I asked her to discuss future dosing with her which she understands.   -Trazodoen for ?insomnia  -prn cloazepan BID 0.5 mg       40 minutes spent on the date of the encounter doing chart review, review of outside records, review of test results, interpretation of tests, patient visit, documentation, and discussion with other provider(s)       Marianne Juarez CNP on 8/1/2023 at 1:37 PM        Again, thank you for allowing me to participate in the care of your patient.        Sincerely,        Marianne Juarez CNP

## 2023-08-03 ENCOUNTER — ONCOLOGY VISIT (OUTPATIENT)
Dept: RADIATION ONCOLOGY | Facility: CLINIC | Age: 78
End: 2023-08-03
Payer: COMMERCIAL

## 2023-08-03 NOTE — LETTER
8/3/2023         RE: Yamel Lindsay  [address] [facility] [address]        Dear Colleague,    Thank you for referring your patient, Yamel Lindsay, to the [facility]. Please see a copy of my visit note below.    No notes on file    Again, thank you for allowing me to participate in the care of your patient.        Sincerely,        Татьяна Escoto MD

## 2023-08-04 NOTE — PROCEDURES
Radiotherapy Treatment Summary  Date of Report: August  3, 2023     PATIENT: OSCAR CRAIG  MEDICAL RECORD NO: [id number]  : 1945     DIAGNOSIS: C34.31 Malignant neoplasm of lower lobe, right bronchus or lung     INTENT OF RADIOTHERAPY: Cure     PATHOLOGY:  RLL squamous cell carcinoma, LLL adenocarcinoma,                                STAGE:    RLL squamous cell carcinoma: [id number]. LLL adenocarcinoma: [id number].     Treatment Summary:  Radiation Oncology - Course: 1 Protocol:   Treatment Site Current Dose Modality From To Elapsed Days Fx.  1 Right Lower Lobe  6,000 cGy 06 X  6/06/2023  2023  50 30  2 Left Lower Lobe  5,000 cGy 06 X  6/22/2023  2023  14 10     Total Dose(cGy):  Right Lun cGy  Left Lun cGy  Dose per fraction(cGy):  Right Lun cGy  Left Lun cGy     COMMENTS: Ms. Craig is a 78 yo female with synchronous LLL adenocarcinoma, [id number] and   right lung squamous cell carcinoma, [id number].  The patient initially presented with dyspnea in   2022 and CXR revealed a left lung base consolidation measuring approximately 2.4 cm.   Chest CT (22) showed 11cm RLL mass abutting multiple central bronchovascular and   mediastinal structures, a 2.2cm spiculated nodule in the LLL. PET/CT (22) showed markedly   hypermetabolic RLL mass 8 x 5.2cm (SUV max 24) with multiple hypermetabolic RML and RUL   nodules concerning for intrapulmonary metastases. There was also a spiculated hypermetabolic LLL   mass 2.2 x 2cm (SUV max 17.9) thought to be a synchronous lung cancer. There was a prominent   mildly hypermetabolic right paratracheal node (SUV max 4.2) measuring 1 x 0.9 cm and mildly avid,   subcentimeter lower paraesophageal node.  Follow-up chest CT on 2022 showed   redemonstration of LLL spiculated pulmonary nodule and right lower hemithorax pleural based mass   abutting the distal esophagus, bronchus intermedius and right lower lobe bronchus as well as  probable   involvement of the interlobar pulmonary artery and RLL pulmonary artery. New nodularity was also   noted in the RUL anterolaterally.  EBUS of the right lower lobe on 12/20/2022 showed squamous cell   carcinoma with extensive necrosis.  An FNA of the left lower lobe showed adenocarcinoma.  Brain   MRI in 1/3/2023 was negative for intracranial metastatic disease.  The patient's case was presented at   multidisciplinary tumor board on 1/10/2023 and the disease was deemed to be too extensive for   concurrent chemoradiotherapy.  She instead received palliative intent   pembrolizumab/carboplatin/paclitaxel.  The patient met with Dr. Escoto on 5/23/2023, who   recommended that the patient be treated with radiotherapy consisting of 60 Gray in 30 fractions to the   right lower lobe and 50 Gray in 10 fractions to the left lower lobe.  The patient tolerated the initiation   of treatment and she was able to complete all sessions of radiotherapy without any unplanned breaks in   treatment.  The patient experienced the anticipated side effects of radiotherapy as listed below.     CTC V5.0 Toxicity Criteria  Fatigue: Grade 1: Fatigue relieved by rest  Skin: Grade 1: Faint erythema or dry desquamation     GI:  Nausea: Grade 1: Loss of appetite without alteration in eating habits  Mucositis: Grade 1: Asymptomatic or mild symptoms; intervention not indicated     FOLLOW UP PLAN:   CT scan is scheduled for 8/25/2023. She will be considered for restarting immunotherapy in a follow   up on 8/28/2023 with Dr Martel.     Resident Physician: Jose Lazaro MD  Staff Physician : MAKI Escoto M.D.         CC: Chaz Martel MD              Radiation Oncology:  [facility] [address] [place] [address]

## 2023-08-11 DIAGNOSIS — G62.0 CHEMOTHERAPY-INDUCED PERIPHERAL NEUROPATHY (H): ICD-10-CM

## 2023-08-11 DIAGNOSIS — T45.1X5A CHEMOTHERAPY-INDUCED PERIPHERAL NEUROPATHY (H): ICD-10-CM

## 2023-08-11 DIAGNOSIS — C34.31 MALIGNANT NEOPLASM OF LOWER LOBE OF RIGHT LUNG (H): ICD-10-CM

## 2023-08-11 RX ORDER — GABAPENTIN 300 MG/1
CAPSULE ORAL
Qty: 30 CAPSULE | Refills: 0 | Status: SHIPPED | OUTPATIENT
Start: 2023-08-11 | End: 2023-09-11

## 2023-08-11 NOTE — TELEPHONE ENCOUNTER
Received electronic communication from pharmacy requesting a refill of gabapentin.    Last refill: 7/13/23  Last office visit: 3/8/23  Scheduled for follow up pending, msg sent to scheduling.     Will route request to MD for review.     Reviewed [place]  Report.

## 2023-08-16 DIAGNOSIS — F41.9 ANXIETY: ICD-10-CM

## 2023-08-25 ENCOUNTER — HOSPITAL ENCOUNTER (OUTPATIENT)
Dept: CT IMAGING | Facility: HOSPITAL | Age: 78
Discharge: HOME | End: 2023-08-25
Attending: STUDENT IN AN ORGANIZED HEALTH CARE EDUCATION/TRAINING PROGRAM | Admitting: STUDENT IN AN ORGANIZED HEALTH CARE EDUCATION/TRAINING PROGRAM
Payer: COMMERCIAL

## 2023-08-25 DIAGNOSIS — C34.31 MALIGNANT NEOPLASM OF LOWER LOBE OF RIGHT LUNG (H): ICD-10-CM

## 2023-08-25 DIAGNOSIS — F41.9 ANXIETY: ICD-10-CM

## 2023-08-25 LAB
CREAT BLD-MCNC: 0.7 MG/DL (ref 0.6–1.1)
GFR SERPL CREATININE-BSD FRML MDRD: >60 ML/MIN/1.73M2

## 2023-08-25 PROCEDURE — 250N000011 HC RX IP 250 OP 636: Mod: JZ | Performed by: STUDENT IN AN ORGANIZED HEALTH CARE EDUCATION/TRAINING PROGRAM

## 2023-08-25 PROCEDURE — 82565 ASSAY OF CREATININE: CPT

## 2023-08-25 PROCEDURE — 71260 CT THORAX DX C+: CPT

## 2023-08-25 RX ORDER — IOPAMIDOL 755 MG/ML
90 INJECTION, SOLUTION INTRAVASCULAR ONCE
Status: COMPLETED | OUTPATIENT
Start: 2023-08-25 | End: 2023-08-25

## 2023-08-25 RX ADMIN — IOPAMIDOL 90 ML: 755 INJECTION, SOLUTION INTRAVENOUS at 13:22

## 2023-08-25 NOTE — TELEPHONE ENCOUNTER
"Clonazepam 0.5mg tab  Last prescribing provider: Dr. Martel on 6/23/23    Last clinic visit date: 8/1/23 w/ Marianne Juarez    Recommendations for requested medication (if none, N/A): 8/1/23, \"#Psychiatry: Patient has previously had a history of anxiety/panic attacks.   - PTA on Prozac 40 mg. Feeling more depressed over the last couple months, I told her this could be reactive in response to RT side effects but we could trial an increase to 60 mg. She has follow up with her PCP end of the month and I asked her to discuss future dosing with her which she understands.   -Trazodoen for ?insomnia  -prn cloazepan BID 0.5 mg\"    Any other pertinent information (if none, N/A): Marianne is out of office. Routing to Dr. Martel.     Refilled: Y/N, if NO, why?    "

## 2023-08-27 RX ORDER — CLONAZEPAM 0.5 MG/1
.5-1 TABLET ORAL 2 TIMES DAILY PRN
Qty: 120 TABLET | Refills: 1 | Status: SHIPPED | OUTPATIENT
Start: 2023-08-27 | End: 2023-10-31

## 2023-08-28 ENCOUNTER — ONCOLOGY VISIT (OUTPATIENT)
Dept: ONCOLOGY | Facility: CLINIC | Age: 78
End: 2023-08-28
Attending: STUDENT IN AN ORGANIZED HEALTH CARE EDUCATION/TRAINING PROGRAM
Payer: COMMERCIAL

## 2023-08-28 VITALS
WEIGHT: 166.8 LBS | TEMPERATURE: 98.1 F | SYSTOLIC BLOOD PRESSURE: 131 MMHG | DIASTOLIC BLOOD PRESSURE: 80 MMHG | BODY MASS INDEX: 29.55 KG/M2 | OXYGEN SATURATION: 95 % | HEART RATE: 83 BPM

## 2023-08-28 DIAGNOSIS — C34.31 MALIGNANT NEOPLASM OF LOWER LOBE OF RIGHT LUNG (H): ICD-10-CM

## 2023-08-28 DIAGNOSIS — M79.662 PAIN OF LEFT LOWER LEG: ICD-10-CM

## 2023-08-28 PROCEDURE — G0463 HOSPITAL OUTPT CLINIC VISIT: HCPCS | Performed by: STUDENT IN AN ORGANIZED HEALTH CARE EDUCATION/TRAINING PROGRAM

## 2023-08-28 PROCEDURE — 99215 OFFICE O/P EST HI 40 MIN: CPT | Performed by: STUDENT IN AN ORGANIZED HEALTH CARE EDUCATION/TRAINING PROGRAM

## 2023-08-28 RX ORDER — PREDNISONE 10 MG/1
10 TABLET ORAL DAILY
Qty: 60 TABLET | Refills: 1 | Status: SHIPPED | OUTPATIENT
Start: 2023-08-28 | End: 2024-08-07

## 2023-08-28 ASSESSMENT — PAIN SCALES - GENERAL: PAINLEVEL: NO PAIN (0)

## 2023-08-28 NOTE — LETTER
8/28/2023         RE: Yamel Lindsay  [address] [facility] [address]        Dear Colleague,    Thank you for referring your patient, Yamel Lindsay, to the [facility]. Please see a copy of my visit note below.    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 Stage IIIC (AJCC 8th edition) diagnosed 12/2022  PD-L1 <1%  NGS- [facility] panel- Neg (High TMB 16.7 mut/MB)     Left LL adenocarcinoma [id number] Stage (AJCC 8th edition) diagnosed 12/2022  PD-L1-2%  NGS-KRAS G12C, TMB 2.4      Treatment:  1/20/23- 4 cycles of Pembrolizumab+carboplatin+paclitaxel (pembro on hold ucrrently ofr immune mediated myalgia/arthralgia since 3/2023)       XRT-   Right Lower Lobe      6,000 cGy    in 30 fraction     6/06/2023 7/26/2023          Left Lower Lobe        5,000 cGy   in 10 fraction     6/22/2023 7/06/2023            Intent of treatment: Palliative    Onc HPI:    One month history of increasing dyspnea with palpitations, propting ER visit 11/19.  11/19/22- CT- 1 cm right lower lobe mass abutting multiple central bronchovascular and mediastinal structures, consistent with malignancy.2.2 cm spiculated nodule in the left lower lobe, also consistent with malignancy.   12/6/22- PET/CT-  Right lower lobe lung mass abutting the pleura (SUV max of 24), measuring 8 x 5.2 cm. There is associated complete obliteration of right lower lobe bronchus and loss of fat planes with  the left atrium. Spiculated left lower lobe solid lung mass (SUV 17.9), measuring 2.2 x 2 cm. There are several markedly hypermetabolic, irregular solid nodules in the right middle lobe abutting the pleura (approximately 10-12), as for example 1.2 cm nodule with SUV max of 16.8 (4/126). There is a separate markedly hypermetabolic 1 cm nodule in the right upper lobe. Mild hypermetabolic uptake is noted within a1 x 0.9 cm right paratracheal node (  SUV max of 4.2) (4/120), Mild uptake is also  noted within a subcentimeter lower paraesophageal node (4/154).   12/28/22- EBUS with deblking in the right hilar mass (no mediastinal staging performed)- - LUNG, RIGHT MAINSTEM, ENDOBRONCHIAL BIOPSY: Squamous cell carcinoma with extensive necrosis (positive for p40 and negative for TTF-1). Left LL FNAC- Positive for malignancy- Adenocarcinoma  (diffusely positive for TTF-1 while negative for p40)  1/3/23: Brain MRI - Neg  1/10/23: Tumor board discussion: Too extensive for concurrent/sequential chemo XRT- plan for palliative intent chemo-IO  1/20/23- C1 Pembrolizumab+carboplatin+paclitaxel  2/10/23-C2 Pembrolizumab+carboplatin+paclitaxel  2/27/23- CT CAP- Decreased size of the hypoattenuating mass in the central right lower lobe and small irregularly-shaped nodules in the right upper lobe consistent with positive treatment response. There is persistent postobstructive right lower lobe atelectasis.Unchanged heterogeneous lobulated and spiculated solid nodule in the lateral basal left lower lobe. No findings to suggest new extrapulmonary metastatic disease.  3/3, 3/24-  Pembrolizumab+carboplatin+paclitaxel      4/10/23- CT CAP- - some Rx response- Mass distal right lower lobe bronchus causing complete post obstructive atelectasis has decreased from 6.5 x 4.5 x 3.5 cm to 5.5 x 3.8 x 2.4 cm today. Spiculated left lower lobe mass has decreased from 2.1 cm to 1.5 cm today. Right upper lobe nodularity continues to decrease. Ill-defined nodularity in the right middle lobe has developed and is most likely postinflammatory.     6/5/23- After discussion at tumor board, Dr. Escoto agreed for definitive radiation treatment (without chemo, as she already had 4 cycles of chemo) for cancer on the right side and possible SBRT on left side both with definitive intent    6/5/23- Started XRT     Right Lower Lobe      6,000 cGy    in 30 fraction     6/06/2023 7/26/2023           Left Lower Lobe        5,000 cGy   in 10 fraction     6/22/2023 7/06/2023 6/16/23- CT Chest-  Unchanged complete atelectasis of the heterogeneous right lower lobe due to central obstructing mass status post treatment. No change in the posterolateral left lower lobe nodule with surrounding cicatrization. No findings to suggest progressive   malignancy in the chest.    8/25/23- CT Chest- Stable right lower lobe lung mass with distal atelectasis. Lobulated nodule in the left lower lobe is also stable. No disease progression in the chest. No metastatic disease in the abdomen and pelvis.       Interval history:   Yamel is here for follow and discuss results of CT scan.  She finished XRT to rt chest in last month. She took a break from XRT from July 8 through 15 to attend a family reunion in [facility]. Tolerated radiation ok, has some fatigue,   She had a lot of fun at the trip and she was glad that she did it.  Remains active, independent of ADL and IADL  Her pembro on hold currently for immune mediated myalgia/arthralgia, currently remains on 10 mg of prednisone for bilateral calf pain, radiates from the knee to the ankle. The pain is now controlled and tolerable on 10 mg prednisone.She ankita be dependent on low dose prednisone. Darci ran out of prdnisone for last few days, and has noticed some flare of leg pain.   No leg swelling or rash. No heartburn or reflux. No cough or shortness of breath. No mouth sores.    ECOG PS 1    Current Outpatient Medications   Medication Sig Dispense Refill    predniSONE (DELTASONE) 10 MG tablet Take 1 tablet (10 mg) by mouth daily 60 tablet 1    acetaminophen (TYLENOL) 500 MG tablet Take 1,000 mg by mouth every 6 hours as needed for mild pain Taking once a day      ADVAIR -21 MCG/ACT inhaler INHALE 2 PUFFS INTO THE LUNGS TWICE DAILY 12 g 11    albuterol (PROAIR HFA/PROVENTIL HFA/VENTOLIN HFA) 108 (90 Base) MCG/ACT inhaler INHALE 2 PUFFS INTO THE LUNGS  EVERY 6 HOURS AS NEEDED FOR SHORTNESS OF BREATH OR DIFFICULTY BREATHING OR WHEEZING 8.5 g 1    atorvastatin (LIPITOR) 20 MG tablet TAKE 1 TABLET(20 MG) BY MOUTH DAILY 90 tablet 1    Cholecalciferol (VITAMIN D-3 PO) Take 2,000 Units by mouth daily       clonazePAM (KLONOPIN) 0.5 MG tablet Take 1-2 tablets (0.5-1 mg) by mouth 2 times daily as needed for anxiety 120 tablet 1    clonazePAM (KLONOPIN) 0.5 MG tablet Take 0.5-1 tablets (0.25-0.5 mg) by mouth 2 times daily as needed for anxiety 30 tablet 3    FLUoxetine (PROZAC) 20 MG capsule Take 3 capsules (60 mg) by mouth daily 180 capsule 1    gabapentin (NEURONTIN) 300 MG capsule TAKE 1 CAPSULE(300 MG) BY MOUTH EVERY EVENING AS NEEDED FOR NERVE PAIN 30 capsule 0    hydrochlorothiazide (HYDRODIURIL) 25 MG tablet Take 25 mg by mouth daily      ibuprofen (ADVIL,MOTRIN) 200 MG tablet Take 200-600 mg by mouth every 2 hours as needed for mild pain      ipratropium - albuterol 0.5 mg/2.5 mg/3 mL (DUONEB) 0.5-2.5 (3) MG/3ML neb solution Take 1 vial (3 mLs) by nebulization every 6 hours as needed for shortness of breath or wheezing 90 mL 0    lidocaine-prilocaine (EMLA) 2.5-2.5 % external cream Apply topically as needed for moderate pain (4-6) Apply 1 hour prior to port access and cover with saran wrap or press and seal. 30 g 1    omeprazole (PRILOSEC) 20 MG DR capsule TAKE 1 CAPSULE(20 MG) BY MOUTH DAILY 30 TO 60 MINUTES BEFORE A MEAL 90 capsule 2    ondansetron (ZOFRAN) 8 MG tablet Take 1 tablet (8 mg) by mouth every 8 hours as needed for nausea (vomiting) 30 tablet 2    polyethylene glycol (MIRALAX) 17 g packet Take 1 packet by mouth daily Taking PRN      prochlorperazine (COMPAZINE) 10 MG tablet Take 0.5 tablets (5 mg) by mouth every 6 hours as needed for nausea or vomiting (Patient not taking: Reported on 4/26/2023) 30 tablet 2    tiZANidine (ZANAFLEX) 2 MG tablet TAKE 1 TABLET(2 MG) BY MOUTH THREE TIMES DAILY AS NEEDED FOR MUSCLE SPASMS 90 tablet 3    traZODone (DESYREL)  50 MG tablet TAKE 2 TABLETS(100 MG) BY MOUTH AT BEDTIME 180 tablet 3          Allergies   Allergen Reactions    Nka [No Known Allergies]     Seasonal Allergies        /80 (BP Location: Right arm, Patient Position: Sitting, Cuff Size: Adult Regular)   Pulse 83   Temp 98.1  F (36.7  C) (Oral)   Wt 75.7 kg (166 lb 12.8 oz)   SpO2 95%   BMI 29.55 kg/m    General: Patient appears well in no acute distress.   Skin: No visualized rash or lesions on visualized skin  Eyes: EOMI, no erythema, sclera icterus or discharge noted  Resp: Appears to be breathing comfortably without accessory muscle usage, speaking in full sentences, no cough  MSK: Appears to have normal range of motion based on visualized movements  Neurologic: No apparent tremors, facial movements symmetric  Psych: affect engaged, pleasant, alert and oriented   Blood pressure 131/80, pulse 83, temperature 98.1  F (36.7  C), temperature source Oral, weight 75.7 kg (166 lb 12.8 oz), SpO2 95 %, not currently breastfeeding.  Wt Readings from Last 4 Encounters:   08/28/23 75.7 kg (166 lb 12.8 oz)   08/01/23 74.3 kg (163 lb 12.8 oz)   07/20/23 73.9 kg (163 lb)   07/06/23 72.2 kg (159 lb 1.6 oz)         Impression/plan:     Ms. Yamel Lindsay is a 77 year old  female with PMHx of for HTN, HLD, anxiety, who is here for evaluation/followup of new lung cancer     Synchronous Rt LL Squamous cell carcinoma [id number] Stage IIIC (AJCC 8th edition) diagnosed 12/2022  PD-L1 <1%  NGS- [facility] panel- Neg (High TMB 16.7 mut/MB)     Left LL adenocarcinoma [id number] Stage (AJCC 8th edition) diagnosed 12/2022  PD-L1-2%  NGS-KRAS G12C, TMB 2.4     Pt with 2 synchronous cancers at diagnosis..  The more  threatening one was the right lower lobe squamous cell cancer which appears to be at least stage IIIC given the size greater than 7 cm suggestive of T4 disease.  She also has mediastinal lymph node that were enlarged and appear pathologic but were not biopsied.  She underwent an EBUS  for debulking and her mediastinum was not been staged.  She is also has several FDG avid nodules in the right middle and the right upper lobe that have not yet been biopsied.  Given the extent of involvement of the right sided cancer, concurrent/sequential chemoradiation not feasible per tumor board discussion, also no further EBUS required since high probability of cancer.   She proceeded with palliative intent chemoimmunotherapy with pembro+carbo+taxol.  Of note, squamous has high TMB and low neg PD-L1.  Adenocarcinoma has KRAS G12C.   CT after 4 cycles showing good partial response in the right-sided tumor and small amount of response in the left side adenocarcinoma.  After discussion at tumor board, Dr. Escoto did definitive radiation treatment  on cancer on the right side and SBRT on left side both with definitive intent.   CT now in 8/2023 showing overall stable disease.   Discussed pros and cons of resuming or withholding keytruda.  We decided to continue holding immunotherapy for now, given she had severe arthritis/myositis and seems to be now dependent on 10 mg prednisone and has better QoL off of the drug. We could resume pembro in the future when she has symptomtaic/ radiographic progression.          Plan  CT in 2 months   RTC with me after CT         Myositis/arthritis with hx of elevated CRP and ESR. Has been attributed to immunotherapy. dopplar negative for DVT, No known bone mets on baseline PET/CT.  -CK normal  -has had 2 prednisone tapers, the 2nd was a longer taper. Her pain started to increase the week after stopping prednisone  -she was resumed on prednisone 10 mg without improvement and increased to 20 mg.   - remains on 10 mg daily, plan to continue on it (If pain increases again, she will call and we can increase to 20 mg daily and again slowly taper.  - now is likley steroid dpendent (low dose prednisone), ok to continue it (refilled today), can cosnider withdrawing it in near future  -has  "tramadol, started on gabapentin 300 mg at bedtime per palliative\"        #Psychiatry: Patient has previously had a history of anxiety.now since the diagnosis, is having panic attacks.  PTA on Prozac, and also clonazepam  -Trazodoen for ?insomnia  -increase cloazepan to BID 0.5 mg     #COVID vaccine: s/p 3 boosters     #Hypertension hyperlipidemia  ON statin        On the day of service,  Preparation time:  5 minutes  Visit time:  30 minutes  Care Coordination:  5 minutes        Chaz Martel M.D.   of Medicine  Division of Hematology, Oncology and Transplantation  [facility]    "

## 2023-08-28 NOTE — NURSING NOTE
"Oncology Rooming Note    August 28, 2023 3:01 PM   Yamel Lindsay is a 78 year old female who presents for:    Chief Complaint   Patient presents with    Oncology Clinic Visit     Right lower lobe lung mass     Initial Vitals: /80 (BP Location: Right arm, Patient Position: Sitting, Cuff Size: Adult Regular)   Pulse 83   Temp 98.1  F (36.7  C) (Oral)   Wt 75.7 kg (166 lb 12.8 oz)   SpO2 95%   BMI 29.55 kg/m   Estimated body mass index is 29.55 kg/m  as calculated from the following:    Height as of 5/8/23: 1.6 m (5' 3\").    Weight as of this encounter: 75.7 kg (166 lb 12.8 oz). Body surface area is 1.83 meters squared.  No Pain (0) Comment: Data Unavailable   No LMP recorded. Patient is postmenopausal.  Allergies reviewed: Yes  Medications reviewed: Yes    Medications: Prednisone & arthritis medication  Pharmacy name entered into [employer]:    [facility] [place], [place] - [address] [facility] [place], [place] - [address] [id number] - [place], [place] - [address] [place] [address] [address]    Clinical concerns: none      Maci Zacarias, EMT  8/28/2023              "

## 2023-08-28 NOTE — PROGRESS NOTES
8/28/23    Reason for Visit: f/u lung cancer, joint pain      Diagnosis:   Synchronous Rt LL Squamous cell carcinoma [id number] Stage IIIC (AJCC 8th edition) diagnosed 12/2022  PD-L1 <1%  NGS- [facility] panel- Neg (High TMB 16.7 mut/MB)     Left LL adenocarcinoma [id number] Stage (AJCC 8th edition) diagnosed 12/2022  PD-L1-2%  NGS-KRAS G12C, TMB 2.4      Treatment:  1/20/23- 4 cycles of Pembrolizumab+carboplatin+paclitaxel (pembro on hold ucrrently ofr immune mediated myalgia/arthralgia since 3/2023)       XRT-   Right Lower Lobe      6,000 cGy    in 30 fraction     6/06/2023 7/26/2023          Left Lower Lobe        5,000 cGy   in 10 fraction     6/22/2023 7/06/2023            Intent of treatment: Palliative    Onc HPI:    One month history of increasing dyspnea with palpitations, propting ER visit 11/19.  11/19/22- CT- 1 cm right lower lobe mass abutting multiple central bronchovascular and mediastinal structures, consistent with malignancy.2.2 cm spiculated nodule in the left lower lobe, also consistent with malignancy.   12/6/22- PET/CT-  Right lower lobe lung mass abutting the pleura (SUV max of 24), measuring 8 x 5.2 cm. There is associated complete obliteration of right lower lobe bronchus and loss of fat planes with  the left atrium. Spiculated left lower lobe solid lung mass (SUV 17.9), measuring 2.2 x 2 cm. There are several markedly hypermetabolic, irregular solid nodules in the right middle lobe abutting the pleura (approximately 10-12), as for example 1.2 cm nodule with SUV max of 16.8 (4/126). There is a separate markedly hypermetabolic 1 cm nodule in the right upper lobe. Mild hypermetabolic uptake is noted within a1 x 0.9 cm right paratracheal node ( SUV max of 4.2) (4/120), Mild uptake is also  noted within a subcentimeter lower paraesophageal node (4/154).   12/28/22- EBUS with deblking in the right hilar mass (no mediastinal staging performed)- - LUNG, RIGHT MAINSTEM, ENDOBRONCHIAL  BIOPSY: Squamous cell carcinoma with extensive necrosis (positive for p40 and negative for TTF-1). Left LL FNAC- Positive for malignancy- Adenocarcinoma  (diffusely positive for TTF-1 while negative for p40)  1/3/23: Brain MRI - Neg  1/10/23: Tumor board discussion: Too extensive for concurrent/sequential chemo XRT- plan for palliative intent chemo-IO  1/20/23- C1 Pembrolizumab+carboplatin+paclitaxel  2/10/23-C2 Pembrolizumab+carboplatin+paclitaxel  2/27/23- CT CAP- Decreased size of the hypoattenuating mass in the central right lower lobe and small irregularly-shaped nodules in the right upper lobe consistent with positive treatment response. There is persistent postobstructive right lower lobe atelectasis.Unchanged heterogeneous lobulated and spiculated solid nodule in the lateral basal left lower lobe. No findings to suggest new extrapulmonary metastatic disease.  3/3, 3/24-  Pembrolizumab+carboplatin+paclitaxel      4/10/23- CT CAP- - some Rx response- Mass distal right lower lobe bronchus causing complete post obstructive atelectasis has decreased from 6.5 x 4.5 x 3.5 cm to 5.5 x 3.8 x 2.4 cm today. Spiculated left lower lobe mass has decreased from 2.1 cm to 1.5 cm today. Right upper lobe nodularity continues to decrease. Ill-defined nodularity in the right middle lobe has developed and is most likely postinflammatory.     6/5/23- After discussion at tumor board, Dr. Escoto agreed for definitive radiation treatment (without chemo, as she already had 4 cycles of chemo) for cancer on the right side and possible SBRT on left side both with definitive intent    6/5/23- Started XRT     Right Lower Lobe      6,000 cGy    in 30 fraction     6/06/2023 7/26/2023          Left Lower Lobe        5,000 cGy   in 10 fraction     6/22/2023 7/06/2023 6/16/23- CT Chest-  Unchanged complete atelectasis of the heterogeneous right lower lobe due to central obstructing mass status post treatment. No change in the  posterolateral left lower lobe nodule with surrounding cicatrization. No findings to suggest progressive   malignancy in the chest.    8/25/23- CT Chest- Stable right lower lobe lung mass with distal atelectasis. Lobulated nodule in the left lower lobe is also stable. No disease progression in the chest. No metastatic disease in the abdomen and pelvis.       Interval history:   Yamel is here for follow and discuss results of CT scan.  She finished XRT to rt chest in last month. She took a break from XRT from July 8 through 15 to attend a family reunion in [facility]. Tolerated radiation ok, has some fatigue,   She had a lot of fun at the trip and she was glad that she did it.  Remains active, independent of ADL and IADL  Her pembro on hold currently for immune mediated myalgia/arthralgia, currently remains on 10 mg of prednisone for bilateral calf pain, radiates from the knee to the ankle. The pain is now controlled and tolerable on 10 mg prednisone.She ankita be dependent on low dose prednisone. Darci ran out of prdnisone for last few days, and has noticed some flare of leg pain.   No leg swelling or rash. No heartburn or reflux. No cough or shortness of breath. No mouth sores.    ECOG PS 1    Current Outpatient Medications   Medication Sig Dispense Refill    predniSONE (DELTASONE) 10 MG tablet Take 1 tablet (10 mg) by mouth daily 60 tablet 1    acetaminophen (TYLENOL) 500 MG tablet Take 1,000 mg by mouth every 6 hours as needed for mild pain Taking once a day      ADVAIR -21 MCG/ACT inhaler INHALE 2 PUFFS INTO THE LUNGS TWICE DAILY 12 g 11    albuterol (PROAIR HFA/PROVENTIL HFA/VENTOLIN HFA) 108 (90 Base) MCG/ACT inhaler INHALE 2 PUFFS INTO THE LUNGS EVERY 6 HOURS AS NEEDED FOR SHORTNESS OF BREATH OR DIFFICULTY BREATHING OR WHEEZING 8.5 g 1    atorvastatin (LIPITOR) 20 MG tablet TAKE 1 TABLET(20 MG) BY MOUTH DAILY 90 tablet 1    Cholecalciferol (VITAMIN D-3 PO) Take 2,000 Units by mouth daily        clonazePAM (KLONOPIN) 0.5 MG tablet Take 1-2 tablets (0.5-1 mg) by mouth 2 times daily as needed for anxiety 120 tablet 1    clonazePAM (KLONOPIN) 0.5 MG tablet Take 0.5-1 tablets (0.25-0.5 mg) by mouth 2 times daily as needed for anxiety 30 tablet 3    FLUoxetine (PROZAC) 20 MG capsule Take 3 capsules (60 mg) by mouth daily 180 capsule 1    gabapentin (NEURONTIN) 300 MG capsule TAKE 1 CAPSULE(300 MG) BY MOUTH EVERY EVENING AS NEEDED FOR NERVE PAIN 30 capsule 0    hydrochlorothiazide (HYDRODIURIL) 25 MG tablet Take 25 mg by mouth daily      ibuprofen (ADVIL,MOTRIN) 200 MG tablet Take 200-600 mg by mouth every 2 hours as needed for mild pain      ipratropium - albuterol 0.5 mg/2.5 mg/3 mL (DUONEB) 0.5-2.5 (3) MG/3ML neb solution Take 1 vial (3 mLs) by nebulization every 6 hours as needed for shortness of breath or wheezing 90 mL 0    lidocaine-prilocaine (EMLA) 2.5-2.5 % external cream Apply topically as needed for moderate pain (4-6) Apply 1 hour prior to port access and cover with saran wrap or press and seal. 30 g 1    omeprazole (PRILOSEC) 20 MG DR capsule TAKE 1 CAPSULE(20 MG) BY MOUTH DAILY 30 TO 60 MINUTES BEFORE A MEAL 90 capsule 2    ondansetron (ZOFRAN) 8 MG tablet Take 1 tablet (8 mg) by mouth every 8 hours as needed for nausea (vomiting) 30 tablet 2    polyethylene glycol (MIRALAX) 17 g packet Take 1 packet by mouth daily Taking PRN      prochlorperazine (COMPAZINE) 10 MG tablet Take 0.5 tablets (5 mg) by mouth every 6 hours as needed for nausea or vomiting (Patient not taking: Reported on 4/26/2023) 30 tablet 2    tiZANidine (ZANAFLEX) 2 MG tablet TAKE 1 TABLET(2 MG) BY MOUTH THREE TIMES DAILY AS NEEDED FOR MUSCLE SPASMS 90 tablet 3    traZODone (DESYREL) 50 MG tablet TAKE 2 TABLETS(100 MG) BY MOUTH AT BEDTIME 180 tablet 3          Allergies   Allergen Reactions    Nka [No Known Allergies]     Seasonal Allergies        /80 (BP Location: Right arm, Patient Position: Sitting, Cuff Size: Adult  Regular)   Pulse 83   Temp 98.1  F (36.7  C) (Oral)   Wt 75.7 kg (166 lb 12.8 oz)   SpO2 95%   BMI 29.55 kg/m    General: Patient appears well in no acute distress.   Skin: No visualized rash or lesions on visualized skin  Eyes: EOMI, no erythema, sclera icterus or discharge noted  Resp: Appears to be breathing comfortably without accessory muscle usage, speaking in full sentences, no cough  MSK: Appears to have normal range of motion based on visualized movements  Neurologic: No apparent tremors, facial movements symmetric  Psych: affect engaged, pleasant, alert and oriented   Blood pressure 131/80, pulse 83, temperature 98.1  F (36.7  C), temperature source Oral, weight 75.7 kg (166 lb 12.8 oz), SpO2 95 %, not currently breastfeeding.  Wt Readings from Last 4 Encounters:   08/28/23 75.7 kg (166 lb 12.8 oz)   08/01/23 74.3 kg (163 lb 12.8 oz)   07/20/23 73.9 kg (163 lb)   07/06/23 72.2 kg (159 lb 1.6 oz)         Impression/plan:     Ms. Yamel Lindsay is a 77 year old  female with PMHx of for HTN, HLD, anxiety, who is here for evaluation/followup of new lung cancer     Synchronous Rt LL Squamous cell carcinoma [id number] Stage IIIC (AJCC 8th edition) diagnosed 12/2022  PD-L1 <1%  NGS- [facility] panel- Neg (High TMB 16.7 mut/MB)     Left LL adenocarcinoma [id number] Stage (AJCC 8th edition) diagnosed 12/2022  PD-L1-2%  NGS-KRAS G12C, TMB 2.4     Pt with 2 synchronous cancers at diagnosis..  The more  threatening one was the right lower lobe squamous cell cancer which appears to be at least stage IIIC given the size greater than 7 cm suggestive of T4 disease.  She also has mediastinal lymph node that were enlarged and appear pathologic but were not biopsied.  She underwent an EBUS for debulking and her mediastinum was not been staged.  She is also has several FDG avid nodules in the right middle and the right upper lobe that have not yet been biopsied.  Given the extent of involvement of the right sided cancer,  "concurrent/sequential chemoradiation not feasible per tumor board discussion, also no further EBUS required since high probability of cancer.   She proceeded with palliative intent chemoimmunotherapy with pembro+carbo+taxol.  Of note, squamous has high TMB and low neg PD-L1.  Adenocarcinoma has KRAS G12C.   CT after 4 cycles showing good partial response in the right-sided tumor and small amount of response in the left side adenocarcinoma.  After discussion at tumor board, Dr. Escoto did definitive radiation treatment  on cancer on the right side and SBRT on left side both with definitive intent.   CT now in 8/2023 showing overall stable disease.   Discussed pros and cons of resuming or withholding keytruda.  We decided to continue holding immunotherapy for now, given she had severe arthritis/myositis and seems to be now dependent on 10 mg prednisone and has better QoL off of the drug. We could resume pembro in the future when she has symptomtaic/ radiographic progression.          Plan  CT in 2 months   RTC with me after CT         Myositis/arthritis with hx of elevated CRP and ESR. Has been attributed to immunotherapy. dopplar negative for DVT, No known bone mets on baseline PET/CT.  -CK normal  -has had 2 prednisone tapers, the 2nd was a longer taper. Her pain started to increase the week after stopping prednisone  -she was resumed on prednisone 10 mg without improvement and increased to 20 mg.   - remains on 10 mg daily, plan to continue on it (If pain increases again, she will call and we can increase to 20 mg daily and again slowly taper.  - now is likley steroid dpendent (low dose prednisone), ok to continue it (refilled today), can cosnider withdrawing it in near future  -has tramadol, started on gabapentin 300 mg at bedtime per palliative\"        #Psychiatry: Patient has previously had a history of anxiety.now since the diagnosis, is having panic attacks.  PTA on Prozac, and also clonazepam  -Trazodoen for " ?insomnia  -increase cloazepan to BID 0.5 mg     #COVID vaccine: s/p 3 boosters     #Hypertension hyperlipidemia  ON statin        On the day of service,  Preparation time:  5 minutes  Visit time:  30 minutes  Care Coordination:  5 minutes        Chaz Martel M.D.   of Medicine  Division of Hematology, Oncology and Transplantation  [facility]

## 2023-08-31 ENCOUNTER — OFFICE VISIT (OUTPATIENT)
Dept: FAMILY MEDICINE | Facility: CLINIC | Age: 78
End: 2023-08-31
Payer: COMMERCIAL

## 2023-08-31 VITALS
OXYGEN SATURATION: 97 % | SYSTOLIC BLOOD PRESSURE: 130 MMHG | HEIGHT: 63 IN | RESPIRATION RATE: 14 BRPM | TEMPERATURE: 97.9 F | DIASTOLIC BLOOD PRESSURE: 68 MMHG | BODY MASS INDEX: 29.59 KG/M2 | WEIGHT: 167 LBS | HEART RATE: 84 BPM

## 2023-08-31 DIAGNOSIS — G47.09 OTHER INSOMNIA: ICD-10-CM

## 2023-08-31 DIAGNOSIS — J45.20 MILD INTERMITTENT ASTHMA WITHOUT COMPLICATION: ICD-10-CM

## 2023-08-31 DIAGNOSIS — J44.9 CHRONIC OBSTRUCTIVE PULMONARY DISEASE, UNSPECIFIED COPD TYPE (H): Primary | ICD-10-CM

## 2023-08-31 DIAGNOSIS — I82.4Y9 DEEP VEIN THROMBOSIS (DVT) OF PROXIMAL LOWER EXTREMITY, UNSPECIFIED CHRONICITY, UNSPECIFIED LATERALITY (H): ICD-10-CM

## 2023-08-31 DIAGNOSIS — E78.5 HYPERLIPIDEMIA LDL GOAL <100: ICD-10-CM

## 2023-08-31 DIAGNOSIS — M62.838 MUSCLE SPASM: ICD-10-CM

## 2023-08-31 PROCEDURE — 99214 OFFICE O/P EST MOD 30 MIN: CPT | Performed by: FAMILY MEDICINE

## 2023-08-31 RX ORDER — ATORVASTATIN CALCIUM 20 MG/1
20 TABLET, FILM COATED ORAL DAILY
Qty: 90 TABLET | Refills: 3 | Status: SHIPPED | OUTPATIENT
Start: 2023-08-31 | End: 2024-08-26

## 2023-08-31 RX ORDER — TRAZODONE HYDROCHLORIDE 50 MG/1
TABLET, FILM COATED ORAL
Qty: 180 TABLET | Refills: 3 | Status: SHIPPED | OUTPATIENT
Start: 2023-08-31 | End: 2024-04-01

## 2023-08-31 RX ORDER — IPRATROPIUM BROMIDE AND ALBUTEROL SULFATE 2.5; .5 MG/3ML; MG/3ML
1 SOLUTION RESPIRATORY (INHALATION) EVERY 6 HOURS PRN
Qty: 90 ML | Refills: 0 | Status: SHIPPED | OUTPATIENT
Start: 2023-08-31 | End: 2024-03-07

## 2023-08-31 RX ORDER — ALBUTEROL SULFATE 90 UG/1
AEROSOL, METERED RESPIRATORY (INHALATION)
Qty: 8.5 G | Refills: 11 | Status: SHIPPED | OUTPATIENT
Start: 2023-08-31 | End: 2024-03-27

## 2023-08-31 RX ORDER — TIZANIDINE 2 MG/1
2 TABLET ORAL 3 TIMES DAILY PRN
Qty: 90 TABLET | Refills: 3 | Status: SHIPPED | OUTPATIENT
Start: 2023-08-31 | End: 2023-12-18

## 2023-08-31 ASSESSMENT — ENCOUNTER SYMPTOMS
NEUROLOGICAL NEGATIVE: 1
EYES NEGATIVE: 1
ALLERGIC/IMMUNOLOGIC NEGATIVE: 1
ENDOCRINE NEGATIVE: 1
GASTROINTESTINAL NEGATIVE: 1
PSYCHIATRIC NEGATIVE: 1
CONSTITUTIONAL NEGATIVE: 1
MUSCULOSKELETAL NEGATIVE: 1
CARDIOVASCULAR NEGATIVE: 1
RESPIRATORY NEGATIVE: 1
HEMATOLOGIC/LYMPHATIC NEGATIVE: 1

## 2023-08-31 ASSESSMENT — PATIENT HEALTH QUESTIONNAIRE - PHQ9
SUM OF ALL RESPONSES TO PHQ QUESTIONS 1-9: 1
10. IF YOU CHECKED OFF ANY PROBLEMS, HOW DIFFICULT HAVE THESE PROBLEMS MADE IT FOR YOU TO DO YOUR WORK, TAKE CARE OF THINGS AT HOME, OR GET ALONG WITH OTHER PEOPLE: NOT DIFFICULT AT ALL
SUM OF ALL RESPONSES TO PHQ QUESTIONS 1-9: 1

## 2023-08-31 ASSESSMENT — PAIN SCALES - GENERAL: PAINLEVEL: NO PAIN (0)

## 2023-08-31 ASSESSMENT — ASTHMA QUESTIONNAIRES: ACT_TOTALSCORE: 16

## 2023-08-31 NOTE — PROGRESS NOTES
"  Assessment & Plan     Chronic obstructive pulmonary disease, unspecified COPD type (H)  Stable , no new changes  - albuterol (PROAIR HFA/PROVENTIL HFA/VENTOLIN HFA) 108 (90 Base) MCG/ACT inhaler; INHALE 2 PUFFS INTO THE LUNGS EVERY 6 HOURS AS NEEDED FOR SHORTNESS OF BREATH OR DIFFICULTY BREATHING OR WHEEZING    Hyperlipidemia LDL goal <100  Medication faxed.  - atorvastatin (LIPITOR) 20 MG tablet; Take 1 tablet (20 mg) by mouth daily    Mild intermittent asthma without complication  Medication faxed.   - ipratropium - albuterol 0.5 mg/2.5 mg/3 mL (DUONEB) 0.5-2.5 (3) MG/3ML neb solution; Take 1 vial (3 mLs) by nebulization every 6 hours as needed for shortness of breath or wheezing    Muscle spasm  Medication faxed  - tiZANidine (ZANAFLEX) 2 MG tablet; Take 1 tablet (2 mg) by mouth 3 times daily as needed for muscle spasms    Other insomnia  Medication faxed  - traZODone (DESYREL) 50 MG tablet; TAKE 2 TABLETS(100 MG) BY MOUTH AT BEDTIME    Deep vein thrombosis (DVT) of proximal lower extremity, unspecified chronicity, unspecified laterality (H)  Resolved.    Review of external notes as documented elsewhere in note       BMI:   Estimated body mass index is 29.82 kg/m  as calculated from the following:    Height as of this encounter: 1.594 m (5' 2.75\").    Weight as of this encounter: 75.8 kg (167 lb).       FUTURE APPOINTMENTS:       - Follow-up visit in one month or sooner as needed.    Catie Fraser MD  [facility]    Thu Montesinos is a 78 year old, presenting for the following health issues:    Patient is a 78 yr old female here for medication refills.   She was diagnosed with Lung CA last year and has been through chemotherapy and radiotherapy. She is in remission at present. She needs refills on some of her chronic medications.   Recheck Medication        8/31/2023     8:56 AM   Additional Questions   Roomed by Krystin NINA   Accompanied by self         8/31/2023     8:56 AM "   Patient Reported Additional Medications   Patient reports taking the following new medications no new meds       History of Present Illness       Reason for visit:  Med check    She eats 0-1 servings of fruits and vegetables daily.She consumes 0 sweetened beverage(s) daily.She exercises with enough effort to increase her heart rate 30 to 60 minutes per day.  She exercises with enough effort to increase her heart rate 5 days per week.   She is taking medications regularly.       Hyperlipidemia Follow-Up    Are you regularly taking any medication or supplement to lower your cholesterol?   Yes- atorvastatin   Are you having muscle aches or other side effects that you think could be caused by your cholesterol lowering medication?  No    Hypertension Follow-up    Do you check your blood pressure regularly outside of the clinic? No   Are you following a low salt diet? Yes  Are your blood pressures ever more than 140 on the top number (systolic) OR more   than 90 on the bottom number (diastolic), for example 140/90? Not sure     Asthma Follow-Up    Was ACT completed today?  Yes        9/27/2022     7:53 AM   ACT Total Scores   ACT TOTAL SCORE (Goal Greater than or Equal to 20) 16   In the past 12 months, how many times did you visit the emergency room for your asthma without being admitted to the hospital? 0   In the past 12 months, how many times were you hospitalized overnight because of your asthma? 0        How many days per week do you miss taking your asthma controller medication?  0  Please describe any recent triggers for your asthma: None  Have you had any Emergency Room Visits, Urgent Care Visits, or Hospital Admissions since your last office visit?  No    Pain History:  When did you first notice your pain? Chronic    Have you seen this provider for your pain in the past? Yes   Where in your body do you have pain? Hip pain   Are you seeing anyone else for your pain? No        11/23/2022     2:56 PM 1/4/2023      "2:44 PM 8/31/2023     8:47 AM   PHQ-9 SCORE   PHQ-9 Total Score MyChart   1 (Minimal depression)   PHQ-9 Total Score 3 13 1           5/4/2022     4:38 PM 9/12/2022    10:43 AM 1/4/2023     2:44 PM   PAOLA-7 SCORE   Total Score 13 10 13           11/23/2022     2:56 PM 1/4/2023     2:44 PM 8/31/2023     8:47 AM   PHQ-9 SCORE   PHQ-9 Total Score MyChart   1 (Minimal depression)   PHQ-9 Total Score 3 13 1           5/4/2022     4:38 PM 9/12/2022    10:43 AM 1/4/2023     2:44 PM   PAOLA-7 SCORE   Total Score 13 10 13       Chronic Pain Follow Up:    Location of pain: osteoarthritis   Analgesia/pain control:    - Recent changes:  Undergoing cancer treatment   - Overall control: Tolerable with discomfort    - Current treatments: Tramadol   Adherence:     - Do you ever take more pain medicine than prescribed? No    - When did you take your last dose of pain medicine?   Adverse effects: No   PDMP Review         Value Time User    State PDMP site checked  Yes 2/2/2023  1:24 PM Claudine Ko, NAYA CNP          Last CSA Agreement:   CSA -- Patient Level:     [Media Unavailable] Controlled Substance Agreement - Opioid - Scan on 7/1/2021  7:34 PM       Last UDS: 6/29/2021  Patient declined to complete   today            Review of Systems   Constitutional: Negative.    HENT: Negative.     Eyes: Negative.    Respiratory: Negative.     Cardiovascular: Negative.    Gastrointestinal: Negative.    Endocrine: Negative.    Breasts:  negative.    Genitourinary: Negative.    Musculoskeletal: Negative.    Allergic/Immunologic: Negative.    Neurological: Negative.    Hematological: Negative.    Psychiatric/Behavioral: Negative.              Objective    /68   Pulse 84   Temp 97.9  F (36.6  C) (Tympanic)   Resp 14   Ht 1.594 m (5' 2.75\")   Wt 75.8 kg (167 lb)   SpO2 97%   BMI 29.82 kg/m    Body mass index is 29.82 kg/m .  Physical Exam  Constitutional:       Appearance: Normal appearance.   HENT:      Head: Normocephalic " and atraumatic.      Right Ear: Tympanic membrane normal.      Left Ear: Tympanic membrane normal.   Eyes:      Pupils: Pupils are equal, round, and reactive to light.   Cardiovascular:      Rate and Rhythm: Normal rate and regular rhythm.      Pulses: Normal pulses.      Heart sounds: Normal heart sounds.   Pulmonary:      Effort: Pulmonary effort is normal.      Breath sounds: Normal breath sounds.   Abdominal:      General: Abdomen is flat. Bowel sounds are normal. There is no distension.      Palpations: Abdomen is soft. There is no mass.      Tenderness: There is no abdominal tenderness. There is no right CVA tenderness, left CVA tenderness, guarding or rebound.      Hernia: No hernia is present.   Musculoskeletal:         General: Normal range of motion.   Skin:     General: Skin is warm and dry.   Neurological:      Mental Status: She is alert and oriented to person, place, and time.   Psychiatric:         Mood and Affect: Mood normal.         Behavior: Behavior normal.        Declined labs today

## 2023-09-11 DIAGNOSIS — F41.9 ANXIETY: ICD-10-CM

## 2023-09-11 DIAGNOSIS — T45.1X5A CHEMOTHERAPY-INDUCED PERIPHERAL NEUROPATHY (H): ICD-10-CM

## 2023-09-11 DIAGNOSIS — G62.0 CHEMOTHERAPY-INDUCED PERIPHERAL NEUROPATHY (H): ICD-10-CM

## 2023-09-11 DIAGNOSIS — C34.31 MALIGNANT NEOPLASM OF LOWER LOBE OF RIGHT LUNG (H): ICD-10-CM

## 2023-09-11 RX ORDER — GABAPENTIN 300 MG/1
CAPSULE ORAL
Qty: 30 CAPSULE | Refills: 0 | Status: SHIPPED | OUTPATIENT
Start: 2023-09-11 | End: 2023-10-09

## 2023-09-11 NOTE — TELEPHONE ENCOUNTER
Received electronic fax from pharmacy requesting refill of Gabapentin.     Last refill: 8/14/2023  Last office visit: 3/8/2023  Follow up: 9/8 canceled due to Dr. Gan being out of clinic.  Voicemail and [employer] message sent to patient to call scheduling to reschedule.        Will route request to MD for review.     Reviewed [place]  Report.

## 2023-09-12 ENCOUNTER — PATIENT OUTREACH (OUTPATIENT)
Dept: ONCOLOGY | Facility: CLINIC | Age: 78
End: 2023-09-12
Payer: COMMERCIAL

## 2023-09-12 ENCOUNTER — HOSPITAL ENCOUNTER (OUTPATIENT)
Dept: ULTRASOUND IMAGING | Facility: HOSPITAL | Age: 78
Discharge: HOME | End: 2023-09-12
Attending: STUDENT IN AN ORGANIZED HEALTH CARE EDUCATION/TRAINING PROGRAM | Admitting: STUDENT IN AN ORGANIZED HEALTH CARE EDUCATION/TRAINING PROGRAM
Payer: COMMERCIAL

## 2023-09-12 DIAGNOSIS — J45.20 MILD INTERMITTENT ASTHMA WITHOUT COMPLICATION: ICD-10-CM

## 2023-09-12 DIAGNOSIS — C34.31 MALIGNANT NEOPLASM OF LOWER LOBE OF RIGHT LUNG (H): ICD-10-CM

## 2023-09-12 DIAGNOSIS — M79.661 BILATERAL CALF PAIN: ICD-10-CM

## 2023-09-12 DIAGNOSIS — M79.662 BILATERAL CALF PAIN: ICD-10-CM

## 2023-09-12 DIAGNOSIS — C34.31 MALIGNANT NEOPLASM OF LOWER LOBE OF RIGHT LUNG (H): Primary | ICD-10-CM

## 2023-09-12 PROCEDURE — 93970 EXTREMITY STUDY: CPT

## 2023-09-12 NOTE — PROGRESS NOTES
"Pt call CO bilateral calf pain, R 7/10 and L 5/10 on pain scale. Began yesterday, has been gradually increasing in pain. No discoloration or swelling, no pain increase with touch, but a bit with \"squeezing\", no temp differential. Notes on 10 mg prednisone/day, was previously on 20 mg. Is currently on Ibu and tylenol, \"probably more than I am supposed to take\". No issues with SOB or chest pain, no other Sx.     Per Dr Martel ordered a Bilat LE US, if - then will increase to 20 mg/day. Called and relayed information to Pt, who verbalized understanding. Messaged scheduling.     "

## 2023-09-13 RX ORDER — IPRATROPIUM BROMIDE AND ALBUTEROL SULFATE 2.5; .5 MG/3ML; MG/3ML
SOLUTION RESPIRATORY (INHALATION)
Qty: 180 ML | OUTPATIENT
Start: 2023-09-13

## 2023-09-13 NOTE — PROGRESS NOTES
US is - for DVT. Per Dr Martel increase prednisone to 20 mg/day for 2 weeks, then 10 mg/day for 1 week. No answer left detailed VM & sent mychart.

## 2023-10-09 DIAGNOSIS — C34.31 MALIGNANT NEOPLASM OF LOWER LOBE OF RIGHT LUNG (H): ICD-10-CM

## 2023-10-09 DIAGNOSIS — G62.0 CHEMOTHERAPY-INDUCED PERIPHERAL NEUROPATHY (H): ICD-10-CM

## 2023-10-09 DIAGNOSIS — T45.1X5A CHEMOTHERAPY-INDUCED PERIPHERAL NEUROPATHY (H): ICD-10-CM

## 2023-10-09 NOTE — TELEPHONE ENCOUNTER
Received fax from pharmacy requesting refill of gabapentin.     Last refill: 9/11/23  Last office visit: 3/8/2023  Follow up: 9/8 canceled due to Dr. Gan being out of clinic.  Voicemail and [employer] message already sent to patient to call scheduling to reschedule. Message sent to scheduling.    Will route request to MD for review.     Reviewed [place]  Report.

## 2023-10-10 RX ORDER — GABAPENTIN 300 MG/1
CAPSULE ORAL
Qty: 30 CAPSULE | Refills: 0 | Status: SHIPPED | OUTPATIENT
Start: 2023-10-10 | End: 2023-11-14

## 2023-10-13 DIAGNOSIS — M17.0 OSTEOARTHRITIS OF BOTH KNEES, UNSPECIFIED OSTEOARTHRITIS TYPE: ICD-10-CM

## 2023-10-13 DIAGNOSIS — G89.4 CHRONIC PAIN SYNDROME: ICD-10-CM

## 2023-10-13 RX ORDER — TRAMADOL HYDROCHLORIDE 50 MG/1
50 TABLET ORAL EVERY 6 HOURS PRN
Qty: 120 TABLET | Refills: 1 | Status: CANCELLED | OUTPATIENT
Start: 2023-10-13

## 2023-10-13 NOTE — TELEPHONE ENCOUNTER
"Received telephone from JUSTIN Del Valle that patient left a voicemail requesting refill of tramadol.     Last refill: 8/1/2023  Last office visit: 9/8 canceled due to Dr. Gan being out of clinic.  Voicemail and [employer] message already sent to patient to call scheduling to reschedule. Message sent to scheduling.       Will route request to MD for review.     Reviewed [place]  Report.      ADDENDUM:  Voicemail left for patient notifying her, Dr. Gan \"never Rx'd this Plus it's been a long time since she's seen us   She is welcome to have follow-up with us if she wants .\"    Scheduling phone number provided for patient to call and schedule  "

## 2023-10-20 ENCOUNTER — HOSPITAL ENCOUNTER (OUTPATIENT)
Dept: CT IMAGING | Facility: HOSPITAL | Age: 78
Discharge: HOME | End: 2023-10-20
Attending: STUDENT IN AN ORGANIZED HEALTH CARE EDUCATION/TRAINING PROGRAM | Admitting: STUDENT IN AN ORGANIZED HEALTH CARE EDUCATION/TRAINING PROGRAM
Payer: COMMERCIAL

## 2023-10-20 DIAGNOSIS — C34.31 MALIGNANT NEOPLASM OF LOWER LOBE OF RIGHT LUNG (H): ICD-10-CM

## 2023-10-20 LAB
CREAT BLD-MCNC: 0.6 MG/DL (ref 0.6–1.1)
EGFRCR SERPLBLD CKD-EPI 2021: >60 ML/MIN/1.73M2

## 2023-10-20 PROCEDURE — 82565 ASSAY OF CREATININE: CPT

## 2023-10-20 PROCEDURE — 74177 CT ABD & PELVIS W/CONTRAST: CPT

## 2023-10-20 PROCEDURE — 250N000011 HC RX IP 250 OP 636: Mod: JZ | Performed by: STUDENT IN AN ORGANIZED HEALTH CARE EDUCATION/TRAINING PROGRAM

## 2023-10-20 RX ORDER — IOPAMIDOL 755 MG/ML
82 INJECTION, SOLUTION INTRAVASCULAR ONCE
Status: COMPLETED | OUTPATIENT
Start: 2023-10-20 | End: 2023-10-20

## 2023-10-20 RX ADMIN — IOPAMIDOL 82 ML: 755 INJECTION, SOLUTION INTRAVENOUS at 12:58

## 2023-10-23 ENCOUNTER — ONCOLOGY VISIT (OUTPATIENT)
Dept: ONCOLOGY | Facility: CLINIC | Age: 78
End: 2023-10-23
Attending: STUDENT IN AN ORGANIZED HEALTH CARE EDUCATION/TRAINING PROGRAM
Payer: COMMERCIAL

## 2023-10-23 VITALS
DIASTOLIC BLOOD PRESSURE: 73 MMHG | BODY MASS INDEX: 29.89 KG/M2 | RESPIRATION RATE: 16 BRPM | HEIGHT: 63 IN | SYSTOLIC BLOOD PRESSURE: 123 MMHG | OXYGEN SATURATION: 96 % | WEIGHT: 168.7 LBS | TEMPERATURE: 98 F | HEART RATE: 86 BPM

## 2023-10-23 DIAGNOSIS — C34.31 MALIGNANT NEOPLASM OF LOWER LOBE OF RIGHT LUNG (H): ICD-10-CM

## 2023-10-23 DIAGNOSIS — I10 BENIGN ESSENTIAL HYPERTENSION: Primary | ICD-10-CM

## 2023-10-23 PROCEDURE — 99215 OFFICE O/P EST HI 40 MIN: CPT | Performed by: STUDENT IN AN ORGANIZED HEALTH CARE EDUCATION/TRAINING PROGRAM

## 2023-10-23 PROCEDURE — G0463 HOSPITAL OUTPT CLINIC VISIT: HCPCS | Performed by: STUDENT IN AN ORGANIZED HEALTH CARE EDUCATION/TRAINING PROGRAM

## 2023-10-23 RX ORDER — PREDNISONE 10 MG/1
10 TABLET ORAL DAILY
Qty: 30 TABLET | Refills: 0 | Status: SHIPPED | OUTPATIENT
Start: 2023-10-23 | End: 2024-04-16

## 2023-10-23 ASSESSMENT — PAIN SCALES - GENERAL: PAINLEVEL: NO PAIN (0)

## 2023-10-23 NOTE — LETTER
10/23/2023         RE: Yamel Lindsay  [address] [facility] [address]        Dear Colleague,    Thank you for referring your patient, Yamel Lindsay, to the [facility]. Please see a copy of my visit note below.    10/23/23    Reason for Visit: f/u lung cancer, joint pain      Diagnosis:   Synchronous Rt LL Squamous cell carcinoma [id number] Stage IIIC (AJCC 8th edition) diagnosed 12/2022  PD-L1 <1%  NGS- [facility] panel- Neg (High TMB 16.7 mut/MB)     Left LL adenocarcinoma [id number] Stage (AJCC 8th edition) diagnosed 12/2022  PD-L1-2%  NGS-KRAS G12C, TMB 2.4      Treatment:  1/20/23- 4 cycles of Pembrolizumab+carboplatin+paclitaxel (pembro on hold ucrrently ofr immune mediated myalgia/arthralgia since 3/2023)   XRT-   Right Lower Lobe      6,000 cGy    in 30 fraction     6/06/2023 7/26/2023          Left Lower Lobe        5,000 cGy   in 10 fraction     6/22/2023 7/06/2023            Intent of treatment: Palliative    Onc HPI:    One month history of increasing dyspnea with palpitations, propting ER visit 11/19.  11/19/22- CT- 1 cm right lower lobe mass abutting multiple central bronchovascular and mediastinal structures, consistent with malignancy.2.2 cm spiculated nodule in the left lower lobe, also consistent with malignancy.   12/6/22- PET/CT-  Right lower lobe lung mass abutting the pleura (SUV max of 24), measuring 8 x 5.2 cm. There is associated complete obliteration of right lower lobe bronchus and loss of fat planes with  the left atrium. Spiculated left lower lobe solid lung mass (SUV 17.9), measuring 2.2 x 2 cm. There are several markedly hypermetabolic, irregular solid nodules in the right middle lobe abutting the pleura (approximately 10-12), as for example 1.2 cm nodule with SUV max of 16.8 (4/126). There is a separate markedly hypermetabolic 1 cm nodule in the right upper lobe. Mild hypermetabolic uptake is noted within a1 x 0.9 cm right paratracheal node (  SUV max of 4.2) (4/120), Mild uptake is also  noted within a subcentimeter lower paraesophageal node (4/154).   12/28/22- EBUS with deblking in the right hilar mass (no mediastinal staging performed)- - LUNG, RIGHT MAINSTEM, ENDOBRONCHIAL BIOPSY: Squamous cell carcinoma with extensive necrosis (positive for p40 and negative for TTF-1). Left LL FNAC- Positive for malignancy- Adenocarcinoma  (diffusely positive for TTF-1 while negative for p40)  1/3/23: Brain MRI - Neg  1/10/23: Tumor board discussion: Too extensive for concurrent/sequential chemo XRT- plan for palliative intent chemo-IO  1/20/23- C1 Pembrolizumab+carboplatin+paclitaxel  2/10/23-C2 Pembrolizumab+carboplatin+paclitaxel  2/27/23- CT CAP- Decreased size of the hypoattenuating mass in the central right lower lobe and small irregularly-shaped nodules in the right upper lobe consistent with positive treatment response. There is persistent postobstructive right lower lobe atelectasis.Unchanged heterogeneous lobulated and spiculated solid nodule in the lateral basal left lower lobe. No findings to suggest new extrapulmonary metastatic disease.  3/3, 3/24-  Pembrolizumab+carboplatin+paclitaxel      4/10/23- CT CAP- - some Rx response- Mass distal right lower lobe bronchus causing complete post obstructive atelectasis has decreased from 6.5 x 4.5 x 3.5 cm to 5.5 x 3.8 x 2.4 cm today. Spiculated left lower lobe mass has decreased from 2.1 cm to 1.5 cm today. Right upper lobe nodularity continues to decrease. Ill-defined nodularity in the right middle lobe has developed and is most likely postinflammatory.     6/5/23- After discussion at tumor board, Dr. Escoto agreed for definitive radiation treatment (without chemo, as she already had 4 cycles of chemo) for cancer on the right side and possible SBRT on left side both with definitive intent    6/5/23- Started XRT     Right Lower Lobe      6,000 cGy    in 30 fraction     6/06/2023 7/26/2023           Left Lower Lobe        5,000 cGy   in 10 fraction     6/22/2023 7/06/2023 6/16/23- CT Chest-  Unchanged complete atelectasis of the heterogeneous right lower lobe due to central obstructing mass status post treatment. No change in the posterolateral left lower lobe nodule with surrounding cicatrization. No findings to suggest progressive   malignancy in the chest.    8/25/23- CT Chest- Stable right lower lobe lung mass with distal atelectasis. Lobulated nodule in the left lower lobe is also stable. No disease progression in the chest. No metastatic disease in the abdomen and pelvis.     10/20/23- CT CHest- Heterogeneous mass with consolidation and complete volume loss of the right lower lobe which is located in the azygoesophageal recess has increased in size slightly with axial dimensions superiorly 3.2 x 2.2 cm previously 2.8 x 2.0 cm  (axial image 95 series 3) and axial dimensions inferiorly 4.2 x 2.9 cm previously 4.0 x 2.6 cm (image 110). Spiculated mass posterior basilar left lower lobe stable at 1.7 x 1.2 cm (image 119).         Interval history:   Yamel is here for follow up and discuss results of CT scan.    Her pembro on hold currently for immune mediated myalgia/arthralgia  She was on maintenance prednisone for immune related arthritis/myositis.   She stopped prednisone, unclear when and or what reason.  Over the last month or so she has noticed more pain in the both knees and left calf pain , this pain is moderate to severe and has disabled her, She has been mostly sedentary or the last 1-2 months,   Still has some swelling in rt leg  This is affecting her ability to do ADL      ECOG PS 2.5    Current Outpatient Medications   Medication Sig Dispense Refill    acetaminophen (TYLENOL) 500 MG tablet Take 1,000 mg by mouth every 6 hours as needed for mild pain Taking once a day      ADVAIR -21 MCG/ACT inhaler INHALE 2 PUFFS INTO THE LUNGS TWICE DAILY 12 g 11    albuterol (PROAIR  HFA/PROVENTIL HFA/VENTOLIN HFA) 108 (90 Base) MCG/ACT inhaler INHALE 2 PUFFS INTO THE LUNGS EVERY 6 HOURS AS NEEDED FOR SHORTNESS OF BREATH OR DIFFICULTY BREATHING OR WHEEZING 8.5 g 11    atorvastatin (LIPITOR) 20 MG tablet Take 1 tablet (20 mg) by mouth daily 90 tablet 3    Cholecalciferol (VITAMIN D-3 PO) Take 2,000 Units by mouth daily       clonazePAM (KLONOPIN) 0.5 MG tablet Take 1-2 tablets (0.5-1 mg) by mouth 2 times daily as needed for anxiety 120 tablet 1    clonazePAM (KLONOPIN) 0.5 MG tablet Take 0.5-1 tablets (0.25-0.5 mg) by mouth 2 times daily as needed for anxiety 30 tablet 3    FLUoxetine (PROZAC) 20 MG capsule TAKE 2 CAPSULES(40 MG) BY MOUTH DAILY 180 capsule 3    gabapentin (NEURONTIN) 300 MG capsule TAKE 1 CAPSULE(300 MG) BY MOUTH EVERY EVENING AS NEEDED FOR NERVE PAIN 30 capsule 0    hydrochlorothiazide (HYDRODIURIL) 25 MG tablet Take 25 mg by mouth daily      ibuprofen (ADVIL,MOTRIN) 200 MG tablet Take 200-600 mg by mouth every 2 hours as needed for mild pain      ipratropium - albuterol 0.5 mg/2.5 mg/3 mL (DUONEB) 0.5-2.5 (3) MG/3ML neb solution Take 1 vial (3 mLs) by nebulization every 6 hours as needed for shortness of breath or wheezing 90 mL 0    lidocaine-prilocaine (EMLA) 2.5-2.5 % external cream Apply topically as needed for moderate pain (4-6) Apply 1 hour prior to port access and cover with saran wrap or press and seal. 30 g 1    omeprazole (PRILOSEC) 20 MG DR capsule TAKE 1 CAPSULE(20 MG) BY MOUTH DAILY 30 TO 60 MINUTES BEFORE A MEAL 90 capsule 2    ondansetron (ZOFRAN) 8 MG tablet Take 1 tablet (8 mg) by mouth every 8 hours as needed for nausea (vomiting) 30 tablet 2    polyethylene glycol (MIRALAX) 17 g packet Take 1 packet by mouth daily Taking PRN      predniSONE (DELTASONE) 10 MG tablet Take 1 tablet (10 mg) by mouth daily 60 tablet 1    prochlorperazine (COMPAZINE) 10 MG tablet Take 0.5 tablets (5 mg) by mouth every 6 hours as needed for nausea or vomiting 30 tablet 2     tiZANidine (ZANAFLEX) 2 MG tablet Take 1 tablet (2 mg) by mouth 3 times daily as needed for muscle spasms 90 tablet 3    traZODone (DESYREL) 50 MG tablet TAKE 2 TABLETS(100 MG) BY MOUTH AT BEDTIME 180 tablet 3          Allergies   Allergen Reactions    Nka [No Known Allergies]     Seasonal Allergies        There were no vitals taken for this visit.  General: Patient appears well in no acute distress.   Skin: No visualized rash or lesions on visualized skin  Eyes: EOMI, no erythema, sclera icterus or discharge noted  Resp: Appears to be breathing comfortably without accessory muscle usage, speaking in full sentences, no cough  MSK: Appears to have normal range of motion based on visualized movements  Neurologic: No apparent tremors, facial movements symmetric  Psych: affect engaged, pleasant, alert and oriented   not currently breastfeeding.  Wt Readings from Last 4 Encounters:   08/31/23 75.8 kg (167 lb)   08/28/23 75.7 kg (166 lb 12.8 oz)   08/01/23 74.3 kg (163 lb 12.8 oz)   07/20/23 73.9 kg (163 lb)         Impression/plan:     Ms. Yamel Lindsay is a 77 year old  female with PMHx of for HTN, HLD, anxiety, who is here for evaluation/followup of new lung cancer     Synchronous Rt LL Squamous cell carcinoma [id number] Stage IIIC (AJCC 8th edition) diagnosed 12/2022  PD-L1 <1%  NGS- [facility] panel- Neg (High TMB 16.7 mut/MB)     Left LL adenocarcinoma [id number] Stage (AJCC 8th edition) diagnosed 12/2022  PD-L1-2%  NGS-KRAS G12C, TMB 2.4     Pt with 2 synchronous cancers at diagnosis..  The more  threatening one was the right lower lobe squamous cell cancer which appears to be at least stage IIIC given the size greater than 7 cm suggestive of T4 disease.  She also has mediastinal lymph node that were enlarged and appear pathologic but were not biopsied.  She underwent an EBUS for debulking and her mediastinum was not been staged.  She is also has several FDG avid nodules in the right middle and the right upper lobe that have  "not yet been biopsied.  Given the extent of involvement of the right sided cancer, concurrent/sequential chemoradiation not feasible per tumor board discussion, also no further EBUS required since high probability of cancer.   She proceeded with palliative intent chemoimmunotherapy with pembro+carbo+taxol.  Of note, squamous has high TMB and low neg PD-L1.  Adenocarcinoma has KRAS G12C. CT after 4 cycles showing good partial response in the right-sided tumor and some response in the left side adenocarcinoma.  After discussion at tumor board, Dr. Escoto did definitive radiation treatment  on cancer on the right side and SBRT on left side both with definitive intent. CT  8/2023 showing overall stable disease.     Immunotherapy on hold due to Myositis/arthritis with hx of elevated CRP and ESR been attributed to immunotherapy. CT on 10/2023 showing slight increase in size of consolidative opacity int he rt lower lobe. Discussed the possibility fo cancer progression vs radiation changes. We will get PET CT to look at FDG activity and depending on the result may consider a biopsy.        Plan  Labs in the next 1-2 days   PET CT   RTC with me after CT         # Myositis/arthritis with hx of elevated CRP and ESR. Has been attributed to immunotherapy. dopplar negative for DVT, No known bone mets on baseline PET/CT.  -CK normal  - will get ESR/CRP (previously CK ahs been normal)  -has had 2 prednisone tapers, the 2nd was a longer taper. Her pain started to increase the week after stopping prednisone  -she was resumed on prednisone 10 mg without improvement and increased to 20 mg.   - was on 10 mg daily, but stopped it for unclear reasons, has noticed recent flare in both the knee joints and left calf muscle  - recommend resuming 10 mg prednisone and may cosndier increasingt o 20 mg daily if no improvement  - -has tramadol, started on gabapentin 300 mg at bedtime per palliative\"        #Psychiatry: Patient has previously had a " history of anxiety.now since the diagnosis, is having panic attacks.  PTA on Prozac, and also clonazepam  -Trazodoen for ?insomnia  -increase cloazepan to BID 0.5 mg     #COVID vaccine: s/p 3 boosters     #Hypertension hyperlipidemia  ON statin        On the day of service,  Preparation time:  5 minutes  Visit time:  30 minutes  Care Coordination:  5 minutes        Chaz Martel M.D.   of Medicine  Division of Hematology, Oncology and Transplantation  [facility]

## 2023-10-23 NOTE — NURSING NOTE
"Oncology Rooming Note    October 23, 2023 5:10 PM   Yamel Lindsay is a 78 year old female who presents for:    Chief Complaint   Patient presents with    Oncology Clinic Visit     [facility] RETURN - RIGHT LOWER LOBE LUNG MASS     Initial Vitals: /73 (BP Location: Right arm, Patient Position: Chair, Cuff Size: Adult Large)   Pulse 86   Temp 98  F (36.7  C)   Resp 16   Ht 1.594 m (5' 2.76\")   Wt 76.5 kg (168 lb 11.2 oz)   SpO2 96%   BMI 30.12 kg/m   Estimated body mass index is 30.12 kg/m  as calculated from the following:    Height as of this encounter: 1.594 m (5' 2.76\").    Weight as of this encounter: 76.5 kg (168 lb 11.2 oz). Body surface area is 1.84 meters squared.  No Pain (0) Comment: Data Unavailable   No LMP recorded. Patient is postmenopausal.  Allergies reviewed: Yes  Medications reviewed: Yes    Medications: Medication refills not needed today.  Pharmacy name entered into [facility]:    [facility] [place], [place] - [address] [facility] [place], [place] - [address] [id number] - [place], [place] [facility] [facility] [address] [facility]    bJ Dominguez LPN              "

## 2023-10-23 NOTE — PROGRESS NOTES
10/23/23    Reason for Visit: f/u lung cancer, joint pain      Diagnosis:   Synchronous Rt LL Squamous cell carcinoma [id number] Stage IIIC (AJCC 8th edition) diagnosed 12/2022  PD-L1 <1%  NGS- [facility] panel- Neg (High TMB 16.7 mut/MB)     Left LL adenocarcinoma [id number] Stage (AJCC 8th edition) diagnosed 12/2022  PD-L1-2%  NGS-KRAS G12C, TMB 2.4      Treatment:  1/20/23- 4 cycles of Pembrolizumab+carboplatin+paclitaxel (pembro on hold ucrrently ofr immune mediated myalgia/arthralgia since 3/2023)   XRT-   Right Lower Lobe      6,000 cGy    in 30 fraction     6/06/2023 7/26/2023          Left Lower Lobe        5,000 cGy   in 10 fraction     6/22/2023 7/06/2023            Intent of treatment: Palliative    Onc HPI:    One month history of increasing dyspnea with palpitations, propting ER visit 11/19.  11/19/22- CT- 1 cm right lower lobe mass abutting multiple central bronchovascular and mediastinal structures, consistent with malignancy.2.2 cm spiculated nodule in the left lower lobe, also consistent with malignancy.   12/6/22- PET/CT-  Right lower lobe lung mass abutting the pleura (SUV max of 24), measuring 8 x 5.2 cm. There is associated complete obliteration of right lower lobe bronchus and loss of fat planes with  the left atrium. Spiculated left lower lobe solid lung mass (SUV 17.9), measuring 2.2 x 2 cm. There are several markedly hypermetabolic, irregular solid nodules in the right middle lobe abutting the pleura (approximately 10-12), as for example 1.2 cm nodule with SUV max of 16.8 (4/126). There is a separate markedly hypermetabolic 1 cm nodule in the right upper lobe. Mild hypermetabolic uptake is noted within a1 x 0.9 cm right paratracheal node ( SUV max of 4.2) (4/120), Mild uptake is also  noted within a subcentimeter lower paraesophageal node (4/154).   12/28/22- EBUS with deblking in the right hilar mass (no mediastinal staging performed)- - LUNG, RIGHT MAINSTEM, ENDOBRONCHIAL  BIOPSY: Squamous cell carcinoma with extensive necrosis (positive for p40 and negative for TTF-1). Left LL FNAC- Positive for malignancy- Adenocarcinoma  (diffusely positive for TTF-1 while negative for p40)  1/3/23: Brain MRI - Neg  1/10/23: Tumor board discussion: Too extensive for concurrent/sequential chemo XRT- plan for palliative intent chemo-IO  1/20/23- C1 Pembrolizumab+carboplatin+paclitaxel  2/10/23-C2 Pembrolizumab+carboplatin+paclitaxel  2/27/23- CT CAP- Decreased size of the hypoattenuating mass in the central right lower lobe and small irregularly-shaped nodules in the right upper lobe consistent with positive treatment response. There is persistent postobstructive right lower lobe atelectasis.Unchanged heterogeneous lobulated and spiculated solid nodule in the lateral basal left lower lobe. No findings to suggest new extrapulmonary metastatic disease.  3/3, 3/24-  Pembrolizumab+carboplatin+paclitaxel      4/10/23- CT CAP- - some Rx response- Mass distal right lower lobe bronchus causing complete post obstructive atelectasis has decreased from 6.5 x 4.5 x 3.5 cm to 5.5 x 3.8 x 2.4 cm today. Spiculated left lower lobe mass has decreased from 2.1 cm to 1.5 cm today. Right upper lobe nodularity continues to decrease. Ill-defined nodularity in the right middle lobe has developed and is most likely postinflammatory.     6/5/23- After discussion at tumor board, Dr. Escoto agreed for definitive radiation treatment (without chemo, as she already had 4 cycles of chemo) for cancer on the right side and possible SBRT on left side both with definitive intent    6/5/23- Started XRT     Right Lower Lobe      6,000 cGy    in 30 fraction     6/06/2023 7/26/2023          Left Lower Lobe        5,000 cGy   in 10 fraction     6/22/2023 7/06/2023 6/16/23- CT Chest-  Unchanged complete atelectasis of the heterogeneous right lower lobe due to central obstructing mass status post treatment. No change in the  posterolateral left lower lobe nodule with surrounding cicatrization. No findings to suggest progressive   malignancy in the chest.    8/25/23- CT Chest- Stable right lower lobe lung mass with distal atelectasis. Lobulated nodule in the left lower lobe is also stable. No disease progression in the chest. No metastatic disease in the abdomen and pelvis.     10/20/23- CT CHest- Heterogeneous mass with consolidation and complete volume loss of the right lower lobe which is located in the azygoesophageal recess has increased in size slightly with axial dimensions superiorly 3.2 x 2.2 cm previously 2.8 x 2.0 cm  (axial image 95 series 3) and axial dimensions inferiorly 4.2 x 2.9 cm previously 4.0 x 2.6 cm (image 110). Spiculated mass posterior basilar left lower lobe stable at 1.7 x 1.2 cm (image 119).         Interval history:   Yamel is here for follow up and discuss results of CT scan.    Her pembro on hold currently for immune mediated myalgia/arthralgia  She was on maintenance prednisone for immune related arthritis/myositis.   She stopped prednisone, unclear when and or what reason.  Over the last month or so she has noticed more pain in the both knees and left calf pain , this pain is moderate to severe and has disabled her, She has been mostly sedentary or the last 1-2 months,   Still has some swelling in rt leg  This is affecting her ability to do ADL      ECOG PS 2.5    Current Outpatient Medications   Medication Sig Dispense Refill    acetaminophen (TYLENOL) 500 MG tablet Take 1,000 mg by mouth every 6 hours as needed for mild pain Taking once a day      ADVAIR -21 MCG/ACT inhaler INHALE 2 PUFFS INTO THE LUNGS TWICE DAILY 12 g 11    albuterol (PROAIR HFA/PROVENTIL HFA/VENTOLIN HFA) 108 (90 Base) MCG/ACT inhaler INHALE 2 PUFFS INTO THE LUNGS EVERY 6 HOURS AS NEEDED FOR SHORTNESS OF BREATH OR DIFFICULTY BREATHING OR WHEEZING 8.5 g 11    atorvastatin (LIPITOR) 20 MG tablet Take 1 tablet (20 mg) by mouth  daily 90 tablet 3    Cholecalciferol (VITAMIN D-3 PO) Take 2,000 Units by mouth daily       clonazePAM (KLONOPIN) 0.5 MG tablet Take 1-2 tablets (0.5-1 mg) by mouth 2 times daily as needed for anxiety 120 tablet 1    clonazePAM (KLONOPIN) 0.5 MG tablet Take 0.5-1 tablets (0.25-0.5 mg) by mouth 2 times daily as needed for anxiety 30 tablet 3    FLUoxetine (PROZAC) 20 MG capsule TAKE 2 CAPSULES(40 MG) BY MOUTH DAILY 180 capsule 3    gabapentin (NEURONTIN) 300 MG capsule TAKE 1 CAPSULE(300 MG) BY MOUTH EVERY EVENING AS NEEDED FOR NERVE PAIN 30 capsule 0    hydrochlorothiazide (HYDRODIURIL) 25 MG tablet Take 25 mg by mouth daily      ibuprofen (ADVIL,MOTRIN) 200 MG tablet Take 200-600 mg by mouth every 2 hours as needed for mild pain      ipratropium - albuterol 0.5 mg/2.5 mg/3 mL (DUONEB) 0.5-2.5 (3) MG/3ML neb solution Take 1 vial (3 mLs) by nebulization every 6 hours as needed for shortness of breath or wheezing 90 mL 0    lidocaine-prilocaine (EMLA) 2.5-2.5 % external cream Apply topically as needed for moderate pain (4-6) Apply 1 hour prior to port access and cover with saran wrap or press and seal. 30 g 1    omeprazole (PRILOSEC) 20 MG DR capsule TAKE 1 CAPSULE(20 MG) BY MOUTH DAILY 30 TO 60 MINUTES BEFORE A MEAL 90 capsule 2    ondansetron (ZOFRAN) 8 MG tablet Take 1 tablet (8 mg) by mouth every 8 hours as needed for nausea (vomiting) 30 tablet 2    polyethylene glycol (MIRALAX) 17 g packet Take 1 packet by mouth daily Taking PRN      predniSONE (DELTASONE) 10 MG tablet Take 1 tablet (10 mg) by mouth daily 60 tablet 1    prochlorperazine (COMPAZINE) 10 MG tablet Take 0.5 tablets (5 mg) by mouth every 6 hours as needed for nausea or vomiting 30 tablet 2    tiZANidine (ZANAFLEX) 2 MG tablet Take 1 tablet (2 mg) by mouth 3 times daily as needed for muscle spasms 90 tablet 3    traZODone (DESYREL) 50 MG tablet TAKE 2 TABLETS(100 MG) BY MOUTH AT BEDTIME 180 tablet 3          Allergies   Allergen Reactions    Nka [No  Known Allergies]     Seasonal Allergies        There were no vitals taken for this visit.  General: Patient appears well in no acute distress.   Skin: No visualized rash or lesions on visualized skin  Eyes: EOMI, no erythema, sclera icterus or discharge noted  Resp: Appears to be breathing comfortably without accessory muscle usage, speaking in full sentences, no cough  MSK: Appears to have normal range of motion based on visualized movements  Neurologic: No apparent tremors, facial movements symmetric  Psych: affect engaged, pleasant, alert and oriented   not currently breastfeeding.  Wt Readings from Last 4 Encounters:   08/31/23 75.8 kg (167 lb)   08/28/23 75.7 kg (166 lb 12.8 oz)   08/01/23 74.3 kg (163 lb 12.8 oz)   07/20/23 73.9 kg (163 lb)         Impression/plan:     Ms. Yamel Lindsay is a 77 year old  female with PMHx of for HTN, HLD, anxiety, who is here for evaluation/followup of new lung cancer     Synchronous Rt LL Squamous cell carcinoma [id number] Stage IIIC (AJCC 8th edition) diagnosed 12/2022  PD-L1 <1%  NGS- [facility] panel- Neg (High TMB 16.7 mut/MB)     Left LL adenocarcinoma [id number] Stage (AJCC 8th edition) diagnosed 12/2022  PD-L1-2%  NGS-KRAS G12C, TMB 2.4     Pt with 2 synchronous cancers at diagnosis..  The more  threatening one was the right lower lobe squamous cell cancer which appears to be at least stage IIIC given the size greater than 7 cm suggestive of T4 disease.  She also has mediastinal lymph node that were enlarged and appear pathologic but were not biopsied.  She underwent an EBUS for debulking and her mediastinum was not been staged.  She is also has several FDG avid nodules in the right middle and the right upper lobe that have not yet been biopsied.  Given the extent of involvement of the right sided cancer, concurrent/sequential chemoradiation not feasible per tumor board discussion, also no further EBUS required since high probability of cancer.   She proceeded with palliative  "intent chemoimmunotherapy with pembro+carbo+taxol.  Of note, squamous has high TMB and low neg PD-L1.  Adenocarcinoma has KRAS G12C. CT after 4 cycles showing good partial response in the right-sided tumor and some response in the left side adenocarcinoma.  After discussion at tumor board, Dr. Escoto did definitive radiation treatment  on cancer on the right side and SBRT on left side both with definitive intent. CT  8/2023 showing overall stable disease.     Immunotherapy on hold due to Myositis/arthritis with hx of elevated CRP and ESR been attributed to immunotherapy. CT on 10/2023 showing slight increase in size of consolidative opacity int he rt lower lobe. Discussed the possibility fo cancer progression vs radiation changes. We will get PET CT to look at FDG activity and depending on the result may consider a biopsy.        Plan  Labs in the next 1-2 days   PET CT   RTC with me after CT         # Myositis/arthritis with hx of elevated CRP and ESR. Has been attributed to immunotherapy. dopplar negative for DVT, No known bone mets on baseline PET/CT.  -CK normal  - will get ESR/CRP (previously CK ahs been normal)  -has had 2 prednisone tapers, the 2nd was a longer taper. Her pain started to increase the week after stopping prednisone  -she was resumed on prednisone 10 mg without improvement and increased to 20 mg.   - was on 10 mg daily, but stopped it for unclear reasons, has noticed recent flare in both the knee joints and left calf muscle  - recommend resuming 10 mg prednisone and may cosndier increasingt o 20 mg daily if no improvement  - -has tramadol, started on gabapentin 300 mg at bedtime per palliative\"        #Psychiatry: Patient has previously had a history of anxiety.now since the diagnosis, is having panic attacks.  PTA on Prozac, and also clonazepam  -Trazodoen for ?insomnia  -increase cloazepan to BID 0.5 mg     #COVID vaccine: s/p 3 boosters     #Hypertension hyperlipidemia  ON statin        On the " day of service,  Preparation time:  5 minutes  Visit time:  30 minutes  Care Coordination:  5 minutes        Chaz Martel M.D.   of Medicine  Division of Hematology, Oncology and Transplantation  [facility]

## 2023-10-24 RX ORDER — HYDROCHLOROTHIAZIDE 25 MG/1
25 TABLET ORAL DAILY
Qty: 90 TABLET | Refills: 1 | Status: SHIPPED | OUTPATIENT
Start: 2023-10-24 | End: 2024-04-16

## 2023-10-31 DIAGNOSIS — F41.9 ANXIETY: ICD-10-CM

## 2023-10-31 NOTE — TELEPHONE ENCOUNTER
"Clonazepam 0.5mg tab  Last prescribing provider: Dr. Martel     Last clinic visit date: 10/23/23    Recommendations for requested medication (if none, N/A): 10/23/23, \"#Psychiatry: Patient has previously had a history of anxiety.now since the diagnosis, is having panic attacks.  PTA on Prozac, and also clonazepam  -Trazodoen for ?insomnia  -increase cloazepan to BID 0.5 mg\"    Any other pertinent information (if none, N/A): fax request    Refilled: Y/N, if NO, why?    "

## 2023-11-01 ENCOUNTER — ONCOLOGY VISIT (OUTPATIENT)
Dept: PALLIATIVE CARE | Facility: CLINIC | Age: 78
End: 2023-11-01
Attending: INTERNAL MEDICINE
Payer: COMMERCIAL

## 2023-11-01 VITALS
TEMPERATURE: 97.5 F | BODY MASS INDEX: 29.58 KG/M2 | DIASTOLIC BLOOD PRESSURE: 66 MMHG | WEIGHT: 165.7 LBS | OXYGEN SATURATION: 93 % | RESPIRATION RATE: 16 BRPM | HEART RATE: 90 BPM | SYSTOLIC BLOOD PRESSURE: 101 MMHG

## 2023-11-01 DIAGNOSIS — C34.32 MALIGNANT NEOPLASM OF LOWER LOBE OF LEFT LUNG (H): ICD-10-CM

## 2023-11-01 DIAGNOSIS — M17.0 OSTEOARTHRITIS OF BOTH KNEES, UNSPECIFIED OSTEOARTHRITIS TYPE: Primary | ICD-10-CM

## 2023-11-01 DIAGNOSIS — C34.31 MALIGNANT NEOPLASM OF LOWER LOBE OF RIGHT LUNG (H): ICD-10-CM

## 2023-11-01 PROCEDURE — G0463 HOSPITAL OUTPT CLINIC VISIT: HCPCS | Performed by: INTERNAL MEDICINE

## 2023-11-01 PROCEDURE — 99215 OFFICE O/P EST HI 40 MIN: CPT | Performed by: INTERNAL MEDICINE

## 2023-11-01 RX ORDER — CELECOXIB 200 MG/1
200 CAPSULE ORAL DAILY
Qty: 60 CAPSULE | Refills: 1 | Status: SHIPPED | OUTPATIENT
Start: 2023-11-01 | End: 2024-02-08

## 2023-11-01 RX ORDER — CLONAZEPAM 0.5 MG/1
.5-1 TABLET ORAL 2 TIMES DAILY PRN
Qty: 120 TABLET | Refills: 1 | Status: SHIPPED | OUTPATIENT
Start: 2023-11-01 | End: 2024-01-03

## 2023-11-01 ASSESSMENT — PAIN SCALES - GENERAL: PAINLEVEL: NO PAIN (0)

## 2023-11-01 NOTE — PATIENT INSTRUCTIONS
Buy and use 'diclofenac gel' also called 'Voltaren gel' from the store. Apply it up to 4 times a day. Apply as much as you want. Ignore the warnings on the box.    Celecoxib: this is like ibuprofen but doesn't really cause stomach ulcers. TAke it twice a day. Stop ibuprofen.    It's ok to continue 8 extra strength Tyelnol a day    I am referring you to sports medicine/ortho clinic

## 2023-11-01 NOTE — LETTER
11/1/2023       RE: Yamel Lindsay  [address] [place] [address]     Dear Colleague,    Thank you [facility] referring your patient[facility]amel Lindsay[facility]. Please see a copy of my visit note below.    Palliative Care Outpatient Clinic      Patient ID:  Medical - She has RLL SCC lung and LLL lung adenocarcinoma both dx 12/2022-->pembro/carbo/paclitaxel for a few months then definitive-dose RT to RLL and LLL Jun-Jul 2023. Pembro held 2/2 arthralgias.   10/2023 slight increase in RLL opacity    Hx of chronic knee djd pain and tramadol use  Anxiety and panic disorder, COPD, asthma    Social - [place] 2 [place] children and 4 grandchildren. 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     Care Planning -       History:  History gathered today from: patient, medical chart; daughter is with her    It's been over 6 mo since we saw her.  Remains on chemo; pembro held 2/2 arthralgias.  Had definitive dose RT to both lungs this summer.   Scan afterwards ~stable    She tells me she was referred back because she asked for tramadol for her knee pain    Knee pain: x many years R>>L.  Told she had djd years ago. I reviewed old outside imaging--had an Xray 7 y ago c/w DJD both knees. No hx of MRI. Some hx of a knee injection 7ish years ago which helped for a while. No specialist eval hx.   She's also had R upper calf pain and tenderness; no dvt on several dopplers this year notably. Has some chronic ankle edema bilat R>L.    Pain so bad sometimes her daughter tells me Yamel says she'd like her leg cut off    TAkes 600 mg ibuprofen qid  1g APAP qid  Nothing else    Through her chemoradiation done with curative intent for her bilateral LL NSCLCs; under active surveillance. Has josé miguel RLL opacities of unclear significance (eg collapsed lung around her mass/scar vs residual tumor).     PE: There were no vitals taken for this  visit.   Wt Readings from Last 3 Encounters:   10/23/23 76.5 kg (168 lb 11.2 oz)   08/31/23 75.8 kg (167 lb)   08/28/23 75.7 kg (166 lb 12.8 oz)     Alert NAD  Both knees   Nontender, no red/warmth/swelling  Mild crepitus bilat  Knee rom nl    Small pitting bilat ankle edema      Data reviewed:  I reviewed recent labs and imaging, my comments:  Cr 0.6    CT Oct  IMPRESSION:  1.  Right lower lobe tumor/consolidation has increased in size slightly.  2.  Left lower lobe mass is stable.  3.  No new disease.    Whole body PET a year ago--can't see the knees very well; seems to have sig joint space narrowing bilat medial compartments; iamges reviewed with her today      Doppler Sept no DVT bilat LE     database reviewed: y      Impression & Recommendations:  79 yo with bilateral separate NSCLC diagnosed earlier this year treated with chemoradiotherapy and undergoing active surveillance; unclear status of her current cancer    Presumed knee DJD, right>L  Continue 1g APAP qid; Dr Martel checking labs soon.  Stop ibuprofen  Replace with 200 mg bid celecoxib  Diclofenac gel  Sports med eval   Standing X rays ordered    Follow-up as needed with us; happy to see her back anytime    Over 40 minutes spent on the date of the encounter doing chart review, history and exam, patient education & counseling, documentation and other activities as noted above.    Thank you for involving us in the patient's care.   Torres Gan MD / Palliative Medicine / Text me via [employer]  This note may have been composed with voice recognition software and there may be mistranscriptions.          Again, thank you for allowing me to participate in the care of your patient.      Sincerely,    Torres Gan MD

## 2023-11-01 NOTE — PROGRESS NOTES
Palliative Care Outpatient Clinic      Patient ID:  Medical - She has RLL SCC lung and LLL lung adenocarcinoma both dx 12/2022-->pembro/carbo/paclitaxel for a few months then definitive-dose RT to RLL and LLL Jun-Jul 2023. Pembro held 2/2 arthralgias.   10/2023 slight increase in RLL opacity    Hx of chronic knee djd pain and tramadol use  Anxiety and panic disorder, COPD, asthma    Social - [place] 2 adult children and 4 grandchildren. 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     Care Planning -       History:  History gathered today from: patient, medical chart; daughter is with her    It's been over 6 mo since we saw her.  Remains on chemo; pembro held 2/2 arthralgias.  Had definitive dose RT to both lungs this summer.   Scan afterwards ~stable    She tells me she was referred back because she asked for tramadol for her knee pain    Knee pain: x many years R>>L.  Told she had djd years ago. I reviewed old outside imaging--had an Xray 7 y ago c/w DJD both knees. No hx of MRI. Some hx of a knee injection 7ish years ago which helped for a while. No specialist eval hx.   She's also had R upper calf pain and tenderness; no dvt on several dopplers this year notably. Has some chronic ankle edema bilat R>L.    Pain so bad sometimes her daughter tells me Yamel says she'd like her leg cut off    TAkes 600 mg ibuprofen qid  1g APAP qid  Nothing else    Through her chemoradiation done with curative intent for her bilateral LL NSCLCs; under active surveillance. Has josé miguel RLL opacities of unclear significance (eg collapsed lung around her mass/scar vs residual tumor).     PE: There were no vitals taken for this visit.   Wt Readings from Last 3 Encounters:   10/23/23 76.5 kg (168 lb 11.2 oz)   08/31/23 75.8 kg (167 lb)   08/28/23 75.7 kg (166 lb 12.8 oz)     Alert NAD  Both knees   Nontender, no red/warmth/swelling  Mild crepitus bilat  Knee rom nl    Small pitting bilat ankle edema      Data reviewed:  I reviewed  recent labs and imaging, my comments:  Cr 0.6    CT Oct  IMPRESSION:  1.  Right lower lobe tumor/consolidation has increased in size slightly.  2.  Left lower lobe mass is stable.  3.  No new disease.    Whole body PET a year ago--can't see the knees very well; seems to have sig joint space narrowing bilat medial compartments; iamges reviewed with her today      Doppler Sept no DVT bilat LE     database reviewed: y      Impression & Recommendations:  79 yo with bilateral separate NSCLC diagnosed earlier this year treated with chemoradiotherapy and undergoing active surveillance; unclear status of her current cancer    Presumed knee DJD, right>L  Continue 1g APAP qid; Dr Martel checking labs soon.  Stop ibuprofen  Replace with 200 mg bid celecoxib  Diclofenac gel  Sports med eval   Standing X rays ordered    Follow-up as needed with us; happy to see her back anytime    Over 40 minutes spent on the date of the encounter doing chart review, history and exam, patient education & counseling, documentation and other activities as noted above.    Thank you for involving us in the patient's care.   Torres Gan MD / Palliative Medicine / Text me via [employer]  This note may have been composed with voice recognition software and there may be mistranscriptions.

## 2023-11-01 NOTE — NURSING NOTE
"Oncology Rooming Note    November 1, 2023 2:20 PM   Yamel Lindsay is a 78 year old female who presents for:    Chief Complaint   Patient presents with    Oncology Clinic Visit     Palliative Care     Initial Vitals: /66 (BP Location: Right arm, Patient Position: Sitting, Cuff Size: Adult Regular)   Pulse 90   Temp 97.5  F (36.4  C) (Oral)   Resp 16   Wt 75.2 kg (165 lb 11.2 oz)   SpO2 93%   BMI 29.58 kg/m   Estimated body mass index is 29.58 kg/m  as calculated from the following:    Height as of 10/23/23: 1.594 m (5' 2.76\").    Weight as of this encounter: 75.2 kg (165 lb 11.2 oz). Body surface area is 1.82 meters squared.  No Pain (0) Comment: Data Unavailable   No LMP recorded. Patient is postmenopausal.  Allergies reviewed: Yes  Medications reviewed: Yes    Medications: Medication refills not needed today.  Pharmacy name entered into [employer]:    [facility] [place], [place] - [address] [facility] [place], [place] - [address] [id number] - [place], [place] - [facility] [address] [address] [facility] [address]    Clinical concerns: Patient states there are no new concerns to discuss with provider.   Nica Garcia              "

## 2023-11-03 ENCOUNTER — HOSPITAL ENCOUNTER (OUTPATIENT)
Dept: PET IMAGING | Facility: HOSPITAL | Age: 78
Discharge: HOME | End: 2023-11-03
Attending: STUDENT IN AN ORGANIZED HEALTH CARE EDUCATION/TRAINING PROGRAM
Payer: COMMERCIAL

## 2023-11-03 ENCOUNTER — LAB (OUTPATIENT)
Dept: LAB | Facility: HOSPITAL | Age: 78
End: 2023-11-03
Attending: INTERNAL MEDICINE
Payer: COMMERCIAL

## 2023-11-03 ENCOUNTER — HOSPITAL ENCOUNTER (OUTPATIENT)
Dept: RADIOLOGY | Facility: HOSPITAL | Age: 78
Discharge: HOME | End: 2023-11-03
Attending: INTERNAL MEDICINE
Payer: COMMERCIAL

## 2023-11-03 DIAGNOSIS — C34.31 MALIGNANT NEOPLASM OF LOWER LOBE OF RIGHT LUNG (H): ICD-10-CM

## 2023-11-03 DIAGNOSIS — M17.0 OSTEOARTHRITIS OF BOTH KNEES, UNSPECIFIED OSTEOARTHRITIS TYPE: ICD-10-CM

## 2023-11-03 LAB
CRP SERPL-MCNC: 12.9 MG/L
ERYTHROCYTE [SEDIMENTATION RATE] IN BLOOD BY WESTERGREN METHOD: 41 MM/HR (ref 0–30)
GLUCOSE BLDC GLUCOMTR-MCNC: 117 MG/DL (ref 70–99)

## 2023-11-03 PROCEDURE — 78815 PET IMAGE W/CT SKULL-THIGH: CPT | Mod: PS

## 2023-11-03 PROCEDURE — 36415 COLL VENOUS BLD VENIPUNCTURE: CPT

## 2023-11-03 PROCEDURE — 85652 RBC SED RATE AUTOMATED: CPT

## 2023-11-03 PROCEDURE — 343N000001 HC RX 343: Performed by: STUDENT IN AN ORGANIZED HEALTH CARE EDUCATION/TRAINING PROGRAM

## 2023-11-03 PROCEDURE — A9552 F18 FDG: HCPCS | Performed by: STUDENT IN AN ORGANIZED HEALTH CARE EDUCATION/TRAINING PROGRAM

## 2023-11-03 PROCEDURE — 86140 C-REACTIVE PROTEIN: CPT

## 2023-11-03 PROCEDURE — 82962 GLUCOSE BLOOD TEST: CPT

## 2023-11-03 PROCEDURE — 73565 X-RAY EXAM OF KNEES: CPT

## 2023-11-03 RX ADMIN — FLUDEOXYGLUCOSE F-18 11.01 MILLICURIE: 500 INJECTION, SOLUTION INTRAVENOUS at 11:48

## 2023-11-03 NOTE — PROGRESS NOTES
ASSESSMENT & PLAN    Yamel was seen today for pain and pain.    Diagnoses and all orders for this visit:    Primary osteoarthritis of right knee  Patellofemoral syndrome of right knee  Primary osteoarthritis of left knee      This issue is chronic and Worsening. Erna attends today with bilateral, right worse than left chronic knee pain.  Her radiographs taken on 11/3 were reviewed with the patient today.  These are somewhat limiting given there is only on AP view and no lateral or sunshine view.  They do show overall mild osteoarthritis of bilateral knees in the medial and lateral compartments.  Given the patient's history and exam findings, I suspect she also has osteoarthritis of the patellofemoral compartment.  We discussed the above findings and the possible treatment options including physical therapy, corticosteroid injection, and surgical intervention.  Given her overall mild arthritis, I suspect she will do well with physical therapy.  However, given her pain level today it is reasonable to perform a corticosteroid injection for acute pain relief and help her perform her therapy exercises.  We determined the following plan:  -CSI to right knee performed under ultrasound guidance today, see procedure note below for details  -Physical therapy referral placed today to focus on quad and glutes/hip strengthening  -She can continue to use over-the-counter pain medications, ice, heat as needed  -She will follow-up in our clinic in 1 week for a procedure only visit for a steroid injection to the left knee    Terry Aviles DO  [facility] SP[facility]IC[facility]IN[facility]    -----  Chief Complaint   Patient presents with    Left Knee - Pain    Right Knee - Pain       SUBJECTIVE  Yamel Lindsay is a/an 78 year old female who is seen in consultation at the request of  Torres Gan M.D. for evaluation of bilateral knee pain.     The patient is seen by themselves.    Onset: many years(s) ago.  "Reports insidious onset without acute precipitating event. Patient notes there was no accident but the pain has been getting progressively worse over the years.   Location of Pain: bilateral right is worse than the left knee - above the knee pain on the patellofemoral tendon  Worsened by: movement  Better with: rest  Treatments tried: rest/activity avoidance, ice, heat, elevation, previous imaging (xray 11/3/2023), corticosteroid injection (most recent date: 7ish years ago) patient cannot remember if it provided relief.  Associated symptoms: weakness of knee, pain travels down into the calf, and feeling of instability    Orthopedic/Surgical history: NO  Social History/Occupation: retired      REVIEW OF SYSTEMS:  Review of systems negative unless mentioned in HPI     OBJECTIVE:  Ht 1.575 m (5' 2\")   Wt 73.9 kg (163 lb)   BMI 29.81 kg/m     General: healthy, alert and in no distress  Skin: no suspicious lesions or rash.  CV: distal perfusion intact  Resp: normal respiratory effort without conversational dyspnea   Psych: normal mood and affect  Gait: NORMAL  Neuro: Normal light sensory exam of bilateral lower extremity      Knee exam  Gait: Normal  Alignment:  [x] Normal  [] Anatomic valgus  [] Anatomic varus  Inspection: [x] Normal  [] Ecchymosis present []Other   Palpation:  Joint Tenderness: [] No Tenderness  [x] MJL [] LJL [] MCL Margin [] LCL Margin [] Pes Anserine [] Distal Quadricep  [] Other   Peripatellar Tenderness: [] None [] Lateral pole [x] Medial pole [x] Superior pole [] Inferior pole    Patellar tendon pain: [x] None [] Present    Patellar apprehension: [x] None [] Present    Patellar motion: [x] Normal [] Abnormal  Crepitus: [x] None [] Present  Effusion: [x] None [] Trace [] 1+ [] 2+ [] 3+  Range of motion:  Flexion: 135, Extension: 0  Strength:  [x] Full in all planes, including intact extensor mechanism [] Limited as described  Neurologic: Normal sensation   Vascular: Normal pulses   Special " tests:   Lachman: Negative  Anterior Drawer: Negative  Sag/quad Activation: NP  Posterior Drawer: Negative  Valgus Stress: Negative at 0/30  Varus Stress: Negative at 0/30  Josselyn's: Negative  Thessaly: NP     Other notable findings/comments: None       RADIOLOGY:  Final results and radiologist's interpretation, available in the [facility] health record.  Images were reviewed with the patient in the office today.  My personal interpretation of the performed imaging: Radiographs from 11/3 were reviewed.  There is only a single AP standing view available.  There is mild osteophytosis and joint space narrowing of the medial and lateral compartments    Large Joint Injection/Arthocentesis: R knee joint    Date/Time: 11/8/2023 2:07 PM    Performed by: Terry Aviles DO  Authorized by: Terry Aviles DO    Indications:  Pain and osteoarthritis  Needle Size:  22 G  Guidance: ultrasound    Approach:  Anterolateral  Location:  Knee      Medications:  6 mg betamethasone acet & sod phos 6 (3-3) MG/ML; 3 mL lidocaine 1 %; 2 mL ROPivacaine 5 MG/ML  Outcome:  Tolerated well, no immediate complications  Procedure discussed: discussed risks, benefits, and alternatives    Consent Given by:  Patient  Timeout: timeout called immediately prior to procedure    Prep: patient was prepped and draped in usual sterile fashion     Ultrasound was used to ensure safe and accurate needle placement and injection. Ultrasound images of the procedure were permanently stored.    1ml of 8.4% Sodium Bicarbonate solution was used to buffer the local numbing agent for today's injection

## 2023-11-05 NOTE — PROGRESS NOTES
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 (AJCC 8th edition) diagnosed 12/2022  PD-L1 <1%  NGS- [facility] panel- Neg (High TMB 16.7 mut/MB)     Left LL adenocarcinoma [id number] Stage (AJCC 8th edition) diagnosed 12/2022  PD-L1-2%  NGS-KRAS G12C, TMB 2.4      Treatment:  1/20/23- 4 cycles of Pembrolizumab+carboplatin+paclitaxel (pembro on hold ucrrently ofr immune mediated myalgia/arthralgia since 3/2023)   XRT-   Right Lower Lobe      6,000 cGy    in 30 fraction     6/06/2023 7/26/2023          Left Lower Lobe        5,000 cGy   in 10 fraction     6/22/2023 7/06/2023            Intent of treatment: Palliative    Onc HPI:    One month history of increasing dyspnea with palpitations, propting ER visit 11/19.  11/19/22- CT- 1 cm right lower lobe mass abutting multiple central bronchovascular and mediastinal structures, consistent with malignancy.2.2 cm spiculated nodule in the left lower lobe, also consistent with malignancy.   12/6/22- PET/CT-  Right lower lobe lung mass abutting the pleura (SUV max of 24), measuring 8 x 5.2 cm. There is associated complete obliteration of right lower lobe bronchus and loss of fat planes with  the left atrium. Spiculated left lower lobe solid lung mass (SUV 17.9), measuring 2.2 x 2 cm. There are several markedly hypermetabolic, irregular solid nodules in the right middle lobe abutting the pleura (approximately 10-12), as for example 1.2 cm nodule with SUV max of 16.8 (4/126). There is a separate markedly hypermetabolic 1 cm nodule in the right upper lobe. Mild hypermetabolic uptake is noted within a1 x 0.9 cm right paratracheal node ( SUV max of 4.2) (4/120), Mild uptake is also  noted within a subcentimeter lower paraesophageal node (4/154).   12/28/22- EBUS with deblking in the right hilar mass (no mediastinal staging performed)- - LUNG, RIGHT MAINSTEM, ENDOBRONCHIAL  BIOPSY: Squamous cell carcinoma with extensive necrosis (positive for p40 and negative for TTF-1). Left LL FNAC- Positive for malignancy- Adenocarcinoma  (diffusely positive for TTF-1 while negative for p40)  1/3/23: Brain MRI - Neg  1/10/23: Tumor board discussion: Too extensive for concurrent/sequential chemo XRT- plan for palliative intent chemo-IO  1/20/23- C1 Pembrolizumab+carboplatin+paclitaxel  2/10/23-C2 Pembrolizumab+carboplatin+paclitaxel  2/27/23- CT CAP- Decreased size of the hypoattenuating mass in the central right lower lobe and small irregularly-shaped nodules in the right upper lobe consistent with positive treatment response. There is persistent postobstructive right lower lobe atelectasis.Unchanged heterogeneous lobulated and spiculated solid nodule in the lateral basal left lower lobe. No findings to suggest new extrapulmonary metastatic disease.  3/3, 3/24-  Pembrolizumab+carboplatin+paclitaxel      4/10/23- CT CAP- - some Rx response- Mass distal right lower lobe bronchus causing complete post obstructive atelectasis has decreased from 6.5 x 4.5 x 3.5 cm to 5.5 x 3.8 x 2.4 cm today. Spiculated left lower lobe mass has decreased from 2.1 cm to 1.5 cm today. Right upper lobe nodularity continues to decrease. Ill-defined nodularity in the right middle lobe has developed and is most likely postinflammatory.     6/5/23- After discussion at tumor board, Dr. Escoto agreed for definitive radiation treatment (without chemo, as she already had 4 cycles of chemo) for cancer on the right side and possible SBRT on left side both with definitive intent    6/5/23- Started XRT     Right Lower Lobe      6,000 cGy    in 30 fraction     6/06/2023 7/26/2023          Left Lower Lobe        5,000 cGy   in 10 fraction     6/22/2023 7/06/2023 6/16/23- CT Chest-  Unchanged complete atelectasis of the heterogeneous right lower lobe due to central obstructing mass status post treatment. No change in the  posterolateral left lower lobe nodule with surrounding cicatrization. No findings to suggest progressive   malignancy in the chest.    8/25/23- CT Chest- Stable right lower lobe lung mass with distal atelectasis. Lobulated nodule in the left lower lobe is also stable. No disease progression in the chest. No metastatic disease in the abdomen and pelvis.     10/20/23- CT CHest- Heterogeneous mass with consolidation and complete volume loss of the right lower lobe which is located in the azygoesophageal recess has increased in size slightly with axial dimensions superiorly 3.2 x 2.2 cm previously 2.8 x 2.0 cm  (axial image 95 series 3) and axial dimensions inferiorly 4.2 x 2.9 cm previously 4.0 x 2.6 cm (image 110). Spiculated mass posterior basilar left lower lobe stable at 1.7 x 1.2 cm (image 119).    11/3/23- PET/CT-  A consolidative masslike opacity with atelectasis involving the right lower lobe at site of original tumor demonstrates and ill-defined area of soft tissue thickening along its superior margin with moderate FDG uptake. Appearance favors ongoing posttreatment inflammatory change although is indeterminate for early local recurrence. Recommend short term chest CT with IV contrast follow-up in 3 months. Otherwise no evidence of FDG avid malignancy. An irregular nodule in the left lower lobe is not FDG avid suggesting posttreatment scarring. No evidence of metastasis including no FDG avid adenopathy.Some small irregular opacities have developed in the left lung in the short interval since 10/20/2023, likely infectious/inflammatory         Interval history:   Yamel is here for follow up and discuss results of PET CT scan.    Her pembro on hold currently for immune mediated myalgia/arthralgia  She was on maintenance prednisone for immune related arthritis/myositis.   She restarted prednisone at 10 mg and noticed little to no difference in pain. She reports having more pain in the b/l joints than muscle/calf  pain.  This is affecting her ability to do ADL  She is seeing a new sports medicine doc to consdier inj for possible arthritis      ECOG PS 2.5    Current Outpatient Medications   Medication Sig Dispense Refill    acetaminophen (TYLENOL) 500 MG tablet Take 1,000 mg by mouth every 6 hours as needed for mild pain Taking once a day      ADVAIR -21 MCG/ACT inhaler INHALE 2 PUFFS INTO THE LUNGS TWICE DAILY 12 g 11    albuterol (PROAIR HFA/PROVENTIL HFA/VENTOLIN HFA) 108 (90 Base) MCG/ACT inhaler INHALE 2 PUFFS INTO THE LUNGS EVERY 6 HOURS AS NEEDED FOR SHORTNESS OF BREATH OR DIFFICULTY BREATHING OR WHEEZING 8.5 g 11    atorvastatin (LIPITOR) 20 MG tablet Take 1 tablet (20 mg) by mouth daily 90 tablet 3    celecoxib (CELEBREX) 200 MG capsule Take 1 capsule (200 mg) by mouth daily 60 capsule 1    Cholecalciferol (VITAMIN D-3 PO) Take 2,000 Units by mouth daily       clonazePAM (KLONOPIN) 0.5 MG tablet Take 1-2 tablets (0.5-1 mg) by mouth 2 times daily as needed for anxiety (Patient not taking: Reported on 11/1/2023) 120 tablet 1    clonazePAM (KLONOPIN) 0.5 MG tablet Take 0.5-1 tablets (0.25-0.5 mg) by mouth 2 times daily as needed for anxiety 30 tablet 3    FLUoxetine (PROZAC) 20 MG capsule TAKE 2 CAPSULES(40 MG) BY MOUTH DAILY 180 capsule 3    gabapentin (NEURONTIN) 300 MG capsule TAKE 1 CAPSULE(300 MG) BY MOUTH EVERY EVENING AS NEEDED FOR NERVE PAIN 30 capsule 0    hydrochlorothiazide (HYDRODIURIL) 25 MG tablet TAKE 1 TABLET(25 MG) BY MOUTH DAILY 90 tablet 1    ibuprofen (ADVIL,MOTRIN) 200 MG tablet Take 200-600 mg by mouth every 2 hours as needed for mild pain      ipratropium - albuterol 0.5 mg/2.5 mg/3 mL (DUONEB) 0.5-2.5 (3) MG/3ML neb solution Take 1 vial (3 mLs) by nebulization every 6 hours as needed for shortness of breath or wheezing 90 mL 0    lidocaine-prilocaine (EMLA) 2.5-2.5 % external cream Apply topically as needed for moderate pain (4-6) Apply 1 hour prior to port access and cover with saran wrap  or press and seal. (Patient not taking: Reported on 11/1/2023) 30 g 1    omeprazole (PRILOSEC) 20 MG DR capsule TAKE 1 CAPSULE(20 MG) BY MOUTH DAILY 30 TO 60 MINUTES BEFORE A MEAL 90 capsule 2    ondansetron (ZOFRAN) 8 MG tablet Take 1 tablet (8 mg) by mouth every 8 hours as needed for nausea (vomiting) 30 tablet 2    polyethylene glycol (MIRALAX) 17 g packet Take 1 packet by mouth daily Taking PRN (Patient not taking: Reported on 10/23/2023)      predniSONE (DELTASONE) 10 MG tablet Take 1 tablet (10 mg) by mouth daily (Patient not taking: Reported on 11/1/2023) 30 tablet 0    predniSONE (DELTASONE) 10 MG tablet Take 1 tablet (10 mg) by mouth daily (Patient not taking: Reported on 10/23/2023) 60 tablet 1    prochlorperazine (COMPAZINE) 10 MG tablet Take 0.5 tablets (5 mg) by mouth every 6 hours as needed for nausea or vomiting 30 tablet 2    tiZANidine (ZANAFLEX) 2 MG tablet Take 1 tablet (2 mg) by mouth 3 times daily as needed for muscle spasms 90 tablet 3    traZODone (DESYREL) 50 MG tablet TAKE 2 TABLETS(100 MG) BY MOUTH AT BEDTIME 180 tablet 3          Allergies   Allergen Reactions    Nka [No Known Allergies]     Seasonal Allergies        /77 (BP Location: Right arm, Patient Position: Sitting, Cuff Size: Adult Regular)   Pulse 81   Temp 97.7  F (36.5  C) (Oral)   Wt 74.3 kg (163 lb 11.2 oz)   SpO2 94%   BMI 29.22 kg/m    General: Patient appears well in no acute distress.   Skin: No visualized rash or lesions on visualized skin  Eyes: EOMI, no erythema, sclera icterus or discharge noted  Resp: Appears to be breathing comfortably without accessory muscle usage, speaking in full sentences, no cough  MSK: Appears to have normal range of motion based on visualized movements  Neurologic: No apparent tremors, facial movements symmetric  Psych: affect engaged, pleasant, alert and oriented   not currently breastfeeding.  Wt Readings from Last 4 Encounters:   11/01/23 75.2 kg (165 lb 11.2 oz)   10/23/23 76.5  kg (168 lb 11.2 oz)   08/31/23 75.8 kg (167 lb)   08/28/23 75.7 kg (166 lb 12.8 oz)         Impression/plan:     Ms. Yamel Lindsay is a 77 year old  female with PMHx of for HTN, HLD, anxiety, who is here for evaluation/followup of new lung cancer     Synchronous Rt LL Squamous cell carcinoma [id number] Stage IIIC (AJCC 8th edition) diagnosed 12/2022  PD-L1 <1%  NGS- [facility] panel- Neg (High TMB 16.7 mut/MB)     Left LL adenocarcinoma [id number] Stage (AJCC 8th edition) diagnosed 12/2022  PD-L1-2%  NGS-KRAS G12C, TMB 2.4     Pt with 2 synchronous cancers at diagnosis..  The more  threatening one was the right lower lobe squamous cell cancer which appears to be at least stage IIIC given the size greater than 7 cm suggestive of T4 disease.  She also has mediastinal lymph node that were enlarged and appear pathologic but were not biopsied.  She underwent an EBUS for debulking and her mediastinum was not been staged.  She is also has several FDG avid nodules in the right middle and the right upper lobe that have not yet been biopsied.  Given the extent of involvement of the right sided cancer, concurrent/sequential chemoradiation not feasible per tumor board discussion, also no further EBUS required since high probability of cancer.   She proceeded with palliative intent chemoimmunotherapy with pembro+carbo+taxol.  Of note, squamous has high TMB and low neg PD-L1.  Adenocarcinoma has KRAS G12C. CT after 4 cycles showing good partial response in the right-sided tumor and some response in the left side adenocarcinoma.  After discussion at tumor board, Dr. Escoto did definitive radiation treatment  on cancer on the right side and SBRT on left side both with definitive intent.  CT on 10/2023 showing slight increase in size of consolidative opacity int he rt lower lobe.We got a PET scan which showed some mild uptake b/l suggesting inflammation and mild FDG uptake in the rt LL but FDG avidity is much lower than before suggesting likley Rx  "related changes.   Discussed low likelihood of cancer progression, discussed monitoring with CT scan for now.    Immunotherapy on hold due to Myositis/arthritis with hx of elevated CRP and ESR been attributed to immunotherapy. We willc ontnue to hold it for now until we see signs of progression.        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 Has been attributed to immunotherapy. dopplar negative for DVT, No known bone mets on baseline PET/CT.  -CK normal  - CRP and ESR is mildly elevated (significantly lower than the levels when she was on steroids for myositis)  -has had 2 prednisone tapers, the 2nd was a longer taper. Her pain started to increase the week after stopping prednisone  -she was resumed on prednisone 10 mg without improvement and increased to 20 mg.   - was on 10 mg daily, but stopped it for unclear reasons, has noticed recent flare in both the knee joints and left calf muscle  - She resumed 10 mg prednisone 10/2023 but did not notice any difference, now her pain is mor ein the joints and muscle ache seems to be relievd by muscle relaxant, suggesting this is MSK etiology and less likley to be immunotherapy related.   - -has tramadol, started on gabapentin 300 mg at bedtime per palliative\"  -- stop prednisone, asked he rto get in tpuch with me if symptoms get worse (especially msucle)  -see ign sports medicien Doc for possible injection for arthritis        #Psychiatry: Patient has previously had a history of anxiety.now since the diagnosis, is having panic attacks.  PTA on Prozac, and also clonazepam  -Trazodoen for ?insomnia  -cloazepan BID 0.5 mg     #COVID vaccine: s/p 3 boosters     #Hypertension hyperlipidemia  ON statin        On the day of service,  Preparation time:  5 minutes  Visit time:  30 minutes  Care Coordination:  5 minutes        Chaz Martel M.D.   of Medicine  Division of Hematology, Oncology and " Transplantation  [facility]

## 2023-11-06 ENCOUNTER — ONCOLOGY VISIT (OUTPATIENT)
Dept: ONCOLOGY | Facility: CLINIC | Age: 78
End: 2023-11-06
Attending: STUDENT IN AN ORGANIZED HEALTH CARE EDUCATION/TRAINING PROGRAM
Payer: COMMERCIAL

## 2023-11-06 VITALS
HEART RATE: 81 BPM | BODY MASS INDEX: 29.22 KG/M2 | SYSTOLIC BLOOD PRESSURE: 124 MMHG | OXYGEN SATURATION: 94 % | WEIGHT: 163.7 LBS | TEMPERATURE: 97.7 F | DIASTOLIC BLOOD PRESSURE: 77 MMHG

## 2023-11-06 DIAGNOSIS — C34.31 MALIGNANT NEOPLASM OF LOWER LOBE OF RIGHT LUNG (H): ICD-10-CM

## 2023-11-06 PROCEDURE — 99215 OFFICE O/P EST HI 40 MIN: CPT | Performed by: STUDENT IN AN ORGANIZED HEALTH CARE EDUCATION/TRAINING PROGRAM

## 2023-11-06 PROCEDURE — G0463 HOSPITAL OUTPT CLINIC VISIT: HCPCS | Performed by: STUDENT IN AN ORGANIZED HEALTH CARE EDUCATION/TRAINING PROGRAM

## 2023-11-06 ASSESSMENT — PAIN SCALES - GENERAL: PAINLEVEL: NO PAIN (0)

## 2023-11-06 NOTE — LETTER
11/6/2023         RE: Yamel Lindsay  [address] [facility] [address]        Dear Colleague,    Thank you for referring your patient, Yamel Lindsay, to the [facility]. Please see a copy of my visit note below.    11/6/23    Reason for Visit: f/u lung cancer, joint pain      Diagnosis:   Synchronous Rt LL Squamous cell carcinoma [id number] Stage IIIC (AJCC 8th edition) diagnosed 12/2022  PD-L1 <1%  NGS- [facility] panel- Neg (High TMB 16.7 mut/MB)     Left LL adenocarcinoma [id number] Stage (AJCC 8th edition) diagnosed 12/2022  PD-L1-2%  NGS-KRAS G12C, TMB 2.4      Treatment:  1/20/23- 4 cycles of Pembrolizumab+carboplatin+paclitaxel (pembro on hold ucrrently ofr immune mediated myalgia/arthralgia since 3/2023)   XRT-   Right Lower Lobe      6,000 cGy    in 30 fraction     6/06/2023 7/26/2023          Left Lower Lobe        5,000 cGy   in 10 fraction     6/22/2023 7/06/2023            Intent of treatment: Palliative    Onc HPI:    One month history of increasing dyspnea with palpitations, propting ER visit 11/19.  11/19/22- CT- 1 cm right lower lobe mass abutting multiple central bronchovascular and mediastinal structures, consistent with malignancy.2.2 cm spiculated nodule in the left lower lobe, also consistent with malignancy.   12/6/22- PET/CT-  Right lower lobe lung mass abutting the pleura (SUV max of 24), measuring 8 x 5.2 cm. There is associated complete obliteration of right lower lobe bronchus and loss of fat planes with  the left atrium. Spiculated left lower lobe solid lung mass (SUV 17.9), measuring 2.2 x 2 cm. There are several markedly hypermetabolic, irregular solid nodules in the right middle lobe abutting the pleura (approximately 10-12), as for example 1.2 cm nodule with SUV max of 16.8 (4/126). There is a separate markedly hypermetabolic 1 cm nodule in the right upper lobe. Mild hypermetabolic uptake is noted within a1 x 0.9 cm right paratracheal node ( SUV  max of 4.2) (4/120), Mild uptake is also  noted within a subcentimeter lower paraesophageal node (4/154).   12/28/22- EBUS with deblking in the right hilar mass (no mediastinal staging performed)- - LUNG, RIGHT MAINSTEM, ENDOBRONCHIAL BIOPSY: Squamous cell carcinoma with extensive necrosis (positive for p40 and negative for TTF-1). Left LL FNAC- Positive for malignancy- Adenocarcinoma  (diffusely positive for TTF-1 while negative for p40)  1/3/23: Brain MRI - Neg  1/10/23: Tumor board discussion: Too extensive for concurrent/sequential chemo XRT- plan for palliative intent chemo-IO  1/20/23- C1 Pembrolizumab+carboplatin+paclitaxel  2/10/23-C2 Pembrolizumab+carboplatin+paclitaxel  2/27/23- CT CAP- Decreased size of the hypoattenuating mass in the central right lower lobe and small irregularly-shaped nodules in the right upper lobe consistent with positive treatment response. There is persistent postobstructive right lower lobe atelectasis.Unchanged heterogeneous lobulated and spiculated solid nodule in the lateral basal left lower lobe. No findings to suggest new extrapulmonary metastatic disease.  3/3, 3/24-  Pembrolizumab+carboplatin+paclitaxel      4/10/23- CT CAP- - some Rx response- Mass distal right lower lobe bronchus causing complete post obstructive atelectasis has decreased from 6.5 x 4.5 x 3.5 cm to 5.5 x 3.8 x 2.4 cm today. Spiculated left lower lobe mass has decreased from 2.1 cm to 1.5 cm today. Right upper lobe nodularity continues to decrease. Ill-defined nodularity in the right middle lobe has developed and is most likely postinflammatory.     6/5/23- After discussion at tumor board, Dr. Escoto agreed for definitive radiation treatment (without chemo, as she already had 4 cycles of chemo) for cancer on the right side and possible SBRT on left side both with definitive intent    6/5/23- Started XRT     Right Lower Lobe      6,000 cGy    in 30 fraction     6/06/2023 7/26/2023           Left Lower Lobe        5,000 cGy   in 10 fraction     6/22/2023 7/06/2023 6/16/23- CT Chest-  Unchanged complete atelectasis of the heterogeneous right lower lobe due to central obstructing mass status post treatment. No change in the posterolateral left lower lobe nodule with surrounding cicatrization. No findings to suggest progressive   malignancy in the chest.    8/25/23- CT Chest- Stable right lower lobe lung mass with distal atelectasis. Lobulated nodule in the left lower lobe is also stable. No disease progression in the chest. No metastatic disease in the abdomen and pelvis.     10/20/23- CT CHest- Heterogeneous mass with consolidation and complete volume loss of the right lower lobe which is located in the azygoesophageal recess has increased in size slightly with axial dimensions superiorly 3.2 x 2.2 cm previously 2.8 x 2.0 cm  (axial image 95 series 3) and axial dimensions inferiorly 4.2 x 2.9 cm previously 4.0 x 2.6 cm (image 110). Spiculated mass posterior basilar left lower lobe stable at 1.7 x 1.2 cm (image 119).    11/3/23- PET/CT-  A consolidative masslike opacity with atelectasis involving the right lower lobe at site of original tumor demonstrates and ill-defined area of soft tissue thickening along its superior margin with moderate FDG uptake. Appearance favors ongoing posttreatment inflammatory change although is indeterminate for early local recurrence. Recommend short term chest CT with IV contrast follow-up in 3 months. Otherwise no evidence of FDG avid malignancy. An irregular nodule in the left lower lobe is not FDG avid suggesting posttreatment scarring. No evidence of metastasis including no FDG avid adenopathy.Some small irregular opacities have developed in the left lung in the short interval since 10/20/2023, likely infectious/inflammatory         Interval history:   Yamel is here for follow up and discuss results of PET CT scan.    Her pembro on hold currently for  immune mediated myalgia/arthralgia  She was on maintenance prednisone for immune related arthritis/myositis.   She restarted prednisone at 10 mg and noticed little to no difference in pain. She reports having more pain in the b/l joints than muscle/calf pain.  This is affecting her ability to do ADL  She is seeing a new sports medicine doc to consdier inj for possible arthritis      ECOG PS 2.5    Current Outpatient Medications   Medication Sig Dispense Refill    acetaminophen (TYLENOL) 500 MG tablet Take 1,000 mg by mouth every 6 hours as needed for mild pain Taking once a day      ADVAIR -21 MCG/ACT inhaler INHALE 2 PUFFS INTO THE LUNGS TWICE DAILY 12 g 11    albuterol (PROAIR HFA/PROVENTIL HFA/VENTOLIN HFA) 108 (90 Base) MCG/ACT inhaler INHALE 2 PUFFS INTO THE LUNGS EVERY 6 HOURS AS NEEDED FOR SHORTNESS OF BREATH OR DIFFICULTY BREATHING OR WHEEZING 8.5 g 11    atorvastatin (LIPITOR) 20 MG tablet Take 1 tablet (20 mg) by mouth daily 90 tablet 3    celecoxib (CELEBREX) 200 MG capsule Take 1 capsule (200 mg) by mouth daily 60 capsule 1    Cholecalciferol (VITAMIN D-3 PO) Take 2,000 Units by mouth daily       clonazePAM (KLONOPIN) 0.5 MG tablet Take 1-2 tablets (0.5-1 mg) by mouth 2 times daily as needed for anxiety (Patient not taking: Reported on 11/1/2023) 120 tablet 1    clonazePAM (KLONOPIN) 0.5 MG tablet Take 0.5-1 tablets (0.25-0.5 mg) by mouth 2 times daily as needed for anxiety 30 tablet 3    FLUoxetine (PROZAC) 20 MG capsule TAKE 2 CAPSULES(40 MG) BY MOUTH DAILY 180 capsule 3    gabapentin (NEURONTIN) 300 MG capsule TAKE 1 CAPSULE(300 MG) BY MOUTH EVERY EVENING AS NEEDED FOR NERVE PAIN 30 capsule 0    hydrochlorothiazide (HYDRODIURIL) 25 MG tablet TAKE 1 TABLET(25 MG) BY MOUTH DAILY 90 tablet 1    ibuprofen (ADVIL,MOTRIN) 200 MG tablet Take 200-600 mg by mouth every 2 hours as needed for mild pain      ipratropium - albuterol 0.5 mg/2.5 mg/3 mL (DUONEB) 0.5-2.5 (3) MG/3ML neb solution Take 1 vial (3  mLs) by nebulization every 6 hours as needed for shortness of breath or wheezing 90 mL 0    lidocaine-prilocaine (EMLA) 2.5-2.5 % external cream Apply topically as needed for moderate pain (4-6) Apply 1 hour prior to port access and cover with saran wrap or press and seal. (Patient not taking: Reported on 11/1/2023) 30 g 1    omeprazole (PRILOSEC) 20 MG DR capsule TAKE 1 CAPSULE(20 MG) BY MOUTH DAILY 30 TO 60 MINUTES BEFORE A MEAL 90 capsule 2    ondansetron (ZOFRAN) 8 MG tablet Take 1 tablet (8 mg) by mouth every 8 hours as needed for nausea (vomiting) 30 tablet 2    polyethylene glycol (MIRALAX) 17 g packet Take 1 packet by mouth daily Taking PRN (Patient not taking: Reported on 10/23/2023)      predniSONE (DELTASONE) 10 MG tablet Take 1 tablet (10 mg) by mouth daily (Patient not taking: Reported on 11/1/2023) 30 tablet 0    predniSONE (DELTASONE) 10 MG tablet Take 1 tablet (10 mg) by mouth daily (Patient not taking: Reported on 10/23/2023) 60 tablet 1    prochlorperazine (COMPAZINE) 10 MG tablet Take 0.5 tablets (5 mg) by mouth every 6 hours as needed for nausea or vomiting 30 tablet 2    tiZANidine (ZANAFLEX) 2 MG tablet Take 1 tablet (2 mg) by mouth 3 times daily as needed for muscle spasms 90 tablet 3    traZODone (DESYREL) 50 MG tablet TAKE 2 TABLETS(100 MG) BY MOUTH AT BEDTIME 180 tablet 3          Allergies   Allergen Reactions    Nka [No Known Allergies]     Seasonal Allergies        /77 (BP Location: Right arm, Patient Position: Sitting, Cuff Size: Adult Regular)   Pulse 81   Temp 97.7  F (36.5  C) (Oral)   Wt 74.3 kg (163 lb 11.2 oz)   SpO2 94%   BMI 29.22 kg/m    General: Patient appears well in no acute distress.   Skin: No visualized rash or lesions on visualized skin  Eyes: EOMI, no erythema, sclera icterus or discharge noted  Resp: Appears to be breathing comfortably without accessory muscle usage, speaking in full sentences, no cough  MSK: Appears to have normal range of motion based on  visualized movements  Neurologic: No apparent tremors, facial movements symmetric  Psych: affect engaged, pleasant, alert and oriented   not currently breastfeeding.  Wt Readings from Last 4 Encounters:   11/01/23 75.2 kg (165 lb 11.2 oz)   10/23/23 76.5 kg (168 lb 11.2 oz)   08/31/23 75.8 kg (167 lb)   08/28/23 75.7 kg (166 lb 12.8 oz)         Impression/plan:     Ms. Yamel Lindsay is a 77 year old  female with PMHx of for HTN, HLD, anxiety, who is here for evaluation/followup of new lung cancer     Synchronous Rt LL Squamous cell carcinoma [id number] Stage IIIC (AJCC 8th edition) diagnosed 12/2022  PD-L1 <1%  NGS- [facility] panel- Neg (High TMB 16.7 mut/MB)     Left LL adenocarcinoma [id number] Stage (AJCC 8th edition) diagnosed 12/2022  PD-L1-2%  NGS-KRAS G12C, TMB 2.4     Pt with 2 synchronous cancers at diagnosis..  The more  threatening one was the right lower lobe squamous cell cancer which appears to be at least stage IIIC given the size greater than 7 cm suggestive of T4 disease.  She also has mediastinal lymph node that were enlarged and appear pathologic but were not biopsied.  She underwent an EBUS for debulking and her mediastinum was not been staged.  She is also has several FDG avid nodules in the right middle and the right upper lobe that have not yet been biopsied.  Given the extent of involvement of the right sided cancer, concurrent/sequential chemoradiation not feasible per tumor board discussion, also no further EBUS required since high probability of cancer.   She proceeded with palliative intent chemoimmunotherapy with pembro+carbo+taxol.  Of note, squamous has high TMB and low neg PD-L1.  Adenocarcinoma has KRAS G12C. CT after 4 cycles showing good partial response in the right-sided tumor and some response in the left side adenocarcinoma.  After discussion at tumor board, Dr. Escoto did definitive radiation treatment  on cancer on the right side and SBRT on left side both with definitive intent.  CT on  "10/2023 showing slight increase in size of consolidative opacity int he rt lower lobe.We got a PET scan which showed some mild uptake b/l suggesting inflammation and mild FDG uptake in the rt LL but FDG avidity is much lower than before suggesting likley Rx related changes.   Discussed low likelihood of cancer progression, discussed monitoring with CT scan for now.    Immunotherapy on hold due to Myositis/arthritis with hx of elevated CRP and ESR been attributed to immunotherapy. We willc ontnue to hold it for now until we see signs of progression.        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 Has been attributed to immunotherapy. dopplar negative for DVT, No known bone mets on baseline PET/CT.  -CK normal  - will get ESR/CRP (previously CK ahs been normal)  -has had 2 prednisone tapers, the 2nd was a longer taper. Her pain started to increase the week after stopping prednisone  -she was resumed on prednisone 10 mg without improvement and increased to 20 mg.   - was on 10 mg daily, but stopped it for unclear reasons, has noticed recent flare in both the knee joints and left calf muscle  - She resumed 10 mg prednisone 10/2023 but did not notice any difference, now her pain is mor ein the joints and muscle ache seems to be relievd by muscle relaxant, suggesting this is MSK etiology and less likley to be immunotherapy related.   - -has tramadol, started on gabapentin 300 mg at bedtime per palliative\"  -- stop prednisone, asked he rto get in tpuch with me if symptoms get worse (especially msucle)  -see ign sports medicien Doc for possible injection for arthritis        #Psychiatry: Patient has previously had a history of anxiety.now since the diagnosis, is having panic attacks.  PTA on Prozac, and also clonazepam  -Trazodoen for ?insomnia  -cloazepan BID 0.5 mg     #COVID vaccine: s/p 3 boosters     #Hypertension hyperlipidemia  ON statin        On the " day of service,  Preparation time:  5 minutes  Visit time:  30 minutes  Care Coordination:  5 minutes        Chaz Martel M.D.   of Medicine  Division of Hematology, Oncology and Transplantation  [facility]

## 2023-11-06 NOTE — NURSING NOTE
"Oncology Rooming Note    November 6, 2023 1:34 PM   Yamel Lindsay is a 78 year old female who presents for:    Chief Complaint   Patient presents with    Oncology Clinic Visit     Right lower lobe lung mass     Initial Vitals: /77 (BP Location: Right arm, Patient Position: Sitting, Cuff Size: Adult Regular)   Pulse 81   Wt 74.3 kg (163 lb 11.2 oz)   BMI 29.22 kg/m   Estimated body mass index is 29.22 kg/m  as calculated from the following:    Height as of 10/23/23: 1.594 m (5' 2.76\").    Weight as of this encounter: 74.3 kg (163 lb 11.2 oz). Body surface area is 1.81 meters squared.  No Pain (0) Comment: Data Unavailable   No LMP recorded. Patient is postmenopausal.  Allergies reviewed: Yes  Medications reviewed: Yes    Medications: Medication refills not needed today.  Pharmacy name entered into EPIC:    [facility] [place], [place] - [address] [facility] [place], [place] - [address] [id number] - [place], [place] - [facility] [facility] [address] [facility] [address] [facility]    Clinical concerns:  none      Rekha Turner              "

## 2023-11-08 ENCOUNTER — OFFICE VISIT (OUTPATIENT)
Dept: ORTHOPEDICS | Facility: CLINIC | Age: 78
End: 2023-11-08
Attending: INTERNAL MEDICINE
Payer: COMMERCIAL

## 2023-11-08 VITALS — HEIGHT: 62 IN | WEIGHT: 163 LBS | BODY MASS INDEX: 30 KG/M2

## 2023-11-08 DIAGNOSIS — M22.2X1 PATELLOFEMORAL SYNDROME OF RIGHT KNEE: ICD-10-CM

## 2023-11-08 DIAGNOSIS — M17.12 PRIMARY OSTEOARTHRITIS OF LEFT KNEE: ICD-10-CM

## 2023-11-08 DIAGNOSIS — M17.11 PRIMARY OSTEOARTHRITIS OF RIGHT KNEE: Primary | ICD-10-CM

## 2023-11-08 PROCEDURE — 99204 OFFICE O/P NEW MOD 45 MIN: CPT | Mod: 25 | Performed by: STUDENT IN AN ORGANIZED HEALTH CARE EDUCATION/TRAINING PROGRAM

## 2023-11-08 PROCEDURE — 20611 DRAIN/INJ JOINT/BURSA W/US: CPT | Mod: RT | Performed by: STUDENT IN AN ORGANIZED HEALTH CARE EDUCATION/TRAINING PROGRAM

## 2023-11-08 RX ORDER — LIDOCAINE HYDROCHLORIDE 10 MG/ML
3 INJECTION, SOLUTION INFILTRATION; PERINEURAL
Status: SHIPPED | OUTPATIENT
Start: 2023-11-08

## 2023-11-08 RX ORDER — BETAMETHASONE SODIUM PHOSPHATE AND BETAMETHASONE ACETATE 3; 3 MG/ML; MG/ML
6 INJECTION, SUSPENSION INTRA-ARTICULAR; INTRALESIONAL; INTRAMUSCULAR; SOFT TISSUE
Status: SHIPPED | OUTPATIENT
Start: 2023-11-08

## 2023-11-08 RX ORDER — ROPIVACAINE HYDROCHLORIDE 5 MG/ML
2 INJECTION, SOLUTION EPIDURAL; INFILTRATION; PERINEURAL
Status: SHIPPED | OUTPATIENT
Start: 2023-11-08

## 2023-11-08 RX ADMIN — BETAMETHASONE SODIUM PHOSPHATE AND BETAMETHASONE ACETATE 6 MG: 3; 3 INJECTION, SUSPENSION INTRA-ARTICULAR; INTRALESIONAL; INTRAMUSCULAR; SOFT TISSUE at 14:07

## 2023-11-08 RX ADMIN — LIDOCAINE HYDROCHLORIDE 3 ML: 10 INJECTION, SOLUTION INFILTRATION; PERINEURAL at 14:07

## 2023-11-08 RX ADMIN — ROPIVACAINE HYDROCHLORIDE 2 ML: 5 INJECTION, SOLUTION EPIDURAL; INFILTRATION; PERINEURAL at 14:07

## 2023-11-08 NOTE — LETTER
11/8/2023         RE: Yamel Lindsay  [facility] [facility] [address]        Dear Colleague,    Thank you for referring your patient, Yamel Lindsay, to the [facility]. Please see a copy of my visit note below.    ASSESSMENT & PLAN    Yamel was seen today for pain and pain.    Diagnoses and all orders for this visit:    Primary osteoarthritis of right knee  Patellofemoral syndrome of right knee  Primary osteoarthritis of left knee      This issue is chronic and Worsening. Erna attends today with bilateral, right worse than left chronic knee pain.  Her radiographs taken on 11/3 were reviewed with the patient today.  These are somewhat limiting given there is only on AP view and no lateral or sunshine view.  They do show overall mild osteoarthritis of bilateral knees in the medial and lateral compartments.  Given the patient's history and exam findings, I suspect she also has osteoarthritis of the patellofemoral compartment.  We discussed the above findings and the possible treatment options including physical therapy, corticosteroid injection, and surgical intervention.  Given her overall mild arthritis, I suspect she will do well with physical therapy.  However, given her pain level today it is reasonable to perform a corticosteroid injection for acute pain relief and help her perform her therapy exercises.  We determined the following plan:  -CSI to right knee performed under ultrasound guidance today, see procedure note below for details  -Physical therapy referral placed today to focus on quad and glutes/hip strengthening  -She can continue to use over-the-counter pain medications, ice, heat as needed  -She will follow-up in our clinic in 1 week for a procedure only visit for a steroid injection to the left knee    Terry Aviles DO  [facility]    -----  Chief Complaint   Patient presents with     Left  "Knee - Pain     Right Knee - Pain       SUBJECTIVE  Yamel Lindsay is a/an 78 year old female who is seen in consultation at the request of  Torres Gan M.D. for evaluation of bilateral knee pain.     The patient is seen by themselves.    Onset: many years(s) ago. Reports insidious onset without acute precipitating event. Patient notes there was no accident but the pain has been getting progressively worse over the years.   Location of Pain: bilateral right is worse than the left knee - above the knee pain on the patellofemoral tendon  Worsened by: movement  Better with: rest  Treatments tried: rest/activity avoidance, ice, heat, elevation, previous imaging (xray 11/3/2023), corticosteroid injection (most recent date: 7ish years ago) patient cannot remember if it provided relief.  Associated symptoms: weakness of knee, pain travels down into the calf, and feeling of instability    Orthopedic/Surgical history: NO  Social History/Occupation: retired      REVIEW OF SYSTEMS:  Review of systems negative unless mentioned in HPI     OBJECTIVE:  Ht 1.575 m (5' 2\")   Wt 73.9 kg (163 lb)   BMI 29.81 kg/m     General: healthy, alert and in no distress  Skin: no suspicious lesions or rash.  CV: distal perfusion intact  Resp: normal respiratory effort without conversational dyspnea   Psych: normal mood and affect  Gait: NORMAL  Neuro: Normal light sensory exam of bilateral lower extremity      Knee exam  Gait: Normal  Alignment:  [x] Normal  [] Anatomic valgus  [] Anatomic varus  Inspection: [x] Normal  [] Ecchymosis present []Other   Palpation:  Joint Tenderness: [] No Tenderness  [x] MJL [] LJL [] MCL Margin [] LCL Margin [] Pes Anserine [] Distal Quadricep  [] Other   Peripatellar Tenderness: [] None [] Lateral pole [x] Medial pole [x] Superior pole [] Inferior pole    Patellar tendon pain: [x] None [] Present    Patellar apprehension: [x] None [] Present    Patellar motion: [x] Normal [] Abnormal  Crepitus: [x] " None [] Present  Effusion: [x] None [] Trace [] 1+ [] 2+ [] 3+  Range of motion:  Flexion: 135, Extension: 0  Strength:  [x] Full in all planes, including intact extensor mechanism [] Limited as described  Neurologic: Normal sensation   Vascular: Normal pulses   Special tests:   Lachman: Negative  Anterior Drawer: Negative  Sag/quad Activation: NP  Posterior Drawer: Negative  Valgus Stress: Negative at 0/30  Varus Stress: Negative at 0/30  Josselyn's: Negative  Thessaly: NP     Other notable findings/comments: None       RADIOLOGY:  Final results and radiologist's interpretation, available in the [facility] health record.  Images were reviewed with the patient in the office today.  My personal interpretation of the performed imaging: Radiographs from 11/3 were reviewed.  There is only a single AP standing view available.  There is mild osteophytosis and joint space narrowing of the medial and lateral compartments    Large Joint Injection/Arthocentesis: R knee joint    Date/Time: 11/8/2023 2:07 PM    Performed by: Terry Aviles DO  Authorized by: Terry Aviles DO    Indications:  Pain and osteoarthritis  Needle Size:  22 G  Guidance: ultrasound    Approach:  Anterolateral  Location:  Knee      Medications:  6 mg betamethasone acet & sod phos 6 (3-3) MG/ML; 3 mL lidocaine 1 %; 2 mL ROPivacaine 5 MG/ML  Outcome:  Tolerated well, no immediate complications  Procedure discussed: discussed risks, benefits, and alternatives    Consent Given by:  Patient  Timeout: timeout called immediately prior to procedure    Prep: patient was prepped and draped in usual sterile fashion     Ultrasound was used to ensure safe and accurate needle placement and injection. Ultrasound images of the procedure were permanently stored.    1ml of 8.4% Sodium Bicarbonate solution was used to buffer the local numbing agent for today's injection         Again, thank you for allowing me to participate in the care of your patient.         Sincerely,        Terry Aviles, DO

## 2023-11-10 NOTE — PROGRESS NOTES
"ASSESSMENT & PLAN    There are no diagnoses linked to this encounter.  This issue is {ACUTE/CHRONIC:186963} and {IMPROVING WORSENIN}.    {FOLLOW UP PLANS (Optional):369671}    Terry Aviles, [facility]    SUBJECTIVE- Interim History November 10, 2023    No chief complaint on file.      Yamel Lindsay is a 78 year old female who is seen in f/u up for Data Unavailable. Since last visit on *** patient has ***.  - Now ~ *** from initial onset    Worsened by: ***  Better with: ***  Treatments tried: {sjatreatmentoptions:483709}  Associated symptoms:  {thassociatedsx:581369}    The patient is seen {sjaparent:658052}.  The patient is {HANDDOMINANCE:864103} handed    Orthopedic/Surgical history: {YES - DATE/NO:316692::\"NO\"}  Social History/Occupation: ***      REVIEW OF SYSTEMS:  Review of Systems    OBJECTIVE:  There were no vitals taken for this visit.   General: healthy, alert and in no distress  Skin: no suspicious lesions or rash.  CV: distal perfusion intact ***  Resp: normal respiratory effort without conversational dyspnea   Psych: normal mood and affect  Gait: {FSOC GAIT:908439::\"NORMAL\"}  Neuro: Normal light sensory exam of *** extremity ***    RADIOLOGY:  Final results and radiologist's interpretation, available in the [facility] health record.  Images were reviewed with the patient in the office today.  My personal interpretation of the performed imaging: ***      {[facility]  Documentation (Optional):306154}  { E&M time (Optional):118761}  {Provider  Link to [facility] Help Grid :382604}     "

## 2023-11-13 DIAGNOSIS — C34.31 MALIGNANT NEOPLASM OF LOWER LOBE OF RIGHT LUNG (H): ICD-10-CM

## 2023-11-13 DIAGNOSIS — T45.1X5A CHEMOTHERAPY-INDUCED PERIPHERAL NEUROPATHY (H): ICD-10-CM

## 2023-11-13 DIAGNOSIS — G62.0 CHEMOTHERAPY-INDUCED PERIPHERAL NEUROPATHY (H): ICD-10-CM

## 2023-11-14 RX ORDER — GABAPENTIN 300 MG/1
CAPSULE ORAL
Qty: 30 CAPSULE | Refills: 3 | Status: SHIPPED | OUTPATIENT
Start: 2023-11-14 | End: 2024-03-26

## 2023-11-14 NOTE — TELEPHONE ENCOUNTER
Received [email or web address] message from pharmacy requesting refill of Gabapentin.     Last refill: 10/11/2023  Last office visit: 11/1/2023  Scheduled for follow up pending per check out request and message sent to patient    Will route request to MD for review.     Reviewed [place]  Report.

## 2023-11-16 NOTE — PROGRESS NOTES
Sports Medicine Procedure Note    Yamel was seen today for pain.    Diagnoses and all orders for this visit:    Primary osteoarthritis of left knee  -     Large Joint Injection/Arthocentesis: L knee joint        Yamel Lindsay is a/an 78 year old female who is seen for Corticosteroid injection of left knee.   Last injection: 11/8/2023 for the right knee performed by Dr. Aviles     -DARIN to left knee performed today under ultrasound guidance, see procedure note below for details  -Post-injection instructions were provided to the patient    Return if symptoms worsen or fail to improve.    Large Joint Injection/Arthocentesis: L knee joint    Date/Time: 11/17/2023 1:40 PM    Performed by: Terry Aviles DO  Authorized by: Terry Aviles DO    Indications:  Pain and osteoarthritis  Needle Size:  22 G  Guidance: ultrasound    Approach:  Anterolateral  Location:  Knee      Medications:  6 mg betamethasone acet & sod phos 6 (3-3) MG/ML; 2 mL lidocaine 1 %; 4 mL ROPivacaine 5 MG/ML  Outcome:  Tolerated well, no immediate complications  Procedure discussed: discussed risks, benefits, and alternatives    Consent Given by:  Patient  Timeout: timeout called immediately prior to procedure    Prep: patient was prepped and draped in usual sterile fashion     Ultrasound was used to ensure safe and accurate needle placement and injection. Ultrasound images of the procedure were permanently stored.    1ml of 8.4% Sodium Bicarbonate solution was used to buffer the local numbing agent for today's injection    Post-Injection Discharge Instructions    You may shower, however avoid swimming, tub baths or hot tubs for 24 hours following your procedure  You may have a mild to moderate increase in pain for a few days following the injection.  The lidocaine (local numbing medicine) will wear off in several hours. It usually takes 3-5 days for the steroid medication to start working although it may take up to 14 days for full effect.   You  may use ice packs for 10-15 minutes, 3 to 4 times a day at the injection site for comfort if needed  You may use extra strength Tylenol for pain control if necessary   If you were fasting, you may resume your normal diet and medications after the procedure  If you have diabetes, your blood sugar may be higher than normal for 10-14 days following a steroid injection. Contact your doctor who manages your diabetes if your blood sugar is significantly higher than usual    If you experience any of the following, call Sports Medicine @ [phone] or [phone]  -Fever over 100 degrees F  -Swelling, bleeding, redness, drainage, warmth at the injection site  -New or significant worsening pain        Dr. ASHLEY Avilse, DO [facility]  Sports Medicine     -----

## 2023-11-17 ENCOUNTER — OFFICE VISIT (OUTPATIENT)
Dept: ORTHOPEDICS | Facility: CLINIC | Age: 78
End: 2023-11-17
Payer: COMMERCIAL

## 2023-11-17 VITALS — HEIGHT: 62 IN | WEIGHT: 163 LBS | BODY MASS INDEX: 30 KG/M2

## 2023-11-17 DIAGNOSIS — M17.12 PRIMARY OSTEOARTHRITIS OF LEFT KNEE: Primary | ICD-10-CM

## 2023-11-17 PROCEDURE — 20611 DRAIN/INJ JOINT/BURSA W/US: CPT | Mod: LT | Performed by: STUDENT IN AN ORGANIZED HEALTH CARE EDUCATION/TRAINING PROGRAM

## 2023-11-17 RX ORDER — LIDOCAINE HYDROCHLORIDE 10 MG/ML
2 INJECTION, SOLUTION INFILTRATION; PERINEURAL
Status: SHIPPED | OUTPATIENT
Start: 2023-11-17

## 2023-11-17 RX ORDER — BETAMETHASONE SODIUM PHOSPHATE AND BETAMETHASONE ACETATE 3; 3 MG/ML; MG/ML
6 INJECTION, SUSPENSION INTRA-ARTICULAR; INTRALESIONAL; INTRAMUSCULAR; SOFT TISSUE
Status: SHIPPED | OUTPATIENT
Start: 2023-11-17

## 2023-11-17 RX ORDER — ROPIVACAINE HYDROCHLORIDE 5 MG/ML
4 INJECTION, SOLUTION EPIDURAL; INFILTRATION; PERINEURAL
Status: SHIPPED | OUTPATIENT
Start: 2023-11-17

## 2023-11-17 RX ADMIN — ROPIVACAINE HYDROCHLORIDE 4 ML: 5 INJECTION, SOLUTION EPIDURAL; INFILTRATION; PERINEURAL at 13:40

## 2023-11-17 RX ADMIN — BETAMETHASONE SODIUM PHOSPHATE AND BETAMETHASONE ACETATE 6 MG: 3; 3 INJECTION, SUSPENSION INTRA-ARTICULAR; INTRALESIONAL; INTRAMUSCULAR; SOFT TISSUE at 13:40

## 2023-11-17 RX ADMIN — LIDOCAINE HYDROCHLORIDE 2 ML: 10 INJECTION, SOLUTION INFILTRATION; PERINEURAL at 13:40

## 2023-11-17 NOTE — LETTER
11/17/2023         RE: Yamel Lindsay  [address] [address]        Dear Colleague,    Thank you for referring your patient, Yamel Lindsay, to the [facility]. Please see a copy of my visit note below.    Sports Medicine Procedure Note    Yamel was seen today for pain.    Diagnoses and all orders for this visit:    Primary osteoarthritis of left knee  -     Large Joint Injection/Arthocentesis: L knee joint        Yamel Lindsay is a/an 78 year old female who is seen for Corticosteroid injection of left knee.   Last injection: 11/8/2023 for the right knee performed by Dr. Aviles     -DARIN to left knee performed today under ultrasound guidance, see procedure note below for details  -Post-injection instructions were provided to the patient    Return if symptoms worsen or fail to improve.    Large Joint Injection/Arthocentesis: L knee joint    Date/Time: 11/17/2023 1:40 PM    Performed by: Terry Aviles DO  Authorized by: Terry Aviles DO    Indications:  Pain and osteoarthritis  Needle Size:  22 G  Guidance: ultrasound    Approach:  Anterolateral  Location:  Knee      Medications:  6 mg betamethasone acet & sod phos 6 (3-3) MG/ML; 2 mL lidocaine 1 %; 4 mL ROPivacaine 5 MG/ML  Outcome:  Tolerated well, no immediate complications  Procedure discussed: discussed risks, benefits, and alternatives    Consent Given by:  Patient  Timeout: timeout called immediately prior to procedure    Prep: patient was prepped and draped in usual sterile fashion     Ultrasound was used to ensure safe and accurate needle placement and injection. Ultrasound images of the procedure were permanently stored.    1ml of 8.4% Sodium Bicarbonate solution was used to buffer the local numbing agent for today's injection    Post-Injection Discharge Instructions    You may shower, however avoid swimming, tub baths or hot tubs for 24 hours following your procedure  You may have a  mild to moderate increase in pain for a few days following the injection.  The lidocaine (local numbing medicine) will wear off in several hours. It usually takes 3-5 days for the steroid medication to start working although it may take up to 14 days for full effect.   You may use ice packs for 10-15 minutes, 3 to 4 times a day at the injection site for comfort if needed  You may use extra strength Tylenol for pain control if necessary   If you were fasting, you may resume your normal diet and medications after the procedure  If you have diabetes, your blood sugar may be higher than normal for 10-14 days following a steroid injection. Contact your doctor who manages your diabetes if your blood sugar is significantly higher than usual    If you experience any of the following, call Sports Medicine @ [phone] or [phone]  -Fever over 100 degrees F  -Swelling, bleeding, redness, drainage, warmth at the injection site  -New or significant worsening pain        Dr. ASHLEY Aviles DO [facility]  Sports Medicine     -----        Again, thank you for allowing me to participate in the care of your patient.        Sincerely,        Terry Aviles DO

## 2023-11-25 ENCOUNTER — HEALTH MAINTENANCE LETTER (OUTPATIENT)
Age: 78
End: 2023-11-25

## 2023-12-16 DIAGNOSIS — M62.838 MUSCLE SPASM: ICD-10-CM

## 2023-12-18 RX ORDER — TIZANIDINE 2 MG/1
2 TABLET ORAL 3 TIMES DAILY PRN
Qty: 90 TABLET | Refills: 0 | Status: SHIPPED | OUTPATIENT
Start: 2023-12-18 | End: 2024-01-15

## 2023-12-21 ENCOUNTER — TRANSFERRED RECORDS (OUTPATIENT)
Dept: HEALTH INFORMATION MANAGEMENT | Facility: CLINIC | Age: 78
End: 2023-12-21
Payer: COMMERCIAL

## 2024-01-03 DIAGNOSIS — F41.9 ANXIETY: ICD-10-CM

## 2024-01-03 RX ORDER — CLONAZEPAM 0.5 MG/1
.5-1 TABLET ORAL 2 TIMES DAILY PRN
Qty: 120 TABLET | Refills: 1 | Status: SHIPPED | OUTPATIENT
Start: 2024-01-03 | End: 2024-03-07

## 2024-01-03 NOTE — TELEPHONE ENCOUNTER
Pending Prescriptions:                       Disp   Refills    clonazePAM (KLONOPIN) 0.5 MG tablet       120 ta*1            Sig: Take 1-2 tablets (0.5-1 mg) by mouth 2 times           daily as needed for anxiety    Last prescribing provider:     Last clinic visit date: 11/06/23 w/    Recommendations for requested medication (if none, N/A): Copied from last OV note: Psychiatry: Patient has previously had a history of anxiety.now since the diagnosis, is having panic attacks.  PTA on Prozac, and also clonazepam  -Trazodoen for ?insomnia  -cloazepan BID 0.5 mg    Any other pertinent information (if none, N/A): N/A    Refilled: Y/N, if NO, why?

## 2024-01-12 ENCOUNTER — THERAPY VISIT (OUTPATIENT)
Dept: PHYSICAL THERAPY | Facility: REHABILITATION | Age: 79
End: 2024-01-12
Attending: STUDENT IN AN ORGANIZED HEALTH CARE EDUCATION/TRAINING PROGRAM
Payer: COMMERCIAL

## 2024-01-12 DIAGNOSIS — M62.838 MUSCLE SPASM: ICD-10-CM

## 2024-01-12 DIAGNOSIS — M17.12 PRIMARY OSTEOARTHRITIS OF LEFT KNEE: ICD-10-CM

## 2024-01-12 PROCEDURE — 97161 PT EVAL LOW COMPLEX 20 MIN: CPT | Mod: GP

## 2024-01-12 PROCEDURE — 97110 THERAPEUTIC EXERCISES: CPT | Mod: GP

## 2024-01-12 NOTE — PROGRESS NOTES
PHYSICAL THERAPY EVALUATION  Type of Visit: Evaluation    See electronic medical record for Abuse and Falls Screening details.    Subjective       Presenting condition or subjective complaint: B knee pain with R>L starting years prior  Date of onset: 11/17/23    Relevant medical history: Arthritis; Asthma; Cancer   Dates & types of surgery: Lung cancer - done with chemo and radiation treatments    Prior diagnostic imaging/testing results: CT scan     IMPRESSION:   Frontal view of both knees demonstrates mild medial compartment degenerative arthrosis bilaterally.  Prior therapy history for the same diagnosis, illness or injury:        Prior Level of Function  Transfers: Independent  Ambulation: Independent  ADL:   IADL:     Living Environment  Social support:     Type of home:     Stairs to enter the home:         Ramp:     Stairs inside the home:         Help at home:    Equipment owned:       Employment:      Hobbies/Interests:      Patient goals for therapy: Improve leg pain    Pain assessment: Pain present     Objective   KNEE EVALUATION  PAIN: Pain Level at Rest: 4/10  Pain Level with Use: 8/10  Pain Location: B knee and upper calf   Pain Quality: Aching, Sharp, and Shooting  Pain Frequency: constant or increased intensity at random times  Pain is Worst: Morning  Pain is Exacerbated By: Walking  Pain is Relieved By: Medication, rest/offloading knees  Pain Progression: Worsened  INTEGUMENTARY (edema, incisions):   POSTURE:  Forward flexed posture   GAIT:  Weightbearing Status: WBAT  Assistive Device(s): None  Gait Deviations: Antalgic  Stride length decreased  Estephania decreased  BALANCE/PROPRIOCEPTION:  SLS with support UE 5 sec B no  increased leg pain   WEIGHTBEARING ALIGNMENT:   NON-WEIGHTBEARING ALIGNMENT:   ROM:   (Degrees) Left AROM Left PROM  Right AROM Right PROM   Knee Flexion Full range B       Knee Extension Full range B       Pain: Pain with end range flexion, R more than left. Pain with SRI in hip.    End feel: normal end feel    STRENGTH:   Pain: - none + mild ++ moderate +++ severe  Strength Scale: 0-5/5 Left Right   Knee Flexion 4+ 4+   Knee Extension 4+ 4+   Quad Set 4+ 4+     FLEXIBILITY:  HS and hip flexor limited secondary to slight hip pain   SPECIAL TESTS:    Left Right   Apley's (Meniscus)     Josselyn's (Meniscus) Negative  Negative    Candice's (ITB/TFL)     Patellar Apprehension Test Negative  Negative    Patella Tracking     Ligamentous Stability Negative  Negative    Anterior Drawer (ACL) Negative,   Negative,     Posterior Drawer (PCL) Negative,   Negative,     Prone Dial Test at 30 Deg and 90 Deg (PCL/PLC)     Valgus Stress Testing at 0 Deg and 30 Deg Negative,   Negative,     Varus Stress Testing at 0 Deg and 30 Deg        FUNCTIONAL TESTS:  STS 15.3 sec   PALPATION:  TTP R medial patellar border, B greater trochanters hips  JOINT MOBILITY: WFL    Assessment & Plan   CLINICAL IMPRESSIONS  Medical Diagnosis: M17.12 (ICD-10-CM) - Primary osteoarthritis of left knee    Treatment Diagnosis: B knee pain   Impression/Assessment: Patient is a 78 year old female with B knee pain, L>R complaints.  The following significant findings have been identified: Pain, Decreased ROM/flexibility, Decreased joint mobility, Decreased strength, Impaired balance, Decreased proprioception, Inflammation, Edema, Impaired gait, Impaired muscle performance, Decreased activity tolerance, Impaired posture, and Instability. These impairments interfere with their ability to perform self care tasks, work tasks, recreational activities, household chores, driving , household mobility, and community mobility as compared to previous level of function. Pt presents with B knee pain R>L chronic in nature with more difficulty walking, standing and transfers. Pt has felt more pain since ending chemo and radiation. Upon eval pt demonstrating generalized leg weakness and hip weakness secondary to deonditioning. Suspect may also have hip  degenerative changes also influencing mobility and pain. Pt would benefit from skilled PT to improve function.     Clinical Decision Making (Complexity):  Clinical Presentation: Stable/Uncomplicated  Clinical Presentation Rationale: based on medical and personal factors listed in PT evaluation  Clinical Decision Making (Complexity): Low complexity    PLAN OF CARE  Treatment Interventions:  Modalities: E-stim, Iontophoresis, Ultrasound  Interventions: Gait Training, Manual Therapy, Neuromuscular Re-education, Therapeutic Activity, Therapeutic Exercise, Self-Care/Home Management    Long Term Goals     PT Goal 1  Goal Identifier: HEP  Goal Description: Patient will be independent in self-management of condition and HEP to reach functional goals.  Target Date: 04/05/24  PT Goal 2  Goal Identifier: Walking  Goal Description: Pt will be able to walk for 1 mile or more with pain level <3/10 for return to physical activity  Target Date: 04/05/24  PT Goal 3  Goal Identifier: 5 x STS  Goal Description: Pt will improve 5 x STS score to 12 sec or less to demonstrate improved LE functional strength  Target Date: 04/05/24  PT Goal 4  Goal Identifier: Bending  Goal Description: Pt will be able to bend and  item off ground with pain level <3/10 to do ADLs  Target Date: 04/05/24      Frequency of Treatment: 1x/wk  Duration of Treatment: 12 weeks    Recommended Referrals to Other Professionals:   Education Assessment:   Learner/Method: Patient  Education Comments: Verbalizes understanding    Risks and benefits of evaluation/treatment have been explained.   Patient/Family/caregiver agrees with Plan of Care.     Evaluation Time:     PT Eval, Low Complexity Minutes (33028): 22       Signing Clinician: REINA SHARP, PT      [facility]                                                                                   OUTPATIENT PHYSICAL THERAPY      PLAN OF TREATMENT FOR OUTPATIENT REHABILITATION    Patient's Last Name, First Name, Yamel Rondon YOB: 1945   Provider's Name   [facility]   Medical Record No.  [id number]     Onset Date: 11/17/23  Start of Care Date: 01/12/24     Medical Diagnosis:  M17.12 (ICD-10-CM) - Primary osteoarthritis of left knee      PT Treatment Diagnosis:  B knee pain Plan of Treatment  Frequency/Duration: 1x/wk/ 12 weeks    Certification date from 01/12/24 to 04/05/24         See note for plan of treatment details and functional goals     REINA SHARP, PT                         I CERTIFY THE NEED FOR THESE SERVICES FURNISHED UNDER        THIS PLAN OF TREATMENT AND WHILE UNDER MY CARE     (Physician attestation of this document indicates review and certification of the therapy plan).              Referring Provider:  Terry Aviles    Initial Assessment  See Epic Evaluation- Start of Care Date: 01/12/24

## 2024-01-12 NOTE — TELEPHONE ENCOUNTER
Pending Prescriptions:                       Disp   Refills    tiZANidine (ZANAFLEX) 2 MG tablet [Pharmac*90 tab*0        Sig: TAKE 1 TABLET(2 MG) BY MOUTH THREE TIMES DAILY AS           NEEDED FOR MUSCLE SPASMS    Routing refill request to provider for review/approval because:  Drug not on the [facility] refill protocol     Anitra Lundberg RN  [facility]

## 2024-01-15 RX ORDER — TIZANIDINE 2 MG/1
2 TABLET ORAL 3 TIMES DAILY PRN
Qty: 90 TABLET | Refills: 3 | Status: SHIPPED | OUTPATIENT
Start: 2024-01-15 | End: 2024-04-17

## 2024-01-18 ENCOUNTER — TELEPHONE (OUTPATIENT)
Dept: PHYSICAL THERAPY | Facility: REHABILITATION | Age: 79
End: 2024-01-18

## 2024-01-18 NOTE — TELEPHONE ENCOUNTER
Yamel Lindsay was contacted after their no-show (missed visit without notification) on 1/18/24.  They were asked to call the clinic to confirm their upcoming appointments or their remaining appointments would be removed from the schedule. We have not heard back and will be canceling their remaining scheduled appointments. She was offered appointment of same day and to contact clinic back if wanting this appointment

## 2024-01-25 ENCOUNTER — THERAPY VISIT (OUTPATIENT)
Dept: PHYSICAL THERAPY | Facility: REHABILITATION | Age: 79
End: 2024-01-25
Payer: COMMERCIAL

## 2024-01-25 DIAGNOSIS — M17.12 PRIMARY OSTEOARTHRITIS OF LEFT KNEE: Primary | ICD-10-CM

## 2024-01-25 PROCEDURE — 97110 THERAPEUTIC EXERCISES: CPT | Mod: GP

## 2024-01-30 ENCOUNTER — TELEPHONE (OUTPATIENT)
Dept: PHYSICAL THERAPY | Facility: REHABILITATION | Age: 79
End: 2024-01-30
Payer: COMMERCIAL

## 2024-01-30 NOTE — TELEPHONE ENCOUNTER
Called patient after no show on 1/29/24 - Told patient that the rest of her visits would have to be cancelled due to the no show policy and her missing a previous appointment already. Told patient if she was still interested in physical therapy, she could give the  a call to reschedule, otherwise we would have to go ahead and resolve this episode of care.

## 2024-02-02 ENCOUNTER — HOSPITAL ENCOUNTER (OUTPATIENT)
Dept: CT IMAGING | Facility: HOSPITAL | Age: 79
Discharge: HOME | End: 2024-02-02
Attending: STUDENT IN AN ORGANIZED HEALTH CARE EDUCATION/TRAINING PROGRAM | Admitting: STUDENT IN AN ORGANIZED HEALTH CARE EDUCATION/TRAINING PROGRAM
Payer: COMMERCIAL

## 2024-02-02 DIAGNOSIS — C34.31 MALIGNANT NEOPLASM OF LOWER LOBE OF RIGHT LUNG (H): ICD-10-CM

## 2024-02-02 LAB
CREAT BLD-MCNC: 0.7 MG/DL (ref 0.6–1.1)
EGFRCR SERPLBLD CKD-EPI 2021: >60 ML/MIN/1.73M2

## 2024-02-02 PROCEDURE — 82565 ASSAY OF CREATININE: CPT

## 2024-02-02 PROCEDURE — 71260 CT THORAX DX C+: CPT

## 2024-02-02 PROCEDURE — 250N000011 HC RX IP 250 OP 636: Performed by: STUDENT IN AN ORGANIZED HEALTH CARE EDUCATION/TRAINING PROGRAM

## 2024-02-02 RX ORDER — IOPAMIDOL 755 MG/ML
90 INJECTION, SOLUTION INTRAVASCULAR ONCE
Status: COMPLETED | OUTPATIENT
Start: 2024-02-02 | End: 2024-02-02

## 2024-02-02 RX ADMIN — IOPAMIDOL 90 ML: 755 INJECTION, SOLUTION INTRAVENOUS at 13:35

## 2024-02-05 ENCOUNTER — ONCOLOGY VISIT (OUTPATIENT)
Dept: ONCOLOGY | Facility: CLINIC | Age: 79
End: 2024-02-05
Attending: STUDENT IN AN ORGANIZED HEALTH CARE EDUCATION/TRAINING PROGRAM
Payer: COMMERCIAL

## 2024-02-05 VITALS
HEART RATE: 86 BPM | TEMPERATURE: 97.6 F | OXYGEN SATURATION: 95 % | SYSTOLIC BLOOD PRESSURE: 127 MMHG | WEIGHT: 150 LBS | BODY MASS INDEX: 27.44 KG/M2 | RESPIRATION RATE: 18 BRPM | DIASTOLIC BLOOD PRESSURE: 74 MMHG

## 2024-02-05 DIAGNOSIS — C34.31 MALIGNANT NEOPLASM OF LOWER LOBE OF RIGHT LUNG (H): ICD-10-CM

## 2024-02-05 PROCEDURE — 99215 OFFICE O/P EST HI 40 MIN: CPT | Performed by: STUDENT IN AN ORGANIZED HEALTH CARE EDUCATION/TRAINING PROGRAM

## 2024-02-05 PROCEDURE — G0463 HOSPITAL OUTPT CLINIC VISIT: HCPCS | Performed by: STUDENT IN AN ORGANIZED HEALTH CARE EDUCATION/TRAINING PROGRAM

## 2024-02-05 ASSESSMENT — PAIN SCALES - GENERAL: PAINLEVEL: NO PAIN (0)

## 2024-02-05 NOTE — NURSING NOTE
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    Weight as of this encounter: 68 kg (150 lb). Body surface area is 1.72 meters squared.  No Pain (0) Comment: Data Unavailable   No LMP recorded. Patient is postmenopausal.  Allergies reviewed: Yes  Medications reviewed: Yes    Medications: Medication refills not needed today.  Pharmacy name entered into [employer]:    [facility] [place], [place] - [address] [facility] [place], [place] - [address] [id number] - [place], [place] - [address] [place] [address] E AT [place] [address] & [facility]    Frailty Screening:   Is the patient here for a new oncology consult visit in cancer care? 2. No      Clinical concerns: Pt had trouble accessing port at CT scan appointment last week.       Sandoval Mcghee              "

## 2024-02-05 NOTE — PROGRESS NOTES
2/5/24    Reason for Visit: f/u lung cancer, joint pain      Diagnosis:   Synchronous Rt LL Squamous cell carcinoma [id number] Stage IIIC (AJCC 8th edition) diagnosed 12/2022  PD-L1 <1%  NGS- [facility] panel- Neg (High TMB 16.7 mut/MB)     Left LL adenocarcinoma [id number] Stage (AJCC 8th edition) diagnosed 12/2022  PD-L1-2%  NGS-KRAS G12C, TMB 2.4      Treatment:  1/20/23- 4 cycles of Pembrolizumab+carboplatin+paclitaxel (pembro on hold ucrrently of immune mediated myalgia/arthralgia since 3/2023)   XRT-  Right Lower Lobe      6,000 cGy    in 30 fraction     6/06/2023 7/26/2023          Left Lower Lobe        5,000 cGy   in 10 fraction     6/22/2023 7/06/2023            Intent of treatment: Palliative    Onc HPI:    One month history of increasing dyspnea with palpitations, propting ER visit 11/19.  11/19/22- CT- 1 cm right lower lobe mass abutting multiple central bronchovascular and mediastinal structures, consistent with malignancy.2.2 cm spiculated nodule in the left lower lobe, also consistent with malignancy.   12/6/22- PET/CT-  Right lower lobe lung mass abutting the pleura (SUV max of 24), measuring 8 x 5.2 cm. There is associated complete obliteration of right lower lobe bronchus and loss of fat planes with  the left atrium. Spiculated left lower lobe solid lung mass (SUV 17.9), measuring 2.2 x 2 cm. There are several markedly hypermetabolic, irregular solid nodules in the right middle lobe abutting the pleura (approximately 10-12), as for example 1.2 cm nodule with SUV max of 16.8 (4/126). There is a separate markedly hypermetabolic 1 cm nodule in the right upper lobe. Mild hypermetabolic uptake is noted within a1 x 0.9 cm right paratracheal node ( SUV max of 4.2) (4/120), Mild uptake is also  noted within a subcentimeter lower paraesophageal node (4/154).   12/28/22- EBUS with deblking in the right hilar mass (no mediastinal staging performed)- - LUNG, RIGHT MAINSTEM, ENDOBRONCHIAL BIOPSY:  Squamous cell carcinoma with extensive necrosis (positive for p40 and negative for TTF-1). Left LL FNAC- Positive for malignancy- Adenocarcinoma  (diffusely positive for TTF-1 while negative for p40)  1/3/23: Brain MRI - Neg  1/10/23: Tumor board discussion: Too extensive for concurrent/sequential chemo XRT- plan for palliative intent chemo-IO  1/20/23- C1 Pembrolizumab+carboplatin+paclitaxel  2/10/23-C2 Pembrolizumab+carboplatin+paclitaxel  2/27/23- CT CAP- Decreased size of the hypoattenuating mass in the central right lower lobe and small irregularly-shaped nodules in the right upper lobe consistent with positive treatment response. There is persistent postobstructive right lower lobe atelectasis.Unchanged heterogeneous lobulated and spiculated solid nodule in the lateral basal left lower lobe. No findings to suggest new extrapulmonary metastatic disease.  3/3, 3/24-  Pembrolizumab+carboplatin+paclitaxel      4/10/23- CT CAP- - some Rx response- Mass distal right lower lobe bronchus causing complete post obstructive atelectasis has decreased from 6.5 x 4.5 x 3.5 cm to 5.5 x 3.8 x 2.4 cm today. Spiculated left lower lobe mass has decreased from 2.1 cm to 1.5 cm today. Right upper lobe nodularity continues to decrease. Ill-defined nodularity in the right middle lobe has developed and is most likely postinflammatory.     6/5/23- After discussion at tumor board, Dr. Escoto agreed for definitive radiation treatment (without chemo, as she already had 4 cycles of chemo) for cancer on the right side and possible SBRT on left side both with definitive intent    6/5/23- Started XRT     Right Lower Lobe      6,000 cGy    in 30 fraction     6/06/2023 7/26/2023          Left Lower Lobe        5,000 cGy   in 10 fraction     6/22/2023 7/06/2023 6/16/23- CT Chest-  Unchanged complete atelectasis of the heterogeneous right lower lobe due to central obstructing mass status post treatment. No change in the  posterolateral left lower lobe nodule with surrounding cicatrization. No findings to suggest progressive   malignancy in the chest.    8/25/23- CT Chest- Stable right lower lobe lung mass with distal atelectasis. Lobulated nodule in the left lower lobe is also stable. No disease progression in the chest. No metastatic disease in the abdomen and pelvis.     10/20/23- CT CHest- Heterogeneous mass with consolidation and complete volume loss of the right lower lobe which is located in the azygoesophageal recess has increased in size slightly with axial dimensions superiorly 3.2 x 2.2 cm previously 2.8 x 2.0 cm  (axial image 95 series 3) and axial dimensions inferiorly 4.2 x 2.9 cm previously 4.0 x 2.6 cm (image 110). Spiculated mass posterior basilar left lower lobe stable at 1.7 x 1.2 cm (image 119).    11/3/23- PET/CT-  A consolidative masslike opacity with atelectasis involving the right lower lobe at site of original tumor demonstrates and ill-defined area of soft tissue thickening along its superior margin with moderate FDG uptake. Appearance favors ongoing posttreatment inflammatory change although is indeterminate for early local recurrence. Recommend short term chest CT with IV contrast follow-up in 3 months. Otherwise no evidence of FDG avid malignancy. An irregular nodule in the left lower lobe is not FDG avid suggesting posttreatment scarring. No evidence of metastasis including no FDG avid adenopathy.Some small irregular opacities have developed in the left lung in the short interval since 10/20/2023, likely infectious/inflammatory    2/2/24- CT Chest-  Unchanged posttreatment lobar atelectasis of the right lower lobe. Increased predominantly solid parenchymal bands with architectural distortion in the basal left lower lobe, centered on the inferior posterior medial right upper lobe consistent with posttreatment inflammation and developing cicatrization. There are  no findings to suggest progression of  neoplasm in the chest.New small right pleural effusion not a component of which is organized in the posterior mid chest secondary to #2.         Interval history:   Yamel is here for follow up.    Her pembro on hold currently for immune mediated myalgia/arthralgia  She stopped prednisone  She went to cruise in Dec.Going to [place].  She was on maintenance prednisone for immune related arthritis/myositis.   She has stable  fatigue, able to walk daily  No change in breathing has stable exertional SOB  She has lost about 10 lbs intentional, attributes to increased activity  Appetite is ok        ECOG PS 1    Current Outpatient Medications   Medication Sig Dispense Refill    acetaminophen (TYLENOL) 500 MG tablet Take 1,000 mg by mouth every 6 hours as needed for mild pain Taking once a day      ADVAIR -21 MCG/ACT inhaler INHALE 2 PUFFS INTO THE LUNGS TWICE DAILY 12 g 11    albuterol (PROAIR HFA/PROVENTIL HFA/VENTOLIN HFA) 108 (90 Base) MCG/ACT inhaler INHALE 2 PUFFS INTO THE LUNGS EVERY 6 HOURS AS NEEDED FOR SHORTNESS OF BREATH OR DIFFICULTY BREATHING OR WHEEZING 8.5 g 11    atorvastatin (LIPITOR) 20 MG tablet Take 1 tablet (20 mg) by mouth daily 90 tablet 3    celecoxib (CELEBREX) 200 MG capsule Take 1 capsule (200 mg) by mouth daily 60 capsule 1    Cholecalciferol (VITAMIN D-3 PO) Take 2,000 Units by mouth daily       clonazePAM (KLONOPIN) 0.5 MG tablet Take 1-2 tablets (0.5-1 mg) by mouth 2 times daily as needed for anxiety 120 tablet 1    clonazePAM (KLONOPIN) 0.5 MG tablet Take 0.5-1 tablets (0.25-0.5 mg) by mouth 2 times daily as needed for anxiety 30 tablet 3    FLUoxetine (PROZAC) 20 MG capsule TAKE 2 CAPSULES(40 MG) BY MOUTH DAILY 180 capsule 3    gabapentin (NEURONTIN) 300 MG capsule TAKE 1 CAPSULE(300 MG) BY MOUTH EVERY EVENING AS NEEDED FOR NERVE PAIN 30 capsule 3    hydrochlorothiazide (HYDRODIURIL) 25 MG tablet TAKE 1 TABLET(25 MG) BY MOUTH DAILY 90 tablet 1    omeprazole (PRILOSEC) 20 MG DR capsule  TAKE 1 CAPSULE(20 MG) BY MOUTH DAILY 30 TO 60 MINUTES BEFORE A MEAL 90 capsule 2    tiZANidine (ZANAFLEX) 2 MG tablet TAKE 1 TABLET(2 MG) BY MOUTH THREE TIMES DAILY AS NEEDED FOR MUSCLE SPASMS 90 tablet 3    traZODone (DESYREL) 50 MG tablet TAKE 2 TABLETS(100 MG) BY MOUTH AT BEDTIME 180 tablet 3    ibuprofen (ADVIL,MOTRIN) 200 MG tablet Take 200-600 mg by mouth every 2 hours as needed for mild pain (Patient not taking: Reported on 2/5/2024)      ipratropium - albuterol 0.5 mg/2.5 mg/3 mL (DUONEB) 0.5-2.5 (3) MG/3ML neb solution Take 1 vial (3 mLs) by nebulization every 6 hours as needed for shortness of breath or wheezing (Patient not taking: Reported on 2/5/2024) 90 mL 0    lidocaine-prilocaine (EMLA) 2.5-2.5 % external cream Apply topically as needed for moderate pain (4-6) Apply 1 hour prior to port access and cover with saran wrap or press and seal. (Patient not taking: Reported on 11/1/2023) 30 g 1    ondansetron (ZOFRAN) 8 MG tablet Take 1 tablet (8 mg) by mouth every 8 hours as needed for nausea (vomiting) (Patient not taking: Reported on 2/5/2024) 30 tablet 2    polyethylene glycol (MIRALAX) 17 g packet Take 1 packet by mouth daily Taking PRN (Patient not taking: Reported on 10/23/2023)      predniSONE (DELTASONE) 10 MG tablet Take 1 tablet (10 mg) by mouth daily (Patient not taking: Reported on 11/1/2023) 30 tablet 0    predniSONE (DELTASONE) 10 MG tablet Take 1 tablet (10 mg) by mouth daily (Patient not taking: Reported on 10/23/2023) 60 tablet 1    prochlorperazine (COMPAZINE) 10 MG tablet Take 0.5 tablets (5 mg) by mouth every 6 hours as needed for nausea or vomiting (Patient not taking: Reported on 2/5/2024) 30 tablet 2          Allergies   Allergen Reactions    Nka [No Known Allergies]     Seasonal Allergies        There were no vitals taken for this visit.  General: Patient appears well in no acute distress.   Skin: No visualized rash or lesions on visualized skin  Eyes: EOMI, no erythema, sclera  icterus or discharge noted  Resp: Appears to be breathing comfortably without accessory muscle usage, speaking in full sentences, no cough  MSK: Appears to have normal range of motion based on visualized movements  Neurologic: No apparent tremors, facial movements symmetric  Psych: affect engaged, pleasant, alert and oriented   not currently breastfeeding.  Wt Readings from Last 4 Encounters:   02/05/24 68 kg (150 lb)   11/17/23 73.9 kg (163 lb)   11/08/23 73.9 kg (163 lb)   11/06/23 74.3 kg (163 lb 11.2 oz)         Impression/plan:     Ms. Yamel Lindsay is a 77 year old  female with PMHx of for HTN, HLD, anxiety, who is here for evaluation/followup of new lung cancer     Synchronous Rt LL Squamous cell carcinoma [id number] Stage IIIC (AJCC 8th edition) diagnosed 12/2022  PD-L1 <1%  NGS- [facility] panel- Neg (High TMB 16.7 mut/MB)     Left LL adenocarcinoma [id number] Stage (AJCC 8th edition) diagnosed 12/2022  PD-L1-2%  NGS-KRAS G12C, TMB 2.4     Pt with 2 synchronous cancers at diagnosis..  The more  threatening one was the right lower lobe squamous cell cancer which appears to be at least stage IIIC given the size greater than 7 cm suggestive of T4 disease.  She also has mediastinal lymph node that were enlarged and appear pathologic but were not biopsied.  She underwent an EBUS for debulking and her mediastinum was not been staged.  She is also has several FDG avid nodules in the right middle and the right upper lobe that have not yet been biopsied.  Given the extent of involvement of the right sided cancer, concurrent/sequential chemoradiation not feasible per tumor board discussion, also no further EBUS required since high probability of cancer.   She proceeded with palliative intent chemoimmunotherapy with pembro+carbo+taxol.  Of note, squamous has high TMB and low neg PD-L1.  Adenocarcinoma has KRAS G12C. CT after 4 cycles showing good partial response in the right-sided tumor and some response in the left side  "adenocarcinoma.  After discussion at tumor board, Dr. Escoto did definitive radiation treatment  on cancer on the right side and SBRT on left side both with definitive intent.  CT on 10/2023 showing slight increase in size of consolidative opacity int he rt lower lobe.We got a PET scan which showed some mild uptake b/l suggesting inflammation and mild FDG uptake in the rt LL but FDG avidity is much lower than before suggesting likley Rx related changes. CT in 2/2024 stable with evolving treatment changes.  Continue monitoring.  Immunotherapy on hold due to Myositis/arthritis with hx of elevated CRP and ESR been attributed to immunotherapy. We willc ontnue to hold it for now until we see signs of progression.        Plan  CT in 3 months  RTC with me after CT  Port removal and I dont anticipate Rx soon          # Myositis/arthritis with hx of elevated CRP and ESR. Has been attributed to immunotherapy. dopplar negative for DVT, No known bone mets on baseline PET/CT.  -CK normal  - CRP and ESR is mildly elevated (significantly lower than the levels when she was on steroids for myositis)  -has had 2 prednisone tapers, the 2nd was a longer taper. Her pain started to increase the week after stopping prednisone  -she was resumed on prednisone 10 mg without improvement and increased to 20 mg.   - was on 10 mg daily, but stopped it for unclear reasons, has noticed recent flare in both the knee joints and left calf muscle  - She resumed 10 mg prednisone 10/2023 but did not notice any difference, now her pain is mor ein the joints and muscle ache seems to be relievd by muscle relaxant, suggesting this is MSK etiology and less likley to be immunotherapy related.   - -has tramadol, started on gabapentin 300 mg at bedtime per palliative\"  -- stop prednisone, asked he rto get in tpuch with me if symptoms get worse (especially msucle)        #Psychiatry: Patient has previously had a history of anxiety.now since the diagnosis, on and " off panic attacks.  PTA on Prozac, and also clonazepam  -Trazodoen for ?insomnia  -cloazepan BID 0.5 mg  -cancer pyschology referral placed     #COVID vaccine: s/p 3 boosters     #Hypertension hyperlipidemia  ON statin        On the day of service,  Preparation time:  5 minutes  Visit time:  30 minutes  Care Coordination:  5 minutes        Chaz Martel M.D.   of Medicine  Division of Hematology, Oncology and Transplantation  [facility]

## 2024-02-05 NOTE — LETTER
2/5/2024         RE: Yamel Lindsay  [address] [facility] [address]        Dear Colleague,    Thank you for referring your patient, Yamel Lindsay, to the [facility]. Please see a copy of my visit note below.    2/5/24    Reason for Visit: f/u lung cancer, joint pain      Diagnosis:   Synchronous Rt LL Squamous cell carcinoma [id number] Stage IIIC (AJCC 8th edition) diagnosed 12/2022  PD-L1 <1%  NGS- [facility] panel- Neg (High TMB 16.7 mut/MB)     Left LL adenocarcinoma [id number] Stage (AJCC 8th edition) diagnosed 12/2022  PD-L1-2%  NGS-KRAS G12C, TMB 2.4      Treatment:  1/20/23- 4 cycles of Pembrolizumab+carboplatin+paclitaxel (pembro on hold ucrrently of immune mediated myalgia/arthralgia since 3/2023)   XRT-  Right Lower Lobe      6,000 cGy    in 30 fraction     6/06/2023 7/26/2023          Left Lower Lobe        5,000 cGy   in 10 fraction     6/22/2023 7/06/2023            Intent of treatment: Palliative    Onc HPI:    One month history of increasing dyspnea with palpitations, propting ER visit 11/19.  11/19/22- CT- 1 cm right lower lobe mass abutting multiple central bronchovascular and mediastinal structures, consistent with malignancy.2.2 cm spiculated nodule in the left lower lobe, also consistent with malignancy.   12/6/22- PET/CT-  Right lower lobe lung mass abutting the pleura (SUV max of 24), measuring 8 x 5.2 cm. There is associated complete obliteration of right lower lobe bronchus and loss of fat planes with  the left atrium. Spiculated left lower lobe solid lung mass (SUV 17.9), measuring 2.2 x 2 cm. There are several markedly hypermetabolic, irregular solid nodules in the right middle lobe abutting the pleura (approximately 10-12), as for example 1.2 cm nodule with SUV max of 16.8 (4/126). There is a separate markedly hypermetabolic 1 cm nodule in the right upper lobe. Mild hypermetabolic uptake is noted within a1 x 0.9 cm right paratracheal node ( SUV max  of 4.2) (4/120), Mild uptake is also  noted within a subcentimeter lower paraesophageal node (4/154).   12/28/22- EBUS with deblking in the right hilar mass (no mediastinal staging performed)- - LUNG, RIGHT MAINSTEM, ENDOBRONCHIAL BIOPSY: Squamous cell carcinoma with extensive necrosis (positive for p40 and negative for TTF-1). Left LL FNAC- Positive for malignancy- Adenocarcinoma  (diffusely positive for TTF-1 while negative for p40)  1/3/23: Brain MRI - Neg  1/10/23: Tumor board discussion: Too extensive for concurrent/sequential chemo XRT- plan for palliative intent chemo-IO  1/20/23- C1 Pembrolizumab+carboplatin+paclitaxel  2/10/23-C2 Pembrolizumab+carboplatin+paclitaxel  2/27/23- CT CAP- Decreased size of the hypoattenuating mass in the central right lower lobe and small irregularly-shaped nodules in the right upper lobe consistent with positive treatment response. There is persistent postobstructive right lower lobe atelectasis.Unchanged heterogeneous lobulated and spiculated solid nodule in the lateral basal left lower lobe. No findings to suggest new extrapulmonary metastatic disease.  3/3, 3/24-  Pembrolizumab+carboplatin+paclitaxel      4/10/23- CT CAP- - some Rx response- Mass distal right lower lobe bronchus causing complete post obstructive atelectasis has decreased from 6.5 x 4.5 x 3.5 cm to 5.5 x 3.8 x 2.4 cm today. Spiculated left lower lobe mass has decreased from 2.1 cm to 1.5 cm today. Right upper lobe nodularity continues to decrease. Ill-defined nodularity in the right middle lobe has developed and is most likely postinflammatory.     6/5/23- After discussion at tumor board, Dr. Escoto agreed for definitive radiation treatment (without chemo, as she already had 4 cycles of chemo) for cancer on the right side and possible SBRT on left side both with definitive intent    6/5/23- Started XRT     Right Lower Lobe      6,000 cGy    in 30 fraction     6/06/2023 7/26/2023          Left  Lower Lobe        5,000 cGy   in 10 fraction     6/22/2023 7/06/2023 6/16/23- CT Chest-  Unchanged complete atelectasis of the heterogeneous right lower lobe due to central obstructing mass status post treatment. No change in the posterolateral left lower lobe nodule with surrounding cicatrization. No findings to suggest progressive   malignancy in the chest.    8/25/23- CT Chest- Stable right lower lobe lung mass with distal atelectasis. Lobulated nodule in the left lower lobe is also stable. No disease progression in the chest. No metastatic disease in the abdomen and pelvis.     10/20/23- CT CHest- Heterogeneous mass with consolidation and complete volume loss of the right lower lobe which is located in the azygoesophageal recess has increased in size slightly with axial dimensions superiorly 3.2 x 2.2 cm previously 2.8 x 2.0 cm  (axial image 95 series 3) and axial dimensions inferiorly 4.2 x 2.9 cm previously 4.0 x 2.6 cm (image 110). Spiculated mass posterior basilar left lower lobe stable at 1.7 x 1.2 cm (image 119).    11/3/23- PET/CT-  A consolidative masslike opacity with atelectasis involving the right lower lobe at site of original tumor demonstrates and ill-defined area of soft tissue thickening along its superior margin with moderate FDG uptake. Appearance favors ongoing posttreatment inflammatory change although is indeterminate for early local recurrence. Recommend short term chest CT with IV contrast follow-up in 3 months. Otherwise no evidence of FDG avid malignancy. An irregular nodule in the left lower lobe is not FDG avid suggesting posttreatment scarring. No evidence of metastasis including no FDG avid adenopathy.Some small irregular opacities have developed in the left lung in the short interval since 10/20/2023, likely infectious/inflammatory    2/2/24- CT Chest-  Unchanged posttreatment lobar atelectasis of the right lower lobe. Increased predominantly solid parenchymal bands  with architectural distortion in the basal left lower lobe, centered on the inferior posterior medial right upper lobe consistent with posttreatment inflammation and developing cicatrization. There are  no findings to suggest progression of neoplasm in the chest.New small right pleural effusion not a component of which is organized in the posterior mid chest secondary to #2.         Interval history:   Yamel is here for follow up.    Her pembro on hold currently for immune mediated myalgia/arthralgia  She stopped prednisone  She went to cruise in Dec.Going to [place].  She was on maintenance prednisone for immune related arthritis/myositis.   She has stable  fatigue, able to walk daily  No change in breathing has stable exertional SOB  She has lost about 10 lbs intentional, attributes to increased activity  Appetite is ok        ECOG PS 1    Current Outpatient Medications   Medication Sig Dispense Refill    acetaminophen (TYLENOL) 500 MG tablet Take 1,000 mg by mouth every 6 hours as needed for mild pain Taking once a day      ADVAIR -21 MCG/ACT inhaler INHALE 2 PUFFS INTO THE LUNGS TWICE DAILY 12 g 11    albuterol (PROAIR HFA/PROVENTIL HFA/VENTOLIN HFA) 108 (90 Base) MCG/ACT inhaler INHALE 2 PUFFS INTO THE LUNGS EVERY 6 HOURS AS NEEDED FOR SHORTNESS OF BREATH OR DIFFICULTY BREATHING OR WHEEZING 8.5 g 11    atorvastatin (LIPITOR) 20 MG tablet Take 1 tablet (20 mg) by mouth daily 90 tablet 3    celecoxib (CELEBREX) 200 MG capsule Take 1 capsule (200 mg) by mouth daily 60 capsule 1    Cholecalciferol (VITAMIN D-3 PO) Take 2,000 Units by mouth daily       clonazePAM (KLONOPIN) 0.5 MG tablet Take 1-2 tablets (0.5-1 mg) by mouth 2 times daily as needed for anxiety 120 tablet 1    clonazePAM (KLONOPIN) 0.5 MG tablet Take 0.5-1 tablets (0.25-0.5 mg) by mouth 2 times daily as needed for anxiety 30 tablet 3    FLUoxetine (PROZAC) 20 MG capsule TAKE 2 CAPSULES(40 MG) BY MOUTH DAILY 180 capsule 3    gabapentin (NEURONTIN)  300 MG capsule TAKE 1 CAPSULE(300 MG) BY MOUTH EVERY EVENING AS NEEDED FOR NERVE PAIN 30 capsule 3    hydrochlorothiazide (HYDRODIURIL) 25 MG tablet TAKE 1 TABLET(25 MG) BY MOUTH DAILY 90 tablet 1    omeprazole (PRILOSEC) 20 MG DR capsule TAKE 1 CAPSULE(20 MG) BY MOUTH DAILY 30 TO 60 MINUTES BEFORE A MEAL 90 capsule 2    tiZANidine (ZANAFLEX) 2 MG tablet TAKE 1 TABLET(2 MG) BY MOUTH THREE TIMES DAILY AS NEEDED FOR MUSCLE SPASMS 90 tablet 3    traZODone (DESYREL) 50 MG tablet TAKE 2 TABLETS(100 MG) BY MOUTH AT BEDTIME 180 tablet 3    ibuprofen (ADVIL,MOTRIN) 200 MG tablet Take 200-600 mg by mouth every 2 hours as needed for mild pain (Patient not taking: Reported on 2/5/2024)      ipratropium - albuterol 0.5 mg/2.5 mg/3 mL (DUONEB) 0.5-2.5 (3) MG/3ML neb solution Take 1 vial (3 mLs) by nebulization every 6 hours as needed for shortness of breath or wheezing (Patient not taking: Reported on 2/5/2024) 90 mL 0    lidocaine-prilocaine (EMLA) 2.5-2.5 % external cream Apply topically as needed for moderate pain (4-6) Apply 1 hour prior to port access and cover with saran wrap or press and seal. (Patient not taking: Reported on 11/1/2023) 30 g 1    ondansetron (ZOFRAN) 8 MG tablet Take 1 tablet (8 mg) by mouth every 8 hours as needed for nausea (vomiting) (Patient not taking: Reported on 2/5/2024) 30 tablet 2    polyethylene glycol (MIRALAX) 17 g packet Take 1 packet by mouth daily Taking PRN (Patient not taking: Reported on 10/23/2023)      predniSONE (DELTASONE) 10 MG tablet Take 1 tablet (10 mg) by mouth daily (Patient not taking: Reported on 11/1/2023) 30 tablet 0    predniSONE (DELTASONE) 10 MG tablet Take 1 tablet (10 mg) by mouth daily (Patient not taking: Reported on 10/23/2023) 60 tablet 1    prochlorperazine (COMPAZINE) 10 MG tablet Take 0.5 tablets (5 mg) by mouth every 6 hours as needed for nausea or vomiting (Patient not taking: Reported on 2/5/2024) 30 tablet 2          Allergies   Allergen Reactions    Nka  [No Known Allergies]     Seasonal Allergies        There were no vitals taken for this visit.  General: Patient appears well in no acute distress.   Skin: No visualized rash or lesions on visualized skin  Eyes: EOMI, no erythema, sclera icterus or discharge noted  Resp: Appears to be breathing comfortably without accessory muscle usage, speaking in full sentences, no cough  MSK: Appears to have normal range of motion based on visualized movements  Neurologic: No apparent tremors, facial movements symmetric  Psych: affect engaged, pleasant, alert and oriented   not currently breastfeeding.  Wt Readings from Last 4 Encounters:   02/05/24 68 kg (150 lb)   11/17/23 73.9 kg (163 lb)   11/08/23 73.9 kg (163 lb)   11/06/23 74.3 kg (163 lb 11.2 oz)         Impression/plan:     Ms. Yamel Lindsay is a 77 year old  female with PMHx of for HTN, HLD, anxiety, who is here for evaluation/followup of new lung cancer     Synchronous Rt LL Squamous cell carcinoma [id number] Stage IIIC (AJCC 8th edition) diagnosed 12/2022  PD-L1 <1%  NGS- [facility] panel- Neg (High TMB 16.7 mut/MB)     Left LL adenocarcinoma [id number] Stage (AJCC 8th edition) diagnosed 12/2022  PD-L1-2%  NGS-KRAS G12C, TMB 2.4     Pt with 2 synchronous cancers at diagnosis..  The more  threatening one was the right lower lobe squamous cell cancer which appears to be at least stage IIIC given the size greater than 7 cm suggestive of T4 disease.  She also has mediastinal lymph node that were enlarged and appear pathologic but were not biopsied.  She underwent an EBUS for debulking and her mediastinum was not been staged.  She is also has several FDG avid nodules in the right middle and the right upper lobe that have not yet been biopsied.  Given the extent of involvement of the right sided cancer, concurrent/sequential chemoradiation not feasible per tumor board discussion, also no further EBUS required since high probability of cancer.   She proceeded with palliative intent  chemoimmunotherapy with pembro+carbo+taxol.  Of note, squamous has high TMB and low neg PD-L1.  Adenocarcinoma has KRAS G12C. CT after 4 cycles showing good partial response in the right-sided tumor and some response in the left side adenocarcinoma.  After discussion at tumor board, Dr. Escoto did definitive radiation treatment  on cancer on the right side and SBRT on left side both with definitive intent.  CT on 10/2023 showing slight increase in size of consolidative opacity int he rt lower lobe.We got a PET scan which showed some mild uptake b/l suggesting inflammation and mild FDG uptake in the rt LL but FDG avidity is much lower than before suggesting likley Rx related changes. CT in 2/2024 stable with evolving treatment changes.  Continue monitoring.  Immunotherapy on hold due to Myositis/arthritis with hx of elevated CRP and ESR been attributed to immunotherapy. We willc ontnue to hold it for now until we see signs of progression.        Plan  CT in 3 months  RTC with me after CT  Port removal and I dont anticipate Rx soon          # Myositis/arthritis with hx of elevated CRP and ESR. Has been attributed to immunotherapy. dopplar negative for DVT, No known bone mets on baseline PET/CT.  -CK normal  - CRP and ESR is mildly elevated (significantly lower than the levels when she was on steroids for myositis)  -has had 2 prednisone tapers, the 2nd was a longer taper. Her pain started to increase the week after stopping prednisone  -she was resumed on prednisone 10 mg without improvement and increased to 20 mg.   - was on 10 mg daily, but stopped it for unclear reasons, has noticed recent flare in both the knee joints and left calf muscle  - She resumed 10 mg prednisone 10/2023 but did not notice any difference, now her pain is mor ein the joints and muscle ache seems to be relievd by muscle relaxant, suggesting this is MSK etiology and less likley to be immunotherapy related.   - -has tramadol, started on gabapentin  "300 mg at bedtime per palliative\"  -- stop prednisone, asked he rto get in tpuch with me if symptoms get worse (especially msucle)        #Psychiatry: Patient has previously had a history of anxiety.now since the diagnosis, on and off panic attacks.  PTA on Prozac, and also clonazepam  -Trazodoen for ?insomnia  -cloazepan BID 0.5 mg  -cancer pyschology referral placed     #COVID vaccine: s/p 3 boosters     #Hypertension hyperlipidemia  ON statin        On the day of service,  Preparation time:  5 minutes  Visit time:  30 minutes  Care Coordination:  5 minutes        Chaz Martel M.D.   of Medicine  Division of Hematology, Oncology and Transplantation  [facility]    "

## 2024-02-06 DIAGNOSIS — C34.31 MALIGNANT NEOPLASM OF LOWER LOBE OF RIGHT LUNG (H): ICD-10-CM

## 2024-02-06 DIAGNOSIS — C34.32 MALIGNANT NEOPLASM OF LOWER LOBE OF LEFT LUNG (H): Primary | ICD-10-CM

## 2024-02-07 DIAGNOSIS — M17.0 OSTEOARTHRITIS OF BOTH KNEES, UNSPECIFIED OSTEOARTHRITIS TYPE: ICD-10-CM

## 2024-02-08 RX ORDER — CELECOXIB 200 MG/1
200 CAPSULE ORAL DAILY
Qty: 60 CAPSULE | Refills: 1 | Status: SHIPPED | OUTPATIENT
Start: 2024-02-08

## 2024-02-08 NOTE — TELEPHONE ENCOUNTER
Received [facility] message from pharmacy requesting refill of Celebrex.     Last refill: 1/2/2024  Last office visit: 11/1/2023  Scheduled for follow up pending, message sent to scheduling     Will route request to MD for review.     Reviewed [place]  Report.

## 2024-02-13 ENCOUNTER — TRANSCRIBE ORDERS (OUTPATIENT)
Dept: OTHER | Age: 79
End: 2024-02-13

## 2024-02-13 DIAGNOSIS — C34.31 MALIGNANT NEOPLASM OF LOWER LOBE OF RIGHT LUNG (H): Primary | ICD-10-CM

## 2024-02-27 ENCOUNTER — ONCOLOGY VISIT (OUTPATIENT)
Dept: ONCOLOGY | Facility: HOSPITAL | Age: 79
End: 2024-02-27
Attending: STUDENT IN AN ORGANIZED HEALTH CARE EDUCATION/TRAINING PROGRAM
Payer: COMMERCIAL

## 2024-02-27 DIAGNOSIS — F33.1 MAJOR DEPRESSIVE DISORDER, RECURRENT EPISODE, MODERATE (H): ICD-10-CM

## 2024-02-27 DIAGNOSIS — F41.1 GENERALIZED ANXIETY DISORDER: Primary | ICD-10-CM

## 2024-02-27 PROCEDURE — 90834 PSYTX W PT 45 MINUTES: CPT | Performed by: PSYCHOLOGIST

## 2024-02-27 NOTE — PROGRESS NOTES
"[facility]- Psychotherapy                                    Progress Note    Patient Name: Yamel Lindsay  Date: 2024           Service Type: Individual      Session Start Time: 1200  Session End Time: 1238     Session Length: 38 mins    Session #: 1    Attendees: Client attended alone    Service Modality:  In-person    DATA  Interactive Complexity: No  Crisis: No        Progress Since Last Session (Related to Symptoms / Goals / Homework):   Symptoms:  Pt reports anxiety over scans she has each time to check for recurrence. Pt reports her anxiety is ongoing but spikes nearer the scans.      Pt reports recent depressive sx also including low energy, low motivation, low mood, denies recent S/I,      Pt reports a hx of mental health issues dating back to childhood.      Pt is walking daily up to 2-3 miles.     Pt reports good sleep with trazodone. She reports a good appetite.     Homework:  none      Episode of Care Goals: Satisfactory progress - ACTION (Actively working towards change); Intervened by reinforcing change plan / affirming steps taken     Current / Ongoing Stressors and Concerns:   Pt reports she was dx with cancer a year ago and is now in \"remission\" for six months. She reports the scan she gets every three months cause her a lot of anxiety.      Pt reports she has good support people.      Pt reports she enjoys lunches with friends, movies, family, entertaining people at her home, and walking.      Pt reports she is taking a trip to [place] in two weeks. She is really excited.      Social Hx:  Pt reports she is  and  since .     Pt reports she has two daughters: ages 58 and 56. She has four grandkids ages 27-35. Pt reports a good relationship with her kids and grandkids.     Pt rpeorts she has a brother (-7 years) who is a good support and has had cancer twice.     Pt reports another brother  from esophageal cancer.        Treatment Objective(s) Addressed in " This Session:   use at least 3 coping skills for anxiety management in the next 3 weeks  Increase interest, engagement, and pleasure in doing things  ,     Intervention:   CBT: ,  Emotion Focused Therapy: ,  Solution Focused: ,    Assessments completed prior to visit:  none       ASSESSMENT: Current Emotional / Mental Status (status of significant symptoms):   Risk status (Self / Other harm or suicidal ideation)   Patient denies current fears or concerns for personal safety.   Patient denies current or recent suicidal ideation or behaviors.   Patient denies current or recent homicidal ideation or behaviors.   Patient denies current or recent self injurious behavior or ideation.   Patient denies other safety concerns.   Patient reports there has been no change in risk factors since their last session.     Patient reports there has been no change in protective factors since their last session.     Recommended that patient call 911 or go to the local ED should there be a change in any of these risk factors.     Appearance:   Appropriate    Eye Contact:   Good    Psychomotor Behavior: Normal    Attitude:   Cooperative    Orientation:   All   Speech    Rate / Production: Normal     Volume:  Normal    Mood:    Anxious    Affect:    Appropriate    Thought Content:  Clear    Thought Form:  Coherent  Logical    Insight:    Good      Medication Review:   No changes to current psychiatric medication(s)     Medication Compliance:   Yes     Changes in Health Issues:   Yes: cancer, Associated Psychological Distress     Chemical Use Review:   Substance Use: Chemical use reviewed, no active concerns identified      Tobacco Use: No current tobacco use.      Diagnosis:  F41.1 PAOLA  F33.1 MDD    Collateral Reports Completed:   Not Applicable    PLAN: (Patient Tasks / Therapist Tasks / Other)  Return after trip. Utilize coping skills discussed.         Mckay Jorge, MJ                                                          ______________________________________________________________________    Individual Treatment Plan    Patient's Name: Yamel Lindsay  YOB: 1945    Date of Creation: 2/27/2024    Date Treatment Plan Last Reviewed/Revised: 2/27/2024      DSM5 Diagnoses: 296.32 (F33.1) Major Depressive Disorder, Recurrent Episode, Moderate _ or 300.02 (F41.1) Generalized Anxiety Disorder  Psychosocial / Contextual Factors: cancer dx  PROMIS (reviewed every 90 days):     Referral / Collaboration:  Referral to another professional/service is not indicated at this time..    Anticipated number of session for this episode of care: 9-12 sessions  Anticipation frequency of session: Biweekly  Anticipated Duration of each session: 38-52 minutes  Treatment plan will be reviewed in 90 days or when goals have been changed.       MeasurableTreatment Goal(s) related to diagnosis / functional impairment(s)  Goal 1: Patient will reduce anxiety sx      Objective #A (Patient Action)    Patient will use at least 3 coping skills for anxiety management in the next 3 weeks.  Status: New - Date: 2/27/24      Intervention(s)  Therapist will teach emotional regulation skills. , .    Objective #B  Patient will use distraction each time intrusive worry surfaces.  Status: New - Date: 2/27/24      Intervention(s)  Therapist will teach emotional regulation skills. , .      Goal 2: Patient will reduce depressive sx      Objective #A (Patient Action)    Status: New - Date: 2/27/24      Patient will Decrease frequency and intensity of feeling down, depressed, hopeless.    Intervention(s)  Therapist will teach emotional recognition/identification. , .    Objective #B  Patient will Increase interest, engagement, and pleasure in doing things.    Status: New - Date: 2/27/24      Intervention(s)  Therapist will teach emotional regulation skills. , .    Mckay Jorge LP  February 27, 2024

## 2024-02-27 NOTE — LETTER
"    2/27/2024         RE: Yamel Lindsay  [address] [facility] [address] [facility] [address]        Dear Colleague,    Thank you for referring your patient, Yamel Lindsay, to the [facility] [place]. Please see a copy of my visit note below.        [facility]- Psychotherapy                                    Progress Note    Patient Name: Yamel Lindsay  Date: 2/27/2024           Service Type: Individual      Session Start Time: 1200  Session End Time: 1238     Session Length: 38 mins    Session #: 1    Attendees: Client attended alone    Service Modality:  In-person    DATA  Interactive Complexity: No  Crisis: No        Progress Since Last Session (Related to Symptoms / Goals / Homework):   Symptoms:  Pt reports anxiety over scans she has each time to check for recurrence. Pt reports her anxiety is ongoing but spikes nearer the scans.      Pt reports recent depressive sx also including low energy, low motivation, low mood, denies recent S/I,      Pt reports a hx of mental health issues dating back to childhood.      Pt is walking daily up to 2-3 miles.     Pt reports good sleep with trazodone. She reports a good appetite.     Homework:  none      Episode of Care Goals: Satisfactory progress - ACTION (Actively working towards change); Intervened by reinforcing change plan / affirming steps taken     Current / Ongoing Stressors and Concerns:   Pt reports she was dx with cancer a year ago and is now in \"remission\" for six months. She reports the scan she gets every three months cause her a lot of anxiety.      Pt reports she has good support people.      Pt reports she enjoys lunches with friends, movies, family, entertaining people at her home, and walking.      Pt reports she is taking a trip to [place] in two weeks. She is really excited.      Social Hx:  Pt reports she is  and  since 1987.     Pt reports she has two daughters: ages 58 and 56. She has four grandkids ages 27-35. " Pt reports a good relationship with her kids and grandkids.     Pt rpeorts she has a brother (-7 years) who is a good support and has had cancer twice.     Pt reports another brother  from esophageal cancer.        Treatment Objective(s) Addressed in This Session:   use at least 3 coping skills for anxiety management in the next 3 weeks  Increase interest, engagement, and pleasure in doing things  ,     Intervention:   CBT: ,  Emotion Focused Therapy: ,  Solution Focused: ,    Assessments completed prior to visit:  none       ASSESSMENT: Current Emotional / Mental Status (status of significant symptoms):   Risk status (Self / Other harm or suicidal ideation)   Patient denies current fears or concerns for personal safety.   Patient denies current or recent suicidal ideation or behaviors.   Patient denies current or recent homicidal ideation or behaviors.   Patient denies current or recent self injurious behavior or ideation.   Patient denies other safety concerns.   Patient reports there has been no change in risk factors since their last session.     Patient reports there has been no change in protective factors since their last session.     Recommended that patient call 911 or go to the local ED should there be a change in any of these risk factors.     Appearance:   Appropriate    Eye Contact:   Good    Psychomotor Behavior: Normal    Attitude:   Cooperative    Orientation:   All   Speech    Rate / Production: Normal     Volume:  Normal    Mood:    Anxious    Affect:    Appropriate    Thought Content:  Clear    Thought Form:  Coherent  Logical    Insight:    Good      Medication Review:   No changes to current psychiatric medication(s)     Medication Compliance:   Yes     Changes in Health Issues:   Yes: cancer, Associated Psychological Distress     Chemical Use Review:   Substance Use: Chemical use reviewed, no active concerns identified      Tobacco Use: No current tobacco use.      Diagnosis:  F41.1  PAOLA  F33.1 MDD    Collateral Reports Completed:   Not Applicable    PLAN: (Patient Tasks / Therapist Tasks / Other)  Return after trip. Utilize coping skills discussed.         Mckay Jorge LP                                                         ______________________________________________________________________    Individual Treatment Plan    Patient's Name: Yamel Lindsay  YOB: 1945    Date of Creation: 2/27/2024    Date Treatment Plan Last Reviewed/Revised: 2/27/2024      DSM5 Diagnoses: 296.32 (F33.1) Major Depressive Disorder, Recurrent Episode, Moderate _ or 300.02 (F41.1) Generalized Anxiety Disorder  Psychosocial / Contextual Factors: cancer dx  PROMIS (reviewed every 90 days):     Referral / Collaboration:  Referral to another professional/service is not indicated at this time..    Anticipated number of session for this episode of care: 9-12 sessions  Anticipation frequency of session: Biweekly  Anticipated Duration of each session: 38-52 minutes  Treatment plan will be reviewed in 90 days or when goals have been changed.       MeasurableTreatment Goal(s) related to diagnosis / functional impairment(s)  Goal 1: Patient will reduce anxiety sx      Objective #A (Patient Action)    Patient will use at least 3 coping skills for anxiety management in the next 3 weeks.  Status: New - Date: 2/27/24      Intervention(s)  Therapist will teach emotional regulation skills. , .    Objective #B  Patient will use distraction each time intrusive worry surfaces.  Status: New - Date: 2/27/24      Intervention(s)  Therapist will teach emotional regulation skills. , .      Goal 2: Patient will reduce depressive sx      Objective #A (Patient Action)    Status: New - Date: 2/27/24      Patient will Decrease frequency and intensity of feeling down, depressed, hopeless.    Intervention(s)  Therapist will teach emotional recognition/identification. , .    Objective #B  Patient will Increase interest,  engagement, and pleasure in doing things.    Status: New - Date: 2/27/24      Intervention(s)  Therapist will teach emotional regulation skills. , .    Mckay Jorge LP  February 27, 2024      Again, thank you for allowing me to participate in the care of your patient.        Sincerely,        Mckay Jorge LP

## 2024-03-04 ENCOUNTER — TELEPHONE (OUTPATIENT)
Dept: SURGERY | Facility: CLINIC | Age: 79
End: 2024-03-04
Payer: COMMERCIAL

## 2024-03-04 NOTE — TELEPHONE ENCOUNTER
Reason for Call:  Other call back    Detailed comments: Erick with Imaging called noting that patient is wanting a port removal. Patient has not seen anyone here. Not sure if you need a consult. Please advise orders are needed. Thank you / I did leave patient a v/m to advise Thankyou     Phone Number Patient can be reached at: Home number on file [phone] (home)    Best Time: any    Can we leave a detailed message on this number? YES    Call taken on 3/4/2024 at 2:58 PM by Lori Vasquez

## 2024-03-07 DIAGNOSIS — J45.20 MILD INTERMITTENT ASTHMA WITHOUT COMPLICATION: ICD-10-CM

## 2024-03-07 DIAGNOSIS — F41.9 ANXIETY: ICD-10-CM

## 2024-03-07 RX ORDER — IPRATROPIUM BROMIDE AND ALBUTEROL SULFATE 2.5; .5 MG/3ML; MG/3ML
1 SOLUTION RESPIRATORY (INHALATION) EVERY 6 HOURS PRN
Qty: 90 ML | Refills: 0 | Status: SHIPPED | OUTPATIENT
Start: 2024-03-07 | End: 2024-03-27

## 2024-03-07 RX ORDER — CLONAZEPAM 0.5 MG/1
.5-1 TABLET ORAL 2 TIMES DAILY PRN
Qty: 120 TABLET | Refills: 1 | Status: SHIPPED | OUTPATIENT
Start: 2024-03-07 | End: 2024-05-17

## 2024-03-07 NOTE — TELEPHONE ENCOUNTER
Medication requested: clonazepam 0.5 mg tablet    Last prescribing provider: Chaz Martel MD    Last clinic visit date: 2/5/24 with Dr. Martel    Recommendations for requested medication (if none, N/A): na    Any other pertinent information (if none, N/A): na    Refilled: Y/N, if NO, why?    Pended and Routed to Dr. Chaz Martel

## 2024-03-07 NOTE — TELEPHONE ENCOUNTER
[facility]: Cancer Care                                                                                          Pt calls inquiring about port removal, is going out of town 3/15-23, was told it can only be done in [place]? Told Pt may this was the case for a specific date but I would double check.     Pt also requests a refill of nebulizer medication (Duoneb). Noted it was previously Rx'd by her PCP. Pt has moved since it was Rx'd and is in process of finding a closer PCP, asks if Dr Martel can refill. I said I would route. Pt expects to have a new PCP in 3-6 months.      Signature:  ABHIJIT CORTÉS RN

## 2024-03-11 ENCOUNTER — MYC MEDICAL ADVICE (OUTPATIENT)
Dept: ONCOLOGY | Facility: CLINIC | Age: 79
End: 2024-03-11
Payer: COMMERCIAL

## 2024-03-26 ENCOUNTER — TELEPHONE (OUTPATIENT)
Dept: FAMILY MEDICINE | Facility: CLINIC | Age: 79
End: 2024-03-26
Payer: COMMERCIAL

## 2024-03-26 DIAGNOSIS — F41.9 ANXIETY: ICD-10-CM

## 2024-03-26 DIAGNOSIS — J44.9 CHRONIC OBSTRUCTIVE PULMONARY DISEASE, UNSPECIFIED COPD TYPE (H): ICD-10-CM

## 2024-03-26 DIAGNOSIS — G62.0 CHEMOTHERAPY-INDUCED PERIPHERAL NEUROPATHY (H): ICD-10-CM

## 2024-03-26 DIAGNOSIS — M17.0 OSTEOARTHRITIS OF BOTH KNEES, UNSPECIFIED OSTEOARTHRITIS TYPE: ICD-10-CM

## 2024-03-26 DIAGNOSIS — T45.1X5A CHEMOTHERAPY-INDUCED PERIPHERAL NEUROPATHY (H): ICD-10-CM

## 2024-03-26 DIAGNOSIS — G47.09 OTHER INSOMNIA: ICD-10-CM

## 2024-03-26 DIAGNOSIS — C34.31 MALIGNANT NEOPLASM OF LOWER LOBE OF RIGHT LUNG (H): ICD-10-CM

## 2024-03-26 DIAGNOSIS — G89.4 CHRONIC PAIN SYNDROME: ICD-10-CM

## 2024-03-26 RX ORDER — GABAPENTIN 300 MG/1
CAPSULE ORAL
Qty: 30 CAPSULE | Refills: 3 | Status: SHIPPED | OUTPATIENT
Start: 2024-03-26 | End: 2024-04-16

## 2024-03-26 NOTE — TELEPHONE ENCOUNTER
Received electronic message from pharmacy requesting a new order for gabapentin. Pt requesting a 90 day supply.    Last refill: 12/16/23  Last office visit: 11/1/23  Scheduled for follow up pending, msg sent to scheduling.    Will route request to MD for review.     Reviewed [place]  Report.

## 2024-03-27 DIAGNOSIS — J45.20 MILD INTERMITTENT ASTHMA WITHOUT COMPLICATION: ICD-10-CM

## 2024-03-27 DIAGNOSIS — F41.9 ANXIETY: ICD-10-CM

## 2024-03-27 DIAGNOSIS — K21.9 GASTROESOPHAGEAL REFLUX DISEASE WITHOUT ESOPHAGITIS: ICD-10-CM

## 2024-03-27 DIAGNOSIS — C34.31 MALIGNANT NEOPLASM OF LOWER LOBE OF RIGHT LUNG (H): ICD-10-CM

## 2024-03-27 RX ORDER — CLONAZEPAM 0.5 MG/1
TABLET ORAL
Qty: 90 TABLET | OUTPATIENT
Start: 2024-03-27

## 2024-03-27 RX ORDER — ALBUTEROL SULFATE 90 UG/1
AEROSOL, METERED RESPIRATORY (INHALATION)
Qty: 25.5 G | Refills: 11 | Status: SHIPPED | OUTPATIENT
Start: 2024-03-27 | End: 2024-04-01

## 2024-03-27 NOTE — TELEPHONE ENCOUNTER
Medication requested: Omeprazole 20 mg capsules     Last prescribing provider: Marianne Juarez CNP on 8/1/23    Last clinic visit date: 2/5/24 with Dr. Martel    Recommendations for requested medication (if none, N/A): NA    Any other pertinent information (if none, N/A): NA    Refilled: Y/N, if NO, why?    Pended and Routed to Dr.Amit Martel

## 2024-03-27 NOTE — TELEPHONE ENCOUNTER
Medication requested: prochlorperazine 10 mg tablets     Last prescribing provider: Chaz Martel MD on 1/18/23    Last clinic visit date: 2/5/24 with Dr. Martel    Recommendations for requested medication (if none, N/A): NA    Any other pertinent information (if none, N/A): NA    Refilled: Y/N, if NO, why?    Pended and Routed to Dr. Chaz Martel

## 2024-03-27 NOTE — TELEPHONE ENCOUNTER
"Request sent from [facility] to refill prednisone.    -has had 2 prednisone tapers, the 2nd was a longer taper. Her pain started to increase the week after stopping prednisone  -she was resumed on prednisone 10 mg without improvement and increased to 20 mg.   - was on 10 mg daily, but stopped it for unclear reasons, has noticed recent flare in both the knee joints and left calf muscle  - She resumed 10 mg prednisone 10/2023 but did not notice any difference, now her pain is mor ein the joints and muscle ache seems to be relievd by muscle relaxant, suggesting this is MSK etiology and less likley to be immunotherapy related.   - -has tramadol, started on gabapentin 300 mg at bedtime per palliative\"  -- stop prednisone, asked he rto get in tpuch with me if symptoms get worse (especially msucle)    Informed pharmacy that pt is no longer taking prednisone.    Medication requested: ipratropium albuterol 0.5 mg/2.5 mg/3 mL (Duoneb)   Last prescribing provider: Dr. Martel on 3/7/24    Medication requested: fluoxetine 20 mg capsules  Last prescribing provider: Catie Fraser MD on 9/12/23      Last clinic visit date: 2/5/24 with Dr. Martel    Recommendations for requested medication (if none, N/A): NA    Any other pertinent information (if none, N/A): NA    Refilled: Y/N, if NO, why?    Pended and Routed to Chaz Martel MD    "

## 2024-03-28 RX ORDER — PROCHLORPERAZINE MALEATE 10 MG
5 TABLET ORAL EVERY 6 HOURS PRN
Qty: 30 TABLET | Refills: 2 | Status: SHIPPED | OUTPATIENT
Start: 2024-03-28 | End: 2024-04-16

## 2024-03-28 RX ORDER — HEPARIN SODIUM (PORCINE) LOCK FLUSH IV SOLN 100 UNIT/ML 100 UNIT/ML
5 SOLUTION INTRAVENOUS
OUTPATIENT
Start: 2024-03-28

## 2024-03-28 RX ORDER — IPRATROPIUM BROMIDE AND ALBUTEROL SULFATE 2.5; .5 MG/3ML; MG/3ML
1 SOLUTION RESPIRATORY (INHALATION) EVERY 6 HOURS PRN
Qty: 90 ML | Refills: 0 | Status: SHIPPED | OUTPATIENT
Start: 2024-03-28 | End: 2024-04-25

## 2024-04-01 RX ORDER — TRAZODONE HYDROCHLORIDE 50 MG/1
TABLET, FILM COATED ORAL
Qty: 180 TABLET | Refills: 3 | OUTPATIENT
Start: 2024-04-01

## 2024-04-01 RX ORDER — TRAMADOL HYDROCHLORIDE 50 MG/1
TABLET ORAL
Qty: 360 TABLET | OUTPATIENT
Start: 2024-04-01

## 2024-04-01 RX ORDER — TRAMADOL HYDROCHLORIDE 50 MG/1
50 TABLET ORAL EVERY 6 HOURS PRN
Qty: 120 TABLET | Refills: 1 | Status: CANCELLED | OUTPATIENT
Start: 2024-04-01

## 2024-04-01 RX ORDER — ALBUTEROL SULFATE 90 UG/1
AEROSOL, METERED RESPIRATORY (INHALATION)
Qty: 54 G | OUTPATIENT
Start: 2024-04-01

## 2024-04-01 RX ORDER — ALBUTEROL SULFATE 90 UG/1
AEROSOL, METERED RESPIRATORY (INHALATION)
Qty: 25.5 G | Refills: 0 | Status: SHIPPED | OUTPATIENT
Start: 2024-04-01 | End: 2024-06-10

## 2024-04-01 RX ORDER — TRAZODONE HYDROCHLORIDE 50 MG/1
TABLET, FILM COATED ORAL
Qty: 60 TABLET | Refills: 0 | Status: SHIPPED | OUTPATIENT
Start: 2024-04-01 | End: 2024-10-03

## 2024-04-01 NOTE — TELEPHONE ENCOUNTER
Appt scheduled 4/16 with PCP. Pt requesting fill to get to appt.    Mariajose Mancuso on 4/1/2024 at 11:08 AM

## 2024-04-01 NOTE — TELEPHONE ENCOUNTER
I have faxed the trazodone and the albuterol. I have had several discussions with the patient about the tramadol. We will have to discuss in details when she comes in on the 16 th,.

## 2024-04-02 NOTE — TELEPHONE ENCOUNTER
Results given to patient with good understanding.    Patient has appt on 4/16/24. Helen Patricia on 4/2/2024 at 10:44 AM

## 2024-04-09 ENCOUNTER — OFFICE VISIT (OUTPATIENT)
Dept: SURGERY | Facility: CLINIC | Age: 79
End: 2024-04-09
Payer: COMMERCIAL

## 2024-04-09 VITALS
WEIGHT: 143.2 LBS | HEART RATE: 91 BPM | DIASTOLIC BLOOD PRESSURE: 77 MMHG | HEIGHT: 62 IN | SYSTOLIC BLOOD PRESSURE: 150 MMHG | BODY MASS INDEX: 26.35 KG/M2 | TEMPERATURE: 97.7 F

## 2024-04-09 DIAGNOSIS — Z95.828 PORT-A-CATH IN PLACE: Primary | ICD-10-CM

## 2024-04-09 PROCEDURE — 36590 REMOVAL TUNNELED CV CATH: CPT | Performed by: SURGERY

## 2024-04-09 ASSESSMENT — PAIN SCALES - GENERAL: PAINLEVEL: NO PAIN (0)

## 2024-04-09 NOTE — PROGRESS NOTES
Procedure Date: 04/09/24     PREOPERATIVE DIAGNOSIS: Lung Cancer  POSTOPERATIVE DIAGNOSIS: Same  PROCEDURE: Removal of left subclavian infusion port    ATTENDING SURGEON: Scot Valadez DO    ESTIMATED BLOOD LOSS: 3 mL    INDICATIONS: Yamel Lindsay presents with a history of lung cancer who has now completed treatment. She presents for elective removal of her infusion port, and was counseled about the indications, risks, benefits and alternatives to surgery. She understood the information given, and wishes to proceed.     DESCRIPTION OF PROCEDURE: The patient was brought to the procedure room and placed supine on the operating room table.  The left chest was then prepped and draped in the usual sterile fashion. Local anesthetic was delivered at the left chest wall at the site of the infusion port reservoir, and an incision created over the existing scar. The tissue was dissected using a 15-blade scalpel, and two points of fixation each released sequentially, and all suture material removed. The port reservoir and catheter were then removed as a single unit, and the catheter inspected carefully, and appeared wholly intact and unremarkable. Hemostasis was assured at the surgical field/wound. The subcutaneous tissue was closed with interrupted 3-0 Vicryl. Skin edges were re-approximated using 4-0 Monocryl, and the wound was cleansed and dried prior to application of sterile glue.     The patient tolerated the procedure well.     Scot Valadez DO on 4/9/2024 at 8:53 AM

## 2024-04-09 NOTE — LETTER
4/9/2024         RE: Yamel Lindsay  [facility] [address]        Dear Colleague,    Thank you for referring your patient, Yamel Lindsay, to the [facility]. Please see a copy of my visit note below.    Procedure Date: 04/09/24     PREOPERATIVE DIAGNOSIS: Lung Cancer  POSTOPERATIVE DIAGNOSIS: Same  PROCEDURE: Removal of left subclavian infusion port    ATTENDING SURGEON: Scot Valadez DO    ESTIMATED BLOOD LOSS: 3 mL    INDICATIONS: Yamel Lindsay presents with a history of lung cancer who has now completed treatment. She presents for elective removal of her infusion port, and was counseled about the indications, risks, benefits and alternatives to surgery. She understood the information given, and wishes to proceed.     DESCRIPTION OF PROCEDURE: The patient was brought to the procedure room and placed supine on the operating room table.  The left chest was then prepped and draped in the usual sterile fashion. Local anesthetic was delivered at the left chest wall at the site of the infusion port reservoir, and an incision created over the existing scar. The tissue was dissected using a 15-blade scalpel, and two points of fixation each released sequentially, and all suture material removed. The port reservoir and catheter were then removed as a single unit, and the catheter inspected carefully, and appeared wholly intact and unremarkable. Hemostasis was assured at the surgical field/wound. The subcutaneous tissue was closed with interrupted 3-0 Vicryl. Skin edges were re-approximated using 4-0 Monocryl, and the wound was cleansed and dried prior to application of sterile glue.     The patient tolerated the procedure well.     Scot Valadez DO on 4/9/2024 at 8:53 AM      Again, thank you for allowing me to participate in the care of your patient.        Sincerely,        Scot Valadez DO

## 2024-04-09 NOTE — NURSING NOTE
"Initial BP (!) 150/77 (BP Location: Left arm, Patient Position: Sitting, Cuff Size: Adult Regular)   Pulse 91   Temp 97.7  F (36.5  C) (Tympanic)   Ht 1.575 m (5' 2\")   Wt 65 kg (143 lb 3.2 oz)   BMI 26.19 kg/m   Estimated body mass index is 26.19 kg/m  as calculated from the following:    Height as of this encounter: 1.575 m (5' 2\").    Weight as of this encounter: 65 kg (143 lb 3.2 oz). .  Mena Curiel MA    "

## 2024-04-16 ENCOUNTER — OFFICE VISIT (OUTPATIENT)
Dept: FAMILY MEDICINE | Facility: CLINIC | Age: 79
End: 2024-04-16
Payer: COMMERCIAL

## 2024-04-16 VITALS
TEMPERATURE: 97.6 F | SYSTOLIC BLOOD PRESSURE: 114 MMHG | DIASTOLIC BLOOD PRESSURE: 68 MMHG | OXYGEN SATURATION: 96 % | WEIGHT: 146.2 LBS | HEIGHT: 63 IN | HEART RATE: 77 BPM | BODY MASS INDEX: 25.91 KG/M2 | RESPIRATION RATE: 16 BRPM

## 2024-04-16 DIAGNOSIS — I10 BENIGN ESSENTIAL HYPERTENSION: ICD-10-CM

## 2024-04-16 DIAGNOSIS — J45.20 MILD INTERMITTENT ASTHMA WITHOUT COMPLICATION: ICD-10-CM

## 2024-04-16 DIAGNOSIS — G62.0 CHEMOTHERAPY-INDUCED PERIPHERAL NEUROPATHY (H): ICD-10-CM

## 2024-04-16 DIAGNOSIS — I82.4Y9 DEEP VEIN THROMBOSIS (DVT) OF PROXIMAL LOWER EXTREMITY, UNSPECIFIED CHRONICITY, UNSPECIFIED LATERALITY (H): ICD-10-CM

## 2024-04-16 DIAGNOSIS — J43.1 PANLOBULAR EMPHYSEMA (H): ICD-10-CM

## 2024-04-16 DIAGNOSIS — T45.1X5A CHEMOTHERAPY-INDUCED PERIPHERAL NEUROPATHY (H): ICD-10-CM

## 2024-04-16 DIAGNOSIS — J40 BRONCHITIS: Primary | ICD-10-CM

## 2024-04-16 DIAGNOSIS — E78.5 HYPERLIPIDEMIA LDL GOAL <130: ICD-10-CM

## 2024-04-16 DIAGNOSIS — F10.20 ALCOHOLISM (H): ICD-10-CM

## 2024-04-16 PROCEDURE — 99214 OFFICE O/P EST MOD 30 MIN: CPT | Performed by: FAMILY MEDICINE

## 2024-04-16 RX ORDER — PREDNISONE 20 MG/1
TABLET ORAL
Qty: 20 TABLET | Refills: 0 | Status: SHIPPED | OUTPATIENT
Start: 2024-04-16

## 2024-04-16 RX ORDER — FLUTICASONE PROPIONATE AND SALMETEROL XINAFOATE 115; 21 UG/1; UG/1
2 AEROSOL, METERED RESPIRATORY (INHALATION) 2 TIMES DAILY
Qty: 12 G | Refills: 11 | Status: SHIPPED | OUTPATIENT
Start: 2024-04-16

## 2024-04-16 RX ORDER — HYDROCHLOROTHIAZIDE 25 MG/1
25 TABLET ORAL DAILY
Qty: 90 TABLET | Refills: 1 | Status: SHIPPED | OUTPATIENT
Start: 2024-04-16 | End: 2024-07-17

## 2024-04-16 RX ORDER — CODEINE PHOSPHATE AND GUAIFENESIN 10; 100 MG/5ML; MG/5ML
1-2 SOLUTION ORAL EVERY 4 HOURS PRN
Qty: 120 ML | Refills: 0 | Status: SHIPPED | OUTPATIENT
Start: 2024-04-16 | End: 2024-04-19

## 2024-04-16 ASSESSMENT — ANXIETY QUESTIONNAIRES
3. WORRYING TOO MUCH ABOUT DIFFERENT THINGS: NEARLY EVERY DAY
2. NOT BEING ABLE TO STOP OR CONTROL WORRYING: NEARLY EVERY DAY
GAD7 TOTAL SCORE: 13
4. TROUBLE RELAXING: SEVERAL DAYS
IF YOU CHECKED OFF ANY PROBLEMS ON THIS QUESTIONNAIRE, HOW DIFFICULT HAVE THESE PROBLEMS MADE IT FOR YOU TO DO YOUR WORK, TAKE CARE OF THINGS AT HOME, OR GET ALONG WITH OTHER PEOPLE: VERY DIFFICULT
1. FEELING NERVOUS, ANXIOUS, OR ON EDGE: NEARLY EVERY DAY
5. BEING SO RESTLESS THAT IT IS HARD TO SIT STILL: NOT AT ALL
6. BECOMING EASILY ANNOYED OR IRRITABLE: MORE THAN HALF THE DAYS
GAD7 TOTAL SCORE: 13
8. IF YOU CHECKED OFF ANY PROBLEMS, HOW DIFFICULT HAVE THESE MADE IT FOR YOU TO DO YOUR WORK, TAKE CARE OF THINGS AT HOME, OR GET ALONG WITH OTHER PEOPLE?: VERY DIFFICULT
7. FEELING AFRAID AS IF SOMETHING AWFUL MIGHT HAPPEN: SEVERAL DAYS
7. FEELING AFRAID AS IF SOMETHING AWFUL MIGHT HAPPEN: SEVERAL DAYS
GAD7 TOTAL SCORE: 13

## 2024-04-16 ASSESSMENT — PAIN SCALES - GENERAL: PAINLEVEL: NO PAIN (0)

## 2024-04-16 ASSESSMENT — PATIENT HEALTH QUESTIONNAIRE - PHQ9
SUM OF ALL RESPONSES TO PHQ QUESTIONS 1-9: 9
SUM OF ALL RESPONSES TO PHQ QUESTIONS 1-9: 9
10. IF YOU CHECKED OFF ANY PROBLEMS, HOW DIFFICULT HAVE THESE PROBLEMS MADE IT FOR YOU TO DO YOUR WORK, TAKE CARE OF THINGS AT HOME, OR GET ALONG WITH OTHER PEOPLE: VERY DIFFICULT

## 2024-04-16 NOTE — PROGRESS NOTES
"  Assessment & Plan   Problem List Items Addressed This Visit       Alcoholism (H)- resolved    Benign essential hypertension    Relevant Medications    hydrochlorothiazide (HYDRODIURIL) 25 MG tablet    Chemotherapy-induced peripheral neuropathy (H24)- ongoing. No new symptoms    Deep vein thrombosis (DVT) of proximal lower extremity, unspecified chronicity, unspecified laterality (H)- resolved.     Relevant Medications    predniSONE (DELTASONE) 20 MG tablet    Hyperlipidemia LDL goal <130    Relevant Orders    Lipid panel reflex to direct LDL Non-fasting    Mild intermittent asthma without complication    Relevant Medications    fluticasone-salmeterol (ADVAIR HFA) 115-21 MCG/ACT inhaler    Panlobular emphysema (H)    Relevant Medications    fluticasone-salmeterol (ADVAIR HFA) 115-21 MCG/ACT inhaler    predniSONE (DELTASONE) 20 MG tablet    guaiFENesin-codeine (ROBITUSSIN AC) 100-10 MG/5ML solution     Other Visit Diagnoses       Bronchitis    -  Primary    Relevant Medications    predniSONE (DELTASONE) 20 MG tablet    guaiFENesin-codeine (ROBITUSSIN AC) 100-10 MG/5ML solution           78 yr old female here for medication refills    Also has a URI ongoing for several weeks. Appears like a bronchitis- recommend prednisone and also some cough medications.            BMI  Estimated body mass index is 26.1 kg/m  as calculated from the following:    Height as of this encounter: 1.594 m (5' 2.75\").    Weight as of this encounter: 66.3 kg (146 lb 3.2 oz).       Depression Screening Follow Up               No data to display                    Follow Up Actions Taken  Crisis resource information provided in the After Visit Summary    Discussed the following ways the patient can remain in a safe environment:  remove alcohol and be around others  FUTURE APPOINTMENTS:       - Follow-up visit as needed      Thu Montesinos is a 78 year old, presenting for the following health issues:  Hypertension, Lipids, and URI        " "4/16/2024    10:58 AM   Additional Questions   Roomed by Linda FALCON   Accompanied by self     History of Present Illness       Headaches:   Since the patient's last clinic visit, headaches are: worsened  The patient is getting headaches:  Everyday  She is not able to do normal daily activities when she has a migraine.  The patient is taking the following rescue/relief medications:  Tylenol   Patient states \"I get only a small amount of relief\" from the rescue/relief medications.   The patient is taking the following medications to prevent migraines:  No medications to prevent migraines  In the past 4 weeks, the patient has gone to an Urgent Care or Emergency Room 0 times times due to headaches.    Reason for visit:  Med check head and chest cold  Symptom onset:  3-4 weeks ago  Symptoms include:  Headache cough heavy chest  Symptom intensity:  Severe  Symptom progression:  Staying the same  Had these symptoms before:  No  What makes it worse:  No  What makes it better:  Mucinex  tylenol sleep    She eats 2-3 servings of fruits and vegetables daily.She consumes 0 sweetened beverage(s) daily.She exercises with enough effort to increase her heart rate 30 to 60 minutes per day.  She exercises with enough effort to increase her heart rate 3 or less days per week.   She is taking medications regularly.     [facility] ([facility]) Flowsheet     Hyperlipidemia Follow-Up    Are you regularly taking any medication or supplement to lower your cholesterol?   Yes- atorvastatin  Are you having muscle aches or other side effects that you think could be caused by your cholesterol lowering medication?  No    Hypertension Follow-up    Do you check your blood pressure regularly outside of the clinic? No   Are you following a low salt diet? Yes  Are your blood pressures ever more than 140 on the top number (systolic) OR more   than 90 on the bottom number (diastolic), for example 140/90?     Patient reports no issues with her medications. " "    Acute Illness  Acute illness concerns: URI, cough  Onset/Duration: 3 weeks  Symptoms:  Fever: No  Chills/Sweats: YES  Headache (location?): YES  Sinus Pressure: YES  Conjunctivitis:  YES  Ear Pain: no  Rhinorrhea: YES  Congestion: YES  Sore Throat: No  Cough: YES-productive of clear sputum  Wheeze: YES  Decreased Appetite: No  Nausea: No  Vomiting: No  Diarrhea: No  Dysuria/Freq.: No  Dysuria or Hematuria: No  Fatigue/Achiness: YES  Sick/Strep Exposure: No  Therapies tried and outcome: mucinex, tylenol, otc cough meds, limited relief    URI symptoms ongoing for a few weeks. Cough is productive of clear sputum. No fevers but she reports some chills.             Review of Systems  CONSTITUTIONAL: NEGATIVE for fever, chills, change in weight  INTEGUMENTARY/SKIN: NEGATIVE for worrisome rashes, moles or lesions  EYES: NEGATIVE for vision changes or irritation  ENT/MOUTH: POSITIVE for nasal congestion, postnasal drainage, rhinorrhea-clear, and sinus pressure  RESP:POSITIVE for wheezing, cough-productive, and dyspnea on exertion  CV: NEGATIVE for chest pain, palpitations or peripheral edema  GI: NEGATIVE for nausea, abdominal pain, heartburn, or change in bowel habits  : NEGATIVE for frequency, dysuria, or hematuria  MUSCULOSKELETAL: NEGATIVE for significant arthralgias or myalgia  ENDOCRINE: NEGATIVE for temperature intolerance, skin/hair changes  HEME: NEGATIVE for bleeding problems  PSYCHIATRIC: NEGATIVE for changes in mood or affect      Objective    /68 (BP Location: Right arm, Patient Position: Chair, Cuff Size: Adult Regular)   Pulse 77   Temp 97.6  F (36.4  C) (Tympanic)   Resp 16   Ht 1.594 m (5' 2.75\")   Wt 66.3 kg (146 lb 3.2 oz)   SpO2 96%   BMI 26.10 kg/m    Body mass index is 26.1 kg/m .  Physical Exam   GENERAL: alert and no distress  NECK: no adenopathy, no asymmetry, masses, or scars  RESP: expiratory wheezes throughout  CV: regular rate and rhythm, normal S1 S2, no S3 or S4, no murmur, " click or rub, no peripheral edema  ABDOMEN: soft, nontender, no hepatosplenomegaly, no masses and bowel sounds normal  MS: no gross musculoskeletal defects noted, no edema            Signed Electronically by: Catie Fraser MD

## 2024-04-17 DIAGNOSIS — M62.838 MUSCLE SPASM: ICD-10-CM

## 2024-04-17 RX ORDER — TIZANIDINE 2 MG/1
2 TABLET ORAL 3 TIMES DAILY PRN
Qty: 90 TABLET | Refills: 3 | Status: SHIPPED | OUTPATIENT
Start: 2024-04-17 | End: 2024-09-04

## 2024-04-18 ENCOUNTER — TELEPHONE (OUTPATIENT)
Dept: FAMILY MEDICINE | Facility: CLINIC | Age: 79
End: 2024-04-18

## 2024-04-18 ENCOUNTER — TELEPHONE (OUTPATIENT)
Dept: FAMILY MEDICINE | Facility: CLINIC | Age: 79
End: 2024-04-18
Payer: COMMERCIAL

## 2024-04-18 DIAGNOSIS — K21.9 GASTROESOPHAGEAL REFLUX DISEASE WITHOUT ESOPHAGITIS: ICD-10-CM

## 2024-04-18 DIAGNOSIS — J40 BRONCHITIS: ICD-10-CM

## 2024-04-18 NOTE — TELEPHONE ENCOUNTER
Patient Returning Call    Reason for call:  Prescription refill    Information relayed to patient:   N/A    Patient has additional questions:  Yes    What are your questions/concerns:   Dosage questions    Who does the patient want to speak with:  Provider    Is an  needed?:  No      Could we send this information to you in [facility] or would you prefer to receive a phone call?:   Patient would prefer a phone call   Okay to leave a detailed message?: Yes at Cell number on file:    Telephone Information:   Mobile [phone]

## 2024-04-18 NOTE — TELEPHONE ENCOUNTER
Pending Prescriptions:                       Disp   Refills    omeprazole (PRILOSEC) 20 MG DR capsule    90 cap*2            Sig: TAKE 1 CAPSULE(20 MG) BY MOUTH DAILY 30 TO 60           MINUTES BEFORE A MEAL

## 2024-04-19 DIAGNOSIS — J40 BRONCHITIS: ICD-10-CM

## 2024-04-19 RX ORDER — CODEINE PHOSPHATE AND GUAIFENESIN 10; 100 MG/5ML; MG/5ML
SOLUTION ORAL
Qty: 120 ML | Refills: 0 | Status: SHIPPED | OUTPATIENT
Start: 2024-04-19

## 2024-04-19 RX ORDER — CODEINE PHOSPHATE AND GUAIFENESIN 10; 100 MG/5ML; MG/5ML
1-2 SOLUTION ORAL EVERY 4 HOURS PRN
Qty: 120 ML | Refills: 0 | Status: CANCELLED | OUTPATIENT
Start: 2024-04-19

## 2024-04-25 DIAGNOSIS — J45.20 MILD INTERMITTENT ASTHMA WITHOUT COMPLICATION: ICD-10-CM

## 2024-04-25 NOTE — TELEPHONE ENCOUNTER
Medication requested: ipratropium-albuterol neb    Last prescribing provider: Dr. Martel on 3/28/24    Last clinic visit date: 2/5/24 with Dr. Martel    Recommendations for requested medication (if none, N/A): NA    Any other pertinent information (if none, N/A): NA    Refilled: Y/N, if NO, why?    Pended and Routed to Dr. Chaz Martel

## 2024-04-26 RX ORDER — IPRATROPIUM BROMIDE AND ALBUTEROL SULFATE 2.5; .5 MG/3ML; MG/3ML
1 SOLUTION RESPIRATORY (INHALATION) EVERY 6 HOURS PRN
Qty: 90 ML | Refills: 0 | Status: SHIPPED | OUTPATIENT
Start: 2024-04-26 | End: 2024-08-07

## 2024-05-03 ENCOUNTER — HOSPITAL ENCOUNTER (OUTPATIENT)
Dept: CT IMAGING | Facility: HOSPITAL | Age: 79
Discharge: HOME | End: 2024-05-03
Attending: STUDENT IN AN ORGANIZED HEALTH CARE EDUCATION/TRAINING PROGRAM | Admitting: STUDENT IN AN ORGANIZED HEALTH CARE EDUCATION/TRAINING PROGRAM
Payer: COMMERCIAL

## 2024-05-03 DIAGNOSIS — C34.31 MALIGNANT NEOPLASM OF LOWER LOBE OF RIGHT LUNG (H): ICD-10-CM

## 2024-05-03 LAB
CREAT BLD-MCNC: 0.6 MG/DL (ref 0.6–1.1)
EGFRCR SERPLBLD CKD-EPI 2021: >60 ML/MIN/1.73M2

## 2024-05-03 PROCEDURE — 250N000011 HC RX IP 250 OP 636: Performed by: STUDENT IN AN ORGANIZED HEALTH CARE EDUCATION/TRAINING PROGRAM

## 2024-05-03 PROCEDURE — 82565 ASSAY OF CREATININE: CPT

## 2024-05-03 PROCEDURE — 71260 CT THORAX DX C+: CPT

## 2024-05-03 RX ORDER — IOPAMIDOL 755 MG/ML
71 INJECTION, SOLUTION INTRAVASCULAR ONCE
Status: COMPLETED | OUTPATIENT
Start: 2024-05-03 | End: 2024-05-03

## 2024-05-03 RX ADMIN — IOPAMIDOL 71 ML: 755 INJECTION, SOLUTION INTRAVENOUS at 13:01

## 2024-05-05 NOTE — PROGRESS NOTES
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 Stage IIIC (AJCC 8th edition) diagnosed 12/2022  PD-L1 <1%  NGS- [facility] panel- Neg (High TMB 16.7 mut/MB)     Left LL adenocarcinoma [id number] Stage (AJCC 8th edition) diagnosed 12/2022  PD-L1-2%  NGS-KRAS G12C, TMB 2.4      Treatment:  1/20/23- 4 cycles of Pembrolizumab+carboplatin+paclitaxel (pembro on hold ucrrently of immune mediated myalgia/arthralgia since 3/2023)   XRT-  Right Lower Lobe      6,000 cGy    in 30 fraction     6/06/2023 7/26/2023          Left Lower Lobe        5,000 cGy   in 10 fraction     6/22/2023 7/06/2023            Intent of treatment: Palliative    Onc HPI:    One month history of increasing dyspnea with palpitations, propting ER visit 11/19.  11/19/22- CT- 1 cm right lower lobe mass abutting multiple central bronchovascular and mediastinal structures, consistent with malignancy.2.2 cm spiculated nodule in the left lower lobe, also consistent with malignancy.   12/6/22- PET/CT-  Right lower lobe lung mass abutting the pleura (SUV max of 24), measuring 8 x 5.2 cm. There is associated complete obliteration of right lower lobe bronchus and loss of fat planes with  the left atrium. Spiculated left lower lobe solid lung mass (SUV 17.9), measuring 2.2 x 2 cm. There are several markedly hypermetabolic, irregular solid nodules in the right middle lobe abutting the pleura (approximately 10-12), as for example 1.2 cm nodule with SUV max of 16.8 (4/126). There is a separate markedly hypermetabolic 1 cm nodule in the right upper lobe. Mild hypermetabolic uptake is noted within a1 x 0.9 cm right paratracheal node ( SUV max of 4.2) (4/120), Mild uptake is also  noted within a subcentimeter lower paraesophageal node (4/154).   12/28/22- EBUS with deblking in the right hilar mass (no mediastinal staging performed)- - LUNG, RIGHT MAINSTEM, ENDOBRONCHIAL BIOPSY:  Squamous cell carcinoma with extensive necrosis (positive for p40 and negative for TTF-1). Left LL FNAC- Positive for malignancy- Adenocarcinoma  (diffusely positive for TTF-1 while negative for p40)  1/3/23: Brain MRI - Neg  1/10/23: Tumor board discussion: Too extensive for concurrent/sequential chemo XRT- plan for palliative intent chemo-IO  1/20/23- C1 Pembrolizumab+carboplatin+paclitaxel  2/10/23-C2 Pembrolizumab+carboplatin+paclitaxel  2/27/23- CT CAP- Decreased size of the hypoattenuating mass in the central right lower lobe and small irregularly-shaped nodules in the right upper lobe consistent with positive treatment response. There is persistent postobstructive right lower lobe atelectasis.Unchanged heterogeneous lobulated and spiculated solid nodule in the lateral basal left lower lobe. No findings to suggest new extrapulmonary metastatic disease.  3/3, 3/24-  Pembrolizumab+carboplatin+paclitaxel      4/10/23- CT CAP- - some Rx response- Mass distal right lower lobe bronchus causing complete post obstructive atelectasis has decreased from 6.5 x 4.5 x 3.5 cm to 5.5 x 3.8 x 2.4 cm today. Spiculated left lower lobe mass has decreased from 2.1 cm to 1.5 cm today. Right upper lobe nodularity continues to decrease. Ill-defined nodularity in the right middle lobe has developed and is most likely postinflammatory.     6/5/23- After discussion at tumor board, Dr. Escoto agreed for definitive radiation treatment (without chemo, as she already had 4 cycles of chemo) for cancer on the right side and possible SBRT on left side both with definitive intent    6/5/23- Started XRT     Right Lower Lobe      6,000 cGy    in 30 fraction     6/06/2023 7/26/2023          Left Lower Lobe        5,000 cGy   in 10 fraction     6/22/2023 7/06/2023 6/16/23- CT Chest-  Unchanged complete atelectasis of the heterogeneous right lower lobe due to central obstructing mass status post treatment. No change in the  posterolateral left lower lobe nodule with surrounding cicatrization. No findings to suggest progressive   malignancy in the chest.    8/25/23- CT Chest- Stable right lower lobe lung mass with distal atelectasis. Lobulated nodule in the left lower lobe is also stable. No disease progression in the chest. No metastatic disease in the abdomen and pelvis.     10/20/23- CT CHest- Heterogeneous mass with consolidation and complete volume loss of the right lower lobe which is located in the azygoesophageal recess has increased in size slightly with axial dimensions superiorly 3.2 x 2.2 cm previously 2.8 x 2.0 cm  (axial image 95 series 3) and axial dimensions inferiorly 4.2 x 2.9 cm previously 4.0 x 2.6 cm (image 110). Spiculated mass posterior basilar left lower lobe stable at 1.7 x 1.2 cm (image 119).    11/3/23- PET/CT-  A consolidative masslike opacity with atelectasis involving the right lower lobe at site of original tumor demonstrates and ill-defined area of soft tissue thickening along its superior margin with moderate FDG uptake. Appearance favors ongoing posttreatment inflammatory change although is indeterminate for early local recurrence. Recommend short term chest CT with IV contrast follow-up in 3 months. Otherwise no evidence of FDG avid malignancy. An irregular nodule in the left lower lobe is not FDG avid suggesting posttreatment scarring. No evidence of metastasis including no FDG avid adenopathy.Some small irregular opacities have developed in the left lung in the short interval since 10/20/2023, likely infectious/inflammatory    2/2/24- CT Chest-  Unchanged posttreatment lobar atelectasis of the right lower lobe. Increased predominantly solid parenchymal bands with architectural distortion in the basal left lower lobe, centered on the inferior posterior medial right upper lobe consistent with posttreatment inflammation and developing cicatrization. There are  no findings to suggest progression of  neoplasm in the chest.New small right pleural effusion not a component of which is organized in the posterior mid chest secondary to #2.     5/3/24- CT Chest-  Stable posttreatment changes in both lower lobes. No definite recurrent disease. No new sites of disease in the chest, abdomen, or pelvis.        Interval history:   Yamel is here for follow up.    She is doing well overall.   She went to [place] in March, had a wonderful time.  She had a lot of sea food.  2-3 weeks ago, she had episode of bronchitis, PCP prescribed some prednisone and codeine  Endorse that she has some residual cough and occasional wheeizng, wonders if this is also allergy  She has stable  fatigue, able to walk daily  No change in breathing has stable exertional SOB  Weight is stable  Appetite is ok        ECOG PS 1    Wt Readings from Last 4 Encounters:   05/06/24 67 kg (147 lb 11.2 oz)   04/16/24 66.3 kg (146 lb 3.2 oz)   04/09/24 65 kg (143 lb 3.2 oz)   02/05/24 68 kg (150 lb)        Current Outpatient Medications   Medication Sig Dispense Refill    acetaminophen (TYLENOL) 500 MG tablet Take 1,000 mg by mouth every 6 hours as needed for mild pain Taking once a day      albuterol (PROAIR HFA/PROVENTIL HFA/VENTOLIN HFA) 108 (90 Base) MCG/ACT inhaler INHALE 2 PUFFS INTO THE LUNGS EVERY 6 HOURS AS NEEDED FOR SHORTNESS OF BREATH OR DIFFICULTY BREATHING OR WHEEZING 25.5 g 0    atorvastatin (LIPITOR) 20 MG tablet Take 1 tablet (20 mg) by mouth daily 90 tablet 3    celecoxib (CELEBREX) 200 MG capsule TAKE 1 CAPSULE(200 MG) BY MOUTH DAILY 60 capsule 1    Cholecalciferol (VITAMIN D-3 PO) Take 2,000 Units by mouth daily       clonazePAM (KLONOPIN) 0.5 MG tablet Take 1-2 tablets (0.5-1 mg) by mouth 2 times daily as needed for anxiety 120 tablet 1    clonazePAM (KLONOPIN) 0.5 MG tablet Take 0.5-1 tablets (0.25-0.5 mg) by mouth 2 times daily as needed for anxiety 30 tablet 3    FLUoxetine (PROZAC) 20 MG capsule Take 2 capsules (40 mg) by mouth daily 180  capsule 3    fluticasone-salmeterol (ADVAIR HFA) 115-21 MCG/ACT inhaler Inhale 2 puffs into the lungs 2 times daily 12 g 11    guaiFENesin-codeine (ROBITUSSIN AC) 100-10 MG/5ML solution TAKE 5 TO 10 ML BY MOUTH EVERY 4 HOURS AS NEEDED 120 mL 0    hydrochlorothiazide (HYDRODIURIL) 25 MG tablet Take 1 tablet (25 mg) by mouth daily 90 tablet 1    ipratropium - albuterol 0.5 mg/2.5 mg/3 mL (DUONEB) 0.5-2.5 (3) MG/3ML neb solution Take 1 vial (3 mLs) by nebulization every 6 hours as needed for shortness of breath or wheezing 90 mL 0    lidocaine-prilocaine (EMLA) 2.5-2.5 % external cream Apply topically as needed for moderate pain (4-6) Apply 1 hour prior to port access and cover with saran wrap or press and seal. (Patient not taking: Reported on 11/1/2023) 30 g 1    omeprazole (PRILOSEC) 20 MG DR capsule TAKE 1 CAPSULE(20 MG) BY MOUTH DAILY 30 TO 60 MINUTES BEFORE A MEAL 90 capsule 2    polyethylene glycol (MIRALAX) 17 g packet Take 1 packet by mouth daily Taking PRN (Patient not taking: Reported on 10/23/2023)      predniSONE (DELTASONE) 10 MG tablet Take 1 tablet (10 mg) by mouth daily (Patient not taking: Reported on 10/23/2023) 60 tablet 1    predniSONE (DELTASONE) 20 MG tablet Take 3 tabs by mouth daily x 3 days, then 2 tabs daily x 3 days, then 1 tab daily x 3 days, then 1/2 tab daily x 3 days. 20 tablet 0    tiZANidine (ZANAFLEX) 2 MG tablet TAKE 1 TABLET(2 MG) BY MOUTH THREE TIMES DAILY AS NEEDED FOR MUSCLE SPASMS 90 tablet 3    traZODone (DESYREL) 50 MG tablet TAKE 2 TABLETS(100 MG) BY MOUTH AT BEDTIME 60 tablet 0          Allergies   Allergen Reactions    Nka [No Known Allergies]     Seasonal Allergies        There were no vitals taken for this visit.  General: Patient appears well in no acute distress.   Skin: No visualized rash or lesions on visualized skin  Eyes: EOMI, no erythema, sclera icterus or discharge noted  Resp: Appears to be breathing comfortably without accessory muscle usage, speaking in full  sentences, no cough  MSK: Appears to have normal range of motion based on visualized movements  Neurologic: No apparent tremors, facial movements symmetric  Psych: affect engaged, pleasant, alert and oriented   not currently breastfeeding.  Wt Readings from Last 4 Encounters:   04/16/24 66.3 kg (146 lb 3.2 oz)   04/09/24 65 kg (143 lb 3.2 oz)   02/05/24 68 kg (150 lb)   11/17/23 73.9 kg (163 lb)         Impression/plan:     Ms. Yamel Linsday is a 77 year old  female with PMHx of for HTN, HLD, anxiety, who is here for evaluation/followup of new lung cancer     Synchronous Rt LL Squamous cell carcinoma [id number] Stage IIIC (AJCC 8th edition) diagnosed 12/2022  PD-L1 <1%  NGS- [facility] panel- Neg (High TMB 16.7 mut/MB)     Left LL adenocarcinoma [id number] Stage (AJCC 8th edition) diagnosed 12/2022  PD-L1-2%  NGS-KRAS G12C, TMB 2.4     Pt with 2 synchronous cancers at diagnosis..  The more  threatening one was the right lower lobe squamous cell cancer which appears to be at least stage IIIC given the size greater than 7 cm suggestive of T4 disease.  She also has mediastinal lymph node that were enlarged and appear pathologic but were not biopsied.  She underwent an EBUS for debulking and her mediastinum was not been staged.  She is also has several FDG avid nodules in the right middle and the right upper lobe that have not yet been biopsied.  Given the extent of involvement of the right sided cancer, concurrent/sequential chemoradiation not feasible per tumor board discussion, also no further EBUS required since high probability of cancer.   She proceeded with palliative intent chemoimmunotherapy with pembro+carbo+taxol.  Of note, squamous has high TMB and low neg PD-L1.  Adenocarcinoma has KRAS G12C. CT after 4 cycles showing good partial response in the right-sided tumor and some response in the left side adenocarcinoma.  After discussion at tumor board, Dr. Escoto did definitive radiation treatment  on cancer on the right side  "and SBRT on left side both with definitive intent.  CT on 10/2023 showing slight increase in size of consolidative opacity int he rt lower lobe.We got a PET scan which showed some mild uptake b/l suggesting inflammation and mild FDG uptake in the rt LL but FDG avidity is much lower than before suggesting likley Rx related changes. CT in 5/2024 stable with evolving treatment changes.  Continue monitoring.  Immunotherapy on hold due to Myositis/arthritis with hx of elevated CRP and ESR been attributed to immunotherapy. We willc ontnue to hold it for now until we see signs of progression.        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 Has been attributed to immunotherapy. dopplar negative for DVT, No known bone mets on baseline PET/CT.- Resolved  -CK normal  - CRP and ESR is mildly elevated (significantly lower than the levels when she was on steroids for myositis)  -has had 2 prednisone tapers, the 2nd was a longer taper. Her pain started to increase the week after stopping prednisone  -she was resumed on prednisone 10 mg without improvement and increased to 20 mg.   - was on 10 mg daily, but stopped it for unclear reasons, has noticed recent flare in both the knee joints and left calf muscle  - She resumed 10 mg prednisone 10/2023 but did not notice any difference, now her pain is more in the joints and muscle ache seems to be relievd by muscle relaxant, suggesting this is MSK etiology and less likley to be immunotherapy related.   - -has tramadol, started on gabapentin 300 mg at bedtime per palliative\"  -- stop prednisone, asked he rto get in tpuch with me if symptoms get worse (especially msucle)    # Bronchitis, COPD exacerbation  In April Rx with PO steroids and codeine, has some lingering cough, continue to monitor  Continue Advair        #Psychiatry: Patient has previously had a history of anxiety.now since the diagnosis, on and off panic " attacks.  PTA on Prozac, and also clonazepam  -Trazodoen for ?insomnia  -cloazepan BID 0.5 mg  -cancer pyschology referral placed     #COVID vaccine: s/p 3 boosters     #Hypertension hyperlipidemia  ON statin        On the day of service,  Preparation time:  5 minutes  Visit time:  30 minutes  Care Coordination:  5 minutes        Chaz Martel M.D.   of Medicine  Division of Hematology, Oncology and Transplantation  [facility]

## 2024-05-06 ENCOUNTER — ONCOLOGY VISIT (OUTPATIENT)
Dept: ONCOLOGY | Facility: CLINIC | Age: 79
End: 2024-05-06
Attending: STUDENT IN AN ORGANIZED HEALTH CARE EDUCATION/TRAINING PROGRAM
Payer: COMMERCIAL

## 2024-05-06 VITALS
OXYGEN SATURATION: 94 % | SYSTOLIC BLOOD PRESSURE: 114 MMHG | HEART RATE: 88 BPM | RESPIRATION RATE: 20 BRPM | WEIGHT: 147.7 LBS | DIASTOLIC BLOOD PRESSURE: 72 MMHG | BODY MASS INDEX: 26.37 KG/M2 | TEMPERATURE: 97.8 F

## 2024-05-06 DIAGNOSIS — C34.31 MALIGNANT NEOPLASM OF LOWER LOBE OF RIGHT LUNG (H): ICD-10-CM

## 2024-05-06 PROCEDURE — G0463 HOSPITAL OUTPT CLINIC VISIT: HCPCS | Performed by: STUDENT IN AN ORGANIZED HEALTH CARE EDUCATION/TRAINING PROGRAM

## 2024-05-06 PROCEDURE — 99214 OFFICE O/P EST MOD 30 MIN: CPT | Performed by: STUDENT IN AN ORGANIZED HEALTH CARE EDUCATION/TRAINING PROGRAM

## 2024-05-06 ASSESSMENT — PAIN SCALES - GENERAL: PAINLEVEL: MODERATE PAIN (4)

## 2024-05-06 NOTE — NURSING NOTE
"Oncology Rooming Note    May 6, 2024 1:48 PM   Yamel Lindsay is a 78 year old female who presents for:    Chief Complaint   Patient presents with    Oncology Clinic Visit     Right lower lobe lung mass     Initial Vitals: /72 (BP Location: Right arm, Patient Position: Sitting, Cuff Size: Adult Regular)   Pulse 88   Temp 97.8  F (36.6  C) (Oral)   Resp 20   Wt 67 kg (147 lb 11.2 oz)   SpO2 94%   BMI 26.37 kg/m   Estimated body mass index is 26.37 kg/m  as calculated from the following:    Height as of 4/16/24: 1.594 m (5' 2.75\").    Weight as of this encounter: 67 kg (147 lb 11.2 oz). Body surface area is 1.72 meters squared.  Moderate Pain (4) Comment: Data Unavailable   No LMP recorded. Patient is postmenopausal.  Allergies reviewed: Yes  Medications reviewed: Yes    Medications: Medication refills not needed today.  Pharmacy name entered into [facility]: [id number] - [address] AT [place] [address] & [facility]    Frailty Screening:   Is the patient here for a new oncology consult visit in cancer care? 2. No      Clinical concerns: none      Rekha Turner              "

## 2024-05-06 NOTE — LETTER
5/6/2024         RE: Yamel Lindsay  [address] [facility] [address]        Dear Colleague,    Thank you for referring your patient, Yamel Lindsay, to the [facility]. Please see a copy of my visit note below.    5/6/24    Reason for Visit: f/u lung cancer, joint pain      Diagnosis:   Synchronous Rt LL Squamous cell carcinoma [id number] Stage IIIC (AJCC 8th edition) diagnosed 12/2022  PD-L1 <1%  NGS- [facility] panel- Neg (High TMB 16.7 mut/MB)     Left LL adenocarcinoma [id number] Stage (AJCC 8th edition) diagnosed 12/2022  PD-L1-2%  NGS-KRAS G12C, TMB 2.4      Treatment:  1/20/23- 4 cycles of Pembrolizumab+carboplatin+paclitaxel (pembro on hold ucrrently of immune mediated myalgia/arthralgia since 3/2023)   XRT-  Right Lower Lobe      6,000 cGy    in 30 fraction     6/06/2023 7/26/2023          Left Lower Lobe        5,000 cGy   in 10 fraction     6/22/2023 7/06/2023            Intent of treatment: Palliative    Onc HPI:    One month history of increasing dyspnea with palpitations, propting ER visit 11/19.  11/19/22- CT- 1 cm right lower lobe mass abutting multiple central bronchovascular and mediastinal structures, consistent with malignancy.2.2 cm spiculated nodule in the left lower lobe, also consistent with malignancy.   12/6/22- PET/CT-  Right lower lobe lung mass abutting the pleura (SUV max of 24), measuring 8 x 5.2 cm. There is associated complete obliteration of right lower lobe bronchus and loss of fat planes with  the left atrium. Spiculated left lower lobe solid lung mass (SUV 17.9), measuring 2.2 x 2 cm. There are several markedly hypermetabolic, irregular solid nodules in the right middle lobe abutting the pleura (approximately 10-12), as for example 1.2 cm nodule with SUV max of 16.8 (4/126). There is a separate markedly hypermetabolic 1 cm nodule in the right upper lobe. Mild hypermetabolic uptake is noted within a1 x 0.9 cm right paratracheal node ( SUV max  of 4.2) (4/120), Mild uptake is also  noted within a subcentimeter lower paraesophageal node (4/154).   12/28/22- EBUS with deblking in the right hilar mass (no mediastinal staging performed)- - LUNG, RIGHT MAINSTEM, ENDOBRONCHIAL BIOPSY: Squamous cell carcinoma with extensive necrosis (positive for p40 and negative for TTF-1). Left LL FNAC- Positive for malignancy- Adenocarcinoma  (diffusely positive for TTF-1 while negative for p40)  1/3/23: Brain MRI - Neg  1/10/23: Tumor board discussion: Too extensive for concurrent/sequential chemo XRT- plan for palliative intent chemo-IO  1/20/23- C1 Pembrolizumab+carboplatin+paclitaxel  2/10/23-C2 Pembrolizumab+carboplatin+paclitaxel  2/27/23- CT CAP- Decreased size of the hypoattenuating mass in the central right lower lobe and small irregularly-shaped nodules in the right upper lobe consistent with positive treatment response. There is persistent postobstructive right lower lobe atelectasis.Unchanged heterogeneous lobulated and spiculated solid nodule in the lateral basal left lower lobe. No findings to suggest new extrapulmonary metastatic disease.  3/3, 3/24-  Pembrolizumab+carboplatin+paclitaxel      4/10/23- CT CAP- - some Rx response- Mass distal right lower lobe bronchus causing complete post obstructive atelectasis has decreased from 6.5 x 4.5 x 3.5 cm to 5.5 x 3.8 x 2.4 cm today. Spiculated left lower lobe mass has decreased from 2.1 cm to 1.5 cm today. Right upper lobe nodularity continues to decrease. Ill-defined nodularity in the right middle lobe has developed and is most likely postinflammatory.     6/5/23- After discussion at tumor board, Dr. Escoto agreed for definitive radiation treatment (without chemo, as she already had 4 cycles of chemo) for cancer on the right side and possible SBRT on left side both with definitive intent    6/5/23- Started XRT     Right Lower Lobe      6,000 cGy    in 30 fraction     6/06/2023 7/26/2023          Left  Lower Lobe        5,000 cGy   in 10 fraction     6/22/2023 7/06/2023 6/16/23- CT Chest-  Unchanged complete atelectasis of the heterogeneous right lower lobe due to central obstructing mass status post treatment. No change in the posterolateral left lower lobe nodule with surrounding cicatrization. No findings to suggest progressive   malignancy in the chest.    8/25/23- CT Chest- Stable right lower lobe lung mass with distal atelectasis. Lobulated nodule in the left lower lobe is also stable. No disease progression in the chest. No metastatic disease in the abdomen and pelvis.     10/20/23- CT CHest- Heterogeneous mass with consolidation and complete volume loss of the right lower lobe which is located in the azygoesophageal recess has increased in size slightly with axial dimensions superiorly 3.2 x 2.2 cm previously 2.8 x 2.0 cm  (axial image 95 series 3) and axial dimensions inferiorly 4.2 x 2.9 cm previously 4.0 x 2.6 cm (image 110). Spiculated mass posterior basilar left lower lobe stable at 1.7 x 1.2 cm (image 119).    11/3/23- PET/CT-  A consolidative masslike opacity with atelectasis involving the right lower lobe at site of original tumor demonstrates and ill-defined area of soft tissue thickening along its superior margin with moderate FDG uptake. Appearance favors ongoing posttreatment inflammatory change although is indeterminate for early local recurrence. Recommend short term chest CT with IV contrast follow-up in 3 months. Otherwise no evidence of FDG avid malignancy. An irregular nodule in the left lower lobe is not FDG avid suggesting posttreatment scarring. No evidence of metastasis including no FDG avid adenopathy.Some small irregular opacities have developed in the left lung in the short interval since 10/20/2023, likely infectious/inflammatory    2/2/24- CT Chest-  Unchanged posttreatment lobar atelectasis of the right lower lobe. Increased predominantly solid parenchymal bands  with architectural distortion in the basal left lower lobe, centered on the inferior posterior medial right upper lobe consistent with posttreatment inflammation and developing cicatrization. There are  no findings to suggest progression of neoplasm in the chest.New small right pleural effusion not a component of which is organized in the posterior mid chest secondary to #2.     5/3/24- CT Chest-  Stable posttreatment changes in both lower lobes. No definite recurrent disease. No new sites of disease in the chest, abdomen, or pelvis.        Interval history:   Yamel is here for follow up.    She is doing well overall.   She went to [place] in March, had a wonderful time.  She had a lot of sea food.  2-3 weeks ago, she had episode of bronchitis, PCP prescribed some prednisone and codeine  Endorse that she has some residual cough and occasional wheeizng, wonders if this is also allergy  She has stable  fatigue, able to walk daily  No change in breathing has stable exertional SOB  Weight is stable  Appetite is ok        ECOG PS 1    Wt Readings from Last 4 Encounters:   05/06/24 67 kg (147 lb 11.2 oz)   04/16/24 66.3 kg (146 lb 3.2 oz)   04/09/24 65 kg (143 lb 3.2 oz)   02/05/24 68 kg (150 lb)        Current Outpatient Medications   Medication Sig Dispense Refill    acetaminophen (TYLENOL) 500 MG tablet Take 1,000 mg by mouth every 6 hours as needed for mild pain Taking once a day      albuterol (PROAIR HFA/PROVENTIL HFA/VENTOLIN HFA) 108 (90 Base) MCG/ACT inhaler INHALE 2 PUFFS INTO THE LUNGS EVERY 6 HOURS AS NEEDED FOR SHORTNESS OF BREATH OR DIFFICULTY BREATHING OR WHEEZING 25.5 g 0    atorvastatin (LIPITOR) 20 MG tablet Take 1 tablet (20 mg) by mouth daily 90 tablet 3    celecoxib (CELEBREX) 200 MG capsule TAKE 1 CAPSULE(200 MG) BY MOUTH DAILY 60 capsule 1    Cholecalciferol (VITAMIN D-3 PO) Take 2,000 Units by mouth daily       clonazePAM (KLONOPIN) 0.5 MG tablet Take 1-2 tablets (0.5-1 mg) by mouth 2 times daily as  needed for anxiety 120 tablet 1    clonazePAM (KLONOPIN) 0.5 MG tablet Take 0.5-1 tablets (0.25-0.5 mg) by mouth 2 times daily as needed for anxiety 30 tablet 3    FLUoxetine (PROZAC) 20 MG capsule Take 2 capsules (40 mg) by mouth daily 180 capsule 3    fluticasone-salmeterol (ADVAIR HFA) 115-21 MCG/ACT inhaler Inhale 2 puffs into the lungs 2 times daily 12 g 11    guaiFENesin-codeine (ROBITUSSIN AC) 100-10 MG/5ML solution TAKE 5 TO 10 ML BY MOUTH EVERY 4 HOURS AS NEEDED 120 mL 0    hydrochlorothiazide (HYDRODIURIL) 25 MG tablet Take 1 tablet (25 mg) by mouth daily 90 tablet 1    ipratropium - albuterol 0.5 mg/2.5 mg/3 mL (DUONEB) 0.5-2.5 (3) MG/3ML neb solution Take 1 vial (3 mLs) by nebulization every 6 hours as needed for shortness of breath or wheezing 90 mL 0    lidocaine-prilocaine (EMLA) 2.5-2.5 % external cream Apply topically as needed for moderate pain (4-6) Apply 1 hour prior to port access and cover with saran wrap or press and seal. (Patient not taking: Reported on 11/1/2023) 30 g 1    omeprazole (PRILOSEC) 20 MG DR capsule TAKE 1 CAPSULE(20 MG) BY MOUTH DAILY 30 TO 60 MINUTES BEFORE A MEAL 90 capsule 2    polyethylene glycol (MIRALAX) 17 g packet Take 1 packet by mouth daily Taking PRN (Patient not taking: Reported on 10/23/2023)      predniSONE (DELTASONE) 10 MG tablet Take 1 tablet (10 mg) by mouth daily (Patient not taking: Reported on 10/23/2023) 60 tablet 1    predniSONE (DELTASONE) 20 MG tablet Take 3 tabs by mouth daily x 3 days, then 2 tabs daily x 3 days, then 1 tab daily x 3 days, then 1/2 tab daily x 3 days. 20 tablet 0    tiZANidine (ZANAFLEX) 2 MG tablet TAKE 1 TABLET(2 MG) BY MOUTH THREE TIMES DAILY AS NEEDED FOR MUSCLE SPASMS 90 tablet 3    traZODone (DESYREL) 50 MG tablet TAKE 2 TABLETS(100 MG) BY MOUTH AT BEDTIME 60 tablet 0          Allergies   Allergen Reactions    Nka [No Known Allergies]     Seasonal Allergies        There were no vitals taken for this visit.  General: Patient  appears well in no acute distress.   Skin: No visualized rash or lesions on visualized skin  Eyes: EOMI, no erythema, sclera icterus or discharge noted  Resp: Appears to be breathing comfortably without accessory muscle usage, speaking in full sentences, no cough  MSK: Appears to have normal range of motion based on visualized movements  Neurologic: No apparent tremors, facial movements symmetric  Psych: affect engaged, pleasant, alert and oriented   not currently breastfeeding.  Wt Readings from Last 4 Encounters:   04/16/24 66.3 kg (146 lb 3.2 oz)   04/09/24 65 kg (143 lb 3.2 oz)   02/05/24 68 kg (150 lb)   11/17/23 73.9 kg (163 lb)         Impression/plan:     Ms. Yamel Lindsay is a 77 year old  female with PMHx of for HTN, HLD, anxiety, who is here for evaluation/followup of new lung cancer     Synchronous Rt LL Squamous cell carcinoma [id number] Stage IIIC (AJCC 8th edition) diagnosed 12/2022  PD-L1 <1%  NGS- [facility] panel- Neg (High TMB 16.7 mut/MB)     Left LL adenocarcinoma [id number] Stage (AJCC 8th edition) diagnosed 12/2022  PD-L1-2%  NGS-KRAS G12C, TMB 2.4     Pt with 2 synchronous cancers at diagnosis..  The more  threatening one was the right lower lobe squamous cell cancer which appears to be at least stage IIIC given the size greater than 7 cm suggestive of T4 disease.  She also has mediastinal lymph node that were enlarged and appear pathologic but were not biopsied.  She underwent an EBUS for debulking and her mediastinum was not been staged.  She is also has several FDG avid nodules in the right middle and the right upper lobe that have not yet been biopsied.  Given the extent of involvement of the right sided cancer, concurrent/sequential chemoradiation not feasible per tumor board discussion, also no further EBUS required since high probability of cancer.   She proceeded with palliative intent chemoimmunotherapy with pembro+carbo+taxol.  Of note, squamous has high TMB and low neg PD-L1.  Adenocarcinoma  "has KRAS G12C. CT after 4 cycles showing good partial response in the right-sided tumor and some response in the left side adenocarcinoma.  After discussion at tumor board, Dr. Escoto did definitive radiation treatment  on cancer on the right side and SBRT on left side both with definitive intent.  CT on 10/2023 showing slight increase in size of consolidative opacity int he rt lower lobe.We got a PET scan which showed some mild uptake b/l suggesting inflammation and mild FDG uptake in the rt LL but FDG avidity is much lower than before suggesting likley Rx related changes. CT in 5/2024 stable with evolving treatment changes.  Continue monitoring.  Immunotherapy on hold due to Myositis/arthritis with hx of elevated CRP and ESR been attributed to immunotherapy. We willc ontnue to hold it for now until we see signs of progression.        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 Has been attributed to immunotherapy. dopplar negative for DVT, No known bone mets on baseline PET/CT.- Resolved  -CK normal  - CRP and ESR is mildly elevated (significantly lower than the levels when she was on steroids for myositis)  -has had 2 prednisone tapers, the 2nd was a longer taper. Her pain started to increase the week after stopping prednisone  -she was resumed on prednisone 10 mg without improvement and increased to 20 mg.   - was on 10 mg daily, but stopped it for unclear reasons, has noticed recent flare in both the knee joints and left calf muscle  - She resumed 10 mg prednisone 10/2023 but did not notice any difference, now her pain is more in the joints and muscle ache seems to be relievd by muscle relaxant, suggesting this is MSK etiology and less likley to be immunotherapy related.   - -has tramadol, started on gabapentin 300 mg at bedtime per palliative\"  -- stop prednisone, asked he rto get in tpuch with me if symptoms get worse (especially msucle)    # " Bronchitis, COPD exacerbation  In April Rx with PO steroids and codeine, has some lingering cough, continue to monitor  Continue Advair        #Psychiatry: Patient has previously had a history of anxiety.now since the diagnosis, on and off panic attacks.  PTA on Prozac, and also clonazepam  -Trazodoen for ?insomnia  -cloazepan BID 0.5 mg  -cancer pyschology referral placed     #COVID vaccine: s/p 3 boosters     #Hypertension hyperlipidemia  ON statin        On the day of service,  Preparation time:  5 minutes  Visit time:  30 minutes  Care Coordination:  5 minutes        Chaz Martel M.D.   of Medicine  Division of Hematology, Oncology and Transplantation  [facility]

## 2024-05-17 DIAGNOSIS — F41.9 ANXIETY: ICD-10-CM

## 2024-05-17 NOTE — TELEPHONE ENCOUNTER
Klonopin refill   Last prescribing provider: Dr Martel     Last clinic visit date: 5/6/24 DR Martel    Recommendations for requested medication (if none, N/A): Copied from chart note   5/6/24 DR Martel   Psychiatry: Patient has previously had a history of anxiety.now since the diagnosis, on and off panic attacks.  PTA on Prozac, and also clonazepam  -Trazodoen for ?insomnia  -cloazepan BID 0.5 mg  -cancer pyschology referral placed     Any other pertinent information (if none, N/A): N/A    Refilled: Y/N, if NO, why?

## 2024-05-20 RX ORDER — CLONAZEPAM 0.5 MG/1
.5-1 TABLET ORAL 2 TIMES DAILY PRN
Qty: 120 TABLET | Refills: 1 | Status: SHIPPED | OUTPATIENT
Start: 2024-05-20 | End: 2024-07-22

## 2024-06-09 DIAGNOSIS — J44.9 CHRONIC OBSTRUCTIVE PULMONARY DISEASE, UNSPECIFIED COPD TYPE (H): ICD-10-CM

## 2024-06-10 RX ORDER — ALBUTEROL SULFATE 90 UG/1
AEROSOL, METERED RESPIRATORY (INHALATION)
Qty: 90 G | Refills: 0 | Status: SHIPPED | OUTPATIENT
Start: 2024-06-10

## 2024-07-16 DIAGNOSIS — J45.20 MILD INTERMITTENT ASTHMA WITHOUT COMPLICATION: ICD-10-CM

## 2024-07-16 RX ORDER — FLUTICASONE PROPIONATE AND SALMETEROL XINAFOATE 115; 21 UG/1; UG/1
2 AEROSOL, METERED RESPIRATORY (INHALATION) 2 TIMES DAILY
Qty: 12 G | Refills: 11 | OUTPATIENT
Start: 2024-07-16

## 2024-07-17 DIAGNOSIS — I10 BENIGN ESSENTIAL HYPERTENSION: ICD-10-CM

## 2024-07-17 RX ORDER — HYDROCHLOROTHIAZIDE 25 MG/1
25 TABLET ORAL DAILY
Qty: 90 TABLET | Refills: 1 | Status: SHIPPED | OUTPATIENT
Start: 2024-07-17

## 2024-07-17 NOTE — TELEPHONE ENCOUNTER
Requested Prescriptions   Pending Prescriptions Disp Refills    hydrochlorothiazide (HYDRODIURIL) 25 MG tablet [Pharmacy Med Name: HYDROCHLOROTHIAZIDE 25MG TABLETS] 90 tablet 1     Sig: TAKE 1 TABLET(25 MG) BY MOUTH DAILY       Diuretics (Including Combos) Protocol Failed - 7/17/2024  8:08 AM        Failed - Has GFR on file in past 12 months and most recent value is normal        Passed - Blood pressure under 140/90 in past 12 months     BP Readings from Last 3 Encounters:   05/06/24 114/72   04/16/24 114/68   04/09/24 (!) 150/77       No data recorded            Passed - Medication is active on med list        Passed - Medication indicated for associated diagnosis     Medication is associated with one or more of the following diagnoses:     Edema   Hypertension   Heart Failure   Meniere's Disease          Passed - Recent (12 mo) or future (90 days) visit within the authorizing provider's specialty     The patient must have completed an in-person or virtual visit within the past 12 months or has a future visit scheduled within the next 90 days with the authorizing provider s specialty.  Urgent care and e-visits do not quality as an office visit for this protocol.          Passed - Patient is age 18 or older        Passed - No active pregancy on record        Passed - No positive pregnancy test in past 12 months

## 2024-07-22 ENCOUNTER — ONCOLOGY VISIT (OUTPATIENT)
Dept: ONCOLOGY | Facility: HOSPITAL | Age: 79
End: 2024-07-22
Payer: COMMERCIAL

## 2024-07-22 DIAGNOSIS — F41.9 ANXIETY: ICD-10-CM

## 2024-07-22 DIAGNOSIS — F33.1 MAJOR DEPRESSIVE DISORDER, RECURRENT EPISODE, MODERATE (H): Primary | ICD-10-CM

## 2024-07-22 DIAGNOSIS — K21.9 GASTROESOPHAGEAL REFLUX DISEASE WITHOUT ESOPHAGITIS: ICD-10-CM

## 2024-07-22 PROCEDURE — 90837 PSYTX W PT 60 MINUTES: CPT | Performed by: PSYCHOLOGIST

## 2024-07-22 NOTE — LETTER
"7/22/2024      Yamel Lindsay  [address] [facility] [address]      Dear Colleague,    Thank you for referring your patient, Yamel Lindsay, to the [facility] [place]. Please see a copy of my visit note below.              [facility] Oncology- Psychotherapy                                     Progress Note     Patient Name: Yamel Lindsay                        Date:   7/22/2024                                              Service Type: Individual                            Session Start Time:       1100      Session End Time:    1153                Session Length:        53 mins     Session #:      2     Attendees:     Client attended alone     Service Modality:  In-person     DATA  Interactive Complexity: No  Crisis: No                               Progress Since Last Session (Related to Symptoms / Goals / Homework):              Symptoms:  Pt reports recent depressive sx also including low energy, low motivation, low mood, denies recent S/I,                  Pt reports current depressive episode of three months.                  Pt reports a hx of mental health issues dating back to childhood.                  Pt reports she has stopped walking daily                 Pt reports she will sit in her nightgown all day.                 We discussed coping strategies.      Pt reports excessive sleep.     She reports a good appetite and needs to eat better.      Homework:  none                            Episode of Care Goals: Satisfactory progress - ACTION (Actively working towards change); Intervened by reinforcing change plan / affirming steps taken                 Current / Ongoing Stressors and Concerns:              Pt reports she was dx with cancer a year ago and is now in \"remission\" for six months. She reports the scan she gets every three months cause her a lot of anxiety.                   Pt reports two grandchildren are moving she is close to and she is feeling that loss. "                  Pt reports she misses her old neighborhood and one friend specifically. She still sees her but it was a lot easier when she lived two blocks away.                   Pt reports she is looking forward to a party she has planned at her daughter's cabin this weekend.                  Pt reports she is interested in volunteer work as a way to find something meaningful to do.                                   Pt reports she enjoys lunches with friends, movies, and family.                                   Social Hx:  Pt reports she is  and  since .      Pt reports she has two daughters: ages 58 and 56. She has four grandkids ages 27-35. Pt reports a good relationship with her kids and grandkids.      Pt rpeorts she has a brother (-7 years) who is a good support and has had cancer twice.      Pt reports another brother  from esophageal cancer.                     Treatment Objective(s) Addressed in This Session:          use at least 3 coping skills for anxiety management in the next 3 weeks  Increase interest, engagement, and pleasure in doing things  ,                 Intervention:              CBT: ,  Emotion Focused Therapy: ,  Solution Focused: ,     Assessments completed prior to visit:  none                    ASSESSMENT: Current Emotional / Mental Status (status of significant symptoms):              Risk status (Self / Other harm or suicidal ideation)              Patient denies current fears or concerns for personal safety.              Patient denies current or recent suicidal ideation or behaviors.              Patient denies current or recent homicidal ideation or behaviors.              Patient denies current or recent self injurious behavior or ideation.              Patient denies other safety concerns.              Patient reports there has been no change in risk factors since their last session.                Patient reports there has been no change in protective  factors since their last session.                Recommended that patient call 911 or go to the local ED should there be a change in any of these risk factors.                 Appearance:                            Appropriate               Eye Contact:                           Good               Psychomotor Behavior:          Normal               Attitude:                                   Cooperative               Orientation:                             All              Speech                          Rate / Production:       Normal                           Volume:                       Normal               Mood:                                      Anxious               Affect:                                      Appropriate               Thought Content:                    Clear               Thought Form:                        Coherent  Logical               Insight:                                     Good                  Medication Review:              No changes to current psychiatric medication(s)                 Medication Compliance:              Yes                 Changes in Health Issues:              Yes: cancer, Associated Psychological Distress                 Chemical Use Review:              Substance Use: Chemical use reviewed, no active concerns identified                  Tobacco Use: No current tobacco use.       Diagnosis:  F41.1 PAOLA  F33.1 MDD     Collateral Reports Completed:              Not Applicable     PLAN: (Patient Tasks / Therapist Tasks / Other)  Return after trip. Utilize coping skills discussed.            Mckay Jorge LP                                                           ______________________________________________________________________     Individual Treatment Plan     Patient's Name: Yamel Lindsay                     YOB: 1945     Date of Creation: 2/27/2024     Date Treatment Plan Last Reviewed/Revised: 7/22/24      DSM5 Diagnoses:  296.32 (F33.1) Major Depressive Disorder, Recurrent Episode, Moderate _ or 300.02 (F41.1) Generalized Anxiety Disorder  Psychosocial / Contextual Factors: cancer dx  PROMIS (reviewed every 90 days):      Referral / Collaboration:  Referral to another professional/service is not indicated at this time..     Anticipated number of session for this episode of care: 9-12 sessions  Anticipation frequency of session: Biweekly  Anticipated Duration of each session: 38-52 minutes  Treatment plan will be reviewed in 90 days or when goals have been changed.         MeasurableTreatment Goal(s) related to diagnosis / functional impairment(s)  Goal 1: Patient will reduce anxiety sx        Objective #A (Patient Action)                          Patient will use at least 3 coping skills for anxiety management in the next 3 weeks.  Status: New - Date: 7/22/24       Intervention(s)  Therapist will teach emotional regulation skills. , .     Objective #B  Patient will use distraction each time intrusive worry surfaces.  Status: New - Date: 7/22/24       Intervention(s)  Therapist will teach emotional regulation skills. , .        Goal 2: Patient will reduce depressive sx        Objective #A (Patient Action)                          Status: New - Date: 7/22/24       Patient will Decrease frequency and intensity of feeling down, depressed, hopeless.     Intervention(s)  Therapist will teach emotional recognition/identification. , .     Objective #B  Patient will Increase interest, engagement, and pleasure in doing things.                 Status: New - Date:7/22/24       Intervention(s)  Therapist will teach emotional regulation skills. , .     Mckay Jorge LP                    7/22/2024               Again, thank you for allowing me to participate in the care of your patient.        Sincerely,        Mckay Jorge LP

## 2024-07-22 NOTE — PROGRESS NOTES
"[facility]- Psychotherapy                                     Progress Note     Patient Name: Yamel Lindsay                        Date:   7/22/2024                                              Service Type: Individual                            Session Start Time:       1100      Session End Time:    1153                Session Length:        53 mins     Session #:      2     Attendees:     Client attended alone     Service Modality:  In-person     DATA  Interactive Complexity: No  Crisis: No                               Progress Since Last Session (Related to Symptoms / Goals / Homework):              Symptoms:  Pt reports recent depressive sx also including low energy, low motivation, low mood, denies recent S/I,                  Pt reports current depressive episode of three months.                  Pt reports a hx of mental health issues dating back to childhood.                  Pt reports she has stopped walking daily                 Pt reports she will sit in her nightgown all day.                 We discussed coping strategies.      Pt reports excessive sleep.     She reports a good appetite and needs to eat better.      Homework:  none                            Episode of Care Goals: Satisfactory progress - ACTION (Actively working towards change); Intervened by reinforcing change plan / affirming steps taken                 Current / Ongoing Stressors and Concerns:              Pt reports she was dx with cancer a year ago and is now in \"remission\" for six months. She reports the scan she gets every three months cause her a lot of anxiety.                   Pt reports two grandchildren are moving she is close to and she is feeling that loss.                  Pt reports she misses her old neighborhood and one friend specifically. She still sees her but it was a lot easier when she lived two blocks away.                   Pt reports she is looking forward to a party she has " planned at her daughter's cabin this weekend.                  Pt reports she is interested in volunteer work as a way to find something meaningful to do.                                   Pt reports she enjoys lunches with friends, movies, and family.                                   Social Hx:  Pt reports she is  and  since .      Pt reports she has two daughters: ages 58 and 56. She has four grandkids ages 27-35. Pt reports a good relationship with her kids and grandkids.      Pt rpeorts she has a brother (-7 years) who is a good support and has had cancer twice.      Pt reports another brother  from esophageal cancer.                     Treatment Objective(s) Addressed in This Session:          use at least 3 coping skills for anxiety management in the next 3 weeks  Increase interest, engagement, and pleasure in doing things  ,                 Intervention:              CBT: ,  Emotion Focused Therapy: ,  Solution Focused: ,     Assessments completed prior to visit:  none                    ASSESSMENT: Current Emotional / Mental Status (status of significant symptoms):              Risk status (Self / Other harm or suicidal ideation)              Patient denies current fears or concerns for personal safety.              Patient denies current or recent suicidal ideation or behaviors.              Patient denies current or recent homicidal ideation or behaviors.              Patient denies current or recent self injurious behavior or ideation.              Patient denies other safety concerns.              Patient reports there has been no change in risk factors since their last session.                Patient reports there has been no change in protective factors since their last session.                Recommended that patient call 911 or go to the local ED should there be a change in any of these risk factors.                 Appearance:                            Appropriate                Eye Contact:                           Good               Psychomotor Behavior:          Normal               Attitude:                                   Cooperative               Orientation:                             All              Speech                          Rate / Production:       Normal                           Volume:                       Normal               Mood:                                      Anxious               Affect:                                      Appropriate               Thought Content:                    Clear               Thought Form:                        Coherent  Logical               Insight:                                     Good                  Medication Review:              No changes to current psychiatric medication(s)                 Medication Compliance:              Yes                 Changes in Health Issues:              Yes: cancer, Associated Psychological Distress                 Chemical Use Review:              Substance Use: Chemical use reviewed, no active concerns identified                  Tobacco Use: No current tobacco use.       Diagnosis:  F41.1 PAOLA  F33.1 MDD     Collateral Reports Completed:              Not Applicable     PLAN: (Patient Tasks / Therapist Tasks / Other)  Return after trip. Utilize coping skills discussed.            Mckay Jorge LP                                                           ______________________________________________________________________     Individual Treatment Plan     Patient's Name: Yamel Lindsay                     YOB: 1945     Date of Creation: 2/27/2024     Date Treatment Plan Last Reviewed/Revised: 7/22/24      DSM5 Diagnoses: 296.32 (F33.1) Major Depressive Disorder, Recurrent Episode, Moderate _ or 300.02 (F41.1) Generalized Anxiety Disorder  Psychosocial / Contextual Factors: cancer dx  PROMIS (reviewed every 90 days):      Referral / Collaboration:  Referral  to another professional/service is not indicated at this time..     Anticipated number of session for this episode of care: 9-12 sessions  Anticipation frequency of session: Biweekly  Anticipated Duration of each session: 38-52 minutes  Treatment plan will be reviewed in 90 days or when goals have been changed.         MeasurableTreatment Goal(s) related to diagnosis / functional impairment(s)  Goal 1: Patient will reduce anxiety sx        Objective #A (Patient Action)                          Patient will use at least 3 coping skills for anxiety management in the next 3 weeks.  Status: New - Date: 7/22/24       Intervention(s)  Therapist will teach emotional regulation skills. , .     Objective #B  Patient will use distraction each time intrusive worry surfaces.  Status: New - Date: 7/22/24       Intervention(s)  Therapist will teach emotional regulation skills. , .        Goal 2: Patient will reduce depressive sx        Objective #A (Patient Action)                          Status: New - Date: 7/22/24       Patient will Decrease frequency and intensity of feeling down, depressed, hopeless.     Intervention(s)  Therapist will teach emotional recognition/identification. , .     Objective #B  Patient will Increase interest, engagement, and pleasure in doing things.                 Status: New - Date:7/22/24       Intervention(s)  Therapist will teach emotional regulation skills. , .     Mckay Jorge LP                    7/22/2024

## 2024-07-23 ENCOUNTER — PATIENT OUTREACH (OUTPATIENT)
Dept: CARE COORDINATION | Facility: CLINIC | Age: 79
End: 2024-07-23
Payer: COMMERCIAL

## 2024-07-23 ENCOUNTER — TELEPHONE (OUTPATIENT)
Dept: ONCOLOGY | Facility: CLINIC | Age: 79
End: 2024-07-23
Payer: COMMERCIAL

## 2024-07-23 RX ORDER — CLONAZEPAM 0.5 MG/1
.5-1 TABLET ORAL 2 TIMES DAILY PRN
Qty: 120 TABLET | Refills: 1 | Status: SHIPPED | OUTPATIENT
Start: 2024-07-23 | End: 2024-09-24

## 2024-07-23 NOTE — TELEPHONE ENCOUNTER
Retail Pharmacy Prior Authorization Team   Phone: [phone]    PA Initiation    Medication: CLONAZEPAM 0.5 MG PO TABS  Insurance Company: [employer] - Phone [phone] Fax [phone]  Pharmacy Filling the Rx: [id number] [place], [place] - [address] [place] [address] E AT [facility] [address] & [facility]  Filling Pharmacy Phone: [phone]  Filling Pharmacy Fax: [phone]  Start Date: 7/23/2024

## 2024-07-23 NOTE — PROGRESS NOTES
Social Work - Telephone/Loudeyehart message  [facility]  Data:   Patient Name: Yamel Lindsay  Goes By: Yamel    /Age: 1945 (79 year old)      Referral Source: Antony Jorge psychologist    Reason for Referral: Volunteer resources    Intervention: SW left voicemail for pt with reason for call and call back information.    Plan:  will await patient's return phone call/message and provide assistance at that time.      YAMILE Valdez, [facility]  Adult Oncology Clin[facility]  [place] (M,W), [place] (T) & [place] (Th)  *I am off Friday  Office: [phone]

## 2024-07-24 NOTE — TELEPHONE ENCOUNTER
Dr. Sylvia Bush, please review:    Labs- MRSA neg, all other labs WNL  EKG- WNL    OK for surgery? Prior Authorization Approval    Medication: CLONAZEPAM 0.5 MG PO TABS  Authorization Effective Date: 7/23/2024  Authorization Expiration Date: 7/23/2025  Approved Dose/Quantity:   Reference #:     Insurance Company: [employer] - Phone [phone] Fax [phone]  Expected CoPay: $    CoPay Card Available:      Financial Assistance Needed:   Which Pharmacy is filling the prescription: [id number] - [place], [place] - [address] [place] [address] E AT [place] [address] & [facility]  Pharmacy Notified: Yes  Patient Notified:

## 2024-08-01 ENCOUNTER — ONCOLOGY VISIT (OUTPATIENT)
Dept: ONCOLOGY | Facility: HOSPITAL | Age: 79
End: 2024-08-01
Attending: PSYCHOLOGIST
Payer: COMMERCIAL

## 2024-08-01 ENCOUNTER — PATIENT OUTREACH (OUTPATIENT)
Dept: ONCOLOGY | Facility: CLINIC | Age: 79
End: 2024-08-01
Payer: COMMERCIAL

## 2024-08-01 DIAGNOSIS — F41.1 GENERALIZED ANXIETY DISORDER: ICD-10-CM

## 2024-08-01 DIAGNOSIS — F33.1 MAJOR DEPRESSIVE DISORDER, RECURRENT EPISODE, MODERATE (H): Primary | ICD-10-CM

## 2024-08-01 PROCEDURE — 90837 PSYTX W PT 60 MINUTES: CPT | Performed by: PSYCHOLOGIST

## 2024-08-01 NOTE — LETTER
"8/1/2024      Yamel Lindsay  [address] [facility] [address]      Dear Colleague,    Thank you for referring your patient, Yamel Lindsay, to the [facility] [place]. Please see a copy of my visit note below.             [facility]- [facility]                                     Progress Note     Patient Name: Yamel Lindsay                        Date:   8/1/2024                                              Service Type: Individual                            Session Start Time:       1355      Session End Time:    1448                Session Length:        53 mins     Session #:      3     Attendees:     Client attended alone     Service Modality:  In-person     DATA  Interactive Complexity: No  Crisis: No                               Progress Since Last Session (Related to Symptoms / Goals / Homework):              Symptoms:  Pt reports recent depressive sx also including low energy, low motivation, low mood, denies recent S/I,                   Pt reports current depressive episode of three months.                  Pt reports a hx of mental health issues dating back to childhood.                  Pt reports she has stopped walking daily                  We discussed coping strategies.      Pt reports excessive sleep.      She reports a good appetite and needs to eat better.     Pt reports she had an overall good weekend with her family the past weekend.      Homework:  none                            Episode of Care Goals: Satisfactory progress - ACTION (Actively working towards change); Intervened by reinforcing change plan / affirming steps taken                 Current / Ongoing Stressors and Concerns:             Pt reports she had a nice weekend with her family last weekend. She reports one incident however where her grandson \"Michael\" (Raeann's child) had an outburst at her for her blocking him from her phone for a short time 12 years ago. Pt thinks it was related " "to anxiety and his mother fueling his thoughts on that.     Pt reports her grandson \"Chirag\"  (Cooper' child) is now living in [place] and pt misses him.     Pt reports Raeann's other child is not speaking to her and pt does not know why?     Pt reports her daughter Raeann lives in a hoarder's house and her spouse is also a hoarder. Pt is concerned because Raeann is ill a lot possibly due to mold issues in the home.                                          Pt reports she misses her old neighborhood and one friend specifically. She still sees her but it was a lot easier when she lived two blocks away.                   Pt reports she is looking forward to a party she has planned at her daughter's cabin this weekend.                   Pt reports she is interested in volunteer work as a way to find something meaningful to do.                                    Pt reports she enjoys lunches with friends, movies, and family.                                   Social Hx:  Pt reports she is  and  since .      Pt reports she has two daughters: ages 58 and 56. She has four grandkids ages 27-35. Pt reports a good relationship with her kids and grandkids.      Pt rpeorts she has a brother (-7 years) who is a good support and has had cancer twice.      Pt reports another brother  from esophageal cancer.      Pt reports her daughter Raeann (58) lives in a hoarder's house and her spouse is also a hoarder. Pt is concerned because Raeann is ill a lot possibly due to mold issues in the home.     Pts grandson Michael was a college [employer] star. He lives with pt X 3 years as did his sister Kusum. Pt reports they both have anxiety issues. Michael was admitted to in at one time. Michael's mother Raeann also has a hx of admission.     Pt reports her daughter \"Cooper\" is also dealing with anxiety. Her son Chirag moved to [place] and her other son \"Mathew\" is in [place] with his S/O.                  Treatment Objective(s) " Addressed in This Session:          use at least 3 coping skills for anxiety management in the next 3 weeks  Increase interest, engagement, and pleasure in doing things  ,                 Intervention:              CBT: ,  Emotion Focused Therapy: ,  Solution Focused: ,     Assessments completed prior to visit:  none                    ASSESSMENT: Current Emotional / Mental Status (status of significant symptoms):              Risk status (Self / Other harm or suicidal ideation)              Patient denies current fears or concerns for personal safety.              Patient denies current or recent suicidal ideation or behaviors.              Patient denies current or recent homicidal ideation or behaviors.              Patient denies current or recent self injurious behavior or ideation.              Patient denies other safety concerns.              Patient reports there has been no change in risk factors since their last session.                Patient reports there has been no change in protective factors since their last session.                Recommended that patient call 911 or go to the local ED should there be a change in any of these risk factors.                 Appearance:                            Appropriate               Eye Contact:                           Good               Psychomotor Behavior:          Normal               Attitude:                                   Cooperative               Orientation:                             All              Speech                          Rate / Production:       Normal                           Volume:                       Normal               Mood:                                      Anxious               Affect:                                      Appropriate               Thought Content:                    Clear               Thought Form:                        Coherent  Logical               Insight:                                     Good                   Medication Review:              No changes to current psychiatric medication(s)                 Medication Compliance:              Yes                 Changes in Health Issues:              Yes: cancer, Associated Psychological Distress                 Chemical Use Review:              Substance Use: Chemical use reviewed, no active concerns identified                  Tobacco Use: No current tobacco use.       Diagnosis:  F41.1 PAOLA  F33.1 MDD     Collateral Reports Completed:              Not Applicable     PLAN: (Patient Tasks / Therapist Tasks / Other)  Return after trip. Utilize coping skills discussed.            Mckay Jorge LP                                                           ______________________________________________________________________     Individual Treatment Plan     Patient's Name: Yamel Lindsay                     YOB: 1945     Date of Creation: 2/27/2024     Date Treatment Plan Last Reviewed/Revised: 7/22/24      DSM5 Diagnoses: 296.32 (F33.1) Major Depressive Disorder, Recurrent Episode, Moderate _ or 300.02 (F41.1) Generalized Anxiety Disorder  Psychosocial / Contextual Factors: cancer dx  PROMIS (reviewed every 90 days):      Referral / Collaboration:  Referral to another professional/service is not indicated at this time..     Anticipated number of session for this episode of care: 9-12 sessions  Anticipation frequency of session: Biweekly  Anticipated Duration of each session: 38-52 minutes  Treatment plan will be reviewed in 90 days or when goals have been changed.         MeasurableTreatment Goal(s) related to diagnosis / functional impairment(s)  Goal 1: Patient will reduce anxiety sx        Objective #A (Patient Action)                          Patient will use at least 3 coping skills for anxiety management in the next 3 weeks.  Status: New - Date: 7/22/24       Intervention(s)  Therapist will teach emotional regulation skills. , .      Objective #B  Patient will use distraction each time intrusive worry surfaces.  Status: New - Date: 7/22/24       Intervention(s)  Therapist will teach emotional regulation skills. , .        Goal 2: Patient will reduce depressive sx        Objective #A (Patient Action)                          Status: New - Date: 7/22/24       Patient will Decrease frequency and intensity of feeling down, depressed, hopeless.     Intervention(s)  Therapist will teach emotional recognition/identification. , .     Objective #B  Patient will Increase interest, engagement, and pleasure in doing things.                 Status: New - Date:7/22/24       Intervention(s)  Therapist will teach emotional regulation skills. , .     Mckay Jorge LP                    8/1/2024               Again, thank you for allowing me to participate in the care of your patient.        Sincerely,        Mckay Jorge LP

## 2024-08-01 NOTE — PROGRESS NOTES
"[facility] Oncology- Psychotherapy                                     Progress Note     Patient Name: Yamel Lindsay                        Date:   8/1/2024                                              Service Type: Individual                            Session Start Time:       1355      Session End Time:    1448                Session Length:        53 mins     Session #:      3     Attendees:     Client attended alone     Service Modality:  In-person     DATA  Interactive Complexity: No  Crisis: No                               Progress Since Last Session (Related to Symptoms / Goals / Homework):              Symptoms:  Pt reports recent depressive sx also including low energy, low motivation, low mood, denies recent S/I,                   Pt reports current depressive episode of three months.                  Pt reports a hx of mental health issues dating back to childhood.                  Pt reports she has stopped walking daily                  We discussed coping strategies.      Pt reports excessive sleep.      She reports a good appetite and needs to eat better.     Pt reports she had an overall good weekend with her family the past weekend.      Homework:  none                            Episode of Care Goals: Satisfactory progress - ACTION (Actively working towards change); Intervened by reinforcing change plan / affirming steps taken                 Current / Ongoing Stressors and Concerns:             Pt reports she had a nice weekend with her family last weekend. She reports one incident however where her grandson \"Michael\" (Raeann's child) had an outburst at her for her blocking him from her phone for a short time 12 years ago. Pt thinks it was related to anxiety and his mother fueling his thoughts on that.     Pt reports her grandson \"Chirag\"  (Cooper' child) is now living in [place] and pt misses him.     Pt reports Raeann's other child is not speaking to her and pt does not know why? " "    Pt reports her daughter Raeann lives in a hoarder's house and her spouse is also a hoarder. Pt is concerned because Raeann is ill a lot possibly due to mold issues in the home.                                          Pt reports she misses her old neighborhood and one friend specifically. She still sees her but it was a lot easier when she lived two blocks away.                   Pt reports she is looking forward to a party she has planned at her daughter's cabin this weekend.                   Pt reports she is interested in volunteer work as a way to find something meaningful to do.                                    Pt reports she enjoys lunches with friends, movies, and family.                                   Social Hx:  Pt reports she is  and  since .      Pt reports she has two daughters: ages 58 and 56. She has four grandkids ages 27-35. Pt reports a good relationship with her kids and grandkids.      Pt rpeorts she has a brother (-7 years) who is a good support and has had cancer twice.      Pt reports another brother  from esophageal cancer.      Pt reports her daughter Raeann (58) lives in a hoarder's house and her spouse is also a hoarder. Pt is concerned because Raeann is ill a lot possibly due to mold issues in the home.     Pts grandson Michael was a college [employer] star. He lives with pt X 3 years as did his sister Kusum. Pt reports they both have anxiety issues. Michael was admitted to in at one time. Michael's mother Raeann also has a hx of admission.     Pt reports her daughter \"Cooper\" is also dealing with anxiety. Her son Chirag moved to [place] and her other son \"Mathew\" is in [place] with his S/O.                  Treatment Objective(s) Addressed in This Session:          use at least 3 coping skills for anxiety management in the next 3 weeks  Increase interest, engagement, and pleasure in doing things  ,                 Intervention:              CBT: ,  Emotion Focused " Therapy: ,  Solution Focused: ,     Assessments completed prior to visit:  none                    ASSESSMENT: Current Emotional / Mental Status (status of significant symptoms):              Risk status (Self / Other harm or suicidal ideation)              Patient denies current fears or concerns for personal safety.              Patient denies current or recent suicidal ideation or behaviors.              Patient denies current or recent homicidal ideation or behaviors.              Patient denies current or recent self injurious behavior or ideation.              Patient denies other safety concerns.              Patient reports there has been no change in risk factors since their last session.                Patient reports there has been no change in protective factors since their last session.                Recommended that patient call 911 or go to the local ED should there be a change in any of these risk factors.                 Appearance:                            Appropriate               Eye Contact:                           Good               Psychomotor Behavior:          Normal               Attitude:                                   Cooperative               Orientation:                             All              Speech                          Rate / Production:       Normal                           Volume:                       Normal               Mood:                                      Anxious               Affect:                                      Appropriate               Thought Content:                    Clear               Thought Form:                        Coherent  Logical               Insight:                                     Good                  Medication Review:              No changes to current psychiatric medication(s)                 Medication Compliance:              Yes                 Changes in Health Issues:              Yes: cancer, Associated  Psychological Distress                 Chemical Use Review:              Substance Use: Chemical use reviewed, no active concerns identified                  Tobacco Use: No current tobacco use.       Diagnosis:  F41.1 PAOLA  F33.1 MDD     Collateral Reports Completed:              Not Applicable     PLAN: (Patient Tasks / Therapist Tasks / Other)  Return after trip. Utilize coping skills discussed.            Mckay Jorge LP                                                           ______________________________________________________________________     Individual Treatment Plan     Patient's Name: Yamel Lindsay                     YOB: 1945     Date of Creation: 2/27/2024     Date Treatment Plan Last Reviewed/Revised: 7/22/24      DSM5 Diagnoses: 296.32 (F33.1) Major Depressive Disorder, Recurrent Episode, Moderate _ or 300.02 (F41.1) Generalized Anxiety Disorder  Psychosocial / Contextual Factors: cancer dx  PROMIS (reviewed every 90 days):      Referral / Collaboration:  Referral to another professional/service is not indicated at this time..     Anticipated number of session for this episode of care: 9-12 sessions  Anticipation frequency of session: Biweekly  Anticipated Duration of each session: 38-52 minutes  Treatment plan will be reviewed in 90 days or when goals have been changed.         MeasurableTreatment Goal(s) related to diagnosis / functional impairment(s)  Goal 1: Patient will reduce anxiety sx        Objective #A (Patient Action)                          Patient will use at least 3 coping skills for anxiety management in the next 3 weeks.  Status: New - Date: 7/22/24       Intervention(s)  Therapist will teach emotional regulation skills. , .     Objective #B  Patient will use distraction each time intrusive worry surfaces.  Status: New - Date: 7/22/24       Intervention(s)  Therapist will teach emotional regulation skills. , .        Goal 2: Patient will reduce depressive  sx        Objective #A (Patient Action)                          Status: New - Date: 7/22/24       Patient will Decrease frequency and intensity of feeling down, depressed, hopeless.     Intervention(s)  Therapist will teach emotional recognition/identification. , .     Objective #B  Patient will Increase interest, engagement, and pleasure in doing things.                 Status: New - Date:7/22/24       Intervention(s)  Therapist will teach emotional regulation skills. , .     Mckay Jorge LP                    8/1/2024

## 2024-08-05 ENCOUNTER — HOSPITAL ENCOUNTER (OUTPATIENT)
Dept: CT IMAGING | Facility: HOSPITAL | Age: 79
Discharge: HOME | End: 2024-08-05
Attending: STUDENT IN AN ORGANIZED HEALTH CARE EDUCATION/TRAINING PROGRAM | Admitting: STUDENT IN AN ORGANIZED HEALTH CARE EDUCATION/TRAINING PROGRAM
Payer: COMMERCIAL

## 2024-08-05 DIAGNOSIS — C34.31 MALIGNANT NEOPLASM OF LOWER LOBE OF RIGHT LUNG (H): ICD-10-CM

## 2024-08-05 PROCEDURE — 250N000011 HC RX IP 250 OP 636: Performed by: STUDENT IN AN ORGANIZED HEALTH CARE EDUCATION/TRAINING PROGRAM

## 2024-08-05 PROCEDURE — 71260 CT THORAX DX C+: CPT

## 2024-08-05 RX ORDER — IOPAMIDOL 755 MG/ML
75 INJECTION, SOLUTION INTRAVASCULAR ONCE
Status: COMPLETED | OUTPATIENT
Start: 2024-08-05 | End: 2024-08-05

## 2024-08-05 RX ADMIN — IOPAMIDOL 75 ML: 755 INJECTION, SOLUTION INTRAVENOUS at 13:24

## 2024-08-06 NOTE — PROGRESS NOTES
8/7/24    Reason for Visit: f/u lung cancer, joint pain      Diagnosis:   Synchronous Rt LL Squamous cell carcinoma [id number] Stage IIIC (AJCC 8th edition) diagnosed 12/2022  PD-L1 <1%  NGS- [facility] panel- Neg (High TMB 16.7 mut/MB)     Left LL adenocarcinoma [id number] Stage (AJCC 8th edition) diagnosed 12/2022  PD-L1-2%  NGS-KRAS G12C, TMB 2.4      Treatment:  1/20/23- 4 cycles of Pembrolizumab+carboplatin+paclitaxel (pembro on hold ucrrently of immune mediated myalgia/arthralgia since 3/2023)   XRT-  Right Lower Lobe      6,000 cGy    in 30 fraction     6/06/2023 7/26/2023          Left Lower Lobe        5,000 cGy   in 10 fraction     6/22/2023 7/06/2023            Intent of treatment: Palliative    Onc HPI:    One month history of increasing dyspnea with palpitations, propting ER visit 11/19.  11/19/22- CT- 1 cm right lower lobe mass abutting multiple central bronchovascular and mediastinal structures, consistent with malignancy.2.2 cm spiculated nodule in the left lower lobe, also consistent with malignancy.   12/6/22- PET/CT-  Right lower lobe lung mass abutting the pleura (SUV max of 24), measuring 8 x 5.2 cm. There is associated complete obliteration of right lower lobe bronchus and loss of fat planes with  the left atrium. Spiculated left lower lobe solid lung mass (SUV 17.9), measuring 2.2 x 2 cm. There are several markedly hypermetabolic, irregular solid nodules in the right middle lobe abutting the pleura (approximately 10-12), as for example 1.2 cm nodule with SUV max of 16.8 (4/126). There is a separate markedly hypermetabolic 1 cm nodule in the right upper lobe. Mild hypermetabolic uptake is noted within a1 x 0.9 cm right paratracheal node ( SUV max of 4.2) (4/120), Mild uptake is also  noted within a subcentimeter lower paraesophageal node (4/154).   12/28/22- EBUS with deblking in the right hilar mass (no mediastinal staging performed)- - LUNG, RIGHT MAINSTEM, ENDOBRONCHIAL BIOPSY:  Squamous cell carcinoma with extensive necrosis (positive for p40 and negative for TTF-1). Left LL FNAC- Positive for malignancy- Adenocarcinoma  (diffusely positive for TTF-1 while negative for p40)  1/3/23: Brain MRI - Neg  1/10/23: Tumor board discussion: Too extensive for concurrent/sequential chemo XRT- plan for palliative intent chemo-IO  1/20/23- C1 Pembrolizumab+carboplatin+paclitaxel  2/10/23-C2 Pembrolizumab+carboplatin+paclitaxel  2/27/23- CT CAP- Decreased size of the hypoattenuating mass in the central right lower lobe and small irregularly-shaped nodules in the right upper lobe consistent with positive treatment response. There is persistent postobstructive right lower lobe atelectasis.Unchanged heterogeneous lobulated and spiculated solid nodule in the lateral basal left lower lobe. No findings to suggest new extrapulmonary metastatic disease.  3/3, 3/24-  Pembrolizumab+carboplatin+paclitaxel      4/10/23- CT CAP- - some Rx response- Mass distal right lower lobe bronchus causing complete post obstructive atelectasis has decreased from 6.5 x 4.5 x 3.5 cm to 5.5 x 3.8 x 2.4 cm today. Spiculated left lower lobe mass has decreased from 2.1 cm to 1.5 cm today. Right upper lobe nodularity continues to decrease. Ill-defined nodularity in the right middle lobe has developed and is most likely postinflammatory.     6/5/23- After discussion at tumor board, Dr. Escoto agreed for definitive radiation treatment (without chemo, as she already had 4 cycles of chemo) for cancer on the right side and possible SBRT on left side both with definitive intent    6/5/23- Started XRT     Right Lower Lobe      6,000 cGy    in 30 fraction     6/06/2023 7/26/2023          Left Lower Lobe        5,000 cGy   in 10 fraction     6/22/2023 7/06/2023 6/16/23- CT Chest-  Unchanged complete atelectasis of the heterogeneous right lower lobe due to central obstructing mass status post treatment. No change in the  posterolateral left lower lobe nodule with surrounding cicatrization. No findings to suggest progressive   malignancy in the chest.    8/25/23- CT Chest- Stable right lower lobe lung mass with distal atelectasis. Lobulated nodule in the left lower lobe is also stable. No disease progression in the chest. No metastatic disease in the abdomen and pelvis.     10/20/23- CT CHest- Heterogeneous mass with consolidation and complete volume loss of the right lower lobe which is located in the azygoesophageal recess has increased in size slightly with axial dimensions superiorly 3.2 x 2.2 cm previously 2.8 x 2.0 cm  (axial image 95 series 3) and axial dimensions inferiorly 4.2 x 2.9 cm previously 4.0 x 2.6 cm (image 110). Spiculated mass posterior basilar left lower lobe stable at 1.7 x 1.2 cm (image 119).    11/3/23- PET/CT-  A consolidative masslike opacity with atelectasis involving the right lower lobe at site of original tumor demonstrates and ill-defined area of soft tissue thickening along its superior margin with moderate FDG uptake. Appearance favors ongoing posttreatment inflammatory change although is indeterminate for early local recurrence. Recommend short term chest CT with IV contrast follow-up in 3 months. Otherwise no evidence of FDG avid malignancy. An irregular nodule in the left lower lobe is not FDG avid suggesting posttreatment scarring. No evidence of metastasis including no FDG avid adenopathy.Some small irregular opacities have developed in the left lung in the short interval since 10/20/2023, likely infectious/inflammatory    2/2/24- CT Chest-  Unchanged posttreatment lobar atelectasis of the right lower lobe. Increased predominantly solid parenchymal bands with architectural distortion in the basal left lower lobe, centered on the inferior posterior medial right upper lobe consistent with posttreatment inflammation and developing cicatrization. There are  no findings to suggest progression of  neoplasm in the chest.New small right pleural effusion not a component of which is organized in the posterior mid chest secondary to #2.     5/3/24- CT Chest-  Stable posttreatment changes in both lower lobes. No definite recurrent disease. No new sites of disease in the chest, abdomen, or pelvis.    8/5/24- CT Chest- Emphysematous change with complete right lower lobe atelectasis and areas of parenchymal density and retraction in the retrohilar posteroinferior right upper lobe and in the lateral left lower lobe which appear similar or smaller today compared with  previous imaging. No new or progressive abnormality is present.        Interval history:   Yamel is here for follow up.    She is doing well overall.   No new or progressive symptoms.  She saw a psychotherapist for depression, on fluoxetine, requesting pscyiatric referral  SOB on exertion is stable, able to walk 1-2 miles/day  Fatigue is stable   Weight is stable  Appetite is ok        ECOG PS 1    Wt Readings from Last 4 Encounters:   05/06/24 67 kg (147 lb 11.2 oz)   04/16/24 66.3 kg (146 lb 3.2 oz)   04/09/24 65 kg (143 lb 3.2 oz)   02/05/24 68 kg (150 lb)        Current Outpatient Medications   Medication Sig Dispense Refill    acetaminophen (TYLENOL) 500 MG tablet Take 1,000 mg by mouth every 6 hours as needed for mild pain Taking once a day      albuterol (PROAIR HFA/PROVENTIL HFA/VENTOLIN HFA) 108 (90 Base) MCG/ACT inhaler INHALE 2 PUFFS INTO THE LUNGS EVERY 6 HOURS AS NEEDED FOR SHORTNESS OF BREATH OR DIFFICULTY BREATHING OR WHEEZING 90 g 0    atorvastatin (LIPITOR) 20 MG tablet Take 1 tablet (20 mg) by mouth daily 90 tablet 3    celecoxib (CELEBREX) 200 MG capsule TAKE 1 CAPSULE(200 MG) BY MOUTH DAILY 60 capsule 1    Cholecalciferol (VITAMIN D-3 PO) Take 2,000 Units by mouth daily       clonazePAM (KLONOPIN) 0.5 MG tablet Take 1-2 tablets (0.5-1 mg) by mouth 2 times daily as needed for anxiety 120 tablet 1    clonazePAM (KLONOPIN) 0.5 MG tablet  Take 0.5-1 tablets (0.25-0.5 mg) by mouth 2 times daily as needed for anxiety 30 tablet 3    FLUoxetine (PROZAC) 20 MG capsule Take 2 capsules (40 mg) by mouth daily 180 capsule 3    fluticasone-salmeterol (ADVAIR HFA) 115-21 MCG/ACT inhaler Inhale 2 puffs into the lungs 2 times daily 12 g 11    guaiFENesin-codeine (ROBITUSSIN AC) 100-10 MG/5ML solution TAKE 5 TO 10 ML BY MOUTH EVERY 4 HOURS AS NEEDED 120 mL 0    hydrochlorothiazide (HYDRODIURIL) 25 MG tablet TAKE 1 TABLET(25 MG) BY MOUTH DAILY 90 tablet 1    ipratropium - albuterol 0.5 mg/2.5 mg/3 mL (DUONEB) 0.5-2.5 (3) MG/3ML neb solution Take 1 vial (3 mLs) by nebulization every 6 hours as needed for shortness of breath or wheezing 90 mL 0    lidocaine-prilocaine (EMLA) 2.5-2.5 % external cream Apply topically as needed for moderate pain (4-6) Apply 1 hour prior to port access and cover with saran wrap or press and seal. (Patient not taking: Reported on 11/1/2023) 30 g 1    omeprazole (PRILOSEC) 20 MG DR capsule TAKE 1 CAPSULE(20 MG) BY MOUTH DAILY 30 TO 60 MINUTES BEFORE A MEAL 90 capsule 2    polyethylene glycol (MIRALAX) 17 g packet Take 1 packet by mouth daily Taking PRN (Patient not taking: Reported on 10/23/2023)      predniSONE (DELTASONE) 10 MG tablet Take 1 tablet (10 mg) by mouth daily (Patient not taking: Reported on 10/23/2023) 60 tablet 1    predniSONE (DELTASONE) 20 MG tablet Take 3 tabs by mouth daily x 3 days, then 2 tabs daily x 3 days, then 1 tab daily x 3 days, then 1/2 tab daily x 3 days. 20 tablet 0    tiZANidine (ZANAFLEX) 2 MG tablet TAKE 1 TABLET(2 MG) BY MOUTH THREE TIMES DAILY AS NEEDED FOR MUSCLE SPASMS 90 tablet 3    traZODone (DESYREL) 50 MG tablet TAKE 2 TABLETS(100 MG) BY MOUTH AT BEDTIME 60 tablet 0          Allergies   Allergen Reactions    Nka [No Known Allergies]     Seasonal Allergies        There were no vitals taken for this visit.  General: Patient appears well in no acute distress.   Skin: No visualized rash or lesions on  visualized skin  Eyes: EOMI, no erythema, sclera icterus or discharge noted  Resp: Appears to be breathing comfortably without accessory muscle usage, speaking in full sentences, no cough  MSK: Appears to have normal range of motion based on visualized movements  Neurologic: No apparent tremors, facial movements symmetric  Psych: affect engaged, pleasant, alert and oriented   not currently breastfeeding.  Wt Readings from Last 4 Encounters:   05/06/24 67 kg (147 lb 11.2 oz)   04/16/24 66.3 kg (146 lb 3.2 oz)   04/09/24 65 kg (143 lb 3.2 oz)   02/05/24 68 kg (150 lb)         Impression/plan:     Ms. Yamel Lindsay is a 77 year old  female with PMHx of for HTN, HLD, anxiety, who is here for evaluation/followup of new lung cancer     Synchronous Rt LL Squamous cell carcinoma [id number] Stage IIIC (AJCC 8th edition) diagnosed 12/2022  PD-L1 <1%  NGS- [facility] panel- Neg (High TMB 16.7 mut/MB)     Left LL adenocarcinoma [id number] Stage (AJCC 8th edition) diagnosed 12/2022  PD-L1-2%  NGS-KRAS G12C, TMB 2.4     Pt with 2 synchronous cancers at diagnosis..  The more  threatening one was the right lower lobe squamous cell cancer which appears to be at least stage IIIC given the size greater than 7 cm suggestive of T4 disease.  She also has mediastinal lymph node that were enlarged and appear pathologic but were not biopsied.  She underwent an EBUS for debulking and her mediastinum was not been staged.  She is also has several FDG avid nodules in the right middle and the right upper lobe that have not yet been biopsied.  Given the extent of involvement of the right sided cancer, concurrent/sequential chemoradiation not feasible per tumor board discussion, also no further EBUS required since high probability of cancer.   She proceeded with palliative intent chemoimmunotherapy with pembro+carbo+taxol.  Of note, squamous has high TMB and low neg PD-L1.  Adenocarcinoma has KRAS G12C. CT after 4 cycles showing good partial response in the  "right-sided tumor and some response in the left side adenocarcinoma.  After discussion at tumor board, Dr. Escoto did definitive radiation treatment  on cancer on the right side and SBRT on left side both with definitive intent.  CT on 10/2023 showing slight increase in size of consolidative opacity int he rt lower lobe.We got a PET scan which showed some mild uptake b/l suggesting inflammation and mild FDG uptake in the rt LL but FDG avidity is much lower than before suggesting likley Rx related changes. CT in 8/2024 stable with evolving treatment changes.  Continue monitoring.  Immunotherapy on hold due to Myositis/arthritis with hx of elevated CRP and ESR been attributed to immunotherapy. We will continue to hold it for now until we see signs of progression.        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 Has been attributed to immunotherapy. dopplar negative for DVT, No known bone mets on baseline PET/CT.- Resolved  -CK normal  - CRP and ESR is mildly elevated (significantly lower than the levels when she was on steroids for myositis)  -has had 2 prednisone tapers, the 2nd was a longer taper. Her pain started to increase the week after stopping prednisone  -she was resumed on prednisone 10 mg without improvement and increased to 20 mg.   - was on 10 mg daily, but stopped it for unclear reasons, has noticed recent flare in both the knee joints and left calf muscle  - She resumed 10 mg prednisone 10/2023 but did not notice any difference, now her pain is more in the joints and muscle ache seems to be relievd by muscle relaxant, suggesting this is MSK etiology and less likley to be immunotherapy related.   - -has tramadol, started on gabapentin 300 mg at bedtime per palliative\"  -- stop prednisone, asked he rto get in tpuch with me if symptoms get worse (especially msucle)    # Bronchitis, COPD exacerbation  In April Rx with PO steroids and codeine, has " some lingering cough, continue to monitor  Continue Advair        #Psychiatry: Patient has previously had a history of anxiety.now since the diagnosis, on and off panic attacks.and also has depression  PTA on Prozac, and also clonazepam  -Trazodoen for ?insomnia  -cloazepan BID 0.5 mg  -Saw cancer  pyschology , but wantsa psciatrist referral     #COVID vaccine: s/p 3 boosters     #Hypertension hyperlipidemia  ON statin        On the day of service,  Preparation time:  5 minutes  Visit time:  20 minutes  Care Coordination:  5 minutes        Chaz Martel M.D.   of Medicine  Division of Hematology, Oncology and Transplantation  [facility]

## 2024-08-07 ENCOUNTER — ONCOLOGY VISIT (OUTPATIENT)
Dept: ONCOLOGY | Facility: CLINIC | Age: 79
End: 2024-08-07
Attending: STUDENT IN AN ORGANIZED HEALTH CARE EDUCATION/TRAINING PROGRAM
Payer: COMMERCIAL

## 2024-08-07 VITALS
TEMPERATURE: 97.7 F | DIASTOLIC BLOOD PRESSURE: 73 MMHG | SYSTOLIC BLOOD PRESSURE: 124 MMHG | HEART RATE: 82 BPM | BODY MASS INDEX: 28.39 KG/M2 | WEIGHT: 159 LBS | RESPIRATION RATE: 16 BRPM | OXYGEN SATURATION: 95 %

## 2024-08-07 DIAGNOSIS — J45.20 MILD INTERMITTENT ASTHMA WITHOUT COMPLICATION: ICD-10-CM

## 2024-08-07 DIAGNOSIS — C34.31 MALIGNANT NEOPLASM OF LOWER LOBE OF RIGHT LUNG (H): ICD-10-CM

## 2024-08-07 PROCEDURE — 99214 OFFICE O/P EST MOD 30 MIN: CPT | Performed by: STUDENT IN AN ORGANIZED HEALTH CARE EDUCATION/TRAINING PROGRAM

## 2024-08-07 PROCEDURE — G0463 HOSPITAL OUTPT CLINIC VISIT: HCPCS | Performed by: STUDENT IN AN ORGANIZED HEALTH CARE EDUCATION/TRAINING PROGRAM

## 2024-08-07 RX ORDER — IPRATROPIUM BROMIDE AND ALBUTEROL SULFATE 2.5; .5 MG/3ML; MG/3ML
1 SOLUTION RESPIRATORY (INHALATION) EVERY 6 HOURS PRN
Qty: 90 ML | Refills: 0 | Status: SHIPPED | OUTPATIENT
Start: 2024-08-07

## 2024-08-07 ASSESSMENT — PAIN SCALES - GENERAL: PAINLEVEL: NO PAIN (0)

## 2024-08-07 NOTE — NURSING NOTE
"Oncology Rooming Note    August 7, 2024 12:00 PM   Yamel Lindsay is a 79 year old female who presents for:    Chief Complaint   Patient presents with    Oncology Clinic Visit     Malignant neoplasm of lower lobe of right lung      Initial Vitals: /73   Pulse 82   Temp 97.7  F (36.5  C)   Resp 16   Wt 72.1 kg (159 lb)   SpO2 95%   BMI 28.39 kg/m   Estimated body mass index is 28.39 kg/m  as calculated from the following:    Height as of 4/16/24: 1.594 m (5' 2.75\").    Weight as of this encounter: 72.1 kg (159 lb). Body surface area is 1.79 meters squared.  No Pain (0) Comment: Data Unavailable   No LMP recorded. Patient is postmenopausal.  Allergies reviewed: Yes  Medications reviewed: Yes    Medications: Medication refills not needed today.  Pharmacy name entered into E[facility]: [id number] - [place], [place] - [address] [place] [address] [address] [facility] [address] [facility]    Frailty Screening:   Is the patient here for a new oncology consult visit in cancer care? 2. No      Clinical concerns: no other complaints      Sandoval Mcghee"

## 2024-08-07 NOTE — LETTER
8/7/2024      Yamel Lindsay  [address] [facility] [address]      Dear Colleague,    Thank you for referring your patient, Yamel Lindsay, to the [facility]. Please see a copy of my visit note below.    8/7/24    Reason for Visit: f/u lung cancer, joint pain      Diagnosis:   Synchronous Rt LL Squamous cell carcinoma [id number] Stage IIIC (AJCC 8th edition) diagnosed 12/2022  PD-L1 <1%  NGS- [facility] panel- Neg (High TMB 16.7 mut/MB)     Left LL adenocarcinoma [id number] Stage (AJCC 8th edition) diagnosed 12/2022  PD-L1-2%  NGS-KRAS G12C, TMB 2.4      Treatment:  1/20/23- 4 cycles of Pembrolizumab+carboplatin+paclitaxel (pembro on hold ucrrently of immune mediated myalgia/arthralgia since 3/2023)   XRT-  Right Lower Lobe      6,000 cGy    in 30 fraction     6/06/2023 7/26/2023          Left Lower Lobe        5,000 cGy   in 10 fraction     6/22/2023 7/06/2023            Intent of treatment: Palliative    Onc HPI:    One month history of increasing dyspnea with palpitations, propting ER visit 11/19.  11/19/22- CT- 1 cm right lower lobe mass abutting multiple central bronchovascular and mediastinal structures, consistent with malignancy.2.2 cm spiculated nodule in the left lower lobe, also consistent with malignancy.   12/6/22- PET/CT-  Right lower lobe lung mass abutting the pleura (SUV max of 24), measuring 8 x 5.2 cm. There is associated complete obliteration of right lower lobe bronchus and loss of fat planes with  the left atrium. Spiculated left lower lobe solid lung mass (SUV 17.9), measuring 2.2 x 2 cm. There are several markedly hypermetabolic, irregular solid nodules in the right middle lobe abutting the pleura (approximately 10-12), as for example 1.2 cm nodule with SUV max of 16.8 (4/126). There is a separate markedly hypermetabolic 1 cm nodule in the right upper lobe. Mild hypermetabolic uptake is noted within a1 x 0.9 cm right paratracheal node ( SUV max of 4.2)  (4/120), Mild uptake is also  noted within a subcentimeter lower paraesophageal node (4/154).   12/28/22- EBUS with deblking in the right hilar mass (no mediastinal staging performed)- - LUNG, RIGHT MAINSTEM, ENDOBRONCHIAL BIOPSY: Squamous cell carcinoma with extensive necrosis (positive for p40 and negative for TTF-1). Left LL FNAC- Positive for malignancy- Adenocarcinoma  (diffusely positive for TTF-1 while negative for p40)  1/3/23: Brain MRI - Neg  1/10/23: Tumor board discussion: Too extensive for concurrent/sequential chemo XRT- plan for palliative intent chemo-IO  1/20/23- C1 Pembrolizumab+carboplatin+paclitaxel  2/10/23-C2 Pembrolizumab+carboplatin+paclitaxel  2/27/23- CT CAP- Decreased size of the hypoattenuating mass in the central right lower lobe and small irregularly-shaped nodules in the right upper lobe consistent with positive treatment response. There is persistent postobstructive right lower lobe atelectasis.Unchanged heterogeneous lobulated and spiculated solid nodule in the lateral basal left lower lobe. No findings to suggest new extrapulmonary metastatic disease.  3/3, 3/24-  Pembrolizumab+carboplatin+paclitaxel      4/10/23- CT CAP- - some Rx response- Mass distal right lower lobe bronchus causing complete post obstructive atelectasis has decreased from 6.5 x 4.5 x 3.5 cm to 5.5 x 3.8 x 2.4 cm today. Spiculated left lower lobe mass has decreased from 2.1 cm to 1.5 cm today. Right upper lobe nodularity continues to decrease. Ill-defined nodularity in the right middle lobe has developed and is most likely postinflammatory.     6/5/23- After discussion at tumor board, Dr. Escoto agreed for definitive radiation treatment (without chemo, as she already had 4 cycles of chemo) for cancer on the right side and possible SBRT on left side both with definitive intent    6/5/23- Started XRT     Right Lower Lobe      6,000 cGy    in 30 fraction     6/06/2023 7/26/2023          Left Lower Lobe         5,000 cGy   in 10 fraction     6/22/2023 7/06/2023 6/16/23- CT Chest-  Unchanged complete atelectasis of the heterogeneous right lower lobe due to central obstructing mass status post treatment. No change in the posterolateral left lower lobe nodule with surrounding cicatrization. No findings to suggest progressive   malignancy in the chest.    8/25/23- CT Chest- Stable right lower lobe lung mass with distal atelectasis. Lobulated nodule in the left lower lobe is also stable. No disease progression in the chest. No metastatic disease in the abdomen and pelvis.     10/20/23- CT CHest- Heterogeneous mass with consolidation and complete volume loss of the right lower lobe which is located in the azygoesophageal recess has increased in size slightly with axial dimensions superiorly 3.2 x 2.2 cm previously 2.8 x 2.0 cm  (axial image 95 series 3) and axial dimensions inferiorly 4.2 x 2.9 cm previously 4.0 x 2.6 cm (image 110). Spiculated mass posterior basilar left lower lobe stable at 1.7 x 1.2 cm (image 119).    11/3/23- PET/CT-  A consolidative masslike opacity with atelectasis involving the right lower lobe at site of original tumor demonstrates and ill-defined area of soft tissue thickening along its superior margin with moderate FDG uptake. Appearance favors ongoing posttreatment inflammatory change although is indeterminate for early local recurrence. Recommend short term chest CT with IV contrast follow-up in 3 months. Otherwise no evidence of FDG avid malignancy. An irregular nodule in the left lower lobe is not FDG avid suggesting posttreatment scarring. No evidence of metastasis including no FDG avid adenopathy.Some small irregular opacities have developed in the left lung in the short interval since 10/20/2023, likely infectious/inflammatory    2/2/24- CT Chest-  Unchanged posttreatment lobar atelectasis of the right lower lobe. Increased predominantly solid parenchymal bands with  architectural distortion in the basal left lower lobe, centered on the inferior posterior medial right upper lobe consistent with posttreatment inflammation and developing cicatrization. There are  no findings to suggest progression of neoplasm in the chest.New small right pleural effusion not a component of which is organized in the posterior mid chest secondary to #2.     5/3/24- CT Chest-  Stable posttreatment changes in both lower lobes. No definite recurrent disease. No new sites of disease in the chest, abdomen, or pelvis.    8/5/24- CT Chest- Emphysematous change with complete right lower lobe atelectasis and areas of parenchymal density and retraction in the retrohilar posteroinferior right upper lobe and in the lateral left lower lobe which appear similar or smaller today compared with  previous imaging. No new or progressive abnormality is present.        Interval history:   Yamel is here for follow up.    She is doing well overall.   No new or progressive symptoms.  She saw a psychotherapist for depression, on fluoxetine, requesting pscyiatric referral  SOB on exertion is stable, able to walk 1-2 miles/day  Fatigue is stable   Weight is stable  Appetite is ok        ECOG PS 1    Wt Readings from Last 4 Encounters:   05/06/24 67 kg (147 lb 11.2 oz)   04/16/24 66.3 kg (146 lb 3.2 oz)   04/09/24 65 kg (143 lb 3.2 oz)   02/05/24 68 kg (150 lb)        Current Outpatient Medications   Medication Sig Dispense Refill     acetaminophen (TYLENOL) 500 MG tablet Take 1,000 mg by mouth every 6 hours as needed for mild pain Taking once a day       albuterol (PROAIR HFA/PROVENTIL HFA/VENTOLIN HFA) 108 (90 Base) MCG/ACT inhaler INHALE 2 PUFFS INTO THE LUNGS EVERY 6 HOURS AS NEEDED FOR SHORTNESS OF BREATH OR DIFFICULTY BREATHING OR WHEEZING 90 g 0     atorvastatin (LIPITOR) 20 MG tablet Take 1 tablet (20 mg) by mouth daily 90 tablet 3     celecoxib (CELEBREX) 200 MG capsule TAKE 1 CAPSULE(200 MG) BY MOUTH DAILY 60 capsule  1     Cholecalciferol (VITAMIN D-3 PO) Take 2,000 Units by mouth daily        clonazePAM (KLONOPIN) 0.5 MG tablet Take 1-2 tablets (0.5-1 mg) by mouth 2 times daily as needed for anxiety 120 tablet 1     clonazePAM (KLONOPIN) 0.5 MG tablet Take 0.5-1 tablets (0.25-0.5 mg) by mouth 2 times daily as needed for anxiety 30 tablet 3     FLUoxetine (PROZAC) 20 MG capsule Take 2 capsules (40 mg) by mouth daily 180 capsule 3     fluticasone-salmeterol (ADVAIR HFA) 115-21 MCG/ACT inhaler Inhale 2 puffs into the lungs 2 times daily 12 g 11     guaiFENesin-codeine (ROBITUSSIN AC) 100-10 MG/5ML solution TAKE 5 TO 10 ML BY MOUTH EVERY 4 HOURS AS NEEDED 120 mL 0     hydrochlorothiazide (HYDRODIURIL) 25 MG tablet TAKE 1 TABLET(25 MG) BY MOUTH DAILY 90 tablet 1     ipratropium - albuterol 0.5 mg/2.5 mg/3 mL (DUONEB) 0.5-2.5 (3) MG/3ML neb solution Take 1 vial (3 mLs) by nebulization every 6 hours as needed for shortness of breath or wheezing 90 mL 0     lidocaine-prilocaine (EMLA) 2.5-2.5 % external cream Apply topically as needed for moderate pain (4-6) Apply 1 hour prior to port access and cover with saran wrap or press and seal. (Patient not taking: Reported on 11/1/2023) 30 g 1     omeprazole (PRILOSEC) 20 MG DR capsule TAKE 1 CAPSULE(20 MG) BY MOUTH DAILY 30 TO 60 MINUTES BEFORE A MEAL 90 capsule 2     polyethylene glycol (MIRALAX) 17 g packet Take 1 packet by mouth daily Taking PRN (Patient not taking: Reported on 10/23/2023)       predniSONE (DELTASONE) 10 MG tablet Take 1 tablet (10 mg) by mouth daily (Patient not taking: Reported on 10/23/2023) 60 tablet 1     predniSONE (DELTASONE) 20 MG tablet Take 3 tabs by mouth daily x 3 days, then 2 tabs daily x 3 days, then 1 tab daily x 3 days, then 1/2 tab daily x 3 days. 20 tablet 0     tiZANidine (ZANAFLEX) 2 MG tablet TAKE 1 TABLET(2 MG) BY MOUTH THREE TIMES DAILY AS NEEDED FOR MUSCLE SPASMS 90 tablet 3     traZODone (DESYREL) 50 MG tablet TAKE 2 TABLETS(100 MG) BY MOUTH AT  BEDTIME 60 tablet 0          Allergies   Allergen Reactions     Nka [No Known Allergies]      Seasonal Allergies        There were no vitals taken for this visit.  General: Patient appears well in no acute distress.   Skin: No visualized rash or lesions on visualized skin  Eyes: EOMI, no erythema, sclera icterus or discharge noted  Resp: Appears to be breathing comfortably without accessory muscle usage, speaking in full sentences, no cough  MSK: Appears to have normal range of motion based on visualized movements  Neurologic: No apparent tremors, facial movements symmetric  Psych: affect engaged, pleasant, alert and oriented   not currently breastfeeding.  Wt Readings from Last 4 Encounters:   05/06/24 67 kg (147 lb 11.2 oz)   04/16/24 66.3 kg (146 lb 3.2 oz)   04/09/24 65 kg (143 lb 3.2 oz)   02/05/24 68 kg (150 lb)         Impression/plan:     Ms. Yamel Lindsay is a 77 year old  female with PMHx of for HTN, HLD, anxiety, who is here for evaluation/followup of new lung cancer     Synchronous Rt LL Squamous cell carcinoma [id number] Stage IIIC (AJCC 8th edition) diagnosed 12/2022  PD-L1 <1%  NGS- [facility] panel- Neg (High TMB 16.7 mut/MB)     Left LL adenocarcinoma [id number] Stage (AJCC 8th edition) diagnosed 12/2022  PD-L1-2%  NGS-KRAS G12C, TMB 2.4     Pt with 2 synchronous cancers at diagnosis..  The more  threatening one was the right lower lobe squamous cell cancer which appears to be at least stage IIIC given the size greater than 7 cm suggestive of T4 disease.  She also has mediastinal lymph node that were enlarged and appear pathologic but were not biopsied.  She underwent an EBUS for debulking and her mediastinum was not been staged.  She is also has several FDG avid nodules in the right middle and the right upper lobe that have not yet been biopsied.  Given the extent of involvement of the right sided cancer, concurrent/sequential chemoradiation not feasible per tumor board discussion, also no further EBUS  required since high probability of cancer.   She proceeded with palliative intent chemoimmunotherapy with pembro+carbo+taxol.  Of note, squamous has high TMB and low neg PD-L1.  Adenocarcinoma has KRAS G12C. CT after 4 cycles showing good partial response in the right-sided tumor and some response in the left side adenocarcinoma.  After discussion at tumor board, Dr. Escoto did definitive radiation treatment  on cancer on the right side and SBRT on left side both with definitive intent.  CT on 10/2023 showing slight increase in size of consolidative opacity int he rt lower lobe.We got a PET scan which showed some mild uptake b/l suggesting inflammation and mild FDG uptake in the rt LL but FDG avidity is much lower than before suggesting likley Rx related changes. CT in 8/2024 stable with evolving treatment changes.  Continue monitoring.  Immunotherapy on hold due to Myositis/arthritis with hx of elevated CRP and ESR been attributed to immunotherapy. We will continue to hold it for now until we see signs of progression.        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 Has been attributed to immunotherapy. dopplar negative for DVT, No known bone mets on baseline PET/CT.- Resolved  -CK normal  - CRP and ESR is mildly elevated (significantly lower than the levels when she was on steroids for myositis)  -has had 2 prednisone tapers, the 2nd was a longer taper. Her pain started to increase the week after stopping prednisone  -she was resumed on prednisone 10 mg without improvement and increased to 20 mg.   - was on 10 mg daily, but stopped it for unclear reasons, has noticed recent flare in both the knee joints and left calf muscle  - She resumed 10 mg prednisone 10/2023 but did not notice any difference, now her pain is more in the joints and muscle ache seems to be relievd by muscle relaxant, suggesting this is MSK etiology and less likley to be immunotherapy  "related.   - -has tramadol, started on gabapentin 300 mg at bedtime per palliative\"  -- stop prednisone, asked he rto get in tpuch with me if symptoms get worse (especially msucle)    # Bronchitis, COPD exacerbation  In April Rx with PO steroids and codeine, has some lingering cough, continue to monitor  Continue Advair        #Psychiatry: Patient has previously had a history of anxiety.now since the diagnosis, on and off panic attacks.and also has depression  PTA on Prozac, and also clonazepam  -Trazodoen for ?insomnia  -cloazepan BID 0.5 mg  -Saw cancer  pyschology , but wantsa psciatrist referral     #COVID vaccine: s/p 3 boosters     #Hypertension hyperlipidemia  ON statin        On the day of service,  Preparation time:  5 minutes  Visit time:  20 minutes  Care Coordination:  5 minutes        Chaz Martel M.D.   of Medicine  Division of Hematology, Oncology and Transplantation  [facility]      Again, thank you for allowing me to participate in the care of your patient.        Sincerely,        Chaz Martel MD  "

## 2024-08-07 NOTE — TELEPHONE ENCOUNTER
Pending Prescriptions:                       Disp   Refills    ipratropium - albuterol 0.5 mg/2.5 mg/3 m*90 mL  0            Sig: Take 1 vial (3 mLs) by nebulization every 6 hours           as needed for shortness of breath or wheezing    Last prescribing provider:     Last clinic visit date: 08/07/24 w/    Recommendations for requested medication (if none, N/A): N/A    Any other pertinent information (if none, N/A): N/A    Refilled: Y/N, if NO, why?

## 2024-08-09 NOTE — PROGRESS NOTES
DISCHARGE  Reason for Discharge: Patient has failed to schedule further appointments. Pt no showed multiple visits.     Equipment Issued: none    Discharge Plan: Patient to continue home program.    Referring Provider:  Terry Aviles

## 2024-08-12 ENCOUNTER — PRE VISIT (OUTPATIENT)
Dept: PSYCHIATRY | Facility: CLINIC | Age: 79
End: 2024-08-12
Payer: COMMERCIAL

## 2024-08-12 NOTE — TELEPHONE ENCOUNTER
PSYCHIATRY CLINIC PHONE INTAKE     SERVICES REQUESTED / INTERESTED IN          Med Management    Presenting Problem and Brief History                              What would you like to be seen for? (brief description):  Pt was diagnosed 20 years ago. Currently taking prozac 20mg, trazadone 100mg, and klonipin 1mg 1-2mg daily as needed. No concerns about medication. PCP referred for pt to get long-term care. No issues with sleep or appetite.     Have you received a mental health diagnosis? Yes   Which one (s): anxiety and major depression  Is there any history of developmental delay?  No   Are you currently seeing a mental health provider?  Yes            Who / month last seen:  [facility] provider -  for therapy - see chart  Do you have mental health records elsewhere?  No  Will you sign a release so we can obtain them?  No    Have you ever been hospitalized for psychiatric reasons?  No  Describe:  NA    Do you have current thoughts of self-harm?  No    Do you currently have thoughts of harming others?  No    Do you have any safety concerns? No   If yes to these, offer to reach out to a  for follow up.      Substance Use History     Do you have any history of alcohol  or other substance use?  No  Describe:  NA  Have you ever received treatment for this?  No    Describe:  NA     Social History     Who is the patient's a guardian?  No    Name / number: NA  Have you had an ACT team in last 12 months?  No  Describe: NA   OK to leave a detailed voicemail?  Yes    Would you be interested in learning more about research opportunities for which you or your child may qualify? We can connect you with a team member for more information.   unknown   If yes, send an inbasket message to Zenaida Faith    Medical/ Surgical History                                   Patient Active Problem List   Diagnosis    Hyperlipidemia LDL goal <130    Primary localized osteoarthrosis, lower leg    Generalized anxiety disorder     Rhinitis, allergic seasonal    Alcoholism (H)    Mild recurrent major depression (H24)    Seasonal allergic rhinitis    Hip pain    GERD (gastroesophageal reflux disease)    S/P laparoscopic hernia repair    OA (osteoarthritis) of knee    Mild intermittent asthma without complication    Major depression in complete remission (H)    Benign essential hypertension    Right lower lobe lung mass    Malignant neoplasm of lower lobe of right lung (H)    Panlobular emphysema (H)    Adverse effect of antineoplastic and immunosuppressive drugs, sequela    Malignant neoplasm of lower lobe of left lung (H)    Deep vein thrombosis (DVT) of proximal lower extremity, unspecified chronicity, unspecified laterality (H)    Chemotherapy-induced peripheral neuropathy (H24)          Medications             Have you taken >3 psychiatric medications in your past?  YesNo  Do you currently take 5 or more medications, including prescriptions, supplements, and other over the counter products?  No    If YES to at least one of these questions:   As part of your evaluation in our clinic, we have specially trained pharmacists as part of your care team. Your provider would like for you to meet with one of our pharmacists to review your current and past medications, ensure your med list is up to date, and queue up any questions or concerns you have about medications. They will review all of your medications, not just for mental health, to help ensure you know what you re taking and that everything is working together.     Please schedule patient in [facility] (Marry Padilla or Debbie Singh) for 60m MTM in any green space as virtual (video), telephone, or in person (designated in person days per Epic templates).  -Appt notes can say  Psych eval on xx/xx   -Route telephone encounter to the pharmacist who will be seeing the patient.  If patient has questions about insurance coverage or billing, please still schedule the visit and refer  them to call the [facility] coordinators at [phone].    Current Outpatient Medications   Medication Sig Dispense Refill    acetaminophen (TYLENOL) 500 MG tablet Take 1,000 mg by mouth every 6 hours as needed for mild pain Taking once a day      albuterol (PROAIR HFA/PROVENTIL HFA/VENTOLIN HFA) 108 (90 Base) MCG/ACT inhaler INHALE 2 PUFFS INTO THE LUNGS EVERY 6 HOURS AS NEEDED FOR SHORTNESS OF BREATH OR DIFFICULTY BREATHING OR WHEEZING 90 g 0    atorvastatin (LIPITOR) 20 MG tablet Take 1 tablet (20 mg) by mouth daily 90 tablet 3    celecoxib (CELEBREX) 200 MG capsule TAKE 1 CAPSULE(200 MG) BY MOUTH DAILY 60 capsule 1    Cholecalciferol (VITAMIN D-3 PO) Take 2,000 Units by mouth daily       clonazePAM (KLONOPIN) 0.5 MG tablet Take 1-2 tablets (0.5-1 mg) by mouth 2 times daily as needed for anxiety 120 tablet 1    clonazePAM (KLONOPIN) 0.5 MG tablet Take 0.5-1 tablets (0.25-0.5 mg) by mouth 2 times daily as needed for anxiety 30 tablet 3    FLUoxetine (PROZAC) 20 MG capsule Take 2 capsules (40 mg) by mouth daily 180 capsule 3    fluticasone-salmeterol (ADVAIR HFA) 115-21 MCG/ACT inhaler Inhale 2 puffs into the lungs 2 times daily 12 g 11    guaiFENesin-codeine (ROBITUSSIN AC) 100-10 MG/5ML solution TAKE 5 TO 10 ML BY MOUTH EVERY 4 HOURS AS NEEDED 120 mL 0    hydrochlorothiazide (HYDRODIURIL) 25 MG tablet TAKE 1 TABLET(25 MG) BY MOUTH DAILY 90 tablet 1    ipratropium - albuterol 0.5 mg/2.5 mg/3 mL (DUONEB) 0.5-2.5 (3) MG/3ML neb solution Take 1 vial (3 mLs) by nebulization every 6 hours as needed for shortness of breath or wheezing 90 mL 0    lidocaine-prilocaine (EMLA) 2.5-2.5 % external cream Apply topically as needed for moderate pain (4-6) Apply 1 hour prior to port access and cover with saran wrap or press and seal. (Patient not taking: Reported on 11/1/2023) 30 g 1    omeprazole (PRILOSEC) 20 MG DR capsule TAKE 1 CAPSULE(20 MG) BY MOUTH DAILY 30 TO 60 MINUTES BEFORE A MEAL 90 capsule 2    polyethylene glycol  (MIRALAX) 17 g packet Take 1 packet by mouth daily Taking PRN (Patient not taking: Reported on 10/23/2023)      predniSONE (DELTASONE) 20 MG tablet Take 3 tabs by mouth daily x 3 days, then 2 tabs daily x 3 days, then 1 tab daily x 3 days, then 1/2 tab daily x 3 days. 20 tablet 0    tiZANidine (ZANAFLEX) 2 MG tablet TAKE 1 TABLET(2 MG) BY MOUTH THREE TIMES DAILY AS NEEDED FOR MUSCLE SPASMS 90 tablet 3    traZODone (DESYREL) 50 MG tablet TAKE 2 TABLETS(100 MG) BY MOUTH AT BEDTIME 60 tablet 0         DISPOSITION      8/12/24 Intake complete. Scheduled MTM on 8/22/24 at 2:30pm with Marry Padilla. Scheduled AGE with Anu Green, on 10/15/24 at 9am.     Rupinder Castro, Lead Complex

## 2024-08-25 DIAGNOSIS — E78.5 HYPERLIPIDEMIA LDL GOAL <100: ICD-10-CM

## 2024-08-26 ENCOUNTER — ONCOLOGY VISIT (OUTPATIENT)
Dept: ONCOLOGY | Facility: HOSPITAL | Age: 79
End: 2024-08-26
Attending: PSYCHOLOGIST
Payer: COMMERCIAL

## 2024-08-26 DIAGNOSIS — F41.1 GENERALIZED ANXIETY DISORDER: ICD-10-CM

## 2024-08-26 DIAGNOSIS — F33.1 MAJOR DEPRESSIVE DISORDER, RECURRENT EPISODE, MODERATE (H): Primary | ICD-10-CM

## 2024-08-26 PROCEDURE — 90837 PSYTX W PT 60 MINUTES: CPT | Performed by: PSYCHOLOGIST

## 2024-08-26 RX ORDER — ATORVASTATIN CALCIUM 20 MG/1
20 TABLET, FILM COATED ORAL DAILY
Qty: 90 TABLET | Refills: 3 | Status: SHIPPED | OUTPATIENT
Start: 2024-08-26

## 2024-08-26 NOTE — LETTER
"8/26/2024      Yamel Lindsay  [address] [facility] [address]      Dear Colleague,    Thank you for referring your patient, Yamel Lindsay, to the [facility] [place]. Please see a copy of my visit note below.              [facility] Oncology- Psychotherapy                                     Progress Note     Patient Name: Yamel Lindsay                        Date:   8/26/2024                                              Service Type: Individual                            Session Start Time:       1200     Session End Time:    1253                Session Length:        53 mins     Session #:      4     Attendees:     Client attended alone     Service Modality:  In-person     DATA  Interactive Complexity: No  Crisis: No                               Progress Since Last Session (Related to Symptoms / Goals / Homework):              Symptoms:  Pt reports recent improvement in mood with a positve oncoogy report.                Pt reports a hx of mental health issues dating back to childhood.                 We discussed coping strategies.      Pt reports excessive sleep.      She reports a good appetite and needs to eat better.      Pt reports increased social contact.      Homework:  none                            Episode of Care Goals: Satisfactory progress - ACTION (Actively working towards change); Intervened by reinforcing change plan / affirming steps taken                 Current / Ongoing Stressors and Concerns:  Pt reports she had a nice visit with her daughter recently . They went to a dinner and show.     Pt reports her grandson \"Chirag\"  (Cooper' child) is now living in [place] and pt misses him.     Pt reports her grand son \"Ty\" and she are speakiing again. She is not talking ot her grand daughter \"Kusum.\" Pt suspects her daughter Raeann is poisoning Kusum against her.                   Pt reports her daughter Raeann lives in a hoarder's house and her spouse is also a " "hoarder. Pt is concerned because Raeann is ill a lot possibly due to mold issues in the home.     Pt reports she misses her old neighborhood and one friend specifically. She still sees her but it was a lot easier when she lived two blocks away. . Pt reports she has had increased contact with friends. She has a class reunion coming up next month she is excited to attend and see more friends.                 Pt reports she is looking forward to a party she has planned at her daughter's cabin this weekend.                   Pt reports she is interested in volunteer work as a way to find something meaningful to do.                                    Pt reports she enjoys lunches with friends, movies, and family.                                   Social Hx:  Pt reports she is  and  since .      Pt reports she has two daughters: ages 58 and 56. She has four grandkids ages 27-35. Pt reports a good relationship with her kids and grandkids.      Pt rpeorts she has a brother (-7 years) who is a good support and has had cancer twice.      Pt reports another brother  from esophageal cancer.      Pt reports her daughter Raeann (58) lives in a hoarder's house and her spouse is also a hoarder. Pt is concerned because Raeann is ill a lot possibly due to mold issues in the home.      Pts grandson Michael was a college [employer] star. He lives with pt X 3 years as did his sister Kusum. Pt reports they both have anxiety issues. Michael was admitted to in at one time. Michael's mother Raeann also has a hx of admission.      Pt reports her daughter \"Cooper\" is also dealing with anxiety. Her son Chirag moved to [place] and her other son \"Mathew\" is in [place] with his S/O.                  Treatment Objective(s) Addressed in This Session:          use at least 3 coping skills for anxiety management in the next 3 weeks  Increase interest, engagement, and pleasure in doing things  ,                 Intervention:              " CBT: ,  Emotion Focused Therapy: ,  Solution Focused: ,     Assessments completed prior to visit:  none                    ASSESSMENT: Current Emotional / Mental Status (status of significant symptoms):              Risk status (Self / Other harm or suicidal ideation)              Patient denies current fears or concerns for personal safety.              Patient denies current or recent suicidal ideation or behaviors.              Patient denies current or recent homicidal ideation or behaviors.              Patient denies current or recent self injurious behavior or ideation.              Patient denies other safety concerns.              Patient reports there has been no change in risk factors since their last session.                Patient reports there has been no change in protective factors since their last session.                Recommended that patient call 911 or go to the local ED should there be a change in any of these risk factors.                 Appearance:                            Appropriate               Eye Contact:                           Good               Psychomotor Behavior:          Normal               Attitude:                                   Cooperative               Orientation:                             All              Speech                          Rate / Production:       Normal                           Volume:                       Normal               Mood:                                      Anxious               Affect:                                      Appropriate               Thought Content:                    Clear               Thought Form:                        Coherent  Logical               Insight:                                     Good                  Medication Review:              No changes to current psychiatric medication(s)                 Medication Compliance:              Yes                 Changes in Health Issues:              Yes:  cancer, Associated Psychological Distress                 Chemical Use Review:              Substance Use: Chemical use reviewed, no active concerns identified                  Tobacco Use: No current tobacco use.       Diagnosis:  F41.1 PAOLA  F33.1 MDD     Collateral Reports Completed:              Not Applicable     PLAN: (Patient Tasks / Therapist Tasks / Other)  Return after trip. Utilize coping skills discussed.            Mckay Jorge LP                                                           ______________________________________________________________________     Individual Treatment Plan     Patient's Name: Yamel Lindsay                     YOB: 1945     Date of Creation: 2/27/2024     Date Treatment Plan Last Reviewed/Revised: 7/22/24      DSM5 Diagnoses: 296.32 (F33.1) Major Depressive Disorder, Recurrent Episode, Moderate _ or 300.02 (F41.1) Generalized Anxiety Disorder  Psychosocial / Contextual Factors: cancer dx  PROMIS (reviewed every 90 days):      Referral / Collaboration:  Referral to another professional/service is not indicated at this time..     Anticipated number of session for this episode of care: 9-12 sessions  Anticipation frequency of session: Biweekly  Anticipated Duration of each session: 38-52 minutes  Treatment plan will be reviewed in 90 days or when goals have been changed.         MeasurableTreatment Goal(s) related to diagnosis / functional impairment(s)  Goal 1: Patient will reduce anxiety sx        Objective #A (Patient Action)                          Patient will use at least 3 coping skills for anxiety management in the next 3 weeks.  Status: New - Date: 7/22/24       Intervention(s)  Therapist will teach emotional regulation skills. , .     Objective #B  Patient will use distraction each time intrusive worry surfaces.  Status: New - Date: 7/22/24       Intervention(s)  Therapist will teach emotional regulation skills. , .        Goal 2: Patient will  reduce depressive sx        Objective #A (Patient Action)                          Status: New - Date: 7/22/24       Patient will Decrease frequency and intensity of feeling down, depressed, hopeless.     Intervention(s)  Therapist will teach emotional recognition/identification. , .     Objective #B  Patient will Increase interest, engagement, and pleasure in doing things.                 Status: New - Date:7/22/24       Intervention(s)  Therapist will teach emotional regulation skills. , .     Mckay Jorge LP                    8/1/2024               Again, thank you for allowing me to participate in the care of your patient.        Sincerely,        Mckay Jorge LP

## 2024-08-26 NOTE — TELEPHONE ENCOUNTER
Requested Prescriptions   Pending Prescriptions Disp Refills    atorvastatin (LIPITOR) 20 MG tablet [Pharmacy Med Name: ATORVASTATIN 20MG TABLETS] 90 tablet 3     Sig: TAKE 1 TABLET(20 MG) BY MOUTH DAILY       Antihyperlipidemic agents Failed - 8/25/2024  8:08 AM        Failed - LDL on file in the past 12 months        Passed - Medication is active on med list        Passed - Recent (12 mo) or future (90 days) visit within the authorizing provider's specialty     The patient must have completed an in-person or virtual visit within the past 12 months or has a future visit scheduled within the next 90 days with the authorizing provider s specialty.  Urgent care and e-visits do not quality as an office visit for this protocol.          Passed - Patient is age 18 years or older        Passed - No active pregnancy on record        Passed - No positive pregnancy test in past 12 mos

## 2024-08-26 NOTE — PROGRESS NOTES
"[facility] Oncology- Psychotherapy                                     Progress Note     Patient Name: Yamel Lindsay                        Date:   8/26/2024                                              Service Type: Individual                            Session Start Time:       1200     Session End Time:    1253                Session Length:        53 mins     Session #:      4     Attendees:     Client attended alone     Service Modality:  In-person     DATA  Interactive Complexity: No  Crisis: No                               Progress Since Last Session (Related to Symptoms / Goals / Homework):              Symptoms:  Pt reports recent improvement in mood with a positve oncoogy report.                Pt reports a hx of mental health issues dating back to childhood.                 We discussed coping strategies.      Pt reports excessive sleep.      She reports a good appetite and needs to eat better.      Pt reports increased social contact.      Homework:  none                            Episode of Care Goals: Satisfactory progress - ACTION (Actively working towards change); Intervened by reinforcing change plan / affirming steps taken                 Current / Ongoing Stressors and Concerns:  Pt reports she had a nice visit with her daughter recently . They went to a dinner and show.     Pt reports her grandson \"Chirag\"  (Cooper' child) is now living in [place] and pt misses him.     Pt reports her grand son \"Ty\" and she are speakiing again. She is not talking ot her grand daughter \"Kusum.\" Pt suspects her daughter Raeann is poisoning Kusum against her.                   Pt reports her daughter Raeann lives in a hoarder's house and her spouse is also a hoarder. Pt is concerned because Raeann is ill a lot possibly due to mold issues in the home.     Pt reports she misses her old neighborhood and one friend specifically. She still sees her but it was a lot easier when she lived two blocks away. " ". Pt reports she has had increased contact with friends. She has a class reunion coming up next month she is excited to attend and see more friends.                 Pt reports she is looking forward to a party she has planned at her daughter's cabin this weekend.                   Pt reports she is interested in volunteer work as a way to find something meaningful to do.                                    Pt reports she enjoys lunches with friends, movies, and family.                                   Social Hx:  Pt reports she is  and  since .      Pt reports she has two daughters: ages 58 and 56. She has four grandkids ages 27-35. Pt reports a good relationship with her kids and grandkids.      Pt rpeorts she has a brother (-7 years) who is a good support and has had cancer twice.      Pt reports another brother  from esophageal cancer.      Pt reports her daughter Raeann (58) lives in a hoarder's house and her spouse is also a hoarder. Pt is concerned because Raeann is ill a lot possibly due to mold issues in the home.      Pts grandson Michael was a college [employer] star. He lives with pt X 3 years as did his sister Kusum. Pt reports they both have anxiety issues. Michael was admitted to in at one time. Michael's mother Raeann also has a hx of admission.      Pt reports her daughter \"Cooper\" is also dealing with anxiety. Her son Chirag moved to [place] and her other son \"Mathew\" is in [place] with his S/O.                  Treatment Objective(s) Addressed in This Session:          use at least 3 coping skills for anxiety management in the next 3 weeks  Increase interest, engagement, and pleasure in doing things  ,                 Intervention:              CBT: ,  Emotion Focused Therapy: ,  Solution Focused: ,     Assessments completed prior to visit:  none                    ASSESSMENT: Current Emotional / Mental Status (status of significant symptoms):              Risk status (Self / Other harm " or suicidal ideation)              Patient denies current fears or concerns for personal safety.              Patient denies current or recent suicidal ideation or behaviors.              Patient denies current or recent homicidal ideation or behaviors.              Patient denies current or recent self injurious behavior or ideation.              Patient denies other safety concerns.              Patient reports there has been no change in risk factors since their last session.                Patient reports there has been no change in protective factors since their last session.                Recommended that patient call 911 or go to the local ED should there be a change in any of these risk factors.                 Appearance:                            Appropriate               Eye Contact:                           Good               Psychomotor Behavior:          Normal               Attitude:                                   Cooperative               Orientation:                             All              Speech                          Rate / Production:       Normal                           Volume:                       Normal               Mood:                                      Anxious               Affect:                                      Appropriate               Thought Content:                    Clear               Thought Form:                        Coherent  Logical               Insight:                                     Good                  Medication Review:              No changes to current psychiatric medication(s)                 Medication Compliance:              Yes                 Changes in Health Issues:              Yes: cancer, Associated Psychological Distress                 Chemical Use Review:              Substance Use: Chemical use reviewed, no active concerns identified                  Tobacco Use: No current tobacco use.       Diagnosis:  F41.1 PAOLA  F33.1  MDD     Collateral Reports Completed:              Not Applicable     PLAN: (Patient Tasks / Therapist Tasks / Other)  Return after trip. Utilize coping skills discussed.            Mckay Jorge LP                                                           ______________________________________________________________________     Individual Treatment Plan     Patient's Name: Yamel Lindsay                     YOB: 1945     Date of Creation: 2/27/2024     Date Treatment Plan Last Reviewed/Revised: 7/22/24      DSM5 Diagnoses: 296.32 (F33.1) Major Depressive Disorder, Recurrent Episode, Moderate _ or 300.02 (F41.1) Generalized Anxiety Disorder  Psychosocial / Contextual Factors: cancer dx  PROMIS (reviewed every 90 days):      Referral / Collaboration:  Referral to another professional/service is not indicated at this time..     Anticipated number of session for this episode of care: 9-12 sessions  Anticipation frequency of session: Biweekly  Anticipated Duration of each session: 38-52 minutes  Treatment plan will be reviewed in 90 days or when goals have been changed.         MeasurableTreatment Goal(s) related to diagnosis / functional impairment(s)  Goal 1: Patient will reduce anxiety sx        Objective #A (Patient Action)                          Patient will use at least 3 coping skills for anxiety management in the next 3 weeks.  Status: New - Date: 7/22/24       Intervention(s)  Therapist will teach emotional regulation skills. , .     Objective #B  Patient will use distraction each time intrusive worry surfaces.  Status: New - Date: 7/22/24       Intervention(s)  Therapist will teach emotional regulation skills. , .        Goal 2: Patient will reduce depressive sx        Objective #A (Patient Action)                          Status: New - Date: 7/22/24       Patient will Decrease frequency and intensity of feeling down, depressed, hopeless.     Intervention(s)  Therapist will teach  emotional recognition/identification. , .     Objective #B  Patient will Increase interest, engagement, and pleasure in doing things.                 Status: New - Date:7/22/24       Intervention(s)  Therapist will teach emotional regulation skills. , .     Mckay Jorge LP                    8/1/2024

## 2024-09-04 DIAGNOSIS — M62.838 MUSCLE SPASM: ICD-10-CM

## 2024-09-04 RX ORDER — TIZANIDINE 2 MG/1
2 TABLET ORAL 3 TIMES DAILY PRN
Qty: 90 TABLET | Refills: 3 | Status: SHIPPED | OUTPATIENT
Start: 2024-09-04

## 2024-09-24 DIAGNOSIS — F41.9 ANXIETY: ICD-10-CM

## 2024-09-24 RX ORDER — CLONAZEPAM 0.5 MG/1
.5-1 TABLET ORAL 2 TIMES DAILY PRN
Qty: 120 TABLET | Refills: 1 | Status: SHIPPED | OUTPATIENT
Start: 2024-09-24

## 2024-09-24 NOTE — TELEPHONE ENCOUNTER
Medication requested: clonazepam 0.5 mg tablet    Last prescribing provider: Chaz Martel MD on 7/23/24    Last clinic visit date: 8/7/24 with Dr. Martel    Recommendations for requested medication (if none, N/A): NA    Any other pertinent information (if none, N/A): NA    Refilled: Y/N, if NO, why?    Pended and Routed to Chaz Martel MD

## 2024-10-02 DIAGNOSIS — G47.09 OTHER INSOMNIA: ICD-10-CM

## 2024-10-02 NOTE — TELEPHONE ENCOUNTER
Routing refill request to provider for review/approval because:  Drug interaction warning    Last Written Prescription Date:  4/1/24  Last Fill Quantity: 60,  # refills: 0   Last office visit: 4/16/2024 ; last virtual visit: 1/4/2023 with prescribing provider:     Future Office Visit:        Julie Behrendt RN

## 2024-10-03 RX ORDER — TRAZODONE HYDROCHLORIDE 50 MG/1
TABLET, FILM COATED ORAL
Qty: 180 TABLET | Refills: 0 | Status: SHIPPED | OUTPATIENT
Start: 2024-10-03 | End: 2024-10-29

## 2024-10-22 DIAGNOSIS — J44.9 CHRONIC OBSTRUCTIVE PULMONARY DISEASE, UNSPECIFIED COPD TYPE (H): ICD-10-CM

## 2024-10-23 RX ORDER — ALBUTEROL SULFATE 90 UG/1
INHALANT RESPIRATORY (INHALATION)
Qty: 25.5 G | OUTPATIENT
Start: 2024-10-23

## 2024-10-23 NOTE — TELEPHONE ENCOUNTER
Overdue for needed care. Please call to schedule. Once appt is scheduled, route back to the pool.

## 2024-10-23 NOTE — TELEPHONE ENCOUNTER
Scheduled for 11/6. Patient states she is out and needs this today.    Josi Ocampo on 10/23/2024 at 10:46 AM

## 2024-10-25 DIAGNOSIS — J44.9 CHRONIC OBSTRUCTIVE PULMONARY DISEASE, UNSPECIFIED COPD TYPE (H): ICD-10-CM

## 2024-10-25 RX ORDER — ALBUTEROL SULFATE 90 UG/1
INHALANT RESPIRATORY (INHALATION)
Qty: 90 G | Refills: 0 | Status: SHIPPED | OUTPATIENT
Start: 2024-10-25 | End: 2024-11-06

## 2024-10-28 DIAGNOSIS — F41.9 ANXIETY: ICD-10-CM

## 2024-10-29 DIAGNOSIS — G47.09 OTHER INSOMNIA: ICD-10-CM

## 2024-10-29 NOTE — TELEPHONE ENCOUNTER
Medication Question or Refill        What medication are you calling about (include dose and sig)?: Pending Prescriptions:                       Disp   Refills    traZODone (DESYREL) 50 MG tablet          180 ta*0            Sig: TAKE 2 TABLETS(100 MG) BY MOUTH AT BEDTIME      Preferred Pharmacy:   [id number] - [place], [place] - [facility] [address] [address]  [facility] [address]  Phone: [phone] Fax: [phone]      Controlled Substance Agreement on file:   CSA -- Patient Level:     [Media Unavailable] Controlled Substance Agreement - Opioid - Scan on 7/1/2021  7:34 PM       Who prescribed the medication?: Dr. Fraser    Do you need a refill? Yes    Patient offered an appointment? No    Do you have any questions or concerns?  Yes: Patient states she is out of medication      Could we send this information to you in [facility] or would you prefer to receive a phone call?:   Patient would prefer a phone call   Okay to leave a detailed message?: Yes at Cell number on file:    Telephone Information:   Mobile [phone]

## 2024-10-30 RX ORDER — TRAZODONE HYDROCHLORIDE 50 MG/1
TABLET, FILM COATED ORAL
Qty: 180 TABLET | Refills: 0 | Status: SHIPPED | OUTPATIENT
Start: 2024-10-30

## 2024-11-06 ENCOUNTER — OFFICE VISIT (OUTPATIENT)
Dept: FAMILY MEDICINE | Facility: CLINIC | Age: 79
End: 2024-11-06
Payer: COMMERCIAL

## 2024-11-06 VITALS
BODY MASS INDEX: 29.34 KG/M2 | HEIGHT: 63 IN | SYSTOLIC BLOOD PRESSURE: 126 MMHG | OXYGEN SATURATION: 96 % | DIASTOLIC BLOOD PRESSURE: 80 MMHG | TEMPERATURE: 97.4 F | WEIGHT: 165.6 LBS | HEART RATE: 77 BPM | RESPIRATION RATE: 20 BRPM

## 2024-11-06 DIAGNOSIS — I10 BENIGN ESSENTIAL HYPERTENSION: Primary | ICD-10-CM

## 2024-11-06 DIAGNOSIS — K21.9 GASTROESOPHAGEAL REFLUX DISEASE WITHOUT ESOPHAGITIS: ICD-10-CM

## 2024-11-06 DIAGNOSIS — J44.9 CHRONIC OBSTRUCTIVE PULMONARY DISEASE, UNSPECIFIED COPD TYPE (H): ICD-10-CM

## 2024-11-06 DIAGNOSIS — F33.42 RECURRENT MAJOR DEPRESSIVE DISORDER, IN FULL REMISSION (H): ICD-10-CM

## 2024-11-06 PROCEDURE — 90480 ADMN SARSCOV2 VAC 1/ONLY CMP: CPT | Performed by: FAMILY MEDICINE

## 2024-11-06 PROCEDURE — 90662 IIV NO PRSV INCREASED AG IM: CPT | Performed by: FAMILY MEDICINE

## 2024-11-06 PROCEDURE — 99214 OFFICE O/P EST MOD 30 MIN: CPT | Performed by: FAMILY MEDICINE

## 2024-11-06 PROCEDURE — G0008 ADMIN INFLUENZA VIRUS VAC: HCPCS | Performed by: FAMILY MEDICINE

## 2024-11-06 PROCEDURE — 96127 BRIEF EMOTIONAL/BEHAV ASSMT: CPT | Performed by: FAMILY MEDICINE

## 2024-11-06 PROCEDURE — 91320 SARSCV2 VAC 30MCG TRS-SUC IM: CPT | Performed by: FAMILY MEDICINE

## 2024-11-06 PROCEDURE — G2211 COMPLEX E/M VISIT ADD ON: HCPCS | Performed by: FAMILY MEDICINE

## 2024-11-06 RX ORDER — ALBUTEROL SULFATE 90 UG/1
INHALANT RESPIRATORY (INHALATION)
Qty: 90 G | Refills: 11 | Status: SHIPPED | OUTPATIENT
Start: 2024-11-06

## 2024-11-06 RX ORDER — HYDROCHLOROTHIAZIDE 25 MG/1
25 TABLET ORAL DAILY
Qty: 90 TABLET | Refills: 3 | Status: SHIPPED | OUTPATIENT
Start: 2024-11-06

## 2024-11-06 ASSESSMENT — ANXIETY QUESTIONNAIRES
GAD7 TOTAL SCORE: 7
IF YOU CHECKED OFF ANY PROBLEMS ON THIS QUESTIONNAIRE, HOW DIFFICULT HAVE THESE PROBLEMS MADE IT FOR YOU TO DO YOUR WORK, TAKE CARE OF THINGS AT HOME, OR GET ALONG WITH OTHER PEOPLE: NOT DIFFICULT AT ALL
2. NOT BEING ABLE TO STOP OR CONTROL WORRYING: NEARLY EVERY DAY
GAD7 TOTAL SCORE: 7
1. FEELING NERVOUS, ANXIOUS, OR ON EDGE: SEVERAL DAYS
7. FEELING AFRAID AS IF SOMETHING AWFUL MIGHT HAPPEN: NOT AT ALL
GAD7 TOTAL SCORE: 7
5. BEING SO RESTLESS THAT IT IS HARD TO SIT STILL: NOT AT ALL
3. WORRYING TOO MUCH ABOUT DIFFERENT THINGS: NEARLY EVERY DAY
7. FEELING AFRAID AS IF SOMETHING AWFUL MIGHT HAPPEN: NOT AT ALL
8. IF YOU CHECKED OFF ANY PROBLEMS, HOW DIFFICULT HAVE THESE MADE IT FOR YOU TO DO YOUR WORK, TAKE CARE OF THINGS AT HOME, OR GET ALONG WITH OTHER PEOPLE?: NOT DIFFICULT AT ALL
6. BECOMING EASILY ANNOYED OR IRRITABLE: NOT AT ALL
4. TROUBLE RELAXING: NOT AT ALL

## 2024-11-06 ASSESSMENT — PAIN SCALES - GENERAL: PAINLEVEL_OUTOF10: NO PAIN (0)

## 2024-11-06 ASSESSMENT — PATIENT HEALTH QUESTIONNAIRE - PHQ9
SUM OF ALL RESPONSES TO PHQ QUESTIONS 1-9: 13
10. IF YOU CHECKED OFF ANY PROBLEMS, HOW DIFFICULT HAVE THESE PROBLEMS MADE IT FOR YOU TO DO YOUR WORK, TAKE CARE OF THINGS AT HOME, OR GET ALONG WITH OTHER PEOPLE: VERY DIFFICULT
SUM OF ALL RESPONSES TO PHQ QUESTIONS 1-9: 13

## 2024-11-06 NOTE — PROGRESS NOTES
"  Assessment & Plan   Problem List Items Addressed This Visit       Benign essential hypertension - Primary    Relevant Medications    hydrochlorothiazide (HYDRODIURIL) 25 MG tablet    Other Relevant Orders    Basic metabolic panel  (Ca, Cl, CO2, Creat, Gluc, K, Na, BUN)    GERD (gastroesophageal reflux disease)    Relevant Medications    omeprazole (PRILOSEC) 20 MG DR capsule    Major depression in complete remission (H)     Other Visit Diagnoses       Chronic obstructive pulmonary disease, unspecified COPD type (H)        Relevant Medications    albuterol (PROAIR HFA/PROVENTIL HFA/VENTOLIN HFA) 108 (90 Base) MCG/ACT inhaler           Patient is a 79 yr old female here for medication refills. Medication faxed,     Labs ordered, patient will be notified of results.        BMI  Estimated body mass index is 29.56 kg/m  as calculated from the following:    Height as of this encounter: 1.594 m (5' 2.76\").    Weight as of this encounter: 75.1 kg (165 lb 9.6 oz).       FUTURE APPOINTMENTS:       - Follow-up visit as needed.      Thu Montesinos is a 79 year old, presenting for the following health issues:    Patient is a 79 yr old female here for medication refills. She has a history of asthma, hypertension, hyperlipidemia ,history of lung CA, anxiety and depression.   She is here for refills on some of her chronic medications.   She denies any acute symptoms and she has no side effects to her medications.   Hypertension, Lipids, Asthma, Depression, Anxiety, and Health Maintenance (Flu and covid )        11/6/2024    12:52 PM   Additional Questions   Roomed by kashif   Accompanied by self         11/6/2024    12:52 PM   Patient Reported Additional Medications   Patient reports taking the following new medications none     History of Present Illness     Asthma:  She presents for follow up of asthma.  She has no cough, some wheezing, and some shortness of breath.  She is using a relief medication 2-3 times per day. She " does not miss any doses of her controller medication throughout the week. Patient is aware of the following triggers: none. The patient has not had a visit to the Emergency Room, Urgent Care or Hospital due to asthma since the last clinic visit.     Mental Health Follow-up:  Patient presents to follow-up on Depression & Anxiety.Patient's depression since last visit has been:  Worse  The patient is not having other symptoms associated with depression.  Patient's anxiety since last visit has been:  Worse  The patient is not having other symptoms associated with anxiety.  Any significant life events: No  Patient is not feeling anxious or having panic attacks.  Patient has no concerns about alcohol or drug use.    She eats 2-3 servings of fruits and vegetables daily.She consumes 0 sweetened beverage(s) daily.She exercises with enough effort to increase her heart rate 20 to 29 minutes per day.  She exercises with enough effort to increase her heart rate 4 days per week.   She is taking medications regularly.         Chief Complaint   Patient presents with    Hypertension    Lipids    Asthma    Depression    Anxiety    Health Maintenance     Flu and covid          Hyperlipidemia Follow-Up    Are you regularly taking any medication or supplement to lower your cholesterol?   Yes- am  Are you having muscle aches or other side effects that you think could be caused by your cholesterol lowering medication?  No    Hypertension Follow-up    Do you check your blood pressure regularly outside of the clinic? No   Are you following a low salt diet? Yes  Are your blood pressures ever more than 140 on the top number (systolic) OR more   than 90 on the bottom number (diastolic), for example 140/90? No    Asthma Follow-Up    Was ACT completed today?  Yes        8/31/2023    10:23 AM   ACT Total Scores   ACT TOTAL SCORE (Goal Greater than or Equal to 20) 16   In the past 12 months, how many times did you visit the emergency room for your  asthma without being admitted to the hospital? 0   In the past 12 months, how many times were you hospitalized overnight because of your asthma? 0        How many days per week do you miss taking your asthma controller medication?  0  Please describe any recent triggers for your asthma: None  Have you had any Emergency Room Visits, Urgent Care Visits, or Hospital Admissions since your last office visit?  No  How many servings of fruits and vegetables do you eat daily?  2-3  On average, how many sweetened beverages do you drink each day (Examples: soda, juice, sweet tea, etc.  Do NOT count diet or artificially sweetened beverages)?   0  How many days per week do you exercise enough to make your heart beat faster? 4  How many minutes a day do you exercise enough to make your heart beat faster? 20 - 29  How many days per week do you miss taking your medication? 0  Depression and Anxiety   How are you doing with your depression since your last visit? Worsened   How are you doing with your anxiety since your last visit?  Worsened   Are you having other symptoms that might be associated with depression or anxiety? No  Have you had a significant life event? No   Do you have any concerns with your use of alcohol or other drugs? No    Social History     Tobacco Use    Smoking status: Former     Current packs/day: 0.00     Types: Cigarettes     Quit date: 2011     Years since quittin.4     Passive exposure: Past    Smokeless tobacco: Never   Vaping Use    Vaping status: Never Used   Substance Use Topics    Alcohol use: No    Drug use: No         2023     8:47 AM 2024    10:42 AM 2024    12:54 PM   PHQ   PHQ-9 Total Score 1 9 13    Q9: Thoughts of better off dead/self-harm past 2 weeks Not at all  Several days  Not at all    F/U: Thoughts of suicide or self-harm  No     F/U: Safety concerns  No         Patient-reported         2023     2:44 PM 2024    10:43 AM 2024     1:01 PM   PAOLA-7  "SCORE   Total Score  13 (moderate anxiety) 7 (mild anxiety)   Total Score 13 13 7        Patient-reported         11/6/2024    12:54 PM   Last PHQ-9   1.  Little interest or pleasure in doing things 3    2.  Feeling down, depressed, or hopeless 3    3.  Trouble falling or staying asleep, or sleeping too much 0    4.  Feeling tired or having little energy 3    5.  Poor appetite or overeating 2    6.  Feeling bad about yourself 1    7.  Trouble concentrating 1    8.  Moving slowly or restless 0    Q9: Thoughts of better off dead/self-harm past 2 weeks 0    PHQ-9 Total Score 13        Patient-reported         11/6/2024     1:01 PM   PAOLA-7    1. Feeling nervous, anxious, or on edge 1    2. Not being able to stop or control worrying 3    3. Worrying too much about different things 3    4. Trouble relaxing 0    5. Being so restless that it is hard to sit still 0    6. Becoming easily annoyed or irritable 0    7. Feeling afraid, as if something awful might happen 0    PAOLA-7 Total Score 7    If you checked any problems, how difficult have they made it for you to do your work, take care of things at home, or get along with other people? Not difficult at all        Patient-reported       Suicide Assessment Five-step Evaluation and Treatment (SAFE-T)        Review of Systems  Constitutional, HEENT, cardiovascular, pulmonary, gi and gu systems are negative, except as otherwise noted.      Objective    /80 (BP Location: Right arm, Patient Position: Sitting, Cuff Size: Adult Regular)   Pulse 77   Temp 97.4  F (36.3  C) (Tympanic)   Resp 20   Ht 1.594 m (5' 2.76\")   Wt 75.1 kg (165 lb 9.6 oz)   SpO2 96%   BMI 29.56 kg/m    Body mass index is 29.56 kg/m .  Physical Exam   GENERAL: alert and no distress  EYES: Eyes grossly normal to inspection, PERRL and conjunctivae and sclerae normal  HENT: ear canals and TM's normal, nose and mouth without ulcers or lesions  NECK: no adenopathy, no asymmetry, masses, or scars  RESP: " lungs clear to auscultation - no rales, rhonchi or wheezes  CV: regular rate and rhythm, normal S1 S2, no S3 or S4, no murmur, click or rub, no peripheral edema  ABDOMEN: soft, nontender, no hepatosplenomegaly, no masses and bowel sounds normal  MS: no gross musculoskeletal defects noted, no edema  SKIN: no suspicious lesions or rashes  PSYCH: mentation appears normal, affect normal/bright            Signed Electronically by: Catie Fraser MD

## 2024-11-07 ENCOUNTER — ANCILLARY PROCEDURE (OUTPATIENT)
Dept: CT IMAGING | Facility: CLINIC | Age: 79
End: 2024-11-07
Attending: STUDENT IN AN ORGANIZED HEALTH CARE EDUCATION/TRAINING PROGRAM
Payer: COMMERCIAL

## 2024-11-07 ENCOUNTER — LAB (OUTPATIENT)
Dept: LAB | Facility: CLINIC | Age: 79
End: 2024-11-07
Attending: STUDENT IN AN ORGANIZED HEALTH CARE EDUCATION/TRAINING PROGRAM
Payer: COMMERCIAL

## 2024-11-07 DIAGNOSIS — C34.31 MALIGNANT NEOPLASM OF LOWER LOBE OF RIGHT LUNG (H): ICD-10-CM

## 2024-11-07 DIAGNOSIS — I10 BENIGN ESSENTIAL HYPERTENSION: ICD-10-CM

## 2024-11-07 LAB
ANION GAP SERPL CALCULATED.3IONS-SCNC: 11 MMOL/L (ref 7–15)
BUN SERPL-MCNC: 11 MG/DL (ref 8–23)
CALCIUM SERPL-MCNC: 8.7 MG/DL (ref 8.8–10.4)
CHLORIDE SERPL-SCNC: 101 MMOL/L (ref 98–107)
CREAT BLD-MCNC: 0.7 MG/DL (ref 0.5–1)
CREAT SERPL-MCNC: 0.59 MG/DL (ref 0.51–0.95)
EGFRCR SERPLBLD CKD-EPI 2021: >60 ML/MIN/1.73M2
EGFRCR SERPLBLD CKD-EPI 2021: >90 ML/MIN/1.73M2
GLUCOSE SERPL-MCNC: 99 MG/DL (ref 70–99)
HCO3 SERPL-SCNC: 24 MMOL/L (ref 22–29)
POTASSIUM SERPL-SCNC: 5 MMOL/L (ref 3.4–5.3)
SODIUM SERPL-SCNC: 136 MMOL/L (ref 135–145)

## 2024-11-07 PROCEDURE — 80048 BASIC METABOLIC PNL TOTAL CA: CPT

## 2024-11-07 PROCEDURE — 36591 DRAW BLOOD OFF VENOUS DEVICE: CPT

## 2024-11-07 PROCEDURE — 71260 CT THORAX DX C+: CPT | Performed by: STUDENT IN AN ORGANIZED HEALTH CARE EDUCATION/TRAINING PROGRAM

## 2024-11-07 PROCEDURE — 74177 CT ABD & PELVIS W/CONTRAST: CPT | Performed by: STUDENT IN AN ORGANIZED HEALTH CARE EDUCATION/TRAINING PROGRAM

## 2024-11-07 PROCEDURE — 82947 ASSAY GLUCOSE BLOOD QUANT: CPT

## 2024-11-07 RX ORDER — HEPARIN SODIUM (PORCINE) LOCK FLUSH IV SOLN 100 UNIT/ML 100 UNIT/ML
500 SOLUTION INTRAVENOUS ONCE
Status: ACTIVE | OUTPATIENT
Start: 2024-11-07

## 2024-11-07 RX ORDER — IOPAMIDOL 755 MG/ML
86 INJECTION, SOLUTION INTRAVASCULAR ONCE
Status: COMPLETED | OUTPATIENT
Start: 2024-11-07 | End: 2024-11-07

## 2024-11-07 RX ADMIN — IOPAMIDOL 86 ML: 755 INJECTION, SOLUTION INTRAVASCULAR at 12:52

## 2024-11-07 NOTE — DISCHARGE INSTRUCTIONS

## 2024-11-07 NOTE — NURSING NOTE
Chief Complaint   Patient presents with    Blood Draw     Labs drawn from PIV placed by RN. Line flushed with saline. Vitals taken. Pt checked in for appointment(s).      Labs drawn from PIV placed by RN. Line flushed with saline.  Jessika Yen RN

## 2024-11-10 NOTE — PROGRESS NOTES
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 (AJCC 8th edition) diagnosed 12/2022  PD-L1 <1%  NGS- [facility] panel- Neg (High TMB 16.7 mut/MB)     Left LL adenocarcinoma [id number] Stage (AJCC 8th edition) diagnosed 12/2022  PD-L1-2%  NGS-KRAS G12C, TMB 2.4      Treatment:  1/20/23- 4 cycles of Pembrolizumab+carboplatin+paclitaxel (pembro on hold ucrrently of immune mediated myalgia/arthralgia since 3/2023)   XRT-  Right Lower Lobe      6,000 cGy    in 30 fraction     6/06/2023 7/26/2023          Left Lower Lobe        5,000 cGy   in 10 fraction     6/22/2023 7/06/2023            Intent of treatment: Palliative    Onc HPI:    One month history of increasing dyspnea with palpitations, propting ER visit 11/19.  11/19/22- CT- 1 cm right lower lobe mass abutting multiple central bronchovascular and mediastinal structures, consistent with malignancy.2.2 cm spiculated nodule in the left lower lobe, also consistent with malignancy.   12/6/22- PET/CT-  Right lower lobe lung mass abutting the pleura (SUV max of 24), measuring 8 x 5.2 cm. There is associated complete obliteration of right lower lobe bronchus and loss of fat planes with  the left atrium. Spiculated left lower lobe solid lung mass (SUV 17.9), measuring 2.2 x 2 cm. There are several markedly hypermetabolic, irregular solid nodules in the right middle lobe abutting the pleura (approximately 10-12), as for example 1.2 cm nodule with SUV max of 16.8 (4/126). There is a separate markedly hypermetabolic 1 cm nodule in the right upper lobe. Mild hypermetabolic uptake is noted within a1 x 0.9 cm right paratracheal node ( SUV max of 4.2) (4/120), Mild uptake is also  noted within a subcentimeter lower paraesophageal node (4/154).   12/28/22- EBUS with deblking in the right hilar mass (no mediastinal staging performed)- - LUNG, RIGHT MAINSTEM, ENDOBRONCHIAL BIOPSY:  Squamous cell carcinoma with extensive necrosis (positive for p40 and negative for TTF-1). Left LL FNAC- Positive for malignancy- Adenocarcinoma  (diffusely positive for TTF-1 while negative for p40)  1/3/23: Brain MRI - Neg  1/10/23: Tumor board discussion: Too extensive for concurrent/sequential chemo XRT- plan for palliative intent chemo-IO  1/20/23- C1 Pembrolizumab+carboplatin+paclitaxel  2/10/23-C2 Pembrolizumab+carboplatin+paclitaxel  2/27/23- CT CAP- Decreased size of the hypoattenuating mass in the central right lower lobe and small irregularly-shaped nodules in the right upper lobe consistent with positive treatment response. There is persistent postobstructive right lower lobe atelectasis.Unchanged heterogeneous lobulated and spiculated solid nodule in the lateral basal left lower lobe. No findings to suggest new extrapulmonary metastatic disease.  3/3, 3/24-  Pembrolizumab+carboplatin+paclitaxel      4/10/23- CT CAP- - some Rx response- Mass distal right lower lobe bronchus causing complete post obstructive atelectasis has decreased from 6.5 x 4.5 x 3.5 cm to 5.5 x 3.8 x 2.4 cm today. Spiculated left lower lobe mass has decreased from 2.1 cm to 1.5 cm today. Right upper lobe nodularity continues to decrease. Ill-defined nodularity in the right middle lobe has developed and is most likely postinflammatory.     6/5/23- After discussion at tumor board, Dr. Escoto agreed for definitive radiation treatment (without chemo, as she already had 4 cycles of chemo) for cancer on the right side and possible SBRT on left side both with definitive intent    6/5/23- Started XRT     Right Lower Lobe      6,000 cGy    in 30 fraction     6/06/2023 7/26/2023          Left Lower Lobe        5,000 cGy   in 10 fraction     6/22/2023 7/06/2023 6/16/23- CT Chest-  Unchanged complete atelectasis of the heterogeneous right lower lobe due to central obstructing mass status post treatment. No change in the  posterolateral left lower lobe nodule with surrounding cicatrization. No findings to suggest progressive   malignancy in the chest.    8/25/23- CT Chest- Stable right lower lobe lung mass with distal atelectasis. Lobulated nodule in the left lower lobe is also stable. No disease progression in the chest. No metastatic disease in the abdomen and pelvis.     10/20/23- CT CHest- Heterogeneous mass with consolidation and complete volume loss of the right lower lobe which is located in the azygoesophageal recess has increased in size slightly with axial dimensions superiorly 3.2 x 2.2 cm previously 2.8 x 2.0 cm  (axial image 95 series 3) and axial dimensions inferiorly 4.2 x 2.9 cm previously 4.0 x 2.6 cm (image 110). Spiculated mass posterior basilar left lower lobe stable at 1.7 x 1.2 cm (image 119).    11/3/23- PET/CT-  A consolidative masslike opacity with atelectasis involving the right lower lobe at site of original tumor demonstrates and ill-defined area of soft tissue thickening along its superior margin with moderate FDG uptake. Appearance favors ongoing posttreatment inflammatory change although is indeterminate for early local recurrence. Recommend short term chest CT with IV contrast follow-up in 3 months. Otherwise no evidence of FDG avid malignancy. An irregular nodule in the left lower lobe is not FDG avid suggesting posttreatment scarring. No evidence of metastasis including no FDG avid adenopathy.Some small irregular opacities have developed in the left lung in the short interval since 10/20/2023, likely infectious/inflammatory    2/2/24- CT Chest-  Unchanged posttreatment lobar atelectasis of the right lower lobe. Increased predominantly solid parenchymal bands with architectural distortion in the basal left lower lobe, centered on the inferior posterior medial right upper lobe consistent with posttreatment inflammation and developing cicatrization. There are  no findings to suggest progression of  neoplasm in the chest.New small right pleural effusion not a component of which is organized in the posterior mid chest secondary to #2.     5/3/24- CT Chest-  Stable posttreatment changes in both lower lobes. No definite recurrent disease. No new sites of disease in the chest, abdomen, or pelvis.    8/5/24- CT Chest- Emphysematous change with complete right lower lobe atelectasis and areas of parenchymal density and retraction in the retrohilar posteroinferior right upper lobe and in the lateral left lower lobe which appear similar or smaller today compared with  previous imaging. No new or progressive abnormality is present.    11/7/24- CT Chest-    Interval history:   Yamel is here for follow up.    She is doing well overall. She has noticed slight worsening of SOB on exertion  Able to walk 1-2 miles/day  Has also noticed more pain in the arthritis, in knees and back, is sligthly slower to walk  Fatigue is stable   Weight is stable  Eating and drinking ok  Weight is stable  She saw a psychotherapist for depression, on fluoxetine, requesting pscyiatric referral    ECOG PS 1    Wt Readings from Last 4 Encounters:   11/11/24 73.7 kg (162 lb 8 oz)   11/06/24 75.1 kg (165 lb 9.6 oz)   08/07/24 72.1 kg (159 lb)   05/06/24 67 kg (147 lb 11.2 oz)        Current Outpatient Medications   Medication Sig Dispense Refill    acetaminophen (TYLENOL) 500 MG tablet Take 1,000 mg by mouth every 6 hours as needed for mild pain Taking once a day      albuterol (PROAIR HFA/PROVENTIL HFA/VENTOLIN HFA) 108 (90 Base) MCG/ACT inhaler INHALE 2 PUFFS INTO THE LUNGS EVERY 6 HOURS AS NEEDED FOR SHORTNESS OF BREATH OR DIFFICULTY BREATHING OR WHEEZING 90 g 11    atorvastatin (LIPITOR) 20 MG tablet TAKE 1 TABLET(20 MG) BY MOUTH DAILY 90 tablet 3    celecoxib (CELEBREX) 200 MG capsule TAKE 1 CAPSULE(200 MG) BY MOUTH DAILY 60 capsule 1    Cholecalciferol (VITAMIN D-3 PO) Take 2,000 Units by mouth daily       clonazePAM (KLONOPIN) 0.5 MG tablet  Take 1-2 tablets (0.5-1 mg) by mouth 2 times daily as needed for anxiety. 120 tablet 1    clonazePAM (KLONOPIN) 0.5 MG tablet Take 0.5-1 tablets (0.25-0.5 mg) by mouth 2 times daily as needed for anxiety 30 tablet 3    FLUoxetine (PROZAC) 20 MG capsule Take 2 capsules (40 mg) by mouth daily 180 capsule 3    fluticasone-salmeterol (ADVAIR HFA) 115-21 MCG/ACT inhaler Inhale 2 puffs into the lungs 2 times daily 12 g 11    hydrochlorothiazide (HYDRODIURIL) 25 MG tablet Take 1 tablet (25 mg) by mouth daily. 90 tablet 3    ipratropium - albuterol 0.5 mg/2.5 mg/3 mL (DUONEB) 0.5-2.5 (3) MG/3ML neb solution Take 1 vial (3 mLs) by nebulization every 6 hours as needed for shortness of breath or wheezing 90 mL 0    omeprazole (PRILOSEC) 20 MG DR capsule TAKE 1 CAPSULE(20 MG) BY MOUTH DAILY 30 TO 60 MINUTES BEFORE A MEAL 90 capsule 3    tiZANidine (ZANAFLEX) 2 MG tablet TAKE 1 TABLET(2 MG) BY MOUTH THREE TIMES DAILY AS NEEDED FOR MUSCLE SPASMS 90 tablet 3    traZODone (DESYREL) 50 MG tablet TAKE 2 TABLETS(100 MG) BY MOUTH AT BEDTIME 180 tablet 0          Allergies   Allergen Reactions    Nka [No Known Allergies]     Seasonal Allergies        There were no vitals taken for this visit.  General: Patient appears well in no acute distress.   Skin: No visualized rash or lesions on visualized skin  Eyes: EOMI, no erythema, sclera icterus or discharge noted  Resp: Appears to be breathing comfortably without accessory muscle usage, speaking in full sentences, no cough  MSK: Appears to have normal range of motion based on visualized movements  Neurologic: No apparent tremors, facial movements symmetric  Psych: affect engaged, pleasant, alert and oriented   not currently breastfeeding.  Wt Readings from Last 4 Encounters:   11/06/24 75.1 kg (165 lb 9.6 oz)   08/07/24 72.1 kg (159 lb)   05/06/24 67 kg (147 lb 11.2 oz)   04/16/24 66.3 kg (146 lb 3.2 oz)         Impression/plan:     Ms. Yamel Lindsay is a 77 year old  female with PMHx of  for HTN, HLD, anxiety, who is here for evaluation/followup of new lung cancer     Synchronous Rt LL Squamous cell carcinoma [id number] Stage IIIC (AJCC 8th edition) diagnosed 12/2022  PD-L1 <1%  NGS- [facility] panel- Neg (High TMB 16.7 mut/MB)     Left LL adenocarcinoma [id number] Stage (AJCC 8th edition) diagnosed 12/2022  PD-L1-2%  NGS-KRAS G12C, TMB 2.4     Pt with 2 synchronous cancers at diagnosis..  The more  threatening one was the right lower lobe squamous cell cancer which appears to be at least stage IIIC given the size greater than 7 cm suggestive of T4 disease.  She also has mediastinal lymph node that were enlarged and appear pathologic but were not biopsied.  She underwent an EBUS for debulking and her mediastinum was not been staged.  She is also has several FDG avid nodules in the right middle and the right upper lobe that have not yet been biopsied.  Given the extent of involvement of the right sided cancer, concurrent/sequential chemoradiation not feasible per tumor board discussion, also no further EBUS required since high probability of cancer.   She proceeded with palliative intent chemoimmunotherapy with pembro+carbo+taxol.  Of note, squamous has high TMB and low neg PD-L1.  Adenocarcinoma has KRAS G12C. CT after 4 cycles showing good partial response in the right-sided tumor and some response in the left side adenocarcinoma.  After discussion at tumor board, Dr. Escoto did definitive radiation treatment  on cancer on the right side and SBRT on left side both with definitive intent.  CT on 10/2023 showing slight increase in size of consolidative opacity int he rt lower lobe.We got a PET scan which showed some mild uptake b/l suggesting inflammation and mild FDG uptake in the rt LL but FDG avidity is much lower than before suggesting likley Rx related changes. CT in 11/2024 stable with evolving treatment changes.Mild worsenign of SOB with no explaintion radiographically. ?May be COPD progression, will add  "tiotropium.  Continue monitoring.  Immunotherapy on hold due to Myositis/arthritis with hx of elevated CRP and ESR been attributed to immunotherapy. We will continue to hold it for now until we see signs of progression.        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 Has been attributed to immunotherapy. dopplar negative for DVT, No known bone mets on baseline PET/CT.- Resolved  -CK normal  - CRP and ESR is mildly elevated (significantly lower than the levels when she was on steroids for myositis)  -has had 2 prednisone tapers, the 2nd was a longer taper. Her pain started to increase the week after stopping prednisone  -she was resumed on prednisone 10 mg without improvement and increased to 20 mg.   - was on 10 mg daily, but stopped it for unclear reasons, has noticed recent flare in both the knee joints and left calf muscle  - She resumed 10 mg prednisone 10/2023 but did not notice any difference, now her pain is more in the joints and muscle ache seems to be relievd by muscle relaxant, suggesting this is MSK etiology and less likley to be immunotherapy related.   - -has tramadol, started on gabapentin 300 mg at bedtime per palliative\"  -- stop prednisone, asked he rto get in tpuch with me if symptoms get worse (especially msucle)    # Bronchitis, COPD exacerbation  In April Rx with PO steroids and codeine, has some lingering cough, continue to monitor  Continue Advair  Will add tiotropium today due to mild worsening in SOb        #Psychiatry: Patient has previously had a history of anxiety.now since the diagnosis, on and off panic attacks.and also has depression  PTA on Prozac, and also clonazepam  -Trazodoen for ?insomnia  -cloazepan BID 0.5 mg  -Saw cancer  pyschology , but wantsa psciatrist referral     #COVID vaccine: s/p 3 boosters     #Hypertension hyperlipidemia  ON statin        On the day of service,  Preparation time:  5 minutes  Visit time: "  20 minutes  Care Coordination:  5 minutes        Chaz Martle M.D.   of Medicine  Division of Hematology, Oncology and Transplantation  [facility]

## 2024-11-11 ENCOUNTER — ONCOLOGY VISIT (OUTPATIENT)
Dept: ONCOLOGY | Facility: CLINIC | Age: 79
End: 2024-11-11
Attending: STUDENT IN AN ORGANIZED HEALTH CARE EDUCATION/TRAINING PROGRAM
Payer: COMMERCIAL

## 2024-11-11 VITALS
BODY MASS INDEX: 29.01 KG/M2 | TEMPERATURE: 97.9 F | WEIGHT: 162.5 LBS | HEART RATE: 92 BPM | DIASTOLIC BLOOD PRESSURE: 83 MMHG | OXYGEN SATURATION: 95 % | RESPIRATION RATE: 16 BRPM | SYSTOLIC BLOOD PRESSURE: 156 MMHG

## 2024-11-11 DIAGNOSIS — C34.31 MALIGNANT NEOPLASM OF LOWER LOBE OF RIGHT LUNG (H): ICD-10-CM

## 2024-11-11 DIAGNOSIS — J42 CHRONIC BRONCHITIS, UNSPECIFIED CHRONIC BRONCHITIS TYPE (H): Primary | ICD-10-CM

## 2024-11-11 PROCEDURE — G0463 HOSPITAL OUTPT CLINIC VISIT: HCPCS | Performed by: STUDENT IN AN ORGANIZED HEALTH CARE EDUCATION/TRAINING PROGRAM

## 2024-11-11 RX ORDER — TRAMADOL HYDROCHLORIDE 50 MG/1
50 TABLET ORAL EVERY 6 HOURS PRN
Qty: 30 TABLET | Refills: 0 | Status: SHIPPED | OUTPATIENT
Start: 2024-11-11 | End: 2024-11-19

## 2024-11-11 ASSESSMENT — PAIN SCALES - GENERAL: PAINLEVEL_OUTOF10: NO PAIN (0)

## 2024-11-11 NOTE — LETTER
11/11/2024      Yamel Lindsay  [address] [facility] [id number]      Dear Colleague,    Thank you for referring your patient, Yamel Lindsay, to the [facility]. Please see a copy of my visit note below.    11/11/24    Reason for Visit: f/u lung cancer, joint pain      Diagnosis:   Synchronous Rt LL Squamous cell carcinoma [id number] Stage IIIC (AJCC 8th edition) diagnosed 12/2022  PD-L1 <1%  NGS- [facility] panel- Neg (High TMB 16.7 mut/MB)     Left LL adenocarcinoma [id number] Stage (AJCC 8th edition) diagnosed 12/2022  PD-L1-2%  NGS-KRAS G12C, TMB 2.4      Treatment:  1/20/23- 4 cycles of Pembrolizumab+carboplatin+paclitaxel (pembro on hold ucrrently of immune mediated myalgia/arthralgia since 3/2023)   XRT-  Right Lower Lobe      6,000 cGy    in 30 fraction     6/06/2023 7/26/2023          Left Lower Lobe        5,000 cGy   in 10 fraction     6/22/2023 7/06/2023            Intent of treatment: Palliative    Onc HPI:    One month history of increasing dyspnea with palpitations, propting ER visit 11/19.  11/19/22- CT- 1 cm right lower lobe mass abutting multiple central bronchovascular and mediastinal structures, consistent with malignancy.2.2 cm spiculated nodule in the left lower lobe, also consistent with malignancy.   12/6/22- PET/CT-  Right lower lobe lung mass abutting the pleura (SUV max of 24), measuring 8 x 5.2 cm. There is associated complete obliteration of right lower lobe bronchus and loss of fat planes with  the left atrium. Spiculated left lower lobe solid lung mass (SUV 17.9), measuring 2.2 x 2 cm. There are several markedly hypermetabolic, irregular solid nodules in the right middle lobe abutting the pleura (approximately 10-12), as for example 1.2 cm nodule with SUV max of 16.8 (4/126). There is a separate markedly hypermetabolic 1 cm nodule in the right upper lobe. Mild hypermetabolic uptake is noted within a1 x 0.9 cm right paratracheal node ( SUV max of 4.2)  (4/120), Mild uptake is also  noted within a subcentimeter lower paraesophageal node (4/154).   12/28/22- EBUS with deblking in the right hilar mass (no mediastinal staging performed)- - LUNG, RIGHT MAINSTEM, ENDOBRONCHIAL BIOPSY: Squamous cell carcinoma with extensive necrosis (positive for p40 and negative for TTF-1). Left LL FNAC- Positive for malignancy- Adenocarcinoma  (diffusely positive for TTF-1 while negative for p40)  1/3/23: Brain MRI - Neg  1/10/23: Tumor board discussion: Too extensive for concurrent/sequential chemo XRT- plan for palliative intent chemo-IO  1/20/23- C1 Pembrolizumab+carboplatin+paclitaxel  2/10/23-C2 Pembrolizumab+carboplatin+paclitaxel  2/27/23- CT CAP- Decreased size of the hypoattenuating mass in the central right lower lobe and small irregularly-shaped nodules in the right upper lobe consistent with positive treatment response. There is persistent postobstructive right lower lobe atelectasis.Unchanged heterogeneous lobulated and spiculated solid nodule in the lateral basal left lower lobe. No findings to suggest new extrapulmonary metastatic disease.  3/3, 3/24-  Pembrolizumab+carboplatin+paclitaxel      4/10/23- CT CAP- - some Rx response- Mass distal right lower lobe bronchus causing complete post obstructive atelectasis has decreased from 6.5 x 4.5 x 3.5 cm to 5.5 x 3.8 x 2.4 cm today. Spiculated left lower lobe mass has decreased from 2.1 cm to 1.5 cm today. Right upper lobe nodularity continues to decrease. Ill-defined nodularity in the right middle lobe has developed and is most likely postinflammatory.     6/5/23- After discussion at tumor board, Dr. Escoto agreed for definitive radiation treatment (without chemo, as she already had 4 cycles of chemo) for cancer on the right side and possible SBRT on left side both with definitive intent    6/5/23- Started XRT     Right Lower Lobe      6,000 cGy    in 30 fraction     6/06/2023 7/26/2023          Left Lower Lobe         5,000 cGy   in 10 fraction     6/22/2023 7/06/2023 6/16/23- CT Chest-  Unchanged complete atelectasis of the heterogeneous right lower lobe due to central obstructing mass status post treatment. No change in the posterolateral left lower lobe nodule with surrounding cicatrization. No findings to suggest progressive   malignancy in the chest.    8/25/23- CT Chest- Stable right lower lobe lung mass with distal atelectasis. Lobulated nodule in the left lower lobe is also stable. No disease progression in the chest. No metastatic disease in the abdomen and pelvis.     10/20/23- CT CHest- Heterogeneous mass with consolidation and complete volume loss of the right lower lobe which is located in the azygoesophageal recess has increased in size slightly with axial dimensions superiorly 3.2 x 2.2 cm previously 2.8 x 2.0 cm  (axial image 95 series 3) and axial dimensions inferiorly 4.2 x 2.9 cm previously 4.0 x 2.6 cm (image 110). Spiculated mass posterior basilar left lower lobe stable at 1.7 x 1.2 cm (image 119).    11/3/23- PET/CT-  A consolidative masslike opacity with atelectasis involving the right lower lobe at site of original tumor demonstrates and ill-defined area of soft tissue thickening along its superior margin with moderate FDG uptake. Appearance favors ongoing posttreatment inflammatory change although is indeterminate for early local recurrence. Recommend short term chest CT with IV contrast follow-up in 3 months. Otherwise no evidence of FDG avid malignancy. An irregular nodule in the left lower lobe is not FDG avid suggesting posttreatment scarring. No evidence of metastasis including no FDG avid adenopathy.Some small irregular opacities have developed in the left lung in the short interval since 10/20/2023, likely infectious/inflammatory    2/2/24- CT Chest-  Unchanged posttreatment lobar atelectasis of the right lower lobe. Increased predominantly solid parenchymal bands with  architectural distortion in the basal left lower lobe, centered on the inferior posterior medial right upper lobe consistent with posttreatment inflammation and developing cicatrization. There are  no findings to suggest progression of neoplasm in the chest.New small right pleural effusion not a component of which is organized in the posterior mid chest secondary to #2.     5/3/24- CT Chest-  Stable posttreatment changes in both lower lobes. No definite recurrent disease. No new sites of disease in the chest, abdomen, or pelvis.    8/5/24- CT Chest- Emphysematous change with complete right lower lobe atelectasis and areas of parenchymal density and retraction in the retrohilar posteroinferior right upper lobe and in the lateral left lower lobe which appear similar or smaller today compared with  previous imaging. No new or progressive abnormality is present.    11/7/24- CT Chest-    Interval history:   Yamel is here for follow up.    She is doing well overall. She has noticed slight worsening of SOB on exertion  Able to walk 1-2 miles/day  Has also noticed more pain in the arthritis, in knees and back, is sligthly slower to walk  Fatigue is stable   Weight is stable  Eating and drinking ok  Weight is stable  She saw a psychotherapist for depression, on fluoxetine, requesting pscyiatric referral    ECOG PS 1    Wt Readings from Last 4 Encounters:   11/11/24 73.7 kg (162 lb 8 oz)   11/06/24 75.1 kg (165 lb 9.6 oz)   08/07/24 72.1 kg (159 lb)   05/06/24 67 kg (147 lb 11.2 oz)        Current Outpatient Medications   Medication Sig Dispense Refill     acetaminophen (TYLENOL) 500 MG tablet Take 1,000 mg by mouth every 6 hours as needed for mild pain Taking once a day       albuterol (PROAIR HFA/PROVENTIL HFA/VENTOLIN HFA) 108 (90 Base) MCG/ACT inhaler INHALE 2 PUFFS INTO THE LUNGS EVERY 6 HOURS AS NEEDED FOR SHORTNESS OF BREATH OR DIFFICULTY BREATHING OR WHEEZING 90 g 11     atorvastatin (LIPITOR) 20 MG tablet TAKE 1  TABLET(20 MG) BY MOUTH DAILY 90 tablet 3     celecoxib (CELEBREX) 200 MG capsule TAKE 1 CAPSULE(200 MG) BY MOUTH DAILY 60 capsule 1     Cholecalciferol (VITAMIN D-3 PO) Take 2,000 Units by mouth daily        clonazePAM (KLONOPIN) 0.5 MG tablet Take 1-2 tablets (0.5-1 mg) by mouth 2 times daily as needed for anxiety. 120 tablet 1     clonazePAM (KLONOPIN) 0.5 MG tablet Take 0.5-1 tablets (0.25-0.5 mg) by mouth 2 times daily as needed for anxiety 30 tablet 3     FLUoxetine (PROZAC) 20 MG capsule Take 2 capsules (40 mg) by mouth daily 180 capsule 3     fluticasone-salmeterol (ADVAIR HFA) 115-21 MCG/ACT inhaler Inhale 2 puffs into the lungs 2 times daily 12 g 11     hydrochlorothiazide (HYDRODIURIL) 25 MG tablet Take 1 tablet (25 mg) by mouth daily. 90 tablet 3     ipratropium - albuterol 0.5 mg/2.5 mg/3 mL (DUONEB) 0.5-2.5 (3) MG/3ML neb solution Take 1 vial (3 mLs) by nebulization every 6 hours as needed for shortness of breath or wheezing 90 mL 0     omeprazole (PRILOSEC) 20 MG DR capsule TAKE 1 CAPSULE(20 MG) BY MOUTH DAILY 30 TO 60 MINUTES BEFORE A MEAL 90 capsule 3     tiZANidine (ZANAFLEX) 2 MG tablet TAKE 1 TABLET(2 MG) BY MOUTH THREE TIMES DAILY AS NEEDED FOR MUSCLE SPASMS 90 tablet 3     traZODone (DESYREL) 50 MG tablet TAKE 2 TABLETS(100 MG) BY MOUTH AT BEDTIME 180 tablet 0          Allergies   Allergen Reactions     Nka [No Known Allergies]      Seasonal Allergies        There were no vitals taken for this visit.  General: Patient appears well in no acute distress.   Skin: No visualized rash or lesions on visualized skin  Eyes: EOMI, no erythema, sclera icterus or discharge noted  Resp: Appears to be breathing comfortably without accessory muscle usage, speaking in full sentences, no cough  MSK: Appears to have normal range of motion based on visualized movements  Neurologic: No apparent tremors, facial movements symmetric  Psych: affect engaged, pleasant, alert and oriented   not currently  breastfeeding.  Wt Readings from Last 4 Encounters:   11/06/24 75.1 kg (165 lb 9.6 oz)   08/07/24 72.1 kg (159 lb)   05/06/24 67 kg (147 lb 11.2 oz)   04/16/24 66.3 kg (146 lb 3.2 oz)         Impression/plan:     Ms. Yamel Lindsay is a 77 year old  female with PMHx of for HTN, HLD, anxiety, who is here for evaluation/followup of new lung cancer     Synchronous Rt LL Squamous cell carcinoma [id number] Stage IIIC (AJCC 8th edition) diagnosed 12/2022  PD-L1 <1%  NGS- [facility] panel- Neg (High TMB 16.7 mut/MB)     Left LL adenocarcinoma [id number] Stage (AJCC 8th edition) diagnosed 12/2022  PD-L1-2%  NGS-KRAS G12C, TMB 2.4     Pt with 2 synchronous cancers at diagnosis..  The more  threatening one was the right lower lobe squamous cell cancer which appears to be at least stage IIIC given the size greater than 7 cm suggestive of T4 disease.  She also has mediastinal lymph node that were enlarged and appear pathologic but were not biopsied.  She underwent an EBUS for debulking and her mediastinum was not been staged.  She is also has several FDG avid nodules in the right middle and the right upper lobe that have not yet been biopsied.  Given the extent of involvement of the right sided cancer, concurrent/sequential chemoradiation not feasible per tumor board discussion, also no further EBUS required since high probability of cancer.   She proceeded with palliative intent chemoimmunotherapy with pembro+carbo+taxol.  Of note, squamous has high TMB and low neg PD-L1.  Adenocarcinoma has KRAS G12C. CT after 4 cycles showing good partial response in the right-sided tumor and some response in the left side adenocarcinoma.  After discussion at tumor board, Dr. Escoto did definitive radiation treatment  on cancer on the right side and SBRT on left side both with definitive intent.  CT on 10/2023 showing slight increase in size of consolidative opacity int he rt lower lobe.We got a PET scan which showed some mild uptake b/l suggesting  "inflammation and mild FDG uptake in the rt LL but FDG avidity is much lower than before suggesting likley Rx related changes. CT in 11/2024 stable with evolving treatment changes.Mild worsenign of SOB with no explaintion radiographically. ?May be COPD progression, will add tiotropium.  Continue monitoring.  Immunotherapy on hold due to Myositis/arthritis with hx of elevated CRP and ESR been attributed to immunotherapy. We will continue to hold it for now until we see signs of progression.        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 Has been attributed to immunotherapy. dopplar negative for DVT, No known bone mets on baseline PET/CT.- Resolved  -CK normal  - CRP and ESR is mildly elevated (significantly lower than the levels when she was on steroids for myositis)  -has had 2 prednisone tapers, the 2nd was a longer taper. Her pain started to increase the week after stopping prednisone  -she was resumed on prednisone 10 mg without improvement and increased to 20 mg.   - was on 10 mg daily, but stopped it for unclear reasons, has noticed recent flare in both the knee joints and left calf muscle  - She resumed 10 mg prednisone 10/2023 but did not notice any difference, now her pain is more in the joints and muscle ache seems to be relievd by muscle relaxant, suggesting this is MSK etiology and less likley to be immunotherapy related.   - -has tramadol, started on gabapentin 300 mg at bedtime per palliative\"  -- stop prednisone, asked he rto get in tpuch with me if symptoms get worse (especially msucle)    # Bronchitis, COPD exacerbation  In April Rx with PO steroids and codeine, has some lingering cough, continue to monitor  Continue Advair  Will add tiotropium today due to mild worsening in SOb        #Psychiatry: Patient has previously had a history of anxiety.now since the diagnosis, on and off panic attacks.and also has depression  PTA on Prozac, and " also clonazepam  -Trazodoen for ?insomnia  -cloazepan BID 0.5 mg  -Saw cancer  pyschology , but wantsa psciatrist referral     #COVID vaccine: s/p 3 boosters     #Hypertension hyperlipidemia  ON statin        On the day of service,  Preparation time:  5 minutes  Visit time:  20 minutes  Care Coordination:  5 minutes        Chaz Martel M.D.   of Medicine  Division of Hematology, Oncology and Transplantation  [facility]      Again, thank you for allowing me to participate in the care of your patient.        Sincerely,        Chaz Martel MD

## 2024-11-11 NOTE — NURSING NOTE
"Oncology Rooming Note    November 11, 2024 12:20 PM   Yamel Lindsay is a 79 year old female who presents for:    Chief Complaint   Patient presents with    Oncology Clinic Visit     Malignant neoplasm of lower lobe of right lung      Initial Vitals: BP (!) 156/83 (BP Location: Right arm, Patient Position: Sitting, Cuff Size: Adult Regular)   Pulse 92   Temp 97.9  F (36.6  C) (Oral)   Resp 16   Wt 73.7 kg (162 lb 8 oz)   SpO2 95%   BMI 29.01 kg/m   Estimated body mass index is 29.01 kg/m  as calculated from the following:    Height as of 11/6/24: 1.594 m (5' 2.76\").    Weight as of this encounter: 73.7 kg (162 lb 8 oz). Body surface area is 1.81 meters squared.  No Pain (0) Comment: Data Unavailable   No LMP recorded. Patient is postmenopausal.  Allergies reviewed: Yes  Medications reviewed: Yes    Medications: MEDICATION REFILLS NEEDED TODAY. Provider was notified.  Pharmacy name entered into [employer]: [id number] - [place], [place] - [address] [place] [address] E AT [place] [address] & [facility]    Frailty Screening:   Is the patient here for a new oncology consult visit in cancer care? 2. No      Clinical concerns: Pt needs refill on Klonopin. Pt reports no new concerns.       Debora Valdez, EMT     "

## 2024-11-25 DIAGNOSIS — M62.838 MUSCLE SPASM: ICD-10-CM

## 2024-11-25 DIAGNOSIS — M17.0 OSTEOARTHRITIS OF BOTH KNEES, UNSPECIFIED OSTEOARTHRITIS TYPE: ICD-10-CM

## 2024-11-25 DIAGNOSIS — G89.4 CHRONIC PAIN SYNDROME: ICD-10-CM

## 2024-11-25 RX ORDER — TIZANIDINE 2 MG/1
2 TABLET ORAL 3 TIMES DAILY PRN
Qty: 90 TABLET | Refills: 3 | Status: SHIPPED | OUTPATIENT
Start: 2024-11-25

## 2024-11-25 RX ORDER — TRAMADOL HYDROCHLORIDE 50 MG/1
50 TABLET ORAL EVERY 6 HOURS PRN
Qty: 120 TABLET | OUTPATIENT
Start: 2024-11-25

## 2024-11-25 NOTE — TELEPHONE ENCOUNTER
Pt called back.  She says the pharmacy must have sent this by mistake.    Danay Damon on 11/25/2024 at 4:35 PM

## 2024-11-27 DIAGNOSIS — C34.31 MALIGNANT NEOPLASM OF LOWER LOBE OF RIGHT LUNG (H): ICD-10-CM

## 2024-11-27 DIAGNOSIS — F41.9 ANXIETY: ICD-10-CM

## 2024-11-27 RX ORDER — CLONAZEPAM 0.5 MG/1
.5-1 TABLET ORAL 2 TIMES DAILY PRN
Qty: 120 TABLET | Refills: 1 | Status: SHIPPED | OUTPATIENT
Start: 2024-11-27

## 2024-11-27 RX ORDER — TRAMADOL HYDROCHLORIDE 50 MG/1
50 TABLET ORAL EVERY 6 HOURS PRN
Qty: 30 TABLET | Refills: 0 | Status: SHIPPED | OUTPATIENT
Start: 2024-11-27

## 2024-12-10 DIAGNOSIS — C34.31 MALIGNANT NEOPLASM OF LOWER LOBE OF RIGHT LUNG (H): ICD-10-CM

## 2024-12-10 RX ORDER — TRAMADOL HYDROCHLORIDE 50 MG/1
50 TABLET ORAL EVERY 6 HOURS PRN
Qty: 30 TABLET | Refills: 0 | Status: SHIPPED | OUTPATIENT
Start: 2024-12-10

## 2024-12-10 NOTE — TELEPHONE ENCOUNTER
Narcotic Refill Request    Medication(s) requested:  tramadol   Person Requesting Refill: Yamel   What pain is the medication treating: arthritic pain and in chest   How is the medication being taken?:2-3 tabs per day   Does pt have enough for today? All out of medication   Is pain being adequately controlled on the current regimen?: yes   Experiencing any side effects from medication?: none     Date of most recent appointment:  8/7/24 DR Martel   Any No Show Visits:No   Next appointment:   not yet scheduled   Last fill date and by whom:  11/27/24 Dr Martel    Reviewed: No access     Send to provider: DR Quinones as DR Martel is out of the office and to Matheus Barragan

## 2024-12-16 ENCOUNTER — OFFICE VISIT (OUTPATIENT)
Dept: FAMILY MEDICINE | Facility: CLINIC | Age: 79
End: 2024-12-16
Payer: COMMERCIAL

## 2024-12-16 ENCOUNTER — TELEPHONE (OUTPATIENT)
Dept: DERMATOLOGY | Facility: CLINIC | Age: 79
End: 2024-12-16

## 2024-12-16 VITALS
RESPIRATION RATE: 16 BRPM | DIASTOLIC BLOOD PRESSURE: 66 MMHG | WEIGHT: 162.6 LBS | HEIGHT: 63 IN | TEMPERATURE: 98.8 F | OXYGEN SATURATION: 95 % | HEART RATE: 97 BPM | BODY MASS INDEX: 28.81 KG/M2 | SYSTOLIC BLOOD PRESSURE: 134 MMHG

## 2024-12-16 DIAGNOSIS — L98.9 SKIN LESION: Primary | ICD-10-CM

## 2024-12-16 DIAGNOSIS — H91.90 HEARING REDUCED, UNSPECIFIED LATERALITY: ICD-10-CM

## 2024-12-16 DIAGNOSIS — M17.0 OSTEOARTHRITIS OF BOTH KNEES, UNSPECIFIED OSTEOARTHRITIS TYPE: ICD-10-CM

## 2024-12-16 PROCEDURE — 99213 OFFICE O/P EST LOW 20 MIN: CPT

## 2024-12-16 RX ORDER — CELECOXIB 200 MG/1
200 CAPSULE ORAL DAILY
Qty: 60 CAPSULE | Refills: 1 | Status: CANCELLED | OUTPATIENT
Start: 2024-12-16

## 2024-12-16 RX ORDER — CEPHALEXIN 500 MG/1
500 CAPSULE ORAL 3 TIMES DAILY
Qty: 21 CAPSULE | Refills: 0 | Status: SHIPPED | OUTPATIENT
Start: 2024-12-16 | End: 2024-12-23

## 2024-12-16 ASSESSMENT — ANXIETY QUESTIONNAIRES
GAD7 TOTAL SCORE: 5
5. BEING SO RESTLESS THAT IT IS HARD TO SIT STILL: NOT AT ALL
2. NOT BEING ABLE TO STOP OR CONTROL WORRYING: SEVERAL DAYS
1. FEELING NERVOUS, ANXIOUS, OR ON EDGE: SEVERAL DAYS
IF YOU CHECKED OFF ANY PROBLEMS ON THIS QUESTIONNAIRE, HOW DIFFICULT HAVE THESE PROBLEMS MADE IT FOR YOU TO DO YOUR WORK, TAKE CARE OF THINGS AT HOME, OR GET ALONG WITH OTHER PEOPLE: SOMEWHAT DIFFICULT
3. WORRYING TOO MUCH ABOUT DIFFERENT THINGS: SEVERAL DAYS
7. FEELING AFRAID AS IF SOMETHING AWFUL MIGHT HAPPEN: NOT AT ALL
GAD7 TOTAL SCORE: 5
6. BECOMING EASILY ANNOYED OR IRRITABLE: SEVERAL DAYS
7. FEELING AFRAID AS IF SOMETHING AWFUL MIGHT HAPPEN: NOT AT ALL
4. TROUBLE RELAXING: SEVERAL DAYS
8. IF YOU CHECKED OFF ANY PROBLEMS, HOW DIFFICULT HAVE THESE MADE IT FOR YOU TO DO YOUR WORK, TAKE CARE OF THINGS AT HOME, OR GET ALONG WITH OTHER PEOPLE?: SOMEWHAT DIFFICULT
GAD7 TOTAL SCORE: 5

## 2024-12-16 ASSESSMENT — PATIENT HEALTH QUESTIONNAIRE - PHQ9
SUM OF ALL RESPONSES TO PHQ QUESTIONS 1-9: 3
10. IF YOU CHECKED OFF ANY PROBLEMS, HOW DIFFICULT HAVE THESE PROBLEMS MADE IT FOR YOU TO DO YOUR WORK, TAKE CARE OF THINGS AT HOME, OR GET ALONG WITH OTHER PEOPLE: SOMEWHAT DIFFICULT
SUM OF ALL RESPONSES TO PHQ QUESTIONS 1-9: 3

## 2024-12-16 ASSESSMENT — PAIN SCALES - GENERAL: PAINLEVEL_OUTOF10: SEVERE PAIN (7)

## 2024-12-16 NOTE — PROGRESS NOTES
"  Assessment & Plan     Skin lesion  Concerning appearance with sahil edges and somewhat punctate center. Given erythema and tenderness, will prescribe keflex. Urgent referral for dermatological assessment.  - Adult Dermatology  Referral; Future  - cephALEXin (KEFLEX) 500 MG capsule; Take 1 capsule (500 mg) by mouth 3 times daily for 7 days.    Hearing reduced, unspecified laterality  Without cerumen impaction. Notes progressive hearing loss, recommend evaluation by audiology.  - Adult Audiology  Referral; Future    Osteoarthritis of both knees, unspecified osteoarthritis type  Review of notes reveals instructions to follow up with sports medicine. Recommend patient reach out to ordering provider, Dr. Gan for further management of celebrex.       BMI  Estimated body mass index is 29.27 kg/m  as calculated from the following:    Height as of this encounter: 1.588 m (5' 2.5\").    Weight as of this encounter: 73.8 kg (162 lb 9.6 oz).       Thu Montesinos is a 79 year old, presenting for the following health issues:  Derm Problem (Sore on thigh; ) and Ear Problem (Children tell her that she needs hearing aids)        12/16/2024    11:13 AM   Additional Questions   Roomed by Anu     History of Present Illness       Reason for visit:  Sore on my thigh   She is taking medications regularly.     Chief Complaint   Patient presents with    Derm Problem     Sore on thigh;     Ear Problem     Children tell her that she needs hearing aids     Skin Lesion  Onset/Duration: 3 weeks-1 month  Description  Location: left thigh  Color: pink  Border description: evenly pigmented, raised, red, scaly, inflamed  Character: round, raised, painful, red  Itching: no  Bleeding:  No  Intensity:  moderate  Progression of Symptoms:  same and constant  Accompanying signs and symptoms:   Bleeding: No  Scaling: YES  Excessive sun exposure/tanning: No  Sunscreen used: No  History:           Any previous history of skin " "cancer: No  Any family history of melanoma: No  Previous episodes of similar lesion: No  Precipitating or alleviating factors: warm pack helps  Therapies tried and outcome: none    Never had anything like this. Exquisite tenderness. Uncomfortable when clothing brushes over it. Warm compress is most soothing, has tried nothing else. Not draining.     Osteoarthritis - requests refill on celebrex, has not been filled since April, appears to have been a limited quantity    Review of Systems  CONSTITUTIONAL: NEGATIVE for fever, chills, change in weight      Objective    /66   Pulse 97   Temp 98.8  F (37.1  C) (Tympanic)   Resp 16   Ht 1.588 m (5' 2.5\")   Wt 73.8 kg (162 lb 9.6 oz)   SpO2 95%   BMI 29.27 kg/m    Body mass index is 29.27 kg/m .  Physical Exam   GENERAL: alert and no distress  HENT: ear canals and TM's normal, nose and mouth without ulcers or lesions  RESP: lungs clear to auscultation - no rales, rhonchi or wheezes  SKIN: Lesion present on left anterior thigh, measures 2 cm x 1.25 cm, firm, nodular, minor erythema present surrounding      Signed Electronically by: NAYA Glasgow CNP    "

## 2024-12-16 NOTE — TELEPHONE ENCOUNTER
This encounter is being sent to inform the clinic that this patient has a referral from Avani Hamilton APRN CNP   for the diagnoses of Skin lesion [L98.9], and has requested that this patient be seen within 3-5 days and/or with Any.  Based on the availability of our provider(s), we are unable to accommodate this request.    Were all sites offered this patient?  Yes    Does scheduling algorithm request to schedule next available?  Patient has been scheduled for the first available opening with Fariba on 7/22.  We have informed the patient that the clinic will review their referral and reach out if a sooner appointment is medically necessary.

## 2024-12-17 DIAGNOSIS — C34.31 MALIGNANT NEOPLASM OF LOWER LOBE OF RIGHT LUNG (H): ICD-10-CM

## 2024-12-17 NOTE — TELEPHONE ENCOUNTER
"      Reviewed referring provider's note from 12/16/24:    \"Skin lesion  Concerning appearance with sahil edges and somewhat punctate center. Given erythema and tenderness, will prescribe keflex. Urgent referral for dermatological assessment.  - Adult Dermatology  Referral; Future  - cephALEXin (KEFLEX) 500 MG capsule; Take 1 capsule (500 mg) by mouth 3 times daily for 7 days..    Skin Lesion  Onset/Duration: 3 weeks-1 month  Description  Location: left thigh  Color: pink  Border description: evenly pigmented, raised, red, scaly, inflamed  Character: round, raised, painful, red  Itching: no  Bleeding:  No  Intensity:  moderate  Progression of Symptoms:  same and constant  Accompanying signs and symptoms:   Bleeding: No  Scaling: YES  Excessive sun exposure/tanning: No  Sunscreen used: No  History:           Any previous history of skin cancer: No  Any family history of melanoma: No  Previous episodes of similar lesion: No  Precipitating or alleviating factors: warm pack helps  Therapies tried and outcome: none     Never had anything like this. Exquisite tenderness. Uncomfortable when clothing brushes over it. Warm compress is most soothing, has tried nothing else. Not draining.\"                 "

## 2024-12-17 NOTE — TELEPHONE ENCOUNTER
Patient Contact    Attempt # 1    Was call answered?  No    Left message for patient to call back.    Ok to offer spot check only appointment with Dr. Kee (1/22/25 @ 11 for left thigh lesion.)    KRISTEN Wen  [facility]   [phone]

## 2024-12-17 NOTE — TELEPHONE ENCOUNTER
Patient called back. Per patient access , patient accepts the spot check only appointment that was offered in note below. Scheduled.    Aimee Zamarripa RN on 12/17/2024 at 8:47 AM

## 2024-12-18 ENCOUNTER — PATIENT OUTREACH (OUTPATIENT)
Dept: ONCOLOGY | Facility: CLINIC | Age: 79
End: 2024-12-18
Payer: COMMERCIAL

## 2024-12-18 RX ORDER — TRAMADOL HYDROCHLORIDE 50 MG/1
50 TABLET ORAL EVERY 6 HOURS PRN
Qty: 30 TABLET | Refills: 0 | Status: SHIPPED | OUTPATIENT
Start: 2024-12-18

## 2024-12-18 NOTE — TELEPHONE ENCOUNTER
Narcotic Refill Request    Medication(s) requested:  Tramadol   Person Requesting Refill:dedra   What pain is the medication treating: chest and arthritis pain   How is the medication being taken?:takes tramadol takes 2-3 tabs per day   Does pt have enough for today? No she is all out took last one this morning   Is pain being adequately controlled on the current regimen?: yes   Experiencing any side effects from medication?:  none     Date of most recent appointment:  11/11/24 DR Martel   Any No Show Visits:No   Next appointment:   2/19/25 DR Carbajal   Last fill date and by whom:  12/10/25 Dr Quinones    Reviewed: No access     Send to provider: Dr Martel and Matheus Barragan

## 2024-12-29 ENCOUNTER — HEALTH MAINTENANCE LETTER (OUTPATIENT)
Age: 79
End: 2024-12-29

## 2025-01-02 DIAGNOSIS — C34.31 MALIGNANT NEOPLASM OF LOWER LOBE OF RIGHT LUNG (H): ICD-10-CM

## 2025-01-02 RX ORDER — TRAMADOL HYDROCHLORIDE 50 MG/1
50 TABLET ORAL EVERY 6 HOURS PRN
Qty: 30 TABLET | Refills: 0 | Status: SHIPPED | OUTPATIENT
Start: 2025-01-02

## 2025-01-02 NOTE — TELEPHONE ENCOUNTER
Tramadol refill     Narcotic Refill Request    Medication(s) requested:  tramadol   Person Requesting Refill:Yamel   What pain is the medication treating: in chest and arthritic pain   How is the medication being taken?:takes about 3 a day   Does pt have enough for today? All out of medication   Is pain being adequately controlled on the current regimen?: yes   Experiencing any side effects from medication?: None     Date of most recent appointment:  11/11/24 Dr Martel   Any No Show Visits:No   Next appointment:   2/19/25 DR Carbajal   Last fill date and by whom:  12/18/24 DR Martel    Reviewed: No Access     Send to provider: Dr Carbajal and Gaviota estevez

## 2025-01-22 ENCOUNTER — OFFICE VISIT (OUTPATIENT)
Dept: DERMATOLOGY | Facility: CLINIC | Age: 80
End: 2025-01-22
Payer: COMMERCIAL

## 2025-01-22 DIAGNOSIS — L82.1 SEBORRHEIC KERATOSES: ICD-10-CM

## 2025-01-22 DIAGNOSIS — D18.01 ANGIOMA OF SKIN: ICD-10-CM

## 2025-01-22 DIAGNOSIS — D23.9 DERMAL NEVUS: Primary | ICD-10-CM

## 2025-01-22 DIAGNOSIS — L81.4 LENTIGO: ICD-10-CM

## 2025-01-22 DIAGNOSIS — C44.729 SQUAMOUS CELL CARCINOMA OF LEFT THIGH: ICD-10-CM

## 2025-01-22 PROCEDURE — 11102 TANGNTL BX SKIN SINGLE LES: CPT | Performed by: DERMATOLOGY

## 2025-01-22 PROCEDURE — 88331 PATH CONSLTJ SURG 1 BLK 1SPC: CPT | Performed by: DERMATOLOGY

## 2025-01-22 PROCEDURE — 99203 OFFICE O/P NEW LOW 30 MIN: CPT | Mod: 25 | Performed by: DERMATOLOGY

## 2025-01-22 NOTE — PROGRESS NOTES
Yamel Lindsay is an extremely pleasant 79 year old year old female patient here today for growth on left thigh.  Patient has no other skin complaints today.  Remainder of the HPI, Meds, PMH, Allergies, FH, and SH was reviewed in chart.      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    Nicotine dependence     Postmenopausal HRT (hormone replacement therapy) 1994    S/P alcohol detoxification 07/06    Recovered and active in AA       Past Surgical History:   Procedure Laterality Date    BRONCHOSCOPY, WITH BIOPSY, ROBOT ASSISTED N/A 12/28/2022    Procedure: BRONCHOSCOPY, USING ION OPTICAL TRACKING SYSTEM, FINE NEEDLE ASPIRATION;  Surgeon: Alma Almaguer MD;  Location: [facility]    ENDOBRONCHIAL ULTRASOUND FLEXIBLE N/A 12/28/2022    Procedure: endobronchial biopsy and tumor debulking with cryoprobe;  Surgeon: Alma Almaguer MD;  Location:  OR    ESOPHAGOSCOPY, GASTROSCOPY, DUODENOSCOPY (EGD), COMBINED N/A 1/5/2015    Procedure: COMBINED ESOPHAGOSCOPY, GASTROSCOPY, DUODENOSCOPY (EGD), BIOPSY SINGLE OR MULTIPLE;  Surgeon: Dylan Alejandra MD;  Location: [facility]    ESOPHAGOSCOPY, GASTROSCOPY, DUODENOSCOPY (EGD), COMBINED N/A 5/25/2018    Procedure: COMBINED ESOPHAGOSCOPY, GASTROSCOPY, DUODENOSCOPY (EGD), BIOPSY SINGLE OR MULTIPLE;  gastroscopy;  Surgeon: Alexander Bautista MD;  Location: [facility]    INSERT PORT VASCULAR ACCESS Left 2/6/2023    Procedure: INSERTION, VASCULAR ACCESS PORT left;  Surgeon: Scot Valadez DO;  Location: [facility]    LAPAROSCOPIC HERNIORRHAPHY INGUINAL Right 1/26/2015    Procedure: LAPAROSCOPIC HERNIORRHAPHY INGUINAL;  Surgeon: Favio Olsen MD;  Location: [facility]    TUBAL LIGATION          Family History   Problem Relation Age of Onset    Cancer Mother     C.A.D. Father     Lipids Father     Hypertension Father     Hypertension Brother     Cancer Brother         esophageal  cancer    Breast Cancer Other     Prostate Cancer Brother     Psychotic Disorder Daughter     Diabetes No family hx of     Cerebrovascular Disease No family hx of     Cancer - colorectal No family hx of        Social History     Socioeconomic History    Marital status: Single     Spouse name: Not on file    Number of children: Not on file    Years of education: Not on file    Highest education level: Not on file   Occupational History    Not on file   Tobacco Use    Smoking status: Former     Current packs/day: 0.00     Types: Cigarettes     Quit date: 2011     Years since quittin.6     Passive exposure: Past    Smokeless tobacco: Never   Vaping Use    Vaping status: Never Used   Substance and Sexual Activity    Alcohol use: No    Drug use: No    Sexual activity: Not Currently   Other Topics Concern    Parent/sibling w/ CABG, MI or angioplasty before 65F 55M? No   Social History Narrative    Not on file     Social Drivers of Health     Financial Resource Strain: Not on file   Food Insecurity: Not on file   Transportation Needs: Not on file   Physical Activity: Not on file   Stress: Not on file   Social Connections: Not on file   Interpersonal Safety: Low Risk  (2024)    Interpersonal Safety     Do you feel physically and emotionally safe where you currently live?: Yes     Within the past 12 months, have you been hit, slapped, kicked or otherwise physically hurt by someone?: No     Within the past 12 months, have you been humiliated or emotionally abused in other ways by your partner or ex-partner?: No   Housing Stability: Not on file       Outpatient Encounter Medications as of 2025   Medication Sig Dispense Refill    acetaminophen (TYLENOL) 500 MG tablet Take 1,000 mg by mouth every 6 hours as needed for mild pain Taking once a day      albuterol (PROAIR HFA/PROVENTIL HFA/VENTOLIN HFA) 108 (90 Base) MCG/ACT inhaler INHALE 2 PUFFS INTO THE LUNGS EVERY 6 HOURS AS NEEDED FOR SHORTNESS OF BREATH OR  DIFFICULTY BREATHING OR WHEEZING 90 g 11    atorvastatin (LIPITOR) 20 MG tablet TAKE 1 TABLET(20 MG) BY MOUTH DAILY 90 tablet 3    celecoxib (CELEBREX) 200 MG capsule TAKE 1 CAPSULE(200 MG) BY MOUTH DAILY 60 capsule 1    Cholecalciferol (VITAMIN D-3 PO) Take 2,000 Units by mouth daily       clonazePAM (KLONOPIN) 0.5 MG tablet Take 1-2 tablets (0.5-1 mg) by mouth 2 times daily as needed for anxiety. 120 tablet 1    clonazePAM (KLONOPIN) 0.5 MG tablet Take 0.5-1 tablets (0.25-0.5 mg) by mouth 2 times daily as needed for anxiety 30 tablet 3    FLUoxetine (PROZAC) 20 MG capsule Take 2 capsules (40 mg) by mouth daily 180 capsule 3    fluticasone-salmeterol (ADVAIR HFA) 115-21 MCG/ACT inhaler Inhale 2 puffs into the lungs 2 times daily 12 g 11    hydrochlorothiazide (HYDRODIURIL) 25 MG tablet Take 1 tablet (25 mg) by mouth daily. 90 tablet 3    ipratropium - albuterol 0.5 mg/2.5 mg/3 mL (DUONEB) 0.5-2.5 (3) MG/3ML neb solution Take 1 vial (3 mLs) by nebulization every 6 hours as needed for shortness of breath or wheezing 90 mL 0    omeprazole (PRILOSEC) 20 MG DR capsule TAKE 1 CAPSULE(20 MG) BY MOUTH DAILY 30 TO 60 MINUTES BEFORE A MEAL 90 capsule 3    tiotropium (SPIRIVA RESPIMAT) 2.5 MCG/ACT inhaler Inhale 2 puffs into the lungs daily. 4 g 2    tiZANidine (ZANAFLEX) 2 MG tablet TAKE 1 TABLET(2 MG) BY MOUTH THREE TIMES DAILY AS NEEDED FOR MUSCLE SPASMS 90 tablet 3    traMADol (ULTRAM) 50 MG tablet Take 1 tablet (50 mg) by mouth every 6 hours as needed for severe pain. 30 tablet 0    traZODone (DESYREL) 50 MG tablet TAKE 2 TABLETS(100 MG) BY MOUTH AT BEDTIME 180 tablet 0     Facility-Administered Encounter Medications as of 1/22/2025   Medication Dose Route Frequency Provider Last Rate Last Admin    2 mL ropivacaine (NAROPIN) injection 5 mg/mL  2 mL   Terry Aviles DO   2 mL at 11/08/23 1407    4 mL ropivacaine (NAROPIN) injection 5 mg/mL  4 mL   Terry Aviles DO   4 mL at 11/17/23 1340    betamethasone acet & sod  phos (CELESTONE) injection 6 mg  6 mg   Terry Aviles, DO   6 mg at 11/17/23 1340    betamethasone acet & sod phos (CELESTONE) injection 6 mg  6 mg   Terry Aviles, DO   6 mg at 11/08/23 1407    heparin lock flush 100 unit/mL injection 500 Units  500 Units Intracatheter Once Yomi Durham MD        levalbuterol (XOPENEX) neb solution 1.25 mg  1.25 mg Nebulization Q6H PRN Chaz Martel MD        lidocaine 1 % injection 2 mL  2 mL   Terry Aviles, DO   2 mL at 11/17/23 1340    lidocaine 1 % injection 3 mL  3 mL   Terry Aviles, DO   3 mL at 11/08/23 1407             O:   NAD, WDWN, Alert & Oriented, Mood & Affect wnl, Vitals stable   General appearance normal   Vitals stable   Alert, oriented and in no acute distress     L thigh 2.3cm red plaque     Stuck on papules and brown macules on trunk and ext   Red papules on trunk  Flesh colored papules on trunk         Eyes: Conjunctivae/lids:Normal     ENT: Lips, mucosa: normal    MSK:Normal    Cardiovascular: peripheral edema none    Pulm: Breathing Normal    Neuro/Psych: Orientation:Alert and Orientedx3 ; Mood/Affect:normal       MICRO:   L thigh:There is a proliferation of irregular nests of abnormal squamous cells arising from the epidermis and invading the dermis. These are well differentiated. The dermis shows a variable superficial perivascular inflammatory infiltrate.   A/P:  1. Seborrheic keratosis, lentigo, angioma, dermal nevus  2. L thigh r/o squamous cell carcinoma   TANGENTIAL BIOPSY IN HOUSE:  After consent, anesthesia with LEC and prep, tangential excision performed and dx above confirmed with frozen section histology.  No complications and routine wound care.  Patient is not on  anticoagulants and risk of bleeding discussed with patient.       I have personally reviewed all specimens and/or slides and used them with my medical judgement to determine or confirm the final diagnosis.     Patient told result squamous cell carcinoma  schedule excision .      It was a pleasure speaking to Yamel Lindsay today.  Previous clinic notes and pertinent laboratory tests were reviewed prior to Yamel Lindsay's visit.  Signs and Symptoms of skin cancer discussed with patient.  Patient encouraged to perform monthly skin exams.  UV precautions reviewed with patient.  Risks of non-melanoma skin cancer discussed with patient   Return to clinic next appt

## 2025-01-22 NOTE — PATIENT INSTRUCTIONS
Wound Care Instructions     FOR SUPERFICIAL WOUNDS     [facility] [phone]    [facility] [phone]                       AFTER 24 HOURS YOU SHOULD REMOVE THE BANDAGE AND BEGIN DAILY DRESSING CHANGES AS FOLLOWS:     1) Remove Dressing.     2) Clean and dry the area with tap water using a Q-tip or sterile gauze pad.     3) Apply Vaseline, Aquaphor, Polysporin ointment or Bacitracin ointment over entire wound.  Do NOT use Neosporin ointment.     4) Cover the wound with a band-aid, or a sterile non-stick gauze pad and micropore paper tape      REPEAT THESE INSTRUCTIONS AT LEAST ONCE A DAY UNTIL THE WOUND HAS COMPLETELY HEALED.    It is an old wives tale that a wound heals better when it is exposed to air and allowed to dry out. The wound will heal faster with a better cosmetic result if it is kept moist with ointment and covered with a bandage.    **Do not let the wound dry out.**      Supplies Needed:      *Cotton tipped applicators (Q-tips)    *Polysporin Ointment or Bacitracin Ointment (NOT NEOSPORIN)    *Band-aids or non-stick gauze pads and micropore paper tape.      PATIENT INFORMATION:    During the healing process you will notice a number of changes. All wounds develop a small halo of redness surrounding the wound.  This means healing is occurring. Severe itching with extensive redness usually indicates sensitivity to the ointment or bandage tape used to dress the wound.  You should call our office if this develops.      Swelling  and/or discoloration around your surgical site is common, particularly when performed around the eye.    All wounds normally drain.  The larger the wound the more drainage there will be.  After 7-10 days, you will notice the wound beginning to shrink and new skin will begin to grow.  The wound is healed when you can see skin has formed over the entire area.  A healed wound has a healthy, shiny look to the surface and is red to dark pink in color  to normalize.  Wounds may take approximately 4-6 weeks to heal.  Larger wounds may take 6-8 weeks.  After the wound is healed you may discontinue dressing changes.    You may experience a sensation of tightness as your wound heals. This is normal and will gradually subside.    Your healed wound may be sensitive to temperature changes. This sensitivity improves with time, but if you re having a lot of discomfort, try to avoid temperature extremes.    Patients frequently experience itching after their wound appears to have healed because of the continue healing under the skin.  Plain Vaseline will help relieve the itching.        POSSIBLE COMPLICATIONS    BLEEDING:    Leave the bandage in place.  Use tightly rolled up gauze or a cloth to apply direct pressure over the bandage for 30  minutes.  Reapply pressure for an additional 30 minutes if necessary  Use additional gauze and tape to maintain pressure once the bleeding has stopped.     [facility]  [address] [place] [address]  [phone]      January 22, 2025    Yamel Lindsay  [address] [place] [id number]      Dear Yamel          Please check in at 2nd [facility].     Be sure to eat a good breakfast and bathe and wash your hair prior to Surgery. Please bring  with you if this is above your neck    If you are taking any anti-coagulants that are prescribed by your Doctor (such as Coumadin/warfarin, Plavix, Aspirin, Ibuprofen), please continue taking them.     However, If you are taking anti-coagulants over the counter without  a Doctor's order for a Medical condition, please discontinue them 10 days prior to Surgery.      Please wear loose comfortable clothing as it could possibly be 4-6 hours until your surgery is completed depending upon how many layers of tissue need to be removed.     If you need any mobility assistance (getting on the exam chair or toilet) please bring a caregiver, family member, or staff  member to assist you. We are not equipped to transfer patients.    Thank you,    Erick Kee MD

## 2025-01-22 NOTE — LETTER
1/22/2025      Yamel Lindsay  [address] [facility] [address]      Dear Colleague,    Thank you for referring your patient, Yamel Lindsay, to the [facility]. Please see a copy of my visit note below.    Yamel Lidnsay is an extremely pleasant 79 year old year old female patient here today for growth on left thigh.  Patient has no other skin complaints today.  Remainder of the HPI, Meds, PMH, Allergies, FH, and SH was reviewed in chart.      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     Nicotine dependence      Postmenopausal HRT (hormone replacement therapy) 1994     S/P alcohol detoxification 07/06    Recovered and active in AA       Past Surgical History:   Procedure Laterality Date     BRONCHOSCOPY, WITH BIOPSY, ROBOT ASSISTED N/A 12/28/2022    Procedure: BRONCHOSCOPY, USING ION OPTICAL TRACKING SYSTEM, FINE NEEDLE ASPIRATION;  Surgeon: Alma Almaguer MD;  Location: [facility]     ENDOBRONCHIAL ULTRASOUND FLEXIBLE N/A 12/28/2022    Procedure: endobronchial biopsy and tumor debulking with cryoprobe;  Surgeon: Alma Almaguer MD;  Location:  OR     ESOPHAGOSCOPY, GASTROSCOPY, DUODENOSCOPY (EGD), COMBINED N/A 1/5/2015    Procedure: COMBINED ESOPHAGOSCOPY, GASTROSCOPY, DUODENOSCOPY (EGD), BIOPSY SINGLE OR MULTIPLE;  Surgeon: Dylan Alejandra MD;  Location: [facility]     ESOPHAGOSCOPY, GASTROSCOPY, DUODENOSCOPY (EGD), COMBINED N/A 5/25/2018    Procedure: COMBINED ESOPHAGOSCOPY, GASTROSCOPY, DUODENOSCOPY (EGD), BIOPSY SINGLE OR MULTIPLE;  gastroscopy;  Surgeon: Alexander Bautista MD;  Location: [facility]     INSERT PORT VASCULAR ACCESS Left 2/6/2023    Procedure: INSERTION, VASCULAR ACCESS PORT left;  Surgeon: Scot Valadez DO;  Location: [facility]     LAPAROSCOPIC HERNIORRHAPHY INGUINAL Right 1/26/2015    Procedure: LAPAROSCOPIC HERNIORRHAPHY INGUINAL;  Surgeon: Raúl  Favio KING MD;  Location: [facility]     TUBAL LIGATION          Family History   Problem Relation Age of Onset     Cancer Mother      C.A.D. Father      Lipids Father      Hypertension Father      Hypertension Brother      Cancer Brother         esophageal cancer     Breast Cancer Other      Prostate Cancer Brother      Psychotic Disorder Daughter      Diabetes No family hx of      Cerebrovascular Disease No family hx of      Cancer - colorectal No family hx of        Social History     Socioeconomic History     Marital status: Single     Spouse name: Not on file     Number of children: Not on file     Years of education: Not on file     Highest education level: Not on file   Occupational History     Not on file   Tobacco Use     Smoking status: Former     Current packs/day: 0.00     Types: Cigarettes     Quit date: 2011     Years since quittin.6     Passive exposure: Past     Smokeless tobacco: Never   Vaping Use     Vaping status: Never Used   Substance and Sexual Activity     Alcohol use: No     Drug use: No     Sexual activity: Not Currently   Other Topics Concern     Parent/sibling w/ CABG, MI or angioplasty before 65F 55M? No   Social History Narrative     Not on file     Social Drivers of Health     Financial Resource Strain: Not on file   Food Insecurity: Not on file   Transportation Needs: Not on file   Physical Activity: Not on file   Stress: Not on file   Social Connections: Not on file   Interpersonal Safety: Low Risk  (2024)    Interpersonal Safety      Do you feel physically and emotionally safe where you currently live?: Yes      Within the past 12 months, have you been hit, slapped, kicked or otherwise physically hurt by someone?: No      Within the past 12 months, have you been humiliated or emotionally abused in other ways by your partner or ex-partner?: No   Housing Stability: Not on file       Outpatient Encounter Medications as of 2025   Medication Sig Dispense Refill      acetaminophen (TYLENOL) 500 MG tablet Take 1,000 mg by mouth every 6 hours as needed for mild pain Taking once a day       albuterol (PROAIR HFA/PROVENTIL HFA/VENTOLIN HFA) 108 (90 Base) MCG/ACT inhaler INHALE 2 PUFFS INTO THE LUNGS EVERY 6 HOURS AS NEEDED FOR SHORTNESS OF BREATH OR DIFFICULTY BREATHING OR WHEEZING 90 g 11     atorvastatin (LIPITOR) 20 MG tablet TAKE 1 TABLET(20 MG) BY MOUTH DAILY 90 tablet 3     celecoxib (CELEBREX) 200 MG capsule TAKE 1 CAPSULE(200 MG) BY MOUTH DAILY 60 capsule 1     Cholecalciferol (VITAMIN D-3 PO) Take 2,000 Units by mouth daily        clonazePAM (KLONOPIN) 0.5 MG tablet Take 1-2 tablets (0.5-1 mg) by mouth 2 times daily as needed for anxiety. 120 tablet 1     clonazePAM (KLONOPIN) 0.5 MG tablet Take 0.5-1 tablets (0.25-0.5 mg) by mouth 2 times daily as needed for anxiety 30 tablet 3     FLUoxetine (PROZAC) 20 MG capsule Take 2 capsules (40 mg) by mouth daily 180 capsule 3     fluticasone-salmeterol (ADVAIR HFA) 115-21 MCG/ACT inhaler Inhale 2 puffs into the lungs 2 times daily 12 g 11     hydrochlorothiazide (HYDRODIURIL) 25 MG tablet Take 1 tablet (25 mg) by mouth daily. 90 tablet 3     ipratropium - albuterol 0.5 mg/2.5 mg/3 mL (DUONEB) 0.5-2.5 (3) MG/3ML neb solution Take 1 vial (3 mLs) by nebulization every 6 hours as needed for shortness of breath or wheezing 90 mL 0     omeprazole (PRILOSEC) 20 MG DR capsule TAKE 1 CAPSULE(20 MG) BY MOUTH DAILY 30 TO 60 MINUTES BEFORE A MEAL 90 capsule 3     tiotropium (SPIRIVA RESPIMAT) 2.5 MCG/ACT inhaler Inhale 2 puffs into the lungs daily. 4 g 2     tiZANidine (ZANAFLEX) 2 MG tablet TAKE 1 TABLET(2 MG) BY MOUTH THREE TIMES DAILY AS NEEDED FOR MUSCLE SPASMS 90 tablet 3     traMADol (ULTRAM) 50 MG tablet Take 1 tablet (50 mg) by mouth every 6 hours as needed for severe pain. 30 tablet 0     traZODone (DESYREL) 50 MG tablet TAKE 2 TABLETS(100 MG) BY MOUTH AT BEDTIME 180 tablet 0     Facility-Administered Encounter Medications as of  1/22/2025   Medication Dose Route Frequency Provider Last Rate Last Admin     2 mL ropivacaine (NAROPIN) injection 5 mg/mL  2 mL   Terry Aviles, DO   2 mL at 11/08/23 1407     4 mL ropivacaine (NAROPIN) injection 5 mg/mL  4 mL   Terry Aviles, DO   4 mL at 11/17/23 1340     betamethasone acet & sod phos (CELESTONE) injection 6 mg  6 mg   Terry Aviles, DO   6 mg at 11/17/23 1340     betamethasone acet & sod phos (CELESTONE) injection 6 mg  6 mg   Terry Aviles, DO   6 mg at 11/08/23 1407     heparin lock flush 100 unit/mL injection 500 Units  500 Units Intracatheter Once Yomi Durham MD         levalbuterol (XOPENEX) neb solution 1.25 mg  1.25 mg Nebulization Q6H PRN Chaz Martel MD         lidocaine 1 % injection 2 mL  2 mL   Terry Aviles, DO   2 mL at 11/17/23 1340     lidocaine 1 % injection 3 mL  3 mL   Terry Aviles, DO   3 mL at 11/08/23 1407             O:   NAD, WDWN, Alert & Oriented, Mood & Affect wnl, Vitals stable   General appearance normal   Vitals stable   Alert, oriented and in no acute distress     L thigh 2.3cm red plaque     Stuck on papules and brown macules on trunk and ext   Red papules on trunk  Flesh colored papules on trunk         Eyes: Conjunctivae/lids:Normal     ENT: Lips, mucosa: normal    MSK:Normal    Cardiovascular: peripheral edema none    Pulm: Breathing Normal    Neuro/Psych: Orientation:Alert and Orientedx3 ; Mood/Affect:normal       MICRO:   L thigh:There is a proliferation of irregular nests of abnormal squamous cells arising from the epidermis and invading the dermis. These are well differentiated. The dermis shows a variable superficial perivascular inflammatory infiltrate.   A/P:  1. Seborrheic keratosis, lentigo, angioma, dermal nevus  2. L thigh r/o squamous cell carcinoma   TANGENTIAL BIOPSY IN HOUSE:  After consent, anesthesia with LEC and prep, tangential excision performed and dx above confirmed with frozen section histology.  No  complications and routine wound care.  Patient is not on  anticoagulants and risk of bleeding discussed with patient.       I have personally reviewed all specimens and/or slides and used them with my medical judgement to determine or confirm the final diagnosis.     Patient told result squamous cell carcinoma schedule excision .      It was a pleasure speaking to Yamel Lindsay today.  Previous clinic notes and pertinent laboratory tests were reviewed prior to Yamel Lindsay's visit.  Signs and Symptoms of skin cancer discussed with patient.  Patient encouraged to perform monthly skin exams.  UV precautions reviewed with patient.  Risks of non-melanoma skin cancer discussed with patient   Return to clinic next appt      Again, thank you for allowing me to participate in the care of your patient.        Sincerely,        Erick Kee MD    Electronically signed

## 2025-01-28 NOTE — PROGRESS NOTES
Surgical Office Location :   [facility]  [address] [place] [address]

## 2025-01-29 ENCOUNTER — OFFICE VISIT (OUTPATIENT)
Dept: DERMATOLOGY | Facility: CLINIC | Age: 80
End: 2025-01-29
Payer: COMMERCIAL

## 2025-01-29 DIAGNOSIS — C44.729 SQUAMOUS CELL CARCINOMA OF LEFT THIGH: Primary | ICD-10-CM

## 2025-01-29 PROCEDURE — 13121 CMPLX RPR S/A/L 2.6-7.5 CM: CPT | Performed by: DERMATOLOGY

## 2025-01-29 PROCEDURE — 17313 MOHS 1 STAGE T/A/L: CPT | Performed by: DERMATOLOGY

## 2025-01-29 ASSESSMENT — PAIN SCALES - GENERAL: PAINLEVEL_OUTOF10: NO PAIN (0)

## 2025-01-29 NOTE — NURSING NOTE
Chief Complaint   Patient presents with    Derm Problem     Mohs- L thigh- SCC        There were no vitals filed for this visit.  Wt Readings from Last 1 Encounters:   12/16/24 73.8 kg (162 lb 9.6 oz)       Roxane Smith LPN .................1/29/2025

## 2025-01-29 NOTE — PATIENT INSTRUCTIONS
Sutured Wound Care     [facility] [phone]    Left Thigh      No strenuous activity for 48 hours. Resume moderate activity in 48 hours. No heavy exercising until you are seen for follow up in one week.     Take Tylenol as needed for discomfort.                         Do not drink alcoholic beverages for 48 hours.     Keep the pressure bandage in place for 24 hours. If the bandage becomes blood tinged or loose, reinforce it with gauze and tape.        (Refer to the reverse side of this page for management of bleeding).    Remove pressure bandage in 24 hours (White Bandage, and Brown Wrap)    Leave the flat bandage in place until your follow up appointment. (Clear Bandage)     Keep the bandage dry. Wash around it carefully.    If the tape becomes soiled or starts to come off, reinforce it with additional paper tape.    Do not smoke for 3 weeks; smoking is detrimental to wound healing.    It is normal to have swelling and bruising around the surgical site. The bruising will fade in approximately 10-14 days. Elevate the area to reduce swelling.    Numbness, itchiness and sensitivity to temperature changes can occur after surgery and may take up to 18 months to normalize.      POSSIBLE COMPLICATIONS    BLEEDING:    Leave the bandage in place.  Use tightly rolled up gauze or a cloth to apply direct pressure over the bandage for 20   minutes.  Reapply pressure for an additional 20 minutes if necessary  Call the office or go to the nearest emergency room if pressure fails to stop the bleeding.  Use additional gauze and tape to maintain pressure once the bleeding has stopped.        PAIN:    Post operative pain should slowly get better, never worse.  A severe increase in pain may indicate a problem. Call the office if this occurs.    In case of emergency phone:Dr Kee [phone]

## 2025-01-29 NOTE — PROGRESS NOTES
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  .  Patient has no other skin complaints today.  Remainder of the HPI, Meds, PMH, Allergies, FH, and SH was reviewed in chart.      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    Nicotine dependence     Postmenopausal HRT (hormone replacement therapy) 1994    S/P alcohol detoxification 07/2006    Recovered and active in AA    Squamous cell carcinoma of skin, unspecified        Past Surgical History:   Procedure Laterality Date    BRONCHOSCOPY, WITH BIOPSY, ROBOT ASSISTED N/A 12/28/2022    Procedure: BRONCHOSCOPY, USING ION OPTICAL TRACKING SYSTEM, FINE NEEDLE ASPIRATION;  Surgeon: Alma Almaguer MD;  Location: [facility]    ENDOBRONCHIAL ULTRASOUND FLEXIBLE N/A 12/28/2022    Procedure: endobronchial biopsy and tumor debulking with cryoprobe;  Surgeon: Alma Almaguer MD;  Location:  OR    ESOPHAGOSCOPY, GASTROSCOPY, DUODENOSCOPY (EGD), COMBINED N/A 1/5/2015    Procedure: COMBINED ESOPHAGOSCOPY, GASTROSCOPY, DUODENOSCOPY (EGD), BIOPSY SINGLE OR MULTIPLE;  Surgeon: Dylan Alejandra MD;  Location: [facility]    ESOPHAGOSCOPY, GASTROSCOPY, DUODENOSCOPY (EGD), COMBINED N/A 5/25/2018    Procedure: COMBINED ESOPHAGOSCOPY, GASTROSCOPY, DUODENOSCOPY (EGD), BIOPSY SINGLE OR MULTIPLE;  gastroscopy;  Surgeon: Alexander Bautista MD;  Location: [facility]    INSERT PORT VASCULAR ACCESS Left 2/6/2023    Procedure: INSERTION, VASCULAR ACCESS PORT left;  Surgeon: Scot Valadez DO;  Location: [facility]    LAPAROSCOPIC HERNIORRHAPHY INGUINAL Right 1/26/2015    Procedure: LAPAROSCOPIC HERNIORRHAPHY INGUINAL;  Surgeon: Favio Olsen MD;  Location: [facility]    TUBAL LIGATION          Family History   Problem Relation Age of Onset    Cancer Mother     C.A.D. Father      Lipids Father     Hypertension Father     Hypertension Brother     Cancer Brother         esophageal cancer    Breast Cancer Other     Prostate Cancer Brother     Psychotic Disorder Daughter     Diabetes No family hx of     Cerebrovascular Disease No family hx of     Cancer - colorectal No family hx of        Social History     Socioeconomic History    Marital status: Single     Spouse name: Not on file    Number of children: Not on file    Years of education: Not on file    Highest education level: Not on file   Occupational History    Not on file   Tobacco Use    Smoking status: Former     Current packs/day: 0.00     Types: Cigarettes     Quit date: 2011     Years since quittin.6     Passive exposure: Past    Smokeless tobacco: Never   Vaping Use    Vaping status: Never Used   Substance and Sexual Activity    Alcohol use: No    Drug use: No    Sexual activity: Not Currently   Other Topics Concern    Parent/sibling w/ CABG, MI or angioplasty before 65F 55M? No   Social History Narrative    Not on file     Social Drivers of Health     Financial Resource Strain: Not on file   Food Insecurity: Not on file   Transportation Needs: Not on file   Physical Activity: Not on file   Stress: Not on file   Social Connections: Not on file   Interpersonal Safety: Low Risk  (2024)    Interpersonal Safety     Do you feel physically and emotionally safe where you currently live?: Yes     Within the past 12 months, have you been hit, slapped, kicked or otherwise physically hurt by someone?: No     Within the past 12 months, have you been humiliated or emotionally abused in other ways by your partner or ex-partner?: No   Housing Stability: Not on file       Outpatient Encounter Medications as of 2025   Medication Sig Dispense Refill    acetaminophen (TYLENOL) 500 MG tablet Take 1,000 mg by mouth every 6 hours as needed for mild pain Taking once a day      albuterol (PROAIR HFA/PROVENTIL HFA/VENTOLIN HFA) 108  (90 Base) MCG/ACT inhaler INHALE 2 PUFFS INTO THE LUNGS EVERY 6 HOURS AS NEEDED FOR SHORTNESS OF BREATH OR DIFFICULTY BREATHING OR WHEEZING 90 g 11    atorvastatin (LIPITOR) 20 MG tablet TAKE 1 TABLET(20 MG) BY MOUTH DAILY 90 tablet 3    celecoxib (CELEBREX) 200 MG capsule TAKE 1 CAPSULE(200 MG) BY MOUTH DAILY 60 capsule 1    Cholecalciferol (VITAMIN D-3 PO) Take 2,000 Units by mouth daily       clonazePAM (KLONOPIN) 0.5 MG tablet Take 1-2 tablets (0.5-1 mg) by mouth 2 times daily as needed for anxiety. 120 tablet 1    clonazePAM (KLONOPIN) 0.5 MG tablet Take 0.5-1 tablets (0.25-0.5 mg) by mouth 2 times daily as needed for anxiety 30 tablet 3    FLUoxetine (PROZAC) 20 MG capsule Take 2 capsules (40 mg) by mouth daily 180 capsule 3    fluticasone-salmeterol (ADVAIR HFA) 115-21 MCG/ACT inhaler Inhale 2 puffs into the lungs 2 times daily 12 g 11    hydrochlorothiazide (HYDRODIURIL) 25 MG tablet Take 1 tablet (25 mg) by mouth daily. 90 tablet 3    ipratropium - albuterol 0.5 mg/2.5 mg/3 mL (DUONEB) 0.5-2.5 (3) MG/3ML neb solution Take 1 vial (3 mLs) by nebulization every 6 hours as needed for shortness of breath or wheezing 90 mL 0    omeprazole (PRILOSEC) 20 MG DR capsule TAKE 1 CAPSULE(20 MG) BY MOUTH DAILY 30 TO 60 MINUTES BEFORE A MEAL 90 capsule 3    tiotropium (SPIRIVA RESPIMAT) 2.5 MCG/ACT inhaler Inhale 2 puffs into the lungs daily. 4 g 2    tiZANidine (ZANAFLEX) 2 MG tablet TAKE 1 TABLET(2 MG) BY MOUTH THREE TIMES DAILY AS NEEDED FOR MUSCLE SPASMS 90 tablet 3    traMADol (ULTRAM) 50 MG tablet Take 1 tablet (50 mg) by mouth every 6 hours as needed for severe pain. 30 tablet 0    traZODone (DESYREL) 50 MG tablet TAKE 2 TABLETS(100 MG) BY MOUTH AT BEDTIME 180 tablet 0     Facility-Administered Encounter Medications as of 1/29/2025   Medication Dose Route Frequency Provider Last Rate Last Admin    2 mL ropivacaine (NAROPIN) injection 5 mg/mL  2 mL   Terry Aviles DO   2 mL at 11/08/23 1407    4 mL ropivacaine  (NAROPIN) injection 5 mg/mL  4 mL   Terry Aviles, DO   4 mL at 11/17/23 1340    betamethasone acet & sod phos (CELESTONE) injection 6 mg  6 mg   Davide Avilesw, DO   6 mg at 11/17/23 1340    betamethasone acet & sod phos (CELESTONE) injection 6 mg  6 mg   Davide Avilesw, DO   6 mg at 11/08/23 1407    heparin lock flush 100 unit/mL injection 500 Units  500 Units Intracatheter Once Yomi Durham MD        levalbuterol (XOPENEX) neb solution 1.25 mg  1.25 mg Nebulization Q6H PRN Chaz Martel MD        lidocaine 1 % injection 2 mL  2 mL   Terry Aviles, DO   2 mL at 11/17/23 1340    lidocaine 1 % injection 3 mL  3 mL   Terry Aviles, DO   3 mL at 11/08/23 1407             O:   NAD, WDWN, Alert & Oriented, Mood & Affect wnl, Vitals stable   General appearance normal   Vitals stable   Alert, oriented and in no acute distress      Following lymph nodes palpated: femoral no lad  L medial thigh 2.3cm red plaque      Eyes: Conjunctivae/lids:Normal     ENT: Lips, mucosa: normal    MSK:Normal    Cardiovascular: peripheral edema none    Pulm: Breathing Normal    Neuro/Psych: Orientation:Alert and Orientedx3 ; Mood/Affect:normal       A/P:  L medial thigh squamous cell carcinoma   MOHS:   Size    The rationale for Mohs surgery was discussed with the patient and consent was obtained.  The risks and benefits as well as alternatives to therapy were discussed, in detail.  Specifically, the risks of infection, scarring, bleeding, prolonged wound healing, incomplete removal, allergy to anesthesia, nerve injury and recurrence were addressed.  Indication for Mohs was Size. Prior to the procedure, the treatment site was clearly identified and, if available, confirmed with previous photos and confirmed by the patient   All components of the Universal Protocol/PAUSE rule were completed.  The Mohs surgeon operated in two distinct and integrated capacities as the surgeon and pathologist.      The area was prepped with  Betasept.  A rim of normal appearing skin was marked circumferentially around the lesion.  The area was infiltrated with local anesthesia.  The tumor was first debulked to remove all clinically apparent tumor.  An incision following the standard Mohs approach was done and the specimen was oriented,mapped and placed in 1 block(s).  Each specimen was then chromacoded and processed in the Mohs laboratory using standard Mohs technique and submitted for frozen section histology.  Frozen section analysis showed no residual tumor but CLEAR MARGINS.      The tumor was excised using standard Mohs technique in 1 stages(s).  CLEAR MARGINS OBTAINED and Final defect size was 2.8 cm.     We discussed the options for wound management in full with the patient including risks/benefits/ possible outcomes.      REPAIR COMPLEX: Because of the tightness of the surrounding skin and Because of the size and full thickness nature of the defect, Because of the tightness of the surrounding skin, To maintain form and function, and In order to avoid distortion, a complex closure was planned. After LE anesthesia and prep, Burow's triangles were excised in the relaxed skin tension lines. The wound edges were widely undermined greater than width of the defect on both sides by dissection in the subcutaneous plane until adequate tissue mobility was obtained. Hemostasis was obtained. The wound edges were closed in a layered fashion using Vicryl and Fast Absorbing Plain Gut sutures. Postoperative length was 5 cm.   EBL minimal; complications none; wound care routine.  The patient was discharged in good condition and will return in one week for wound evaluation.  It was a pleasure speaking to Yamel Lindsay today.  Previous clinic notes and pertinent laboratory tests were reviewed prior to Yamel Lindsay's visit.  Signs and Symptoms of skin cancer discussed with patient.  Patient encouraged to perform monthly skin exams.  UV precautions reviewed with  patient.  Risks of non-melanoma skin cancer discussed with patient   Return to clinic 6 months

## 2025-01-29 NOTE — LETTER
1/29/2025      Yamel Lindsay  [address] [address]      Dear Colleague,    Thank you for referring your patient, Yamel Lindsya, to the [facility]. Please see a copy of my visit note below.    Surgical Office Location :   [facility]  [address] [place] [address]      Yamel Lindsay is an extremely pleasant 79 year old year old female patient here today for evaluation and managment of squamous cell carcinoma on left medial thigh.  .  Patient has no other skin complaints today.  Remainder of the HPI, Meds, PMH, Allergies, FH, and SH was reviewed in chart.      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     Nicotine dependence      Postmenopausal HRT (hormone replacement therapy) 1994     S/P alcohol detoxification 07/2006    Recovered and active in AA     Squamous cell carcinoma of skin, unspecified        Past Surgical History:   Procedure Laterality Date     BRONCHOSCOPY, WITH BIOPSY, ROBOT ASSISTED N/A 12/28/2022    Procedure: BRONCHOSCOPY, USING ION OPTICAL TRACKING SYSTEM, FINE NEEDLE ASPIRATION;  Surgeon: Alma Almaguer MD;  Location: [facility]     ENDOBRONCHIAL ULTRASOUND FLEXIBLE N/A 12/28/2022    Procedure: endobronchial biopsy and tumor debulking with cryoprobe;  Surgeon: Alma Almaguer MD;  Location: [facility]     ESOPHAGOSCOPY, GASTROSCOPY, DUODENOSCOPY (EGD), COMBINED N/A 1/5/2015    Procedure: COMBINED ESOPHAGOSCOPY, GASTROSCOPY, DUODENOSCOPY (EGD), BIOPSY SINGLE OR MULTIPLE;  Surgeon: Dylan Alejandra MD;  Location: [facility]     ESOPHAGOSCOPY, GASTROSCOPY, DUODENOSCOPY (EGD), COMBINED N/A 5/25/2018    Procedure: COMBINED ESOPHAGOSCOPY, GASTROSCOPY, DUODENOSCOPY (EGD), BIOPSY SINGLE OR MULTIPLE;  gastroscopy;  Surgeon: Alexander Bautista MD;  Location: [facility]     INSERT PORT VASCULAR ACCESS Left 2/6/2023    Procedure:  INSERTION, VASCULAR ACCESS PORT left;  Surgeon: Scot Valadez DO;  Location: [facility]     LAPAROSCOPIC HERNIORRHAPHY INGUINAL Right 2015    Procedure: LAPAROSCOPIC HERNIORRHAPHY INGUINAL;  Surgeon: Favio Olsen MD;  Location: [facility]     TUBAL LIGATION          Family History   Problem Relation Age of Onset     Cancer Mother      C.A.D. Father      Lipids Father      Hypertension Father      Hypertension Brother      Cancer Brother         esophageal cancer     Breast Cancer Other      Prostate Cancer Brother      Psychotic Disorder Daughter      Diabetes No family hx of      Cerebrovascular Disease No family hx of      Cancer - colorectal No family hx of        Social History     Socioeconomic History     Marital status: Single     Spouse name: Not on file     Number of children: Not on file     Years of education: Not on file     Highest education level: Not on file   Occupational History     Not on file   Tobacco Use     Smoking status: Former     Current packs/day: 0.00     Types: Cigarettes     Quit date: 2011     Years since quittin.6     Passive exposure: Past     Smokeless tobacco: Never   Vaping Use     Vaping status: Never Used   Substance and Sexual Activity     Alcohol use: No     Drug use: No     Sexual activity: Not Currently   Other Topics Concern     Parent/sibling w/ CABG, MI or angioplasty before 65F 55M? No   Social History Narrative     Not on file     Social Drivers of Health     Financial Resource Strain: Not on file   Food Insecurity: Not on file   Transportation Needs: Not on file   Physical Activity: Not on file   Stress: Not on file   Social Connections: Not on file   Interpersonal Safety: Low Risk  (2024)    Interpersonal Safety      Do you feel physically and emotionally safe where you currently live?: Yes      Within the past 12 months, have you been hit, slapped, kicked or otherwise physically hurt by someone?: No      Within the past 12 months, have you  been humiliated or emotionally abused in other ways by your partner or ex-partner?: No   Housing Stability: Not on file       Outpatient Encounter Medications as of 1/29/2025   Medication Sig Dispense Refill     acetaminophen (TYLENOL) 500 MG tablet Take 1,000 mg by mouth every 6 hours as needed for mild pain Taking once a day       albuterol (PROAIR HFA/PROVENTIL HFA/VENTOLIN HFA) 108 (90 Base) MCG/ACT inhaler INHALE 2 PUFFS INTO THE LUNGS EVERY 6 HOURS AS NEEDED FOR SHORTNESS OF BREATH OR DIFFICULTY BREATHING OR WHEEZING 90 g 11     atorvastatin (LIPITOR) 20 MG tablet TAKE 1 TABLET(20 MG) BY MOUTH DAILY 90 tablet 3     celecoxib (CELEBREX) 200 MG capsule TAKE 1 CAPSULE(200 MG) BY MOUTH DAILY 60 capsule 1     Cholecalciferol (VITAMIN D-3 PO) Take 2,000 Units by mouth daily        clonazePAM (KLONOPIN) 0.5 MG tablet Take 1-2 tablets (0.5-1 mg) by mouth 2 times daily as needed for anxiety. 120 tablet 1     clonazePAM (KLONOPIN) 0.5 MG tablet Take 0.5-1 tablets (0.25-0.5 mg) by mouth 2 times daily as needed for anxiety 30 tablet 3     FLUoxetine (PROZAC) 20 MG capsule Take 2 capsules (40 mg) by mouth daily 180 capsule 3     fluticasone-salmeterol (ADVAIR HFA) 115-21 MCG/ACT inhaler Inhale 2 puffs into the lungs 2 times daily 12 g 11     hydrochlorothiazide (HYDRODIURIL) 25 MG tablet Take 1 tablet (25 mg) by mouth daily. 90 tablet 3     ipratropium - albuterol 0.5 mg/2.5 mg/3 mL (DUONEB) 0.5-2.5 (3) MG/3ML neb solution Take 1 vial (3 mLs) by nebulization every 6 hours as needed for shortness of breath or wheezing 90 mL 0     omeprazole (PRILOSEC) 20 MG DR capsule TAKE 1 CAPSULE(20 MG) BY MOUTH DAILY 30 TO 60 MINUTES BEFORE A MEAL 90 capsule 3     tiotropium (SPIRIVA RESPIMAT) 2.5 MCG/ACT inhaler Inhale 2 puffs into the lungs daily. 4 g 2     tiZANidine (ZANAFLEX) 2 MG tablet TAKE 1 TABLET(2 MG) BY MOUTH THREE TIMES DAILY AS NEEDED FOR MUSCLE SPASMS 90 tablet 3     traMADol (ULTRAM) 50 MG tablet Take 1 tablet (50 mg) by  mouth every 6 hours as needed for severe pain. 30 tablet 0     traZODone (DESYREL) 50 MG tablet TAKE 2 TABLETS(100 MG) BY MOUTH AT BEDTIME 180 tablet 0     Facility-Administered Encounter Medications as of 1/29/2025   Medication Dose Route Frequency Provider Last Rate Last Admin     2 mL ropivacaine (NAROPIN) injection 5 mg/mL  2 mL   Terry Aviles, DO   2 mL at 11/08/23 1407     4 mL ropivacaine (NAROPIN) injection 5 mg/mL  4 mL   Terry Aviles, DO   4 mL at 11/17/23 1340     betamethasone acet & sod phos (CELESTONE) injection 6 mg  6 mg   Terry Aviles, DO   6 mg at 11/17/23 1340     betamethasone acet & sod phos (CELESTONE) injection 6 mg  6 mg   Terry Aviles, DO   6 mg at 11/08/23 1407     heparin lock flush 100 unit/mL injection 500 Units  500 Units Intracatheter Once Yomi Durham MD         levalbuterol (XOPENEX) neb solution 1.25 mg  1.25 mg Nebulization Q6H PRN Chaz Martel MD         lidocaine 1 % injection 2 mL  2 mL   Terry Aviles, DO   2 mL at 11/17/23 1340     lidocaine 1 % injection 3 mL  3 mL   Terry Aviles,    3 mL at 11/08/23 1407             O:   NAD, WDWN, Alert & Oriented, Mood & Affect wnl, Vitals stable   General appearance normal   Vitals stable   Alert, oriented and in no acute distress      Following lymph nodes palpated: femoral no lad  L medial thigh 2.3cm red plaque      Eyes: Conjunctivae/lids:Normal     ENT: Lips, mucosa: normal    MSK:Normal    Cardiovascular: peripheral edema none    Pulm: Breathing Normal    Neuro/Psych: Orientation:Alert and Orientedx3 ; Mood/Affect:normal       A/P:  L medial thigh squamous cell carcinoma   MOHS:   Size    The rationale for Mohs surgery was discussed with the patient and consent was obtained.  The risks and benefits as well as alternatives to therapy were discussed, in detail.  Specifically, the risks of infection, scarring, bleeding, prolonged wound healing, incomplete removal, allergy to anesthesia, nerve injury  and recurrence were addressed.  Indication for Mohs was Size. Prior to the procedure, the treatment site was clearly identified and, if available, confirmed with previous photos and confirmed by the patient   All components of the Universal Protocol/PAUSE rule were completed.  The Mohs surgeon operated in two distinct and integrated capacities as the surgeon and pathologist.      The area was prepped with Betasept.  A rim of normal appearing skin was marked circumferentially around the lesion.  The area was infiltrated with local anesthesia.  The tumor was first debulked to remove all clinically apparent tumor.  An incision following the standard Mohs approach was done and the specimen was oriented,mapped and placed in 1 block(s).  Each specimen was then chromacoded and processed in the Mohs laboratory using standard Mohs technique and submitted for frozen section histology.  Frozen section analysis showed no residual tumor but CLEAR MARGINS.      The tumor was excised using standard Mohs technique in 1 stages(s).  CLEAR MARGINS OBTAINED and Final defect size was 2.8 cm.     We discussed the options for wound management in full with the patient including risks/benefits/ possible outcomes.      REPAIR COMPLEX: Because of the tightness of the surrounding skin and Because of the size and full thickness nature of the defect, Because of the tightness of the surrounding skin, To maintain form and function, and In order to avoid distortion, a complex closure was planned. After LE anesthesia and prep, Burow's triangles were excised in the relaxed skin tension lines. The wound edges were widely undermined greater than width of the defect on both sides by dissection in the subcutaneous plane until adequate tissue mobility was obtained. Hemostasis was obtained. The wound edges were closed in a layered fashion using Vicryl and Fast Absorbing Plain Gut sutures. Postoperative length was 5 cm.   EBL minimal; complications none;  wound care routine.  The patient was discharged in good condition and will return in one week for wound evaluation.  It was a pleasure speaking to Yamel Lindsay today.  Previous clinic notes and pertinent laboratory tests were reviewed prior to Yamel Lindsay's visit.  Signs and Symptoms of skin cancer discussed with patient.  Patient encouraged to perform monthly skin exams.  UV precautions reviewed with patient.  Risks of non-melanoma skin cancer discussed with patient   Return to clinic 6 months        Again, thank you for allowing me to participate in the care of your patient.        Sincerely,        Eirck Kee MD    Electronically signed

## 2025-02-03 ENCOUNTER — TELEPHONE (OUTPATIENT)
Dept: PHARMACY | Facility: CLINIC | Age: 80
End: 2025-02-03
Payer: COMMERCIAL

## 2025-02-03 NOTE — TELEPHONE ENCOUNTER
[facility] Recruitment: [facility] insurance     Referral outreach attempt #1 on February 3, 2025      Outcome: left voicemail- Call back number [phone]    Brown Garcia, PharmD  [facility] Pharmacist

## 2025-02-04 DIAGNOSIS — C34.31 MALIGNANT NEOPLASM OF LOWER LOBE OF RIGHT LUNG (H): ICD-10-CM

## 2025-02-04 DIAGNOSIS — G47.09 OTHER INSOMNIA: ICD-10-CM

## 2025-02-04 RX ORDER — TRAZODONE HYDROCHLORIDE 50 MG/1
TABLET ORAL
Qty: 60 TABLET | Refills: 5 | Status: SHIPPED | OUTPATIENT
Start: 2025-02-04

## 2025-02-04 NOTE — TELEPHONE ENCOUNTER
Narcotic Refill Request    Medication(s) requested:  Tramadol  Person Requesting Refill:Yamel patient  What pain is the medication treating: arthritic and ongoing chest pain. No new pain  How is the medication being taken?:1 tablet every 6hours (about 3 to 4 a day)   Does pt have enough for today? no  Is pain being adequately controlled on the current regimen?: okay  Experiencing any side effects from medication?: Denies    Date of most recent appointment:  11/11/2024 Dr. Martel  Any No Show Visits:none recently  Next appointment:   2/19/2025 Dr. Carbajal  Last fill date and by whom:  Dr. Ma (on call as Dr. Martel is no longer a provider)    Reviewed: Not an agent    Send to provider: routed to Dr. Carbajal pt's new provider.

## 2025-02-05 ENCOUNTER — ALLIED HEALTH/NURSE VISIT (OUTPATIENT)
Dept: DERMATOLOGY | Facility: CLINIC | Age: 80
End: 2025-02-05
Payer: COMMERCIAL

## 2025-02-05 DIAGNOSIS — Z48.01 ENCOUNTER FOR CHANGE OR REMOVAL OF SURGICAL WOUND DRESSING: Primary | ICD-10-CM

## 2025-02-05 PROCEDURE — 99207 PR NO CHARGE NURSE ONLY: CPT

## 2025-02-05 RX ORDER — TRAMADOL HYDROCHLORIDE 50 MG/1
50 TABLET ORAL EVERY 6 HOURS PRN
Qty: 30 TABLET | Refills: 0 | Status: SHIPPED | OUTPATIENT
Start: 2025-02-05

## 2025-02-05 NOTE — PATIENT INSTRUCTIONS
WOUND CARE INSTRUCTIONS  for  ONE WEEK AFTER SURGERY                  Left Thigh       Leave flat bandage on your skin for one week after today s bandage change.  In one week when you remove the bandage, you may resume your regular skin care routine, including washing with mild soap and water, applying moisturizer, make-up and sunscreen.    If there are any open or bleeding areas at the incision/graft site you should begin to cover the area with a bandage daily as follows:    Clean and dry the area with plain tap water using a Q-tip or sterile gauze pad.  Apply Polysporin or Bacitracin ointment to the open area.  Cover the wound with a band-aid or a sterile non-stick gauze pad and micropore paper tape.         SIGNS OF INFECTION  - If you notice any of these signs of infection, call your doctor right away: expanding redness around the wound.  - Yellow or greenish-colored pus or cloudy wound drainage.    - Red streaking spreading from the wound.  - Increased swelling, tenderness, or pain around the wound.   - Fever.    Please remember that yellow and clear drainage from a wound can be normal and related to normal wound healing.  Isolated drainage from a wound without a combination of the above features does not indicate infection.       *Once the bandages are removed, the scar will be red and firm (especially in the lip/chin area). This is normal and will fade in time. It might take 6-12 months for this to happen.     *Massaging the area will help the scar soften and fade quicker. Begin to massage the area one month after the bandages have been removed. To massage apply pressure directly and firmly over the scar with the fingertips and move in a circular motion. Massage the area for a few minutes several times a day. Continue to massage the site for several months.    *Approximately 6-8 weeks after surgery it is not uncommon to see the formation of  tender pimple-like  bump along the scar. This is normal. As  the scar continues to mature and the stitches underneath the skin begin to dissolve, this might occur. Do not pick or squeeze, this will resolve on it s own. Should one break open producing a small amount of drainage, apply Polysporin or Bacitracin ointment a few times a day until the wound is completely healed.    *Numbness in the surgical area is expected. It might take 12-18 months for the feeling to return to normal. During this time sensations of itchiness, tingling and occasional sharp pains might be noted. These feelings are normal and will subside once the nerves have completely healed.         IN CASE OF EMERGENCY: Dr Kee [phone]     If you were seen in [place] call: [phone]    If you were seen in [place] call: [phone]     Proper skin care from [facility]:    -Eliminate harsh soaps as they strip the natural oils from the skin, often resulting in dry itchy skin ( i.e. Dial, Zest, [place] Spring)  -Use mild soaps such as Cetaphil or Dove Sensitive Skin in the shower. You do not need to use soap on arms, legs, and trunk every time you shower unless visibly soiled.   -Avoid hot or cold showers.  -After showering, lightly dry off and apply moisturizing within 2-3 minutes. This will help trap moisture in the skin.   -Aggressive use of a moisturizer at least 1-2 times a day to the entire body (including -Vanicream, Cetaphil, Aquaphor or Cerave) and moisturize hands after every washing.  -We recommend using moisturizers that come in a tub that needs to be scooped out, not a pump. This has more of an oil base. It will hold moisture in your skin much better than a water base moisturizer. The above recommended are non-pore clogging.      Wear a sunscreen with at least SPF 30 on your face, ears, neck and V of the chest daily. Wear sunscreen on other areas of the body if those areas are exposed to the sun throughout the day. Sunscreens can contain physical and/or chemical blockers.  Physical blockers are less likely to clog pores, these include zinc oxide and titanium dioxide. Reapply every two hour and after swimming.     Sunscreen examples: https://www.ewg.org/sunscreen/    UV radiation  UVA radiation remains constant throughout the day and throughout the year. It is a longer wavelength than UVB and therefore penetrates deeper into the skin leading to immediate and delayed tanning, photoaging, and skin cancer. 70-80% of UVA and UVB radiation occurs between the hours of 10am-2pm.  UVB radiation  UVB radiation causes the most harmful effects and is more significant during the summer months. However, snow and ice can reflect UVB radiation leading to skin damage during the winter months as well. UVB radiation is responsible for tanning, burning, inflammation, delayed erythema (pinkness), pigmentation (brown spots), and skin cancer.     I recommend self monthly full body exams and yearly full body exams with a dermatology provider. If you develop a new or changing lesion please follow up for examination. Most skin cancers are pink and scaly or pink and pearly. However, we do see blue/brown/black skin cancers.  Consider the ABCDEs of melanoma when giving yourself your monthly full body exam ( don't forget the groin, buttocks, feet, toes, etc). A-asymmetry, B-borders, C-color, D-diameter, E-elevation or evolving. If you see any of these changes please follow up in clinic. If you cannot see your back I recommend purchasing a hand held mirror to use with a larger wall mirror.       Checking for Skin Cancer  You can find cancer early by checking your skin each month. There are 3 kinds of skin cancer. They are melanoma, basal cell carcinoma, and squamous cell carcinoma. Doing monthly skin checks is the best way to find new marks or skin changes. Follow the instructions below for checking your skin.   The ABCDEs of checking moles for melanoma   Check your moles or growths for signs of melanoma using  ABCDE:   Asymmetry: the sides of the mole or growth don t match  Border: the edges are ragged, notched, or blurred  Color: the color within the mole or growth varies  Diameter: the mole or growth is larger than 6 mm (size of a pencil eraser)  Evolving: the size, shape, or color of the mole or growth is changing (evolving is not shown in the images below)    Checking for other types of skin cancer  Basal cell carcinoma or squamous cell carcinoma have symptoms such as:     A spot or mole that looks different from all other marks on your skin  Changes in how an area feels, such as itching, tenderness, or pain  Changes in the skin's surface, such as oozing, bleeding, or scaliness  A sore that does not heal  New swelling or redness beyond the border of a mole    Who s at risk?  Anyone can get skin cancer. But you are at greater risk if you have:   Fair skin, light-colored hair, or light-colored eyes  Many moles or abnormal moles on your skin  A history of sunburns from sunlight or tanning beds  A family history of skin cancer  A history of exposure to radiation or chemicals  A weakened immune system  If you have had skin cancer in the past, you are at risk for recurring skin cancer.   How to check your skin  Do your monthly skin checkups in front of a full-length mirror. Check all parts of your body, including your:   Head (ears, face, neck, and scalp)  Torso (front, back, and sides)  Arms (tops, undersides, upper, and lower armpits)  Hands (palms, backs, and fingers, including under the nails)  Buttocks and genitals  Legs (front, back, and sides)  Feet (tops, soles, toes, including under the nails, and between toes)  If you have a lot of moles, take digital photos of them each month. Make sure to take photos both up close and from a distance. These can help you see if any moles change over time.   Most skin changes are not cancer. But if you see any changes in your skin, call your doctor right away. Only he or she can  diagnose a problem. If you have skin cancer, seeing your doctor can be the first step toward getting the treatment that could save your life.   [employer] last reviewed this educational content on 4/1/2019 2000-2020 [employer]. [address] [place] [address], [place] [address]. All rights reserved. This information is not intended as a substitute for professional medical care. Always follow your healthcare professional's instructions.       When should I call my doctor?  If you are worsening or not improving, please, contact us or seek urgent care as noted below.     Who should I call with questions (adults)?    [facility] [phone]  For urgent needs outside of business hours call the [facility] at [phone] and ask for the dermatology resident on call to be paged  If this is a medical emergency and you are unable to reach an ER, Call [phone]      If you need a prescription refill, please contact your pharmacy. Refills are approved or denied by our Physicians during normal business hours, Monday through Friday.  Per office policy, refills will not be granted if you have not been seen within the past year (or sooner depending on the condition).

## 2025-02-05 NOTE — PROGRESS NOTES
Detail Level: Zone Yamel Lindsay comes into clinic today at the request of Dr. Kee Ordering Provider for Wound Check Action taken: See Below.    This service provided today was under the supervising provider of the day Dr. Kee, who was available if needed.    Pt returned to clinic for post surgery 1 week follow up bandage change. Pt has no complaints, denies pain. Bandage removed from Left Anterior Thigh, area cleansed with normal saline. Site is healing and wound edges approximating well. Reapplied new steri strips and paper tape.    Advised to watch for signs/sx of infection; spreading redness, drainage, odor, fever. Call or report promptly to clinic. Pt given written instructions and informed to rtc as needed. Patient verbalized understanding.     Judit Yarbrough MA     Detail Level: Simple Detail Level: Detailed

## 2025-02-11 DIAGNOSIS — C34.31 MALIGNANT NEOPLASM OF LOWER LOBE OF RIGHT LUNG (H): ICD-10-CM

## 2025-02-11 RX ORDER — TRAMADOL HYDROCHLORIDE 50 MG/1
50 TABLET ORAL EVERY 6 HOURS PRN
Qty: 30 TABLET | Refills: 0 | Status: SHIPPED | OUTPATIENT
Start: 2025-02-11

## 2025-02-11 NOTE — TELEPHONE ENCOUNTER
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 Yes, okay will be out tomorrow.  Is pain being adequately controlled on the current regimen?: Yes. Takes the edge off.  Experiencing any side effects from medication?: No    Date of most recent appointment:  11/11/24 with Dr. Martel  Any No Show Visits:No  Next appointment:   2/19/25 with Dr. Carbajal    [facility] Reviewed: No Access    Routed/Paged provider: Dr. Clifton Carbajal

## 2025-02-17 ENCOUNTER — HOSPITAL ENCOUNTER (OUTPATIENT)
Dept: CT IMAGING | Facility: HOSPITAL | Age: 80
Discharge: HOME | End: 2025-02-17
Attending: STUDENT IN AN ORGANIZED HEALTH CARE EDUCATION/TRAINING PROGRAM | Admitting: STUDENT IN AN ORGANIZED HEALTH CARE EDUCATION/TRAINING PROGRAM
Payer: COMMERCIAL

## 2025-02-17 DIAGNOSIS — C34.31 MALIGNANT NEOPLASM OF LOWER LOBE OF RIGHT LUNG (H): ICD-10-CM

## 2025-02-17 LAB
CREAT BLD-MCNC: 0.8 MG/DL (ref 0.6–1.1)
EGFRCR SERPLBLD CKD-EPI 2021: >60 ML/MIN/1.73M2

## 2025-02-17 PROCEDURE — 71260 CT THORAX DX C+: CPT

## 2025-02-17 PROCEDURE — 82565 ASSAY OF CREATININE: CPT

## 2025-02-17 PROCEDURE — 250N000011 HC RX IP 250 OP 636: Performed by: STUDENT IN AN ORGANIZED HEALTH CARE EDUCATION/TRAINING PROGRAM

## 2025-02-17 RX ORDER — IOPAMIDOL 755 MG/ML
75 INJECTION, SOLUTION INTRAVASCULAR ONCE
Status: COMPLETED | OUTPATIENT
Start: 2025-02-17 | End: 2025-02-17

## 2025-02-17 RX ADMIN — IOPAMIDOL 75 ML: 755 INJECTION, SOLUTION INTRAVENOUS at 14:49

## 2025-02-18 NOTE — PROGRESS NOTES
Reason for Visit: f/u lung cancer, joint pain        Diagnosis:   Synchronous Rt LL Squamous cell carcinoma [id number] Stage IIIC (AJCC 8th edition) diagnosed 12/2022  PD-L1 <1%  NGS- [facility] panel- Neg (High TMB 16.7 mut/MB)     Left LL adenocarcinoma [id number] Stage (AJCC 8th edition) diagnosed 12/2022  PD-L1-2%  NGS-[facility] G12C, TMB 2.4        Treatment:  1/20/23- 4 cycles of Pembrolizumab+carboplatin+paclitaxel (pembro on hold ucrrently of immune mediated myalgia/arthralgia since 3/2023)   XRT-  Right Lower Lobe      6,000 cGy    in 30 fraction     6/06/2023 7/26/2023          Left Lower Lobe        5,000 cGy   in 10 fraction     6/22/2023 7/06/2023             Intent of treatment: Palliative     Onc HPI:     One month history of increasing dyspnea with palpitations, propting ER visit 11/19.  11/19/22- CT- 1 cm right lower lobe mass abutting multiple central bronchovascular and mediastinal structures, consistent with malignancy.2.2 cm spiculated nodule in the left lower lobe, also consistent with malignancy.   12/6/22- PET/CT-  Right lower lobe lung mass abutting the pleura (SUV max of 24), measuring 8 x 5.2 cm. There is associated complete obliteration of right lower lobe bronchus and loss of fat planes with  the left atrium. Spiculated left lower lobe solid lung mass (SUV 17.9), measuring 2.2 x 2 cm. There are several markedly hypermetabolic, irregular solid nodules in the right middle lobe abutting the pleura (approximately 10-12), as for example 1.2 cm nodule with SUV max of 16.8 (4/126). There is a separate markedly hypermetabolic 1 cm nodule in the right upper lobe. Mild hypermetabolic uptake is noted within a1 x 0.9 cm right paratracheal node ( SUV max of 4.2) (4/120), Mild uptake is also  noted within a subcentimeter lower paraesophageal node (4/154).   12/28/22- EBUS with deblking in the right hilar mass (no mediastinal staging performed)- Dr.Begnaud- LUNG, RIGHT MAINSTEM, ENDOBRONCHIAL BIOPSY:  Squamous cell carcinoma with extensive necrosis (positive for p40 and negative for TTF-1). Left LL FNAC- Positive for malignancy- Adenocarcinoma  (diffusely positive for TTF-1 while negative for p40)  1/3/23: Brain MRI - Neg  1/10/23: Tumor board discussion: Too extensive for concurrent/sequential chemo XRT- plan for palliative intent chemo-IO  1/20/23- C1 Pembrolizumab+carboplatin+paclitaxel  2/10/23-C2 Pembrolizumab+carboplatin+paclitaxel  2/27/23- CT CAP- Decreased size of the hypoattenuating mass in the central right lower lobe and small irregularly-shaped nodules in the right upper lobe consistent with positive treatment response. There is persistent postobstructive right lower lobe atelectasis.Unchanged heterogeneous lobulated and spiculated solid nodule in the lateral basal left lower lobe. No findings to suggest new extrapulmonary metastatic disease.  3/3, 3/24-  Pembrolizumab+carboplatin+paclitaxel      4/10/23- CT CAP- - some Rx response- Mass distal right lower lobe bronchus causing complete post obstructive atelectasis has decreased from 6.5 x 4.5 x 3.5 cm to 5.5 x 3.8 x 2.4 cm today. Spiculated left lower lobe mass has decreased from 2.1 cm to 1.5 cm today. Right upper lobe nodularity continues to decrease. Ill-defined nodularity in the right middle lobe has developed and is most likely postinflammatory.      6/5/23- After discussion at tumor board, Dr. Escoto agreed for definitive radiation treatment (without chemo, as she already had 4 cycles of chemo) for cancer on the right side and possible SBRT on left side both with definitive intent     6/5/23- Started XRT      Right Lower Lobe      6,000 cGy    in 30 fraction     6/06/2023 7/26/2023          Left Lower Lobe        5,000 cGy   in 10 fraction     6/22/2023 7/06/2023 6/16/23- CT Chest-  Unchanged complete atelectasis of the heterogeneous right lower lobe due to central obstructing mass status post treatment. No change in the  posterolateral left lower lobe nodule with surrounding cicatrization. No findings to suggest progressive   malignancy in the chest.     8/25/23- CT Chest- Stable right lower lobe lung mass with distal atelectasis. Lobulated nodule in the left lower lobe is also stable. No disease progression in the chest. No metastatic disease in the abdomen and pelvis.      10/20/23- CT CHest- Heterogeneous mass with consolidation and complete volume loss of the right lower lobe which is located in the azygoesophageal recess has increased in size slightly with axial dimensions superiorly 3.2 x 2.2 cm previously 2.8 x 2.0 cm  (axial image 95 series 3) and axial dimensions inferiorly 4.2 x 2.9 cm previously 4.0 x 2.6 cm (image 110). Spiculated mass posterior basilar left lower lobe stable at 1.7 x 1.2 cm (image 119).     11/3/23- PET/CT-  A consolidative masslike opacity with atelectasis involving the right lower lobe at site of original tumor demonstrates and ill-defined area of soft tissue thickening along its superior margin with moderate FDG uptake. Appearance favors ongoing posttreatment inflammatory change although is indeterminate for early local recurrence. Recommend short term chest CT with IV contrast follow-up in 3 months. Otherwise no evidence of FDG avid malignancy. An irregular nodule in the left lower lobe is not FDG avid suggesting posttreatment scarring. No evidence of metastasis including no FDG avid adenopathy.Some small irregular opacities have developed in the left lung in the short interval since 10/20/2023, likely infectious/inflammatory     2/2/24- CT Chest-  Unchanged posttreatment lobar atelectasis of the right lower lobe. Increased predominantly solid parenchymal bands with architectural distortion in the basal left lower lobe, centered on the inferior posterior medial right upper lobe consistent with posttreatment inflammation and developing cicatrization. There are  no findings to suggest progression of  neoplasm in the chest.New small right pleural effusion not a component of which is organized in the posterior mid chest secondary to #2.     5/3/24- CT Chest-  Stable posttreatment changes in both lower lobes. No definite recurrent disease. No new sites of disease in the chest, abdomen, or pelvis.     8/5/24- CT Chest- Emphysematous change with complete right lower lobe atelectasis and areas of parenchymal density and retraction in the retrohilar posteroinferior right upper lobe and in the lateral left lower lobe which appear similar or smaller today compared with  previous imaging. No new or progressive abnormality is present.     11/7/24- CT Chest-Stable    2/17/25 - CT chest was stable.  The patient felt well.  She had no new complaints.  Recent labs were unremarkable.    ROS:    She has a few aches and pains but nothing specific.  Otherwise, as above.    LAB:    CBC RESULTS:   Recent Labs   Lab Test 03/24/23  0903   WBC 5.4   RBC 3.64*   HGB 10.7*   HCT 33.4*   MCV 92   MCH 29.4   MCHC 32.0   RDW 19.9*         Last Comprehensive Metabolic Panel:  Sodium   Date Value Ref Range Status   11/07/2024 136 135 - 145 mmol/L Final   03/03/2021 140 133 - 144 mmol/L Final     Potassium   Date Value Ref Range Status   11/07/2024 5.0 3.4 - 5.3 mmol/L Final   03/03/2021 3.9 3.4 - 5.3 mmol/L Final     Chloride   Date Value Ref Range Status   11/07/2024 101 98 - 107 mmol/L Final   03/03/2021 103 94 - 109 mmol/L Final     Carbon Dioxide   Date Value Ref Range Status   03/03/2021 31 20 - 32 mmol/L Final     Carbon Dioxide (CO2)   Date Value Ref Range Status   11/07/2024 24 22 - 29 mmol/L Final     Anion Gap   Date Value Ref Range Status   11/07/2024 11 7 - 15 mmol/L Final   03/03/2021 6 3 - 14 mmol/L Final     Glucose   Date Value Ref Range Status   11/07/2024 99 70 - 99 mg/dL Final   03/03/2021 85 70 - 99 mg/dL Final     GLUCOSE BY METER POCT   Date Value Ref Range Status   11/03/2023 117 (H) 70 - 99 mg/dL Final     Urea  Nitrogen   Date Value Ref Range Status   11/07/2024 11.0 8.0 - 23.0 mg/dL Final   03/03/2021 10 7 - 30 mg/dL Final     Creatinine   Date Value Ref Range Status   11/07/2024 0.59 0.51 - 0.95 mg/dL Final   03/03/2021 0.60 0.52 - 1.04 mg/dL Final     Creatinine POCT   Date Value Ref Range Status   02/17/2025 0.8 0.6 - 1.1 mg/dL Final     GFR Estimate   Date Value Ref Range Status   03/03/2021 89 >60 mL/min/[1.73_m2] Final     Comment:     Non  GFR Calc  Starting 12/18/2018, serum creatinine based estimated GFR (eGFR) will be   calculated using the Chronic Kidney Disease Epidemiology Collaboration   (CKD-EPI) equation.       GFR, ESTIMATED POCT   Date Value Ref Range Status   02/17/2025 >60 >60 mL/min/1.73m2 Final     Calcium   Date Value Ref Range Status   11/07/2024 8.7 (L) 8.8 - 10.4 mg/dL Final     Comment:     Reference intervals for this test were updated on 7/16/2024 to reflect our healthy population more accurately. There may be differences in the flagging of prior results with similar values performed with this method. Those prior results can be interpreted in the context of the updated reference intervals.   03/03/2021 9.6 8.5 - 10.1 mg/dL Final     Bilirubin Total   Date Value Ref Range Status   03/24/2023 0.2 <=1.2 mg/dL Final   03/03/2021 0.2 0.2 - 1.3 mg/dL Final     Alkaline Phosphatase   Date Value Ref Range Status   03/24/2023 89 35 - 104 U/L Final   03/03/2021 179 (H) 40 - 150 U/L Final     ALT   Date Value Ref Range Status   03/24/2023 16 10 - 35 U/L Final   03/03/2021 18 0 - 50 U/L Final     AST   Date Value Ref Range Status   03/24/2023 16 10 - 35 U/L Final   03/03/2021 13 0 - 45 U/L Final     IMAGING:    Recent Results (from the past 744 hours)   CT Chest w Contrast    Narrative    EXAM: CT CHEST W CONTRAST  LOCATION: [facility]  DATE: 2/17/2025    INDICATION: lung cancer, restaging  COMPARISON: CT chest with contrast 8/5/2024; CT of the chest, abdomen, and  pelvis 11/7/2024  TECHNIQUE: CT chest with IV contrast. Multiplanar reformats were obtained. Dose reduction techniques were used.    CONTRAST: 75 mL Isovue 370    FINDINGS:   LUNGS AND PLEURA: There is unchanged focal opacity in the basal right chest along the mediastinal pleura consistent with the chronically atelectatic right lower lobe. There is branching calcification of the airways within the atelectasis. There is an   additional stable band of atelectasis extending towards the posterior costal pleura from the hilum. No new convex or nodular opacities Compensatory hyperinflation of the right upper and middle lobes. A solid, angular opacity in the basal left lower lobe   contacting the diaphragmatic pleura and also has unchanged borders consistent with cicatrization atelectasis. A flat/polygonal nodule in the right middle lobe along the minor fissure (series 4, image 166) is stable and consistent with an intrapulmonary   lymph node. No new lung nodules. Unchanged emphysema. No superimposed airspace opacities. Unchanged architectural distortion of the airways around the right hilum. Left lung airways are patent and normal caliber. Unchanged minimal right pleural fluid. No   left pleural fluid.    MEDIASTINUM: Cardiac chambers are normal in size. No pericardial effusion. Main pulmonary artery is normal caliber. No pulmonary artery filling defects. Mixed attenuation plaque in the thoracic aorta is unchanged. No aortic enlargement. Hazy attenuation   in the fat in the right hilum is not changed. There are no enlarged hilar or mediastinal lymph nodes. Small hiatal hernia. Mild wall thickening of the distal esophagus.    CORONARY ARTERY CALCIFICATION: Moderate.    UPPER ABDOMEN: No actionable findings in the imaged upper abdomen.    MUSCULOSKELETAL: Small thoracic spine degenerative osteophytes. Discogenic sclerosis at T11-T12. No aggressive or destructive bone lesions.      Impression    IMPRESSION:     1.  Stable  chronic atelectasis of the right lower lobe and angular focus of cicatrization atelectasis in the basal left lower lobe. No findings to suggest recurrent neoplasm in the chest.  2.  Emphysema. No superimposed acute lung or pleural inflammatory process.      MED:    Current Outpatient Medications   Medication Sig Dispense Refill    acetaminophen (TYLENOL) 500 MG tablet Take 1,000 mg by mouth every 6 hours as needed for mild pain Taking once a day      albuterol (PROAIR HFA/PROVENTIL HFA/VENTOLIN HFA) 108 (90 Base) MCG/ACT inhaler INHALE 2 PUFFS INTO THE LUNGS EVERY 6 HOURS AS NEEDED FOR SHORTNESS OF BREATH OR DIFFICULTY BREATHING OR WHEEZING 90 g 11    atorvastatin (LIPITOR) 20 MG tablet TAKE 1 TABLET(20 MG) BY MOUTH DAILY 90 tablet 3    celecoxib (CELEBREX) 200 MG capsule TAKE 1 CAPSULE(200 MG) BY MOUTH DAILY 60 capsule 1    Cholecalciferol (VITAMIN D-3 PO) Take 2,000 Units by mouth daily       clonazePAM (KLONOPIN) 0.5 MG tablet Take 1-2 tablets (0.5-1 mg) by mouth 2 times daily as needed for anxiety. 120 tablet 1    clonazePAM (KLONOPIN) 0.5 MG tablet Take 0.5-1 tablets (0.25-0.5 mg) by mouth 2 times daily as needed for anxiety 30 tablet 3    FLUoxetine (PROZAC) 20 MG capsule Take 2 capsules (40 mg) by mouth daily 180 capsule 3    fluticasone-salmeterol (ADVAIR HFA) 115-21 MCG/ACT inhaler Inhale 2 puffs into the lungs 2 times daily 12 g 11    hydrochlorothiazide (HYDRODIURIL) 25 MG tablet Take 1 tablet (25 mg) by mouth daily. 90 tablet 3    ipratropium - albuterol 0.5 mg/2.5 mg/3 mL (DUONEB) 0.5-2.5 (3) MG/3ML neb solution Take 1 vial (3 mLs) by nebulization every 6 hours as needed for shortness of breath or wheezing 90 mL 0    omeprazole (PRILOSEC) 20 MG DR capsule TAKE 1 CAPSULE(20 MG) BY MOUTH DAILY 30 TO 60 MINUTES BEFORE A MEAL 90 capsule 3    tiotropium (SPIRIVA RESPIMAT) 2.5 MCG/ACT inhaler Inhale 2 puffs into the lungs daily. 4 g 2    tiZANidine (ZANAFLEX) 2 MG tablet TAKE 1 TABLET(2 MG) BY MOUTH THREE  TIMES DAILY AS NEEDED FOR MUSCLE SPASMS 90 tablet 3    traMADol (ULTRAM) 50 MG tablet Take 1 tablet (50 mg) by mouth every 6 hours as needed for severe pain. 30 tablet 0    traZODone (DESYREL) 50 MG tablet TAKE 2 TABLETS(100 MG) BY MOUTH AT BEDTIME 60 tablet 5     Current Facility-Administered Medications   Medication Dose Route Frequency Provider Last Rate Last Admin    levalbuterol (XOPENEX) neb solution 1.25 mg  1.25 mg Nebulization Q6H PRN Chaz Martel MD         Facility-Administered Medications Ordered in Other Visits   Medication Dose Route Frequency Provider Last Rate Last Admin    heparin lock flush 100 unit/mL injection 500 Units  500 Units Intracatheter Once Yomi Durham MD          PE:    Vitals: BP (!) 147/73 (BP Location: Right arm, Patient Position: Sitting, Cuff Size: Adult Small)   Pulse 85   Temp 97.7  F (36.5  C) (Oral)   Resp 20   Wt 75.1 kg (165 lb 8 oz)   SpO2 93%   BMI 29.79 kg/m    BMI= Body mass index is 29.79 kg/m .     HEENT: Without nodes  Chest: Clear  Cor: NSR  Ext: Without edema.    A/P:    1) Stage 3 NSCLC:  The patient remains in remission.  We will continue with scan every 3 months.

## 2025-02-19 ENCOUNTER — ONCOLOGY VISIT (OUTPATIENT)
Dept: ONCOLOGY | Facility: CLINIC | Age: 80
End: 2025-02-19
Attending: INTERNAL MEDICINE
Payer: COMMERCIAL

## 2025-02-19 VITALS
OXYGEN SATURATION: 93 % | DIASTOLIC BLOOD PRESSURE: 73 MMHG | RESPIRATION RATE: 20 BRPM | HEART RATE: 85 BPM | TEMPERATURE: 97.7 F | SYSTOLIC BLOOD PRESSURE: 147 MMHG | BODY MASS INDEX: 29.79 KG/M2 | WEIGHT: 165.5 LBS

## 2025-02-19 DIAGNOSIS — C34.80 MALIGNANT NEOPLASM OF OVERLAPPING SITES OF LUNG, UNSPECIFIED LATERALITY (H): Primary | ICD-10-CM

## 2025-02-19 DIAGNOSIS — C34.31 MALIGNANT NEOPLASM OF LOWER LOBE OF RIGHT LUNG (H): ICD-10-CM

## 2025-02-19 DIAGNOSIS — F41.9 ANXIETY: ICD-10-CM

## 2025-02-19 DIAGNOSIS — J44.9 CHRONIC OBSTRUCTIVE PULMONARY DISEASE, UNSPECIFIED COPD TYPE (H): ICD-10-CM

## 2025-02-19 PROCEDURE — G0463 HOSPITAL OUTPT CLINIC VISIT: HCPCS | Performed by: INTERNAL MEDICINE

## 2025-02-19 RX ORDER — CLONAZEPAM 0.5 MG/1
.25-.5 TABLET ORAL 2 TIMES DAILY PRN
Qty: 30 TABLET | Refills: 3 | OUTPATIENT
Start: 2025-02-19

## 2025-02-19 ASSESSMENT — PAIN SCALES - GENERAL: PAINLEVEL_OUTOF10: SEVERE PAIN (7)

## 2025-02-19 NOTE — TELEPHONE ENCOUNTER
I no longer prescribe this medication for this patient, I have told her I am no longer able to prescribe this based on her age

## 2025-02-19 NOTE — TELEPHONE ENCOUNTER
Pending Prescriptions:                       Disp   Refills    clonazePAM (KLONOPIN) 0.5 MG tablet       30 tab*3            Sig: Take 0.5-1 tablets (0.25-0.5 mg) by mouth 2 times           daily as needed for anxiety.    Danay Damon on 2/19/2025 at 12:37 PM

## 2025-02-19 NOTE — NURSING NOTE
"Oncology Rooming Note    February 19, 2025 10:56 AM   Yamel Lindsay is a 79 year old female who presents for:    Chief Complaint   Patient presents with    Oncology Clinic Visit     Malignant neoplasm of lower lobe of right lung     Initial Vitals: BP (!) 147/73 (BP Location: Right arm, Patient Position: Sitting, Cuff Size: Adult Small)   Pulse 85   Temp 97.7  F (36.5  C) (Oral)   Resp 20   Wt 75.1 kg (165 lb 8 oz)   SpO2 93%   BMI 29.79 kg/m   Estimated body mass index is 29.79 kg/m  as calculated from the following:    Height as of 12/16/24: 1.588 m (5' 2.5\").    Weight as of this encounter: 75.1 kg (165 lb 8 oz). Body surface area is 1.82 meters squared.  Severe Pain (7) Comment: Data Unavailable   No LMP recorded. Patient is postmenopausal.  Allergies reviewed: Yes  Medications reviewed: Yes    Medications: Medication refills not needed today.  Pharmacy name entered into [employer]: [id number] - [place] [address] [place] [address] & [facility]    Frailty Screening:   Is the patient here for a new oncology consult visit in cancer care? 2. No    PHQ9:  Did this patient require a PHQ9?: No      Clinical concerns:  none.      Huang Oneil"

## 2025-02-19 NOTE — LETTER
2/19/2025      Yamel Lindsay  [address] [facility] [address]      Dear Colleague,    Thank you for referring your patient, Yamel Lindsay, to the [facility]. Please see a copy of my visit note below.    Reason for Visit: f/u lung cancer, joint pain        Diagnosis:   Synchronous Rt LL Squamous cell carcinoma [id number] Stage IIIC (AJCC 8th edition) diagnosed 12/2022  PD-L1 <1%  NGS- [facility] panel- Neg (High TMB 16.7 mut/MB)     Left LL adenocarcinoma [id number] Stage (AJCC 8th edition) diagnosed 12/2022  PD-L1-2%  NGS-[facility] G12C, TMB 2.4        Treatment:  1/20/23- 4 cycles of Pembrolizumab+carboplatin+paclitaxel (pembro on hold ucrrently of immune mediated myalgia/arthralgia since 3/2023)   XRT-  Right Lower Lobe      6,000 cGy    in 30 fraction     6/06/2023 7/26/2023          Left Lower Lobe        5,000 cGy   in 10 fraction     6/22/2023 7/06/2023             Intent of treatment: Palliative     Onc HPI:     One month history of increasing dyspnea with palpitations, propting ER visit 11/19.  11/19/22- CT- 1 cm right lower lobe mass abutting multiple central bronchovascular and mediastinal structures, consistent with malignancy.2.2 cm spiculated nodule in the left lower lobe, also consistent with malignancy.   12/6/22- PET/CT-  Right lower lobe lung mass abutting the pleura (SUV max of 24), measuring 8 x 5.2 cm. There is associated complete obliteration of right lower lobe bronchus and loss of fat planes with  the left atrium. Spiculated left lower lobe solid lung mass (SUV 17.9), measuring 2.2 x 2 cm. There are several markedly hypermetabolic, irregular solid nodules in the right middle lobe abutting the pleura (approximately 10-12), as for example 1.2 cm nodule with SUV max of 16.8 (4/126). There is a separate markedly hypermetabolic 1 cm nodule in the right upper lobe. Mild hypermetabolic uptake is noted within a1 x 0.9 cm right paratracheal node ( SUV max of 4.2)  (4/120), Mild uptake is also  noted within a subcentimeter lower paraesophageal node (4/154).   12/28/22- EBUS with deblking in the right hilar mass (no mediastinal staging performed)- - LUNG, RIGHT MAINSTEM, ENDOBRONCHIAL BIOPSY: Squamous cell carcinoma with extensive necrosis (positive for p40 and negative for TTF-1). Left LL FNAC- Positive for malignancy- Adenocarcinoma  (diffusely positive for TTF-1 while negative for p40)  1/3/23: Brain MRI - Neg  1/10/23: Tumor board discussion: Too extensive for concurrent/sequential chemo XRT- plan for palliative intent chemo-IO  1/20/23- C1 Pembrolizumab+carboplatin+paclitaxel  2/10/23-C2 Pembrolizumab+carboplatin+paclitaxel  2/27/23- CT CAP- Decreased size of the hypoattenuating mass in the central right lower lobe and small irregularly-shaped nodules in the right upper lobe consistent with positive treatment response. There is persistent postobstructive right lower lobe atelectasis.Unchanged heterogeneous lobulated and spiculated solid nodule in the lateral basal left lower lobe. No findings to suggest new extrapulmonary metastatic disease.  3/3, 3/24-  Pembrolizumab+carboplatin+paclitaxel      4/10/23- CT CAP- - some Rx response- Mass distal right lower lobe bronchus causing complete post obstructive atelectasis has decreased from 6.5 x 4.5 x 3.5 cm to 5.5 x 3.8 x 2.4 cm today. Spiculated left lower lobe mass has decreased from 2.1 cm to 1.5 cm today. Right upper lobe nodularity continues to decrease. Ill-defined nodularity in the right middle lobe has developed and is most likely postinflammatory.      6/5/23- After discussion at tumor board, Dr. Escoto agreed for definitive radiation treatment (without chemo, as she already had 4 cycles of chemo) for cancer on the right side and possible SBRT on left side both with definitive intent     6/5/23- Started XRT      Right Lower Lobe      6,000 cGy    in 30 fraction     6/06/2023 7/26/2023          Left Lower  Lobe        5,000 cGy   in 10 fraction     6/22/2023 7/06/2023 6/16/23- CT Chest-  Unchanged complete atelectasis of the heterogeneous right lower lobe due to central obstructing mass status post treatment. No change in the posterolateral left lower lobe nodule with surrounding cicatrization. No findings to suggest progressive   malignancy in the chest.     8/25/23- CT Chest- Stable right lower lobe lung mass with distal atelectasis. Lobulated nodule in the left lower lobe is also stable. No disease progression in the chest. No metastatic disease in the abdomen and pelvis.      10/20/23- CT CHest- Heterogeneous mass with consolidation and complete volume loss of the right lower lobe which is located in the azygoesophageal recess has increased in size slightly with axial dimensions superiorly 3.2 x 2.2 cm previously 2.8 x 2.0 cm  (axial image 95 series 3) and axial dimensions inferiorly 4.2 x 2.9 cm previously 4.0 x 2.6 cm (image 110). Spiculated mass posterior basilar left lower lobe stable at 1.7 x 1.2 cm (image 119).     11/3/23- PET/CT-  A consolidative masslike opacity with atelectasis involving the right lower lobe at site of original tumor demonstrates and ill-defined area of soft tissue thickening along its superior margin with moderate FDG uptake. Appearance favors ongoing posttreatment inflammatory change although is indeterminate for early local recurrence. Recommend short term chest CT with IV contrast follow-up in 3 months. Otherwise no evidence of FDG avid malignancy. An irregular nodule in the left lower lobe is not FDG avid suggesting posttreatment scarring. No evidence of metastasis including no FDG avid adenopathy.Some small irregular opacities have developed in the left lung in the short interval since 10/20/2023, likely infectious/inflammatory     2/2/24- CT Chest-  Unchanged posttreatment lobar atelectasis of the right lower lobe. Increased predominantly solid parenchymal bands  with architectural distortion in the basal left lower lobe, centered on the inferior posterior medial right upper lobe consistent with posttreatment inflammation and developing cicatrization. There are  no findings to suggest progression of neoplasm in the chest.New small right pleural effusion not a component of which is organized in the posterior mid chest secondary to #2.     5/3/24- CT Chest-  Stable posttreatment changes in both lower lobes. No definite recurrent disease. No new sites of disease in the chest, abdomen, or pelvis.     8/5/24- CT Chest- Emphysematous change with complete right lower lobe atelectasis and areas of parenchymal density and retraction in the retrohilar posteroinferior right upper lobe and in the lateral left lower lobe which appear similar or smaller today compared with  previous imaging. No new or progressive abnormality is present.     11/7/24- CT Chest-Stable    2/17/25 - CT chest was stable.  The patient felt well.  She had no new complaints.  Recent labs were unremarkable.    ROS:    She has a few aches and pains but nothing specific.  Otherwise, as above.    LAB:    CBC RESULTS:   Recent Labs   Lab Test 03/24/23  0903   WBC 5.4   RBC 3.64*   HGB 10.7*   HCT 33.4*   MCV 92   MCH 29.4   MCHC 32.0   RDW 19.9*         Last Comprehensive Metabolic Panel:  Sodium   Date Value Ref Range Status   11/07/2024 136 135 - 145 mmol/L Final   03/03/2021 140 133 - 144 mmol/L Final     Potassium   Date Value Ref Range Status   11/07/2024 5.0 3.4 - 5.3 mmol/L Final   03/03/2021 3.9 3.4 - 5.3 mmol/L Final     Chloride   Date Value Ref Range Status   11/07/2024 101 98 - 107 mmol/L Final   03/03/2021 103 94 - 109 mmol/L Final     Carbon Dioxide   Date Value Ref Range Status   03/03/2021 31 20 - 32 mmol/L Final     Carbon Dioxide (CO2)   Date Value Ref Range Status   11/07/2024 24 22 - 29 mmol/L Final     Anion Gap   Date Value Ref Range Status   11/07/2024 11 7 - 15 mmol/L Final   03/03/2021 6  3 - 14 mmol/L Final     Glucose   Date Value Ref Range Status   11/07/2024 99 70 - 99 mg/dL Final   03/03/2021 85 70 - 99 mg/dL Final     GLUCOSE BY METER POCT   Date Value Ref Range Status   11/03/2023 117 (H) 70 - 99 mg/dL Final     Urea Nitrogen   Date Value Ref Range Status   11/07/2024 11.0 8.0 - 23.0 mg/dL Final   03/03/2021 10 7 - 30 mg/dL Final     Creatinine   Date Value Ref Range Status   11/07/2024 0.59 0.51 - 0.95 mg/dL Final   03/03/2021 0.60 0.52 - 1.04 mg/dL Final     Creatinine POCT   Date Value Ref Range Status   02/17/2025 0.8 0.6 - 1.1 mg/dL Final     GFR Estimate   Date Value Ref Range Status   03/03/2021 89 >60 mL/min/[1.73_m2] Final     Comment:     Non  GFR Calc  Starting 12/18/2018, serum creatinine based estimated GFR (eGFR) will be   calculated using the Chronic Kidney Disease Epidemiology Collaboration   (CKD-EPI) equation.       GFR, ESTIMATED POCT   Date Value Ref Range Status   02/17/2025 >60 >60 mL/min/1.73m2 Final     Calcium   Date Value Ref Range Status   11/07/2024 8.7 (L) 8.8 - 10.4 mg/dL Final     Comment:     Reference intervals for this test were updated on 7/16/2024 to reflect our healthy population more accurately. There may be differences in the flagging of prior results with similar values performed with this method. Those prior results can be interpreted in the context of the updated reference intervals.   03/03/2021 9.6 8.5 - 10.1 mg/dL Final     Bilirubin Total   Date Value Ref Range Status   03/24/2023 0.2 <=1.2 mg/dL Final   03/03/2021 0.2 0.2 - 1.3 mg/dL Final     Alkaline Phosphatase   Date Value Ref Range Status   03/24/2023 89 35 - 104 U/L Final   03/03/2021 179 (H) 40 - 150 U/L Final     ALT   Date Value Ref Range Status   03/24/2023 16 10 - 35 U/L Final   03/03/2021 18 0 - 50 U/L Final     AST   Date Value Ref Range Status   03/24/2023 16 10 - 35 U/L Final   03/03/2021 13 0 - 45 U/L Final     IMAGING:    Recent Results (from the past 744 hours)    CT Chest w Contrast    Narrative    EXAM: CT CHEST W CONTRAST  LOCATION: [facility]  DATE: 2/17/2025    INDICATION: lung cancer, restaging  COMPARISON: CT chest with contrast 8/5/2024; CT of the chest, abdomen, and pelvis 11/7/2024  TECHNIQUE: CT chest with IV contrast. Multiplanar reformats were obtained. Dose reduction techniques were used.    CONTRAST: 75 mL Isovue 370    FINDINGS:   LUNGS AND PLEURA: There is unchanged focal opacity in the basal right chest along the mediastinal pleura consistent with the chronically atelectatic right lower lobe. There is branching calcification of the airways within the atelectasis. There is an   additional stable band of atelectasis extending towards the posterior costal pleura from the hilum. No new convex or nodular opacities Compensatory hyperinflation of the right upper and middle lobes. A solid, angular opacity in the basal left lower lobe   contacting the diaphragmatic pleura and also has unchanged borders consistent with cicatrization atelectasis. A flat/polygonal nodule in the right middle lobe along the minor fissure (series 4, image 166) is stable and consistent with an intrapulmonary   lymph node. No new lung nodules. Unchanged emphysema. No superimposed airspace opacities. Unchanged architectural distortion of the airways around the right hilum. Left lung airways are patent and normal caliber. Unchanged minimal right pleural fluid. No   left pleural fluid.    MEDIASTINUM: Cardiac chambers are normal in size. No pericardial effusion. Main pulmonary artery is normal caliber. No pulmonary artery filling defects. Mixed attenuation plaque in the thoracic aorta is unchanged. No aortic enlargement. Hazy attenuation   in the fat in the right hilum is not changed. There are no enlarged hilar or mediastinal lymph nodes. Small hiatal hernia. Mild wall thickening of the distal esophagus.    CORONARY ARTERY CALCIFICATION: Moderate.    UPPER ABDOMEN:  No actionable findings in the imaged upper abdomen.    MUSCULOSKELETAL: Small thoracic spine degenerative osteophytes. Discogenic sclerosis at T11-T12. No aggressive or destructive bone lesions.      Impression    IMPRESSION:     1.  Stable chronic atelectasis of the right lower lobe and angular focus of cicatrization atelectasis in the basal left lower lobe. No findings to suggest recurrent neoplasm in the chest.  2.  Emphysema. No superimposed acute lung or pleural inflammatory process.      MED:    Current Outpatient Medications   Medication Sig Dispense Refill     acetaminophen (TYLENOL) 500 MG tablet Take 1,000 mg by mouth every 6 hours as needed for mild pain Taking once a day       albuterol (PROAIR HFA/PROVENTIL HFA/VENTOLIN HFA) 108 (90 Base) MCG/ACT inhaler INHALE 2 PUFFS INTO THE LUNGS EVERY 6 HOURS AS NEEDED FOR SHORTNESS OF BREATH OR DIFFICULTY BREATHING OR WHEEZING 90 g 11     atorvastatin (LIPITOR) 20 MG tablet TAKE 1 TABLET(20 MG) BY MOUTH DAILY 90 tablet 3     celecoxib (CELEBREX) 200 MG capsule TAKE 1 CAPSULE(200 MG) BY MOUTH DAILY 60 capsule 1     Cholecalciferol (VITAMIN D-3 PO) Take 2,000 Units by mouth daily        clonazePAM (KLONOPIN) 0.5 MG tablet Take 1-2 tablets (0.5-1 mg) by mouth 2 times daily as needed for anxiety. 120 tablet 1     clonazePAM (KLONOPIN) 0.5 MG tablet Take 0.5-1 tablets (0.25-0.5 mg) by mouth 2 times daily as needed for anxiety 30 tablet 3     FLUoxetine (PROZAC) 20 MG capsule Take 2 capsules (40 mg) by mouth daily 180 capsule 3     fluticasone-salmeterol (ADVAIR HFA) 115-21 MCG/ACT inhaler Inhale 2 puffs into the lungs 2 times daily 12 g 11     hydrochlorothiazide (HYDRODIURIL) 25 MG tablet Take 1 tablet (25 mg) by mouth daily. 90 tablet 3     ipratropium - albuterol 0.5 mg/2.5 mg/3 mL (DUONEB) 0.5-2.5 (3) MG/3ML neb solution Take 1 vial (3 mLs) by nebulization every 6 hours as needed for shortness of breath or wheezing 90 mL 0     omeprazole (PRILOSEC) 20 MG DR capsule  TAKE 1 CAPSULE(20 MG) BY MOUTH DAILY 30 TO 60 MINUTES BEFORE A MEAL 90 capsule 3     tiotropium (SPIRIVA RESPIMAT) 2.5 MCG/ACT inhaler Inhale 2 puffs into the lungs daily. 4 g 2     tiZANidine (ZANAFLEX) 2 MG tablet TAKE 1 TABLET(2 MG) BY MOUTH THREE TIMES DAILY AS NEEDED FOR MUSCLE SPASMS 90 tablet 3     traMADol (ULTRAM) 50 MG tablet Take 1 tablet (50 mg) by mouth every 6 hours as needed for severe pain. 30 tablet 0     traZODone (DESYREL) 50 MG tablet TAKE 2 TABLETS(100 MG) BY MOUTH AT BEDTIME 60 tablet 5     Current Facility-Administered Medications   Medication Dose Route Frequency Provider Last Rate Last Admin     levalbuterol (XOPENEX) neb solution 1.25 mg  1.25 mg Nebulization Q6H PRN Chaz Martel MD         Facility-Administered Medications Ordered in Other Visits   Medication Dose Route Frequency Provider Last Rate Last Admin     heparin lock flush 100 unit/mL injection 500 Units  500 Units Intracatheter Once Yomi Durham MD          PE:    Vitals: BP (!) 147/73 (BP Location: Right arm, Patient Position: Sitting, Cuff Size: Adult Small)   Pulse 85   Temp 97.7  F (36.5  C) (Oral)   Resp 20   Wt 75.1 kg (165 lb 8 oz)   SpO2 93%   BMI 29.79 kg/m    BMI= Body mass index is 29.79 kg/m .     HEENT: Without nodes  Chest: Clear  Cor: NSR  Ext: Without edema.    A/P:    1) Stage 3 NSCLC:  The patient remains in remission.  We will continue with scan every 3 months.        Again, thank you for allowing me to participate in the care of your patient.        Sincerely,        Clifton Carbajal MD    Electronically signed

## 2025-02-20 ENCOUNTER — TELEPHONE (OUTPATIENT)
Dept: FAMILY MEDICINE | Facility: CLINIC | Age: 80
End: 2025-02-20
Payer: COMMERCIAL

## 2025-02-20 ENCOUNTER — PATIENT OUTREACH (OUTPATIENT)
Dept: ONCOLOGY | Facility: CLINIC | Age: 80
End: 2025-02-20
Payer: COMMERCIAL

## 2025-02-20 RX ORDER — ALBUTEROL SULFATE 90 UG/1
INHALANT RESPIRATORY (INHALATION)
Qty: 72 G | OUTPATIENT
Start: 2025-02-20

## 2025-02-20 NOTE — TELEPHONE ENCOUNTER
Pt called back.  Read her Dr Fraser's note.  She is wondering how she tapers off the med?  Wonders if someone can call her regarding this.      Danay Damon on 2/20/2025 at 12:08 PM

## 2025-02-20 NOTE — PROGRESS NOTES
[facility]: Cancer Care                                                                                          Transfer of care patient.  Sent [employer] message with RNCC/clinic contact info.    Gaviota LIU RN  Cancer Care Coordinator  [facility]

## 2025-02-20 NOTE — TELEPHONE ENCOUNTER
Faxed Nutrition Services Referral Form signed and filled out by Aimee Sharma to  [phone].  Put original in cabinet drawer.    Danay Damon on 2/20/2025 at 12:34 PM

## 2025-02-24 DIAGNOSIS — J44.9 CHRONIC OBSTRUCTIVE PULMONARY DISEASE, UNSPECIFIED COPD TYPE (H): ICD-10-CM

## 2025-02-24 NOTE — TELEPHONE ENCOUNTER
Medication Question or Refill    Contacts       Contact Date/Time Type Contact Phone/Fax    02/24/2025 11:11 AM CST Phone (Incoming) Yamel Lindsay (Self) [phone] (M)            What medication are you calling about (include dose and sig)?: Albuterol     Preferred Pharmacy:   [id number] - [place] [address] [address] [address] [place] [address]  [facility] [address]  Phone: [phone] Fax: [phone]      Controlled Substance Agreement on file:   CSA -- Patient Level:     [Media Unavailable] Controlled Substance Agreement - Opioid - Scan on 7/1/2021  7:34 PM       Who prescribed the medication?: PCP    Do you need a refill? Yes    When did you use the medication last? 02/20/2025    Patient offered an appointment? No    Do you have any questions or concerns?  No      Could we send this information to you in [facility] or would you prefer to receive a phone call?:   Patient would prefer a phone call   Okay to leave a detailed message?: Yes at Cell number on file:    Telephone Information:   Mobile [phone]

## 2025-02-25 RX ORDER — ALBUTEROL SULFATE 90 UG/1
INHALANT RESPIRATORY (INHALATION)
Qty: 90 G | Refills: 4 | Status: SHIPPED | OUTPATIENT
Start: 2025-02-25

## 2025-03-03 DIAGNOSIS — C34.31 MALIGNANT NEOPLASM OF LOWER LOBE OF RIGHT LUNG (H): ICD-10-CM

## 2025-03-03 RX ORDER — TRAMADOL HYDROCHLORIDE 50 MG/1
50 TABLET ORAL EVERY 6 HOURS PRN
Qty: 30 TABLET | Refills: 0 | Status: SHIPPED | OUTPATIENT
Start: 2025-03-03

## 2025-03-03 NOTE — TELEPHONE ENCOUNTER
"Narcotic Refill Request  Medication(s) requested:  Tramadol 50mg tab  Person Requesting Refill: Yamel  What pain is the medication treating: knees, chest, and back   How is the medication being taken?: 1 tab 3-4x/day  Does pt have enough for today? No, she is out.  Is pain being adequately controlled on the current regimen?: \"somewhat, it takes the edge off\"  Experiencing any side effects from medication?:  no    Date of most recent appointment:  2/19/25  Any No Show Visits: none  Next appointment:   5/21/25  Last fill date and by whom:  Dr. Clifton Carbajal on 2/21/25   Reviewed: no access    Send to provider: Dr. Carbajal and KRISTEN MeekCC     "

## 2025-03-25 DIAGNOSIS — C34.31 MALIGNANT NEOPLASM OF LOWER LOBE OF RIGHT LUNG (H): ICD-10-CM

## 2025-03-25 RX ORDER — TRAMADOL HYDROCHLORIDE 50 MG/1
50 TABLET ORAL EVERY 6 HOURS PRN
Qty: 30 TABLET | Refills: 0 | Status: SHIPPED | OUTPATIENT
Start: 2025-03-25

## 2025-03-25 NOTE — TELEPHONE ENCOUNTER
"Narcotic Refill Request    Medication(s) requested:  Tramadol  Person Requesting Refill: Patient  What pain is the medication treating: knee and chest pain  How is the medication being taken?:3-4 tablets per day  Does pt have enough for today? No  Is pain being adequately controlled on the current regimen?: \"somewhat, keeps the edge off\"  Experiencing any side effects from medication?: No    Date of most recent appointment:  02/19/25 w/   Any No Show Visits: No  Next appointment:   05/21/25 w/   Last fill date and by whom:  03/14/25 w/    Reviewed: No access    Routed provider:       "

## 2025-04-02 DIAGNOSIS — F41.9 ANXIETY: ICD-10-CM

## 2025-04-02 DIAGNOSIS — C34.31 MALIGNANT NEOPLASM OF LOWER LOBE OF RIGHT LUNG (H): ICD-10-CM

## 2025-04-02 RX ORDER — TRAMADOL HYDROCHLORIDE 50 MG/1
50 TABLET ORAL EVERY 6 HOURS PRN
Qty: 30 TABLET | Refills: 0 | Status: SHIPPED | OUTPATIENT
Start: 2025-04-02

## 2025-04-02 NOTE — TELEPHONE ENCOUNTER
"Narcotic Refill Request     Medication(s) requested:  Tramadol  Person Requesting Refill: Patient  What pain is the medication treating: knee and chest pain  How is the medication being taken?:3-4 tablets per day  Does pt have enough for today? Last one taken this morning  Is pain being adequately controlled on the current regimen?: \"somewhat, keeps the edge off\"  Experiencing any side effects from medication?: NO     Date of most recent appointment:  02/19/25 w/   Any No Show Visits: No  Next appointment:   05/21/25 w/   Last fill date and by whom:  03/22/25 w/    Reviewed: No access     Routed provider:   "

## 2025-04-02 NOTE — TELEPHONE ENCOUNTER
Fluoxetine 20mg capsule  Last prescribing provider: Dr. Martel     Last clinic visit date: 2/19/25 w/ Dr. Carbajal     Recommendations for requested medication (if none, N/A): NA    Any other pertinent information (if none, N/A): fax request    Refilled: Y/N, if NO, why?

## 2025-04-15 DIAGNOSIS — C34.31 MALIGNANT NEOPLASM OF LOWER LOBE OF RIGHT LUNG (H): ICD-10-CM

## 2025-04-15 RX ORDER — TRAMADOL HYDROCHLORIDE 50 MG/1
50 TABLET ORAL EVERY 6 HOURS PRN
Qty: 30 TABLET | Refills: 0 | Status: SHIPPED | OUTPATIENT
Start: 2025-04-15

## 2025-04-15 NOTE — TELEPHONE ENCOUNTER
Narcotic Refill Request    Medication(s) requested:  Tramadol  Person Requesting Refill:Yamel (pt)   What pain is the medication treating: knees and back  How is the medication being taken?:takes 3 to 4 a day  Does pt have enough for today? no  Is pain being adequately controlled on the current regimen?: okay  Experiencing any side effects from medication?: denies    Date of most recent appointment:  2/19/2025 Dr. Carbajal  Any No Show Visits:none recently  Next appointment:   5/21/2025 Dr. Carbajal  Last fill date and by whom:  dr. Carbajal on 4/2/2025   Reviewed: not an agent    Send to provider: routed to Dr. Carbajal and RNCC lupe

## 2025-04-22 DIAGNOSIS — C34.31 MALIGNANT NEOPLASM OF LOWER LOBE OF RIGHT LUNG (H): ICD-10-CM

## 2025-04-22 RX ORDER — TRAMADOL HYDROCHLORIDE 50 MG/1
50 TABLET ORAL EVERY 6 HOURS PRN
Qty: 30 TABLET | Refills: 0 | Status: SHIPPED | OUTPATIENT
Start: 2025-04-22

## 2025-04-22 NOTE — TELEPHONE ENCOUNTER
Narcotic Refill Request  Medication(s) requested:  Tramadol  Person Requesting Refill: Yamel  What pain is the medication treating:  knees and back   How is the medication being taken?: 1 tab q6h   Does pt have enough for today? No, just took last dose today.   Is pain being adequately controlled on the current regimen?: yes, it takes the edge off   Experiencing any side effects from medication?: none    Date of most recent appointment:  2/19/25  Any No Show Visits: none  Next appointment:   5/21/25  Last fill date and by whom:  4/15/25 by Dr. Clifton Carbajal    Reviewed:  no access    Send to provider: Dr. Carbajal and KRISTEN MeekCC.

## 2025-04-30 DIAGNOSIS — C34.31 MALIGNANT NEOPLASM OF LOWER LOBE OF RIGHT LUNG (H): ICD-10-CM

## 2025-04-30 RX ORDER — TRAMADOL HYDROCHLORIDE 50 MG/1
50 TABLET ORAL EVERY 6 HOURS PRN
Qty: 30 TABLET | Refills: 0 | Status: SHIPPED | OUTPATIENT
Start: 2025-04-30

## 2025-04-30 NOTE — TELEPHONE ENCOUNTER
Narcotic Refill Request    Medication(s) requested:  Tramadol  Person Requesting Refill: Patient  What pain is the medication treating: Knees and back  How is the medication being taken?: 3-4 tablets per day  Does pt have enough for today? No  Is pain being adequately controlled on the current regimen?: Yes  Experiencing any side effects from medication?: No    Date of most recent appointment:  02/19/25 w/  Any No Show Visits: No  Next appointment:   05/21/25 w/  Last fill date and by whom:  04/22/25 by     Reviewed: No access    Routed provider:

## 2025-05-06 DIAGNOSIS — J40 BRONCHITIS: ICD-10-CM

## 2025-05-08 DIAGNOSIS — F41.9 ANXIETY: ICD-10-CM

## 2025-05-08 DIAGNOSIS — C34.31 MALIGNANT NEOPLASM OF LOWER LOBE OF RIGHT LUNG (H): ICD-10-CM

## 2025-05-08 RX ORDER — CLONAZEPAM 0.5 MG/1
.5-1 TABLET ORAL 2 TIMES DAILY PRN
Qty: 120 TABLET | Refills: 1 | Status: SHIPPED | OUTPATIENT
Start: 2025-05-08

## 2025-05-08 RX ORDER — CODEINE PHOSPHATE AND GUAIFENESIN 10; 100 MG/5ML; MG/5ML
SOLUTION ORAL
Qty: 120 ML | OUTPATIENT
Start: 2025-05-08

## 2025-05-08 RX ORDER — TRAMADOL HYDROCHLORIDE 50 MG/1
50 TABLET ORAL EVERY 6 HOURS PRN
Qty: 30 TABLET | Refills: 0 | Status: SHIPPED | OUTPATIENT
Start: 2025-05-08

## 2025-05-19 ENCOUNTER — HOSPITAL ENCOUNTER (OUTPATIENT)
Dept: CT IMAGING | Facility: HOSPITAL | Age: 80
Discharge: HOME | End: 2025-05-19
Attending: INTERNAL MEDICINE | Admitting: INTERNAL MEDICINE
Payer: COMMERCIAL

## 2025-05-19 DIAGNOSIS — C34.80 MALIGNANT NEOPLASM OF OVERLAPPING SITES OF LUNG, UNSPECIFIED LATERALITY (H): ICD-10-CM

## 2025-05-19 DIAGNOSIS — C34.31 MALIGNANT NEOPLASM OF LOWER LOBE OF RIGHT LUNG (H): ICD-10-CM

## 2025-05-19 LAB
CREAT BLD-MCNC: 0.8 MG/DL (ref 0.5–1)
EGFRCR SERPLBLD CKD-EPI 2021: >60 ML/MIN/1.73M2

## 2025-05-19 PROCEDURE — 74177 CT ABD & PELVIS W/CONTRAST: CPT

## 2025-05-19 PROCEDURE — 82565 ASSAY OF CREATININE: CPT

## 2025-05-19 PROCEDURE — 250N000011 HC RX IP 250 OP 636: Performed by: INTERNAL MEDICINE

## 2025-05-19 PROCEDURE — 250N000009 HC RX 250: Performed by: INTERNAL MEDICINE

## 2025-05-19 RX ORDER — IOPAMIDOL 755 MG/ML
81 INJECTION, SOLUTION INTRAVASCULAR ONCE
Status: COMPLETED | OUTPATIENT
Start: 2025-05-19 | End: 2025-05-19

## 2025-05-19 RX ADMIN — IOPAMIDOL 81 ML: 755 INJECTION, SOLUTION INTRAVENOUS at 10:48

## 2025-05-19 RX ADMIN — SODIUM CHLORIDE 70 ML: 9 INJECTION, SOLUTION INTRAVENOUS at 10:49

## 2025-05-20 NOTE — PROGRESS NOTES
Reason for Visit: f/u lung cancer, joint pain        Diagnosis:   Synchronous Rt LL Squamous cell carcinoma [id number] Stage IIIC (AJCC 8th edition) diagnosed 12/2022  PD-L1 <1%  NGS- [facility] panel- Neg (High TMB 16.7 mut/MB)     Left LL adenocarcinoma [id number] Stage (AJCC 8th edition) diagnosed 12/2022  PD-L1-2%  NGS-[facility] G12C, TMB 2.4        Treatment:  1/20/23- 4 cycles of Pembrolizumab+carboplatin+paclitaxel (pembro on hold ucrrently of immune mediated myalgia/arthralgia since 3/2023)   XRT-  Right Lower Lobe      6,000 cGy    in 30 fraction     6/06/2023 7/26/2023          Left Lower Lobe        5,000 cGy   in 10 fraction     6/22/2023 7/06/2023             Intent of treatment: Palliative     Onc HPI:     One month history of increasing dyspnea with palpitations, propting ER visit 11/19.  11/19/22- CT- 1 cm right lower lobe mass abutting multiple central bronchovascular and mediastinal structures, consistent with malignancy.2.2 cm spiculated nodule in the left lower lobe, also consistent with malignancy.   12/6/22- PET/CT-  Right lower lobe lung mass abutting the pleura (SUV max of 24), measuring 8 x 5.2 cm. There is associated complete obliteration of right lower lobe bronchus and loss of fat planes with  the left atrium. Spiculated left lower lobe solid lung mass (SUV 17.9), measuring 2.2 x 2 cm. There are several markedly hypermetabolic, irregular solid nodules in the right middle lobe abutting the pleura (approximately 10-12), as for example 1.2 cm nodule with SUV max of 16.8 (4/126). There is a separate markedly hypermetabolic 1 cm nodule in the right upper lobe. Mild hypermetabolic uptake is noted within a1 x 0.9 cm right paratracheal node ( SUV max of 4.2) (4/120), Mild uptake is also  noted within a subcentimeter lower paraesophageal node (4/154).   12/28/22- EBUS with deblking in the right hilar mass (no mediastinal staging performed)- Dr.Begnaud- LUNG, RIGHT MAINSTEM, ENDOBRONCHIAL BIOPSY:  Squamous cell carcinoma with extensive necrosis (positive for p40 and negative for TTF-1). Left LL FNAC- Positive for malignancy- Adenocarcinoma  (diffusely positive for TTF-1 while negative for p40)  1/3/23: Brain MRI - Neg  1/10/23: Tumor board discussion: Too extensive for concurrent/sequential chemo XRT- plan for palliative intent chemo-IO  1/20/23- C1 Pembrolizumab+carboplatin+paclitaxel  2/10/23-C2 Pembrolizumab+carboplatin+paclitaxel  2/27/23- CT CAP- Decreased size of the hypoattenuating mass in the central right lower lobe and small irregularly-shaped nodules in the right upper lobe consistent with positive treatment response. There is persistent postobstructive right lower lobe atelectasis.Unchanged heterogeneous lobulated and spiculated solid nodule in the lateral basal left lower lobe. No findings to suggest new extrapulmonary metastatic disease.  3/3, 3/24-  Pembrolizumab+carboplatin+paclitaxel      4/10/23- CT CAP- - some Rx response- Mass distal right lower lobe bronchus causing complete post obstructive atelectasis has decreased from 6.5 x 4.5 x 3.5 cm to 5.5 x 3.8 x 2.4 cm today. Spiculated left lower lobe mass has decreased from 2.1 cm to 1.5 cm today. Right upper lobe nodularity continues to decrease. Ill-defined nodularity in the right middle lobe has developed and is most likely postinflammatory.      6/5/23- After discussion at tumor board, Dr. Escoto agreed for definitive radiation treatment (without chemo, as she already had 4 cycles of chemo) for cancer on the right side and possible SBRT on left side both with definitive intent     6/5/23- Started XRT      Right Lower Lobe      6,000 cGy    in 30 fraction     6/06/2023 7/26/2023          Left Lower Lobe        5,000 cGy   in 10 fraction     6/22/2023 7/06/2023 6/16/23- CT Chest-  Unchanged complete atelectasis of the heterogeneous right lower lobe due to central obstructing mass status post treatment. No change in the  posterolateral left lower lobe nodule with surrounding cicatrization. No findings to suggest progressive malignancy in the chest.     8/25/23- CT Chest- Stable right lower lobe lung mass with distal atelectasis. Lobulated nodule in the left lower lobe is also stable. No disease progression in the chest. No metastatic disease in the abdomen and pelvis.      10/20/23- CT CHest- Heterogeneous mass with consolidation and complete volume loss of the right lower lobe which is located in the azygoesophageal recess has increased in size slightly with axial dimensions superiorly 3.2 x 2.2 cm previously 2.8 x 2.0 cm  (axial image 95 series 3) and axial dimensions inferiorly 4.2 x 2.9 cm previously 4.0 x 2.6 cm (image 110). Spiculated mass posterior basilar left lower lobe stable at 1.7 x 1.2 cm (image 119).     11/3/23- PET/CT-  A consolidative masslike opacity with atelectasis involving the right lower lobe at site of original tumor demonstrates and ill-defined area of soft tissue thickening along its superior margin with moderate FDG uptake. Appearance favors ongoing posttreatment inflammatory change although is indeterminate for early local recurrence. Recommend short term chest CT with IV contrast follow-up in 3 months. Otherwise no evidence of FDG avid malignancy. An irregular nodule in the left lower lobe is not FDG avid suggesting posttreatment scarring. No evidence of metastasis including no FDG avid adenopathy.Some small irregular opacities have developed in the left lung in the short interval since 10/20/2023, likely infectious/inflammatory     2/2/24- CT Chest-  Unchanged posttreatment lobar atelectasis of the right lower lobe. Increased predominantly solid parenchymal bands with architectural distortion in the basal left lower lobe, centered on the inferior posterior medial right upper lobe consistent with posttreatment inflammation and developing cicatrization. There are  no findings to suggest progression of  neoplasm in the chest.New small right pleural effusion not a component of which is organized in the posterior mid chest secondary to #2.     5/3/24- CT Chest-  Stable posttreatment changes in both lower lobes. No definite recurrent disease. No new sites of disease in the chest, abdomen, or pelvis.     8/5/24- CT Chest- Emphysematous change with complete right lower lobe atelectasis and areas of parenchymal density and retraction in the retrohilar posteroinferior right upper lobe and in the lateral left lower lobe which appear similar or smaller today compared with  previous imaging. No new or progressive abnormality is present.     11/7/24- CT Chest-Stable     2/17/25 - CT chest was stable.  The patient felt well.  She had no new complaints.  Recent labs were unremarkable.    5/21/25 - CT was stable.   The patient felt well, but the ICI induced arthralgias persist.  They are unchanged.  She has an anemia but it hasn't been confirmed for some time.     ROS:    As above.    LAB:    CBC RESULTS:   Recent Labs   Lab Test 03/24/23  0903   WBC 5.4   RBC 3.64*   HGB 10.7*   HCT 33.4*   MCV 92   MCH 29.4   MCHC 32.0   RDW 19.9*         Last Comprehensive Metabolic Panel:  Sodium   Date Value Ref Range Status   11/07/2024 136 135 - 145 mmol/L Final   03/03/2021 140 133 - 144 mmol/L Final     Potassium   Date Value Ref Range Status   11/07/2024 5.0 3.4 - 5.3 mmol/L Final   03/03/2021 3.9 3.4 - 5.3 mmol/L Final     Chloride   Date Value Ref Range Status   11/07/2024 101 98 - 107 mmol/L Final   03/03/2021 103 94 - 109 mmol/L Final     Carbon Dioxide   Date Value Ref Range Status   03/03/2021 31 20 - 32 mmol/L Final     Carbon Dioxide (CO2)   Date Value Ref Range Status   11/07/2024 24 22 - 29 mmol/L Final     Anion Gap   Date Value Ref Range Status   11/07/2024 11 7 - 15 mmol/L Final   03/03/2021 6 3 - 14 mmol/L Final     Glucose   Date Value Ref Range Status   11/07/2024 99 70 - 99 mg/dL Final   03/03/2021 85 70 - 99  mg/dL Final     GLUCOSE BY METER POCT   Date Value Ref Range Status   11/03/2023 117 (H) 70 - 99 mg/dL Final     Urea Nitrogen   Date Value Ref Range Status   11/07/2024 11.0 8.0 - 23.0 mg/dL Final   03/03/2021 10 7 - 30 mg/dL Final     Creatinine   Date Value Ref Range Status   11/07/2024 0.59 0.51 - 0.95 mg/dL Final   03/03/2021 0.60 0.52 - 1.04 mg/dL Final     Creatinine POCT   Date Value Ref Range Status   05/19/2025 0.8 0.5 - 1.0 mg/dL Final     GFR Estimate   Date Value Ref Range Status   03/03/2021 89 >60 mL/min/[1.73_m2] Final     Comment:     Non  GFR Calc  Starting 12/18/2018, serum creatinine based estimated GFR (eGFR) will be   calculated using the Chronic Kidney Disease Epidemiology Collaboration   (CKD-EPI) equation.       GFR, ESTIMATED POCT   Date Value Ref Range Status   05/19/2025 >60 >60 mL/min/1.73m2 Final     Calcium   Date Value Ref Range Status   11/07/2024 8.7 (L) 8.8 - 10.4 mg/dL Final     Comment:     Reference intervals for this test were updated on 7/16/2024 to reflect our healthy population more accurately. There may be differences in the flagging of prior results with similar values performed with this method. Those prior results can be interpreted in the context of the updated reference intervals.   03/03/2021 9.6 8.5 - 10.1 mg/dL Final     Bilirubin Total   Date Value Ref Range Status   03/24/2023 0.2 <=1.2 mg/dL Final   03/03/2021 0.2 0.2 - 1.3 mg/dL Final     Alkaline Phosphatase   Date Value Ref Range Status   03/24/2023 89 35 - 104 U/L Final   03/03/2021 179 (H) 40 - 150 U/L Final     ALT   Date Value Ref Range Status   03/24/2023 16 10 - 35 U/L Final   03/03/2021 18 0 - 50 U/L Final     AST   Date Value Ref Range Status   03/24/2023 16 10 - 35 U/L Final   03/03/2021 13 0 - 45 U/L Final     IMAGING:    Recent Results (from the past 744 hours)   CT Chest/Abdomen/Pelvis w Contrast    Narrative    EXAM: CT CHEST/ABDOMEN/PELVIS W CONTRAST  LOCATION: [facility]  [facility]  DATE: 5/19/2025    INDICATION: NSCLC history. History of right lower lobe squamous cell carcinoma and left lower lobe adenocarcinoma.  COMPARISON: 2/17/2025, 11/7/2024, 8/5/2024, 5/3/2024  TECHNIQUE: CT scan of the chest, abdomen, and pelvis was performed following injection of IV contrast. Multiplanar reformats were obtained. Dose reduction techniques were used.   CONTRAST: 81mL isovue 370    FINDINGS:   LUNGS AND PLEURA: A poorly defined oval nodular area of peribronchial soft tissue thickening in the right upper lobe posteriorly on series 4 image 40 measures 11 x 4 mm and is not significantly changed from 2/17/2025 but may be slightly increased in   prominence from 2024. This may represent evolving scarring and fibrotic change. Attention to this area is recommended on future surveillance exams. Unchanged chronic atelectasis and scarring of the right lower lobe with associated volume loss. Stable   bandlike scarring in the left lower lobe. Stable nodule along the right minor fissure on series 4 image 70 measuring 7 x 5 mm. Moderate emphysematous changes. No pleural effusions.    MEDIASTINUM/AXILLAE: Normal heart size. Mild calcified plaque in the thoracic aorta. No adenopathy.    CORONARY ARTERY CALCIFICATION: Moderate.    HEPATOBILIARY: Unchanged small low-attenuation lesions compatible with small cysts or hemangiomas. No follow-up needed.    PANCREAS: Normal.    SPLEEN: Normal.    ADRENAL GLANDS: Normal.    KIDNEYS/BLADDER: Benign left renal cyst. No follow-up needed.    BOWEL: Small sliding-type hiatal hernia. Bowel loops are normal caliber.    LYMPH NODES: Normal.    VASCULATURE: Moderate calcified atheromatous plaque in the aorta and iliac arteries.    PELVIC ORGANS: No large adnexal cysts or masses.    MUSCULOSKELETAL: No suspicious lytic or sclerotic lesions are identified. Degenerative changes in the lower thoracic and lower lumbar spine.      Impression    IMPRESSION:  1.  A poorly  defined oval nodular area of peribronchial soft tissue thickening in the right upper lobe posteriorly measuring 11 x 4 mm is not significantly changed from 2/17/2025 but may be slightly increased in prominence from 2024. This may represent   evolving scarring and fibrotic change. Attention to this area is recommended on future surveillance exams.  2.  Otherwise, no evidence of recurrent malignancy in the chest, abdomen, or pelvis.  3.  Stable chronic atelectasis and scarring in the right lower lobe with associated volume loss compatible with posttreatment changes. Stable bandlike scarring in the left lower lobe compatible with posttreatment changes.  4.  Stable 7 x 5 mm nodule along the right minor fissure.  5.  Moderate emphysematous changes.        MED:    Current Outpatient Medications   Medication Sig Dispense Refill    acetaminophen (TYLENOL) 500 MG tablet Take 1,000 mg by mouth every 6 hours as needed for mild pain Taking once a day      albuterol (PROAIR HFA/PROVENTIL HFA/VENTOLIN HFA) 108 (90 Base) MCG/ACT inhaler INHALE 2 PUFFS INTO THE LUNGS EVERY 6 HOURS AS NEEDED FOR SHORTNESS OF BREATH OR DIFFICULTY BREATHING OR WHEEZING 90 g 4    atorvastatin (LIPITOR) 20 MG tablet TAKE 1 TABLET(20 MG) BY MOUTH DAILY 90 tablet 3    celecoxib (CELEBREX) 200 MG capsule TAKE 1 CAPSULE(200 MG) BY MOUTH DAILY 60 capsule 1    Cholecalciferol (VITAMIN D-3 PO) Take 2,000 Units by mouth daily       clonazePAM (KLONOPIN) 0.5 MG tablet Take 1-2 tablets (0.5-1 mg) by mouth 2 times daily as needed for anxiety. 120 tablet 1    FLUoxetine (PROZAC) 20 MG capsule Take 2 capsules (40 mg) by mouth daily. 180 capsule 3    fluticasone-salmeterol (ADVAIR HFA) 115-21 MCG/ACT inhaler Inhale 2 puffs into the lungs 2 times daily 12 g 11    hydrochlorothiazide (HYDRODIURIL) 25 MG tablet Take 1 tablet (25 mg) by mouth daily. 90 tablet 3    ipratropium - albuterol 0.5 mg/2.5 mg/3 mL (DUONEB) 0.5-2.5 (3) MG/3ML neb solution Take 1 vial (3 mLs) by  nebulization every 6 hours as needed for shortness of breath or wheezing 90 mL 0    omeprazole (PRILOSEC) 20 MG DR capsule TAKE 1 CAPSULE(20 MG) BY MOUTH DAILY 30 TO 60 MINUTES BEFORE A MEAL 90 capsule 3    tiotropium (SPIRIVA RESPIMAT) 2.5 MCG/ACT inhaler Inhale 2 puffs into the lungs daily. 4 g 2    tiZANidine (ZANAFLEX) 2 MG tablet TAKE 1 TABLET(2 MG) BY MOUTH THREE TIMES DAILY AS NEEDED FOR MUSCLE SPASMS 90 tablet 3    traMADol (ULTRAM) 50 MG tablet Take 1 tablet (50 mg) by mouth every 6 hours as needed for severe pain. 30 tablet 0    traZODone (DESYREL) 50 MG tablet TAKE 2 TABLETS(100 MG) BY MOUTH AT BEDTIME 60 tablet 5     Current Facility-Administered Medications   Medication Dose Route Frequency Provider Last Rate Last Admin    levalbuterol (XOPENEX) neb solution 1.25 mg  1.25 mg Nebulization Q6H PRN Chaz Martel MD         Facility-Administered Medications Ordered in Other Visits   Medication Dose Route Frequency Provider Last Rate Last Admin    heparin lock flush 100 unit/mL injection 500 Units  500 Units Intracatheter Once Yomi Durham MD          PE:    Vitals: /77 (BP Location: Right arm, Patient Position: Sitting, Cuff Size: Adult Regular)   Pulse 81   Temp 97.5  F (36.4  C) (Oral)   Resp 20   Wt 71.8 kg (158 lb 4.8 oz)   SpO2 95%   BMI 28.49 kg/m    BMI= Body mass index is 28.49 kg/m .     HEENT:  Without nodes.  Chest: Clear  Cor: NSR  Ext: Without edema    A/P:    1) NSCLC:  The patient had bilateral primary tumors (stage 1 amd 3).  She is now two years out from definitive therapy.  There is no evidence of recurrence.  We will continue every 3 month scans for the next 12 months.    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  She take tramadol and this controls it.    3) Anemia:  The patient has not had a CBC checked for some time.  We will check this next visit with iron studies and a reticulocyte count.    The longitudinal plan of care  for the diagnosis(es)/condition(s) as documented were addressed during this visit. Due to the added complexity in care, I will continue to support Yamel in the subsequent management and with ongoing continuity of care.

## 2025-05-21 ENCOUNTER — ONCOLOGY VISIT (OUTPATIENT)
Dept: ONCOLOGY | Facility: CLINIC | Age: 80
End: 2025-05-21
Attending: INTERNAL MEDICINE
Payer: COMMERCIAL

## 2025-05-21 VITALS
BODY MASS INDEX: 28.49 KG/M2 | RESPIRATION RATE: 20 BRPM | OXYGEN SATURATION: 95 % | SYSTOLIC BLOOD PRESSURE: 126 MMHG | WEIGHT: 158.3 LBS | DIASTOLIC BLOOD PRESSURE: 77 MMHG | HEART RATE: 81 BPM | TEMPERATURE: 97.5 F

## 2025-05-21 DIAGNOSIS — D50.9 IRON DEFICIENCY ANEMIA, UNSPECIFIED IRON DEFICIENCY ANEMIA TYPE: ICD-10-CM

## 2025-05-21 DIAGNOSIS — C34.80 MALIGNANT NEOPLASM OF OVERLAPPING SITES OF LUNG, UNSPECIFIED LATERALITY (H): Primary | ICD-10-CM

## 2025-05-21 PROCEDURE — G0463 HOSPITAL OUTPT CLINIC VISIT: HCPCS | Performed by: INTERNAL MEDICINE

## 2025-05-21 ASSESSMENT — PAIN SCALES - GENERAL: PAINLEVEL_OUTOF10: NO PAIN (0)

## 2025-05-21 NOTE — LETTER
5/21/2025      Yamel Lindsay  [address] [facility] [id number]      Dear Colleague,    Thank you for referring your patient, Yamel Lindsay, to the [facility]. Please see a copy of my visit note below.    Reason for Visit: f/u lung cancer, joint pain        Diagnosis:   Synchronous Rt LL Squamous cell carcinoma [id number] Stage IIIC (AJCC 8th edition) diagnosed 12/2022  PD-L1 <1%  NGS- [facility] panel- Neg (High TMB 16.7 mut/MB)     Left LL adenocarcinoma [id number] Stage (AJCC 8th edition) diagnosed 12/2022  PD-L1-2%  NGS-[facility] G12C, TMB 2.4        Treatment:  1/20/23- 4 cycles of Pembrolizumab+carboplatin+paclitaxel (pembro on hold ucrrently of immune mediated myalgia/arthralgia since 3/2023)   XRT-  Right Lower Lobe      6,000 cGy    in 30 fraction     6/06/2023 7/26/2023          Left Lower Lobe        5,000 cGy   in 10 fraction     6/22/2023 7/06/2023             Intent of treatment: Palliative     Onc HPI:     One month history of increasing dyspnea with palpitations, propting ER visit 11/19.  11/19/22- CT- 1 cm right lower lobe mass abutting multiple central bronchovascular and mediastinal structures, consistent with malignancy.2.2 cm spiculated nodule in the left lower lobe, also consistent with malignancy.   12/6/22- PET/CT-  Right lower lobe lung mass abutting the pleura (SUV max of 24), measuring 8 x 5.2 cm. There is associated complete obliteration of right lower lobe bronchus and loss of fat planes with  the left atrium. Spiculated left lower lobe solid lung mass (SUV 17.9), measuring 2.2 x 2 cm. There are several markedly hypermetabolic, irregular solid nodules in the right middle lobe abutting the pleura (approximately 10-12), as for example 1.2 cm nodule with SUV max of 16.8 (4/126). There is a separate markedly hypermetabolic 1 cm nodule in the right upper lobe. Mild hypermetabolic uptake is noted within a1 x 0.9 cm right paratracheal node ( SUV max of 4.2)  (4/120), Mild uptake is also  noted within a subcentimeter lower paraesophageal node (4/154).   12/28/22- EBUS with deblking in the right hilar mass (no mediastinal staging performed)- - LUNG, RIGHT MAINSTEM, ENDOBRONCHIAL BIOPSY: Squamous cell carcinoma with extensive necrosis (positive for p40 and negative for TTF-1). Left LL FNAC- Positive for malignancy- Adenocarcinoma  (diffusely positive for TTF-1 while negative for p40)  1/3/23: Brain MRI - Neg  1/10/23: Tumor board discussion: Too extensive for concurrent/sequential chemo XRT- plan for palliative intent chemo-IO  1/20/23- C1 Pembrolizumab+carboplatin+paclitaxel  2/10/23-C2 Pembrolizumab+carboplatin+paclitaxel  2/27/23- CT CAP- Decreased size of the hypoattenuating mass in the central right lower lobe and small irregularly-shaped nodules in the right upper lobe consistent with positive treatment response. There is persistent postobstructive right lower lobe atelectasis.Unchanged heterogeneous lobulated and spiculated solid nodule in the lateral basal left lower lobe. No findings to suggest new extrapulmonary metastatic disease.  3/3, 3/24-  Pembrolizumab+carboplatin+paclitaxel      4/10/23- CT CAP- - some Rx response- Mass distal right lower lobe bronchus causing complete post obstructive atelectasis has decreased from 6.5 x 4.5 x 3.5 cm to 5.5 x 3.8 x 2.4 cm today. Spiculated left lower lobe mass has decreased from 2.1 cm to 1.5 cm today. Right upper lobe nodularity continues to decrease. Ill-defined nodularity in the right middle lobe has developed and is most likely postinflammatory.      6/5/23- After discussion at tumor board, Dr. Escoto agreed for definitive radiation treatment (without chemo, as she already had 4 cycles of chemo) for cancer on the right side and possible SBRT on left side both with definitive intent     6/5/23- Started XRT      Right Lower Lobe      6,000 cGy    in 30 fraction     6/06/2023 7/26/2023          Left Lower  Lobe        5,000 cGy   in 10 fraction     6/22/2023 7/06/2023 6/16/23- CT Chest-  Unchanged complete atelectasis of the heterogeneous right lower lobe due to central obstructing mass status post treatment. No change in the posterolateral left lower lobe nodule with surrounding cicatrization. No findings to suggest progressive malignancy in the chest.     8/25/23- CT Chest- Stable right lower lobe lung mass with distal atelectasis. Lobulated nodule in the left lower lobe is also stable. No disease progression in the chest. No metastatic disease in the abdomen and pelvis.      10/20/23- CT CHest- Heterogeneous mass with consolidation and complete volume loss of the right lower lobe which is located in the azygoesophageal recess has increased in size slightly with axial dimensions superiorly 3.2 x 2.2 cm previously 2.8 x 2.0 cm  (axial image 95 series 3) and axial dimensions inferiorly 4.2 x 2.9 cm previously 4.0 x 2.6 cm (image 110). Spiculated mass posterior basilar left lower lobe stable at 1.7 x 1.2 cm (image 119).     11/3/23- PET/CT-  A consolidative masslike opacity with atelectasis involving the right lower lobe at site of original tumor demonstrates and ill-defined area of soft tissue thickening along its superior margin with moderate FDG uptake. Appearance favors ongoing posttreatment inflammatory change although is indeterminate for early local recurrence. Recommend short term chest CT with IV contrast follow-up in 3 months. Otherwise no evidence of FDG avid malignancy. An irregular nodule in the left lower lobe is not FDG avid suggesting posttreatment scarring. No evidence of metastasis including no FDG avid adenopathy.Some small irregular opacities have developed in the left lung in the short interval since 10/20/2023, likely infectious/inflammatory     2/2/24- CT Chest-  Unchanged posttreatment lobar atelectasis of the right lower lobe. Increased predominantly solid parenchymal bands with  architectural distortion in the basal left lower lobe, centered on the inferior posterior medial right upper lobe consistent with posttreatment inflammation and developing cicatrization. There are  no findings to suggest progression of neoplasm in the chest.New small right pleural effusion not a component of which is organized in the posterior mid chest secondary to #2.     5/3/24- CT Chest-  Stable posttreatment changes in both lower lobes. No definite recurrent disease. No new sites of disease in the chest, abdomen, or pelvis.     8/5/24- CT Chest- Emphysematous change with complete right lower lobe atelectasis and areas of parenchymal density and retraction in the retrohilar posteroinferior right upper lobe and in the lateral left lower lobe which appear similar or smaller today compared with  previous imaging. No new or progressive abnormality is present.     11/7/24- CT Chest-Stable     2/17/25 - CT chest was stable.  The patient felt well.  She had no new complaints.  Recent labs were unremarkable.    5/21/25 - CT was stable.   The patient felt well, but the ICI induced arthralgias persist.  They are unchanged.  She has an anemia but it hasn't been confirmed for some time.     ROS:    As above.    LAB:    CBC RESULTS:   Recent Labs   Lab Test 03/24/23  0903   WBC 5.4   RBC 3.64*   HGB 10.7*   HCT 33.4*   MCV 92   MCH 29.4   MCHC 32.0   RDW 19.9*         Last Comprehensive Metabolic Panel:  Sodium   Date Value Ref Range Status   11/07/2024 136 135 - 145 mmol/L Final   03/03/2021 140 133 - 144 mmol/L Final     Potassium   Date Value Ref Range Status   11/07/2024 5.0 3.4 - 5.3 mmol/L Final   03/03/2021 3.9 3.4 - 5.3 mmol/L Final     Chloride   Date Value Ref Range Status   11/07/2024 101 98 - 107 mmol/L Final   03/03/2021 103 94 - 109 mmol/L Final     Carbon Dioxide   Date Value Ref Range Status   03/03/2021 31 20 - 32 mmol/L Final     Carbon Dioxide (CO2)   Date Value Ref Range Status   11/07/2024 24 22 -  29 mmol/L Final     Anion Gap   Date Value Ref Range Status   11/07/2024 11 7 - 15 mmol/L Final   03/03/2021 6 3 - 14 mmol/L Final     Glucose   Date Value Ref Range Status   11/07/2024 99 70 - 99 mg/dL Final   03/03/2021 85 70 - 99 mg/dL Final     GLUCOSE BY METER POCT   Date Value Ref Range Status   11/03/2023 117 (H) 70 - 99 mg/dL Final     Urea Nitrogen   Date Value Ref Range Status   11/07/2024 11.0 8.0 - 23.0 mg/dL Final   03/03/2021 10 7 - 30 mg/dL Final     Creatinine   Date Value Ref Range Status   11/07/2024 0.59 0.51 - 0.95 mg/dL Final   03/03/2021 0.60 0.52 - 1.04 mg/dL Final     Creatinine POCT   Date Value Ref Range Status   05/19/2025 0.8 0.5 - 1.0 mg/dL Final     GFR Estimate   Date Value Ref Range Status   03/03/2021 89 >60 mL/min/[1.73_m2] Final     Comment:     Non  GFR Calc  Starting 12/18/2018, serum creatinine based estimated GFR (eGFR) will be   calculated using the Chronic Kidney Disease Epidemiology Collaboration   (CKD-EPI) equation.       GFR, ESTIMATED POCT   Date Value Ref Range Status   05/19/2025 >60 >60 mL/min/1.73m2 Final     Calcium   Date Value Ref Range Status   11/07/2024 8.7 (L) 8.8 - 10.4 mg/dL Final     Comment:     Reference intervals for this test were updated on 7/16/2024 to reflect our healthy population more accurately. There may be differences in the flagging of prior results with similar values performed with this method. Those prior results can be interpreted in the context of the updated reference intervals.   03/03/2021 9.6 8.5 - 10.1 mg/dL Final     Bilirubin Total   Date Value Ref Range Status   03/24/2023 0.2 <=1.2 mg/dL Final   03/03/2021 0.2 0.2 - 1.3 mg/dL Final     Alkaline Phosphatase   Date Value Ref Range Status   03/24/2023 89 35 - 104 U/L Final   03/03/2021 179 (H) 40 - 150 U/L Final     ALT   Date Value Ref Range Status   03/24/2023 16 10 - 35 U/L Final   03/03/2021 18 0 - 50 U/L Final     AST   Date Value Ref Range Status   03/24/2023 16  10 - 35 U/L Final   03/03/2021 13 0 - 45 U/L Final     IMAGING:    Recent Results (from the past 744 hours)   CT Chest/Abdomen/Pelvis w Contrast    Narrative    EXAM: CT CHEST/ABDOMEN/PELVIS W CONTRAST  LOCATION: [facility]  DATE: 5/19/2025    INDICATION: NSCLC history. History of right lower lobe squamous cell carcinoma and left lower lobe adenocarcinoma.  COMPARISON: 2/17/2025, 11/7/2024, 8/5/2024, 5/3/2024  TECHNIQUE: CT scan of the chest, abdomen, and pelvis was performed following injection of IV contrast. Multiplanar reformats were obtained. Dose reduction techniques were used.   CONTRAST: 81mL isovue 370    FINDINGS:   LUNGS AND PLEURA: A poorly defined oval nodular area of peribronchial soft tissue thickening in the right upper lobe posteriorly on series 4 image 40 measures 11 x 4 mm and is not significantly changed from 2/17/2025 but may be slightly increased in   prominence from 2024. This may represent evolving scarring and fibrotic change. Attention to this area is recommended on future surveillance exams. Unchanged chronic atelectasis and scarring of the right lower lobe with associated volume loss. Stable   bandlike scarring in the left lower lobe. Stable nodule along the right minor fissure on series 4 image 70 measuring 7 x 5 mm. Moderate emphysematous changes. No pleural effusions.    MEDIASTINUM/AXILLAE: Normal heart size. Mild calcified plaque in the thoracic aorta. No adenopathy.    CORONARY ARTERY CALCIFICATION: Moderate.    HEPATOBILIARY: Unchanged small low-attenuation lesions compatible with small cysts or hemangiomas. No follow-up needed.    PANCREAS: Normal.    SPLEEN: Normal.    ADRENAL GLANDS: Normal.    KIDNEYS/BLADDER: Benign left renal cyst. No follow-up needed.    BOWEL: Small sliding-type hiatal hernia. Bowel loops are normal caliber.    LYMPH NODES: Normal.    VASCULATURE: Moderate calcified atheromatous plaque in the aorta and iliac arteries.    PELVIC  ORGANS: No large adnexal cysts or masses.    MUSCULOSKELETAL: No suspicious lytic or sclerotic lesions are identified. Degenerative changes in the lower thoracic and lower lumbar spine.      Impression    IMPRESSION:  1.  A poorly defined oval nodular area of peribronchial soft tissue thickening in the right upper lobe posteriorly measuring 11 x 4 mm is not significantly changed from 2/17/2025 but may be slightly increased in prominence from 2024. This may represent   evolving scarring and fibrotic change. Attention to this area is recommended on future surveillance exams.  2.  Otherwise, no evidence of recurrent malignancy in the chest, abdomen, or pelvis.  3.  Stable chronic atelectasis and scarring in the right lower lobe with associated volume loss compatible with posttreatment changes. Stable bandlike scarring in the left lower lobe compatible with posttreatment changes.  4.  Stable 7 x 5 mm nodule along the right minor fissure.  5.  Moderate emphysematous changes.        MED:    Current Outpatient Medications   Medication Sig Dispense Refill     acetaminophen (TYLENOL) 500 MG tablet Take 1,000 mg by mouth every 6 hours as needed for mild pain Taking once a day       albuterol (PROAIR HFA/PROVENTIL HFA/VENTOLIN HFA) 108 (90 Base) MCG/ACT inhaler INHALE 2 PUFFS INTO THE LUNGS EVERY 6 HOURS AS NEEDED FOR SHORTNESS OF BREATH OR DIFFICULTY BREATHING OR WHEEZING 90 g 4     atorvastatin (LIPITOR) 20 MG tablet TAKE 1 TABLET(20 MG) BY MOUTH DAILY 90 tablet 3     celecoxib (CELEBREX) 200 MG capsule TAKE 1 CAPSULE(200 MG) BY MOUTH DAILY 60 capsule 1     Cholecalciferol (VITAMIN D-3 PO) Take 2,000 Units by mouth daily        clonazePAM (KLONOPIN) 0.5 MG tablet Take 1-2 tablets (0.5-1 mg) by mouth 2 times daily as needed for anxiety. 120 tablet 1     FLUoxetine (PROZAC) 20 MG capsule Take 2 capsules (40 mg) by mouth daily. 180 capsule 3     fluticasone-salmeterol (ADVAIR HFA) 115-21 MCG/ACT inhaler Inhale 2 puffs into the  lungs 2 times daily 12 g 11     hydrochlorothiazide (HYDRODIURIL) 25 MG tablet Take 1 tablet (25 mg) by mouth daily. 90 tablet 3     ipratropium - albuterol 0.5 mg/2.5 mg/3 mL (DUONEB) 0.5-2.5 (3) MG/3ML neb solution Take 1 vial (3 mLs) by nebulization every 6 hours as needed for shortness of breath or wheezing 90 mL 0     omeprazole (PRILOSEC) 20 MG DR capsule TAKE 1 CAPSULE(20 MG) BY MOUTH DAILY 30 TO 60 MINUTES BEFORE A MEAL 90 capsule 3     tiotropium (SPIRIVA RESPIMAT) 2.5 MCG/ACT inhaler Inhale 2 puffs into the lungs daily. 4 g 2     tiZANidine (ZANAFLEX) 2 MG tablet TAKE 1 TABLET(2 MG) BY MOUTH THREE TIMES DAILY AS NEEDED FOR MUSCLE SPASMS 90 tablet 3     traMADol (ULTRAM) 50 MG tablet Take 1 tablet (50 mg) by mouth every 6 hours as needed for severe pain. 30 tablet 0     traZODone (DESYREL) 50 MG tablet TAKE 2 TABLETS(100 MG) BY MOUTH AT BEDTIME 60 tablet 5     Current Facility-Administered Medications   Medication Dose Route Frequency Provider Last Rate Last Admin     levalbuterol (XOPENEX) neb solution 1.25 mg  1.25 mg Nebulization Q6H PRN Chaz Martel MD         Facility-Administered Medications Ordered in Other Visits   Medication Dose Route Frequency Provider Last Rate Last Admin     heparin lock flush 100 unit/mL injection 500 Units  500 Units Intracatheter Once Yomi Durham MD          PE:    Vitals: /77 (BP Location: Right arm, Patient Position: Sitting, Cuff Size: Adult Regular)   Pulse 81   Temp 97.5  F (36.4  C) (Oral)   Resp 20   Wt 71.8 kg (158 lb 4.8 oz)   SpO2 95%   BMI 28.49 kg/m    BMI= Body mass index is 28.49 kg/m .     HEENT:  Without nodes.  Chest: Clear  Cor: NSR  Ext: Without edema    A/P:    1) NSCLC:  The patient had bilateral primary tumors (stage 1 amd 3).  She is now two years out from definitive therapy.  There is no evidence of recurrence.  We will continue every 3 month scans for the next 12 months.    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  She take tramadol and this controls it.    3) Anemia:  The patient has not had a CBC checked for some time.  We will check this next visit with iron studies and a reticulocyte count.    The longitudinal plan of care for the diagnosis(es)/condition(s) as documented were addressed during this visit. Due to the added complexity in care, I will continue to support Yamel in the subsequent management and with ongoing continuity of care.     Again, thank you for allowing me to participate in the care of your patient.        Sincerely,        Clifton Carbajal MD    Electronically signed

## 2025-05-21 NOTE — NURSING NOTE
"Oncology Rooming Note    May 21, 2025 11:15 AM   Yamel Lindsay is a 79 year old female who presents for:    Chief Complaint   Patient presents with    Oncology Clinic Visit     Malignant neoplasm of lower lobe of right lung     Initial Vitals: /77 (BP Location: Right arm, Patient Position: Sitting, Cuff Size: Adult Regular)   Pulse 81   Temp 97.5  F (36.4  C) (Oral)   Resp 20   Wt 71.8 kg (158 lb 4.8 oz)   SpO2 95%   BMI 28.49 kg/m   Estimated body mass index is 28.49 kg/m  as calculated from the following:    Height as of 12/16/24: 1.588 m (5' 2.5\").    Weight as of this encounter: 71.8 kg (158 lb 4.8 oz). Body surface area is 1.78 meters squared.  No Pain (0) Comment: Data Unavailable   No LMP recorded. Patient is postmenopausal.  Allergies reviewed: Yes  Medications reviewed: Yes    Medications: Medication refills not needed today.  Pharmacy [facility] entered into [facility]: [id number] - [place] [id number] [address] [place] [address] & [facility]    Frailty Screening:   Is the patient here for a new oncology consult visit in cancer care? 2. No    PHQ9:  Did this patient require a PHQ9?: No      Clinical concerns: none      Maci Zacarias            "

## 2025-05-27 DIAGNOSIS — C34.31 MALIGNANT NEOPLASM OF LOWER LOBE OF RIGHT LUNG (H): ICD-10-CM

## 2025-05-27 RX ORDER — TRAMADOL HYDROCHLORIDE 50 MG/1
50 TABLET ORAL EVERY 6 HOURS PRN
Qty: 30 TABLET | Refills: 0 | Status: SHIPPED | OUTPATIENT
Start: 2025-05-27

## 2025-05-27 NOTE — TELEPHONE ENCOUNTER
Narcotic Refill Request     Medication(s) requested:  tramadol Refill   Person Requesting Refill:Yamel   What pain is the medication treating: back and knees   How is the medication being taken?:takes 3-4 times per day as needed   Does pt have enough for today? Last one taken this morning.   Is pain being adequately controlled on the current regimen?: yes takes the edge off   Experiencing any side effects from medication?: None      Date of most recent appointment:  5/21/2025Dr Carbajal   Any No Show Visits:No   Next appointment:  8/27/2025 Dr. Carbajal   Last fill date and by 5/16/2025 DR Carbajal    Reviewed: No Access      Send to provider: Dr Carbajal and Gaviota estveez

## 2025-05-30 DIAGNOSIS — J42 CHRONIC BRONCHITIS, UNSPECIFIED CHRONIC BRONCHITIS TYPE (H): ICD-10-CM

## 2025-05-30 NOTE — TELEPHONE ENCOUNTER
Spiriva inhaler  Last prescribing provider: Dr. Carbajal    Last clinic visit date: 5/21/25    Recommendations for requested medication (if none, N/A): NA    Any other pertinent information (if none, N/A): Fax request. Routing to Dr. Reeder, on call.     Refilled: Y/N, if NO, why?

## 2025-06-04 DIAGNOSIS — M62.838 MUSCLE SPASM: ICD-10-CM

## 2025-06-04 RX ORDER — TIZANIDINE 2 MG/1
2 TABLET ORAL 3 TIMES DAILY PRN
Qty: 90 TABLET | Refills: 3 | OUTPATIENT
Start: 2025-06-04

## 2025-06-05 DIAGNOSIS — C34.31 MALIGNANT NEOPLASM OF LOWER LOBE OF RIGHT LUNG (H): ICD-10-CM

## 2025-06-05 RX ORDER — TRAMADOL HYDROCHLORIDE 50 MG/1
50 TABLET ORAL EVERY 6 HOURS PRN
Qty: 30 TABLET | Refills: 0 | Status: SHIPPED | OUTPATIENT
Start: 2025-06-05

## 2025-06-05 NOTE — TELEPHONE ENCOUNTER
Narcotic Refill Request    Medication(s) requested:  Tramadol  Person Requesting Refill: Patient  What pain is the medication treating: knee and back pain  How is the medication being taken?:1 tablet 3-4 x a day  Does pt have enough for today? No  Is pain being adequately controlled on the current regimen?: Yes  Experiencing any side effects from medication?: No    Date of most recent appointment:  05/21/25 w/  Any No Show Visits: No  Next appointment:   08/27/25 w/  Last fill date and by whom:  05/27/25 by     Reviewed: No access    Routed provider:

## 2025-06-13 DIAGNOSIS — M62.838 MUSCLE SPASM: ICD-10-CM

## 2025-06-16 DIAGNOSIS — C34.31 MALIGNANT NEOPLASM OF LOWER LOBE OF RIGHT LUNG (H): ICD-10-CM

## 2025-06-16 RX ORDER — TRAMADOL HYDROCHLORIDE 50 MG/1
50 TABLET ORAL EVERY 6 HOURS PRN
Qty: 30 TABLET | Refills: 0 | Status: SHIPPED | OUTPATIENT
Start: 2025-06-16

## 2025-06-16 RX ORDER — TIZANIDINE 2 MG/1
2 TABLET ORAL 3 TIMES DAILY PRN
Qty: 90 TABLET | Refills: 3 | Status: SHIPPED | OUTPATIENT
Start: 2025-06-16

## 2025-06-16 NOTE — TELEPHONE ENCOUNTER
Narcotic Refill Request     Medication(s) requested:  Tramadol 50mg tablet  Person Requesting Refill: Yamel  What pain is the medication treating: knee and back pain  How is the medication being taken?:1 tablet 3-4 x a day  Does pt have enough for today? No, took last dose this AM.   Is pain being adequately controlled on the current regimen?: Yes, some what, takes the edge off.   Experiencing any side effects from medication?: No     Date of most recent appointment:  05/21/25 w/   Any No Show Visits: No  Next appointment:   08/27/25 w/   Last fill date and by whom:  6/5/25 by Dr. Mariajose Peterson    Reviewed: No access     Routed provider: Dr. Titi RNCC

## 2025-06-24 DIAGNOSIS — C34.31 MALIGNANT NEOPLASM OF LOWER LOBE OF RIGHT LUNG (H): ICD-10-CM

## 2025-06-24 RX ORDER — TRAMADOL HYDROCHLORIDE 50 MG/1
50 TABLET ORAL EVERY 6 HOURS PRN
Qty: 30 TABLET | Refills: 0 | Status: SHIPPED | OUTPATIENT
Start: 2025-06-24

## 2025-06-24 NOTE — TELEPHONE ENCOUNTER
Narcotic Refill Request    Medication(s) requested:  tramadol 50 mg tabs   Person Requesting Refill:Yamel   What pain is the medication treating: knee and back pain   How is the medication being taken?:1 tablet 3-4 times per day   Does pt have enough for today? Has 1 tab left   Is pain being adequately controlled on the current regimen?: takes the edge off   Experiencing any side effects from medication?: None     Date of most recent appointment:  5/21/25 DR Carbajal   Any No Show Visits:No   Next appointment:   8/27/25 DR Carbajal   Last fill date and by whom:  6/16/25 Dr Carbajal    Reviewed: No Access     Send to provider: DR Carbajal and Gaviota estevez

## 2025-07-07 DIAGNOSIS — C34.31 MALIGNANT NEOPLASM OF LOWER LOBE OF RIGHT LUNG (H): ICD-10-CM

## 2025-07-07 RX ORDER — TRAMADOL HYDROCHLORIDE 50 MG/1
50 TABLET ORAL EVERY 6 HOURS PRN
Qty: 30 TABLET | Refills: 0 | Status: SHIPPED | OUTPATIENT
Start: 2025-07-07

## 2025-07-07 NOTE — TELEPHONE ENCOUNTER
Narcotic Refill Request  Person Requesting Refill:Yamel    Medication(s) requested:  tramadol 50 mg tablet  Last fill date and by whom:  6/24/25 by Dr. Clifton Carbajal  What pain is the medication treating: bilateral knees and back pain  How is the medication being taken?:Taking medication 3-4 times per day  Does pt have enough for today? No. Pt is out of medication  Is pain being adequately controlled on the current regimen?: somewhat  Experiencing any side effects from medication?: None    Date of most recent appointment:  5/21/25 with Dr. Carbajal  Any No Show Visits:No  Next appointment:   8/27/25 with Dr. Carbajal     Reviewed: No Access    Routed/Paged provider: Dr. Clifton Carbajal and Gaviota Aly RNCC

## 2025-07-11 DIAGNOSIS — K21.9 GASTROESOPHAGEAL REFLUX DISEASE WITHOUT ESOPHAGITIS: ICD-10-CM

## 2025-07-12 DIAGNOSIS — I10 BENIGN ESSENTIAL HYPERTENSION: ICD-10-CM

## 2025-07-14 RX ORDER — HYDROCHLOROTHIAZIDE 25 MG/1
25 TABLET ORAL DAILY
Qty: 90 TABLET | Refills: 3 | Status: SHIPPED | OUTPATIENT
Start: 2025-07-14

## 2025-07-14 RX ORDER — OMEPRAZOLE 20 MG/1
CAPSULE, DELAYED RELEASE ORAL
Qty: 90 CAPSULE | Refills: 0 | Status: SHIPPED | OUTPATIENT
Start: 2025-07-14

## 2025-07-15 DIAGNOSIS — C34.31 MALIGNANT NEOPLASM OF LOWER LOBE OF RIGHT LUNG (H): ICD-10-CM

## 2025-07-15 DIAGNOSIS — F41.9 ANXIETY: ICD-10-CM

## 2025-07-15 RX ORDER — CLONAZEPAM 0.5 MG/1
.5-1 TABLET ORAL 2 TIMES DAILY PRN
Qty: 120 TABLET | Refills: 1 | Status: SHIPPED | OUTPATIENT
Start: 2025-07-15

## 2025-07-15 RX ORDER — TRAMADOL HYDROCHLORIDE 50 MG/1
50 TABLET ORAL EVERY 6 HOURS PRN
Qty: 30 TABLET | Refills: 0 | Status: SHIPPED | OUTPATIENT
Start: 2025-07-15

## 2025-07-15 NOTE — TELEPHONE ENCOUNTER
Narcotic Refill Request    Medication(s) requested:  Tramadol and klonopin   Person Requesting Refill:Yamel   What pain is the medication treating: knees and back   How is the medication being taken?:3-4 times daily   Does pt have enough for today? Has one left   Is pain being adequately controlled on the current regimen?: yes somewhat   Experiencing any side effects from medication?: none     Date of most recent appointment:  5/21/25 DR Carbajal   Any No Show Visits:No   Next appointment:   8/27/25 Dr Carbajal   Last fill date and by whom:  7/7/25 DR Carbajal    Reviewed: No access     Send to provider: DR Carbajal and Gaviota Aly

## 2025-07-16 DIAGNOSIS — K21.9 GASTROESOPHAGEAL REFLUX DISEASE WITHOUT ESOPHAGITIS: ICD-10-CM

## 2025-07-16 RX ORDER — OMEPRAZOLE 20 MG/1
CAPSULE, DELAYED RELEASE ORAL
Qty: 90 CAPSULE | Refills: 0 | OUTPATIENT
Start: 2025-07-16

## 2025-08-01 DIAGNOSIS — C34.31 MALIGNANT NEOPLASM OF LOWER LOBE OF RIGHT LUNG (H): ICD-10-CM

## 2025-08-04 RX ORDER — TRAMADOL HYDROCHLORIDE 50 MG/1
50 TABLET ORAL EVERY 6 HOURS PRN
Qty: 30 TABLET | Refills: 0 | Status: SHIPPED | OUTPATIENT
Start: 2025-08-04

## 2025-08-12 DIAGNOSIS — C34.31 MALIGNANT NEOPLASM OF LOWER LOBE OF RIGHT LUNG (H): ICD-10-CM

## 2025-08-12 RX ORDER — TRAMADOL HYDROCHLORIDE 50 MG/1
50 TABLET ORAL EVERY 6 HOURS PRN
Qty: 30 TABLET | Refills: 0 | Status: SHIPPED | OUTPATIENT
Start: 2025-08-12

## 2025-08-19 DIAGNOSIS — C34.31 MALIGNANT NEOPLASM OF LOWER LOBE OF RIGHT LUNG (H): ICD-10-CM

## 2025-08-19 RX ORDER — TRAMADOL HYDROCHLORIDE 50 MG/1
50 TABLET ORAL EVERY 6 HOURS PRN
Qty: 30 TABLET | Refills: 0 | Status: SHIPPED | OUTPATIENT
Start: 2025-08-19

## 2025-08-25 ENCOUNTER — HOSPITAL ENCOUNTER (OUTPATIENT)
Dept: CT IMAGING | Facility: HOSPITAL | Age: 80
Discharge: HOME | End: 2025-08-25
Attending: INTERNAL MEDICINE | Admitting: INTERNAL MEDICINE
Payer: COMMERCIAL

## 2025-08-25 DIAGNOSIS — C34.80 MALIGNANT NEOPLASM OF OVERLAPPING SITES OF LUNG, UNSPECIFIED LATERALITY (H): ICD-10-CM

## 2025-08-25 LAB
CREAT BLD-MCNC: 0.7 MG/DL (ref 0.5–1)
EGFRCR SERPLBLD CKD-EPI 2021: >60 ML/MIN/1.73M2

## 2025-08-25 PROCEDURE — 250N000009 HC RX 250: Performed by: INTERNAL MEDICINE

## 2025-08-25 PROCEDURE — 250N000011 HC RX IP 250 OP 636: Performed by: INTERNAL MEDICINE

## 2025-08-25 PROCEDURE — 82565 ASSAY OF CREATININE: CPT

## 2025-08-25 PROCEDURE — 71260 CT THORAX DX C+: CPT

## 2025-08-25 RX ORDER — IOPAMIDOL 755 MG/ML
77 INJECTION, SOLUTION INTRAVASCULAR ONCE
Status: COMPLETED | OUTPATIENT
Start: 2025-08-25 | End: 2025-08-25

## 2025-08-25 RX ADMIN — SODIUM CHLORIDE 100 ML: 9 INJECTION, SOLUTION INTRAVENOUS at 11:11

## 2025-08-25 RX ADMIN — IOPAMIDOL 77 ML: 755 INJECTION, SOLUTION INTRAVENOUS at 11:10

## 2025-08-27 ENCOUNTER — ONCOLOGY VISIT (OUTPATIENT)
Dept: ONCOLOGY | Facility: CLINIC | Age: 80
End: 2025-08-27
Attending: INTERNAL MEDICINE
Payer: COMMERCIAL

## 2025-08-27 VITALS
BODY MASS INDEX: 27.9 KG/M2 | OXYGEN SATURATION: 94 % | TEMPERATURE: 97.7 F | RESPIRATION RATE: 16 BRPM | DIASTOLIC BLOOD PRESSURE: 90 MMHG | HEART RATE: 89 BPM | WEIGHT: 157.5 LBS | SYSTOLIC BLOOD PRESSURE: 161 MMHG

## 2025-08-27 DIAGNOSIS — C34.31 MALIGNANT NEOPLASM OF LOWER LOBE OF RIGHT LUNG (H): ICD-10-CM

## 2025-08-27 PROCEDURE — G0463 HOSPITAL OUTPT CLINIC VISIT: HCPCS | Performed by: INTERNAL MEDICINE

## 2025-08-27 PROCEDURE — 99214 OFFICE O/P EST MOD 30 MIN: CPT | Performed by: INTERNAL MEDICINE

## 2025-08-27 PROCEDURE — G2211 COMPLEX E/M VISIT ADD ON: HCPCS | Performed by: INTERNAL MEDICINE

## 2025-08-27 RX ORDER — TRAMADOL HYDROCHLORIDE 50 MG/1
50 TABLET ORAL EVERY 6 HOURS PRN
Qty: 30 TABLET | Refills: 0 | Status: SHIPPED | OUTPATIENT
Start: 2025-08-27

## 2025-08-27 ASSESSMENT — PAIN SCALES - GENERAL: PAINLEVEL_OUTOF10: NO PAIN (0)

## (undated) DEVICE — ENDO PROBE COVER ULTRASOUND BALLOON LATEX  MAJ-233

## (undated) DEVICE — SOL WATER IRRIG 1000ML BOTTLE 07139-09

## (undated) DEVICE — BASIN SET MINOR DISP

## (undated) DEVICE — GLOVE PROTEXIS W/NEU-THERA 7.5  2D73TE75

## (undated) DEVICE — LIGHT HANDLE X2

## (undated) DEVICE — Device

## (undated) DEVICE — ANTIFOG SOLUTION W/FOAM PAD CF-1002

## (undated) DEVICE — TUBING SUCTION 10'X3/16" N510

## (undated) DEVICE — DECANTER BAG 2002S

## (undated) DEVICE — DRAPE SHEET REV FOLD 3/4 9349

## (undated) DEVICE — SU ETHIBOND 2-0 MO-7 CR 8X18" CX42D

## (undated) DEVICE — SYR 10ML FINGER CONTROL W/O NDL 309695

## (undated) DEVICE — SYR 10ML LL W/O NDL

## (undated) DEVICE — NDL 22GA 1.5"

## (undated) DEVICE — SU MONOCRYL 4-0 PS-2 18" UND Y496G

## (undated) DEVICE — BLADE KNIFE SURG 15 371115

## (undated) DEVICE — ENDO VALVE BX EVIS MAJ-210

## (undated) DEVICE — NDL COUNTER 20CT 31142493

## (undated) DEVICE — DRAPE C-ARM 60X42" 1013

## (undated) DEVICE — PITCHER STERILE 1000ML  SSK9004A

## (undated) DEVICE — SU VICRYL 3-0 SH 27" UND J416H

## (undated) DEVICE — SYR 10ML LL W/O NDL 302995

## (undated) DEVICE — GLOVE PROTEXIS BLUE W/NEU-THERA 8.0  2D73EB80

## (undated) DEVICE — ENDO ULTRASOUND BRONCH BALLOON PRB COVER MAJ-1351

## (undated) DEVICE — ADH SKIN CLOSURE PREMIERPRO EXOFIN 1.0ML 3470

## (undated) DEVICE — GOWN XLG DISP 9545

## (undated) DEVICE — LABEL MEDICATION SYSTEM  3304

## (undated) DEVICE — ENDO VALVE SUCTION BRONCH EVIS MAJ-209

## (undated) DEVICE — SOL NACL 0.9% IRRIG 1000ML BOTTLE 2F7124

## (undated) DEVICE — DECANTER VIAL 2006S

## (undated) DEVICE — CUSTOM OLYMPUS KIT

## (undated) DEVICE — BAG INSTRUMENT IV VISION ION 490127

## (undated) DEVICE — PACK LAP TRANSVERSE STD

## (undated) DEVICE — ENDO FORCEP ALLIGATOR JAW BIOPSY 2MMX100CM FB-211D

## (undated) DEVICE — LABEL MEDICATION SYSTEM 3303-P

## (undated) DEVICE — ADAPTER PROBE/SUCTION VISION ION 490101

## (undated) DEVICE — BLADE KNIFE SURG 11 371111

## (undated) DEVICE — NDL BIOPSY 21G FLEXISION ION 490103

## (undated) DEVICE — PREP CHLORAPREP 26ML TINTED ORANGE  260815

## (undated) DEVICE — SYR 30ML SLIP TIP W/O NDL 302833

## (undated) DEVICE — SUCTION MANIFOLD NEPTUNE 2 SYS 1 PORT 702-025-000

## (undated) DEVICE — KIT ENDO FIRST STEP DISINFECTANT 200ML W/POUCH EP-4

## (undated) DEVICE — DRSG TELFA 3X8" 1238

## (undated) DEVICE — SOL NACL 0.9% IRRIG 1000ML BOTTLE 07138-09

## (undated) DEVICE — STOCKING SLEEVE COMPRESSION CALF LG

## (undated) DEVICE — SPONGE RAY-TEC 4X8" 7318

## (undated) DEVICE — ENDO VALVE SUCTION ULTRASOUND BRONCH MAJ-1414

## (undated) RX ORDER — ONDANSETRON 2 MG/ML
INJECTION INTRAMUSCULAR; INTRAVENOUS
Status: DISPENSED
Start: 2022-12-28

## (undated) RX ORDER — HEPARIN SODIUM (PORCINE) LOCK FLUSH IV SOLN 100 UNIT/ML 100 UNIT/ML
SOLUTION INTRAVENOUS
Status: DISPENSED
Start: 2023-06-16

## (undated) RX ORDER — HEPARIN SODIUM (PORCINE) LOCK FLUSH IV SOLN 100 UNIT/ML 100 UNIT/ML
SOLUTION INTRAVENOUS
Status: DISPENSED
Start: 2023-08-25

## (undated) RX ORDER — BUPIVACAINE HYDROCHLORIDE 5 MG/ML
INJECTION, SOLUTION EPIDURAL; INTRACAUDAL
Status: DISPENSED
Start: 2023-02-06

## (undated) RX ORDER — ONDANSETRON 2 MG/ML
INJECTION INTRAMUSCULAR; INTRAVENOUS
Status: DISPENSED
Start: 2023-02-06

## (undated) RX ORDER — HEPARIN SODIUM (PORCINE) LOCK FLUSH IV SOLN 100 UNIT/ML 100 UNIT/ML
SOLUTION INTRAVENOUS
Status: DISPENSED
Start: 2023-02-27

## (undated) RX ORDER — FENTANYL CITRATE 50 UG/ML
INJECTION, SOLUTION INTRAMUSCULAR; INTRAVENOUS
Status: DISPENSED
Start: 2022-12-28

## (undated) RX ORDER — HEPARIN SODIUM (PORCINE) LOCK FLUSH IV SOLN 100 UNIT/ML 100 UNIT/ML
SOLUTION INTRAVENOUS
Status: DISPENSED
Start: 2023-10-20

## (undated) RX ORDER — ACETAMINOPHEN 325 MG/1
TABLET ORAL
Status: DISPENSED
Start: 2023-02-06

## (undated) RX ORDER — PROPOFOL 10 MG/ML
INJECTION, EMULSION INTRAVENOUS
Status: DISPENSED
Start: 2022-12-28

## (undated) RX ORDER — PROPOFOL 10 MG/ML
INJECTION, EMULSION INTRAVENOUS
Status: DISPENSED
Start: 2023-02-06

## (undated) RX ORDER — CEFAZOLIN SODIUM/WATER 2 G/20 ML
SYRINGE (ML) INTRAVENOUS
Status: DISPENSED
Start: 2023-02-06

## (undated) RX ORDER — GLYCOPYRROLATE 0.2 MG/ML
INJECTION, SOLUTION INTRAMUSCULAR; INTRAVENOUS
Status: DISPENSED
Start: 2022-12-28

## (undated) RX ORDER — DEXAMETHASONE SODIUM PHOSPHATE 4 MG/ML
INJECTION, SOLUTION INTRA-ARTICULAR; INTRALESIONAL; INTRAMUSCULAR; INTRAVENOUS; SOFT TISSUE
Status: DISPENSED
Start: 2023-02-06

## (undated) RX ORDER — GLYCOPYRROLATE 0.2 MG/ML
INJECTION, SOLUTION INTRAMUSCULAR; INTRAVENOUS
Status: DISPENSED
Start: 2018-05-25

## (undated) RX ORDER — GABAPENTIN 100 MG/1
CAPSULE ORAL
Status: DISPENSED
Start: 2023-02-06